# Patient Record
Sex: MALE | Race: WHITE | Employment: OTHER | URBAN - METROPOLITAN AREA
[De-identification: names, ages, dates, MRNs, and addresses within clinical notes are randomized per-mention and may not be internally consistent; named-entity substitution may affect disease eponyms.]

---

## 2018-02-02 PROBLEM — R20.0 NUMBNESS AND TINGLING OF LEFT SIDE OF FACE: Status: ACTIVE | Noted: 2018-02-02

## 2018-02-02 PROBLEM — R20.0 NUMBNESS AND TINGLING IN LEFT ARM: Status: ACTIVE | Noted: 2018-02-02

## 2018-02-02 PROBLEM — R20.2 NUMBNESS AND TINGLING OF LEFT SIDE OF FACE: Status: ACTIVE | Noted: 2018-02-02

## 2018-02-02 PROBLEM — R20.2 NUMBNESS AND TINGLING IN LEFT ARM: Status: ACTIVE | Noted: 2018-02-02

## 2018-04-18 ENCOUNTER — OFFICE VISIT (OUTPATIENT)
Dept: CARDIOLOGY CLINIC | Age: 83
End: 2018-04-18
Payer: MEDICARE

## 2018-04-18 VITALS
WEIGHT: 185.8 LBS | SYSTOLIC BLOOD PRESSURE: 142 MMHG | HEART RATE: 57 BPM | DIASTOLIC BLOOD PRESSURE: 70 MMHG | HEIGHT: 67 IN | RESPIRATION RATE: 16 BRPM | BODY MASS INDEX: 29.16 KG/M2

## 2018-04-18 DIAGNOSIS — I25.10 CORONARY ARTERY DISEASE INVOLVING NATIVE CORONARY ARTERY OF NATIVE HEART WITHOUT ANGINA PECTORIS: Primary | ICD-10-CM

## 2018-04-18 DIAGNOSIS — Z95.1 S/P CABG X 6: ICD-10-CM

## 2018-04-18 PROCEDURE — 1123F ACP DISCUSS/DSCN MKR DOCD: CPT | Performed by: INTERNAL MEDICINE

## 2018-04-18 PROCEDURE — 1036F TOBACCO NON-USER: CPT | Performed by: INTERNAL MEDICINE

## 2018-04-18 PROCEDURE — G8419 CALC BMI OUT NRM PARAM NOF/U: HCPCS | Performed by: INTERNAL MEDICINE

## 2018-04-18 PROCEDURE — G8598 ASA/ANTIPLAT THER USED: HCPCS | Performed by: INTERNAL MEDICINE

## 2018-04-18 PROCEDURE — 93000 ELECTROCARDIOGRAM COMPLETE: CPT | Performed by: INTERNAL MEDICINE

## 2018-04-18 PROCEDURE — G8427 DOCREV CUR MEDS BY ELIG CLIN: HCPCS | Performed by: INTERNAL MEDICINE

## 2018-04-18 PROCEDURE — 99214 OFFICE O/P EST MOD 30 MIN: CPT | Performed by: INTERNAL MEDICINE

## 2018-04-18 PROCEDURE — 4040F PNEUMOC VAC/ADMIN/RCVD: CPT | Performed by: INTERNAL MEDICINE

## 2018-04-18 RX ORDER — METOPROLOL SUCCINATE 25 MG/1
25 TABLET, EXTENDED RELEASE ORAL DAILY
Qty: 90 TABLET | Refills: 3 | Status: ON HOLD | OUTPATIENT
Start: 2018-04-18 | End: 2019-04-16 | Stop reason: HOSPADM

## 2018-07-10 ENCOUNTER — OFFICE VISIT (OUTPATIENT)
Dept: ORTHOPEDIC SURGERY | Age: 83
End: 2018-07-10
Payer: MEDICARE

## 2018-07-10 VITALS
HEART RATE: 53 BPM | SYSTOLIC BLOOD PRESSURE: 133 MMHG | WEIGHT: 175 LBS | TEMPERATURE: 97.2 F | BODY MASS INDEX: 27.47 KG/M2 | DIASTOLIC BLOOD PRESSURE: 69 MMHG | HEIGHT: 67 IN

## 2018-07-10 DIAGNOSIS — M54.16 LUMBAR RADICULAR PAIN: ICD-10-CM

## 2018-07-10 DIAGNOSIS — M25.551 PAIN OF RIGHT HIP JOINT: Primary | ICD-10-CM

## 2018-07-10 PROCEDURE — 1036F TOBACCO NON-USER: CPT | Performed by: ORTHOPAEDIC SURGERY

## 2018-07-10 PROCEDURE — 1123F ACP DISCUSS/DSCN MKR DOCD: CPT | Performed by: ORTHOPAEDIC SURGERY

## 2018-07-10 PROCEDURE — G8598 ASA/ANTIPLAT THER USED: HCPCS | Performed by: ORTHOPAEDIC SURGERY

## 2018-07-10 PROCEDURE — 4040F PNEUMOC VAC/ADMIN/RCVD: CPT | Performed by: ORTHOPAEDIC SURGERY

## 2018-07-10 PROCEDURE — 99203 OFFICE O/P NEW LOW 30 MIN: CPT | Performed by: ORTHOPAEDIC SURGERY

## 2018-07-10 PROCEDURE — G8419 CALC BMI OUT NRM PARAM NOF/U: HCPCS | Performed by: ORTHOPAEDIC SURGERY

## 2018-07-10 PROCEDURE — G8427 DOCREV CUR MEDS BY ELIG CLIN: HCPCS | Performed by: ORTHOPAEDIC SURGERY

## 2018-07-10 NOTE — PROGRESS NOTES
Chief Complaint:   Chief Complaint   Patient presents with    Hip Pain     Left hip and leg pain for several months. History of left BRIONNA 11/09/09. Wearing shoe orthotics. Had 3 treatments from a chiropractor with no improvement. SOSA Hernandez is a 80 y.o. male, who presents With a few month history of relapsing diffuse left hip pain that radiates down the leg actually he is more symptomatic with numbness tingling low the knee to the foot. He had similar symptoms in the past that resolved with chiropractic and variable use of shoe lifts, these symptoms currently do not seem to be responding to these measures. History notable for left total hip 2009 right total hip 2011 both with uneventful recovery. No fever chills sweats or other constitutional symptoms. Allergies; medications; past medical, surgical, family, and social history; and problem list have been reviewed today and updated as indicated in this encounter - see below following Ortho specifics. Musculoskeletal: Leg lengths are grossly equal, hip rotation flexion are painless bilaterally. Straight leg raising is positive for some hamstring tension but no radicular symptoms. Some weakness of ankle dorsiflexion against resistance noted on the left side, Achilles and patellar tendon reflexes absent. Radiologic Studies: X-rays of pelvis and right hip show bilateral total hip arthroplasties, the right is clearly intact without evidence of loosening migration or wear, the left side there may be some early proximal periprosthetic lucency although the distal stem appears intact but there is some evidence of stress remodeling noted at the medial distal end. ASSESSMENT/PLAN:    Lexi Rodriguez was seen today for hip pain.     Diagnoses and all orders for this visit:    Pain of right hip joint  -     Cancel: XR HIP 2-3 VW W PELVIS RIGHT  -     XR HIP 2-3 VW W PELVIS LEFT    Lumbar radicular pain  -     Ambulatory referral to Physical Therapy     I think the majority of the patient's symptoms are lumbar radicular in nature although we did talk about the possibility as well of some subclinical loosening of the left hip femoral stem. Treatment options were reviewed, the patient was referred for physical therapy and we will also consider a PMR consultation. Follow-up in 1 month possible bone scan left hip. Return in about 1 month (around 8/10/2018). Odilon Nayak MD    7/10/2018  4:46 PM      Patient Active Problem List   Diagnosis    CAD (coronary artery disease)    S/P CABG x 6    Hyperlipemia    Carotid artery stenosis, asymptomatic    Numbness and tingling in left arm    Numbness and tingling of left side of face       Past Medical History:   Diagnosis Date    BPH (benign prostatic hyperplasia)     CAD (coronary artery disease)     Carotid artery calcification     DJD (degenerative joint disease)     Hyperlipemia     Macular degeneration     Numbness and tingling in left arm 2/2/2018    Numbness and tingling of left side of face 2/2/2018    S/P CABG x 6 2000    LIMA-LAD, LYNNETTE-LCx,SVG-OM,SVG-LPL,SVG-PDA-RPL       Past Surgical History:   Procedure Laterality Date    CARDIAC SURGERY      6 vessel in 2000   [facility] DENTAL SURGERY      root canal    DIAGNOSTIC CARDIAC CATH LAB PROCEDURE  9/5/00    OTHER SURGICAL HISTORY  02/16/2011    Injection right hip  T. Contreras Palomino MD    TOTAL HIP ARTHROPLASTY Right 03/28/2011    Right BRIONNA  James Vergara MD    TOTAL HIP ARTHROPLASTY Left 11/09/2009    Left BRIONAN  James Vergara MD    TURP         Current Outpatient Prescriptions   Medication Sig Dispense Refill    metoprolol succinate (TOPROL XL) 25 MG extended release tablet Take 1 tablet by mouth daily 90 tablet 3    Calcium-Magnesium-Vitamin D (CALCIUM 1200+D3 PO) Take 1 tablet by mouth daily.  atorvastatin (LIPITOR) 40 MG tablet Take 40 mg by mouth daily.  Multiple Vitamins-Minerals (CENTRUM-LUTEIN PO) Take 1 tablet by mouth daily.       Omega-3 Fatty Acids (FISH OIL) 1000 MG CAPS Take 1,000 mg by mouth daily.  aspirin 81 MG EC tablet Take 81 mg by mouth daily.  Coenzyme Q10 (COQ10) 100 MG CAPS Take 100 mg by mouth daily.  Bevacizumab (AVASTIN IV) Infuse intravenously See Admin Instructions Gets injection in eyes about every 11 weeks       No current facility-administered medications for this visit. Allergies   Allergen Reactions    Nitroglycerin     Biaxin [Clarithromycin] Rash    Iodine Swelling and Rash     Internal Iodine only    Quinolones Rash       Social History     Social History    Marital status:      Spouse name: N/A    Number of children: N/A    Years of education: N/A     Social History Main Topics    Smoking status: Former Smoker     Packs/day: 1.00     Years: 10.00     Types: Cigarettes    Smokeless tobacco: Never Used      Comment: quit 40-50 years ago    Alcohol use Yes      Comment: socially    Drug use: No    Sexual activity: Not Asked     Other Topics Concern    None     Social History Narrative    None       Family History   Problem Relation Age of Onset    Other Mother         accident age 28 years , Gertrude Phylicia. was 5 months old    Other Father         accident age 43 decesed 8 years when Father passed    Heart Disease Brother     Alzheimer's Disease Sister     Heart Disease Brother     Heart Disease Brother          Review of Systems  As follows except as previously noted in HPI:  Constitutional: Negative for chills, diaphoresis, fatigue, fever and unexpected weight change. Respiratory: Negative for cough, shortness of breath and wheezing. Cardiovascular: Negative for chest pain and palpitations. Neurological: Negative for dizziness, syncope, weakness and numbness. Musculoskeletal: see HPI       Objective:   Physical Exam   Constitutional: Oriented to person, place, and time.  and appears well-developed and well-nourished. :   Head: Normocephalic and atraumatic. Eyes: EOM are normal.   Neck: Neck supple. Cardiovascular: Normal rate and regular rhythm. Pulmonary/Chest: Effort normal. No stridor. No respiratory distress, no wheezes. Abdominal: Soft, no distension. There is no tenderness. Neurological: Alert and oriented to person, place, and time. Skin: Skin is warm and dry. Psychiatric: Normal mood and affect.  Behavior is normal. Thought content normal.

## 2018-07-12 ENCOUNTER — EVALUATION (OUTPATIENT)
Dept: PHYSICAL THERAPY | Age: 83
End: 2018-07-12
Payer: MEDICARE

## 2018-07-12 DIAGNOSIS — M54.16 LUMBAR RADICULOPATHY: Primary | ICD-10-CM

## 2018-07-12 PROCEDURE — 97110 THERAPEUTIC EXERCISES: CPT | Performed by: PHYSICAL THERAPIST

## 2018-07-12 PROCEDURE — G8978 MOBILITY CURRENT STATUS: HCPCS | Performed by: PHYSICAL THERAPIST

## 2018-07-12 PROCEDURE — G8979 MOBILITY GOAL STATUS: HCPCS | Performed by: PHYSICAL THERAPIST

## 2018-07-12 PROCEDURE — 97535 SELF CARE MNGMENT TRAINING: CPT | Performed by: PHYSICAL THERAPIST

## 2018-07-12 PROCEDURE — 97162 PT EVAL MOD COMPLEX 30 MIN: CPT | Performed by: PHYSICAL THERAPIST

## 2018-07-12 NOTE — PROGRESS NOTES
INITIAL EVALUATION: SPINE              Date:  2018   Patient: Freeman Knowles  : 1931  MRN: [id number]  Physician: Nuria Smith MD  [address] [place] [address]       Medical Diagnosis: M54.16 (ICD-10-CM) - Lumbar radicular pain    Reason for referral/Mechanism of Injury: 2 month hx of L hip and lateral thigh and lower leg. Has had a history of past episodes of similar symptoms. Onset date: 2 months     SUBJECTIVE:       Past Medical History:   Diagnosis Date    BPH (benign prostatic hyperplasia)     CAD (coronary artery disease)     Carotid artery calcification     DJD (degenerative joint disease)     Hyperlipemia     Macular degeneration     Numbness and tingling in left arm 2018    Numbness and tingling of left side of face 2018    S/P CABG x 6     LIMA-LAD, LYNNETTE-LCx,SVG-OM,SVG-LPL,SVG-PDA-RPL     Past Surgical History:   Procedure Laterality Date    CARDIAC SURGERY      6 vessel in    [facility] DENTAL SURGERY      root canal    DIAGNOSTIC CARDIAC CATH LAB PROCEDURE  00    OTHER SURGICAL HISTORY  2011    Injection right hip  T. Scotty Castro MD    TOTAL HIP ARTHROPLASTY Right 2011    Right BRIONNA  Nakul Bhatt MD    TOTAL HIP ARTHROPLASTY Left 2009    Left BRIONNA  Nakul Bhatt MD    TURP         Pain:  Current 0/10 Best  0/10  Worst 4/10    Symptom Type/Quality/Location:  Intermittent             Provoking Activities/Positions:  [] Sitting,          [] Standing,     [x] Walking,- >1/4 mile                                                                   [x] lying              [] bending,      [] When still,                                                                      [] On the move,  []Turning head                                                                     []a.m.         [] p.m.       [x] as day progresses  -based on activity for longer periods                                                               [] Cough          []

## 2018-09-13 ENCOUNTER — HOSPITAL ENCOUNTER (OUTPATIENT)
Age: 83
Discharge: HOME | End: 2018-09-13
Payer: MEDICARE

## 2018-09-13 LAB
ALBUMIN SERPL-MCNC: 3.9 G/DL (ref 3.5–5.2)
ALP BLD-CCNC: 54 U/L (ref 40–129)
ALT SERPL-CCNC: 16 U/L (ref 0–40)
ANION GAP SERPL CALCULATED.3IONS-SCNC: 9 MMOL/L (ref 7–16)
APTT: 25.3 SEC (ref 25.7–34.7)
AST SERPL-CCNC: 20 U/L (ref 0–39)
BASOPHILS ABSOLUTE: 0.1 E9/L (ref 0–0.2)
BASOPHILS RELATIVE PERCENT: 1.7 % (ref 0–2)
BILIRUB SERPL-MCNC: 1.3 MG/DL (ref 0–1.2)
BILIRUBIN DIRECT: 0.3 MG/DL (ref 0–0.3)
BILIRUBIN, INDIRECT: 1 MG/DL (ref 0–1)
BUN BLDV-MCNC: 22 MG/DL (ref 8–23)
CALCIUM SERPL-MCNC: 9.1 MG/DL (ref 8.6–10.2)
CHLORIDE BLD-SCNC: 105 MMOL/L (ref 98–107)
CHOLESTEROL, TOTAL: 158 MG/DL (ref 0–199)
CO2: 26 MMOL/L (ref 22–29)
CREAT SERPL-MCNC: 1.3 MG/DL (ref 0.7–1.2)
EOSINOPHILS ABSOLUTE: 0.49 E9/L (ref 0.05–0.5)
EOSINOPHILS RELATIVE PERCENT: 8.3 % (ref 0–6)
GFR AFRICAN AMERICAN: >60
GFR NON-AFRICAN AMERICAN: 52 ML/MIN/1.73
GLUCOSE BLD-MCNC: 111 MG/DL (ref 74–109)
HCT VFR BLD CALC: 39.9 % (ref 37–54)
HDLC SERPL-MCNC: 57 MG/DL
HEMOGLOBIN: 13 G/DL (ref 12.5–16.5)
IMMATURE GRANULOCYTES #: 0.01 E9/L
IMMATURE GRANULOCYTES %: 0.2 % (ref 0–5)
INR BLD: 1
IRON: 92 MCG/DL (ref 59–158)
LDL CHOLESTEROL CALCULATED: 79 MG/DL (ref 0–99)
LYMPHOCYTES ABSOLUTE: 1.71 E9/L (ref 1.5–4)
LYMPHOCYTES RELATIVE PERCENT: 28.8 % (ref 20–42)
MCH RBC QN AUTO: 28.8 PG (ref 26–35)
MCHC RBC AUTO-ENTMCNC: 32.6 % (ref 32–34.5)
MCV RBC AUTO: 88.5 FL (ref 80–99.9)
MONOCYTES ABSOLUTE: 0.69 E9/L (ref 0.1–0.95)
MONOCYTES RELATIVE PERCENT: 11.6 % (ref 2–12)
NEUTROPHILS ABSOLUTE: 2.93 E9/L (ref 1.8–7.3)
NEUTROPHILS RELATIVE PERCENT: 49.4 % (ref 43–80)
PDW BLD-RTO: 13.4 FL (ref 11.5–15)
PLATELET # BLD: 196 E9/L (ref 130–450)
PMV BLD AUTO: 8.6 FL (ref 7–12)
POTASSIUM SERPL-SCNC: 4.5 MMOL/L (ref 3.5–5)
PROSTATE SPECIFIC ANTIGEN: 1.51 NG/ML (ref 0–4)
PROTHROMBIN TIME: 11.7 SEC (ref 9.3–12.4)
RBC # BLD: 4.51 E12/L (ref 3.8–5.8)
SEDIMENTATION RATE, ERYTHROCYTE: 10 MM/HR (ref 0–15)
SODIUM BLD-SCNC: 140 MMOL/L (ref 132–146)
T4 FREE: 1.27 NG/DL (ref 0.93–1.7)
TOTAL PROTEIN: 6.6 G/DL (ref 6.4–8.3)
TRIGL SERPL-MCNC: 111 MG/DL (ref 0–149)
TSH SERPL DL<=0.05 MIU/L-ACNC: 1.77 UIU/ML (ref 0.27–4.2)
URIC ACID, SERUM: 7.1 MG/DL (ref 3.4–7)
VITAMIN D 25-HYDROXY: 37 NG/ML (ref 30–100)
VLDLC SERPL CALC-MCNC: 22 MG/DL
WBC # BLD: 5.9 E9/L (ref 4.5–11.5)

## 2018-09-13 PROCEDURE — 85025 COMPLETE CBC W/AUTO DIFF WBC: CPT

## 2018-09-13 PROCEDURE — 85651 RBC SED RATE NONAUTOMATED: CPT

## 2018-09-13 PROCEDURE — G0103 PSA SCREENING: HCPCS

## 2018-09-13 PROCEDURE — 84443 ASSAY THYROID STIM HORMONE: CPT

## 2018-09-13 PROCEDURE — 80053 COMPREHEN METABOLIC PANEL: CPT

## 2018-09-13 PROCEDURE — 36415 COLL VENOUS BLD VENIPUNCTURE: CPT

## 2018-09-13 PROCEDURE — 80061 LIPID PANEL: CPT

## 2018-09-13 PROCEDURE — 84550 ASSAY OF BLOOD/URIC ACID: CPT

## 2018-09-13 PROCEDURE — 85610 PROTHROMBIN TIME: CPT

## 2018-09-13 PROCEDURE — 85730 THROMBOPLASTIN TIME PARTIAL: CPT

## 2018-09-13 PROCEDURE — 82306 VITAMIN D 25 HYDROXY: CPT

## 2018-09-13 PROCEDURE — 83540 ASSAY OF IRON: CPT

## 2018-09-13 PROCEDURE — 82248 BILIRUBIN DIRECT: CPT

## 2018-09-13 PROCEDURE — 84439 ASSAY OF FREE THYROXINE: CPT

## 2018-09-28 ENCOUNTER — HOSPITAL ENCOUNTER (OUTPATIENT)
Dept: PET IMAGING | Age: 83
Discharge: HOME | End: 2018-09-30
Payer: MEDICARE

## 2018-09-28 DIAGNOSIS — R91.1 PULMONARY NODULE: ICD-10-CM

## 2018-09-28 PROCEDURE — 78815 PET IMAGE W/CT SKULL-THIGH: CPT

## 2018-09-28 PROCEDURE — 82962 GLUCOSE BLOOD TEST: CPT

## 2018-09-28 PROCEDURE — 3430000000 HC RX DIAGNOSTIC RADIOPHARMACEUTICAL: Performed by: RADIOLOGY

## 2018-09-28 PROCEDURE — A9552 F18 FDG: HCPCS | Performed by: RADIOLOGY

## 2018-09-28 RX ORDER — FLUDEOXYGLUCOSE F 18 200 MCI/ML
15 INJECTION, SOLUTION INTRAVENOUS
Status: COMPLETED | OUTPATIENT
Start: 2018-09-28 | End: 2018-09-28

## 2018-09-28 RX ADMIN — FLUDEOXYGLUCOSE F 18 15 MILLICURIE: 200 INJECTION, SOLUTION INTRAVENOUS at 10:26

## 2018-10-03 LAB — METER GLUCOSE: 109 MG/DL (ref 70–110)

## 2018-11-19 PROBLEM — R91.1 PULMONARY NODULE: Status: ACTIVE | Noted: 2018-11-19

## 2018-12-17 ENCOUNTER — OFFICE VISIT (OUTPATIENT)
Dept: VASCULAR SURGERY | Age: 83
End: 2018-12-17
Payer: MEDICARE

## 2018-12-17 ENCOUNTER — HOSPITAL ENCOUNTER (OUTPATIENT)
Dept: CARDIOLOGY | Age: 83
Discharge: HOME | End: 2018-12-17
Payer: MEDICARE

## 2018-12-17 VITALS
SYSTOLIC BLOOD PRESSURE: 142 MMHG | HEART RATE: 47 BPM | BODY MASS INDEX: 28.93 KG/M2 | HEIGHT: 66 IN | WEIGHT: 180 LBS | DIASTOLIC BLOOD PRESSURE: 68 MMHG

## 2018-12-17 DIAGNOSIS — I65.23 ASYMPTOMATIC BILATERAL CAROTID ARTERY STENOSIS: ICD-10-CM

## 2018-12-17 DIAGNOSIS — I65.23 ASYMPTOMATIC BILATERAL CAROTID ARTERY STENOSIS: Primary | ICD-10-CM

## 2018-12-17 PROCEDURE — G8427 DOCREV CUR MEDS BY ELIG CLIN: HCPCS | Performed by: SURGERY

## 2018-12-17 PROCEDURE — 1101F PT FALLS ASSESS-DOCD LE1/YR: CPT | Performed by: SURGERY

## 2018-12-17 PROCEDURE — 4040F PNEUMOC VAC/ADMIN/RCVD: CPT | Performed by: SURGERY

## 2018-12-17 PROCEDURE — G8598 ASA/ANTIPLAT THER USED: HCPCS | Performed by: SURGERY

## 2018-12-17 PROCEDURE — 1123F ACP DISCUSS/DSCN MKR DOCD: CPT | Performed by: SURGERY

## 2018-12-17 PROCEDURE — 1036F TOBACCO NON-USER: CPT | Performed by: SURGERY

## 2018-12-17 PROCEDURE — 99213 OFFICE O/P EST LOW 20 MIN: CPT | Performed by: SURGERY

## 2018-12-17 PROCEDURE — G8419 CALC BMI OUT NRM PARAM NOF/U: HCPCS | Performed by: SURGERY

## 2018-12-17 PROCEDURE — 93880 EXTRACRANIAL BILAT STUDY: CPT

## 2018-12-17 PROCEDURE — G8482 FLU IMMUNIZE ORDER/ADMIN: HCPCS | Performed by: SURGERY

## 2018-12-17 RX ORDER — TAMSULOSIN HYDROCHLORIDE 0.4 MG/1
0.4 CAPSULE ORAL EVERY OTHER DAY
COMMUNITY
End: 2019-03-21 | Stop reason: ALTCHOICE

## 2018-12-17 RX ORDER — FINASTERIDE 5 MG/1
5 TABLET, FILM COATED ORAL EVERY OTHER DAY
COMMUNITY
End: 2019-03-21 | Stop reason: ALTCHOICE

## 2018-12-17 NOTE — PATIENT INSTRUCTIONS
exercise program. Regular exercise lowers your chance of stroke. · Limit alcohol to 2 drinks a day for men and 1 drink a day for women. Too much alcohol can cause health problems. · Work with your doctor to control high blood pressure, high cholesterol, diabetes, and other conditions that increase your chance of a stroke. A healthy diet, exercise, weight loss (if needed), and medicines can help. · Avoid colds and flu. Get the flu vaccine every year. When should you call for help? Call 911 anytime you think you may need emergency care. For example, call if:    · You passed out (lost consciousness).     · You have symptoms of a stroke. These may include:  ? Sudden numbness, tingling, weakness, or loss of movement in your face, arm, or leg, especially on only one side of your body. ? Sudden vision changes. ? Sudden trouble speaking. ? Sudden confusion or trouble understanding simple statements. ? Sudden problems with walking or balance. ? A sudden, severe headache that is different from past headaches.    Call your doctor now or seek immediate medical care if:    · You are dizzy or lightheaded, or you feel like you may faint.    Watch closely for changes in your health, and be sure to contact your doctor if you have any problems. Where can you learn more? Go to [email or web address] org and sign in to your PositiveID account. Enter S652 in the Wicron box to learn more about \"Carotid Stenosis: Care Instructions. \"     If you do not have an account, please click on the \"Sign Up Now\" link. Current as of: December 6, 2017  Content Version: 11.8  © 4972-0941 Healthwise, [employer]orporated. Care instructions adapted under license by [facility] ([facility]). If you have questions about a medical condition or this instruction, always ask your healthcare professional. [place] [address] any warranty or liability for your use of this information.

## 2019-03-11 ENCOUNTER — HOSPITAL ENCOUNTER (OUTPATIENT)
Dept: CT IMAGING | Age: 84
Discharge: HOME | End: 2019-03-13
Payer: MEDICARE

## 2019-03-11 DIAGNOSIS — R91.1 LUNG NODULE: ICD-10-CM

## 2019-03-11 PROCEDURE — 71250 CT THORAX DX C-: CPT

## 2019-03-26 ENCOUNTER — INITIAL CONSULT (OUTPATIENT)
Dept: CARDIOTHORACIC SURGERY | Age: 84
End: 2019-03-26
Payer: MEDICARE

## 2019-03-26 ENCOUNTER — PREP FOR PROCEDURE (OUTPATIENT)
Dept: CARDIOTHORACIC SURGERY | Age: 84
End: 2019-03-26

## 2019-03-26 ENCOUNTER — TELEPHONE (OUTPATIENT)
Dept: CARDIOLOGY | Age: 84
End: 2019-03-26

## 2019-03-26 VITALS
BODY MASS INDEX: 26.68 KG/M2 | WEIGHT: 170 LBS | SYSTOLIC BLOOD PRESSURE: 154 MMHG | HEART RATE: 50 BPM | HEIGHT: 67 IN | DIASTOLIC BLOOD PRESSURE: 74 MMHG

## 2019-03-26 DIAGNOSIS — R91.1 PULMONARY NODULE: Primary | ICD-10-CM

## 2019-03-26 DIAGNOSIS — Z01.818 PRE-OP TESTING: ICD-10-CM

## 2019-03-26 PROCEDURE — 1101F PT FALLS ASSESS-DOCD LE1/YR: CPT | Performed by: THORACIC SURGERY (CARDIOTHORACIC VASCULAR SURGERY)

## 2019-03-26 PROCEDURE — G8482 FLU IMMUNIZE ORDER/ADMIN: HCPCS | Performed by: THORACIC SURGERY (CARDIOTHORACIC VASCULAR SURGERY)

## 2019-03-26 PROCEDURE — G8598 ASA/ANTIPLAT THER USED: HCPCS | Performed by: THORACIC SURGERY (CARDIOTHORACIC VASCULAR SURGERY)

## 2019-03-26 PROCEDURE — 1123F ACP DISCUSS/DSCN MKR DOCD: CPT | Performed by: THORACIC SURGERY (CARDIOTHORACIC VASCULAR SURGERY)

## 2019-03-26 PROCEDURE — G8419 CALC BMI OUT NRM PARAM NOF/U: HCPCS | Performed by: THORACIC SURGERY (CARDIOTHORACIC VASCULAR SURGERY)

## 2019-03-26 PROCEDURE — 99204 OFFICE O/P NEW MOD 45 MIN: CPT | Performed by: THORACIC SURGERY (CARDIOTHORACIC VASCULAR SURGERY)

## 2019-03-26 PROCEDURE — G8427 DOCREV CUR MEDS BY ELIG CLIN: HCPCS | Performed by: THORACIC SURGERY (CARDIOTHORACIC VASCULAR SURGERY)

## 2019-03-26 PROCEDURE — 1036F TOBACCO NON-USER: CPT | Performed by: THORACIC SURGERY (CARDIOTHORACIC VASCULAR SURGERY)

## 2019-03-26 PROCEDURE — 4040F PNEUMOC VAC/ADMIN/RCVD: CPT | Performed by: THORACIC SURGERY (CARDIOTHORACIC VASCULAR SURGERY)

## 2019-03-26 RX ORDER — SODIUM CHLORIDE 0.9 % (FLUSH) 0.9 %
10 SYRINGE (ML) INJECTION PRN
Status: CANCELLED | OUTPATIENT
Start: 2019-03-26

## 2019-03-26 RX ORDER — CHLORHEXIDINE GLUCONATE 0.12 MG/ML
15 RINSE ORAL ONCE
Status: CANCELLED | OUTPATIENT
Start: 2019-03-26 | End: 2019-03-26

## 2019-03-26 RX ORDER — CHLORHEXIDINE GLUCONATE 4 G/100ML
SOLUTION TOPICAL ONCE
Status: CANCELLED | OUTPATIENT
Start: 2019-03-26 | End: 2019-03-26

## 2019-03-26 RX ORDER — SODIUM CHLORIDE 9 MG/ML
INJECTION, SOLUTION INTRAVENOUS CONTINUOUS
Status: CANCELLED | OUTPATIENT
Start: 2019-03-26

## 2019-03-26 RX ORDER — SODIUM CHLORIDE 0.9 % (FLUSH) 0.9 %
10 SYRINGE (ML) INJECTION EVERY 12 HOURS SCHEDULED
Status: CANCELLED | OUTPATIENT
Start: 2019-03-26

## 2019-03-26 ASSESSMENT — ENCOUNTER SYMPTOMS
SHORTNESS OF BREATH: 0
ABDOMINAL PAIN: 0
CHEST TIGHTNESS: 0
COUGH: 0

## 2019-03-26 NOTE — H&P
Patient ID: Kaye Goodwin is a 80 y.o. male. Chief Complaint   Patient presents with    Consultation       referral Dr Michael Gallagher lung nodule      HPI  Mr. Tali Camp is an 66-year-old male recently presenting to office upon referral from Dr. Michael Gallagher for a lung nodule. He underwent a workup following episodes of hemoptysis this past October. A CT of the chest revealed a 12 mm nodule in the superior segment of the LLL. PET scan revealed a SUV 4.7 with no other uptake. A repeat CT scan done on 3/11/19 shows increase in nodule size to 16 mm. He continues to c/o hemoptysis. He denies any significant SOB, cough, MACDONALD, recent weight loss, night sweats, or fever/chills. He reports to the office to discuss possible surgical intervention. Past Medical History:   Diagnosis Date    BPH (benign prostatic hyperplasia)      CAD (coronary artery disease)      Carotid artery calcification      DJD (degenerative joint disease)      Hyperlipemia      Macular degeneration      Numbness and tingling in left arm 2018    Numbness and tingling of left side of face 2018    S/P CABG x 6      LIMA-LAD, LYNNETTE-LCx,SVG-OM,SVG-LPL,SVG-PDA-RPL                 Past Surgical History:   Procedure Laterality Date    CARDIAC SURGERY         6 vessel in     DENTAL SURGERY         root canal    DIAGNOSTIC CARDIAC CATH LAB PROCEDURE   00    OTHER SURGICAL HISTORY   2011     Injection right hip  T.  Kristen Curiel MD    TOTAL HIP ARTHROPLASTY Right 2011     Right BRIONNA Joyce MD    TOTAL HIP ARTHROPLASTY Left 2009     Left BRIONNA Joyce MD    TUR                   Family History   Problem Relation Age of Onset    Other Mother           accident age 28 years , Michaela Teran. was 5 months old    Other Father           accident age 43 decesed 8 years when Father passed   Cher Dally Heart Disease Brother      Alzheimer's Disease Sister      Heart Disease Brother      Heart Disease Brother                 Allergies   Allergen Reactions    Nitroglycerin      Biaxin [Clarithromycin] Rash    Iodine Swelling and Rash       Internal Iodine only    Quinolones Rash            Current Outpatient Medications:     metoprolol succinate (TOPROL XL) 25 MG extended release tablet, Take 1 tablet by mouth daily, Disp: 90 tablet, Rfl: 3    Calcium-Magnesium-Vitamin D (CALCIUM 1200+D3 PO), Take 1 tablet by mouth daily. , Disp: , Rfl:     atorvastatin (LIPITOR) 40 MG tablet, Take 40 mg by mouth daily. , Disp: , Rfl:     Multiple Vitamins-Minerals (CENTRUM-LUTEIN PO), Take 1 tablet by mouth daily. , Disp: , Rfl:     Omega-3 Fatty Acids (FISH OIL) 1000 MG CAPS, Take 1,000 mg by mouth daily. , Disp: , Rfl:     aspirin 81 MG EC tablet, Take 81 mg by mouth daily. , Disp: , Rfl:     Coenzyme Q10 (COQ10) 100 MG CAPS, Take 100 mg by mouth daily. , Disp: , Rfl:     Bevacizumab (AVASTIN IV), Infuse intravenously See Admin Instructions Gets injection in eyes about every 11 weeks, Disp: , Rfl:      Social History            Tobacco Use    Smoking status: Former Smoker       Packs/day: 1.00       Years: 10.00       Pack years: 10.00       Types: Cigarettes    Smokeless tobacco: Never Used    Tobacco comment: quit 40-50 years ago   Substance Use Topics    Alcohol use: Yes       Comment: socially              Vitals:     03/26/19 1001 03/26/19 1006   BP: (!) 163/79 (!) 154/74   Site: Right Upper Arm     Position: Sitting     Pulse: 50     Weight: 170 lb (77.1 kg)     Height: 5' 7\" (1.702 m)            Review of Systems   Constitutional: Negative for activity change, fatigue and unexpected weight change. Respiratory: Negative for cough, chest tightness and shortness of breath.         + hemoptysis   Cardiovascular: Negative for chest pain. Gastrointestinal: Negative for abdominal pain.    Neurological: Negative for dizziness, syncope and light-headedness.         Objective:   Physical Exam   Constitutional: He is oriented to person, place, and time. He appears well-developed. No distress. Cardiovascular: Normal rate. Pulmonary/Chest: Effort normal. No respiratory distress. Abdominal: Soft. Bowel sounds are normal.   Musculoskeletal: Normal range of motion. Neurological: He is alert and oriented to person, place, and time. Skin: Skin is warm and dry. Psychiatric: He has a normal mood and affect.         Assessment:      Superior segment LLL lung nodule                Plan:   Per. Dr. Sharla Winter:   Certainly cancer until proven otherwise. Robotic LLL wedge possible lobe. He has had previous CABG so there will likely be adhesions. In an 80year old I do not feel dissecting out his LIMA graft to get to AP window and periaortic lymph nodes is appropriate. I will get subcarinal lymph nodes, and others. PAT 4/9, OR 4/12 at 7 am.                   H&P Update    Patient's History and Physical from March 26, 2019 was reviewed. Patient examined. There has been no change.     Baldemar Seen

## 2019-04-04 NOTE — PROGRESS NOTES
[facility] PRE-ADMISSION TESTING GENERAL INSTRUCTIONS- formerly [facility]-phone number:[phone]    GENERAL INSTRUCTIONS  ? Antibacterial Soap shower Night before and/or AM of Surgery  ? James wipe instruction sheet and wipes given. X ? Nothing by mouth after midnight, including gum, candy, mints, or water. X ? You may brush your teeth, gargle, but do NOT swallow water. X ? Hibiclens shower  the night before and the morning of surgery. Do not use             Hibiclens on your face or head. X ? No smoking, chewing tobacco, illegal drugs, or alcohol within 24 hours of your surgery. X ? Jewelry, valuables or body piercing's should not be brought to the hospital. All body and/or tongue piercing's must be removed prior to arriving to hospital.  ALL hair pins must be removed. X ? Do not wear makeup, lotions, powders, deodorant. Nail polish as directed by the nurse. X ? Arrange transportation with a responsible adult  to and from the hospital. If you do not have a responsible adult  to transport you, you will need to make arrangements with a medical transportation company (i.e. [employer]. A Uber/taxi/bus is not appropriate unless you are accompanied by a responsible adult ). Arrange for someone to be with you for the remainder of the day and for 24 hours after your procedure due to having had anesthesia. Who will be your  for transportation?___FEDERICO BENNETT______________   Who will be staying with you for 24 hrs after your procedure?______FEDERICO BENNETT____________  ? Bring insurance card and photo ID. X ? Transfusion Bracelet: Please bring with you to hospital, day of surgery  ? Bring urine specimen day of surgery. Any small container is acceptable. ? Use inhalers the morning of surgery and bring with you to hospital.  ? Bring copy of living will or healthcare power of  papers to be placed in your electronic record.   ? CPAP/BI-PAP: Please bring your machine if you are to spend the night in the hospital.     PARKING INSTRUCTIONS:    X ? Arrival Time:__0500___________  ·  X ? Parking lot '\"I\"  is located on [facility] (the corner of [facility] and [facility]). To enter, press the button and the gate will lift. A free token will be provided to exit the lot. One car per patient is allowed to park in this lot. All other cars are to park on [address] [place] [address] either in the parking garage or the handicap lot. ? Free  parking is available on [address] [place] [address]. · ? To reach the [facility] lobby from [address] [place] [address], upon entering the hospital, take elevator B to the 3rd floor. EDUCATION INSTRUCTIONS:      ? Knee or hip replacement booklet & exercise pamphlets given. ? Bj 77 placed in chart. X ? Pre-admission Testing educational folder given   X ? Incentive Spirometry,coughing & deep breathing exercises reviewed. X ? Medication information sheet(s)    X ? Fluoroscopy-Xray used in surgery reviewed with patient. Educational pamphlet placed in chart. X ? Pain: Post-op pain is normal and to be expected. You will be asked to rate your pain from 0-10(a zero is not acceptable-education is needed). Your post-op pain goal is:3   X ? Ask your nurse for your pain medication. ? Joint camp offered. ? Joint replacement booklets given. ? Other:___________________________    MEDICATION INSTRUCTIONS:    X ? Bring a complete list of your medications, please write the last time you took the medicine, give this list to the nurse. X ? Take the following medications the morning of surgery with 1-2 ounces of water: SEE LIST   X ? Stop herbal supplements and vitamins 5 days before your surgery. ? DO NOT take any diabetic medicine the morning of surgery. Follow instructions for insulin the day before surgery.   ? If you are diabetic and your blood sugar is low or you feel symptomatic, you may drink 1-2 ounces of apple

## 2019-04-09 ENCOUNTER — HOSPITAL ENCOUNTER (OUTPATIENT)
Dept: CARDIOLOGY | Age: 84
Discharge: HOME | End: 2019-04-09
Payer: MEDICARE

## 2019-04-09 DIAGNOSIS — R06.09 DOE (DYSPNEA ON EXERTION): ICD-10-CM

## 2019-04-09 LAB
LV EF: 60 %
LVEF MODALITY: NORMAL

## 2019-04-11 ENCOUNTER — ANESTHESIA EVENT (OUTPATIENT)
Dept: OPERATING ROOM | Age: 84
DRG: 163 | End: 2019-04-11
Payer: MEDICARE

## 2019-04-11 ENCOUNTER — HOSPITAL ENCOUNTER (OUTPATIENT)
Dept: PREADMISSION TESTING | Age: 84
Discharge: HOME | DRG: 163 | End: 2019-04-11
Payer: MEDICARE

## 2019-04-11 VITALS
HEIGHT: 67 IN | RESPIRATION RATE: 12 BRPM | OXYGEN SATURATION: 96 % | SYSTOLIC BLOOD PRESSURE: 136 MMHG | TEMPERATURE: 98 F | DIASTOLIC BLOOD PRESSURE: 60 MMHG | BODY MASS INDEX: 26.68 KG/M2 | HEART RATE: 54 BPM | WEIGHT: 170 LBS

## 2019-04-11 DIAGNOSIS — Z01.818 PRE-OP TESTING: ICD-10-CM

## 2019-04-11 DIAGNOSIS — R91.1 PULMONARY NODULE: ICD-10-CM

## 2019-04-11 LAB
ABO/RH: NORMAL
ALBUMIN SERPL-MCNC: 4 G/DL (ref 3.5–5.2)
ALP BLD-CCNC: 69 U/L (ref 40–129)
ALT SERPL-CCNC: 22 U/L (ref 0–40)
ANION GAP SERPL CALCULATED.3IONS-SCNC: 10 MMOL/L (ref 7–16)
ANTIBODY SCREEN: NORMAL
AST SERPL-CCNC: 24 U/L (ref 0–39)
BILIRUB SERPL-MCNC: 1.1 MG/DL (ref 0–1.2)
BUN BLDV-MCNC: 19 MG/DL (ref 8–23)
CALCIUM SERPL-MCNC: 9.2 MG/DL (ref 8.6–10.2)
CHLORIDE BLD-SCNC: 100 MMOL/L (ref 98–107)
CO2: 26 MMOL/L (ref 22–29)
CREAT SERPL-MCNC: 1.2 MG/DL (ref 0.7–1.2)
GFR AFRICAN AMERICAN: >60
GFR NON-AFRICAN AMERICAN: 57 ML/MIN/1.73
GLUCOSE BLD-MCNC: 153 MG/DL (ref 74–99)
HCT VFR BLD CALC: 38.2 % (ref 37–54)
HEMOGLOBIN: 12.6 G/DL (ref 12.5–16.5)
MCH RBC QN AUTO: 29.3 PG (ref 26–35)
MCHC RBC AUTO-ENTMCNC: 33 % (ref 32–34.5)
MCV RBC AUTO: 88.8 FL (ref 80–99.9)
PDW BLD-RTO: 13.4 FL (ref 11.5–15)
PLATELET # BLD: 212 E9/L (ref 130–450)
PMV BLD AUTO: 8.8 FL (ref 7–12)
POTASSIUM REFLEX MAGNESIUM: 4.9 MMOL/L (ref 3.5–5)
RBC # BLD: 4.3 E12/L (ref 3.8–5.8)
SODIUM BLD-SCNC: 136 MMOL/L (ref 132–146)
TOTAL PROTEIN: 7 G/DL (ref 6.4–8.3)
WBC # BLD: 7.3 E9/L (ref 4.5–11.5)

## 2019-04-11 PROCEDURE — 86900 BLOOD TYPING SEROLOGIC ABO: CPT

## 2019-04-11 PROCEDURE — 36415 COLL VENOUS BLD VENIPUNCTURE: CPT

## 2019-04-11 PROCEDURE — 85027 COMPLETE CBC AUTOMATED: CPT

## 2019-04-11 PROCEDURE — 80053 COMPREHEN METABOLIC PANEL: CPT

## 2019-04-11 PROCEDURE — 86901 BLOOD TYPING SEROLOGIC RH(D): CPT

## 2019-04-11 PROCEDURE — 86850 RBC ANTIBODY SCREEN: CPT

## 2019-04-11 PROCEDURE — 86923 COMPATIBILITY TEST ELECTRIC: CPT

## 2019-04-11 PROCEDURE — 87081 CULTURE SCREEN ONLY: CPT

## 2019-04-11 NOTE — ANESTHESIA PRE PROCEDURE
Department of Anesthesiology  Preprocedure Note       Name:  Emma Borges   Age:  80 y.o.  :  1931                                          MRN:  [id number]         Date:  2019      Surgeon: Misti Vergara):  Enzo Zavaleta MD    Procedure: BRONCHOSCOPY LEFT THORACOSCOPY ROBOTIC VIDEO ASSISTED , WEDGE RESECTION, POSS. LEFT LOWER LOBECTOMY (Left )    Medications prior to admission:   Prior to Admission medications    Medication Sig Start Date End Date Taking? Authorizing Provider   PROAIR  (07 Base) MCG/ACT inhaler  3/27/19  Yes Historical Provider, MD   metoprolol succinate (TOPROL XL) 25 MG extended release tablet Take 1 tablet by mouth daily 18  Yes Jb Rivera DO   atorvastatin (LIPITOR) 40 MG tablet Take 40 mg by mouth daily. Yes Historical Provider, MD   Calcium-Magnesium-Vitamin D (CALCIUM 1200+D3 PO) Take 1 tablet by mouth daily. Historical Provider, MD   Multiple Vitamins-Minerals (CENTRUM-LUTEIN PO) Take 1 tablet by mouth daily. Historical Provider, MD   Omega-3 Fatty Acids (FISH OIL) 1000 MG CAPS Take 1,000 mg by mouth daily. Historical Provider, MD   aspirin 81 MG EC tablet Take 81 mg by mouth daily. Historical Provider, MD   Coenzyme Q10 (COQ10) 100 MG CAPS Take 100 mg by mouth daily.       Historical Provider, MD   Bevacizumab (AVASTIN IV) Infuse intravenously See Admin Instructions Gets injection in eyes about every 11 weeks    Historical Provider, MD       Current medications:    Current Facility-Administered Medications   Medication Dose Route Frequency Provider Last Rate Last Dose    HYDROmorphone (DILAUDID) injection 0.25 mg  0.25 mg Intravenous Q5 Min PRN Nadine Mixon Finamore, DO        HYDROmorphone (DILAUDID) injection 0.5 mg  0.5 mg Intravenous Q5 Min PRN Nadine Mixon Finamore, DO        morphine (PF) injection 1 mg  1 mg Intravenous Q5 Min PRN Nadine Mixon Finamore, DO        morphine (PF) injection 2 mg  2 mg Intravenous Q5 Min PRN Virgene Klinefelter, DO  oxyCODONE-acetaminophen (PERCOCET) 5-325 MG per tablet 1 tablet  1 tablet Oral PRN Read Lute, DO        Or    oxyCODONE-acetaminophen (PERCOCET) 5-325 MG per tablet 2 tablet  2 tablet Oral PRN Read Lute, DO        [facility] injection 4 mg  4 mg Intravenous Once PRN Read Lute, DO        meperidine (DEMEROL) injection 12.5 mg  12.5 mg Intravenous Q15 Min PRN Ranjith Kaye, DO        0.9 % sodium chloride infusion   Intravenous Continuous Jules Home, APRN -  mL/hr at 04/12/19 0601      ceFAZolin (ANCEF) 2 g in dextrose 3 % 50 mL IVPB (duplex)  2 g Intravenous On Call to [address], APRN - CNP        chlorhexidine (HIBICLENS) 4 % liquid   Topical Once Jules Home, APRN - CNP        sodium chloride flush 0.9 % injection 10 mL  10 mL Intravenous 2 times per day Jules Home, APRN - CNP        sodium chloride flush 0.9 % injection 10 mL  10 mL Intravenous PRN Jules Home, APRN - CNP           Allergies:     Allergies   Allergen Reactions    Nitroglycerin     Biaxin [Clarithromycin] Rash    Iodine Swelling and Rash     Internal Iodine only    Quinolones Rash       Problem List:    Patient Active Problem List   Diagnosis Code    CAD (coronary artery disease) I25.10    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    Hyperlipemia E78.5    Asymptomatic bilateral carotid artery stenosis I65.23    Numbness and tingling in left arm R20.0, R20.2    Numbness and tingling of left side of face R20.0, R20.2    Pulmonary nodule R91.1    Lung nodule R91.1       Past Medical History:        Diagnosis Date    BPH (benign prostatic hyperplasia)     CAD (coronary artery disease)     Carotid artery calcification     DJD (degenerative joint disease)     Hyperlipemia     Lung nodule     Macular degeneration     Numbness and tingling in left arm 2/2/2018    Numbness and tingling of left side of face 2/2/2018    S/P CABG x 6 2000    LIMA-LAD, LYNNETTE-LCx,SVG-OM,SVG-LPL,SVG-PDA-RPL       Past Surgical History:        Procedure Laterality Date    CARDIAC SURGERY      6 vessel in 2000   [facility] DENTAL SURGERY      root canal    DIAGNOSTIC CARDIAC CATH LAB PROCEDURE  9/5/00    OTHER SURGICAL HISTORY  02/16/2011    Injection right hip  ADRIANNE Pickering MD    TOTAL HIP ARTHROPLASTY Right 03/28/2011    Right BRIONNA  Zena Ladd MD    TOTAL HIP ARTHROPLASTY Left 11/09/2009    Left BRIONNA  Zena Ladd MD    TURKAREN         Social History:    Social History     Tobacco Use    Smoking status: Former Smoker     Packs/day: 1.00     Years: 10.00     Pack years: 10.00     Types: Cigarettes    Smokeless tobacco: Never Used    Tobacco comment: quit 40-50 years ago   Substance Use Topics    Alcohol use: Yes     Comment: socially                                Counseling given: Not Answered  Comment: quit 40-50 years ago      Vital Signs (Current):   Vitals:    04/12/19 0530 04/12/19 0630   BP: (!) 172/77 (!) 219/84   Pulse: 71 64   Resp: 18 16   Temp: 98 °F (36.7 °C)    TempSrc: Temporal    SpO2: 95% 99%   Weight: 170 lb (77.1 kg)                                               BP Readings from Last 3 Encounters:   04/12/19 (!) 219/84   04/11/19 136/60   03/26/19 (!) 154/74       NPO Status: Pt advised NPO after 4/12/19 0000 Time of last liquid consumption: 2100                        Time of last solid consumption: 2100                        Date of last liquid consumption: 04/11/19                        Date of last solid food consumption: 04/11/19    BMI:   Wt Readings from Last 3 Encounters:   04/12/19 170 lb (77.1 kg)   04/11/19 170 lb (77.1 kg)   03/26/19 170 lb (77.1 kg)     Body mass index is 26.63 kg/m².     CBC:   Lab Results   Component Value Date    WBC 7.3 04/11/2019    RBC 4.30 04/11/2019    HGB 12.6 04/11/2019    HCT 38.2 04/11/2019    MCV 88.8 04/11/2019    RDW 13.4 04/11/2019     04/11/2019       CMP:   Lab Results   Component Value Date     04/11/2019 K 4.9 04/11/2019     04/11/2019    CO2 26 04/11/2019    BUN 19 04/11/2019    CREATININE 1.2 04/11/2019    GFRAA >60 04/11/2019    LABGLOM 57 04/11/2019    GLUCOSE 153 04/11/2019    GLUCOSE 109 05/08/2012    PROT 7.0 04/11/2019    CALCIUM 9.2 04/11/2019    BILITOT 1.1 04/11/2019    ALKPHOS 69 04/11/2019    AST 24 04/11/2019    ALT 22 04/11/2019       POC Tests: No results for input(s): POCGLU, POCNA, POCK, POCCL, POCBUN, POCHEMO, POCHCT in the last 72 hours. Coags:   Lab Results   Component Value Date    PROTIME 11.7 09/13/2018    INR 1.0 09/13/2018    APTT 25.3 09/13/2018       HCG (If Applicable): No results found for: PREGTESTUR, PREGSERUM, HCG, HCGQUANT     ABGs: No results found for: PHART, PO2ART, XVC5QJC, XAS9IQO, BEART, J0NLFKZD     Type & Screen (If Applicable):  No results found for: LABABO, LABRH    EKG 4/18/18  Sinus  Bradycardia   -Left axis -anterior fascicular block. Low voltage with rightward P-axis and rotation -possible pulmonary disease. Anesthesia Evaluation  Patient summary reviewed and Nursing notes reviewed no history of anesthetic complications:   Airway: Mallampati: III  TM distance: >3 FB   Neck ROM: full  Mouth opening: > = 3 FB Dental:      Comment: Pt denies any loose, chipped or missing teeth    Pulmonary:Negative Pulmonary ROS and normal exam  breath sounds clear to auscultation                             Cardiovascular:    (+) CAD:, CABG/stent (CABG x6 - 2000):, hyperlipidemia      ECG reviewed  Rhythm: regular  Rate: normal           Beta Blocker:  Dose within 24 Hrs         Neuro/Psych:                ROS comment: Numbness and tingling of left side of face GI/Hepatic/Renal: Neg GI/Hepatic/Renal ROS            Endo/Other:    (+) blood dyscrasia (Aspirin last taken 4/9/19): anemia:., malignancy/cancer (Pulmonary nodule (possible stage 1)).                   ROS comment: DJD (degenerative joint disease) Abdominal:           Vascular:           ROS comment: Asymptomatic bilateral carotid artery stenosis. Anesthesia Plan      general and regional     ASA 3     (Pt advised and educated on possibility of 2 large bore IVs and art line, pt agreeable )  Induction: intravenous. BIS and arterial line  MIPS: Postoperative opioids intended and Prophylactic antiemetics administered. Anesthetic plan and risks discussed with patient. Use of blood products discussed with patient whom consented to blood products. Plan discussed with CRNA and attending.               Daniel Wren DO   4/12/2019

## 2019-04-12 ENCOUNTER — HOSPITAL ENCOUNTER (INPATIENT)
Age: 84
LOS: 4 days | Discharge: HOME | DRG: 163 | End: 2019-04-16
Attending: THORACIC SURGERY (CARDIOTHORACIC VASCULAR SURGERY) | Admitting: THORACIC SURGERY (CARDIOTHORACIC VASCULAR SURGERY)
Payer: MEDICARE

## 2019-04-12 ENCOUNTER — APPOINTMENT (OUTPATIENT)
Dept: GENERAL RADIOLOGY | Age: 84
DRG: 163 | End: 2019-04-12
Attending: THORACIC SURGERY (CARDIOTHORACIC VASCULAR SURGERY)
Payer: MEDICARE

## 2019-04-12 ENCOUNTER — ANESTHESIA (OUTPATIENT)
Dept: OPERATING ROOM | Age: 84
DRG: 163 | End: 2019-04-12
Payer: MEDICARE

## 2019-04-12 VITALS — SYSTOLIC BLOOD PRESSURE: 158 MMHG | OXYGEN SATURATION: 100 % | DIASTOLIC BLOOD PRESSURE: 81 MMHG | TEMPERATURE: 90.1 F

## 2019-04-12 DIAGNOSIS — G89.18 ACUTE POST-OPERATIVE PAIN: Primary | ICD-10-CM

## 2019-04-12 PROBLEM — R91.1 LUNG NODULE: Status: ACTIVE | Noted: 2019-04-12

## 2019-04-12 LAB
BLOOD BANK DISPENSE STATUS: NORMAL
BLOOD BANK DISPENSE STATUS: NORMAL
BLOOD BANK PRODUCT CODE: NORMAL
BLOOD BANK PRODUCT CODE: NORMAL
BPU ID: NORMAL
BPU ID: NORMAL
DESCRIPTION BLOOD BANK: NORMAL
DESCRIPTION BLOOD BANK: NORMAL
ORGANISM: ABNORMAL

## 2019-04-12 PROCEDURE — 6360000002 HC RX W HCPCS: Performed by: NURSE ANESTHETIST, CERTIFIED REGISTERED

## 2019-04-12 PROCEDURE — 07T70ZZ RESECTION OF THORAX LYMPHATIC, OPEN APPROACH: ICD-10-PCS | Performed by: THORACIC SURGERY (CARDIOTHORACIC VASCULAR SURGERY)

## 2019-04-12 PROCEDURE — 3700000001 HC ADD 15 MINUTES (ANESTHESIA): Performed by: THORACIC SURGERY (CARDIOTHORACIC VASCULAR SURGERY)

## 2019-04-12 PROCEDURE — 94640 AIRWAY INHALATION TREATMENT: CPT

## 2019-04-12 PROCEDURE — 2700000000 HC OXYGEN THERAPY PER DAY

## 2019-04-12 PROCEDURE — 6360000002 HC RX W HCPCS: Performed by: REGISTERED NURSE

## 2019-04-12 PROCEDURE — 6370000000 HC RX 637 (ALT 250 FOR IP): Performed by: NURSE PRACTITIONER

## 2019-04-12 PROCEDURE — 88305 TISSUE EXAM BY PATHOLOGIST: CPT

## 2019-04-12 PROCEDURE — 2720000010 HC SURG SUPPLY STERILE: Performed by: THORACIC SURGERY (CARDIOTHORACIC VASCULAR SURGERY)

## 2019-04-12 PROCEDURE — 32674 THORACOSCOPY LYMPH NODE EXC: CPT | Performed by: NURSE PRACTITIONER

## 2019-04-12 PROCEDURE — 6360000002 HC RX W HCPCS

## 2019-04-12 PROCEDURE — 8E0W4CZ ROBOTIC ASSISTED PROCEDURE OF TRUNK REGION, PERCUTANEOUS ENDOSCOPIC APPROACH: ICD-10-PCS | Performed by: THORACIC SURGERY (CARDIOTHORACIC VASCULAR SURGERY)

## 2019-04-12 PROCEDURE — 0BTJ0ZZ RESECTION OF LEFT LOWER LUNG LOBE, OPEN APPROACH: ICD-10-PCS | Performed by: THORACIC SURGERY (CARDIOTHORACIC VASCULAR SURGERY)

## 2019-04-12 PROCEDURE — 32663 THORACOSCOPY W/LOBECTOMY: CPT | Performed by: NURSE PRACTITIONER

## 2019-04-12 PROCEDURE — 7100000000 HC PACU RECOVERY - FIRST 15 MIN: Performed by: THORACIC SURGERY (CARDIOTHORACIC VASCULAR SURGERY)

## 2019-04-12 PROCEDURE — 88342 IMHCHEM/IMCYTCHM 1ST ANTB: CPT

## 2019-04-12 PROCEDURE — 88331 PATH CONSLTJ SURG 1 BLK 1SPC: CPT

## 2019-04-12 PROCEDURE — 97161 PT EVAL LOW COMPLEX 20 MIN: CPT

## 2019-04-12 PROCEDURE — 97110 THERAPEUTIC EXERCISES: CPT

## 2019-04-12 PROCEDURE — 88309 TISSUE EXAM BY PATHOLOGIST: CPT

## 2019-04-12 PROCEDURE — 3600000019 HC SURGERY ROBOT ADDTL 15MIN: Performed by: THORACIC SURGERY (CARDIOTHORACIC VASCULAR SURGERY)

## 2019-04-12 PROCEDURE — 2580000003 HC RX 258: Performed by: NURSE PRACTITIONER

## 2019-04-12 PROCEDURE — 2580000003 HC RX 258: Performed by: REGISTERED NURSE

## 2019-04-12 PROCEDURE — 2140000000 HC CCU INTERMEDIATE R&B

## 2019-04-12 PROCEDURE — C1713 ANCHOR/SCREW BN/BN,TIS/BN: HCPCS | Performed by: THORACIC SURGERY (CARDIOTHORACIC VASCULAR SURGERY)

## 2019-04-12 PROCEDURE — 2500000003 HC RX 250 WO HCPCS: Performed by: NURSE ANESTHETIST, CERTIFIED REGISTERED

## 2019-04-12 PROCEDURE — 6360000002 HC RX W HCPCS: Performed by: NURSE PRACTITIONER

## 2019-04-12 PROCEDURE — 7100000001 HC PACU RECOVERY - ADDTL 15 MIN: Performed by: THORACIC SURGERY (CARDIOTHORACIC VASCULAR SURGERY)

## 2019-04-12 PROCEDURE — 88313 SPECIAL STAINS GROUP 2: CPT

## 2019-04-12 PROCEDURE — 2500000003 HC RX 250 WO HCPCS: Performed by: THORACIC SURGERY (CARDIOTHORACIC VASCULAR SURGERY)

## 2019-04-12 PROCEDURE — 6360000002 HC RX W HCPCS: Performed by: ANESTHESIOLOGY

## 2019-04-12 PROCEDURE — 88341 IMHCHEM/IMCYTCHM EA ADD ANTB: CPT

## 2019-04-12 PROCEDURE — 3600000009 HC SURGERY ROBOT BASE: Performed by: THORACIC SURGERY (CARDIOTHORACIC VASCULAR SURGERY)

## 2019-04-12 PROCEDURE — 32674 THORACOSCOPY LYMPH NODE EXC: CPT | Performed by: THORACIC SURGERY (CARDIOTHORACIC VASCULAR SURGERY)

## 2019-04-12 PROCEDURE — S2900 ROBOTIC SURGICAL SYSTEM: HCPCS | Performed by: THORACIC SURGERY (CARDIOTHORACIC VASCULAR SURGERY)

## 2019-04-12 PROCEDURE — 71045 X-RAY EXAM CHEST 1 VIEW: CPT

## 2019-04-12 PROCEDURE — 2709999900 HC NON-CHARGEABLE SUPPLY: Performed by: THORACIC SURGERY (CARDIOTHORACIC VASCULAR SURGERY)

## 2019-04-12 PROCEDURE — 6370000000 HC RX 637 (ALT 250 FOR IP): Performed by: ANESTHESIOLOGY

## 2019-04-12 PROCEDURE — 86923 COMPATIBILITY TEST ELECTRIC: CPT

## 2019-04-12 PROCEDURE — 94760 N-INVAS EAR/PLS OXIMETRY 1: CPT

## 2019-04-12 PROCEDURE — 2500000003 HC RX 250 WO HCPCS: Performed by: REGISTERED NURSE

## 2019-04-12 PROCEDURE — 32663 THORACOSCOPY W/LOBECTOMY: CPT | Performed by: THORACIC SURGERY (CARDIOTHORACIC VASCULAR SURGERY)

## 2019-04-12 PROCEDURE — 3700000000 HC ANESTHESIA ATTENDED CARE: Performed by: THORACIC SURGERY (CARDIOTHORACIC VASCULAR SURGERY)

## 2019-04-12 PROCEDURE — 76942 ECHO GUIDE FOR BIOPSY: CPT | Performed by: ANESTHESIOLOGY

## 2019-04-12 DEVICE — SEALANT TISS GLUE 4ML PLEUR AIR LEAK PROGEL: Type: IMPLANTABLE DEVICE | Site: LUNG | Status: FUNCTIONAL

## 2019-04-12 RX ORDER — SODIUM CHLORIDE 0.9 % (FLUSH) 0.9 %
10 SYRINGE (ML) INJECTION PRN
Status: DISCONTINUED | OUTPATIENT
Start: 2019-04-12 | End: 2019-04-16 | Stop reason: HOSPADM

## 2019-04-12 RX ORDER — SODIUM CHLORIDE 9 MG/ML
INJECTION, SOLUTION INTRAVENOUS CONTINUOUS PRN
Status: DISCONTINUED | OUTPATIENT
Start: 2019-04-12 | End: 2019-04-12 | Stop reason: SDUPTHER

## 2019-04-12 RX ORDER — MIDAZOLAM HYDROCHLORIDE 1 MG/ML
INJECTION INTRAMUSCULAR; INTRAVENOUS PRN
Status: DISCONTINUED | OUTPATIENT
Start: 2019-04-12 | End: 2019-04-12 | Stop reason: SDUPTHER

## 2019-04-12 RX ORDER — BUPIVACAINE HYDROCHLORIDE AND EPINEPHRINE 5; 5 MG/ML; UG/ML
INJECTION, SOLUTION EPIDURAL; INTRACAUDAL; PERINEURAL PRN
Status: DISCONTINUED | OUTPATIENT
Start: 2019-04-12 | End: 2019-04-12 | Stop reason: ALTCHOICE

## 2019-04-12 RX ORDER — OXYCODONE HYDROCHLORIDE AND ACETAMINOPHEN 5; 325 MG/1; MG/1
2 TABLET ORAL PRN
Status: DISCONTINUED | OUTPATIENT
Start: 2019-04-12 | End: 2019-04-12 | Stop reason: HOSPADM

## 2019-04-12 RX ORDER — GABAPENTIN 100 MG/1
200 CAPSULE ORAL ONCE
Status: COMPLETED | OUTPATIENT
Start: 2019-04-12 | End: 2019-04-12

## 2019-04-12 RX ORDER — CELECOXIB 100 MG/1
200 CAPSULE ORAL ONCE
Status: COMPLETED | OUTPATIENT
Start: 2019-04-12 | End: 2019-04-12

## 2019-04-12 RX ORDER — MORPHINE SULFATE 2 MG/ML
2 INJECTION, SOLUTION INTRAMUSCULAR; INTRAVENOUS EVERY 5 MIN PRN
Status: DISCONTINUED | OUTPATIENT
Start: 2019-04-12 | End: 2019-04-12 | Stop reason: HOSPADM

## 2019-04-12 RX ORDER — OXYCODONE HYDROCHLORIDE 5 MG/1
5 TABLET ORAL EVERY 4 HOURS PRN
Status: DISCONTINUED | OUTPATIENT
Start: 2019-04-12 | End: 2019-04-14

## 2019-04-12 RX ORDER — ROPIVACAINE HYDROCHLORIDE 5 MG/ML
INJECTION, SOLUTION EPIDURAL; INFILTRATION; PERINEURAL
Status: COMPLETED | OUTPATIENT
Start: 2019-04-12 | End: 2019-04-12

## 2019-04-12 RX ORDER — FENTANYL CITRATE 50 UG/ML
INJECTION, SOLUTION INTRAMUSCULAR; INTRAVENOUS PRN
Status: DISCONTINUED | OUTPATIENT
Start: 2019-04-12 | End: 2019-04-12 | Stop reason: SDUPTHER

## 2019-04-12 RX ORDER — MEPERIDINE HYDROCHLORIDE 50 MG/ML
12.5 INJECTION INTRAMUSCULAR; INTRAVENOUS; SUBCUTANEOUS
Status: DISCONTINUED | OUTPATIENT
Start: 2019-04-12 | End: 2019-04-12 | Stop reason: HOSPADM

## 2019-04-12 RX ORDER — PROPOFOL 10 MG/ML
INJECTION, EMULSION INTRAVENOUS PRN
Status: DISCONTINUED | OUTPATIENT
Start: 2019-04-12 | End: 2019-04-12 | Stop reason: SDUPTHER

## 2019-04-12 RX ORDER — HYDRALAZINE HYDROCHLORIDE 20 MG/ML
INJECTION INTRAMUSCULAR; INTRAVENOUS PRN
Status: DISCONTINUED | OUTPATIENT
Start: 2019-04-12 | End: 2019-04-12 | Stop reason: SDUPTHER

## 2019-04-12 RX ORDER — SODIUM CHLORIDE 0.9 % (FLUSH) 0.9 %
10 SYRINGE (ML) INJECTION EVERY 12 HOURS SCHEDULED
Status: DISCONTINUED | OUTPATIENT
Start: 2019-04-12 | End: 2019-04-16 | Stop reason: HOSPADM

## 2019-04-12 RX ORDER — ONDANSETRON 2 MG/ML
4 INJECTION INTRAMUSCULAR; INTRAVENOUS
Status: DISCONTINUED | OUTPATIENT
Start: 2019-04-12 | End: 2019-04-12 | Stop reason: HOSPADM

## 2019-04-12 RX ORDER — ATORVASTATIN CALCIUM 40 MG/1
40 TABLET, FILM COATED ORAL DAILY
Status: DISCONTINUED | OUTPATIENT
Start: 2019-04-13 | End: 2019-04-16 | Stop reason: HOSPADM

## 2019-04-12 RX ORDER — ONDANSETRON 2 MG/ML
INJECTION INTRAMUSCULAR; INTRAVENOUS PRN
Status: DISCONTINUED | OUTPATIENT
Start: 2019-04-12 | End: 2019-04-12 | Stop reason: SDUPTHER

## 2019-04-12 RX ORDER — SODIUM CHLORIDE 0.9 % (FLUSH) 0.9 %
10 SYRINGE (ML) INJECTION PRN
Status: DISCONTINUED | OUTPATIENT
Start: 2019-04-12 | End: 2019-04-12

## 2019-04-12 RX ORDER — AMIODARONE HYDROCHLORIDE 200 MG/1
200 TABLET ORAL 2 TIMES DAILY
Status: DISCONTINUED | OUTPATIENT
Start: 2019-04-12 | End: 2019-04-16 | Stop reason: HOSPADM

## 2019-04-12 RX ORDER — ACETAMINOPHEN 500 MG
1000 TABLET ORAL ONCE
Status: COMPLETED | OUTPATIENT
Start: 2019-04-12 | End: 2019-04-12

## 2019-04-12 RX ORDER — METOPROLOL SUCCINATE 25 MG/1
25 TABLET, EXTENDED RELEASE ORAL DAILY
Status: DISCONTINUED | OUTPATIENT
Start: 2019-04-13 | End: 2019-04-15

## 2019-04-12 RX ORDER — GLYCOPYRROLATE 1 MG/5 ML
SYRINGE (ML) INTRAVENOUS PRN
Status: DISCONTINUED | OUTPATIENT
Start: 2019-04-12 | End: 2019-04-12 | Stop reason: SDUPTHER

## 2019-04-12 RX ORDER — EPHEDRINE SULFATE/0.9% NACL/PF 50 MG/5 ML
SYRINGE (ML) INTRAVENOUS PRN
Status: DISCONTINUED | OUTPATIENT
Start: 2019-04-12 | End: 2019-04-12 | Stop reason: SDUPTHER

## 2019-04-12 RX ORDER — SODIUM CHLORIDE 0.9 % (FLUSH) 0.9 %
10 SYRINGE (ML) INJECTION EVERY 12 HOURS SCHEDULED
Status: DISCONTINUED | OUTPATIENT
Start: 2019-04-12 | End: 2019-04-12

## 2019-04-12 RX ORDER — ROPIVACAINE HYDROCHLORIDE 5 MG/ML
60 INJECTION, SOLUTION EPIDURAL; INFILTRATION; PERINEURAL ONCE
Status: COMPLETED | OUTPATIENT
Start: 2019-04-12 | End: 2019-04-12

## 2019-04-12 RX ORDER — ONDANSETRON 2 MG/ML
4 INJECTION INTRAMUSCULAR; INTRAVENOUS EVERY 6 HOURS PRN
Status: DISCONTINUED | OUTPATIENT
Start: 2019-04-12 | End: 2019-04-16 | Stop reason: HOSPADM

## 2019-04-12 RX ORDER — NEOSTIGMINE METHYLSULFATE 1 MG/ML
INJECTION, SOLUTION INTRAVENOUS PRN
Status: DISCONTINUED | OUTPATIENT
Start: 2019-04-12 | End: 2019-04-12 | Stop reason: SDUPTHER

## 2019-04-12 RX ORDER — MORPHINE SULFATE 2 MG/ML
1 INJECTION, SOLUTION INTRAMUSCULAR; INTRAVENOUS EVERY 5 MIN PRN
Status: DISCONTINUED | OUTPATIENT
Start: 2019-04-12 | End: 2019-04-12 | Stop reason: HOSPADM

## 2019-04-12 RX ORDER — OXYCODONE HYDROCHLORIDE 5 MG/1
5 TABLET ORAL ONCE
Status: COMPLETED | OUTPATIENT
Start: 2019-04-12 | End: 2019-04-12

## 2019-04-12 RX ORDER — KETOROLAC TROMETHAMINE 30 MG/ML
INJECTION, SOLUTION INTRAMUSCULAR; INTRAVENOUS PRN
Status: DISCONTINUED | OUTPATIENT
Start: 2019-04-12 | End: 2019-04-12 | Stop reason: SDUPTHER

## 2019-04-12 RX ORDER — ASPIRIN 81 MG/1
81 TABLET ORAL DAILY
Status: DISCONTINUED | OUTPATIENT
Start: 2019-04-13 | End: 2019-04-16 | Stop reason: HOSPADM

## 2019-04-12 RX ORDER — DOCUSATE SODIUM 100 MG/1
100 CAPSULE, LIQUID FILLED ORAL 2 TIMES DAILY
Status: DISCONTINUED | OUTPATIENT
Start: 2019-04-12 | End: 2019-04-16 | Stop reason: HOSPADM

## 2019-04-12 RX ORDER — AMIODARONE HYDROCHLORIDE 50 MG/ML
INJECTION, SOLUTION INTRAVENOUS PRN
Status: DISCONTINUED | OUTPATIENT
Start: 2019-04-12 | End: 2019-04-12 | Stop reason: SDUPTHER

## 2019-04-12 RX ORDER — DEXAMETHASONE SODIUM PHOSPHATE 10 MG/ML
INJECTION INTRAMUSCULAR; INTRAVENOUS PRN
Status: DISCONTINUED | OUTPATIENT
Start: 2019-04-12 | End: 2019-04-12 | Stop reason: SDUPTHER

## 2019-04-12 RX ORDER — MAGNESIUM SULFATE 1 G/100ML
1 INJECTION INTRAVENOUS PRN
Status: DISCONTINUED | OUTPATIENT
Start: 2019-04-12 | End: 2019-04-16 | Stop reason: HOSPADM

## 2019-04-12 RX ORDER — MORPHINE SULFATE 2 MG/ML
2 INJECTION, SOLUTION INTRAMUSCULAR; INTRAVENOUS EVERY 4 HOURS PRN
Status: DISCONTINUED | OUTPATIENT
Start: 2019-04-12 | End: 2019-04-14

## 2019-04-12 RX ORDER — CEFAZOLIN SODIUM 2 G/50ML
2 SOLUTION INTRAVENOUS EVERY 8 HOURS
Status: COMPLETED | OUTPATIENT
Start: 2019-04-12 | End: 2019-04-13

## 2019-04-12 RX ORDER — ROCURONIUM BROMIDE 10 MG/ML
INJECTION, SOLUTION INTRAVENOUS PRN
Status: DISCONTINUED | OUTPATIENT
Start: 2019-04-12 | End: 2019-04-12 | Stop reason: SDUPTHER

## 2019-04-12 RX ORDER — OXYCODONE HYDROCHLORIDE AND ACETAMINOPHEN 5; 325 MG/1; MG/1
1 TABLET ORAL PRN
Status: DISCONTINUED | OUTPATIENT
Start: 2019-04-12 | End: 2019-04-12 | Stop reason: HOSPADM

## 2019-04-12 RX ORDER — IPRATROPIUM BROMIDE AND ALBUTEROL SULFATE 2.5; .5 MG/3ML; MG/3ML
1 SOLUTION RESPIRATORY (INHALATION)
Status: DISCONTINUED | OUTPATIENT
Start: 2019-04-12 | End: 2019-04-16 | Stop reason: HOSPADM

## 2019-04-12 RX ORDER — MIDAZOLAM HYDROCHLORIDE 1 MG/ML
1 INJECTION INTRAMUSCULAR; INTRAVENOUS PRN
Status: COMPLETED | OUTPATIENT
Start: 2019-04-12 | End: 2019-04-12

## 2019-04-12 RX ORDER — CEFAZOLIN SODIUM 1 G/3ML
INJECTION, POWDER, FOR SOLUTION INTRAMUSCULAR; INTRAVENOUS PRN
Status: DISCONTINUED | OUTPATIENT
Start: 2019-04-12 | End: 2019-04-12 | Stop reason: SDUPTHER

## 2019-04-12 RX ORDER — CHLORHEXIDINE GLUCONATE 4 G/100ML
SOLUTION TOPICAL ONCE
Status: DISCONTINUED | OUTPATIENT
Start: 2019-04-12 | End: 2019-04-12

## 2019-04-12 RX ORDER — ACETAMINOPHEN 325 MG/1
650 TABLET ORAL EVERY 6 HOURS
Status: DISPENSED | OUTPATIENT
Start: 2019-04-12 | End: 2019-04-14

## 2019-04-12 RX ORDER — CHLORHEXIDINE GLUCONATE 0.12 MG/ML
15 RINSE ORAL ONCE
Status: COMPLETED | OUTPATIENT
Start: 2019-04-12 | End: 2019-04-12

## 2019-04-12 RX ORDER — SODIUM CHLORIDE 9 MG/ML
INJECTION, SOLUTION INTRAVENOUS CONTINUOUS
Status: DISCONTINUED | OUTPATIENT
Start: 2019-04-12 | End: 2019-04-12

## 2019-04-12 RX ORDER — CEFAZOLIN SODIUM 2 G/50ML
2 SOLUTION INTRAVENOUS
Status: DISCONTINUED | OUTPATIENT
Start: 2019-04-12 | End: 2019-04-12

## 2019-04-12 RX ADMIN — ROPIVACAINE HYDROCHLORIDE 15 ML: 5 INJECTION, SOLUTION EPIDURAL; INFILTRATION; PERINEURAL at 09:05

## 2019-04-12 RX ADMIN — CELECOXIB 200 MG: 100 CAPSULE ORAL at 06:29

## 2019-04-12 RX ADMIN — FENTANYL CITRATE 25 MCG: 50 INJECTION, SOLUTION INTRAMUSCULAR; INTRAVENOUS at 08:22

## 2019-04-12 RX ADMIN — CEFAZOLIN 2000 MG: 1 INJECTION, POWDER, FOR SOLUTION INTRAMUSCULAR; INTRAVENOUS at 07:06

## 2019-04-12 RX ADMIN — ROPIVACAINE HYDROCHLORIDE 30 ML: 5 INJECTION, SOLUTION EPIDURAL; INFILTRATION; PERINEURAL at 06:40

## 2019-04-12 RX ADMIN — AMIODARONE HYDROCHLORIDE 150 MG: 50 INJECTION, SOLUTION INTRAVENOUS at 08:15

## 2019-04-12 RX ADMIN — KETOROLAC TROMETHAMINE 30 MG: 30 INJECTION, SOLUTION INTRAMUSCULAR; INTRAVENOUS at 08:40

## 2019-04-12 RX ADMIN — DEXAMETHASONE SODIUM PHOSPHATE 10 MG: 10 INJECTION INTRAMUSCULAR; INTRAVENOUS at 07:00

## 2019-04-12 RX ADMIN — METOPROLOL TARTRATE 25 MG: 25 TABLET ORAL at 05:59

## 2019-04-12 RX ADMIN — IPRATROPIUM BROMIDE AND ALBUTEROL SULFATE 1 AMPULE: .5; 3 SOLUTION RESPIRATORY (INHALATION) at 22:03

## 2019-04-12 RX ADMIN — CHLORHEXIDINE GLUCONATE 0.12% ORAL RINSE 15 ML: 1.2 LIQUID ORAL at 05:58

## 2019-04-12 RX ADMIN — Medication 5 MG: at 07:34

## 2019-04-12 RX ADMIN — MIDAZOLAM HYDROCHLORIDE 2 MG: 1 INJECTION, SOLUTION INTRAMUSCULAR; INTRAVENOUS at 06:52

## 2019-04-12 RX ADMIN — Medication 10 ML: at 13:24

## 2019-04-12 RX ADMIN — DOCUSATE SODIUM 100 MG: 100 CAPSULE, LIQUID FILLED ORAL at 20:11

## 2019-04-12 RX ADMIN — SODIUM CHLORIDE: 9 INJECTION, SOLUTION INTRAVENOUS at 07:05

## 2019-04-12 RX ADMIN — OXYCODONE HYDROCHLORIDE 5 MG: 5 TABLET ORAL at 06:29

## 2019-04-12 RX ADMIN — MORPHINE SULFATE 2 MG: 2 INJECTION, SOLUTION INTRAMUSCULAR; INTRAVENOUS at 13:23

## 2019-04-12 RX ADMIN — ONDANSETRON HYDROCHLORIDE 4 MG: 2 INJECTION, SOLUTION INTRAMUSCULAR; INTRAVENOUS at 08:41

## 2019-04-12 RX ADMIN — MIDAZOLAM 1 MG: 1 INJECTION INTRAMUSCULAR; INTRAVENOUS at 06:35

## 2019-04-12 RX ADMIN — GABAPENTIN 200 MG: 100 CAPSULE ORAL at 06:29

## 2019-04-12 RX ADMIN — FENTANYL CITRATE 100 MCG: 50 INJECTION, SOLUTION INTRAMUSCULAR; INTRAVENOUS at 07:00

## 2019-04-12 RX ADMIN — AMIODARONE HYDROCHLORIDE 200 MG: 200 TABLET ORAL at 14:11

## 2019-04-12 RX ADMIN — PHENYLEPHRINE HYDROCHLORIDE 100 MCG: 10 INJECTION INTRAVENOUS at 07:26

## 2019-04-12 RX ADMIN — HYDRALAZINE HYDROCHLORIDE 10 MG: 20 INJECTION INTRAMUSCULAR; INTRAVENOUS at 09:01

## 2019-04-12 RX ADMIN — ACETAMINOPHEN 650 MG: 325 TABLET, FILM COATED ORAL at 19:24

## 2019-04-12 RX ADMIN — Medication 5 MG: at 07:29

## 2019-04-12 RX ADMIN — DOCUSATE SODIUM 100 MG: 100 CAPSULE, LIQUID FILLED ORAL at 14:11

## 2019-04-12 RX ADMIN — ACETAMINOPHEN 650 MG: 325 TABLET, FILM COATED ORAL at 14:11

## 2019-04-12 RX ADMIN — ROPIVACAINE HYDROCHLORIDE 30 ML: 5 INJECTION, SOLUTION EPIDURAL; INFILTRATION; PERINEURAL at 07:39

## 2019-04-12 RX ADMIN — SODIUM CHLORIDE: 9 INJECTION, SOLUTION INTRAVENOUS at 06:01

## 2019-04-12 RX ADMIN — IPRATROPIUM BROMIDE AND ALBUTEROL SULFATE 1 AMPULE: .5; 3 SOLUTION RESPIRATORY (INHALATION) at 13:31

## 2019-04-12 RX ADMIN — ROCURONIUM BROMIDE 10 MG: 10 INJECTION, SOLUTION INTRAVENOUS at 07:57

## 2019-04-12 RX ADMIN — MIDAZOLAM 1 MG: 1 INJECTION INTRAMUSCULAR; INTRAVENOUS at 06:37

## 2019-04-12 RX ADMIN — Medication 10 ML: at 20:10

## 2019-04-12 RX ADMIN — IPRATROPIUM BROMIDE AND ALBUTEROL SULFATE 1 AMPULE: .5; 3 SOLUTION RESPIRATORY (INHALATION) at 16:34

## 2019-04-12 RX ADMIN — MUPIROCIN: 20 OINTMENT TOPICAL at 15:32

## 2019-04-12 RX ADMIN — Medication 3 MG: at 08:45

## 2019-04-12 RX ADMIN — LIDOCAINE HYDROCHLORIDE 100 MG: 20 INJECTION, SOLUTION INTRAVENOUS at 07:00

## 2019-04-12 RX ADMIN — AMIODARONE HYDROCHLORIDE 200 MG: 200 TABLET ORAL at 20:11

## 2019-04-12 RX ADMIN — MUPIROCIN: 20 OINTMENT TOPICAL at 20:11

## 2019-04-12 RX ADMIN — IPRATROPIUM BROMIDE AND ALBUTEROL SULFATE 1 AMPULE: .5; 3 SOLUTION RESPIRATORY (INHALATION) at 09:31

## 2019-04-12 RX ADMIN — CEFAZOLIN SODIUM 2 G: 2 SOLUTION INTRAVENOUS at 23:59

## 2019-04-12 RX ADMIN — PHENYLEPHRINE HYDROCHLORIDE 200 MCG: 10 INJECTION INTRAVENOUS at 07:14

## 2019-04-12 RX ADMIN — ROPIVACAINE HYDROCHLORIDE 15 ML: 5 INJECTION, SOLUTION EPIDURAL; INFILTRATION; PERINEURAL at 09:29

## 2019-04-12 RX ADMIN — Medication 10 ML: at 14:12

## 2019-04-12 RX ADMIN — Medication 0.6 MG: at 08:45

## 2019-04-12 RX ADMIN — PROPOFOL 100 MG: 10 INJECTION, EMULSION INTRAVENOUS at 07:00

## 2019-04-12 RX ADMIN — CEFAZOLIN SODIUM 2 G: 2 SOLUTION INTRAVENOUS at 16:36

## 2019-04-12 RX ADMIN — ROCURONIUM BROMIDE 50 MG: 10 INJECTION, SOLUTION INTRAVENOUS at 07:00

## 2019-04-12 RX ADMIN — ACETAMINOPHEN 1000 MG: 500 TABLET ORAL at 06:30

## 2019-04-12 ASSESSMENT — PULMONARY FUNCTION TESTS
PIF_VALUE: 25
PIF_VALUE: 33
PIF_VALUE: 16
PIF_VALUE: 29
PIF_VALUE: 28
PIF_VALUE: 19
PIF_VALUE: 27
PIF_VALUE: 30
PIF_VALUE: 1
PIF_VALUE: 3
PIF_VALUE: 30
PIF_VALUE: 29
PIF_VALUE: 2
PIF_VALUE: 28
PIF_VALUE: 18
PIF_VALUE: 30
PIF_VALUE: 31
PIF_VALUE: 30
PIF_VALUE: 18
PIF_VALUE: 28
PIF_VALUE: 30
PIF_VALUE: 29
PIF_VALUE: 0
PIF_VALUE: 29
PIF_VALUE: 33
PIF_VALUE: 28
PIF_VALUE: 28
PIF_VALUE: 27
PIF_VALUE: 32
PIF_VALUE: 29
PIF_VALUE: 28
PIF_VALUE: 27
PIF_VALUE: 4
PIF_VALUE: 30
PIF_VALUE: 28
PIF_VALUE: 28
PIF_VALUE: 27
PIF_VALUE: 29
PIF_VALUE: 35
PIF_VALUE: 10
PIF_VALUE: 30
PIF_VALUE: 23
PIF_VALUE: 31
PIF_VALUE: 25
PIF_VALUE: 29
PIF_VALUE: 1
PIF_VALUE: 27
PIF_VALUE: 26
PIF_VALUE: 24
PIF_VALUE: 30
PIF_VALUE: 28
PIF_VALUE: 15
PIF_VALUE: 29
PIF_VALUE: 31
PIF_VALUE: 29
PIF_VALUE: 25
PIF_VALUE: 27
PIF_VALUE: 0
PIF_VALUE: 29
PIF_VALUE: 29
PIF_VALUE: 30
PIF_VALUE: 28
PIF_VALUE: 29
PIF_VALUE: 28
PIF_VALUE: 2
PIF_VALUE: 29
PIF_VALUE: 15
PIF_VALUE: 28
PIF_VALUE: 29
PIF_VALUE: 24
PIF_VALUE: 19
PIF_VALUE: 30
PIF_VALUE: 29
PIF_VALUE: 28
PIF_VALUE: 29
PIF_VALUE: 2
PIF_VALUE: 30
PIF_VALUE: 24
PIF_VALUE: 28
PIF_VALUE: 24
PIF_VALUE: 2
PIF_VALUE: 25
PIF_VALUE: 30
PIF_VALUE: 29
PIF_VALUE: 29
PIF_VALUE: 3
PIF_VALUE: 29
PIF_VALUE: 28
PIF_VALUE: 1
PIF_VALUE: 28
PIF_VALUE: 30
PIF_VALUE: 27
PIF_VALUE: 0
PIF_VALUE: 28
PIF_VALUE: 30
PIF_VALUE: 4
PIF_VALUE: 29
PIF_VALUE: 19
PIF_VALUE: 28
PIF_VALUE: 29
PIF_VALUE: 23
PIF_VALUE: 24
PIF_VALUE: 29
PIF_VALUE: 2
PIF_VALUE: 30
PIF_VALUE: 19
PIF_VALUE: 27
PIF_VALUE: 25
PIF_VALUE: 32
PIF_VALUE: 29
PIF_VALUE: 30
PIF_VALUE: 2
PIF_VALUE: 29
PIF_VALUE: 24
PIF_VALUE: 30
PIF_VALUE: 24
PIF_VALUE: 30
PIF_VALUE: 30
PIF_VALUE: 28
PIF_VALUE: 29
PIF_VALUE: 19

## 2019-04-12 ASSESSMENT — PAIN DESCRIPTION - ORIENTATION: ORIENTATION: LEFT

## 2019-04-12 ASSESSMENT — PAIN SCALES - GENERAL
PAINLEVEL_OUTOF10: 4
PAINLEVEL_OUTOF10: 0
PAINLEVEL_OUTOF10: 5
PAINLEVEL_OUTOF10: 0
PAINLEVEL_OUTOF10: 6
PAINLEVEL_OUTOF10: 0

## 2019-04-12 ASSESSMENT — PAIN - FUNCTIONAL ASSESSMENT: PAIN_FUNCTIONAL_ASSESSMENT: 0-10

## 2019-04-12 ASSESSMENT — PAIN DESCRIPTION - LOCATION: LOCATION: CHEST

## 2019-04-12 ASSESSMENT — PAIN DESCRIPTION - DESCRIPTORS: DESCRIPTORS: DISCOMFORT

## 2019-04-12 ASSESSMENT — PAIN DESCRIPTION - PAIN TYPE: TYPE: ACUTE PAIN;SURGICAL PAIN

## 2019-04-12 NOTE — PROGRESS NOTES
Pt admitted to same day surgery. Pt alert/oriented x3. Respirations non-labored. Skin warm/dry. No distress noted. Side rails x2. Call light in reach. Will continue to monitor.   Олег Steve RN 08-Apr-2018 18:30

## 2019-04-12 NOTE — ANESTHESIA POSTPROCEDURE EVALUATION
Department of Anesthesiology  Postprocedure Note    Patient: Broyd Crawley  MRN: [id number]  YOB: 1931  Date of evaluation: 4/12/2019  Time:  12:20 PM     Procedure Summary     Date:  04/12/19 Room / Location:  [facility] OR 05 / [facility]    Anesthesia Start:  1662 Anesthesia Stop:  0909    Procedure:  BRONCHOSCOPY LEFT THORACOSCOPY ROBOTIC VIDEO ASSISTED , WEDGE RESECTION, POSS. LEFT LOWER LOBECTOMY (Left ) Diagnosis:  (LUNG NODULE)    Surgeon:  Damaso Trotter MD Responsible Provider:  Kelley Montelongo DO    Anesthesia Type:  general ASA Status:  3          Anesthesia Type: general    Danyelle Phase I: Danyelle Score: 9    Danyelle Phase II:      Last vitals: Reviewed and per EMR flowsheets.        Anesthesia Post Evaluation    Patient location during evaluation: PACU  Patient participation: complete - patient participated  Level of consciousness: awake and alert  Airway patency: patent  Nausea & Vomiting: no nausea and no vomiting  Complications: no  Cardiovascular status: blood pressure returned to baseline  Respiratory status: acceptable  Hydration status: euvolemic

## 2019-04-12 NOTE — PROGRESS NOTES
INTRAOPERATIVE CONSULTATION (with FROZEN SECTION)    LLL wedge- non-small cell carcinoma. Await permanent sections.

## 2019-04-12 NOTE — ANESTHESIA PROCEDURE NOTES
Peripheral Block    Patient location during procedure: pre-op  Staffing  Anesthesiologist: Jerel Leventhal, DO  Performed: anesthesiologist   Preanesthetic Checklist  Completed: patient identified, site marked, surgical consent, pre-op evaluation, timeout performed, IV checked, risks and benefits discussed, monitors and equipment checked, anesthesia consent given, oxygen available and patient being monitored  Peripheral Block  Patient position: prone  Prep: ChloraPrep  Patient monitoring: cardiac monitor, continuous pulse ox, IV access and frequent blood pressure checks  Block type: Erector spinae  Laterality: left  Injection technique: catheter  Procedures: ultrasound guided  Local infiltration: lidocaine  Infiltration strength: 2 %  Dose: 5 mL  Provider prep: mask  Local infiltration: lidocaine  Needle  Needle type: Tuohy   Needle gauge: 22 G  Needle length: 8 cm  Needle localization: ultrasound guidance  Catheter type: open end  Catheter size: 18 G  Assessment  Injection assessment: negative aspiration for heme, no paresthesia on injection and local visualized surrounding nerve on ultrasound  Slow fractionated injection: yes  Hemodynamics: stable  Additional Notes  Timeout performed.   meds as above - no complications  Reason for block: post-op pain management and at surgeon's request

## 2019-04-12 NOTE — PROGRESS NOTES
Pt missed his dose of Toprol 25mg this AM. Per Dr. Angelica Anderson pt given PO dose this AM.  Brian Chacon RN

## 2019-04-12 NOTE — PROGRESS NOTES
Physical Therapy    Facility/Department: [address]  Initial Assessment    NAME: Tamia Acosta  : 1931  MRN: [id number]    Date of Service: 2019  Evaluating Therapist: Vamsi Villatoro PT, DPT    Room #: 2810F  DIAGNOSIS: lung nodule   PRECAUTIONS: falls, CTx1  Pertinent Medical History: CTx1, BPH, CAD, DJD, HLD, macular degeneration, s/p CABGx6 in ,  B hip BRIONNA  S/p  L VATS, bronch,  And LLL lobectomy 19    Social:    Pt lives alone. Pt lives in a 1 story home with 1 steps and no hand rail(s) to enter, 13 steps and 2 HR to basement where pt showers. Prior to admission pt used no device and required no assistance with self care or mobility. Pt is independnet and drives      Initial Evaluation  Date: 19 Treatment  Date: NA  Short Term/ Long Term   Goals   Was pt agreeable to Eval/treatment? Yes      Did pt report pain? Pt reported mild pain at incision sit     Bed Mobility  Rolling: SBA  Supine to sit: SBA  Sit to supine: nt  Scooting: SBA  Mod I   Transfers Sit to stand: SBA  Stand to sit: SBA  Stand pivot: SBA with ww   Mod I   Ambulation    200 feet with ww with SBA  >400 feet with AAD and mod I    Stair negotiation: ascended and descended NT  13 steps with 2 rail with mod I   AMPAC Raw Score 24/24       Alertness/Orientation: x4  LE AROM: Grossly WFL  LE Strength: Grossly 4+/5  Static Balance: SBA  Dynamic Balance: SBA   Endurance: good  Sensation: denied numbness/tinglign   Edema/Skin Integrity: no visible skin integrity issues noted     Chair/Bed Alarm: NA      ASSESSMENT/TREATMENT  Pt displays functional ability as noted in the objective portion of this evaluation. Pt performed the following therapeutic activities/exercise:   Seated ankle pumps, LAQ, hip flexion, hip abduction/adduction, shoulder press, elbow flexion/extension all x30 reps B    STS x 3  Comments: Pt was supine  upon PT arrival and agreeable to PT evaluation/treatment.  Pt required cues for safe hand placement with transfers, postural control, and safe use of ww. Pt on 4 L of O2 and SaO2 meaured 90-96%. Pt tolerated activity well. Anticipate pt will be safe to discharge home . At end of session pt sitting in bedside chair with call light within reach and all needs met. Patient education  Pt educated re: safety recommendations, PT treatment expectations and POC, PT d/c recommendations. Patient response to education:   Pt verbalized understanding Pt demonstrated skill Pt requires further education in this area   Yes  partial Ys      Rehab potential is good for reaching above PT goals. Pts/ family goals   To go hoem       PLAN  PT care will be provided in accordance with the objectives noted above. Whenever appropriate, clear delegation orders will be provided for nursing staff. Exercises and functional mobility practice will be used as well as appropriate assistive devices or modalities to obtain goals. Patient and family education will also be administered as needed. Frequency of treatments will be 2-5x/week x 1-3 days. Patient and or family understand(s) diagnosis, prognosis, and plan of care.       Time in: 1420  Time out: [address] [place] BRADY Sc[address]r   License number:  [id number]

## 2019-04-12 NOTE — PROGRESS NOTES
1018-Dr. Chris Garay updated,via perfect serve, post-op chest x ray results, V/S, lung sounds, and chest tube status.  Perfect serve read at 1015

## 2019-04-12 NOTE — ANESTHESIA PROCEDURE NOTES
Arterial Line:    An arterial line was placed using surface landmarks, in the OR for the following indication(s): continuous blood pressure monitoring and blood sampling needed. A 20 gauge (size), 1 and 3/8 inch (length), Angiocath (type) catheter was placed, Seldinger technique not used, into the right radial artery, secured by tape. Anesthesia type: Local    Events:  patient tolerated procedure well with no complications.   Anesthesiologist: Tia Iglesias DO  Resident/CRNA: ABHISHEK Gonzales - CRNA  Performed: Resident/CRNA   Preanesthetic Checklist  Completed: patient identified, site marked, surgical consent, pre-op evaluation, timeout performed, IV checked, risks and benefits discussed, monitors and equipment checked, anesthesia consent given, oxygen available and patient being monitored

## 2019-04-12 NOTE — PROGRESS NOTES
1043-Dr Kaye updated status and states he will be over to evaluate patient. 1100-Patient suddenly talking and no further C/O feeling like he can't take deep breathes. Rhonchi less noted. 12- Dr Betty Heredia here and states he will be back to remove epiduaral catheter.

## 2019-04-12 NOTE — PLAN OF CARE
Patient rates pain at a 6 given morphine and states it has helped.    Continues to wear oxygen 4 liters

## 2019-04-12 NOTE — PROGRESS NOTES
anethesia here to see patient and evaluate epidural.   Bolus epidural given by anesthesia and epidural line pulled dry dressing applied,  Dr. Lara Wilkerson made aware that the line was bolused and pulled. Patient tolerated the procedure well.  Jairo Branch

## 2019-04-12 NOTE — BRIEF OP NOTE
Brief Postoperative Note    Edwardo Kenny  YOB: 1931  [id number]    Pre-operative Diagnosis: LLL lung mass    Post-operative Diagnosis: Same    Procedure: Bronch/Left robotic VATS/Robotic CURLY/Robotic LLL wedge/Robotic left lower lobectomy/Mediastinal lymph node dissection/Intercostal nerve block/22 modifier    Anesthesia: General    Surgeons/Assistants: Yovany/Bentley/Aurea    Estimated Blood Loss: 50    Complications: None    Specimens:   ID Type Source Tests Collected by Time Destination   A : LEFT LOWER LOBE WEDGE  Tissue Lung SURGICAL PATHOLOGY Redgitheodore Keith, MD 4/12/2019 0727    B : INFERIOR PULMONARY LIGAMENT LYMPH NODE  Tissue Lymph Node SURGICAL PATHOLOGY Redabimael Keith, MD 4/12/2019 0734    C : INFERIOR HILAR LYMPH NODE  Tissue Lymph Node SURGICAL PATHOLOGY Redabimael Keith, MD 4/12/2019 0737    D : SUBCARINAL LYMPH NODE  Tissue Lymph Node SURGICAL PATHOLOGY Baldemar Keith, MD 4/12/2019 0748    E : SUBCARINAL LYMPH NODE #2 Tissue Lymph Node SURGICAL PATHOLOGY Redabimael Keith MD 4/12/2019 0749    F : POSTERIOR HILAR LYMPH NODE  Tissue Lymph Node SURGICAL PATHOLOGY Redabimael Keith, MD 4/12/2019 0751    G : BRONCHIAL LYMPH NODE  Tissue Lymph Node SURGICAL PATHOLOGY Redabimael Keith, MD 4/12/2019 0753    H : DEB AORTIC LYMPH NODE Tissue Lymph Node SURGICAL PATHOLOGY Redabimael Keith, MD 4/12/2019 0727    I : AP WINDOW LYMPH NODE PACKET Tissue Lymph Node SURGICAL PATHOLOGY Redabimael Keith MD 4/12/2019 5175    J : LEFT LOWER LOBE OF LUNG, CHECK BRONCHIAL MARGIN Tissue Lung SURGICAL PATHOLOGY Redabimael Keith MD 4/12/2019 7631        Implants:  Implant Name Type Inv. Item Serial No.  Lot No. LRB No. Used   SEALANT PROGEL PLEURAL AIR LEAK Cement SEALANT PROGEL PLEURAL AIR LEAK  [employer] [id number] Left 1         Drains:   Chest Tube 1 Left Pleural 28 Citizen [place] (Active)   Dressing Status Clean;Dry; Intact 4/12/2019  8:10 AM   Dressing Type Dry dressing; Other (Comment) 4/12/2019  8:10 AM   Site Assessment Clean;Dry; Intact 4/12/2019  8:10 AM       Chest Tube 2 Left Pleural 28 [place] (Active)   Dressing Status Clean; Intact;Dry 4/12/2019  8:10 AM   Dressing Type Dry dressing; Other (Comment) 4/12/2019  8:10 AM   Site Assessment Clean;Dry; Intact 4/12/2019  8:10 AM       Urethral Catheter Non-latex 16 fr (Active)       [REMOVED] Urethral Catheter Non-latex 16 fr (Removed)       Findings: NSCLC    Yesica Gomez MD  Date: 4/12/2019  Time: 8:42 AM

## 2019-04-12 NOTE — PLAN OF CARE
Problem: Pain - Acute:  Goal: Pain level will decrease  Description  Pain level will decrease  4/12/2019 1725 by Anton Kenney RN  Outcome: Met This Shift     Problem: Falls - Risk of:  Goal: Will remain free from falls  Description  Will remain free from falls  4/12/2019 1725 by Anton Kenney RN  Outcome: Met This Shift

## 2019-04-12 NOTE — OP NOTE
[address]                  [address] [id number],  [facility]                                OPERATIVE REPORT    PATIENT NAME: Fadia Gagnon                   :        1931  MED REC NO:   [id number]                            ROOM:  ACCOUNT NO:   [de-identified]                           ADMIT DATE: 2019  PROVIDER:     Nandini Duran MD    DATE OF PROCEDURE:  2019    PREOPERATIVE DIAGNOSIS:  Left lower lobe lung mass. POSTOPERATIVE DIAGNOSES:  Left lower lobe lung mass, non-small-cell lung  cancer. INDICATIONS:  Left lower lobe lung mass, non-small-cell lung cancer. SURGEON:  Nandini Duran MD.    ASSISTANT:  Hernan Suarez NP (no other resident was adequately trained  to assist.  Hernan Suarez was present and assisting throughout the entire  operation). SECOND ASSISTANT:  DELILAH Dolan.    COMPLICATIONS:  None, tolerated well. ESTIMATED BLOOD LOSS:  Less than 50 mL. ANESTHESIA:  General endotracheal.    ANESTHESIA ATTENDING:  Wiliam Reich DO.    SPECIMEN OBTAINED:  Include left lower lobe wedge, left lower lobectomy,  and multiple lymph node stations. Please refer to the path report. OPERATIONS PERFORMED:  1. Bronchoscopy. 2,  Left robotic VATS. 3.  Robotic lysis of adhesions. 4.  Robotic left lower lobe wedge resection. 5.  Robotic left lower lobectomy. 6.  Mediastinal lymph node dissection. 7. Intercostal nerve block. 8.  22 modifier. HISTORY:  This is an 49-year-old man with a history of coronary artery  bypass grafting x6, who was found to have a left lower lobe lung mass. This was in the superior segment. It was enlarging and PET-positive and  looked quite ominous. He was referred for resection.   The patient was  described the procedure in full including the risks and complications,  including but not limited to bleeding, infection, need for reoperation,  hemothorax, pneumothorax, stroke, myocardial infarction, and death; and  the patient agreed to proceed. FINDINGS:  On robotic VATS, there was no pleural effusion or pleural  studding, making this an R0 resection. There were significant adhesions  especially anteriorly and superiorly, some of these were taken down so  that I could perform the operation. Wedge resection of left lower lobe  revealed non-small-cell lung cancer, therefore a left lower lobectomy  was performed. Multiple lymph node stations were removed. The patient  tolerated the procedure well. The patient was extubated on the table  and transferred to the recovery room in a stable condition. All sponge,  instruments, and needle counts were correct at the end of the case. DESCRIPTION OF OPERATION:  After adequate informed consent was obtained  and adequate preoperative antibiotics were given, the patient was  brought to the operating room in stable condition. He was laid in the  supine position and the patient was induced under general endotracheal  anesthesia by the Anesthesia staff. This was via double-lumen tube. I  performed bronchoscopy, which revealed the tube to be in good position. The patient was turned to the right lateral decubitus position with the  left side up. All pressure points were padded. The left chest was  prepped and draped in the usual sterile fashion. Four robotic ports and  the 12 mm access port were placed in the usual fashion. The robot was  docked and visualization inside the chest revealed the above findings. The adhesions were taken down using bipolar energy. There were some  adhesions within the fissure, these were also taken down. I easily  identified the superior segmental left lower lobe mass and this was  wedged using an Endo KIMBERLY stapler. Specimens were put in the bag and  removed. Further dissection revealed non-small cell lung cancer.   I  then started my dissection inferiorly and the inferior pulmonary  ligament was released. I carried this dissection anteriorly and  posteriorly to encircle the inferior pulmonary vein. My dissection posteriorly removed subcarinal lymph nodes, as well as  heading up superiorly, dissected out the posterior surface of the  pulmonary artery and headed into the paraaortic and AP window areas  where lymph nodes were removed. I then started the dissection in the  fissure and the superior segmental branch, as well as the basilar  segmental branches were identified and taken using Endo KIMBERLY stapler. I  then took the inferior pulmonary vein using an Endo KIMBERLY stapler. The  anterior fissure was then taken using green load 45 stapler and all the  remainder was the bronchus in the posterior fissure, which were taken  using Endo KIMBERLY stapler green loads. Specimens were put in a bag and  removed. Bronchial margin was negative. The chest was inspected for  hemostasis. Hemostasis was noted. Progel was sprayed over the raw  surfaces. Two 28-[place] chest tubes were placed, one at the apex and  one at the base. These were secured appropriately. Intercostal nerve  block was done around the incisions. The ports were removed under  direct vision and the incisions were closed in multiple layers of  absorbable stitch. Dry sterile dressings were applied. The patient  tolerated the procedure well. The patient was extubated on the table  and transferred to the recovery room in stable condition. All sponge,  instruments, and needle counts were correct at the end of the case.         Laymond Rubinstein, MD    D: 04/12/2019 9:03:13       T: 04/12/2019 13:34:02     [id number]  Job#: [id number]     Doc#: [id number]    CC:  DO Gallo Raygoza MD Claybon Donna, MD Denson Cowper

## 2019-04-12 NOTE — CONSULTS
CHIEF COMPLAINT:  Left lower lobe lung mass    HPI: Mr. Alden Garcia is a very pleasant 31-year-old  gentleman well known to our practice. He is followed by my partner Dr. Jose Plasencia. In summary he had a history of hemoptysis back in October. His workup including a CT scan and PET scan showed a 12 mm nodule in the superior segment of the left lower lobe PET positive with an SUV of 4.7 with no other uptake. Repeat CAT scan in March showed increase up to 1.6 x 0.9 cm. Patient initially had been resistant to resection but in March follow-up he was agreeable. Therefore he was referred to Dr. Valeria Avendaño and today he underwent aBronch/Left robotic VATS/Robotic CURLY/Robotic LLL wedge/Robotic left lower lobectomy/Mediastinal lymph node dissection/Intercostal nerve block/22 modifier . Is currently doing very well sitting up in bed having a late lunch, his pain is well controlled, he is saturating 95% on 4 L of oxygen. Total output from his chest tubes has. been 190 mL      Past Medical History:    Past Medical History:   Diagnosis Date    BPH (benign prostatic hyperplasia)     CAD (coronary artery disease)     Carotid artery calcification     DJD (degenerative joint disease)     Hyperlipemia     Lung nodule     Macular degeneration     Numbness and tingling in left arm 2/2/2018    Numbness and tingling of left side of face 2/2/2018    S/P CABG x 6 2000    LIMA-LAD, LYNNETTE-LCx,SVG-OM,SVG-LPL,SVG-PDA-RPL       Past Surgical History:    Past Surgical History:   Procedure Laterality Date    CARDIAC SURGERY      6 vessel in 2000   [facility] DENTAL SURGERY      root canal    DIAGNOSTIC CARDIAC CATH LAB PROCEDURE  9/5/00    OTHER SURGICAL HISTORY  02/16/2011    Injection right hip  ADRIANNE Pickering MD    THORACOSCOPY Left 4/12/2019    BRONCHOSCOPY LEFT THORACOSCOPY ROBOTIC VIDEO ASSISTED , WEDGE RESECTION, POSS.  LEFT LOWER LOBECTOMY performed by Stephanie Weir MD at [address] [place] [address] Right 03/28/2011    Right BRIONNA  Kelton Leslie MD    TOTAL HIP ARTHROPLASTY Left 11/09/2009    Left BRIONNA  Kelton Leslie MD    TURP         Medications Prior to Admission:    Medications Prior to Admission: PROAIR  (90 Base) MCG/ACT inhaler,   metoprolol succinate (TOPROL XL) 25 MG extended release tablet, Take 1 tablet by mouth daily  atorvastatin (LIPITOR) 40 MG tablet, Take 40 mg by mouth daily. Calcium-Magnesium-Vitamin D (CALCIUM 1200+D3 PO), Take 1 tablet by mouth daily. Multiple Vitamins-Minerals (CENTRUM-LUTEIN PO), Take 1 tablet by mouth daily. Omega-3 Fatty Acids (FISH OIL) 1000 MG CAPS, Take 1,000 mg by mouth daily. aspirin 81 MG EC tablet, Take 81 mg by mouth daily. Coenzyme Q10 (COQ10) 100 MG CAPS, Take 100 mg by mouth daily. Bevacizumab (AVASTIN IV), Infuse intravenously See Admin Instructions Gets injection in eyes about every 11 weeks    Allergies:    Nitroglycerin; Biaxin [clarithromycin]; Iodine; and Quinolones    Social History:    reports that he has quit smoking. His smoking use included cigarettes. He has a 10.00 pack-year smoking history. He has never used smokeless tobacco. He reports that he drinks alcohol. He reports that he does not use drugs. Family History:   family history includes Alzheimer's Disease in his sister; Heart Disease in his brother, brother, and brother; Other in his father and mother. REVIEW OF SYSTEMS:  Constitutional: Denies fever, weight loss, night sweats, and fatigue  Skin: Denies pigmentation, dark lesions, and rashes   HEENT: Denies hearing loss, tinnitus, ear drainage, epistaxis, sore throat, and hoarseness. Cardiovascular: Denies palpitations, chest pain, and chest pressure. Respiratory: Denies cough, dyspnea at rest, hemoptysis, apnea, and choking.   Gastrointestinal: Denies nausea, vomiting, poor appetite, diarrhea, heartburn or reflux  Genitourinary: Denies dysuria, frequency, urgency or hematuria  Musculoskeletal: Denies myalgias, muscle weakness, and bone pain  Neurological: Denies dizziness, vertigo, headache, and focal weakness  Psychological: Denies anxiety and depression  Endocrine: Denies heat intolerance and cold intolerance      PHYSICAL EXAM:    Vitals:  BP (!) 165/68   Pulse 62   Temp 97.1 °F (36.2 °C) (Temporal)   Resp 16   Wt 170 lb (77.1 kg)   SpO2 97%   BMI 26.63 kg/m²     General:  Awake, alert, oriented X 3. No apparent distress. HEENT:  Normocephalic, atraumatic. Pupils equal, round, reactive to light. No scleral icterus. No conjunctival injection. Normal lips, teeth, and gums. No nasal discharge. Neck:  Supple; no bruits  Heart:  RRR, no murmurs, gallops, rubs  Lungs:  Diminished breathing sounds on the left side with small amount of serosanguineous drainage from chest tube  Abdomen:  BS+, soft, nontender nondistended, no masses, no organomegaly Extremities:  No clubbing, cyanosis, or edema  Skin:  Warm and dry, no open lesions or rash  Neuro:  Cranial nerves 2-12 intact, no focal deficits    LABS:  No results found for this or any previous visit (from the past 24 hour(s)). ASSESSMENT:      1. LLL 1.6 x 0.9 cm  PET  Positive (SUV 4.7) nodule s/p Bronch/Left robotic VATS/Robotic CURLY/Robotic LLL wedge/Robotic left lower lobectomy/Mediastinal lymph node dissection/Intercostal nerve block/22 modifier POD#1        PLAN:    1. Pain control   2. Chest tube management  per CTS  3. Incentiuve  Spirometry,  JEAN CLAUDE as needed  4. Follow pathoplogy results, preliminary results in OR reported as non-small cell carcinoma . 5.  GI and DVT prophylaxis    Thank you for letting me participate in care of Mr. Razia Gómez. Akhil Ruizadalberto  1:37 PM  4/12/2019        NOTE: This report, in part or full, may have been transcribed using voice recognition software. Every effort was made to ensure accuracy; however, inadvertent computerized transcription errors may be present.  Please excuse any transcriptional grammatical or spelling errors that may have escaped my editorial review.

## 2019-04-13 LAB
ANION GAP SERPL CALCULATED.3IONS-SCNC: 10 MMOL/L (ref 7–16)
BUN BLDV-MCNC: 32 MG/DL (ref 8–23)
CALCIUM SERPL-MCNC: 8.3 MG/DL (ref 8.6–10.2)
CHLORIDE BLD-SCNC: 100 MMOL/L (ref 98–107)
CO2: 23 MMOL/L (ref 22–29)
CREAT SERPL-MCNC: 1.3 MG/DL (ref 0.7–1.2)
GFR AFRICAN AMERICAN: >60
GFR NON-AFRICAN AMERICAN: 52 ML/MIN/1.73
GLUCOSE BLD-MCNC: 172 MG/DL (ref 74–99)
HCT VFR BLD CALC: 32.4 % (ref 37–54)
HEMOGLOBIN: 10.9 G/DL (ref 12.5–16.5)
MCH RBC QN AUTO: 29.5 PG (ref 26–35)
MCHC RBC AUTO-ENTMCNC: 33.6 % (ref 32–34.5)
MCV RBC AUTO: 87.8 FL (ref 80–99.9)
PDW BLD-RTO: 13.5 FL (ref 11.5–15)
PLATELET # BLD: 200 E9/L (ref 130–450)
PMV BLD AUTO: 8.8 FL (ref 7–12)
POTASSIUM SERPL-SCNC: 4.1 MMOL/L (ref 3.5–5)
RBC # BLD: 3.69 E12/L (ref 3.8–5.8)
SODIUM BLD-SCNC: 133 MMOL/L (ref 132–146)
WBC # BLD: 12.7 E9/L (ref 4.5–11.5)

## 2019-04-13 PROCEDURE — 2580000003 HC RX 258: Performed by: NURSE PRACTITIONER

## 2019-04-13 PROCEDURE — 2700000000 HC OXYGEN THERAPY PER DAY

## 2019-04-13 PROCEDURE — 94640 AIRWAY INHALATION TREATMENT: CPT

## 2019-04-13 PROCEDURE — 85027 COMPLETE CBC AUTOMATED: CPT

## 2019-04-13 PROCEDURE — 94150 VITAL CAPACITY TEST: CPT

## 2019-04-13 PROCEDURE — 6370000000 HC RX 637 (ALT 250 FOR IP): Performed by: NURSE PRACTITIONER

## 2019-04-13 PROCEDURE — 6360000002 HC RX W HCPCS: Performed by: NURSE PRACTITIONER

## 2019-04-13 PROCEDURE — 36415 COLL VENOUS BLD VENIPUNCTURE: CPT

## 2019-04-13 PROCEDURE — 80048 BASIC METABOLIC PNL TOTAL CA: CPT

## 2019-04-13 PROCEDURE — 2140000000 HC CCU INTERMEDIATE R&B

## 2019-04-13 RX ADMIN — IPRATROPIUM BROMIDE AND ALBUTEROL SULFATE 1 AMPULE: .5; 3 SOLUTION RESPIRATORY (INHALATION) at 16:14

## 2019-04-13 RX ADMIN — Medication 10 ML: at 21:21

## 2019-04-13 RX ADMIN — ASPIRIN 81 MG: 81 TABLET ORAL at 09:38

## 2019-04-13 RX ADMIN — ENOXAPARIN SODIUM 40 MG: 40 INJECTION SUBCUTANEOUS at 09:37

## 2019-04-13 RX ADMIN — DOCUSATE SODIUM 100 MG: 100 CAPSULE, LIQUID FILLED ORAL at 09:36

## 2019-04-13 RX ADMIN — Medication 10 ML: at 09:37

## 2019-04-13 RX ADMIN — MUPIROCIN: 20 OINTMENT TOPICAL at 21:22

## 2019-04-13 RX ADMIN — AMIODARONE HYDROCHLORIDE 200 MG: 200 TABLET ORAL at 09:37

## 2019-04-13 RX ADMIN — AMIODARONE HYDROCHLORIDE 200 MG: 200 TABLET ORAL at 21:21

## 2019-04-13 RX ADMIN — METOPROLOL SUCCINATE 25 MG: 25 TABLET, EXTENDED RELEASE ORAL at 09:37

## 2019-04-13 RX ADMIN — MUPIROCIN: 20 OINTMENT TOPICAL at 09:37

## 2019-04-13 RX ADMIN — DOCUSATE SODIUM 100 MG: 100 CAPSULE, LIQUID FILLED ORAL at 21:21

## 2019-04-13 RX ADMIN — ACETAMINOPHEN 650 MG: 325 TABLET, FILM COATED ORAL at 17:19

## 2019-04-13 RX ADMIN — IPRATROPIUM BROMIDE AND ALBUTEROL SULFATE 1 AMPULE: .5; 3 SOLUTION RESPIRATORY (INHALATION) at 19:25

## 2019-04-13 RX ADMIN — OXYCODONE HYDROCHLORIDE 5 MG: 5 TABLET ORAL at 07:25

## 2019-04-13 RX ADMIN — ATORVASTATIN CALCIUM 40 MG: 40 TABLET, FILM COATED ORAL at 09:37

## 2019-04-13 RX ADMIN — IPRATROPIUM BROMIDE AND ALBUTEROL SULFATE 1 AMPULE: .5; 3 SOLUTION RESPIRATORY (INHALATION) at 12:37

## 2019-04-13 RX ADMIN — ACETAMINOPHEN 650 MG: 325 TABLET, FILM COATED ORAL at 05:18

## 2019-04-13 ASSESSMENT — PAIN DESCRIPTION - ONSET: ONSET: ON-GOING

## 2019-04-13 ASSESSMENT — PAIN SCALES - GENERAL
PAINLEVEL_OUTOF10: 0
PAINLEVEL_OUTOF10: 3
PAINLEVEL_OUTOF10: 7
PAINLEVEL_OUTOF10: 0
PAINLEVEL_OUTOF10: 8

## 2019-04-13 ASSESSMENT — PAIN DESCRIPTION - FREQUENCY: FREQUENCY: CONTINUOUS

## 2019-04-13 ASSESSMENT — PAIN DESCRIPTION - LOCATION: LOCATION: PENIS

## 2019-04-13 ASSESSMENT — PAIN DESCRIPTION - DESCRIPTORS: DESCRIPTORS: BURNING;DISCOMFORT

## 2019-04-13 ASSESSMENT — PAIN DESCRIPTION - PAIN TYPE: TYPE: ACUTE PAIN

## 2019-04-13 NOTE — PLAN OF CARE
Problem: Pain - Acute:  Goal: Pain level will decrease  Description  Pain level will decrease  4/13/2019 0543 by Honorio Sharp RN  Outcome: Met This Shift  4/12/2019 1725 by Robert Lazo RN  Outcome: Met This Shift  Goal: Recognizes and communicates pain  Description  Recognizes and communicates pain  Outcome: Met This Shift     Problem: Falls - Risk of:  Goal: Will remain free from falls  Description  Will remain free from falls  4/12/2019 1725 by Robert Lazo RN  Outcome: Met This Shift

## 2019-04-13 NOTE — PLAN OF CARE
Problem: Discharge Planning:  Goal: Ability to perform activities of daily living will improve  Description  Ability to perform activities of daily living will improve  Outcome: Ongoing     Problem: Pain - Acute:  Goal: Pain level will decrease  Description  Pain level will decrease  4/13/2019 1105 by Lieutenant Chencho RN  Outcome: Ongoing

## 2019-04-13 NOTE — PROGRESS NOTES
POD#1 Awake, alert. No complaints. Denies CP, palpitations, SOB at rest, dizziness/lightheadedness. Vitals:    04/13/19 0509 04/13/19 0515 04/13/19 0557 04/13/19 0750   BP: (!) 193/76 (!) 160/60  (!) 160/72   Pulse: 72   71   Resp: 20   16   Temp: 98.3 °F (36.8 °C)   98.7 °F (37.1 °C)   TempSrc: Temporal   Temporal   SpO2: 97%   96%   Weight:   181 lb 6.4 oz (82.3 kg)    Height:         O2: 2L/NC      Intake/Output Summary (Last 24 hours) at 4/13/2019 0945  Last data filed at 4/13/2019 0841  Gross per 24 hour   Intake 340 ml   Output 1220 ml   Net -880 ml       UO: 125mL/8hr   CT output:   Pleural: 180mL/8hr (370mL/24hrs)      Recent Labs     04/11/19  1045 04/13/19  0546   WBC 7.3 12.7*   HGB 12.6 10.9*   HCT 38.2 32.4*    200      Recent Labs     04/11/19  1045 04/13/19  0546   BUN 19 32*   CREATININE 1.2 1.3*       Telemetry: NSR      PE  Cardiac: RRR  Lungs: decreased bases  Chest incisions C/D/I, approximated, no erythema. Chest tubes x 2 present and secure. Abd: Soft, nondistended nontender, +BS  Ext: SWARTZ      A/P: Stable s/p robotic LVATS, LLL wedge, left lower lobectomy, MS lymph node dissection, POD#1  Acute blood loss anemia secondary to surgery--stable  Chest tubes with airleak approx 230 per thopaz chest drainage system. Basilar chest tube removed without difficulty. Patient tolerated well.  Will continue remaining chest tube until airleak resolves  Continue oral amiodarone for afib prophylaxis  Please wean oxygen to keep SpO2 greater than or equal to 92%--continue duonebs/SMI--pulmonary following  Remove chapman catheter  Lovenox for dvt prophy   Increase activity as tolerated   Encourage incentive spirometry  This patient's case and care plan was discussed with the attending surgeon

## 2019-04-14 LAB
ANION GAP SERPL CALCULATED.3IONS-SCNC: 9 MMOL/L (ref 7–16)
BACTERIA: ABNORMAL /HPF
BILIRUBIN URINE: NEGATIVE
BLOOD, URINE: ABNORMAL
BUN BLDV-MCNC: 30 MG/DL (ref 8–23)
CALCIUM SERPL-MCNC: 8.5 MG/DL (ref 8.6–10.2)
CASTS: ABNORMAL /LPF
CHLORIDE BLD-SCNC: 101 MMOL/L (ref 98–107)
CLARITY: CLEAR
CO2: 24 MMOL/L (ref 22–29)
COLOR: YELLOW
CREAT SERPL-MCNC: 1.4 MG/DL (ref 0.7–1.2)
GFR AFRICAN AMERICAN: 58
GFR NON-AFRICAN AMERICAN: 48 ML/MIN/1.73
GLUCOSE BLD-MCNC: 134 MG/DL (ref 74–99)
GLUCOSE URINE: NEGATIVE MG/DL
HCT VFR BLD CALC: 35.2 % (ref 37–54)
HEMOGLOBIN: 11.7 G/DL (ref 12.5–16.5)
KETONES, URINE: NEGATIVE MG/DL
LEUKOCYTE ESTERASE, URINE: NEGATIVE
MCH RBC QN AUTO: 29.6 PG (ref 26–35)
MCHC RBC AUTO-ENTMCNC: 33.2 % (ref 32–34.5)
MCV RBC AUTO: 89.1 FL (ref 80–99.9)
METER GLUCOSE: 135 MG/DL (ref 74–99)
MUCUS: PRESENT
NITRITE, URINE: NEGATIVE
PDW BLD-RTO: 13.7 FL (ref 11.5–15)
PH UA: 6 (ref 5–9)
PLATELET # BLD: 216 E9/L (ref 130–450)
PMV BLD AUTO: 8.8 FL (ref 7–12)
POTASSIUM SERPL-SCNC: 5.1 MMOL/L (ref 3.5–5)
PROTEIN UA: ABNORMAL MG/DL
RBC # BLD: 3.95 E12/L (ref 3.8–5.8)
RBC UA: ABNORMAL /HPF (ref 0–2)
SODIUM BLD-SCNC: 134 MMOL/L (ref 132–146)
SPECIFIC GRAVITY UA: 1.02 (ref 1–1.03)
UROBILINOGEN, URINE: 0.2 E.U./DL
WBC # BLD: 12.5 E9/L (ref 4.5–11.5)
WBC UA: ABNORMAL /HPF (ref 0–5)

## 2019-04-14 PROCEDURE — 94640 AIRWAY INHALATION TREATMENT: CPT

## 2019-04-14 PROCEDURE — 2580000003 HC RX 258: Performed by: NURSE PRACTITIONER

## 2019-04-14 PROCEDURE — 85027 COMPLETE CBC AUTOMATED: CPT

## 2019-04-14 PROCEDURE — 6370000000 HC RX 637 (ALT 250 FOR IP): Performed by: NURSE PRACTITIONER

## 2019-04-14 PROCEDURE — 81001 URINALYSIS AUTO W/SCOPE: CPT

## 2019-04-14 PROCEDURE — 6360000002 HC RX W HCPCS: Performed by: NURSE PRACTITIONER

## 2019-04-14 PROCEDURE — 82962 GLUCOSE BLOOD TEST: CPT

## 2019-04-14 PROCEDURE — P9046 ALBUMIN (HUMAN), 25%, 20 ML: HCPCS | Performed by: NURSE PRACTITIONER

## 2019-04-14 PROCEDURE — 80048 BASIC METABOLIC PNL TOTAL CA: CPT

## 2019-04-14 PROCEDURE — 2700000000 HC OXYGEN THERAPY PER DAY

## 2019-04-14 PROCEDURE — 2140000000 HC CCU INTERMEDIATE R&B

## 2019-04-14 PROCEDURE — 87088 URINE BACTERIA CULTURE: CPT

## 2019-04-14 PROCEDURE — 36415 COLL VENOUS BLD VENIPUNCTURE: CPT

## 2019-04-14 RX ORDER — ACETAMINOPHEN 325 MG/1
650 TABLET ORAL EVERY 4 HOURS PRN
Status: DISCONTINUED | OUTPATIENT
Start: 2019-04-14 | End: 2019-04-16 | Stop reason: HOSPADM

## 2019-04-14 RX ORDER — AMLODIPINE BESYLATE 5 MG/1
5 TABLET ORAL DAILY
Status: DISCONTINUED | OUTPATIENT
Start: 2019-04-14 | End: 2019-04-15

## 2019-04-14 RX ORDER — OXYCODONE HYDROCHLORIDE 5 MG/1
5 TABLET ORAL EVERY 8 HOURS PRN
Status: DISCONTINUED | OUTPATIENT
Start: 2019-04-14 | End: 2019-04-16 | Stop reason: HOSPADM

## 2019-04-14 RX ORDER — SENNA PLUS 8.6 MG/1
1 TABLET ORAL NIGHTLY
Status: DISCONTINUED | OUTPATIENT
Start: 2019-04-14 | End: 2019-04-16 | Stop reason: HOSPADM

## 2019-04-14 RX ORDER — HYDRALAZINE HYDROCHLORIDE 20 MG/ML
10 INJECTION INTRAMUSCULAR; INTRAVENOUS EVERY 6 HOURS PRN
Status: DISCONTINUED | OUTPATIENT
Start: 2019-04-14 | End: 2019-04-16 | Stop reason: HOSPADM

## 2019-04-14 RX ORDER — ALBUMIN (HUMAN) 12.5 G/50ML
50 SOLUTION INTRAVENOUS ONCE
Status: COMPLETED | OUTPATIENT
Start: 2019-04-14 | End: 2019-04-14

## 2019-04-14 RX ADMIN — ALBUMIN (HUMAN) 50 G: 0.25 INJECTION, SOLUTION INTRAVENOUS at 09:48

## 2019-04-14 RX ADMIN — OXYCODONE HYDROCHLORIDE 5 MG: 5 TABLET ORAL at 09:45

## 2019-04-14 RX ADMIN — AMIODARONE HYDROCHLORIDE 200 MG: 200 TABLET ORAL at 09:46

## 2019-04-14 RX ADMIN — METOPROLOL SUCCINATE 25 MG: 25 TABLET, EXTENDED RELEASE ORAL at 09:46

## 2019-04-14 RX ADMIN — ASPIRIN 81 MG: 81 TABLET ORAL at 09:46

## 2019-04-14 RX ADMIN — IPRATROPIUM BROMIDE AND ALBUTEROL SULFATE 1 AMPULE: .5; 3 SOLUTION RESPIRATORY (INHALATION) at 12:47

## 2019-04-14 RX ADMIN — ATORVASTATIN CALCIUM 40 MG: 40 TABLET, FILM COATED ORAL at 09:46

## 2019-04-14 RX ADMIN — IPRATROPIUM BROMIDE AND ALBUTEROL SULFATE 1 AMPULE: .5; 3 SOLUTION RESPIRATORY (INHALATION) at 16:41

## 2019-04-14 RX ADMIN — MUPIROCIN: 20 OINTMENT TOPICAL at 20:44

## 2019-04-14 RX ADMIN — IPRATROPIUM BROMIDE AND ALBUTEROL SULFATE 1 AMPULE: .5; 3 SOLUTION RESPIRATORY (INHALATION) at 09:28

## 2019-04-14 RX ADMIN — MUPIROCIN: 20 OINTMENT TOPICAL at 09:58

## 2019-04-14 RX ADMIN — Medication 10 ML: at 01:46

## 2019-04-14 RX ADMIN — Medication 10 ML: at 09:47

## 2019-04-14 RX ADMIN — OXYCODONE HYDROCHLORIDE 5 MG: 5 TABLET ORAL at 13:27

## 2019-04-14 RX ADMIN — MORPHINE SULFATE 2 MG: 2 INJECTION, SOLUTION INTRAMUSCULAR; INTRAVENOUS at 01:46

## 2019-04-14 RX ADMIN — HYDRALAZINE HYDROCHLORIDE 10 MG: 20 INJECTION INTRAMUSCULAR; INTRAVENOUS at 18:08

## 2019-04-14 RX ADMIN — OXYCODONE HYDROCHLORIDE 5 MG: 5 TABLET ORAL at 17:37

## 2019-04-14 RX ADMIN — AMIODARONE HYDROCHLORIDE 200 MG: 200 TABLET ORAL at 20:43

## 2019-04-14 RX ADMIN — AMLODIPINE BESYLATE 5 MG: 5 TABLET ORAL at 13:25

## 2019-04-14 RX ADMIN — OXYCODONE HYDROCHLORIDE 5 MG: 5 TABLET ORAL at 04:35

## 2019-04-14 RX ADMIN — STANDARDIZED SENNA CONCENTRATE 8.6 MG: 8.6 TABLET ORAL at 20:43

## 2019-04-14 RX ADMIN — Medication 10 ML: at 20:44

## 2019-04-14 RX ADMIN — DOCUSATE SODIUM 100 MG: 100 CAPSULE, LIQUID FILLED ORAL at 20:43

## 2019-04-14 RX ADMIN — DOCUSATE SODIUM 100 MG: 100 CAPSULE, LIQUID FILLED ORAL at 09:46

## 2019-04-14 RX ADMIN — OXYCODONE HYDROCHLORIDE 5 MG: 5 TABLET ORAL at 00:26

## 2019-04-14 RX ADMIN — ENOXAPARIN SODIUM 40 MG: 40 INJECTION SUBCUTANEOUS at 09:46

## 2019-04-14 ASSESSMENT — PAIN DESCRIPTION - PAIN TYPE
TYPE: ACUTE PAIN
TYPE: ACUTE PAIN

## 2019-04-14 ASSESSMENT — PAIN SCALES - GENERAL
PAINLEVEL_OUTOF10: 4
PAINLEVEL_OUTOF10: 5
PAINLEVEL_OUTOF10: 0
PAINLEVEL_OUTOF10: 4
PAINLEVEL_OUTOF10: 0
PAINLEVEL_OUTOF10: 5
PAINLEVEL_OUTOF10: 0
PAINLEVEL_OUTOF10: 0
PAINLEVEL_OUTOF10: 8

## 2019-04-14 ASSESSMENT — PAIN DESCRIPTION - LOCATION
LOCATION: PENIS
LOCATION: RIB CAGE

## 2019-04-14 ASSESSMENT — PAIN DESCRIPTION - ONSET: ONSET: ON-GOING

## 2019-04-14 ASSESSMENT — PAIN DESCRIPTION - ORIENTATION: ORIENTATION: LEFT

## 2019-04-14 ASSESSMENT — PAIN DESCRIPTION - DESCRIPTORS
DESCRIPTORS: BURNING;CONSTANT;DISCOMFORT
DESCRIPTORS: ACHING;DISCOMFORT;DULL

## 2019-04-14 ASSESSMENT — PAIN DESCRIPTION - FREQUENCY: FREQUENCY: CONTINUOUS

## 2019-04-14 NOTE — PLAN OF CARE
Problem: Discharge Planning:  Goal: Ability to perform activities of daily living will improve  Description  Ability to perform activities of daily living will improve  4/13/2019 2240 by Renetta Pickard RN  Outcome: Met This Shift  4/13/2019 1105 by Adrian Candelario RN  Outcome: Ongoing  Goal: Absence of nausea/vomiting  Description  Absence of nausea/vomiting  4/13/2019 2240 by Renetta Pickard RN  Outcome: Met This Shift     Problem: Pain - Acute:  Goal: Pain level will decrease  Description  Pain level will decrease  4/13/2019 2353 by Jose Castle RN  Outcome: Met This Shift  4/13/2019 2240 by Renetta Pickard RN  Outcome: Met This Shift  4/13/2019 1105 by Adrian Candelario RN  Outcome: Ongoing     Problem: Falls - Risk of:  Goal: Will remain free from falls  Description  Will remain free from falls  4/13/2019 2353 by Jose Castle RN  Outcome: Met This Shift  4/13/2019 2240 by Renetta Pickard RN  Outcome: Met This Shift  Goal: Absence of physical injury  Description  Absence of physical injury  4/13/2019 2240 by Renetta Pickard RN  Outcome: Met This Shift     Problem: Musculor/Skeletal Functional Status  Goal: Highest potential functional level  4/13/2019 2353 by Jose Castle RN  Outcome: Met This Shift  4/13/2019 2240 by Renetta Pickard RN  Outcome: Met This Shift

## 2019-04-14 NOTE — PROGRESS NOTES
POD#2 Awake, alert. No complaints other than unable to sleep last night. Denies CP, palpitations, SOB at rest, dizziness/lightheadedness. Vitals:    04/14/19 0025 04/14/19 0355 04/14/19 0430 04/14/19 0820   BP:  (!) 200/78 (!) 178/70 (!) 180/80   Pulse:  80  79   Resp:  20  16   Temp:  98.8 °F (37.1 °C)  98.7 °F (37.1 °C)   TempSrc:    Temporal   SpO2: 91%  93% 94%   Weight:       Height:         O2: 4L/NC      Intake/Output Summary (Last 24 hours) at 4/14/2019 0857  Last data filed at 4/14/2019 3013  Gross per 24 hour   Intake 1020 ml   Output 940 ml   Net 80 ml       UO: 200mL/8hr   CT output:   Pleural: 125mL/8hr (300mL/24hrs)      Recent Labs     04/11/19  1045 04/13/19  0546 04/14/19  0522   WBC 7.3 12.7* 12.5*   HGB 12.6 10.9* 11.7*   HCT 38.2 32.4* 35.2*    200 216      Recent Labs     04/11/19  1045 04/13/19  0546 04/14/19  0522   BUN 19 32* 30*   CREATININE 1.2 1.3* 1.4*     Telemetry: NSR        PE  Cardiac: RRR  Lungs: decreased bases  Chest incisions C/D/I, approximated, no erythema. Chest tube x 1 present and secure. Abd: Soft, nondistended nontender, +BS  Ext: SWARTZ        A/P: Stable s/p robotic LVATS, LLL wedge, left lower lobectomy, MS lymph node dissection, POD#2  Acute blood loss anemia secondary to surgery--stable  Chest tubes with airleak approx  per thopaz chest drainage system.  Will continue remaining chest tube until airleak resolves  Continue oral amiodarone for afib prophylaxis  Please wean oxygen to keep SpO2 greater than or equal to 92%--continue duonebs/SMI--pulmonary following  Lovenox for dvt prophy  Increase activity as tolerated  Encourage incentive spirometry  This patient's case and care plan was discussed with the attending surgeon

## 2019-04-14 NOTE — PLAN OF CARE
Problem: Discharge Planning:  Goal: Ability to perform activities of daily living will improve  Description  Ability to perform activities of daily living will improve  4/14/2019 1040 by Pascual Melara RN  Outcome: Met This Shift     Problem: Pain - Acute:  Goal: Pain level will decrease  Description  Pain level will decrease  4/14/2019 1040 by Pascual Melara RN  Outcome: Met This Shift     Problem: Falls - Risk of:  Goal: Will remain free from falls  Description  Will remain free from falls  4/14/2019 1040 by Pascual Melara RN  Outcome: Met This Shift

## 2019-04-14 NOTE — PROGRESS NOTES
Pt becoming forgetful at times. Easily redirected. Pt incontinent of urine x 2. 's. Head to toe assessment otherwise unremarkable. Queta  notified. Orders received. Daughter, Nataly Seek, updated.

## 2019-04-15 ENCOUNTER — APPOINTMENT (OUTPATIENT)
Dept: GENERAL RADIOLOGY | Age: 84
DRG: 163 | End: 2019-04-15
Attending: THORACIC SURGERY (CARDIOTHORACIC VASCULAR SURGERY)
Payer: MEDICARE

## 2019-04-15 LAB
ANION GAP SERPL CALCULATED.3IONS-SCNC: 13 MMOL/L (ref 7–16)
BUN BLDV-MCNC: 30 MG/DL (ref 8–23)
CALCIUM SERPL-MCNC: 9 MG/DL (ref 8.6–10.2)
CHLORIDE BLD-SCNC: 99 MMOL/L (ref 98–107)
CO2: 21 MMOL/L (ref 22–29)
CREAT SERPL-MCNC: 1.2 MG/DL (ref 0.7–1.2)
GFR AFRICAN AMERICAN: >60
GFR NON-AFRICAN AMERICAN: 57 ML/MIN/1.73
GLUCOSE BLD-MCNC: 171 MG/DL (ref 74–99)
HCT VFR BLD CALC: 37.9 % (ref 37–54)
HEMOGLOBIN: 12.7 G/DL (ref 12.5–16.5)
MCH RBC QN AUTO: 30 PG (ref 26–35)
MCHC RBC AUTO-ENTMCNC: 33.5 % (ref 32–34.5)
MCV RBC AUTO: 89.4 FL (ref 80–99.9)
PDW BLD-RTO: 13.8 FL (ref 11.5–15)
PLATELET # BLD: 242 E9/L (ref 130–450)
PMV BLD AUTO: 8.9 FL (ref 7–12)
POTASSIUM SERPL-SCNC: 4.8 MMOL/L (ref 3.5–5)
RBC # BLD: 4.24 E12/L (ref 3.8–5.8)
SODIUM BLD-SCNC: 133 MMOL/L (ref 132–146)
WBC # BLD: 14 E9/L (ref 4.5–11.5)

## 2019-04-15 PROCEDURE — 6370000000 HC RX 637 (ALT 250 FOR IP): Performed by: NURSE PRACTITIONER

## 2019-04-15 PROCEDURE — 71045 X-RAY EXAM CHEST 1 VIEW: CPT

## 2019-04-15 PROCEDURE — 6360000002 HC RX W HCPCS: Performed by: NURSE PRACTITIONER

## 2019-04-15 PROCEDURE — 80048 BASIC METABOLIC PNL TOTAL CA: CPT

## 2019-04-15 PROCEDURE — 2140000000 HC CCU INTERMEDIATE R&B

## 2019-04-15 PROCEDURE — 94640 AIRWAY INHALATION TREATMENT: CPT

## 2019-04-15 PROCEDURE — 2700000000 HC OXYGEN THERAPY PER DAY

## 2019-04-15 PROCEDURE — 2580000003 HC RX 258: Performed by: NURSE PRACTITIONER

## 2019-04-15 PROCEDURE — 36415 COLL VENOUS BLD VENIPUNCTURE: CPT

## 2019-04-15 PROCEDURE — 85027 COMPLETE CBC AUTOMATED: CPT

## 2019-04-15 RX ORDER — BISACODYL 10 MG
10 SUPPOSITORY, RECTAL RECTAL ONCE
Status: COMPLETED | OUTPATIENT
Start: 2019-04-15 | End: 2019-04-16

## 2019-04-15 RX ORDER — AMLODIPINE BESYLATE 5 MG/1
5 TABLET ORAL ONCE
Status: COMPLETED | OUTPATIENT
Start: 2019-04-15 | End: 2019-04-15

## 2019-04-15 RX ORDER — METOPROLOL SUCCINATE 25 MG/1
12.5 TABLET, EXTENDED RELEASE ORAL ONCE
Status: COMPLETED | OUTPATIENT
Start: 2019-04-15 | End: 2019-04-15

## 2019-04-15 RX ORDER — POLYETHYLENE GLYCOL 3350 17 G/17G
17 POWDER, FOR SOLUTION ORAL DAILY
Status: DISCONTINUED | OUTPATIENT
Start: 2019-04-15 | End: 2019-04-16 | Stop reason: HOSPADM

## 2019-04-15 RX ORDER — METOPROLOL SUCCINATE 25 MG/1
37.5 TABLET, EXTENDED RELEASE ORAL DAILY
Status: DISCONTINUED | OUTPATIENT
Start: 2019-04-16 | End: 2019-04-16 | Stop reason: HOSPADM

## 2019-04-15 RX ORDER — AMLODIPINE BESYLATE 10 MG/1
10 TABLET ORAL DAILY
Status: DISCONTINUED | OUTPATIENT
Start: 2019-04-16 | End: 2019-04-16 | Stop reason: HOSPADM

## 2019-04-15 RX ADMIN — POLYETHYLENE GLYCOL (3350) 17 G: 17 POWDER, FOR SOLUTION ORAL at 16:46

## 2019-04-15 RX ADMIN — AMLODIPINE BESYLATE 5 MG: 5 TABLET ORAL at 08:07

## 2019-04-15 RX ADMIN — Medication 10 ML: at 08:07

## 2019-04-15 RX ADMIN — AMIODARONE HYDROCHLORIDE 200 MG: 200 TABLET ORAL at 08:07

## 2019-04-15 RX ADMIN — ASPIRIN 81 MG: 81 TABLET ORAL at 08:07

## 2019-04-15 RX ADMIN — OXYCODONE HYDROCHLORIDE 5 MG: 5 TABLET ORAL at 00:30

## 2019-04-15 RX ADMIN — IPRATROPIUM BROMIDE AND ALBUTEROL SULFATE 1 AMPULE: .5; 3 SOLUTION RESPIRATORY (INHALATION) at 16:26

## 2019-04-15 RX ADMIN — ATORVASTATIN CALCIUM 40 MG: 40 TABLET, FILM COATED ORAL at 08:08

## 2019-04-15 RX ADMIN — Medication 10 ML: at 20:38

## 2019-04-15 RX ADMIN — Medication 10 ML: at 00:30

## 2019-04-15 RX ADMIN — MUPIROCIN: 20 OINTMENT TOPICAL at 08:06

## 2019-04-15 RX ADMIN — IPRATROPIUM BROMIDE AND ALBUTEROL SULFATE 1 AMPULE: .5; 3 SOLUTION RESPIRATORY (INHALATION) at 12:29

## 2019-04-15 RX ADMIN — STANDARDIZED SENNA CONCENTRATE 8.6 MG: 8.6 TABLET ORAL at 20:37

## 2019-04-15 RX ADMIN — AMLODIPINE BESYLATE 5 MG: 5 TABLET ORAL at 12:33

## 2019-04-15 RX ADMIN — MUPIROCIN: 20 OINTMENT TOPICAL at 20:38

## 2019-04-15 RX ADMIN — METOPROLOL SUCCINATE 12.5 MG: 25 TABLET, EXTENDED RELEASE ORAL at 12:32

## 2019-04-15 RX ADMIN — DOCUSATE SODIUM 100 MG: 100 CAPSULE, LIQUID FILLED ORAL at 20:37

## 2019-04-15 RX ADMIN — HYDRALAZINE HYDROCHLORIDE 10 MG: 20 INJECTION INTRAMUSCULAR; INTRAVENOUS at 00:30

## 2019-04-15 RX ADMIN — IPRATROPIUM BROMIDE AND ALBUTEROL SULFATE 1 AMPULE: .5; 3 SOLUTION RESPIRATORY (INHALATION) at 20:02

## 2019-04-15 RX ADMIN — DOCUSATE SODIUM 100 MG: 100 CAPSULE, LIQUID FILLED ORAL at 08:07

## 2019-04-15 RX ADMIN — AMIODARONE HYDROCHLORIDE 200 MG: 200 TABLET ORAL at 20:37

## 2019-04-15 RX ADMIN — ENOXAPARIN SODIUM 40 MG: 40 INJECTION SUBCUTANEOUS at 08:07

## 2019-04-15 RX ADMIN — IPRATROPIUM BROMIDE AND ALBUTEROL SULFATE 1 AMPULE: .5; 3 SOLUTION RESPIRATORY (INHALATION) at 09:19

## 2019-04-15 RX ADMIN — METOPROLOL SUCCINATE 25 MG: 25 TABLET, EXTENDED RELEASE ORAL at 08:08

## 2019-04-15 ASSESSMENT — PAIN DESCRIPTION - ONSET: ONSET: ON-GOING

## 2019-04-15 ASSESSMENT — PAIN DESCRIPTION - PAIN TYPE: TYPE: ACUTE PAIN

## 2019-04-15 ASSESSMENT — PAIN SCALES - GENERAL
PAINLEVEL_OUTOF10: 0
PAINLEVEL_OUTOF10: 4
PAINLEVEL_OUTOF10: 0

## 2019-04-15 ASSESSMENT — PAIN DESCRIPTION - DESCRIPTORS: DESCRIPTORS: SORE;ACHING;DISCOMFORT

## 2019-04-15 ASSESSMENT — PAIN DESCRIPTION - LOCATION: LOCATION: ABDOMEN

## 2019-04-15 ASSESSMENT — PAIN DESCRIPTION - ORIENTATION: ORIENTATION: UPPER

## 2019-04-15 ASSESSMENT — PAIN DESCRIPTION - FREQUENCY: FREQUENCY: INTERMITTENT

## 2019-04-15 NOTE — PLAN OF CARE
Problem: Discharge Planning:  Goal: Ability to perform activities of daily living will improve  Description  Ability to perform activities of daily living will improve  4/14/2019 1040 by Reggie Mc RN  Outcome: Met This Shift  Goal: Absence of nausea/vomiting  Description  Absence of nausea/vomiting  Outcome: Met This Shift     Problem: Pain - Acute:  Goal: Recognizes and communicates pain  Description  Recognizes and communicates pain  Outcome: Met This Shift     Problem: Falls - Risk of:  Goal: Will remain free from falls  Description  Will remain free from falls  4/14/2019 1040 by Reggie Mc RN  Outcome: Met This Shift

## 2019-04-15 NOTE — CARE COORDINATION
Transition of care: Met with patient in room. POD#3  robotic LVATS, LLL wedge, left lower lobectomy. Pt lives alone in a 1 story home. . Independent with ADLs and drives. DME- [employer], cane which pt does not use. [facility] is a  and [facility]. [facility] and see Dr. Inez Rosado. Currently on 6l/nc. PCP is Dr. Desmond Oshea and pharmacy is [facility] pharmacy is [place].  Sw/cm will follow

## 2019-04-15 NOTE — PROGRESS NOTES
Patient declined ordered suppository at this time for constipation. Requested prune juice and states \"That should take care of it in a couple hours. \" Educated patient on importance of getting his bowels moving and that he may need the suppository due to surgery/pain medication/ etc.  States he will think about it this afternoon if he still hasn't gone by then.

## 2019-04-15 NOTE — PLAN OF CARE
Problem: Discharge Planning:  Goal: Ability to perform activities of daily living will improve  Description  Ability to perform activities of daily living will improve  4/14/2019 1040 by Eri Bailon RN  Outcome: Met This Shift  Goal: Absence of nausea/vomiting  Description  Absence of nausea/vomiting  4/14/2019 2234 by Mychal Mai RN  Outcome: Met This Shift     Problem: Pain - Acute:  Goal: Pain level will decrease  Description  Pain level will decrease  4/14/2019 2306 by Nilsa Goodrich RN  Outcome: Met This Shift  4/14/2019 2234 by Mychal Mai RN  Outcome: Ongoing  4/14/2019 1040 by Eri Bailon RN  Outcome: Met This Shift  Goal: Recognizes and communicates pain  Description  Recognizes and communicates pain  4/14/2019 2234 by Mychal Mai RN  Outcome: Met This Shift     Problem: Falls - Risk of:  Goal: Will remain free from falls  Description  Will remain free from falls  4/14/2019 2306 by Nilsa Goodrich RN  Outcome: Met This Shift  4/14/2019 1040 by Eri Bailon RN  Outcome: Met This Shift     Problem: Pain:  Goal: Pain level will decrease  Description  Pain level will decrease  4/14/2019 2306 by Nilsa Goodrich RN  Outcome: Met This Shift  4/14/2019 2234 by Mychal Mai RN  Outcome: Ongoing  4/14/2019 1040 by Eri Bailon RN  Outcome: Met This Shift

## 2019-04-15 NOTE — PROGRESS NOTES
Jessy Hoang M.D.,[facility]  Hua Oh D.O., F.A.C.OMAURA., Kane Stanford M.D. Cait Barlow M.D., Isamar Blackburn M.D. Daily Pulmonary Progress Note    Patient:  Diaz Tirado 80 y.o. male MRN: [id number]     Date of Service: 4/15/2019      Synopsis     We are following patient for hypoxia post op  \"CC\" shortness of breath    Code status:   FULL    Subjective      Patient was seen and examined. Ambulating in room in no distress. Oxygen down to 3.5 liters NC. Feels better since chest tube removed. No dyspnea. Less discomfort. Occasional cough, no sputum or hemoptysis. Review of Systems:  Constitutional: Denies fever, weight loss, night sweats, and fatigue  Skin: Denies pigmentation, dark lesions, and rashes   HEENT: Denies hearing loss, tinnitus, ear drainage, epistaxis, sore throat, and hoarseness. Cardiovascular: Denies palpitations, chest pain, and chest pressure. Respiratory: Denies dyspnea at rest, hemoptysis, apnea, and choking.   Gastrointestinal: Denies nausea, vomiting, poor appetite, diarrhea, heartburn or reflux  Genitourinary: Denies dysuria, frequency, urgency or hematuria  Musculoskeletal: Denies myalgias, muscle weakness, and bone pain  Neurological: Denies dizziness, vertigo, headache, and focal weakness  Psychological: Denies anxiety and depression  Endocrine: Denies heat intolerance and cold intolerance  Hematopoietic/Lymphatic: Denies bleeding problems and blood transfusions    24-hour events:  None     Objective   Vitals: BP (!) 147/80   Pulse 84   Temp 99.2 °F (37.3 °C) (Temporal)   Resp 16   Ht 5' 6.93\" (1.7 m)   Wt 181 lb 6.4 oz (82.3 kg)   SpO2 92%   BMI 28.47 kg/m²     I/O:    Intake/Output Summary (Last 24 hours) at 4/15/2019 1702  Last data filed at 4/15/2019 1427  Gross per 24 hour   Intake 730 ml   Output 745 ml   Net -15 ml       CURRENT MEDS :  Scheduled Meds:   [START ON 4/16/2019] amLODIPine  10 mg Oral Daily    bisacodyl  10 mg Rectal Once    [START ON 4/16/2019] metoprolol succinate  37.5 mg Oral Daily    polyethylene glycol  17 g Oral Daily    senna  1 tablet Oral Nightly    aspirin  81 mg Oral Daily    atorvastatin  40 mg Oral Daily    sodium chloride flush  10 mL Intravenous 2 times per day    docusate sodium  100 mg Oral BID    enoxaparin  40 mg Subcutaneous Daily    ipratropium-albuterol  1 ampule Inhalation Q4H WA    mupirocin   Nasal BID    amiodarone  200 mg Oral BID       Physical Exam:  General Appearance: appears comfortable in no acute distress. HEENT: Normocephalic atraumatic without obvious abnormality   Neck: Lips, mucosa, and tongue normal.  Supple, symmetrical, trachea midline, no adenopathy;thyroid:  no enlargement/tenderness/nodules or JVD. Lung: Breath sounds CTA. Respirations   unlabored. Symmetrical expansion. Heart: RRR, normal S1, S2. No MRG  Abdomen: Soft, NT, ND. BS present x 4 quadrants. No bruit or organomegaly. Extremities: Pedal pulses 2+ symmetric b/l. Extremities normal, no cyanosis, clubbing, or edema. Musculokeletal: No joint swelling, no muscle tenderness. ROM normal in all joints of extremities. Neurologic: Mental status: Alert and Oriented X3 . Pertinent/ New Labs and Imaging Studies     Imaging Personally Reviewed:  4/15/19  CXR       FINDINGS:     There is been removal left-sided chest tube. There is no pneumothorax. There is some infiltrative changes in left lower hemithorax and   possible small left effusion/pleural thickening. There is some   subcutaneous air.  Right lung is clear.       There is been sternotomy and prior bypass surgery.           Impression   Removal of left-sided chest tube without pneumothorax               Labs:  Lab Results   Component Value Date    WBC 14.0 04/15/2019    HGB 12.7 04/15/2019    HCT 37.9 04/15/2019    MCV 89.4 04/15/2019    MCH 30.0 04/15/2019    MCHC 33.5 04/15/2019    RDW 13.8 04/15/2019     04/15/2019    MPV 8.9 04/15/2019 Lab Results   Component Value Date     04/15/2019    K 4.8 04/15/2019    K 4.9 04/11/2019    CL 99 04/15/2019    CO2 21 04/15/2019    BUN 30 04/15/2019    CREATININE 1.2 04/15/2019    LABALBU 4.0 04/11/2019    LABALBU 4.2 05/08/2012    CALCIUM 9.0 04/15/2019    GFRAA >60 04/15/2019    LABGLOM 57 04/15/2019     Lab Results   Component Value Date    PROTIME 11.7 09/13/2018    INR 1.0 09/13/2018     No results for input(s): PROBNP in the last 72 hours. No results for input(s): PROCAL in the last 72 hours. This SmartLink has not been configured with any valid records. Micro:  No results for input(s): CULTRESP in the last 72 hours. No results for input(s): LABGRAM in the last 72 hours. No results for input(s): LEGUR in the last 72 hours. No results for input(s): STREPNEUMAGU in the last 72 hours. No results for input(s): LP1UAG in the last 72 hours. Assessment:      1. Acute respiratory failure with hypoxia  2. LLL 1.6 x 0.9 cm PET positive (SUV 4.7) nodule  3. S/p Left Robotic VATS LLL wedge, LLL lobectomy, mediastinal lymph node dissection      Plan:   1. Oxygen therapy, wean to keep >94%  2. Ambulating O2 testing prior to dc home  3. Analgesia  PRN  4. Chest tube removed today per surgery team  5. Follow CXR  6. Incentive spirometer  7. Follow path results, pending  8. GI, DVT prophylaxis  9. Increase activity as tolerated   10. Follows in office with Dr. Lenin Pruitt    This plan of care was reviewed in collaboration with Dr. Blanca Horton  Electronically signed by ABHISHEK Jonas CNP on 4/15/2019 at 5:02 PM    I personally saw, examined, and cared for the patient. Labs, medications, radiographs reviewed. I agree with history exam and plans detailed in NP note.     Electronically signed by Payal Nesbitt MD on 4/15/2019 at 10:35 PM

## 2019-04-15 NOTE — PROGRESS NOTES
POD#3 Awake, alert. No complaints other than ct discomfort. Reports he is feeling better today. Denies CP, palpitations, SOB at rest, dizziness/lightheadedness. Vitals:    04/15/19 0025 04/15/19 0330 04/15/19 0355 04/15/19 0727   BP:  (!) 180/86 (!) 160/72 (!) 164/74   Pulse:  86  79   Resp:  18  16   Temp:  98.1 °F (36.7 °C)  98.5 °F (36.9 °C)   TempSrc:  Oral  Oral   SpO2: 91% 93%  93%   Weight:       Height:         O2: 6L/NC      Intake/Output Summary (Last 24 hours) at 4/15/2019 0920  Last data filed at 4/15/2019 9906  Gross per 24 hour   Intake 970 ml   Output 545 ml   Net 425 ml       UO: 100+mL/8hr   CT output:   Pleural: 120mL/8hr (220mL/24hrs)      Recent Labs     04/13/19  0546 04/14/19  0522 04/15/19  0525   WBC 12.7* 12.5* 14.0*   HGB 10.9* 11.7* 12.7   HCT 32.4* 35.2* 37.9    216 242      Recent Labs     04/13/19  0546 04/14/19  0522 04/15/19  0525   BUN 32* 30* 30*   CREATININE 1.3* 1.4* 1.2       Telemetry: NSR        PE  Cardiac: RRR  Lungs: decreased bases  Chest incisions C/D/I, approximated, no erythema. Chest tube x 1 present and secure. Abd: Soft, nondistended nontender, +BS  Ext: SWARTZ        A/P: Stable s/p robotic LVATS, LLL wedge, left lower lobectomy, MS lymph node dissection, POD#3  Acute blood loss anemia secondary to surgery--stable  Chest tube without airleak this morning--reading \"0\" per [facility] chest drainage system. Will likely remove remaining chest tube today  Continue oral amiodarone for afib prophylaxis  Wean oxygen to keep SpO2 greater than or equal to 92%--continue duonebs/SMI--pulmonary following--increasing O2 requirements this AM to 6L/NC--will check portable cxr  HTN--up titrate BB and norvasc, continue prn hydralazine--monitor  Constipation--no BM since prior to surgery--Continue BID colace, senna QHS, and give suppository today. Encouraged continued increase in oral intake and activity.    Lovenox for dvt prophy   Increase activity as tolerated   Encourage incentive spirometry  This patient's case and care plan was discussed with the attending surgeon

## 2019-04-15 NOTE — CARE COORDINATION
EMERY Discharge planning:    SW attempted to meet with patient. Patient is off the unit. Will check back.  Kenny Meyer

## 2019-04-15 NOTE — PLAN OF CARE
Problem: Pain - Acute:  Goal: Pain level will decrease  Description  Pain level will decrease  4/15/2019 0839 by Nava Carballo RN  Outcome: Met This Shift     Problem: Falls - Risk of:  Goal: Will remain free from falls  Description  Will remain free from falls  4/15/2019 0839 by Nava Carballo RN  Outcome: Met This Shift     Problem: Musculor/Skeletal Functional Status  Goal: Highest potential functional level  Outcome: Met This Shift

## 2019-04-16 VITALS
TEMPERATURE: 99.3 F | RESPIRATION RATE: 18 BRPM | DIASTOLIC BLOOD PRESSURE: 69 MMHG | SYSTOLIC BLOOD PRESSURE: 148 MMHG | BODY MASS INDEX: 28.47 KG/M2 | HEART RATE: 80 BPM | HEIGHT: 67 IN | OXYGEN SATURATION: 96 % | WEIGHT: 181.4 LBS

## 2019-04-16 LAB
ANION GAP SERPL CALCULATED.3IONS-SCNC: 11 MMOL/L (ref 7–16)
BUN BLDV-MCNC: 35 MG/DL (ref 8–23)
CALCIUM SERPL-MCNC: 8.8 MG/DL (ref 8.6–10.2)
CHLORIDE BLD-SCNC: 100 MMOL/L (ref 98–107)
CO2: 21 MMOL/L (ref 22–29)
CREAT SERPL-MCNC: 1.3 MG/DL (ref 0.7–1.2)
GFR AFRICAN AMERICAN: >60
GFR NON-AFRICAN AMERICAN: 52 ML/MIN/1.73
GLUCOSE BLD-MCNC: 119 MG/DL (ref 74–99)
HCT VFR BLD CALC: 34.3 % (ref 37–54)
HEMOGLOBIN: 11.4 G/DL (ref 12.5–16.5)
MCH RBC QN AUTO: 29.5 PG (ref 26–35)
MCHC RBC AUTO-ENTMCNC: 33.2 % (ref 32–34.5)
MCV RBC AUTO: 88.6 FL (ref 80–99.9)
PDW BLD-RTO: 13.6 FL (ref 11.5–15)
PLATELET # BLD: 217 E9/L (ref 130–450)
PMV BLD AUTO: 8.9 FL (ref 7–12)
POTASSIUM SERPL-SCNC: 4.5 MMOL/L (ref 3.5–5)
RBC # BLD: 3.87 E12/L (ref 3.8–5.8)
SODIUM BLD-SCNC: 132 MMOL/L (ref 132–146)
URINE CULTURE, ROUTINE: NORMAL
WBC # BLD: 12 E9/L (ref 4.5–11.5)

## 2019-04-16 PROCEDURE — 80048 BASIC METABOLIC PNL TOTAL CA: CPT

## 2019-04-16 PROCEDURE — 6370000000 HC RX 637 (ALT 250 FOR IP): Performed by: NURSE PRACTITIONER

## 2019-04-16 PROCEDURE — 85027 COMPLETE CBC AUTOMATED: CPT

## 2019-04-16 PROCEDURE — 2580000003 HC RX 258: Performed by: NURSE PRACTITIONER

## 2019-04-16 PROCEDURE — 36415 COLL VENOUS BLD VENIPUNCTURE: CPT

## 2019-04-16 PROCEDURE — 94640 AIRWAY INHALATION TREATMENT: CPT

## 2019-04-16 PROCEDURE — 6360000002 HC RX W HCPCS: Performed by: NURSE PRACTITIONER

## 2019-04-16 RX ORDER — OXYCODONE HYDROCHLORIDE AND ACETAMINOPHEN 5; 325 MG/1; MG/1
1 TABLET ORAL EVERY 6 HOURS PRN
Qty: 28 TABLET | Refills: 0 | Status: SHIPPED | OUTPATIENT
Start: 2019-04-16 | End: 2019-04-23

## 2019-04-16 RX ORDER — METOPROLOL SUCCINATE 25 MG/1
37.5 TABLET, EXTENDED RELEASE ORAL DAILY
Qty: 30 TABLET | Refills: 3 | Status: ON HOLD | OUTPATIENT
Start: 2019-04-16 | End: 2019-05-23

## 2019-04-16 RX ORDER — AMLODIPINE BESYLATE 10 MG/1
10 TABLET ORAL DAILY
Qty: 30 TABLET | Refills: 3 | Status: SHIPPED | OUTPATIENT
Start: 2019-04-16 | End: 2019-05-21

## 2019-04-16 RX ORDER — PSEUDOEPHEDRINE HCL 30 MG
100 TABLET ORAL 2 TIMES DAILY PRN
Qty: 20 CAPSULE | Refills: 0 | Status: SHIPPED | OUTPATIENT
Start: 2019-04-16 | End: 2019-08-07

## 2019-04-16 RX ORDER — AMIODARONE HYDROCHLORIDE 200 MG/1
200 TABLET ORAL SEE ADMIN INSTRUCTIONS
Qty: 14 TABLET | Refills: 0 | Status: SHIPPED | OUTPATIENT
Start: 2019-04-16 | End: 2019-05-21

## 2019-04-16 RX ADMIN — ASPIRIN 81 MG: 81 TABLET ORAL at 09:28

## 2019-04-16 RX ADMIN — POLYETHYLENE GLYCOL (3350) 17 G: 17 POWDER, FOR SOLUTION ORAL at 09:27

## 2019-04-16 RX ADMIN — HYDRALAZINE HYDROCHLORIDE 10 MG: 20 INJECTION INTRAMUSCULAR; INTRAVENOUS at 04:15

## 2019-04-16 RX ADMIN — Medication 10 ML: at 09:28

## 2019-04-16 RX ADMIN — AMLODIPINE BESYLATE 10 MG: 10 TABLET ORAL at 09:43

## 2019-04-16 RX ADMIN — BISACODYL 10 MG: 10 SUPPOSITORY RECTAL at 07:08

## 2019-04-16 RX ADMIN — ENOXAPARIN SODIUM 40 MG: 40 INJECTION SUBCUTANEOUS at 09:28

## 2019-04-16 RX ADMIN — IPRATROPIUM BROMIDE AND ALBUTEROL SULFATE 1 AMPULE: .5; 3 SOLUTION RESPIRATORY (INHALATION) at 10:03

## 2019-04-16 RX ADMIN — MUPIROCIN: 20 OINTMENT TOPICAL at 09:28

## 2019-04-16 RX ADMIN — ATORVASTATIN CALCIUM 40 MG: 40 TABLET, FILM COATED ORAL at 09:28

## 2019-04-16 RX ADMIN — AMIODARONE HYDROCHLORIDE 200 MG: 200 TABLET ORAL at 09:28

## 2019-04-16 RX ADMIN — Medication 10 ML: at 04:16

## 2019-04-16 RX ADMIN — ACETAMINOPHEN 650 MG: 325 TABLET, FILM COATED ORAL at 00:35

## 2019-04-16 RX ADMIN — METOPROLOL SUCCINATE 37.5 MG: 25 TABLET, EXTENDED RELEASE ORAL at 09:43

## 2019-04-16 RX ADMIN — ACETAMINOPHEN 650 MG: 325 TABLET, FILM COATED ORAL at 09:45

## 2019-04-16 RX ADMIN — DOCUSATE SODIUM 100 MG: 100 CAPSULE, LIQUID FILLED ORAL at 09:28

## 2019-04-16 ASSESSMENT — PAIN SCALES - GENERAL
PAINLEVEL_OUTOF10: 0
PAINLEVEL_OUTOF10: 3
PAINLEVEL_OUTOF10: 1
PAINLEVEL_OUTOF10: 0
PAINLEVEL_OUTOF10: 0

## 2019-04-16 ASSESSMENT — PAIN DESCRIPTION - ONSET: ONSET: GRADUAL

## 2019-04-16 ASSESSMENT — PAIN DESCRIPTION - PAIN TYPE: TYPE: ACUTE PAIN

## 2019-04-16 ASSESSMENT — PAIN - FUNCTIONAL ASSESSMENT: PAIN_FUNCTIONAL_ASSESSMENT: ACTIVITIES ARE NOT PREVENTED

## 2019-04-16 ASSESSMENT — PAIN DESCRIPTION - DESCRIPTORS: DESCRIPTORS: ACHING;HEADACHE;DISCOMFORT

## 2019-04-16 ASSESSMENT — PAIN DESCRIPTION - LOCATION: LOCATION: HEAD

## 2019-04-16 ASSESSMENT — PAIN DESCRIPTION - FREQUENCY: FREQUENCY: INTERMITTENT

## 2019-04-16 NOTE — PROGRESS NOTES
Patient continues to complain of not yet having a bowel movement and agreeable to ordered suppository. Suppository administered at this time.

## 2019-04-16 NOTE — PROGRESS NOTES
Pulse Ox on Room air at rest 92%.    Pulse Ox ambulation 400 ft 89% room air  Pulse Ox ambulation recovery 94% room air

## 2019-04-16 NOTE — PLAN OF CARE
Problem: Pain:  Goal: Pain level will decrease  Description  Pain level will decrease  4/16/2019 0521 by Michael Loredo RN  Outcome: Met This Shift

## 2019-04-16 NOTE — PROGRESS NOTES
Lester Oliver M.D.,[facility]  Darnell Swann D.O., F.A.C.O.I., Nathalie Noguera M.D. Latoya Suazo M.D., Lady Fatemeh M.D. Daily Pulmonary Progress Note    Patient:  Yefri Lisa 80 y.o. male MRN: [id number]     Date of Service: 4/16/2019      Synopsis     We are following patient for hypoxia post op  \"CC\" shortness of breath    Code status:   FULL    Subjective      Patient was seen and examined. Ambulating in room in no distress. Oxygen weaned off. No cough or mucus production. Ambulatory testing  Pulse Ox on Room air at rest 92%. Pulse Ox ambulation 400 ft 89% room air  Pulse Ox ambulation recovery 94% room air      Review of Systems:  Constitutional: Denies fever, weight loss, night sweats, and fatigue  Skin: Denies pigmentation, dark lesions, and rashes   HEENT: Denies hearing loss, tinnitus, ear drainage, epistaxis, sore throat, and hoarseness. Cardiovascular: Denies palpitations, chest pain, and chest pressure. Respiratory: Denies dyspnea at rest, hemoptysis, apnea, and choking.   Gastrointestinal: Denies nausea, vomiting, poor appetite, diarrhea, heartburn or reflux  Genitourinary: Denies dysuria, frequency, urgency or hematuria  Musculoskeletal: Denies myalgias, muscle weakness, and bone pain  Neurological: Denies dizziness, vertigo, headache, and focal weakness  Psychological: Denies anxiety and depression  Endocrine: Denies heat intolerance and cold intolerance  Hematopoietic/Lymphatic: Denies bleeding problems and blood transfusions    24-hour events:  None     Objective   Vitals: BP (!) 148/69   Pulse 80   Temp 99.3 °F (37.4 °C) (Temporal)   Resp 18   Ht 5' 6.93\" (1.7 m)   Wt 181 lb 6.4 oz (82.3 kg)   SpO2 96%   BMI 28.47 kg/m²     I/O:    Intake/Output Summary (Last 24 hours) at 4/16/2019 1249  Last data filed at 4/16/2019 0520  Gross per 24 hour   Intake 430 ml   Output 575 ml   Net -145 ml       CURRENT MEDS :  Scheduled Meds:   amLODIPine  10 mg Oral Daily    metoprolol succinate  37.5 mg Oral Daily    polyethylene glycol  17 g Oral Daily    senna  1 tablet Oral Nightly    aspirin  81 mg Oral Daily    atorvastatin  40 mg Oral Daily    sodium chloride flush  10 mL Intravenous 2 times per day    docusate sodium  100 mg Oral BID    enoxaparin  40 mg Subcutaneous Daily    ipratropium-albuterol  1 ampule Inhalation Q4H WA    mupirocin   Nasal BID    amiodarone  200 mg Oral BID       Physical Exam:  General Appearance: appears comfortable in no acute distress. HEENT: Normocephalic atraumatic without obvious abnormality   Neck: Lips, mucosa, and tongue normal.  Supple, symmetrical, trachea midline, no adenopathy;thyroid:  no enlargement/tenderness/nodules or JVD. Lung: Breath sounds CTA. Respirations   unlabored. Symmetrical expansion. Heart: RRR, normal S1, S2. No MRG  Abdomen: Soft, NT, ND. BS present x 4 quadrants. No bruit or organomegaly. Extremities: Pedal pulses 2+ symmetric b/l. Extremities normal, no cyanosis, clubbing, or edema. Musculokeletal: No joint swelling, no muscle tenderness. ROM normal in all joints of extremities. Neurologic: Mental status: Alert and Oriented X3 . Pertinent/ New Labs and Imaging Studies     Imaging Personally Reviewed:  4/15/19  CXR       FINDINGS:     There is been removal left-sided chest tube. There is no pneumothorax. There is some infiltrative changes in left lower hemithorax and   possible small left effusion/pleural thickening. There is some   subcutaneous air.  Right lung is clear.       There is been sternotomy and prior bypass surgery.           Impression   Removal of left-sided chest tube without pneumothorax               Labs:  Lab Results   Component Value Date    WBC 12.0 04/16/2019    HGB 11.4 04/16/2019    HCT 34.3 04/16/2019    MCV 88.6 04/16/2019    MCH 29.5 04/16/2019    MCHC 33.2 04/16/2019    RDW 13.6 04/16/2019     04/16/2019    MPV 8.9 04/16/2019     Lab Results Component Value Date     04/16/2019    K 4.5 04/16/2019    K 4.9 04/11/2019     04/16/2019    CO2 21 04/16/2019    BUN 35 04/16/2019    CREATININE 1.3 04/16/2019    LABALBU 4.0 04/11/2019    LABALBU 4.2 05/08/2012    CALCIUM 8.8 04/16/2019    GFRAA >60 04/16/2019    LABGLOM 52 04/16/2019     Lab Results   Component Value Date    PROTIME 11.7 09/13/2018    INR 1.0 09/13/2018     No results for input(s): PROBNP in the last 72 hours. No results for input(s): PROCAL in the last 72 hours. This SmartLink has not been configured with any valid records. Micro:  No results for input(s): CULTRESP in the last 72 hours. No results for input(s): LABGRAM in the last 72 hours. No results for input(s): LEGUR in the last 72 hours. No results for input(s): STREPNEUMAGU in the last 72 hours. No results for input(s): LP1UAG in the last 72 hours. Full PFT testing 3/21/19  DATA (in office testing)  INTERPRETATION  Pulmonary function test on [employer] we obtained in the office today. Patient demonstrates good effort and cooperation. Flow volume loop demonstrates normal expiratory curve. FVC is 3.29 liters (100% of predicted). FEV1 is 2.49 liters (102% of predicted). FEV1/FVC ratio is 0.76. Lung volumes show the vital capacity to be 3.78 liters (114% of predicted). Total lung capacity is 7.00 liters (107% of predicted). Residual volume is 3.22 liters (121% of predicted). RV/TLC ratio is 0.46. Diffusing capacity is reduced to 64% of predicted but partailly corrects to 66% for alveolar volume.  Findings consistent with no obstruction no restriction and moderate reduction in difusing capacity which partially corrects for alveolar lung volume. Assessment:      1. Acute respiratory failure with hypoxia  2. LLL 1.6 x 0.9 cm PET positive (SUV 4.7) nodule  3. S/p Left Robotic VATS LLL wedge, LLL lobectomy, mediastinal lymph node dissection  4. Mild Basilar atelectasis      Plan:   1.  Ambulatory oxygen

## 2019-04-16 NOTE — PLAN OF CARE
Problem: Discharge Planning:  Goal: Ability to perform activities of daily living will improve  Description  Ability to perform activities of daily living will improve  4/15/2019 2237 by Sajan Hernandez RN  Outcome: Met This Shift  4/15/2019 0839 by Trinidad Anaya RN  Outcome: Ongoing  Goal: Absence of nausea/vomiting  Description  Absence of nausea/vomiting  Outcome: Met This Shift     Problem: Pain - Acute:  Goal: Pain level will decrease  Description  Pain level will decrease  4/15/2019 2237 by Sajan Hernandez RN  Outcome: Met This Shift  4/15/2019 0839 by Trinidad Anaya RN  Outcome: Met This Shift     Problem: Falls - Risk of:  Goal: Will remain free from falls  Description  Will remain free from falls  4/15/2019 2237 by Sajan Hernandez RN  Outcome: Met This Shift  4/15/2019 0839 by Trinidad Anaya RN  Outcome: Met This Shift  Goal: Absence of physical injury  Description  Absence of physical injury  Outcome: Met This Shift     Problem: Pain:  Goal: Pain level will decrease  Description  Pain level will decrease  4/15/2019 2237 by Sajan Hernandez RN  Outcome: Met This Shift  4/15/2019 0839 by Trinidad Anaya RN  Outcome: Met This Shift

## 2019-04-22 PROBLEM — C34.32 MALIGNANT NEOPLASM OF LOWER LOBE OF LEFT LUNG (HCC): Status: ACTIVE | Noted: 2019-04-22

## 2019-04-26 ENCOUNTER — HOSPITAL ENCOUNTER (OUTPATIENT)
Age: 84
Discharge: HOME | End: 2019-04-26
Payer: MEDICARE

## 2019-04-26 LAB
ALBUMIN SERPL-MCNC: 3.5 G/DL (ref 3.5–5.2)
ALP BLD-CCNC: 123 U/L (ref 40–129)
ALT SERPL-CCNC: 25 U/L (ref 0–40)
ANION GAP SERPL CALCULATED.3IONS-SCNC: 13 MMOL/L (ref 7–16)
AST SERPL-CCNC: 23 U/L (ref 0–39)
BASOPHILS ABSOLUTE: 0.05 E9/L (ref 0–0.2)
BASOPHILS RELATIVE PERCENT: 0.5 % (ref 0–2)
BILIRUB SERPL-MCNC: 0.6 MG/DL (ref 0–1.2)
BILIRUBIN DIRECT: 0.2 MG/DL (ref 0–0.3)
BILIRUBIN, INDIRECT: 0.4 MG/DL (ref 0–1)
BUN BLDV-MCNC: 21 MG/DL (ref 8–23)
CALCIUM SERPL-MCNC: 8.7 MG/DL (ref 8.6–10.2)
CHLORIDE BLD-SCNC: 101 MMOL/L (ref 98–107)
CHOLESTEROL, TOTAL: 136 MG/DL (ref 0–199)
CO2: 22 MMOL/L (ref 22–29)
CREAT SERPL-MCNC: 1.5 MG/DL (ref 0.7–1.2)
EOSINOPHILS ABSOLUTE: 0.79 E9/L (ref 0.05–0.5)
EOSINOPHILS RELATIVE PERCENT: 7.2 % (ref 0–6)
GFR AFRICAN AMERICAN: 53
GFR NON-AFRICAN AMERICAN: 44 ML/MIN/1.73
GLUCOSE BLD-MCNC: 107 MG/DL (ref 74–99)
HCT VFR BLD CALC: 33.9 % (ref 37–54)
HDLC SERPL-MCNC: 50 MG/DL
HEMOGLOBIN: 11 G/DL (ref 12.5–16.5)
IMMATURE GRANULOCYTES #: 0.05 E9/L
IMMATURE GRANULOCYTES %: 0.5 % (ref 0–5)
IRON: 40 MCG/DL (ref 59–158)
LDL CHOLESTEROL CALCULATED: 63 MG/DL (ref 0–99)
LYMPHOCYTES ABSOLUTE: 1.39 E9/L (ref 1.5–4)
LYMPHOCYTES RELATIVE PERCENT: 12.7 % (ref 20–42)
MCH RBC QN AUTO: 28.9 PG (ref 26–35)
MCHC RBC AUTO-ENTMCNC: 32.4 % (ref 32–34.5)
MCV RBC AUTO: 89 FL (ref 80–99.9)
MONOCYTES ABSOLUTE: 1.09 E9/L (ref 0.1–0.95)
MONOCYTES RELATIVE PERCENT: 10 % (ref 2–12)
NEUTROPHILS ABSOLUTE: 7.55 E9/L (ref 1.8–7.3)
NEUTROPHILS RELATIVE PERCENT: 69.1 % (ref 43–80)
PDW BLD-RTO: 13.4 FL (ref 11.5–15)
PLATELET # BLD: 296 E9/L (ref 130–450)
PMV BLD AUTO: 7.6 FL (ref 7–12)
POTASSIUM SERPL-SCNC: 4.3 MMOL/L (ref 3.5–5)
PROSTATE SPECIFIC ANTIGEN: 3.31 NG/ML (ref 0–4)
RBC # BLD: 3.81 E12/L (ref 3.8–5.8)
SEDIMENTATION RATE, ERYTHROCYTE: 40 MM/HR (ref 0–15)
SODIUM BLD-SCNC: 136 MMOL/L (ref 132–146)
T4 TOTAL: 8.3 MCG/DL (ref 4.5–11.7)
TOTAL PROTEIN: 6.5 G/DL (ref 6.4–8.3)
TRIGL SERPL-MCNC: 114 MG/DL (ref 0–149)
TSH SERPL DL<=0.05 MIU/L-ACNC: 5.78 UIU/ML (ref 0.27–4.2)
URIC ACID, SERUM: 5.6 MG/DL (ref 3.4–7)
VITAMIN D 25-HYDROXY: 29 NG/ML (ref 30–100)
VLDLC SERPL CALC-MCNC: 23 MG/DL
WBC # BLD: 10.9 E9/L (ref 4.5–11.5)

## 2019-04-26 PROCEDURE — 36415 COLL VENOUS BLD VENIPUNCTURE: CPT

## 2019-04-26 PROCEDURE — 84550 ASSAY OF BLOOD/URIC ACID: CPT

## 2019-04-26 PROCEDURE — 84443 ASSAY THYROID STIM HORMONE: CPT

## 2019-04-26 PROCEDURE — 85651 RBC SED RATE NONAUTOMATED: CPT

## 2019-04-26 PROCEDURE — 82306 VITAMIN D 25 HYDROXY: CPT

## 2019-04-26 PROCEDURE — G0103 PSA SCREENING: HCPCS

## 2019-04-26 PROCEDURE — 84436 ASSAY OF TOTAL THYROXINE: CPT

## 2019-04-26 PROCEDURE — 83540 ASSAY OF IRON: CPT

## 2019-04-26 PROCEDURE — 85025 COMPLETE CBC W/AUTO DIFF WBC: CPT

## 2019-04-26 PROCEDURE — 80061 LIPID PANEL: CPT

## 2019-04-26 PROCEDURE — 80053 COMPREHEN METABOLIC PANEL: CPT

## 2019-04-26 PROCEDURE — 82248 BILIRUBIN DIRECT: CPT

## 2019-04-30 ENCOUNTER — OFFICE VISIT (OUTPATIENT)
Dept: CARDIOTHORACIC SURGERY | Age: 84
End: 2019-04-30

## 2019-04-30 VITALS
DIASTOLIC BLOOD PRESSURE: 70 MMHG | HEIGHT: 67 IN | SYSTOLIC BLOOD PRESSURE: 142 MMHG | WEIGHT: 190 LBS | BODY MASS INDEX: 29.82 KG/M2 | HEART RATE: 52 BPM

## 2019-04-30 DIAGNOSIS — C34.32 MALIGNANT NEOPLASM OF LOWER LOBE OF LEFT LUNG (HCC): Primary | ICD-10-CM

## 2019-04-30 PROCEDURE — 99024 POSTOP FOLLOW-UP VISIT: CPT | Performed by: THORACIC SURGERY (CARDIOTHORACIC VASCULAR SURGERY)

## 2019-04-30 ASSESSMENT — ENCOUNTER SYMPTOMS
SHORTNESS OF BREATH: 0
COUGH: 0

## 2019-04-30 NOTE — PROGRESS NOTES
Subjective:      Chief Complaint   Patient presents with    Post-Op Check     Robotic LVATS /wedge/lobe 4/12/19    Leg Swelling     feet and abdomen       Patient ID: Abdon Medellin is a 80 y.o. male who presents to office for routine follow up s/p Robotic LVATS, LLLwedge resection, LLLobectomy on 4/12/19. Patient states he is doing well and denies any CP, SOB or incisional problems. He does note he has experienced some increase in his LE swelling. Path report  Diagnosis:     A. 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 Lymph node, biopsy, inferior pulmonary ligament: Moderate       anthracosis, negative for metastatic carcinoma     C. Lymph node, biopsy, inferior hilar: Severe anthracosis negative for       metastatic carcinoma     D. Lymph node, biopsy, subcarinal #1: Moderate anthracosis negative       for metastatic carcinoma     E. Lymph node, biopsy, subcarinal #2: Adipose tissue, negative for       lymphoid tissue, negative for metastatic carcinoma     F. Lymph node, biopsy, posterior hilar: Lymph nodes x2 with moderate       anthracosis negative for metastatic carcinoma     G. Lymph node, biopsy, bronchial: Moderate anthracosis negative for       metastatic carcinoma     H. Lymph node, biopsy, periaortic: Moderate anthracosis negative for       metastatic carcinoma     I. Lymph node, biopsy, AP window: Moderate anthracosis negative for       metastatic carcinoma     J. Lung, left lower lobectomy: No significant pathologic changes,       negative for residual carcinoma; Lymph node x1 with moderate       anthracosis, negative for metastatic carcinoma;       Bronchial and vascular margins of excision negative for tumor  HPI    Review of Systems   Constitutional: Negative for chills and fever. Respiratory: Negative for cough and shortness of breath.     Cardiovascular: Positive for leg swelling. Negative for chest pain and palpitations. Neurological: Negative for syncope. Objective:   Physical Exam   Constitutional: He is oriented to person, place, and time. He appears well-developed and well-nourished. Cardiovascular: Normal rate, regular rhythm and normal heart sounds. Pulmonary/Chest: Effort normal and breath sounds normal.   LVATS and chest tube sites healing well without evidence of infection    Abdominal: Soft. Bowel sounds are normal.   Neurological: He is alert and oriented to person, place, and time. Assessment:      S/p robotic LVATS, LLL wedge resection, LLLobectomy       Plan:      No activity restrictions   Patient may drive  Path report given to patient and discussed in full  Continue follow up with PCP, pulmonary as scheduled. CT scans every 6 months x 2 years to monitor to new lung nodules   Encouraged to call office with any questions, concerns.   Otherwise no further follow up necessary from CTS standpoint.    '          Dali Ovalle MD

## 2019-05-16 ENCOUNTER — TELEPHONE (OUTPATIENT)
Dept: CARDIOLOGY CLINIC | Age: 84
End: 2019-05-16

## 2019-05-16 NOTE — TELEPHONE ENCOUNTER
[facility] called from Dr. Acuña Mad office. Patient is in need of urologic surgery (unable to urinate) and needs cardiac clearance. Please advise.

## 2019-05-17 ENCOUNTER — TELEPHONE (OUTPATIENT)
Dept: CARDIOTHORACIC SURGERY | Age: 84
End: 2019-05-17

## 2019-05-17 NOTE — TELEPHONE ENCOUNTER
Sharad Horan from Dr Elisa Diggs  [phone] Wants to know if it would be to soon for pt to have a urologic surgery, since he had recent lung sx. They will  obtain cardiac clearance from Dr AJIT CASTELLANO, who wanted to check with us on timing?

## 2019-05-17 NOTE — TELEPHONE ENCOUNTER
Contacted patient to schedule appointment with Dr. Noble White. Patient refused appointment stating Dr. Berhane Diop did an EKG on 5/16/19 and told him he didn't need to do anything further. I contacted Dr. Elda Joyce office and spoke with Diallo Argueta. I explained the above per patient. She asked me to schedule the patient and she will contact him with the appointment and the reasoning behind it.

## 2019-05-21 ENCOUNTER — OFFICE VISIT (OUTPATIENT)
Dept: CARDIOLOGY CLINIC | Age: 84
End: 2019-05-21
Payer: MEDICARE

## 2019-05-21 VITALS
RESPIRATION RATE: 16 BRPM | HEART RATE: 51 BPM | DIASTOLIC BLOOD PRESSURE: 60 MMHG | SYSTOLIC BLOOD PRESSURE: 118 MMHG | HEIGHT: 67 IN | BODY MASS INDEX: 27.45 KG/M2 | WEIGHT: 174.9 LBS

## 2019-05-21 DIAGNOSIS — R06.09 DOE (DYSPNEA ON EXERTION): Primary | ICD-10-CM

## 2019-05-21 PROCEDURE — 99214 OFFICE O/P EST MOD 30 MIN: CPT | Performed by: INTERNAL MEDICINE

## 2019-05-21 PROCEDURE — G8598 ASA/ANTIPLAT THER USED: HCPCS | Performed by: INTERNAL MEDICINE

## 2019-05-21 PROCEDURE — 1123F ACP DISCUSS/DSCN MKR DOCD: CPT | Performed by: INTERNAL MEDICINE

## 2019-05-21 PROCEDURE — G8427 DOCREV CUR MEDS BY ELIG CLIN: HCPCS | Performed by: INTERNAL MEDICINE

## 2019-05-21 PROCEDURE — G8419 CALC BMI OUT NRM PARAM NOF/U: HCPCS | Performed by: INTERNAL MEDICINE

## 2019-05-21 PROCEDURE — 93000 ELECTROCARDIOGRAM COMPLETE: CPT | Performed by: INTERNAL MEDICINE

## 2019-05-21 PROCEDURE — 1036F TOBACCO NON-USER: CPT | Performed by: INTERNAL MEDICINE

## 2019-05-21 PROCEDURE — 4040F PNEUMOC VAC/ADMIN/RCVD: CPT | Performed by: INTERNAL MEDICINE

## 2019-05-21 NOTE — TELEPHONE ENCOUNTER
6 weeks from his surgery will be 5/24/19--from a surgical standpoint he can go forward with further surgeries after this week.

## 2019-05-21 NOTE — PROGRESS NOTES
CHIEF COMPLAINT: CAD h/o CABG    HISTORY OF PRESENT ILLNESS: Patient is a 80 y.o. male seen at the request of Roma Rivera MD.      Patient presents in follow up. Patient has history of CAD s/p CABG 2000 x 6 by Dr. Vern Barros.     Mildly abnormal stress but overall low risk 1/17/18. Baseline MACDONALD. No CP. For prostate procedure. Past Medical History:   Diagnosis Date    BPH (benign prostatic hyperplasia)     CAD (coronary artery disease)     Carotid artery calcification     DJD (degenerative joint disease)     Hyperlipemia     Lung nodule     Macular degeneration     Numbness and tingling in left arm 2/2/2018    Numbness and tingling of left side of face 2/2/2018    S/P CABG x 6 2000    LIMA-LAD, LYNNETTE-LCx,SVG-OM,SVG-LPL,SVG-PDA-RPL       Patient Active Problem List   Diagnosis    CAD (coronary artery disease)    S/P CABG x 6    Hyperlipemia    Asymptomatic bilateral carotid artery stenosis    Numbness and tingling in left arm    Numbness and tingling of left side of face    Pulmonary nodule    Lung nodule    Malignant neoplasm of lower lobe of left lung (HCC)       Allergies   Allergen Reactions    Nitroglycerin     Biaxin [Clarithromycin] Rash    Iodine Swelling and Rash     Internal Iodine only    Quinolones Rash       Current Outpatient Medications   Medication Sig Dispense Refill    metoprolol succinate (TOPROL XL) 25 MG extended release tablet Take 1.5 tablets by mouth daily (Patient taking differently: Take 25 mg by mouth daily ) 30 tablet 3    docusate (COLACE, DULCOLAX) 100 MG CAPS Take 100 mg by mouth 2 times daily as needed (constipation) 20 capsule 0    Calcium-Magnesium-Vitamin D (CALCIUM 1200+D3 PO) Take 1 tablet by mouth daily.  atorvastatin (LIPITOR) 40 MG tablet Take 40 mg by mouth daily.  Multiple Vitamins-Minerals (CENTRUM-LUTEIN PO) Take 1 tablet by mouth daily.  Omega-3 Fatty Acids (FISH OIL) 1000 MG CAPS Take 1,000 mg by mouth daily.       aspirin 81 MG EC tablet Take 81 mg by mouth daily.  Coenzyme Q10 (COQ10) 100 MG CAPS Take 100 mg by mouth daily.  Bevacizumab (AVASTIN IV) Infuse intravenously See Admin Instructions Gets injection in eyes about every 11 weeks      amiodarone (CORDARONE) 200 MG tablet Take 1 tablet by mouth See Admin Instructions Take one tablet once daily x 14 days 14 tablet 0    amLODIPine (NORVASC) 10 MG tablet Take 1 tablet by mouth daily 30 tablet 3    PROAIR  (90 Base) MCG/ACT inhaler        No current facility-administered medications for this visit. Social History     Socioeconomic History    Marital status:       Spouse name: Not on file    Number of children: Not on file    Years of education: Not on file    Highest education level: Not on file   Occupational History    Occupation: retired-    Social Needs    Financial resource strain: Not on file    Food insecurity:     Worry: Not on file     Inability: Not on file   Sodbuster needs:     Medical: Not on file     Non-medical: Not on file   Tobacco Use    Smoking status: Former Smoker     Packs/day: 1.00     Years: 10.00     Pack years: 10.00     Types: Cigarettes    Smokeless tobacco: Never Used    Tobacco comment: quit 40-50 years ago   Substance and Sexual Activity    Alcohol use: Yes     Comment: socially    Drug use: No    Sexual activity: Not on file   Lifestyle    Physical activity:     Days per week: Not on file     Minutes per session: Not on file    Stress: Not on file   Relationships    Social connections:     Talks on phone: Not on file     Gets together: Not on file     Attends Mandaeism service: Not on file     Active member of club or organization: Not on file     Attends meetings of clubs or organizations: Not on file     Relationship status: Not on file    Intimate partner violence:     Fear of current or ex partner: Not on file     Emotionally abused: Not on file     Physically abused: Not on file     Forced sexual activity: Not on file   Other Topics Concern    Not on file   Social History Narrative    Not on file       Family History   Problem Relation Age of Onset    Other Mother         accident age 28 years , Dave Johnston. was 5 months old    Other Father         accident age 43 decesed 8 years when Father passed    Heart Disease Brother     Alzheimer's Disease Sister     Heart Disease Brother     Heart Disease Brother      Review of Systems:  Heart: as above   Lungs: as above   Eyes: denies changes in vision or discharge. Ears: denies changes in hearing or pain. Nose: denies epistaxis or masses   Throat: denies sore throat or trouble swallowing. Neuro: denies numbness, tingling, tremors. Skin: denies rashes or itching. : denies hematuria, dysuria   GI: denies vomiting, diarrhea   Psych: denies mood changed, anxiety, depression. all others systems negative. Physical Exam   /60   Pulse 51   Resp 16   Ht 5' 7\" (1.702 m)   Wt 174 lb 14.4 oz (79.3 kg)   BMI 27.39 kg/m²   Constitutional: Oriented to person, place, and time. Well-developed and well-nourished. No distress. Head: Normocephalic and atraumatic. Eyes: EOM are normal. Pupils are equal, round, and reactive to light. Neck: Normal range of motion. Neck supple. No hepatojugular reflux and no JVD present. Carotid bruit is not present. No tracheal deviation present. No thyromegaly present. Cardiovascular: Normal rate, regular rhythm, normal heart sounds and intact distal pulses. Exam reveals no gallop and no friction rub. No murmur heard. Pulmonary/Chest: Effort normal and breath sounds normal. No respiratory distress. No wheezes. No rales. No tenderness. Abdominal: Soft. Bowel sounds are normal. No distension and no mass. No tenderness. No rebound and no guarding. Musculoskeletal: Normal range of motion. No edema and no tenderness. Lymphadenopathy:   No cervical adenopathy.  No groin adenopathy. Neurological: Alert and oriented to person, place, and time. Skin: Skin is warm and dry. No rash noted. Not diaphoretic. No erythema. Psychiatric: Normal mood and affect. Behavior is normal.     EKG:  nonspecific ST and T waves changes, sinus bradycardia. Stress Impression 1/17/18:    1. ECG during the infusion did not change. 2. The myocardial perfusion imaging was abnormal.    The abnormality was a moderate sized partially reversible defect in the inferolateral  wall  3. Overall left ventricular systolic function was normal without regional wall motion abnormalities. 4. Intermediate risk general pharmacologic stress test.  5. Compared to prior study results of 11/16/2010, there is a new perfusion defect involving the inferolateral wall with partial reversibility. ASSESSMENT AND PLAN:  Patient Active Problem List   Diagnosis    CAD (coronary artery disease)    S/P CABG x 6    Hyperlipemia    Asymptomatic bilateral carotid artery stenosis    Numbness and tingling in left arm    Numbness and tingling of left side of face    Pulmonary nodule    Lung nodule    Malignant neoplasm of lower lobe of left lung ([address]     1. CAD hx of CABG:    Continue BB/ASA/statin/imdur. Stable symptoms. 2. Pre-op Clearance: Overall low risk stress 1/18. Patient is an acceptable risk to proceed with surgery. Anita Perez D.O.   Cardiologist  Cardiology, [address] [facility]

## 2019-05-22 ENCOUNTER — HOSPITAL ENCOUNTER (OUTPATIENT)
Age: 84
Discharge: HOME | End: 2019-05-22
Payer: MEDICARE

## 2019-05-22 ENCOUNTER — HOSPITAL ENCOUNTER (INPATIENT)
Age: 84
LOS: 2 days | Discharge: HOME | DRG: 872 | End: 2019-05-24
Attending: EMERGENCY MEDICINE | Admitting: INTERNAL MEDICINE
Payer: MEDICARE

## 2019-05-22 ENCOUNTER — APPOINTMENT (OUTPATIENT)
Dept: CT IMAGING | Age: 84
DRG: 872 | End: 2019-05-22
Payer: MEDICARE

## 2019-05-22 ENCOUNTER — APPOINTMENT (OUTPATIENT)
Dept: GENERAL RADIOLOGY | Age: 84
DRG: 872 | End: 2019-05-22
Payer: MEDICARE

## 2019-05-22 DIAGNOSIS — N39.0 URINARY TRACT INFECTION WITHOUT HEMATURIA, SITE UNSPECIFIED: ICD-10-CM

## 2019-05-22 DIAGNOSIS — R91.8 LUNG NODULES: ICD-10-CM

## 2019-05-22 DIAGNOSIS — R65.10 SIRS (SYSTEMIC INFLAMMATORY RESPONSE SYNDROME) (HCC): ICD-10-CM

## 2019-05-22 DIAGNOSIS — J90 PLEURAL EFFUSION: Primary | ICD-10-CM

## 2019-05-22 PROBLEM — A41.9 SEPSIS SECONDARY TO UTI (HCC): Status: ACTIVE | Noted: 2019-05-22

## 2019-05-22 LAB
ALBUMIN SERPL-MCNC: 3.8 G/DL (ref 3.5–5.2)
ALP BLD-CCNC: 80 U/L (ref 40–129)
ALT SERPL-CCNC: 9 U/L (ref 0–40)
ANION GAP SERPL CALCULATED.3IONS-SCNC: 15 MMOL/L (ref 7–16)
APTT: 28.2 SEC (ref 25.7–34.7)
AST SERPL-CCNC: 18 U/L (ref 0–39)
BACTERIA: ABNORMAL /HPF
BASOPHILS ABSOLUTE: 0.08 E9/L (ref 0–0.2)
BASOPHILS RELATIVE PERCENT: 0.3 % (ref 0–2)
BILIRUB SERPL-MCNC: 1.7 MG/DL (ref 0–1.2)
BILIRUBIN URINE: ABNORMAL
BLOOD, URINE: ABNORMAL
BUN BLDV-MCNC: 27 MG/DL (ref 8–23)
CALCIUM SERPL-MCNC: 8.8 MG/DL (ref 8.6–10.2)
CHLORIDE BLD-SCNC: 99 MMOL/L (ref 98–107)
CLARITY: ABNORMAL
CO2: 22 MMOL/L (ref 22–29)
COLOR: ABNORMAL
CREAT SERPL-MCNC: 1.3 MG/DL (ref 0.7–1.2)
EOSINOPHILS ABSOLUTE: 0 E9/L (ref 0.05–0.5)
EOSINOPHILS RELATIVE PERCENT: 0 % (ref 0–6)
EPITHELIAL CELLS, UA: ABNORMAL /HPF
GFR AFRICAN AMERICAN: >60
GFR NON-AFRICAN AMERICAN: 51 ML/MIN/1.73
GLUCOSE BLD-MCNC: 143 MG/DL (ref 74–99)
GLUCOSE URINE: NEGATIVE MG/DL
HCT VFR BLD CALC: 33.9 % (ref 37–54)
HEMOGLOBIN: 11.2 G/DL (ref 12.5–16.5)
IMMATURE GRANULOCYTES #: 0.23 E9/L
IMMATURE GRANULOCYTES %: 0.9 % (ref 0–5)
INR BLD: 1.3
KETONES, URINE: NEGATIVE MG/DL
LACTIC ACID: 1.9 MMOL/L (ref 0.5–2.2)
LACTIC ACID: 2.3 MMOL/L (ref 0.5–2.2)
LEUKOCYTE ESTERASE, URINE: ABNORMAL
LYMPHOCYTES ABSOLUTE: 1.23 E9/L (ref 1.5–4)
LYMPHOCYTES RELATIVE PERCENT: 4.7 % (ref 20–42)
MCH RBC QN AUTO: 28.9 PG (ref 26–35)
MCHC RBC AUTO-ENTMCNC: 33 % (ref 32–34.5)
MCV RBC AUTO: 87.4 FL (ref 80–99.9)
MONOCYTES ABSOLUTE: 2.92 E9/L (ref 0.1–0.95)
MONOCYTES RELATIVE PERCENT: 11.1 % (ref 2–12)
NEUTROPHILS ABSOLUTE: 21.96 E9/L (ref 1.8–7.3)
NEUTROPHILS RELATIVE PERCENT: 83 % (ref 43–80)
NITRITE, URINE: NEGATIVE
PDW BLD-RTO: 13.8 FL (ref 11.5–15)
PH UA: 7 (ref 5–9)
PLATELET # BLD: 231 E9/L (ref 130–450)
PMV BLD AUTO: 9 FL (ref 7–12)
POTASSIUM SERPL-SCNC: 4.1 MMOL/L (ref 3.5–5)
PRO-BNP: 2729 PG/ML (ref 0–450)
PROTEIN UA: 100 MG/DL
PROTHROMBIN TIME: 15 SEC (ref 9.3–12.4)
RBC # BLD: 3.88 E12/L (ref 3.8–5.8)
RBC UA: ABNORMAL /HPF (ref 0–2)
SODIUM BLD-SCNC: 136 MMOL/L (ref 132–146)
SPECIFIC GRAVITY UA: 1.01 (ref 1–1.03)
TOTAL PROTEIN: 6.9 G/DL (ref 6.4–8.3)
TROPONIN: <0.01 NG/ML (ref 0–0.03)
UROBILINOGEN, URINE: 1 E.U./DL
WBC # BLD: 26.4 E9/L (ref 4.5–11.5)
WBC UA: ABNORMAL /HPF (ref 0–5)

## 2019-05-22 PROCEDURE — 99285 EMERGENCY DEPT VISIT HI MDM: CPT

## 2019-05-22 PROCEDURE — 71250 CT THORAX DX C-: CPT

## 2019-05-22 PROCEDURE — 2580000003 HC RX 258: Performed by: EMERGENCY MEDICINE

## 2019-05-22 PROCEDURE — 6370000000 HC RX 637 (ALT 250 FOR IP): Performed by: INTERNAL MEDICINE

## 2019-05-22 PROCEDURE — 6360000002 HC RX W HCPCS: Performed by: INTERNAL MEDICINE

## 2019-05-22 PROCEDURE — 2580000003 HC RX 258: Performed by: INTERNAL MEDICINE

## 2019-05-22 PROCEDURE — 85025 COMPLETE CBC W/AUTO DIFF WBC: CPT

## 2019-05-22 PROCEDURE — 85610 PROTHROMBIN TIME: CPT

## 2019-05-22 PROCEDURE — 71045 X-RAY EXAM CHEST 1 VIEW: CPT

## 2019-05-22 PROCEDURE — 6360000002 HC RX W HCPCS

## 2019-05-22 PROCEDURE — 81001 URINALYSIS AUTO W/SCOPE: CPT

## 2019-05-22 PROCEDURE — 85730 THROMBOPLASTIN TIME PARTIAL: CPT

## 2019-05-22 PROCEDURE — 87040 BLOOD CULTURE FOR BACTERIA: CPT

## 2019-05-22 PROCEDURE — 83880 ASSAY OF NATRIURETIC PEPTIDE: CPT

## 2019-05-22 PROCEDURE — 93005 ELECTROCARDIOGRAM TRACING: CPT | Performed by: EMERGENCY MEDICINE

## 2019-05-22 PROCEDURE — A0426 ALS 1: HCPCS

## 2019-05-22 PROCEDURE — 96365 THER/PROPH/DIAG IV INF INIT: CPT

## 2019-05-22 PROCEDURE — 36415 COLL VENOUS BLD VENIPUNCTURE: CPT

## 2019-05-22 PROCEDURE — A0425 GROUND MILEAGE: HCPCS

## 2019-05-22 PROCEDURE — 2140000000 HC CCU INTERMEDIATE R&B

## 2019-05-22 PROCEDURE — 83605 ASSAY OF LACTIC ACID: CPT

## 2019-05-22 PROCEDURE — 94760 N-INVAS EAR/PLS OXIMETRY 1: CPT

## 2019-05-22 PROCEDURE — 80053 COMPREHEN METABOLIC PANEL: CPT

## 2019-05-22 PROCEDURE — 6360000002 HC RX W HCPCS: Performed by: EMERGENCY MEDICINE

## 2019-05-22 PROCEDURE — 84484 ASSAY OF TROPONIN QUANT: CPT

## 2019-05-22 RX ORDER — 0.9 % SODIUM CHLORIDE 0.9 %
1000 INTRAVENOUS SOLUTION INTRAVENOUS ONCE
Status: COMPLETED | OUTPATIENT
Start: 2019-05-22 | End: 2019-05-22

## 2019-05-22 RX ORDER — PIPERACILLIN SODIUM, TAZOBACTAM SODIUM 3; .375 G/15ML; G/15ML
INJECTION, POWDER, LYOPHILIZED, FOR SOLUTION INTRAVENOUS
Status: DISPENSED
Start: 2019-05-22 | End: 2019-05-23

## 2019-05-22 RX ORDER — VANCOMYCIN HYDROCHLORIDE 500 MG/10ML
INJECTION, POWDER, LYOPHILIZED, FOR SOLUTION INTRAVENOUS
Status: DISPENSED
Start: 2019-05-22 | End: 2019-05-23

## 2019-05-22 RX ORDER — ASPIRIN 81 MG/1
81 TABLET ORAL DAILY
Status: DISCONTINUED | OUTPATIENT
Start: 2019-05-23 | End: 2019-05-24 | Stop reason: HOSPADM

## 2019-05-22 RX ORDER — ACETAMINOPHEN 325 MG/1
650 TABLET ORAL EVERY 4 HOURS PRN
Status: DISCONTINUED | OUTPATIENT
Start: 2019-05-22 | End: 2019-05-24 | Stop reason: HOSPADM

## 2019-05-22 RX ORDER — SODIUM CHLORIDE 0.9 % (FLUSH) 0.9 %
10 SYRINGE (ML) INJECTION 2 TIMES DAILY
Status: DISCONTINUED | OUTPATIENT
Start: 2019-05-23 | End: 2019-05-24 | Stop reason: HOSPADM

## 2019-05-22 RX ORDER — SODIUM CHLORIDE 9 MG/ML
INJECTION, SOLUTION INTRAVENOUS CONTINUOUS
Status: DISCONTINUED | OUTPATIENT
Start: 2019-05-23 | End: 2019-05-23

## 2019-05-22 RX ORDER — ATORVASTATIN CALCIUM 40 MG/1
40 TABLET, FILM COATED ORAL NIGHTLY
Status: DISCONTINUED | OUTPATIENT
Start: 2019-05-23 | End: 2019-05-24 | Stop reason: HOSPADM

## 2019-05-22 RX ORDER — VANCOMYCIN HYDROCHLORIDE 1 G/20ML
INJECTION, POWDER, LYOPHILIZED, FOR SOLUTION INTRAVENOUS
Status: DISPENSED
Start: 2019-05-22 | End: 2019-05-23

## 2019-05-22 RX ORDER — METOPROLOL SUCCINATE 25 MG/1
37.5 TABLET, EXTENDED RELEASE ORAL DAILY
Status: DISCONTINUED | OUTPATIENT
Start: 2019-05-23 | End: 2019-05-23

## 2019-05-22 RX ORDER — SODIUM CHLORIDE 0.9 % (FLUSH) 0.9 %
10 SYRINGE (ML) INJECTION PRN
Status: DISCONTINUED | OUTPATIENT
Start: 2019-05-22 | End: 2019-05-24 | Stop reason: HOSPADM

## 2019-05-22 RX ADMIN — Medication 10 ML: at 23:53

## 2019-05-22 RX ADMIN — SODIUM CHLORIDE 1000 ML: 9 INJECTION, SOLUTION INTRAVENOUS at 18:15

## 2019-05-22 RX ADMIN — SODIUM CHLORIDE: 9 INJECTION, SOLUTION INTRAVENOUS at 23:53

## 2019-05-22 RX ADMIN — VANCOMYCIN HYDROCHLORIDE 1250 MG: 1 INJECTION, POWDER, LYOPHILIZED, FOR SOLUTION INTRAVENOUS at 20:18

## 2019-05-22 RX ADMIN — CEFTRIAXONE SODIUM 1 G: 1 INJECTION, POWDER, FOR SOLUTION INTRAMUSCULAR; INTRAVENOUS at 23:53

## 2019-05-22 RX ADMIN — ACETAMINOPHEN 650 MG: 325 TABLET, FILM COATED ORAL at 23:53

## 2019-05-22 ASSESSMENT — PAIN SCALES - GENERAL
PAINLEVEL_OUTOF10: 6
PAINLEVEL_OUTOF10: 0
PAINLEVEL_OUTOF10: 1

## 2019-05-22 ASSESSMENT — PAIN DESCRIPTION - LOCATION: LOCATION: CHEST

## 2019-05-22 ASSESSMENT — PAIN DESCRIPTION - DESCRIPTORS: DESCRIPTORS: DISCOMFORT;CONSTANT

## 2019-05-22 ASSESSMENT — PAIN DESCRIPTION - PAIN TYPE: TYPE: ACUTE PAIN

## 2019-05-22 ASSESSMENT — PAIN - FUNCTIONAL ASSESSMENT: PAIN_FUNCTIONAL_ASSESSMENT: 0-10

## 2019-05-22 NOTE — ED PROVIDER NOTES
HPI:  5/22/19, Time: 5:10 PM         Leslie Mtz is a 80 y.o. male presenting to the ED for fever and cough productive of yellow phlegm, beginning yesterday. Also associated with anorexia and nausea. The complaint has been persistent, moderate in severity, and worsened by nothing. He c/o pain in his chest on the left side. Recent lung resection 5 weeks ago for lung mass. Reports is to have prostate surgery soon as well. Patient denies sore throat, ear pain, sinus congestion, rash,  shortness of breath, edema, headache, visual disturbances, focal paresthesias, focal weakness, abdominal pain, vomiting, diarrhea, constipation, dysuria, hematuria, trauma, neck or back pain or other complaints. ROS:   Pertinent positives and negatives are stated within HPI, all other systems reviewed and are negative.      --------------------------------------------- PAST HISTORY ---------------------------------------------  Past Medical History:  has a past medical history of BPH (benign prostatic hyperplasia), CAD (coronary artery disease), Carotid artery calcification, DJD (degenerative joint disease), Hyperlipemia, Lung nodule, Macular degeneration, Numbness and tingling in left arm, Numbness and tingling of left side of face, and S/P CABG x 6. Past Surgical History:  has a past surgical history that includes Diagnostic Cardiac Cath Lab Procedure (9/5/00); Cardiac surgery; TURP; Dental surgery; Total hip arthroplasty (Right, 03/28/2011); Total hip arthroplasty (Left, 11/09/2009); other surgical history (02/16/2011); and Thoracoscopy (Left, 4/12/2019). Social History:  reports that he has quit smoking. His smoking use included cigarettes. He has a 10.00 pack-year smoking history. He has never used smokeless tobacco. He reports that he drinks alcohol. He reports that he does not use drugs. Family History: family history includes Alzheimer's Disease in his sister;  Heart Disease in his brother, brother, and brother; Other in his father and mother. The patients home medications have been reviewed. Allergies: Nitroglycerin; Biaxin [clarithromycin]; Iodine; and Quinolones        ---------------------------------------------------PHYSICAL EXAM--------------------------------------    Constitutional:  Well developed, well nourished, no acute distress, non-toxic appearance   Eyes:  PERRL, conjunctiva normal, EOMI  HENT:  Atraumatic, external ears normal, nose normal, oropharynx moist. Neck- normal range of motion, no tenderness, supple   Respiratory:  No respiratory distress, diffusely normal breath sounds, no rales, no wheezing   Cardiovascular:  Normal rate, normal rhythm, no murmurs, no gallops, no rubs. Radial and DP pulses 2+ bilaterally. GI:  Soft, nondistended, normal bowel sounds, nontender, no organomegaly, no mass, no rebound, no guarding   :  No costovertebral angle tenderness. Dunham in place. Musculoskeletal:  No edema, no tenderness, no deformities. Integument:  Well hydrated, no rash. Adequate perfusion. Chest wall surgical incisions well-healed. Lymphatic:  No cervical lymphadenopathy noted   Neurologic:  Alert & oriented x 3, CN 2-12 normal, normal gait, no focal deficits noted. Psychiatric:  Speech and behavior appropriate       -------------------------------------------------- RESULTS -------------------------------------------------  I have personally reviewed all laboratory and imaging results for this patient. Results are listed below.      LABS:  Results for orders placed or performed during the hospital encounter of 05/22/19   CBC auto differential   Result Value Ref Range    WBC 26.4 (H) 4.5 - 11.5 E9/L    RBC 3.88 3.80 - 5.80 E12/L    Hemoglobin 11.2 (L) 12.5 - 16.5 g/dL    Hematocrit 33.9 (L) 37.0 - 54.0 %    MCV 87.4 80.0 - 99.9 fL    MCH 28.9 26.0 - 35.0 pg    MCHC 33.0 32.0 - 34.5 %    RDW 13.8 11.5 - 15.0 fL    Platelets 804 630 - 239 E9/L    MPV 9.0 7.0 - 12.0 fL MODERATE (A) Negative    WBC, UA PACKED 0 - 5 /HPF    RBC, UA 5-10 (A) 0 - 2 /HPF    Epi Cells FEW /HPF    Bacteria, UA MANY (A) /HPF   Lactic Acid, Plasma   Result Value Ref Range    Lactic Acid 1.9 0.5 - 2.2 mmol/L       RADIOLOGY:  Interpreted by Radiologist.  CT Chest WO Contrast   Final Result   The most significant findings are 2 groundglass nodular infiltrates in   the left lung as described. Both of which are new when compared to the   previous study. Given the previous CT scan of the chest and PET CT,   concerning for neoplasm. Small moderate left pleural effusion   Hiatal hernia   Moderate scarring and fibrosis throughout the lung fields                     XR CHEST PORTABLE   Final Result      No airspace opacities or pleural effusion. EKG Interpretation  Interpreted by emergency department physician,    Time: 9713  Rhythm: normal sinus   Rate: 63  Axis: normal  Conduction: left anterior fasciclar block  ST Segments: no acute change  T Waves: no acute change  Clinical Impression: no acute changes  Comparison to prior EKG: None      ------------------------- NURSING NOTES AND VITALS REVIEWED ---------------------------   The nursing notes within the ED encounter and vital signs as below have been reviewed by myself. /64   Pulse 61   Temp 99.5 °F (37.5 °C) (Oral)   Resp 16   Ht 5' 7\" (1.702 m)   Wt 170 lb (77.1 kg)   SpO2 96%   BMI 26.63 kg/m²   Oxygen Saturation Interpretation: Normal    The patients available past medical records and past encounters were reviewed.         ------------------------------ ED COURSE/MEDICAL DECISION MAKING----------------------  Medications   piperacillin-tazobactam (ZOSYN) 3.375 g in dextrose 5 % 100 mL IVPB (mini-bag) (3.375 g Intravenous Not Given 5/22/19 1824)   vancomycin (VANCOCIN) 1,250 mg in dextrose 5 % 250 mL IVPB (1,250 mg Intravenous New Bag 5/22/19 2018)   piperacillin-tazobactam (ZOSYN) 3.375 (3-0.375) g injection (0 g Stop Time 5/22/19 2019)   vancomycin (VANCOCIN) 500 MG injection (has no administration in time range)   vancomycin (VANCOCIN) 1 g injection (has no administration in time range)   0.9 % sodium chloride bolus (0 mLs Intravenous Stopped 5/22/19 1926)           Procedures:  none      Medical Decision Making:      SIRS CRITERIA:    Temp >38 C (100.4 F) or <36 C (96.8 F)   NO. Heart Rate >90:   NO.     Resp. Rate >20 or PaCO2 < 32 mmHg:   NO.     WBC <4K or >12K  or >10% Bands:   YES  +1. Total:   1     ** Two or more above criteria met? No**           This patient's ED course included: re-evaluation prior to disposition, IV medications, cardiac monitoring, continuous pulse oximetry and a personal history and physicial eaxmination    This patient has remained hemodynamically stable, improved and been closely monitored during their ED course. Re-Evaluations:             Time: 6:14 PM  Re-evaluation. Patients symptoms are improving  Repeat physical examination is not changed        Consultations:             Spoke with Dr. Mary Milton (Medicine). Discussed case. They will admit this patient to intermediate. Critical Care: none        Counseling: The emergency provider has spoken with the patient and discussed todays results, in addition to providing specific details for the plan of care and counseling regarding the diagnosis and prognosis. Questions are answered at this time and they are agreeable with the plan.       --------------------------------- IMPRESSION AND DISPOSITION ---------------------------------    IMPRESSION  1. Pleural effusion    2. Lung nodules    3. SIRS (systemic inflammatory response syndrome) (HCC)    4.  Urinary tract infection without hematuria, site unspecified        DISPOSITION  Disposition: Admit to intermediate floor  Patient condition is fair                 Colin Hayward, DO  05/22/19 2102       Colin Hayward, DO  05/22/19 2103

## 2019-05-22 NOTE — ED NOTES
Liter of 0.9 NACL infusing to the LAC site at 1000 cc/hour. Zosyn infusing IVPB. Pt will use call light to let nurse know when he can provide a urine sample. Repeat lactic ordered - collected post infusion of normal saline and send to lab.       [facility]  05/22/19 1004

## 2019-05-23 LAB
ANION GAP SERPL CALCULATED.3IONS-SCNC: 11 MMOL/L (ref 7–16)
ANISOCYTOSIS: ABNORMAL
BASOPHILS ABSOLUTE: 0 E9/L (ref 0–0.2)
BASOPHILS RELATIVE PERCENT: 0.4 % (ref 0–2)
BUN BLDV-MCNC: 25 MG/DL (ref 8–23)
BURR CELLS: ABNORMAL
CALCIUM SERPL-MCNC: 8.1 MG/DL (ref 8.6–10.2)
CHLORIDE BLD-SCNC: 102 MMOL/L (ref 98–107)
CO2: 22 MMOL/L (ref 22–29)
CREAT SERPL-MCNC: 1.2 MG/DL (ref 0.7–1.2)
EKG ATRIAL RATE: 63 BPM
EKG P AXIS: 52 DEGREES
EKG P-R INTERVAL: 174 MS
EKG Q-T INTERVAL: 434 MS
EKG QRS DURATION: 102 MS
EKG QTC CALCULATION (BAZETT): 444 MS
EKG R AXIS: -46 DEGREES
EKG T AXIS: 40 DEGREES
EKG VENTRICULAR RATE: 63 BPM
EOSINOPHILS ABSOLUTE: 0 E9/L (ref 0.05–0.5)
EOSINOPHILS RELATIVE PERCENT: 0.1 % (ref 0–6)
GFR AFRICAN AMERICAN: >60
GFR NON-AFRICAN AMERICAN: 56 ML/MIN/1.73
GLUCOSE BLD-MCNC: 113 MG/DL (ref 74–99)
HCT VFR BLD CALC: 29.8 % (ref 37–54)
HEMOGLOBIN: 9.8 G/DL (ref 12.5–16.5)
LYMPHOCYTES ABSOLUTE: 1.63 E9/L (ref 1.5–4)
LYMPHOCYTES RELATIVE PERCENT: 7 % (ref 20–42)
MCH RBC QN AUTO: 28.7 PG (ref 26–35)
MCHC RBC AUTO-ENTMCNC: 32.9 % (ref 32–34.5)
MCV RBC AUTO: 87.4 FL (ref 80–99.9)
MONOCYTES ABSOLUTE: 0.93 E9/L (ref 0.1–0.95)
MONOCYTES RELATIVE PERCENT: 4.3 % (ref 2–12)
NEUTROPHILS ABSOLUTE: 20.74 E9/L (ref 1.8–7.3)
NEUTROPHILS RELATIVE PERCENT: 88.7 % (ref 43–80)
OVALOCYTES: ABNORMAL
PDW BLD-RTO: 13.8 FL (ref 11.5–15)
PLATELET # BLD: 202 E9/L (ref 130–450)
PMV BLD AUTO: 9.1 FL (ref 7–12)
POIKILOCYTES: ABNORMAL
POLYCHROMASIA: ABNORMAL
POTASSIUM SERPL-SCNC: 3.4 MMOL/L (ref 3.5–5)
PROCALCITONIN: 0.16 NG/ML (ref 0–0.08)
RBC # BLD: 3.41 E12/L (ref 3.8–5.8)
SODIUM BLD-SCNC: 135 MMOL/L (ref 132–146)
WBC # BLD: 23.3 E9/L (ref 4.5–11.5)

## 2019-05-23 PROCEDURE — 2140000000 HC CCU INTERMEDIATE R&B

## 2019-05-23 PROCEDURE — 87077 CULTURE AEROBIC IDENTIFY: CPT

## 2019-05-23 PROCEDURE — 87450 HC DIRECT STREP B ANTIGEN: CPT

## 2019-05-23 PROCEDURE — 93010 ELECTROCARDIOGRAM REPORT: CPT | Performed by: INTERNAL MEDICINE

## 2019-05-23 PROCEDURE — 87186 SC STD MICRODIL/AGAR DIL: CPT

## 2019-05-23 PROCEDURE — 6370000000 HC RX 637 (ALT 250 FOR IP): Performed by: INTERNAL MEDICINE

## 2019-05-23 PROCEDURE — 85025 COMPLETE CBC W/AUTO DIFF WBC: CPT

## 2019-05-23 PROCEDURE — 36415 COLL VENOUS BLD VENIPUNCTURE: CPT

## 2019-05-23 PROCEDURE — 80048 BASIC METABOLIC PNL TOTAL CA: CPT

## 2019-05-23 PROCEDURE — 84145 PROCALCITONIN (PCT): CPT

## 2019-05-23 PROCEDURE — 99222 1ST HOSP IP/OBS MODERATE 55: CPT | Performed by: INTERNAL MEDICINE

## 2019-05-23 PROCEDURE — 6360000002 HC RX W HCPCS: Performed by: INTERNAL MEDICINE

## 2019-05-23 PROCEDURE — 87088 URINE BACTERIA CULTURE: CPT

## 2019-05-23 PROCEDURE — 2580000003 HC RX 258: Performed by: INTERNAL MEDICINE

## 2019-05-23 RX ORDER — ALBUTEROL SULFATE 2.5 MG/3ML
2.5 SOLUTION RESPIRATORY (INHALATION) EVERY 6 HOURS PRN
Status: DISCONTINUED | OUTPATIENT
Start: 2019-05-23 | End: 2019-05-24 | Stop reason: HOSPADM

## 2019-05-23 RX ORDER — METOPROLOL SUCCINATE 25 MG/1
25 TABLET, EXTENDED RELEASE ORAL DAILY
Status: DISCONTINUED | OUTPATIENT
Start: 2019-05-24 | End: 2019-05-24 | Stop reason: HOSPADM

## 2019-05-23 RX ORDER — SODIUM CHLORIDE AND POTASSIUM CHLORIDE .9; .15 G/100ML; G/100ML
SOLUTION INTRAVENOUS CONTINUOUS
Status: DISCONTINUED | OUTPATIENT
Start: 2019-05-23 | End: 2019-05-23

## 2019-05-23 RX ORDER — ALBUTEROL SULFATE 2.5 MG/3ML
2.5 SOLUTION RESPIRATORY (INHALATION) EVERY 6 HOURS
Status: DISCONTINUED | OUTPATIENT
Start: 2019-05-23 | End: 2019-05-23

## 2019-05-23 RX ORDER — ATROPA BELLADONNA AND OPIUM 16.2; 6 MG/1; MG/1
60 SUPPOSITORY RECTAL EVERY 8 HOURS PRN
Status: DISCONTINUED | OUTPATIENT
Start: 2019-05-23 | End: 2019-05-24 | Stop reason: HOSPADM

## 2019-05-23 RX ORDER — METOPROLOL SUCCINATE 25 MG/1
25 TABLET, EXTENDED RELEASE ORAL DAILY
COMMUNITY

## 2019-05-23 RX ADMIN — ATORVASTATIN CALCIUM 40 MG: 40 TABLET, FILM COATED ORAL at 21:38

## 2019-05-23 RX ADMIN — SODIUM CHLORIDE AND POTASSIUM CHLORIDE: .9; .15 SOLUTION INTRAVENOUS at 14:58

## 2019-05-23 RX ADMIN — METOPROLOL SUCCINATE 37.5 MG: 25 TABLET, EXTENDED RELEASE ORAL at 11:08

## 2019-05-23 RX ADMIN — SODIUM CHLORIDE: 9 INJECTION, SOLUTION INTRAVENOUS at 11:09

## 2019-05-23 RX ADMIN — Medication 10 ML: at 21:38

## 2019-05-23 ASSESSMENT — PAIN SCALES - GENERAL
PAINLEVEL_OUTOF10: 0

## 2019-05-23 NOTE — PROGRESS NOTES
[facility]

## 2019-05-23 NOTE — H&P
[facility]  [facility] and Physical      CHIEF COMPLAINT:  Chills, generalized feeling of sickness    Reason for Admission:  Suspected urinary tract infection, possible pneumonia    History Obtained From:  Patient    PCP :  Anita Blancas MD    [address] / [facility] [address]      HISTORY OF PRESENT ILLNESS:      The patient is a 80 y.o. male presented to the emergency room because of generalized feeling of malaise, fevers, chills. Patient was recently here for thoracotomy. One week after discharge patient started having issues with lower abdominal bloating. He was evaluated by his surgeon Dr. Marie Stephenson. He was then sent to his urologist's office with a Dunham catheter was placed in 2000 mL of urine was drained. Since then he has had indwelling Dunham catheter. 2 days prior to admission he then started having chills and having it warm sensation after mowing his lawn. He just did not feel good. He went to bed and woke up the next morning not feeling well. He then went back to bed and woke up feeling not good which is when he presented to the emergency room. He also had some nausea. Does not have any cough. In the emergency room he was noted to have urinary tract infection. He was also noted to have an abnormal CT scan of the chest. At that point of time decision was made to admit the patient for further evaluation treatment. He feels significantly improved since admission. Sinus by the bedside.     Past Medical History:        Diagnosis Date    BPH (benign prostatic hyperplasia)     CAD (coronary artery disease)     Carotid artery calcification     DJD (degenerative joint disease)     Hyperlipemia     Lung nodule     Macular degeneration     Numbness and tingling in left arm 2/2/2018    Numbness and tingling of left side of face 2/2/2018    S/P CABG x 6 2000    LIMA-LAD, LYNNETTE-LCx,SVG-OM,SVG-LPL,SVG-PDA-RPL     Past Surgical History:        Procedure Laterality Date    CARDIAC SURGERY      6 vessel in 2000    DENTAL SURGERY      root canal    DIAGNOSTIC CARDIAC CATH LAB PROCEDURE  9/5/00    OTHER SURGICAL HISTORY  02/16/2011    Injection right hip  T. Mary Ellen Avendaño MD    THORACOSCOPY Left 4/12/2019    BRONCHOSCOPY LEFT THORACOSCOPY ROBOTIC VIDEO ASSISTED , WEDGE RESECTION, POSS. LEFT LOWER LOBECTOMY performed by Enzo Zavaleta MD at [facility] Right 03/28/2011    Right BRIONNA  Lakia Dominguez MD    TOTAL HIP ARTHROPLASTY Left 11/09/2009    Left BRIONNA  Lakia Dominguez MD    TURP           Medications Prior to Admission:    Medications Prior to Admission: metoprolol succinate (TOPROL XL) 25 MG extended release tablet, Take 1.5 tablets by mouth daily (Patient taking differently: Take 25 mg by mouth daily )  docusate (COLACE, DULCOLAX) 100 MG CAPS, Take 100 mg by mouth 2 times daily as needed (constipation)  Calcium-Magnesium-Vitamin D (CALCIUM 1200+D3 PO), Take 1 tablet by mouth daily. atorvastatin (LIPITOR) 40 MG tablet, Take 40 mg by mouth daily. Multiple Vitamins-Minerals (CENTRUM-LUTEIN PO), Take 1 tablet by mouth daily. Omega-3 Fatty Acids (FISH OIL) 1000 MG CAPS, Take 1,000 mg by mouth daily. aspirin 81 MG EC tablet, Take 81 mg by mouth daily. Coenzyme Q10 (COQ10) 100 MG CAPS, Take 100 mg by mouth daily. Bevacizumab (AVASTIN IV), Infuse intravenously See Admin Instructions Gets injection in eyes about every 11 weeks    Allergies:  Nitroglycerin; Biaxin [clarithromycin]; Iodine; and Quinolones    Social History:   Social History     Socioeconomic History    Marital status:       Spouse name: Not on file    Number of children: Not on file    Years of education: Not on file    Highest education level: Not on file   Occupational History    Occupation: retired-    Social Needs    Financial resource strain: Not on file    Food insecurity:     Worry: Not on file     Inability: Not on file   [place] needs:     Medical: Not on file     Non-medical: Not on file   Tobacco Use    Smoking status: Former Smoker     Packs/day: 1.00     Years: 10.00     Pack years: 10.00     Types: Cigarettes    Smokeless tobacco: Never Used    Tobacco comment: quit 40-50 years ago   Substance and Sexual Activity    Alcohol use: Yes     Comment: socially    Drug use: No    Sexual activity: Not on file   Lifestyle    Physical activity:     Days per week: Not on file     Minutes per session: Not on file    Stress: Not on file   Relationships    Social connections:     Talks on phone: Not on file     Gets together: Not on file     Attends Cheondoism service: Not on file     Active member of club or organization: Not on file     Attends meetings of clubs or organizations: Not on file     Relationship status: Not on file    Intimate partner violence:     Fear of current or ex partner: Not on file     Emotionally abused: Not on file     Physically abused: Not on file     Forced sexual activity: Not on file   Other Topics Concern    Not on file   Social History Narrative    Not on file         Family History:       Problem Relation Age of Onset    Other Mother         accident age 28 years , Rickie Moore. was 5 months old    Other Father         accident age 43 decesed 8 years when Father passed    Heart Disease Brother     Alzheimer's Disease Sister     Heart Disease Brother     Heart Disease Brother        REVIEW OF SYSTEMS:    General ROS: Positive for fevers, chills, generalized fatigue  Hematological and Lymphatic ROS: negative  Endocrine ROS: negative  Respiratory ROS: no cough,  wheezing  or shortness of breath,   Cardiovascular ROS: no chest pain or dyspnea on exertion  Gastrointestinal ROS: no abdominal pain, change in bowel habits, or black or bloody stools  Genito-Urinary ROS: Positive for recent urinary retention   Neurological ROS: no TIA or stroke symptoms  negative    Vitals:  BP (!) 143/63   Pulse 62   Temp 98.8 °F (37.1 °C) (Temporal)   Resp 24   Ht 5' 7\" (1.702 m)   Wt 171 lb 11.2 oz (77.9 kg)   SpO2 93%   BMI 26.89 kg/m²     PHYSICAL EXAM:  General:  Awake, alert, oriented X 3. Well developed, well nourished, well groomed. No apparent distress. HEENT:  Normocephalic, atraumatic. Pupils equal, round, reactive to light. No scleral icterus. No conjunctival injection. Neck:  Supple, no carotid bruits  Heart:  RRR,   Lungs:  CTA bilaterally, bilat symmetrical expansion, no wheeze, rales, or rhonchi  Abdomen:   Bowel sounds present, soft, nontender, no masses, no organomegaly, no peritoneal signs  Extremities:  No clubbing, cyanosis, or edema  Skin:  Warm and dry, no open lesions or rash  Neuro:  Cranial nerves 2-12 intact, no focal deficits  Indwelling Dunham catheter noted    DATA:     Recent Results (from the past 24 hour(s))   EKG 12 Lead    Collection Time: 05/22/19  4:59 PM   Result Value Ref Range    Ventricular Rate 63 BPM    Atrial Rate 63 BPM    P-R Interval 174 ms    QRS Duration 102 ms    Q-T Interval 434 ms    QTc Calculation (Bazett) 444 ms    P Axis 52 degrees    R Axis -46 degrees    T Axis 40 degrees   CBC auto differential    Collection Time: 05/22/19  5:22 PM   Result Value Ref Range    WBC 26.4 (H) 4.5 - 11.5 E9/L    RBC 3.88 3.80 - 5.80 E12/L    Hemoglobin 11.2 (L) 12.5 - 16.5 g/dL    Hematocrit 33.9 (L) 37.0 - 54.0 %    MCV 87.4 80.0 - 99.9 fL    MCH 28.9 26.0 - 35.0 pg    MCHC 33.0 32.0 - 34.5 %    RDW 13.8 11.5 - 15.0 fL    Platelets 968 517 - 667 E9/L    MPV 9.0 7.0 - 12.0 fL    Neutrophils % 83.0 (H) 43.0 - 80.0 %    Immature Granulocytes % 0.9 0.0 - 5.0 %    Lymphocytes % 4.7 (L) 20.0 - 42.0 %    Monocytes % 11.1 2.0 - 12.0 %    Eosinophils % 0.0 0.0 - 6.0 %    Basophils % 0.3 0.0 - 2.0 %    Neutrophils # 21.96 (H) 1.80 - 7.30 E9/L    Immature Granulocytes # 0.23 E9/L    Lymphocytes # 1.23 (L) 1.50 - 4.00 E9/L    Monocytes # 2.92 (H) 0.10 - 0.95 E9/L    Eosinophils # 0.00 (L) 0.05 - 0.50 E9/L    Basophils # 0.08 0.00 - 0.20 E9/L Comprehensive Metabolic Panel    Collection Time: 05/22/19  5:22 PM   Result Value Ref Range    Sodium 136 132 - 146 mmol/L    Potassium 4.1 3.5 - 5.0 mmol/L    Chloride 99 98 - 107 mmol/L    CO2 22 22 - 29 mmol/L    Anion Gap 15 7 - 16 mmol/L    Glucose 143 (H) 74 - 99 mg/dL    BUN 27 (H) 8 - 23 mg/dL    CREATININE 1.3 (H) 0.7 - 1.2 mg/dL    GFR Non-African American 52 >=60 mL/min/1.73    GFR African American >60     Calcium 8.8 8.6 - 10.2 mg/dL    Total Protein 6.9 6.4 - 8.3 g/dL    Alb 3.8 3.5 - 5.2 g/dL    Total Bilirubin 1.7 (H) 0.0 - 1.2 mg/dL    Alkaline Phosphatase 80 40 - 129 U/L    ALT 9 0 - 40 U/L    AST 18 0 - 39 U/L   Troponin    Collection Time: 05/22/19  5:22 PM   Result Value Ref Range    Troponin <0.01 0.00 - 0.03 ng/mL   Brain Natriuretic Peptide    Collection Time: 05/22/19  5:22 PM   Result Value Ref Range    Pro-BNP 2,729 (H) 0 - 450 pg/mL   Lactic Acid, Plasma    Collection Time: 05/22/19  5:22 PM   Result Value Ref Range    Lactic Acid 2.3 (H) 0.5 - 2.2 mmol/L   APTT    Collection Time: 05/22/19  5:22 PM   Result Value Ref Range    aPTT 28.2 25.7 - 34.7 sec   Protime-INR    Collection Time: 05/22/19  5:22 PM   Result Value Ref Range    Protime 15.0 (H) 9.3 - 12.4 sec    INR 1.3    Urinalysis with Microscopic    Collection Time: 05/22/19  6:52 PM   Result Value Ref Range    Color, UA DARK YELLOW (A) Straw/Yellow    Clarity, UA CLOUDY (A) Clear    Glucose, Ur Negative Negative mg/dL    Bilirubin Urine SMALL (A) Negative    Ketones, Urine Negative Negative mg/dL    Specific Gravity, UA 1.015 1.005 - 1.030    Blood, Urine SMALL (A) Negative    pH, UA 7.0 5.0 - 9.0    Protein,  (A) Negative mg/dL    Urobilinogen, Urine 1.0 <2.0 E.U./dL    Nitrite, Urine Negative Negative    Leukocyte Esterase, Urine MODERATE (A) Negative    WBC, UA PACKED 0 - 5 /HPF    RBC, UA 5-10 (A) 0 - 2 /HPF    Epi Cells FEW /HPF    Bacteria, UA MANY (A) /HPF   Lactic Acid, Plasma    Collection Time: 05/22/19  7:45 PM Result Value Ref Range    Lactic Acid 1.9 0.5 - 2.2 mmol/L   CBC WITH AUTO DIFFERENTIAL    Collection Time: 05/23/19  5:58 AM   Result Value Ref Range    WBC 23.3 (H) 4.5 - 11.5 E9/L    RBC 3.41 (L) 3.80 - 5.80 E12/L    Hemoglobin 9.8 (L) 12.5 - 16.5 g/dL    Hematocrit 29.8 (L) 37.0 - 54.0 %    MCV 87.4 80.0 - 99.9 fL    MCH 28.7 26.0 - 35.0 pg    MCHC 32.9 32.0 - 34.5 %    RDW 13.8 11.5 - 15.0 fL    Platelets 309 722 - 046 E9/L    MPV 9.1 7.0 - 12.0 fL    Neutrophils % 88.7 (H) 43.0 - 80.0 %    Lymphocytes % 7.0 (L) 20.0 - 42.0 %    Monocytes % 4.3 2.0 - 12.0 %    Eosinophils % 0.1 0.0 - 6.0 %    Basophils % 0.4 0.0 - 2.0 %    Neutrophils # 20.74 (H) 1.80 - 7.30 E9/L    Lymphocytes # 1.63 1.50 - 4.00 E9/L    Monocytes # 0.93 0.10 - 0.95 E9/L    Eosinophils # 0.00 (L) 0.05 - 0.50 E9/L    Basophils # 0.00 0.00 - 0.20 E9/L    Anisocytosis 1+     Polychromasia 1+     Poikilocytes 1+     Edel Cells 1+     Ovalocytes 1+    Basic metabolic panel    Collection Time: 05/23/19  5:58 AM   Result Value Ref Range    Sodium 135 132 - 146 mmol/L    Potassium 3.4 (L) 3.5 - 5.0 mmol/L    Chloride 102 98 - 107 mmol/L    CO2 22 22 - 29 mmol/L    Anion Gap 11 7 - 16 mmol/L    Glucose 113 (H) 74 - 99 mg/dL    BUN 25 (H) 8 - 23 mg/dL    CREATININE 1.2 0.7 - 1.2 mg/dL    GFR Non-African American 57 >=60 mL/min/1.73    GFR African American >60     Calcium 8.1 (L) 8.6 - 10.2 mg/dL       CT Chest WO Contrast   Final Result   The most significant findings are 2 groundglass nodular infiltrates in   the left lung as described. Both of which are new when compared to the   previous study. Given the previous CT scan of the chest and PET CT,   concerning for neoplasm. Small moderate left pleural effusion   Hiatal hernia   Moderate scarring and fibrosis throughout the lung fields                     XR CHEST PORTABLE   Final Result      No airspace opacities or pleural effusion.                     ASSESSMENT :      Active Problems:    UTI (urinary tract infection)    Sepsis secondary to UTI [facility])  Resolved Problems:    * No resolved hospital problems. *  Possible pneumonia  Non-small cell cancer  Recent thoracotomy  Postop urinary retention  History of BPH  Coronary disease status post bypass surgery without angina  Hyperlipidemia  Macular degeneration        Plan :    Replace potassium  Antibiotics  Urology to see  Dunham apparently has been in for 3 weeks  ?pneumonia -doubt given lack of respiratory symptoms  Clinically he is feeling much improved but still with elevated wbc count  Ct chest noted  Discussed with patient and son      Electronically signed by Salome Vega MD on 5/23/2019 at 1:09 PM    NOTE: This report was transcribed using voice recognition software.  Every effort was made to ensure accuracy; however, inadvertent transcription errors may be present

## 2019-05-23 NOTE — CONSULTS
Inpatient consult to Cardiology  Consult performed by: Gwen Bagley MD  Consult ordered by: Antolin Reyes MD      The patient is an 15-year-old white male known to [facility] cardiology with primary cardiovascular care provided by Lamount Ped. He has a known history of coronary atherosclerosis with surgical revascularization in 2000 with subsequent medical management. He additionally has a known history of asymptomatic carotid disease, hypertension and hyperlipidemia with recent resection of a carcinoma of his left lower lobe. He is developed symptoms of urinary retention with placement of an indwelling urinary catheter with plans of urologic intervention and was evaluated by Paras Alexandre within the past 48 hours and felt to be an acceptable candidate for his surgical procedure from a cardiovascular standpoint. He subsequently developed alternating fever and chills with evidence suggestive of a urinary tract infection prompting his hospitalization and in the interim has developed no cardiovascular symptomatology. On examination, he is presently in no discomfort nor distress and hemodynamically stable with vital signs as documented with a low-grade fever noted. Jugular venous pressure appears normal with no identified carotid bruits. Lung fields are clear to auscultation. Cardiac examination cell for a regular rate and rhythm with no gallop rhythm or cardiac murmur. A benign abdominal examination is present with no evidence of peripheral edema noted. An interim electrocardiogram demonstrates evidence of sinus rhythm with left anterior fascicular block and is unchanged from that previously documented with a chest x-ray demonstrating no evidence of acute abnormalities. Laboratory assessment demonstrates no change of his chronic kidney disease with the presence of an anemia and leukocytosis.     On a clinical basis, the patient presents with no active cardiovascular symptoms and would be an acceptable candidate for his urologic surgical procedure with a low risk of acute ischemic events. Continued optimal medical therapy will be necessary inclusive of his beta blocker as well as that of cautious periprocedural fluid administration to reduce risk of iatrogenic volume overload and/or perioperative congestive heart failure. I long-term basis, continued aggressive risk factor modification of blood pressure and serum lipids will be necessary to reduce risk of future atherosclerotic development.

## 2019-05-23 NOTE — PLAN OF CARE
Problem: Urinary Elimination:  Goal: Signs and symptoms of infection will decrease  Description  Signs and symptoms of infection will decrease  Outcome: Met This Shift

## 2019-05-23 NOTE — PLAN OF CARE
Problem: Falls - Risk of:  Goal: Will remain free from falls  Description  Will remain free from falls  Outcome: Met This Shift  Goal: Absence of physical injury  Description  Absence of physical injury  Outcome: Met This Shift     Problem: Breathing Pattern - Ineffective:  Goal: Ability to achieve and maintain a regular respiratory rate will improve  Description  Ability to achieve and maintain a regular respiratory rate will improve  Outcome: Met This Shift     Problem:  Activity:  Goal: Ability to tolerate increased activity will improve  Description  Ability to tolerate increased activity will improve  Outcome: Met This Shift

## 2019-05-23 NOTE — CONSULTS
Raza Hensley M.D.,[facility]  Rubia Barajas D.O., F.A.SHANNON.OMAURA., Rose Marie Andre M.D. Amy Adan M.D., Barbara Lawrence M.D. Patient:  Archana Arenas 80 y.o. male MRN: [id number]     Date of Service: 5/23/2019      PULMONARY CONSULTATION    Reason for Consultation: Possible pneumonia  Referring Physician: Cristine Ríos      Communication with the referring physician will be sent via the electronic medical record. Chief Complaint: fatigue     CODE STATUS:prior    SUBJECTIVE:  HPI:  Archana Arenas is a 80 y.o.  is a male well known to our practice, he is followed by Dr. Priyanka Willingham. Significant medical  history for hemoptysis with workup including a CT scan and PET showing a 12 mm nodule in the superior segment left lower lobe PET positive with an SBU of 4.7. Refered to Dr. Nay Duarte who did VATS left lower lobe wedge robotic left arm lobectomy mediastinal lymph node biopsy, on 4/12. He did present to the emergency room one day ago with generalized feeling of malaise, fevers, chills. Onset 2 days, he had been mowing his lawn which she has done previously after his VATS and became extremely fatigued with chills and fever. Fever was at least 100 he does have macular degeneration and is not able to see thermometer very well. She was not accompanied by any coughing sneezing or rhinorrhea. He has had problems urinating and was sent to a urologist he had a Dunham placed. CT scan of chest revealed possible  Left lower lung pneumonia small left pleural effusion. Patient seen in room, he is on room air appears in no acute distress and has no labored breathing today. He is on room air saturation 98%. He reports cough mild occasional clear sputum. He denies chills, fevers, night sweats. He does complain that he had a poor appetite for the last several days. Patient denies any wheezing, shortness of breath or any labored breathing with activity.   He reports to me that he is feeling much better today he has an appetite and his fatigue has lessened. Past Medical History:   Diagnosis Date    BPH (benign prostatic hyperplasia)     CAD (coronary artery disease)     Carotid artery calcification     DJD (degenerative joint disease)     Hyperlipemia     Lung nodule     Macular degeneration     Numbness and tingling in left arm 2018    Numbness and tingling of left side of face 2018    S/P CABG x 6     LIMA-LAD, LYNNETTE-LCx,SVG-OM,SVG-LPL,SVG-PDA-RPL       Past Surgical History:   Procedure Laterality Date    CARDIAC SURGERY      6 vessel in    [facility] DENTAL SURGERY      root canal    DIAGNOSTIC CARDIAC CATH LAB PROCEDURE  00    OTHER SURGICAL HISTORY  2011    Injection right hip  T. Blanca Thornton MD    THORACOSCOPY Left 2019    BRONCHOSCOPY LEFT THORACOSCOPY ROBOTIC VIDEO ASSISTED , WEDGE RESECTION, POSS. LEFT LOWER LOBECTOMY performed by Vianey Jones MD at [address] [place] [address] Right 2011    Right BRIONNA  Dacia Wilson MD    TOTAL HIP ARTHROPLASTY Left 2009    Left MD Dafne García         Family History   Problem Relation Age of Onset    Other Mother         accident age 28 years , Weston Bustillo. was 5 months old    Other Father         accident age 43 decesed 8 years when Father passed    Heart Disease Brother     Alzheimer's Disease Sister     Heart Disease Brother     Heart Disease Brother        Social History:   Social History     Socioeconomic History    Marital status:       Spouse name: Not on file    Number of children: Not on file    Years of education: Not on file    Highest education level: Not on file   Occupational History    Occupation: retired-    Social Needs    Financial resource strain: Not on file    Food insecurity:     Worry: Not on file     Inability: Not on file   Fe[employer] needs:     Medical: Not on file     Non-medical: Not on file   Tobacco Use    Smoking status: Former Smoker     Packs/day: 1.00     Years: 10.00     Pack years: 10.00     Types: Cigarettes    Smokeless tobacco: Never Used    Tobacco comment: quit 40-50 years ago   Substance and Sexual Activity    Alcohol use: Yes     Comment: socially    Drug use: No    Sexual activity: Not on file   Lifestyle    Physical activity:     Days per week: Not on file     Minutes per session: Not on file    Stress: Not on file   Relationships    Social connections:     Talks on phone: Not on file     Gets together: Not on file     Attends Judaism service: Not on file     Active member of club or organization: Not on file     Attends meetings of clubs or organizations: Not on file     Relationship status: Not on file    Intimate partner violence:     Fear of current or ex partner: Not on file     Emotionally abused: Not on file     Physically abused: Not on file     Forced sexual activity: Not on file   Other Topics Concern    Not on file   Social History Narrative    Not on file     Social History:    reports that he has quit smoking. His smoking use included cigarettes. He has a 10.00 pack-year smoking history. He has never used smokeless tobacco. He reports that he drinks alcohol. He reports that he does not use drugs.     Family History:   family history includes Alzheimer's Disease in his sister; Heart Disease in his brother, brother, and brother; Other in his father and mother. ETOH:   reports that he drinks alcohol.     Immunization History   Administered Date(s) Administered    Influenza, MDCK Quadv, with preserv IM (Flucelvax 4 yrs and older) 11/20/2018        Home Meds: Medications Prior to Admission: metoprolol succinate (TOPROL XL) 25 MG extended release tablet, Take 1.5 tablets by mouth daily (Patient taking differently: Take 25 mg by mouth daily )  docusate (COLACE, DULCOLAX) 100 MG CAPS, Take 100 mg by mouth 2 times daily as needed (constipation)  Calcium-Magnesium-Vitamin D (CALCIUM 1200+D3 most suspicious for scar. There is an irregular groundglass infiltrate in the left lung   measuring 3 x 2 cm. There is a second region of nodularity in the   lingula again groundglass appearance measuring 5 x 5 mm. There is a left pleural effusion identified   The heart is abnormal. There is coronary artery calcification. The aorta demonstrates evidence for atherosclerotic disease   The mediastinum demonstrates  multiple lymph nodes that do not meet   the CT criteria for adenopathy. There are multiple lesions seen in the kidney statistically likely   cysts There are findings compatible with a hiatal hernia           Impression   The most significant findings are 2 groundglass nodular infiltrates in   the left lung as described. Both of which are new when compared to the   previous study. Given the previous CT scan of the chest and PET CT,   concerning for neoplasm. Small moderate left pleural effusion   Hiatal hernia   Moderate scarring and fibrosis throughout the lung fields       Labs:  Lab Results   Component Value Date    WBC 23.3 05/23/2019    HGB 9.8 05/23/2019    HCT 29.8 05/23/2019    MCV 87.4 05/23/2019    MCH 28.7 05/23/2019    MCHC 32.9 05/23/2019    RDW 13.8 05/23/2019     05/23/2019    MPV 9.1 05/23/2019     Lab Results   Component Value Date     05/23/2019    K 3.4 05/23/2019    K 4.9 04/11/2019     05/23/2019    CO2 22 05/23/2019    BUN 25 05/23/2019    CREATININE 1.2 05/23/2019    LABALBU 3.8 05/22/2019    LABALBU 4.2 05/08/2012    CALCIUM 8.1 05/23/2019    GFRAA >60 05/23/2019    LABGLOM 57 05/23/2019     Lab Results   Component Value Date    PROTIME 15.0 05/22/2019    INR 1.3 05/22/2019     Recent Labs     05/22/19  1722   PROBNP 2,729*     Recent Labs     05/22/19  1722   TROPONINI <0.01     No results for input(s): PROCAL in the last 72 hours. This SmartLink has not been configured with any valid records. Assessment:  1.  Left lobe mild atelectasis  2. +UTI  3. non small cell lung  cancer  4. Robotic LVATS /wedge/lobe 4/12/19  5. History BPH, Chapman placement 3 weeks ago    Plan:  1. Doubt pneumonia - add incentive and hyperinflation techniques for atelectasis   2. Continue Rocephin   3. Add incentive spirometry  4. Urology consult - +UTI      Thank you for allowing me to participate in the care of Nia Salas. Please feel free to call with questions. This plan of care was reviewed in collaboration with Dr. Christopher Olson    Electronically signed by ABHISHEK Dickson on 5/23/2019 at 1:49 PM    I personally saw, examined, and cared for the patient. Labs, medications, radiographs reviewed. I agree with history exam and plans detailed in NP note with the following additions:    Mr. Azar Lorenzo is known to my partner Dr. Raine Patiño for NSCLC s/p LLL lobectomy with negative lymph nodes. He presented to the hospital with more malaise and fatigue. CXR with scattered areas of atelectasis. He has no SOB, cough, sputum,  Hemoptysis. Doubt pneumonia. U/A suggestive of UTI and he has an indwelling chapman catheter. Feeling better after IVF and antibiotics. Will provide incentive spirometry.      Electronically signed by Nusrat Ferrer MD on 5/23/2019 at 2:40 PM

## 2019-05-23 NOTE — CONSULTS
[address]                  [address] [facility],  [facility]                                  CONSULTATION    PATIENT NAME: Zackary Montgomery                   :        1931  MED REC NO:   [id number]                            ROOM:       1811  ACCOUNT NO:   [de-identified]                           ADMIT DATE: 2019  PROVIDER:     Alba Pastrana MD    CONSULT DATE:  2019    He is in 65. CHIEF COMPLAINT:  Urinary retention. HISTORY OF PRESENT ILLNESS:  This 59-year-old male who has been seen by  Dr. Noé Ellsworth for many years, recently developed urinary retention, and has a  Dunham catheter in place. He was being scheduled by Dr. Noé Ellsworth for  cystoscopy and TUR of his prostate. However, Dr. Noé Ellsworth was awaiting  cardiac clearance for this procedure. The patient apparently had an episode of feeling sick, came to the  emergency room, and was admitted. His serum creatinine is 1.3. He is  currently getting Rocephin, Zosyn, and vancomycin. His white count  remains elevated at 26. The patient would very much like to have his  procedure done at this admission if possible. PAST MEDICAL HISTORY:  His general health has been excellent. Allergies  to NITROGLYCERIN, BIAXIN, IODINE, and QUINOLONES. His medical problems  include a malignant neoplasm of the left lower lung, coronary artery  disease, bilateral carotid stenosis. PAST SURGICAL HISTORY:  The patient underwent a thoracoscopy in 2019. He also has had a hip arthroplasty bilaterally, cardiac catheterization,  cardiac surgery, dental surgery. PHYSICAL EXAMINATION:  GENERAL:  The patient is an extremely alert and oriented male who  appears younger than his stated age. He appears to be comfortable at  this time. ABDOMEN:  Soft. There are no palpable organs or masses present. GENITOURINARY:  Penis, Dunham catheter is in place.   Testes are normal.   The urine is grossly clear.    IMPRESSION AND PLAN:  Bladder outlet obstruction secondary to prostatic  enlargement. The patient will require TUR of his prostate to become  catheter free. This can be done at this admission if he is deemed to be  cardiac stable. The patient also receives injections for macular  degeneration and is scheduled to have an injection on 05/29, which he  has a strong desire to be out of the hospital for that procedure. We  will try to coordinate all these activities and hopefully can accomplish  the TUR.         Deyvi Hernandez MD    D: 05/23/2019 15:08:35       T: 05/23/2019 15:16:49     [id number]  Job#: [id number]     Doc#: [id number]    CC:

## 2019-05-23 NOTE — CARE COORDINATION
5/23/2019  Met with patient to obtain information and assist with potential discharge needs. He lives alone, one floor home. Patient has been independent with Adl's and Iadl's, driving and ready to golf. Patient reported he has 3 children and 2 of them are available to assist his at home if needed. No needs identified at this time.

## 2019-05-24 VITALS
BODY MASS INDEX: 27.25 KG/M2 | WEIGHT: 173.6 LBS | OXYGEN SATURATION: 93 % | DIASTOLIC BLOOD PRESSURE: 81 MMHG | RESPIRATION RATE: 18 BRPM | SYSTOLIC BLOOD PRESSURE: 171 MMHG | HEIGHT: 67 IN | HEART RATE: 68 BPM | TEMPERATURE: 97.5 F

## 2019-05-24 LAB
ANION GAP SERPL CALCULATED.3IONS-SCNC: 11 MMOL/L (ref 7–16)
BASOPHILS ABSOLUTE: 0.08 E9/L (ref 0–0.2)
BASOPHILS RELATIVE PERCENT: 0.6 % (ref 0–2)
BUN BLDV-MCNC: 20 MG/DL (ref 8–23)
CALCIUM SERPL-MCNC: 8.5 MG/DL (ref 8.6–10.2)
CHLORIDE BLD-SCNC: 105 MMOL/L (ref 98–107)
CO2: 21 MMOL/L (ref 22–29)
CREAT SERPL-MCNC: 1.1 MG/DL (ref 0.7–1.2)
EOSINOPHILS ABSOLUTE: 0.44 E9/L (ref 0.05–0.5)
EOSINOPHILS RELATIVE PERCENT: 3.1 % (ref 0–6)
GFR AFRICAN AMERICAN: >60
GFR NON-AFRICAN AMERICAN: >60 ML/MIN/1.73
GLUCOSE BLD-MCNC: 107 MG/DL (ref 74–99)
HCT VFR BLD CALC: 29 % (ref 37–54)
HEMOGLOBIN: 9.5 G/DL (ref 12.5–16.5)
IMMATURE GRANULOCYTES #: 0.08 E9/L
IMMATURE GRANULOCYTES %: 0.6 % (ref 0–5)
L. PNEUMOPHILA SEROGP 1 UR AG: NORMAL
LYMPHOCYTES ABSOLUTE: 1.53 E9/L (ref 1.5–4)
LYMPHOCYTES RELATIVE PERCENT: 10.9 % (ref 20–42)
MCH RBC QN AUTO: 28 PG (ref 26–35)
MCHC RBC AUTO-ENTMCNC: 32.8 % (ref 32–34.5)
MCV RBC AUTO: 85.5 FL (ref 80–99.9)
MONOCYTES ABSOLUTE: 1.38 E9/L (ref 0.1–0.95)
MONOCYTES RELATIVE PERCENT: 9.8 % (ref 2–12)
NEUTROPHILS ABSOLUTE: 10.52 E9/L (ref 1.8–7.3)
NEUTROPHILS RELATIVE PERCENT: 75 % (ref 43–80)
PDW BLD-RTO: 13.8 FL (ref 11.5–15)
PLATELET # BLD: 204 E9/L (ref 130–450)
PMV BLD AUTO: 9.2 FL (ref 7–12)
POTASSIUM SERPL-SCNC: 3.6 MMOL/L (ref 3.5–5)
RBC # BLD: 3.39 E12/L (ref 3.8–5.8)
SODIUM BLD-SCNC: 137 MMOL/L (ref 132–146)
STREP PNEUMONIAE ANTIGEN, URINE: NORMAL
WBC # BLD: 14 E9/L (ref 4.5–11.5)

## 2019-05-24 PROCEDURE — 6360000002 HC RX W HCPCS: Performed by: INTERNAL MEDICINE

## 2019-05-24 PROCEDURE — 2580000003 HC RX 258: Performed by: INTERNAL MEDICINE

## 2019-05-24 PROCEDURE — 36415 COLL VENOUS BLD VENIPUNCTURE: CPT

## 2019-05-24 PROCEDURE — 6370000000 HC RX 637 (ALT 250 FOR IP): Performed by: INTERNAL MEDICINE

## 2019-05-24 PROCEDURE — 85025 COMPLETE CBC W/AUTO DIFF WBC: CPT

## 2019-05-24 PROCEDURE — 80048 BASIC METABOLIC PNL TOTAL CA: CPT

## 2019-05-24 RX ORDER — CEPHALEXIN 500 MG/1
500 CAPSULE ORAL 3 TIMES DAILY
Qty: 42 CAPSULE | Refills: 0 | Status: SHIPPED | OUTPATIENT
Start: 2019-05-24 | End: 2019-06-07

## 2019-05-24 RX ADMIN — CEFTRIAXONE SODIUM 1 G: 1 INJECTION, POWDER, FOR SOLUTION INTRAMUSCULAR; INTRAVENOUS at 00:12

## 2019-05-24 RX ADMIN — ACETAMINOPHEN 650 MG: 325 TABLET, FILM COATED ORAL at 00:11

## 2019-05-24 RX ADMIN — Medication 10 ML: at 09:22

## 2019-05-24 RX ADMIN — METOPROLOL SUCCINATE 25 MG: 25 TABLET, EXTENDED RELEASE ORAL at 09:21

## 2019-05-24 RX ADMIN — Medication 10 ML: at 00:14

## 2019-05-24 ASSESSMENT — PAIN - FUNCTIONAL ASSESSMENT: PAIN_FUNCTIONAL_ASSESSMENT: ACTIVITIES ARE NOT PREVENTED

## 2019-05-24 ASSESSMENT — PAIN DESCRIPTION - FREQUENCY
FREQUENCY: INTERMITTENT
FREQUENCY: INTERMITTENT

## 2019-05-24 ASSESSMENT — PAIN DESCRIPTION - PAIN TYPE
TYPE: ACUTE PAIN
TYPE: ACUTE PAIN

## 2019-05-24 ASSESSMENT — PAIN DESCRIPTION - DESCRIPTORS
DESCRIPTORS: DISCOMFORT
DESCRIPTORS: DISCOMFORT

## 2019-05-24 ASSESSMENT — PAIN DESCRIPTION - LOCATION
LOCATION: GENERALIZED
LOCATION: GENERALIZED

## 2019-05-24 ASSESSMENT — PAIN DESCRIPTION - PROGRESSION
CLINICAL_PROGRESSION: NOT CHANGED
CLINICAL_PROGRESSION: RAPIDLY IMPROVING

## 2019-05-24 ASSESSMENT — PAIN SCALES - GENERAL
PAINLEVEL_OUTOF10: 0
PAINLEVEL_OUTOF10: 0
PAINLEVEL_OUTOF10: 1

## 2019-05-24 ASSESSMENT — PAIN DESCRIPTION - ONSET: ONSET: UNABLE TO TELL

## 2019-05-24 NOTE — PROGRESS NOTES
[facility]   Department of Pharmacy   Pharmacist Transition of Care Services         Patient Demographics  Name:  Brody Crawley   Medical Record Number:  [id number]  Gender:  male   Age:  80 y.o. YOB: 1931    Primary Care Physician: Haydee Ocononr MD  Primary Care Physician phone number:  [phone]  Readmission Risk (% from EPIC Patient List): 14%     Patient plans to participate in [facility] Meds to [facility] (Y/N): N    Pharmacist Review and Summary of Medications     Date of last reviewed/update: 5/24/19     Category Comments   New Medication Started   1. Cephalexin 500 mg PO TID x 14 days          Change in Outpatient Medication  (Dosage Form, Route,   Dose, or Frequency) 1. Discontinued Outpatient Medication   (or on Hold During Admission) 1. Other              Pharmacist Patient Education:    Date  Person Educated Content of Education    5/24/19 Patient Cephalexin -AH       Documentation of Pharmacist Interventions and Follow-up Plan:     The following Pharmacist Transition of Care Services were completed:   [x]  Reviewed and summarized medication changes  []  Entire home medication list was reviewed for accuracy (sources: **)  []  Home medication list was updated or corrected (sources: **)    [x]  Patient education was provided on new medications  []  Patient education was provided on medication changes  [x]  Reviewed the After Visit Summary (AVS) with patient    Additional Interventions:  []  Inpatient prescriber was contacted and the following pharmacy recommendations        were accepted: **     [] Other interventions: **        Pharmacist: Deion Hare PharmD, [facility]  Date:  5/24/2019 1:33 PM  Time spent counseling on medications: 15 minutes

## 2019-05-24 NOTE — PROGRESS NOTES
Message sent to Dr. Carmelita Garcia via perfect serve regarding if patient can discharge from a pulmonology stand point. Dr. Romero Mins back and states yes patient is ok for discharge.      Ashvin Bernal

## 2019-05-24 NOTE — PROGRESS NOTES
[facility] PROGRESS NOTE                                                                       2019        CHIEF UROLOGIC COMPLAINT: UA retention, BPH pending TURP    HISTORY OF PRESENT ILLNESS:  Patient without new complaints. Tolerating catheter and is set for discharge. REVIEW OF SYSTEMS:   CONSTITUTIONAL: negative  HEENT: negative  HEMATOLOGIC: negative  ENDOCRINE: negative  RESPIRATORY: negative  CV: negative  GI: negative  NEURO: negative  ORTHOPEDICS: negative  PSYCHIATRIC: negative  : as above    PAST FAMILY HISTORY:    Family History   Problem Relation Age of Onset    Other Mother         accident age 28 years , Aramis Tillman. was 5 months old    Other Father         accident age 43 decesed 8 years when Father passed    Heart Disease Brother     Alzheimer's Disease Sister     Heart Disease Brother     Heart Disease Brother      PAST SOCIAL HISTORY:    Social History     Socioeconomic History    Marital status:       Spouse name: None    Number of children: None    Years of education: None    Highest education level: None   Occupational History    Occupation: retired-    Social Needs    Financial resource strain: None    Food insecurity:     Worry: None     Inability: None    Transportation needs:     Medical: None     Non-medical: None   Tobacco Use    Smoking status: Former Smoker     Packs/day: 1.00     Years: 10.00     Pack years: 10.00     Types: Cigarettes    Smokeless tobacco: Never Used    Tobacco comment: quit 40-50 years ago   Substance and Sexual Activity    Alcohol use: Yes     Comment: socially    Drug use: No    Sexual activity: None   Lifestyle    Physical activity:     Days per week: None     Minutes per session: None    Stress: None   Relationships    Social connections:     Talks on phone: None     Gets together: None     Attends Protestant service: None     Active member of club or organization: None     Attends meetings of clubs or organizations: None     Relationship status: None    Intimate partner violence:     Fear of current or ex partner: None     Emotionally abused: None     Physically abused: None     Forced sexual activity: None   Other Topics Concern    None   Social History Narrative    None       Scheduled Meds:   metoprolol succinate  25 mg Oral Daily    piperacillin-tazobactam  3.375 g Intravenous Once    aspirin  81 mg Oral Daily    atorvastatin  40 mg Oral Nightly    cefTRIAXone (ROCEPHIN) IV  1 g Intravenous Q24H    sodium chloride flush  10 mL Intravenous BID     Continuous Infusions:  PRN Meds:.opium-belladonna, albuterol, acetaminophen, sodium chloride flush    BP (!) 171/81   Pulse 68   Temp 97.5 °F (36.4 °C) (Temporal)   Resp 18   Ht 5' 7\" (1.702 m)   Wt 173 lb 9.6 oz (78.7 kg)   SpO2 93%   BMI 27.19 kg/m²     Lab Results   Component Value Date    WBC 14.0 (H) 05/24/2019    HGB 9.5 (L) 05/24/2019    HCT 29.0 (L) 05/24/2019    MCV 85.5 05/24/2019     05/24/2019       Lab Results   Component Value Date    CREATININE 1.1 05/24/2019       Lab Results   Component Value Date    PSA 3.31 04/26/2019    PSA 1.51 09/13/2018    PSA 1.92 11/28/2017           PHYSICAL EXAMINATION:  Skin dry, without rashes  Respirations non-labored, intact  Abdomen soft, non-tender, non-distended  Alert and oriented x3  Dunham draining clear yellow urine      ASSESSMENT AND PLAN:  Hx of BPH with UA retention with plan for TURP  -tentatively scheduled for TURP with Dr. Marnie Mendez on Tuesday vs Dr. Jay Cortes Thursday if able to have cardiac clearance  -continue catheter for now  -Cr has improved since admission    We will follow       Electronically signed by Maximo Herrmann MD on 5/24/2019 at 1:46 PM

## 2019-05-24 NOTE — PROGRESS NOTES
Notified Dr. Katheryn Oshea via perfect serve that all consults have signed off. Dr. Bigg Dennison states patient can discharge with chapman and follow up in the office.    Michi Aguilar

## 2019-05-24 NOTE — PROGRESS NOTES
PT IN ROOM 6413 A TO BE DISCHARGED  SCHEDULED FOR SURGERY Tuesday 5-28 ARRIVE 0800 PREOP INSTRUCTIONS TUBED TO FLOOR INSTRUCTED TO GIVE TO PT

## 2019-05-24 NOTE — PROGRESS NOTES
Subjective:    Chief complaint:    Wants to go home    Objective:    BP (!) 171/81   Pulse 68   Temp 97.5 °F (36.4 °C) (Temporal)   Resp 18   Ht 5' 7\" (1.702 m)   Wt 173 lb 9.6 oz (78.7 kg)   SpO2 93%   BMI 27.19 kg/m²   General : Awake ,alert,no distress. Heart:  RRR, no murmurs, gallops, or rubs.   Lungs:  CTA bilaterally, no wheeze, rales or rhonchi  Abd: bowel sounds present, nontender, nondistended, no masses  Extrem:  No clubbing, cyanosis, or edema  Dunham in place    CBC:   Lab Results   Component Value Date    WBC 14.0 05/24/2019    RBC 3.39 05/24/2019    HGB 9.5 05/24/2019    HCT 29.0 05/24/2019    MCV 85.5 05/24/2019    MCH 28.0 05/24/2019    MCHC 32.8 05/24/2019    RDW 13.8 05/24/2019     05/24/2019    MPV 9.2 05/24/2019     BMP:    Lab Results   Component Value Date     05/24/2019    K 3.6 05/24/2019    K 4.9 04/11/2019     05/24/2019    CO2 21 05/24/2019    BUN 20 05/24/2019    LABALBU 3.8 05/22/2019    LABALBU 4.2 05/08/2012    CREATININE 1.1 05/24/2019    CALCIUM 8.5 05/24/2019    GFRAA >60 05/24/2019    LABGLOM >60 05/24/2019    GLUCOSE 107 05/24/2019    GLUCOSE 109 05/08/2012     PT/INR:    Lab Results   Component Value Date    PROTIME 15.0 05/22/2019    INR 1.3 05/22/2019     Troponin:    Lab Results   Component Value Date    TROPONINI <0.01 05/22/2019       Recent Labs     05/23/19  1130   LABURIN >100,000 CFU/ml  Identification and sensitivity to follow    >100,000 CFU/ml  Identification and sensitivity to follow       Recent Labs     05/22/19  1722   BC 24 Hours- no growth     Recent Labs     05/22/19  1722   BLOODCULT2 24 Hours- no growth         Current Facility-Administered Medications:     metoprolol succinate (TOPROL XL) extended release tablet 25 mg, 25 mg, Oral, Daily, Hanna Avila MD, 25 mg at 05/24/19 0921    opium-belladonna (B&O SUPPRETTES) 16.2-60 MG suppository 60 mg, 60 mg, Rectal, Q8H PRN, María Mojica MD    albuterol (PROVENTIL) nebulizer solution 2.5 mg, 2.5 mg, Nebulization, Q6H PRN, ABHISHEK Carver    piperacillin-tazobactam (ZOSYN) 3.375 g in dextrose 5 % 100 mL IVPB (mini-bag), 3.375 g, Intravenous, Once, Tawny Sierra DO    aspirin EC tablet 81 mg, 81 mg, Oral, Daily, Normie Brittle, MD    atorvastatin (LIPITOR) tablet 40 mg, 40 mg, Oral, Nightly, Normie Brittle, MD, 40 mg at 05/23/19 2138    cefTRIAXone (ROCEPHIN) 1 g in sterile water 10 mL IV syringe, 1 g, Intravenous, Q24H, Normie Brittle, MD, 1 g at 05/24/19 0012    acetaminophen (TYLENOL) tablet 650 mg, 650 mg, Oral, Q4H PRN, Normie Brittle, MD, 650 mg at 05/24/19 0011    sodium chloride flush 0.9 % injection 10 mL, 10 mL, Intravenous, PRN, Normie Brittle, MD, 10 mL at 05/24/19 0014    sodium chloride flush 0.9 % injection 10 mL, 10 mL, Intravenous, BID, Normie Brittle, MD, 10 mL at 05/24/19 7445    DIET CARDIAC;    CT Chest WO Contrast   Final Result   The most significant findings are 2 groundglass nodular infiltrates in   the left lung as described. Both of which are new when compared to the   previous study. Given the previous CT scan of the chest and PET CT,   concerning for neoplasm. Small moderate left pleural effusion   Hiatal hernia   Moderate scarring and fibrosis throughout the lung fields                     XR CHEST PORTABLE   Final Result      No airspace opacities or pleural effusion. Assessment:    Active Problems:    UTI (urinary tract infection)    Sepsis secondary to UTI [facility])  Resolved Problems:    * No resolved hospital problems.  *  urinary retention  BPH    Plan:    Urine culture- strep and oxidase positive gram negative fern- likely pseudomonas  Patient wants to go home  I told him I prefer waiting for final sensitivities especially with indwelling chapman and with elevated wbc count  He would like to go home later today  Will place him on heri Dutta  1:26 PM  5/24/2019    NOTE: This report was transcribed using voice recognition software.  Every effort was made to ensure accuracy; however, inadvertent transcription errors may be present Warm

## 2019-05-24 NOTE — PROGRESS NOTES
Rochelle Ramirez M.D.,[facility]  Frankie Hanks D.O., FKIARRAOMAURA., Zuleima Bailon M.D. Tom Prescott M.D., Naresh Tierney M.D. Daily Pulmonary Progress Note    Patient:  Brody Crawley 80 y.o. male MRN: [id number]     Date of Service: 5/24/2019      Synopsis     We are following patient for left lobe mild atelectasis    \"CC\" cough     Code status:prior      Subjective      Patient was seen and examined. Patient seen and examined he is up walking around room, he has no cough he has no shortness of breath. He reports that he is discharging today. He appears to have no respiratory issues at this time will encourage him to use his incentive spirometer at home. Will follow-up in the office after his surgery. Review of Systems:  Constitutional: Denies fever, weight loss, night sweats, and fatigue  Skin: Denies pigmentation, dark lesions, and rashes   HEENT: Denies hearing loss, tinnitus, ear drainage, epistaxis, sore throat, and hoarseness. Cardiovascular: Denies palpitations, chest pain, and chest pressure. Respiratory: Denies cough, dyspnea at rest, hemoptysis, apnea, and choking.   Gastrointestinal: Denies nausea, vomiting, poor appetite, diarrhea, heartburn or reflux  Genitourinary: Denies dysuria, frequency, urgency or hematuria  Musculoskeletal: Denies myalgias, muscle weakness, and bone pain  24-hour events:      Objective   Vitals: BP (!) 171/81   Pulse 68   Temp 97.5 °F (36.4 °C) (Temporal)   Resp 18   Ht 5' 7\" (1.702 m)   Wt 173 lb 9.6 oz (78.7 kg)   SpO2 93%   BMI 27.19 kg/m²     I/O:    Intake/Output Summary (Last 24 hours) at 5/24/2019 1401  Last data filed at 5/24/2019 0815  Gross per 24 hour   Intake 1451.31 ml   Output 500 ml   Net 951.31 ml                        CURRENT MEDS :  Scheduled Meds:   metoprolol succinate  25 mg Oral Daily    piperacillin-tazobactam  3.375 g Intravenous Once    aspirin  81 mg Oral Daily    atorvastatin  40 mg Oral Nightly    cefTRIAXone (ROCEPHIN) IV  1 g Intravenous Q24H    sodium chloride flush  10 mL Intravenous BID       Physical Exam:  General Appearance: appears comfortable in no acute distress. HEENT: Normocephalic atraumatic without obvious abnormality   Neck: Lips, mucosa, and tongue normal.  Supple, symmetrical, trachea midline, no adenopathy;thyroid:  no enlargement/tenderness/nodules or JVD. Lung: Breath sounds CTA. Respirations   unlabored. Symmetrical expansion. Heart: RRR, normal S1, S2. No MRG  Abdomen: Soft, NT, ND. BS present x 4 quadrants. No bruit or organomegaly. Extremities: Pedal pulses 2+ symmetric b/l. Extremities normal, no cyanosis, clubbing, or edema. Musculokeletal: No joint swelling, no muscle tenderness. ROM normal in all joints of extremities. Neurologic: Mental status: Alert and Oriented X3 . Pertinent/ New Labs and Imaging Studies     Labs:  Lab Results   Component Value Date    WBC 14.0 05/24/2019    HGB 9.5 05/24/2019    HCT 29.0 05/24/2019    MCV 85.5 05/24/2019    MCH 28.0 05/24/2019    MCHC 32.8 05/24/2019    RDW 13.8 05/24/2019     05/24/2019    MPV 9.2 05/24/2019     Lab Results   Component Value Date     05/24/2019    K 3.6 05/24/2019    K 4.9 04/11/2019     05/24/2019    CO2 21 05/24/2019    BUN 20 05/24/2019    CREATININE 1.1 05/24/2019    LABALBU 3.8 05/22/2019    LABALBU 4.2 05/08/2012    CALCIUM 8.5 05/24/2019    GFRAA >60 05/24/2019    LABGLOM >60 05/24/2019     Lab Results   Component Value Date    PROTIME 15.0 05/22/2019    INR 1.3 05/22/2019     Recent Labs     05/22/19  1722   PROBNP 2,729*     Recent Labs     05/23/19  0558   PROCAL 0.16*     This SmartLink has not been configured with any valid records. Micro:  No results for input(s): CULTRESP in the last 72 hours. No results for input(s): LABGRAM in the last 72 hours. No results for input(s): LEGUR in the last 72 hours.   Recent Labs     05/23/19  1515   STREPNEUMAGU Presumptive NEGATIVE for Pneumococcal pneumonia, suggesting  no current or recent pneumococcal infection. Infection due  to S. pneumoniae cannot be ruled out since the antigen present  in the sample may be below the detection limit of the test.       Recent Labs     05/23/19  1515   LP1UAG Presumptive NEGATIVE suggesting no recent or current infections  with Legionella pneumophilia serogroup 1. Infection to  Legionella cannot be ruled out since other serogroups and  species may cause infection, antigen may not be present in  early infection, or level of antigen may be below the  detection limit. Assessment:    1. Left lobe mild atelectasis  2. +UTI  3. non small cell lung  cancer  4. Robotic LVATS /wedge/lobe 4/12/19  5. History BPH, Dunham placement 3 weeks ago          Plan:     6. Doubt pneumonia - add incentive and hyperinflation techniques for atelectasis   7. Continue Rocephin -placed on Cipro per PCP  8. Add incentive spirometry  9. Urology following - +UTI, he is scheduled for TUR on 65.  10. Okay for discharge from pulmonary standpoint he will follow up with Sai Mcdonald  in the office after surgery. This plan of care was reviewed in collaboration with Dr. Rachel Polanco  Electronically signed by ABHISHEK Grajeda on 5/24/2019 at 2:01 PM    I personally saw, examined, and cared for the patient. Labs, medications, radiographs reviewed. I agree with history exam and plans detailed in NP note.     Electronically signed by Mary Borges MD on 5/24/2019 at 2:41 PM

## 2019-05-25 LAB
ORGANISM: ABNORMAL
ORGANISM: ABNORMAL
URINE CULTURE, ROUTINE: ABNORMAL

## 2019-05-26 NOTE — DISCHARGE SUMMARY
Physician Discharge Summary     Patient ID:  Ying Dahl  [id number]  55 y.o.  7/18/1931    Admit date: 5/22/2019    Discharge date and time: 5/24/2019  6:00 PM     Admission Diagnoses: Active Problems:    UTI (urinary tract infection)    Sepsis secondary to UTI [facility])  Resolved Problems:    * No resolved hospital problems. *      Discharge Diagnoses: Active Problems:    UTI (urinary tract infection)    Sepsis secondary to UTI [facility])  Resolved Problems:    * No resolved hospital problems. *      Condition at discharge : stable    Consults: IP CONSULT TO INTERNAL MEDICINE  IP CONSULT TO PULMONOLOGY  IP CONSULT TO UROLOGY  IP CONSULT TO CARDIOLOGY    Procedures: none    Hospital Course: patient is a 80-year-old gentleman presents to the emergency room because of generalized feeling of malaise, fevers, chills. Recently in the hospital for a thoracotomy. He does have non-small cell cancer. One week after discharge he had lower abdominal bloating. He went to his urologist office and had Dunham placed with 2000 cc of urine drained. He did have the catheter in for 3 weeks. He also had chills. He was noted to have urinary tract infection. Neurology also saw patient. They recommended eventual TUR. He does not have any aspirin or to symptoms. Seen by pulmonary as well. He did have elevated white count this improved with treatment of infection. He did have gram-negative rods which are oxidase positive more than likely pseudomonas. Cultures were not available at discharge. I did recommend patient to stay until cultures resulted. Since he was feeling better. He requested discharge. CT Chest WO Contrast   Final Result   The most significant findings are 2 groundglass nodular infiltrates in   the left lung as described. Both of which are new when compared to the   previous study. Given the previous CT scan of the chest and PET CT,   concerning for neoplasm.    Small moderate left pleural effusion   Hiatal hernia Moderate scarring and fibrosis throughout the lung fields                     XR CHEST PORTABLE   Final Result      No airspace opacities or pleural effusion.                 Results for orders placed or performed during the hospital encounter of 05/22/19 (from the past 336 hour(s))   EKG 12 Lead    Collection Time: 05/22/19  4:59 PM   Result Value Ref Range    Ventricular Rate 63 BPM    Atrial Rate 63 BPM    P-R Interval 174 ms    QRS Duration 102 ms    Q-T Interval 434 ms    QTc Calculation (Bazett) 444 ms    P Axis 52 degrees    R Axis -46 degrees    T Axis 40 degrees   Culture Blood #1    Collection Time: 05/22/19  5:22 PM   Result Value Ref Range    Blood Culture, Routine 24 Hours- no growth    Culture Blood #2    Collection Time: 05/22/19  5:22 PM   Result Value Ref Range    Culture, Blood 2 24 Hours- no growth    CBC auto differential    Collection Time: 05/22/19  5:22 PM   Result Value Ref Range    WBC 26.4 (H) 4.5 - 11.5 E9/L    RBC 3.88 3.80 - 5.80 E12/L    Hemoglobin 11.2 (L) 12.5 - 16.5 g/dL    Hematocrit 33.9 (L) 37.0 - 54.0 %    MCV 87.4 80.0 - 99.9 fL    MCH 28.9 26.0 - 35.0 pg    MCHC 33.0 32.0 - 34.5 %    RDW 13.8 11.5 - 15.0 fL    Platelets 502 239 - 535 E9/L    MPV 9.0 7.0 - 12.0 fL    Neutrophils % 83.0 (H) 43.0 - 80.0 %    Immature Granulocytes % 0.9 0.0 - 5.0 %    Lymphocytes % 4.7 (L) 20.0 - 42.0 %    Monocytes % 11.1 2.0 - 12.0 %    Eosinophils % 0.0 0.0 - 6.0 %    Basophils % 0.3 0.0 - 2.0 %    Neutrophils # 21.96 (H) 1.80 - 7.30 E9/L    Immature Granulocytes # 0.23 E9/L    Lymphocytes # 1.23 (L) 1.50 - 4.00 E9/L    Monocytes # 2.92 (H) 0.10 - 0.95 E9/L    Eosinophils # 0.00 (L) 0.05 - 0.50 E9/L    Basophils # 0.08 0.00 - 0.20 E9/L   Comprehensive Metabolic Panel    Collection Time: 05/22/19  5:22 PM   Result Value Ref Range    Sodium 136 132 - 146 mmol/L    Potassium 4.1 3.5 - 5.0 mmol/L    Chloride 99 98 - 107 mmol/L    CO2 22 22 - 29 mmol/L    Anion Gap 15 7 - 16 mmol/L    Glucose 143 (H) 74 - 99 mg/dL    BUN 27 (H) 8 - 23 mg/dL    CREATININE 1.3 (H) 0.7 - 1.2 mg/dL    GFR Non-African American 52 >=60 mL/min/1.73    GFR African American >60     Calcium 8.8 8.6 - 10.2 mg/dL    Total Protein 6.9 6.4 - 8.3 g/dL    Alb 3.8 3.5 - 5.2 g/dL    Total Bilirubin 1.7 (H) 0.0 - 1.2 mg/dL    Alkaline Phosphatase 80 40 - 129 U/L    ALT 9 0 - 40 U/L    AST 18 0 - 39 U/L   Troponin    Collection Time: 05/22/19  5:22 PM   Result Value Ref Range    Troponin <0.01 0.00 - 0.03 ng/mL   Brain Natriuretic Peptide    Collection Time: 05/22/19  5:22 PM   Result Value Ref Range    Pro-BNP 2,729 (H) 0 - 450 pg/mL   Lactic Acid, Plasma    Collection Time: 05/22/19  5:22 PM   Result Value Ref Range    Lactic Acid 2.3 (H) 0.5 - 2.2 mmol/L   APTT    Collection Time: 05/22/19  5:22 PM   Result Value Ref Range    aPTT 28.2 25.7 - 34.7 sec   Protime-INR    Collection Time: 05/22/19  5:22 PM   Result Value Ref Range    Protime 15.0 (H) 9.3 - 12.4 sec    INR 1.3    Urinalysis with Microscopic    Collection Time: 05/22/19  6:52 PM   Result Value Ref Range    Color, UA DARK YELLOW (A) Straw/Yellow    Clarity, UA CLOUDY (A) Clear    Glucose, Ur Negative Negative mg/dL    Bilirubin Urine SMALL (A) Negative    Ketones, Urine Negative Negative mg/dL    Specific Gravity, UA 1.015 1.005 - 1.030    Blood, Urine SMALL (A) Negative    pH, UA 7.0 5.0 - 9.0    Protein,  (A) Negative mg/dL    Urobilinogen, Urine 1.0 <2.0 E.U./dL    Nitrite, Urine Negative Negative    Leukocyte Esterase, Urine MODERATE (A) Negative    WBC, UA PACKED 0 - 5 /HPF    RBC, UA 5-10 (A) 0 - 2 /HPF    Epi Cells FEW /HPF    Bacteria, UA MANY (A) /HPF   Lactic Acid, Plasma    Collection Time: 05/22/19  7:45 PM   Result Value Ref Range    Lactic Acid 1.9 0.5 - 2.2 mmol/L   CBC WITH AUTO DIFFERENTIAL    Collection Time: 05/23/19  5:58 AM   Result Value Ref Range    WBC 23.3 (H) 4.5 - 11.5 E9/L    RBC 3.41 (L) 3.80 - 5.80 E12/L    Hemoglobin 9.8 (L) 12.5 - 16.5 g/dL Hematocrit 29.8 (L) 37.0 - 54.0 %    MCV 87.4 80.0 - 99.9 fL    MCH 28.7 26.0 - 35.0 pg    MCHC 32.9 32.0 - 34.5 %    RDW 13.8 11.5 - 15.0 fL    Platelets 029 813 - 413 E9/L    MPV 9.1 7.0 - 12.0 fL    Neutrophils % 88.7 (H) 43.0 - 80.0 %    Lymphocytes % 7.0 (L) 20.0 - 42.0 %    Monocytes % 4.3 2.0 - 12.0 %    Eosinophils % 0.1 0.0 - 6.0 %    Basophils % 0.4 0.0 - 2.0 %    Neutrophils # 20.74 (H) 1.80 - 7.30 E9/L    Lymphocytes # 1.63 1.50 - 4.00 E9/L    Monocytes # 0.93 0.10 - 0.95 E9/L    Eosinophils # 0.00 (L) 0.05 - 0.50 E9/L    Basophils # 0.00 0.00 - 0.20 E9/L    Anisocytosis 1+     Polychromasia 1+     Poikilocytes 1+     Edel Cells 1+     Ovalocytes 1+    Basic metabolic panel    Collection Time: 05/23/19  5:58 AM   Result Value Ref Range    Sodium 135 132 - 146 mmol/L    Potassium 3.4 (L) 3.5 - 5.0 mmol/L    Chloride 102 98 - 107 mmol/L    CO2 22 22 - 29 mmol/L    Anion Gap 11 7 - 16 mmol/L    Glucose 113 (H) 74 - 99 mg/dL    BUN 25 (H) 8 - 23 mg/dL    CREATININE 1.2 0.7 - 1.2 mg/dL    GFR Non-African American 57 >=60 mL/min/1.73    GFR African American >60     Calcium 8.1 (L) 8.6 - 10.2 mg/dL   Procalcitonin    Collection Time: 05/23/19  5:58 AM   Result Value Ref Range    Procalcitonin 0.16 (H) 0.00 - 0.08 ng/mL   URINE CULTURE    Collection Time: 05/23/19 11:30 AM   Result Value Ref Range    Urine Culture, Routine      Organism Pseudomonas aeruginosa (A)     Urine Culture, Routine >100,000 CFU/ml     Organism Enterococcus faecalis (A)     Urine Culture, Routine >100,000 CFU/ml        Susceptibility    Pseudomonas aeruginosa - BACTERIAL SUSCEPTIBILITY PANEL BY SIMI     cefepime =^2 Sensitive mcg/mL     gentamicin <=^1 Sensitive mcg/mL     imipenem =^2 Sensitive mcg/mL     levofloxacin =^1 Sensitive mcg/mL     piperacillin-tazobactam =^8 Sensitive mcg/mL     tobramycin <=^1 Sensitive mcg/mL    Enterococcus faecalis - BACTERIAL SUSCEPTIBILITY PANEL BY SIMI     ampicillin <=^2 Sensitive mcg/mL     doxycycline >=^16 Resistant mcg/mL     gentamicin-syn SYN-R Resistant mcg/mL     nitrofurantoin <=^16 Sensitive mcg/mL     vancomycin =^1 Sensitive mcg/mL   Strep Pneumoniae Antigen    Collection Time: 05/23/19  3:15 PM   Result Value Ref Range    STREP PNEUMONIAE ANTIGEN, URINE       Presumptive NEGATIVE for Pneumococcal pneumonia, suggesting  no current or recent pneumococcal infection. Infection due  to S. pneumoniae cannot be ruled out since the antigen present  in the sample may be below the detection limit of the test.     LEGIONELLA ANTIGEN, URINE    Collection Time: 05/23/19  3:15 PM   Result Value Ref Range    L. pneumophila Serogp 1 Ur Ag       Presumptive NEGATIVE suggesting no recent or current infections  with Legionella pneumophilia serogroup 1. Infection to  Legionella cannot be ruled out since other serogroups and  species may cause infection, antigen may not be present in  early infection, or level of antigen may be below the  detection limit.      CBC WITH AUTO DIFFERENTIAL    Collection Time: 05/24/19  6:45 AM   Result Value Ref Range    WBC 14.0 (H) 4.5 - 11.5 E9/L    RBC 3.39 (L) 3.80 - 5.80 E12/L    Hemoglobin 9.5 (L) 12.5 - 16.5 g/dL    Hematocrit 29.0 (L) 37.0 - 54.0 %    MCV 85.5 80.0 - 99.9 fL    MCH 28.0 26.0 - 35.0 pg    MCHC 32.8 32.0 - 34.5 %    RDW 13.8 11.5 - 15.0 fL    Platelets 757 641 - 662 E9/L    MPV 9.2 7.0 - 12.0 fL    Neutrophils % 75.0 43.0 - 80.0 %    Immature Granulocytes % 0.6 0.0 - 5.0 %    Lymphocytes % 10.9 (L) 20.0 - 42.0 %    Monocytes % 9.8 2.0 - 12.0 %    Eosinophils % 3.1 0.0 - 6.0 %    Basophils % 0.6 0.0 - 2.0 %    Neutrophils # 10.52 (H) 1.80 - 7.30 E9/L    Immature Granulocytes # 0.08 E9/L    Lymphocytes # 1.53 1.50 - 4.00 E9/L    Monocytes # 1.38 (H) 0.10 - 0.95 E9/L    Eosinophils # 0.44 0.05 - 0.50 E9/L    Basophils # 0.08 0.00 - 0.20 N0/H   Basic metabolic panel    Collection Time: 05/24/19  6:45 AM   Result Value Ref Range    Sodium 137 132 - 146 mmol/L    Potassium 3.6 3.5 - 5.0 mmol/L    Chloride 105 98 - 107 mmol/L    CO2 21 (L) 22 - 29 mmol/L    Anion Gap 11 7 - 16 mmol/L    Glucose 107 (H) 74 - 99 mg/dL    BUN 20 8 - 23 mg/dL    CREATININE 1.1 0.7 - 1.2 mg/dL    GFR Non-African American >60 >=60 mL/min/1.73    GFR African American >60     Calcium 8.5 (L) 8.6 - 10.2 mg/dL         Discharge Exam:  See progress note from today    Disposition: home    Patient Instructions:   Discharge Medication List as of 5/24/2019  1:53 PM      START taking these medications    Details   cephALEXin (KEFLEX) 500 MG capsule Take 1 capsule by mouth 3 times daily for 14 days, Disp-42 capsule, R-0Normal         CONTINUE these medications which have NOT CHANGED    Details   metoprolol succinate (TOPROL XL) 25 MG extended release tablet Take 25 mg by mouth dailyHistorical Med      docusate (COLACE, DULCOLAX) 100 MG CAPS Take 100 mg by mouth 2 times daily as needed (constipation), Disp-20 capsule, R-0Print      Calcium-Magnesium-Vitamin D (CALCIUM 1200+D3 PO) Take 1 tablet by mouth daily. atorvastatin (LIPITOR) 40 MG tablet Take 40 mg by mouth daily. Multiple Vitamins-Minerals (CENTRUM-LUTEIN PO) Take 1 tablet by mouth daily. Omega-3 Fatty Acids (FISH OIL) 1000 MG CAPS Take 1,000 mg by mouth daily. aspirin 81 MG EC tablet Take 81 mg by mouth daily. Coenzyme Q10 (COQ10) 100 MG CAPS Take 100 mg by mouth daily.         Bevacizumab (AVASTIN IV) Infuse intravenously See Admin Instructions Gets injection in eyes about every 11 weeksHistorical Med             Activity: as tolerated    Diet: cardiac    Follow-up with John Santana MD  [address] [place] [address]  [phone]    Schedule an appointment as soon as possible for a visit      Griselda Matas, MD  [facility] [phone]      for surgery Tuesday        Note that over 30 minutes was spent in preparing discharge papers, discussing discharge with patient, medication review,

## 2019-05-27 LAB
BLOOD CULTURE, ROUTINE: NORMAL
CULTURE, BLOOD 2: NORMAL

## 2019-05-28 NOTE — PROGRESS NOTES
[place] [facility] PRE-ADMISSION TESTING GENERAL INSTRUCTIONS- [facility]-phone number:[phone]    GENERAL INSTRUCTIONS  [x] Antibacterial Soap shower Night before and/or AM of Surgery  [] James wipe instruction sheet and wipes given. [x] Nothing by mouth after midnight, including gum, candy, mints, or water.  [] You may brush your teeth, gargle, but do NOT swallow water. []Hibiclens shower  the night before and the morning of surgery. Do not use             Hibiclens on your face or head. [x]No smoking, chewing tobacco, illegal drugs, or alcohol within 24 hours of your surgery. [x] Jewelry, valuables or body piercing's should not be brought to the hospital. All body and/or tongue piercing's must be removed prior to arriving to hospital.  ALL hair pins must be removed. [] Do not wear makeup, lotions, powders, deodorant. Nail polish as directed by the nurse. [x] Arrange transportation with a responsible adult  to and from the hospital. If you do not have a responsible adult  to transport you, you will need to make arrangements with a medical transportation company (i.e. [employer]. A Uber/taxi/bus is not appropriate unless you are accompanied by a responsible adult ). Arrange for someone to be with you for the remainder of the day and for 24 hours after your procedure due to having had anesthesia. Who will be your  for transportation?______daughter____________   Who will be staying with you for 24 hrs after your procedure?_________________daughter_  [] Bring insurance card and photo ID.  [] Transfusion Bracelet: Please bring with you to hospital, day of surgery  [] Bring urine specimen day of surgery. Any small container is acceptable. [] Use inhalers the morning of surgery and bring with you to hospital.  [] Bring copy of living will or healthcare power of  papers to be placed in your electronic record.   [] CPAP/BI-PAP: Please bring your machine area if you have concerns about your blood sugar [phone]. [] Use your inhalers the morning of surgery. Bring your emergency inhaler with you day of surgery. [x] Follow physician instructions regarding any blood thinners you may be taking. asa on hold  WHAT TO EXPECT:  [x] The day of surgery you will be greeted and checked in by the [employer].  In addition, you will be registered in the [place] by a Patient Access Representative. Please bring your photo ID and insurance card. A nurse will greet you in accordance to the time you are needed in the pre-op area to prepare you for surgery. Please do not be discouraged if you are not greeted in the order you arrive as there are many variables that are involved in patient preparation. Your patience is greatly appreciated as you wait for your nurse. Please bring in items such as: books, magazines, newspapers, electronics, or any other items  to occupy your time in the waiting area. []  Delays may occur with surgery and staff will make a sincere effort to keep you informed of delays. If any delays occur with your procedure, we apologize ahead of time for your inconvenience as we recognize the value of your time.

## 2019-05-29 ENCOUNTER — PREP FOR PROCEDURE (OUTPATIENT)
Dept: UROLOGY | Age: 84
End: 2019-05-29

## 2019-05-29 DIAGNOSIS — R33.9 URINARY RETENTION: Primary | ICD-10-CM

## 2019-05-29 RX ORDER — SODIUM CHLORIDE 0.9 % (FLUSH) 0.9 %
10 SYRINGE (ML) INJECTION EVERY 12 HOURS SCHEDULED
Status: CANCELLED | OUTPATIENT
Start: 2019-05-29

## 2019-05-29 RX ORDER — SODIUM CHLORIDE 0.9 % (FLUSH) 0.9 %
10 SYRINGE (ML) INJECTION PRN
Status: CANCELLED | OUTPATIENT
Start: 2019-05-29

## 2019-05-30 ENCOUNTER — ANESTHESIA (OUTPATIENT)
Dept: OPERATING ROOM | Age: 84
End: 2019-05-30
Payer: MEDICARE

## 2019-05-30 ENCOUNTER — ANESTHESIA EVENT (OUTPATIENT)
Dept: OPERATING ROOM | Age: 84
End: 2019-05-30
Payer: MEDICARE

## 2019-05-30 ENCOUNTER — APPOINTMENT (OUTPATIENT)
Dept: GENERAL RADIOLOGY | Age: 84
End: 2019-05-30
Attending: UROLOGY
Payer: MEDICARE

## 2019-05-30 ENCOUNTER — HOSPITAL ENCOUNTER (OUTPATIENT)
Age: 84
Setting detail: OBSERVATION
Discharge: HOME | End: 2019-05-31
Attending: UROLOGY | Admitting: UROLOGY
Payer: MEDICARE

## 2019-05-30 VITALS — OXYGEN SATURATION: 97 % | SYSTOLIC BLOOD PRESSURE: 145 MMHG | DIASTOLIC BLOOD PRESSURE: 60 MMHG

## 2019-05-30 PROBLEM — R31.0 GROSS HEMATURIA: Status: ACTIVE | Noted: 2019-05-30

## 2019-05-30 LAB
ANION GAP SERPL CALCULATED.3IONS-SCNC: 10 MMOL/L (ref 7–16)
BUN BLDV-MCNC: 19 MG/DL (ref 8–23)
CALCIUM SERPL-MCNC: 8.6 MG/DL (ref 8.6–10.2)
CHLORIDE BLD-SCNC: 107 MMOL/L (ref 98–107)
CO2: 25 MMOL/L (ref 22–29)
CREAT SERPL-MCNC: 1.1 MG/DL (ref 0.7–1.2)
GFR AFRICAN AMERICAN: >60
GFR NON-AFRICAN AMERICAN: >60 ML/MIN/1.73
GLUCOSE BLD-MCNC: 110 MG/DL (ref 74–99)
HCT VFR BLD CALC: 34.6 % (ref 37–54)
HEMOGLOBIN: 11.1 G/DL (ref 12.5–16.5)
MCH RBC QN AUTO: 28.1 PG (ref 26–35)
MCHC RBC AUTO-ENTMCNC: 32.1 % (ref 32–34.5)
MCV RBC AUTO: 87.6 FL (ref 80–99.9)
PDW BLD-RTO: 13.8 FL (ref 11.5–15)
PLATELET # BLD: 334 E9/L (ref 130–450)
PMV BLD AUTO: 8.4 FL (ref 7–12)
POTASSIUM SERPL-SCNC: 4.1 MMOL/L (ref 3.5–5)
RBC # BLD: 3.95 E12/L (ref 3.8–5.8)
SODIUM BLD-SCNC: 142 MMOL/L (ref 132–146)
WBC # BLD: 8.1 E9/L (ref 4.5–11.5)

## 2019-05-30 PROCEDURE — C1758 CATHETER, URETERAL: HCPCS | Performed by: UROLOGY

## 2019-05-30 PROCEDURE — 7100000001 HC PACU RECOVERY - ADDTL 15 MIN: Performed by: UROLOGY

## 2019-05-30 PROCEDURE — 2500000003 HC RX 250 WO HCPCS: Performed by: UROLOGY

## 2019-05-30 PROCEDURE — 2720000010 HC SURG SUPPLY STERILE: Performed by: UROLOGY

## 2019-05-30 PROCEDURE — 88305 TISSUE EXAM BY PATHOLOGIST: CPT

## 2019-05-30 PROCEDURE — 85027 COMPLETE CBC AUTOMATED: CPT

## 2019-05-30 PROCEDURE — G0378 HOSPITAL OBSERVATION PER HR: HCPCS

## 2019-05-30 PROCEDURE — 3600000003 HC SURGERY LEVEL 3 BASE: Performed by: UROLOGY

## 2019-05-30 PROCEDURE — 2709999900 HC NON-CHARGEABLE SUPPLY: Performed by: UROLOGY

## 2019-05-30 PROCEDURE — 2700000000 HC OXYGEN THERAPY PER DAY

## 2019-05-30 PROCEDURE — 3209999900 FLUORO FOR SURGICAL PROCEDURES

## 2019-05-30 PROCEDURE — 2580000003 HC RX 258: Performed by: UROLOGY

## 2019-05-30 PROCEDURE — 6370000000 HC RX 637 (ALT 250 FOR IP): Performed by: UROLOGY

## 2019-05-30 PROCEDURE — 3700000001 HC ADD 15 MINUTES (ANESTHESIA): Performed by: UROLOGY

## 2019-05-30 PROCEDURE — 7100000000 HC PACU RECOVERY - FIRST 15 MIN: Performed by: UROLOGY

## 2019-05-30 PROCEDURE — 3600000013 HC SURGERY LEVEL 3 ADDTL 15MIN: Performed by: UROLOGY

## 2019-05-30 PROCEDURE — 36415 COLL VENOUS BLD VENIPUNCTURE: CPT

## 2019-05-30 PROCEDURE — 6360000002 HC RX W HCPCS: Performed by: NURSE ANESTHETIST, CERTIFIED REGISTERED

## 2019-05-30 PROCEDURE — 6360000002 HC RX W HCPCS: Performed by: ANESTHESIOLOGY

## 2019-05-30 PROCEDURE — 2580000003 HC RX 258: Performed by: NURSE ANESTHETIST, CERTIFIED REGISTERED

## 2019-05-30 PROCEDURE — 6360000004 HC RX CONTRAST MEDICATION: Performed by: UROLOGY

## 2019-05-30 PROCEDURE — 3700000000 HC ANESTHESIA ATTENDED CARE: Performed by: UROLOGY

## 2019-05-30 PROCEDURE — 80048 BASIC METABOLIC PNL TOTAL CA: CPT

## 2019-05-30 RX ORDER — LIDOCAINE HYDROCHLORIDE 20 MG/ML
INJECTION, SOLUTION INTRAVENOUS PRN
Status: DISCONTINUED | OUTPATIENT
Start: 2019-05-30 | End: 2019-05-30 | Stop reason: SDUPTHER

## 2019-05-30 RX ORDER — HYDROCODONE BITARTRATE AND ACETAMINOPHEN 5; 325 MG/1; MG/1
1 TABLET ORAL EVERY 4 HOURS PRN
Status: DISCONTINUED | OUTPATIENT
Start: 2019-05-30 | End: 2019-05-31 | Stop reason: HOSPADM

## 2019-05-30 RX ORDER — ONDANSETRON 2 MG/ML
INJECTION INTRAMUSCULAR; INTRAVENOUS PRN
Status: DISCONTINUED | OUTPATIENT
Start: 2019-05-30 | End: 2019-05-30 | Stop reason: SDUPTHER

## 2019-05-30 RX ORDER — PROPOFOL 10 MG/ML
INJECTION, EMULSION INTRAVENOUS CONTINUOUS PRN
Status: DISCONTINUED | OUTPATIENT
Start: 2019-05-30 | End: 2019-05-30

## 2019-05-30 RX ORDER — ATORVASTATIN CALCIUM 40 MG/1
40 TABLET, FILM COATED ORAL DAILY
Status: DISCONTINUED | OUTPATIENT
Start: 2019-05-30 | End: 2019-05-31 | Stop reason: HOSPADM

## 2019-05-30 RX ORDER — DEXAMETHASONE SODIUM PHOSPHATE 10 MG/ML
INJECTION INTRAMUSCULAR; INTRAVENOUS PRN
Status: DISCONTINUED | OUTPATIENT
Start: 2019-05-30 | End: 2019-05-30 | Stop reason: SDUPTHER

## 2019-05-30 RX ORDER — PROPOFOL 10 MG/ML
INJECTION, EMULSION INTRAVENOUS PRN
Status: DISCONTINUED | OUTPATIENT
Start: 2019-05-30 | End: 2019-05-30 | Stop reason: SDUPTHER

## 2019-05-30 RX ORDER — SODIUM CHLORIDE 9 MG/ML
INJECTION, SOLUTION INTRAVENOUS CONTINUOUS PRN
Status: DISCONTINUED | OUTPATIENT
Start: 2019-05-30 | End: 2019-05-30 | Stop reason: SDUPTHER

## 2019-05-30 RX ORDER — HYDROCODONE BITARTRATE AND ACETAMINOPHEN 5; 325 MG/1; MG/1
2 TABLET ORAL PRN
Status: DISCONTINUED | OUTPATIENT
Start: 2019-05-30 | End: 2019-05-30 | Stop reason: HOSPADM

## 2019-05-30 RX ORDER — SODIUM CHLORIDE 9 MG/ML
INJECTION, SOLUTION INTRAVENOUS CONTINUOUS
Status: DISCONTINUED | OUTPATIENT
Start: 2019-05-30 | End: 2019-05-31 | Stop reason: HOSPADM

## 2019-05-30 RX ORDER — SODIUM CHLORIDE 450 MG/100ML
INJECTION, SOLUTION INTRAVENOUS CONTINUOUS
Status: DISCONTINUED | OUTPATIENT
Start: 2019-05-30 | End: 2019-05-31 | Stop reason: HOSPADM

## 2019-05-30 RX ORDER — BUPIVACAINE HYDROCHLORIDE 5 MG/ML
INJECTION, SOLUTION EPIDURAL; INTRACAUDAL PRN
Status: DISCONTINUED | OUTPATIENT
Start: 2019-05-30 | End: 2019-05-30 | Stop reason: ALTCHOICE

## 2019-05-30 RX ORDER — FENTANYL CITRATE 50 UG/ML
INJECTION, SOLUTION INTRAMUSCULAR; INTRAVENOUS PRN
Status: DISCONTINUED | OUTPATIENT
Start: 2019-05-30 | End: 2019-05-30 | Stop reason: SDUPTHER

## 2019-05-30 RX ORDER — MORPHINE SULFATE 2 MG/ML
1 INJECTION, SOLUTION INTRAMUSCULAR; INTRAVENOUS EVERY 5 MIN PRN
Status: DISCONTINUED | OUTPATIENT
Start: 2019-05-30 | End: 2019-05-30 | Stop reason: HOSPADM

## 2019-05-30 RX ORDER — MEPERIDINE HYDROCHLORIDE 50 MG/ML
12.5 INJECTION INTRAMUSCULAR; INTRAVENOUS; SUBCUTANEOUS EVERY 5 MIN PRN
Status: DISCONTINUED | OUTPATIENT
Start: 2019-05-30 | End: 2019-05-30 | Stop reason: HOSPADM

## 2019-05-30 RX ORDER — SODIUM CHLORIDE 0.9 % (FLUSH) 0.9 %
10 SYRINGE (ML) INJECTION EVERY 12 HOURS SCHEDULED
Status: DISCONTINUED | OUTPATIENT
Start: 2019-05-30 | End: 2019-05-31 | Stop reason: HOSPADM

## 2019-05-30 RX ORDER — SODIUM CHLORIDE 0.9 % (FLUSH) 0.9 %
10 SYRINGE (ML) INJECTION PRN
Status: DISCONTINUED | OUTPATIENT
Start: 2019-05-30 | End: 2019-05-30 | Stop reason: HOSPADM

## 2019-05-30 RX ORDER — METOPROLOL SUCCINATE 25 MG/1
25 TABLET, EXTENDED RELEASE ORAL DAILY
Status: DISCONTINUED | OUTPATIENT
Start: 2019-05-31 | End: 2019-05-31 | Stop reason: HOSPADM

## 2019-05-30 RX ORDER — ATROPA BELLADONNA AND OPIUM 16.2; 3 MG/1; MG/1
30 SUPPOSITORY RECTAL EVERY 8 HOURS PRN
Status: DISCONTINUED | OUTPATIENT
Start: 2019-05-30 | End: 2019-05-31 | Stop reason: HOSPADM

## 2019-05-30 RX ORDER — HYDROCODONE BITARTRATE AND ACETAMINOPHEN 5; 325 MG/1; MG/1
1 TABLET ORAL PRN
Status: DISCONTINUED | OUTPATIENT
Start: 2019-05-30 | End: 2019-05-30 | Stop reason: HOSPADM

## 2019-05-30 RX ORDER — SODIUM CHLORIDE 0.9 % (FLUSH) 0.9 %
10 SYRINGE (ML) INJECTION PRN
Status: DISCONTINUED | OUTPATIENT
Start: 2019-05-30 | End: 2019-05-31 | Stop reason: HOSPADM

## 2019-05-30 RX ORDER — ONDANSETRON 2 MG/ML
4 INJECTION INTRAMUSCULAR; INTRAVENOUS EVERY 6 HOURS PRN
Status: DISCONTINUED | OUTPATIENT
Start: 2019-05-30 | End: 2019-05-31 | Stop reason: HOSPADM

## 2019-05-30 RX ORDER — HYDROCODONE BITARTRATE AND ACETAMINOPHEN 5; 325 MG/1; MG/1
2 TABLET ORAL EVERY 4 HOURS PRN
Status: DISCONTINUED | OUTPATIENT
Start: 2019-05-30 | End: 2019-05-31 | Stop reason: HOSPADM

## 2019-05-30 RX ORDER — CEFAZOLIN SODIUM 1 G/3ML
INJECTION, POWDER, FOR SOLUTION INTRAMUSCULAR; INTRAVENOUS PRN
Status: DISCONTINUED | OUTPATIENT
Start: 2019-05-30 | End: 2019-05-30 | Stop reason: SDUPTHER

## 2019-05-30 RX ORDER — PROMETHAZINE HYDROCHLORIDE 25 MG/ML
6.25 INJECTION, SOLUTION INTRAMUSCULAR; INTRAVENOUS EVERY 10 MIN PRN
Status: DISCONTINUED | OUTPATIENT
Start: 2019-05-30 | End: 2019-05-30 | Stop reason: HOSPADM

## 2019-05-30 RX ORDER — SODIUM CHLORIDE 0.9 % (FLUSH) 0.9 %
10 SYRINGE (ML) INJECTION EVERY 12 HOURS SCHEDULED
Status: DISCONTINUED | OUTPATIENT
Start: 2019-05-30 | End: 2019-05-30 | Stop reason: HOSPADM

## 2019-05-30 RX ORDER — ATROPA BELLADONNA AND OPIUM 16.2; 6 MG/1; MG/1
SUPPOSITORY RECTAL PRN
Status: DISCONTINUED | OUTPATIENT
Start: 2019-05-30 | End: 2019-05-30 | Stop reason: ALTCHOICE

## 2019-05-30 RX ORDER — MORPHINE SULFATE 2 MG/ML
2 INJECTION, SOLUTION INTRAMUSCULAR; INTRAVENOUS EVERY 5 MIN PRN
Status: DISCONTINUED | OUTPATIENT
Start: 2019-05-30 | End: 2019-05-30 | Stop reason: HOSPADM

## 2019-05-30 RX ORDER — PROPOFOL 10 MG/ML
INJECTION, EMULSION INTRAVENOUS CONTINUOUS PRN
Status: DISCONTINUED | OUTPATIENT
Start: 2019-05-30 | End: 2019-05-30 | Stop reason: SDUPTHER

## 2019-05-30 RX ADMIN — FENTANYL CITRATE 50 MCG: 50 INJECTION, SOLUTION INTRAMUSCULAR; INTRAVENOUS at 15:30

## 2019-05-30 RX ADMIN — FENTANYL CITRATE 50 MCG: 50 INJECTION, SOLUTION INTRAMUSCULAR; INTRAVENOUS at 15:09

## 2019-05-30 RX ADMIN — ATORVASTATIN CALCIUM 40 MG: 40 TABLET, FILM COATED ORAL at 20:27

## 2019-05-30 RX ADMIN — DEXAMETHASONE SODIUM PHOSPHATE 10 MG: 10 INJECTION INTRAMUSCULAR; INTRAVENOUS at 16:18

## 2019-05-30 RX ADMIN — SODIUM CHLORIDE: 4.5 INJECTION, SOLUTION INTRAVENOUS at 20:51

## 2019-05-30 RX ADMIN — MEPERIDINE HYDROCHLORIDE 12.5 MG: 50 INJECTION, SOLUTION INTRAMUSCULAR; INTRAVENOUS; SUBCUTANEOUS at 17:23

## 2019-05-30 RX ADMIN — ONDANSETRON HYDROCHLORIDE 4 MG: 2 INJECTION, SOLUTION INTRAMUSCULAR; INTRAVENOUS at 16:18

## 2019-05-30 RX ADMIN — FENTANYL CITRATE 50 MCG: 50 INJECTION, SOLUTION INTRAMUSCULAR; INTRAVENOUS at 15:45

## 2019-05-30 RX ADMIN — SODIUM CHLORIDE: 9 INJECTION, SOLUTION INTRAVENOUS at 12:41

## 2019-05-30 RX ADMIN — CEFAZOLIN 2000 MG: 1 INJECTION, POWDER, FOR SOLUTION INTRAMUSCULAR; INTRAVENOUS at 14:57

## 2019-05-30 RX ADMIN — MEPERIDINE HYDROCHLORIDE 12.5 MG: 50 INJECTION, SOLUTION INTRAMUSCULAR; INTRAVENOUS; SUBCUTANEOUS at 17:00

## 2019-05-30 RX ADMIN — FENTANYL CITRATE 50 MCG: 50 INJECTION, SOLUTION INTRAMUSCULAR; INTRAVENOUS at 15:02

## 2019-05-30 RX ADMIN — PROPOFOL 70 MG: 10 INJECTION, EMULSION INTRAVENOUS at 15:08

## 2019-05-30 RX ADMIN — PROPOFOL 100 MCG/KG/MIN: 10 INJECTION, EMULSION INTRAVENOUS at 14:57

## 2019-05-30 RX ADMIN — HYDROCODONE BITARTRATE AND ACETAMINOPHEN 1 TABLET: 5; 325 TABLET ORAL at 23:02

## 2019-05-30 RX ADMIN — FENTANYL CITRATE 50 MCG: 50 INJECTION, SOLUTION INTRAMUSCULAR; INTRAVENOUS at 16:10

## 2019-05-30 RX ADMIN — SODIUM CHLORIDE: 9 INJECTION, SOLUTION INTRAVENOUS at 14:52

## 2019-05-30 RX ADMIN — LIDOCAINE HYDROCHLORIDE 50 MG: 20 INJECTION, SOLUTION INTRAVENOUS at 15:00

## 2019-05-30 RX ADMIN — IOPAMIDOL 60 ML: 755 INJECTION, SOLUTION INTRAVENOUS at 15:09

## 2019-05-30 ASSESSMENT — PULMONARY FUNCTION TESTS
PIF_VALUE: 11
PIF_VALUE: 2
PIF_VALUE: 1
PIF_VALUE: 2
PIF_VALUE: 1
PIF_VALUE: 2
PIF_VALUE: 8
PIF_VALUE: 2
PIF_VALUE: 18
PIF_VALUE: 2
PIF_VALUE: 8
PIF_VALUE: 2
PIF_VALUE: 9
PIF_VALUE: 3
PIF_VALUE: 13
PIF_VALUE: 0
PIF_VALUE: 2
PIF_VALUE: 0
PIF_VALUE: 0
PIF_VALUE: 2
PIF_VALUE: 1
PIF_VALUE: 0
PIF_VALUE: 1
PIF_VALUE: 1
PIF_VALUE: 2
PIF_VALUE: 5
PIF_VALUE: 2
PIF_VALUE: 3
PIF_VALUE: 0
PIF_VALUE: 2
PIF_VALUE: 6
PIF_VALUE: 1
PIF_VALUE: 2
PIF_VALUE: 4
PIF_VALUE: 0
PIF_VALUE: 0
PIF_VALUE: 2
PIF_VALUE: 0
PIF_VALUE: 3
PIF_VALUE: 16
PIF_VALUE: 3
PIF_VALUE: 2
PIF_VALUE: 3
PIF_VALUE: 2
PIF_VALUE: 0
PIF_VALUE: 2
PIF_VALUE: 2
PIF_VALUE: 0
PIF_VALUE: 2
PIF_VALUE: 4
PIF_VALUE: 3
PIF_VALUE: 2
PIF_VALUE: 2
PIF_VALUE: 0
PIF_VALUE: 2
PIF_VALUE: 0
PIF_VALUE: 2
PIF_VALUE: 1
PIF_VALUE: 2
PIF_VALUE: 3
PIF_VALUE: 1
PIF_VALUE: 2
PIF_VALUE: 1
PIF_VALUE: 2
PIF_VALUE: 8
PIF_VALUE: 2

## 2019-05-30 ASSESSMENT — PAIN SCALES - GENERAL
PAINLEVEL_OUTOF10: 0
PAINLEVEL_OUTOF10: 3
PAINLEVEL_OUTOF10: 0

## 2019-05-30 ASSESSMENT — PAIN - FUNCTIONAL ASSESSMENT: PAIN_FUNCTIONAL_ASSESSMENT: 0-10

## 2019-05-30 NOTE — ANESTHESIA PRE PROCEDURE
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    Hyperlipemia E78.5    Asymptomatic bilateral carotid artery stenosis I65.23    Numbness and tingling in left arm R20.0, R20.2    Numbness and tingling of left side of face R20.0, R20.2    Pulmonary nodule R91.1    Lung nodule R91.1    Malignant neoplasm of lower lobe of left lung (HCC) C34.32    UTI (urinary tract infection) N39.0    Sepsis secondary to UTI ([facility] [address] A41.9, N39.0       Past Medical History:        Diagnosis Date    BPH (benign prostatic hyperplasia)     CAD (coronary artery disease)     Carotid artery calcification     DJD (degenerative joint disease)     Hyperlipemia     Lung nodule     Macular degeneration     Numbness and tingling in left arm 2/2/2018    Numbness and tingling of left side of face 2/2/2018    S/P CABG x 6 2000    LIMA-LAD, LYNNETTE-LCx,SVG-OM,SVG-LPL,SVG-PDA-RPL       Past Surgical History:        Procedure Laterality Date    CARDIAC SURGERY      6 vessel in 2000   [facility] DENTAL SURGERY      root canal    DIAGNOSTIC CARDIAC CATH LAB PROCEDURE  9/5/00    OTHER SURGICAL HISTORY  02/16/2011    Injection right hip  T. Best Bhatt MD    THORACOSCOPY Left 4/12/2019    BRONCHOSCOPY LEFT THORACOSCOPY ROBOTIC VIDEO ASSISTED , WEDGE RESECTION, POSS.  LEFT LOWER LOBECTOMY performed by Ava Mccartney MD at [address] [place] [address] Right 03/28/2011    Right Cici Flowers MD  TOTAL HIP ARTHROPLASTY Left 11/09/2009    Left BRIONNA  MD Sean Sow         Social History:    Social History     Tobacco Use    Smoking status: Former Smoker     Packs/day: 1.00     Years: 10.00     Pack years: 10.00     Types: Cigarettes    Smokeless tobacco: Never Used    Tobacco comment: quit 40-50 years ago   Substance Use Topics    Alcohol use: Yes     Comment: socially                                Counseling given: Not Answered  Comment: quit 40-50 years ago      Vital Signs (Current): There were no vitals filed for this visit. BP Readings from Last 3 Encounters:   05/30/19 (!) 200/88   05/24/19 (!) 171/81   05/21/19 118/60       NPO Status: Pt advised NPO after 4/12/19 0000                                                                                 BMI:   Wt Readings from Last 3 Encounters:   05/30/19 173 lb (78.5 kg)   05/24/19 173 lb 9.6 oz (78.7 kg)   05/21/19 174 lb 14.4 oz (79.3 kg)     There is no height or weight on file to calculate BMI.    CBC:   Lab Results   Component Value Date    WBC 14.0 05/24/2019    RBC 3.39 05/24/2019    HGB 9.5 05/24/2019    HCT 29.0 05/24/2019    MCV 85.5 05/24/2019    RDW 13.8 05/24/2019     05/24/2019       CMP:   Lab Results   Component Value Date     05/24/2019    K 3.6 05/24/2019    K 4.9 04/11/2019     05/24/2019    CO2 21 05/24/2019    BUN 20 05/24/2019    CREATININE 1.1 05/24/2019    GFRAA >60 05/24/2019    LABGLOM >60 05/24/2019    GLUCOSE 107 05/24/2019    GLUCOSE 109 05/08/2012    PROT 6.9 05/22/2019    CALCIUM 8.5 05/24/2019    BILITOT 1.7 05/22/2019    ALKPHOS 80 05/22/2019    AST 18 05/22/2019    ALT 9 05/22/2019       POC Tests: No results for input(s): POCGLU, POCNA, POCK, POCCL, POCBUN, POCHEMO, POCHCT in the last 72 hours. Coags:   Lab Results   Component Value Date    PROTIME 15.0 05/22/2019    INR 1.3 05/22/2019    APTT 28.2 05/22/2019       HCG (If Applicable):  No results found for: PREGTESTUR, PREGSERUM, HCG, HCGQUANT     ABGs: No results found for: PHART, PO2ART, KAC5MTN, JOT4IGT, BEART, L1DDUGOH     Type & Screen (If Applicable):  No results found for: LABABO, LABRH    EKG 4/18/18  Sinus  Bradycardia   -Left axis -anterior fascicular block. Low voltage with rightward P-axis and rotation -possible pulmonary disease. Anesthesia Evaluation  Patient summary reviewed and Nursing notes reviewed no history of anesthetic complications:   Airway: Mallampati: III  TM distance: >3 FB   Neck ROM: full  Mouth opening: > = 3 FB Dental:      Comment: Pt denies any loose, chipped or missing teeth    Pulmonary:Negative Pulmonary ROS and normal exam  breath sounds clear to auscultation                             Cardiovascular:    (+) CAD:, CABG/stent (CABG x6 - 2000):, hyperlipidemia      ECG reviewed  Rhythm: regular  Rate: normal           Beta Blocker:  Dose within 24 Hrs         Neuro/Psych:                ROS comment: Numbness and tingling of left side of face GI/Hepatic/Renal: Neg GI/Hepatic/Renal ROS            Endo/Other:    (+) blood dyscrasia (Aspirin last taken 4/9/19): anemia:., malignancy/cancer (Pulmonary nodule (possible stage 1)). ROS comment: DJD (degenerative joint disease) Abdominal:           Vascular:           ROS comment: Asymptomatic bilateral carotid artery stenosis. Anesthesia Plan      general and regional     ASA 3     (Pt advised and educated on possibility of 2 large bore IVs and art line, pt agreeable )  Induction: intravenous. BIS and arterial line  MIPS: Postoperative opioids intended and Prophylactic antiemetics administered. Anesthetic plan and risks discussed with patient. Use of blood products discussed with patient whom consented to blood products. Plan discussed with CRNA and attending.                   Penelope Rome MD   5/30/2019

## 2019-05-30 NOTE — ANESTHESIA POSTPROCEDURE EVALUATION
Department of Anesthesiology  Postprocedure Note    Patient: Elvie Olvera  MRN: [id number]  YOB: 1931  Date of evaluation: 5/30/2019  Time:  7:17 PM     Procedure Summary     Date:  05/30/19 Room / Location:  [facility]  / [facility]    Anesthesia Start:   Anesthesia Stop:      Procedure:  CYSTOSCOPY, RETROGRADE PYELOGRAM,  TRANSURETHRAL RESECTION PROSTATE (N/A ) Diagnosis:  (.)    Surgeon:  Maylin Mckinnon MD Responsible Provider:      Anesthesia Type:  general, regional ASA Status:  3          Anesthesia Type: general, regional    Danyelle Phase I: Danyelle Score: 9    Danyelle Phase II:      Last vitals: Reviewed and per EMR flowsheets.        Anesthesia Post Evaluation    Patient location during evaluation: PACU  Patient participation: complete - patient participated  Level of consciousness: awake  Pain score: 3  Airway patency: patent  Nausea & Vomiting: no nausea and no vomiting  Complications: no  Cardiovascular status: blood pressure returned to baseline  Respiratory status: acceptable  Hydration status: euvolemic

## 2019-05-30 NOTE — BRIEF OP NOTE
Brief Postoperative Note  ______________________________________________________________    Patient: Nia Salas  YOB: 1931  MRN: [id number]  Date of Procedure: 5/30/2019    Pre-Op Diagnosis: . Urinary tract infection with urinary retention assess upper and lower tract for causes retention but most likely bladder outlet obstruction due to BPH despite previous laser prostatectomy    Post-Op Diagnosis: Normal upper tracts he may have an element of neurogenic bladder his prostate was not totally obstructed by the residual prostatic tissue/narrow fossa navicularis       Procedure(s):  CYSTOSCOPY, RETROGRADE PYELOGRAM,  URERTHRAL DILITATION, TRANSURETHRAL RESECTION PROSTATE    Anesthesia: Regional, General    Surgeon(s):  Adrienne Hidalgo MD    Assistant: none  Estimated Blood Loss (mL):300cc replaced none    Complications: none  Specimens:   ID Type Source Tests Collected by Time Destination   A : PROSTATE Tissue Tissue SURGICAL PATHOLOGY Shorty Hale MD 5/30/2019 1614        Implants:  none      Drains:   Urethral Catheter  22 fr (Active)       [REMOVED] Urethral Catheter Non-latex 16 fr (Removed)   Catheter Indications Urology/Urologist seeing this patient or inserted indwelling catheter 5/30/2019 12:31 PM   Securement Device Date Changed 05/23/19 5/23/2019  3:06 PM   Site Assessment No urethral drainage 5/30/2019 12:31 PM   Urine Color Milana 5/30/2019 12:31 PM   Urine Appearance Sediment 5/24/2019 10:45 AM   Output (mL) 350 mL 5/24/2019  2:17 PM       Findings: as above    Adrienne Hidalgo MD  Date: 5/30/2019  Time: 4:31 PM

## 2019-05-31 VITALS
WEIGHT: 173 LBS | OXYGEN SATURATION: 99 % | HEIGHT: 67 IN | DIASTOLIC BLOOD PRESSURE: 60 MMHG | RESPIRATION RATE: 16 BRPM | BODY MASS INDEX: 27.15 KG/M2 | HEART RATE: 68 BPM | SYSTOLIC BLOOD PRESSURE: 152 MMHG | TEMPERATURE: 97.7 F

## 2019-05-31 LAB
BASOPHILS ABSOLUTE: 0 E9/L (ref 0–0.2)
BASOPHILS RELATIVE PERCENT: 0.3 % (ref 0–2)
BURR CELLS: ABNORMAL
EOSINOPHILS ABSOLUTE: 0 E9/L (ref 0.05–0.5)
EOSINOPHILS RELATIVE PERCENT: 0 % (ref 0–6)
HCT VFR BLD CALC: 30.5 % (ref 37–54)
HEMOGLOBIN: 10.3 G/DL (ref 12.5–16.5)
LYMPHOCYTES ABSOLUTE: 0.69 E9/L (ref 1.5–4)
LYMPHOCYTES RELATIVE PERCENT: 11.3 % (ref 20–42)
MCH RBC QN AUTO: 28.5 PG (ref 26–35)
MCHC RBC AUTO-ENTMCNC: 33.8 % (ref 32–34.5)
MCV RBC AUTO: 84.5 FL (ref 80–99.9)
MONOCYTES ABSOLUTE: 0.13 E9/L (ref 0.1–0.95)
MONOCYTES RELATIVE PERCENT: 1.7 % (ref 2–12)
NEUTROPHILS ABSOLUTE: 5.48 E9/L (ref 1.8–7.3)
NEUTROPHILS RELATIVE PERCENT: 87 % (ref 43–80)
OVALOCYTES: ABNORMAL
PDW BLD-RTO: 13.5 FL (ref 11.5–15)
PLATELET # BLD: 310 E9/L (ref 130–450)
PMV BLD AUTO: 8.6 FL (ref 7–12)
POIKILOCYTES: ABNORMAL
RBC # BLD: 3.61 E12/L (ref 3.8–5.8)
SCHISTOCYTES: ABNORMAL
WBC # BLD: 6.3 E9/L (ref 4.5–11.5)

## 2019-05-31 PROCEDURE — 2580000003 HC RX 258: Performed by: UROLOGY

## 2019-05-31 PROCEDURE — G0378 HOSPITAL OBSERVATION PER HR: HCPCS

## 2019-05-31 PROCEDURE — 85025 COMPLETE CBC W/AUTO DIFF WBC: CPT

## 2019-05-31 PROCEDURE — 6370000000 HC RX 637 (ALT 250 FOR IP): Performed by: UROLOGY

## 2019-05-31 PROCEDURE — 6360000002 HC RX W HCPCS: Performed by: UROLOGY

## 2019-05-31 PROCEDURE — 2700000000 HC OXYGEN THERAPY PER DAY

## 2019-05-31 PROCEDURE — 36415 COLL VENOUS BLD VENIPUNCTURE: CPT

## 2019-05-31 RX ADMIN — METOPROLOL SUCCINATE 25 MG: 25 TABLET, FILM COATED, EXTENDED RELEASE ORAL at 11:16

## 2019-05-31 RX ADMIN — ATORVASTATIN CALCIUM 40 MG: 40 TABLET, FILM COATED ORAL at 11:16

## 2019-05-31 RX ADMIN — DEXTROSE MONOHYDRATE 2 G: 50 INJECTION, SOLUTION INTRAVENOUS at 00:47

## 2019-05-31 RX ADMIN — DEXTROSE MONOHYDRATE 2 G: 50 INJECTION, SOLUTION INTRAVENOUS at 11:14

## 2019-05-31 ASSESSMENT — PAIN SCALES - GENERAL: PAINLEVEL_OUTOF10: 0

## 2019-05-31 NOTE — OP NOTE
[address]                  [address] [id number],  [facility]                                OPERATIVE REPORT    PATIENT NAME: Joey Shafer                   :        1931  MED REC NO:   [id number]                            ROOM:       8416  ACCOUNT NO:   [de-identified]                           ADMIT DATE: 2019  PROVIDER:     Raman Bose MD    DATE OF PROCEDURE:  2019    PREOPERATIVE DIAGNOSES:  Urinary retention, urinary tract infection,  evaluate for upper and lower tract for causes of infection, suspect  bladder outlet obstruction and urinary retention, also I suspect that  may be an element of neurogenic bladder as this patient had previous  laser prostatectomy with incomplete resection of the prostatic fossa. POSTOPERATIVE DIAGNOSES:  Normal upper tracts, possible early neurogenic  bladder, bladder outlet obstruction is suspected, BPH, also narrow fossa  navicularis. OPERATION PERFORMED:  . Cystopanendoscopy, retrograde pyelogram, urethral  dilatation, transurethral resection of 20 gm of tissue. He had the monitored sedation and B and O suppository at the end of the  case. ANESTHESIA:  IV sedation plus Marcaine 2% 10 mL injected, 5 mL on each  side of the prostate transperineally. BLOOD LOSS:  300 mL, replaced none. DISPOSITION:  PACU, and he would be admitted overnight looking for  hematuria. SURGEON:  Raman Bose MD    OPERATIVE PROCEDURE:  The timeout was read by me, the Anesthesia, and  the operating staff. Reviewed history, physical, allergy, and  medication. 2 gm of Ancef given upon induction. The patient was then  placed in lithotomy position. No undue tension on the hips, knees, or  buttocks. The meatus was dilated to 32-Indonesian and the urethra was  dilated to 30-Indonesian  The 21 panendoscope and obturator were inserted. There were no urethral abnormalities. The verumontanum is intact.    There is residual obstructing anterior tissue with patient's previous  laser prostatectomy in the mid portion and bladder neck area were  actually kind of patulous making wonder, if he does not have an element  of neurogenic bladder, should not have had quite the retention even  though he had incomplete resection of the prostatic fossa. The bladder  was trabeculated with cellule and diverticular formation. There were no  stone, clots, or foreign bodies in the lumen. There was erythema in the  posterior aspect of the bladder, obvious _____. The patient's trigone  was well developed. Both ureteral orifices appeared to be singular and  nonrefluxing. No stones in the estimated area of the kidneys or  ureters. Retrograde pyelogram demonstrates no intrinsic or extrinsic  abnormalities of the ureters, the UPJ, the infundibulum, or the calices. These were normal upper tracts. The patient then had with the  PlasmaLoop system and a saline irrigant transurethral resection of 20 gm  of tissue. At the end of the procedure, there seemed to be some  cloudiness of the scope and I elected eventually switch to the standard  TURP system, used glycine to the coagulation of the prostatic fossa. Blood loss perhaps 300 mL with none replaced. At the end of the  procedure, I filled the bladder and coude'd the bladder and there was a  good caliber stream produced and the irrigant was clear. The patient  then had a 22-Israeli three-way catheter with 60 mL of water in 30 mL  balloon. No traction was necessary. The irrigant was clear. Rectal  had good anal tone, good involution of the prostate and with TURP, and B  and O suppository was used. Approximately 20 gm of tissue was resected  and removed and sent for analysis. The patient tolerated the procedure  well and was sent to the Recovery in satisfactory condition.         Mila Singh MD    D: 05/30/2019 17:41:31       T: 05/31/2019 4:12:18     [email or web address]  Job#: [id number]     Doc#: [id number]    CC:  Marielle Zamora MD

## 2019-05-31 NOTE — PROGRESS NOTES
[employer] service: None     Active member of club or organization: None     Attends meetings of clubs or organizations: None     Relationship status: None    Intimate partner violence:     Fear of current or ex partner: None     Emotionally abused: None     Physically abused: None     Forced sexual activity: None   Other Topics Concern    None   Social History Narrative    None       Scheduled Meds:   atorvastatin  40 mg Oral Daily    metoprolol succinate  25 mg Oral Daily    sodium chloride flush  10 mL Intravenous 2 times per day    ceFAZolin (ANCEF) IVPB  2 g Intravenous Q8H     Continuous Infusions:   sodium chloride Stopped (05/30/19 2052)    sodium chloride 75 mL/hr at 05/30/19 2051     PRN Meds:.sodium chloride flush, HYDROcodone 5 mg - acetaminophen **OR** HYDROcodone 5 mg - acetaminophen, ondansetron, HYDROmorphone, opium-belladonna    BP (!) 146/88   Pulse 55   Temp 98.1 °F (36.7 °C) (Temporal)   Resp 16   Ht 5' 7\" (1.702 m)   Wt 173 lb (78.5 kg)   SpO2 96%   BMI 27.10 kg/m²     Lab Results   Component Value Date    WBC 6.3 05/31/2019    HGB 10.3 (L) 05/31/2019    HCT 30.5 (L) 05/31/2019    MCV 84.5 05/31/2019     05/31/2019       Lab Results   Component Value Date    CREATININE 1.1 05/30/2019       Lab Results   Component Value Date    PSA 3.31 04/26/2019    PSA 1.51 09/13/2018    PSA 1.92 11/28/2017       PHYSICAL EXAMINATION:  Skin dry, without rashes  Respirations non-labored, intact  Abdomen soft, non-tender, non-distended  Alert and oriented x3  Dunham draining clear urine      ASSESSMENT AND PLAN:  POD#1 from TURP for BPH   -stop CBI  -OOB  -if urine stays clear d/c home with catheter  -voiding trial Monday       Electronically signed by Anuja Petersen MD on 5/31/2019 at 8:06 AM

## 2019-05-31 NOTE — CARE COORDINATION
Discharge order noted. Met with patient in room to explain role and discuss transition of care. Patient is POD#1 CYSTOSCOPY, RETROGRADE PYELOGRAM,  URERTHRAL DILITATION, TRANSURETHRAL RESECTION PROSTATE. He will be discharging home with chapman catheter. He lives alone but says he has no needs for discharge. He states he can manage his chapman because he has been doing it at home PTA. His daughter will be transporting him home today.   Arturo Vaughn RN CM

## 2019-05-31 NOTE — PROGRESS NOTES
All discharge instructions reviewed with patient at bedside. Patient verbalizes understanding of all medications and importance of follow up appointments. Patient hooked to leg bag and provided with overnight bag for at home. Patient understands chapman care at home as he had chapman prior to admission.

## 2019-05-31 NOTE — DISCHARGE SUMMARY
Shakira January DISCHARGE SUMMARY    Diaz Tirado  For discharge on 5/31/2019    HOSPITAL COURSE: See daily Urology progress notes for full details regarding hospital course. Hospital day 1- Urine was clear and CBI was stopped. No clots developed and he was determined stable to discharge to home. Day of discharge - afebrile with stable vital signs. Pain was controlled. Patient was tolerating regular diet. Patient was ambulating well with minimal assistance. Patient was passing flatus. DIAGNOSES DURING ADMISSION: See daily Urology progress notes for clinical diagnoses managed during admission  Patient Active Problem List   Diagnosis    CAD (coronary artery disease)    S/P CABG x 6    Hyperlipemia    Asymptomatic bilateral carotid artery stenosis    Numbness and tingling in left arm    Numbness and tingling of left side of face    Pulmonary nodule    Lung nodule    Malignant neoplasm of lower lobe of left lung (HCC)    UTI (urinary tract infection)    Sepsis secondary to UTI ([facility] [address]    Gross hematuria       CONDITION AT DISCHARGE: stable    DISCHARGE INSTRUCTIONS: see discharge instructions under discharge instruction section of EHR    -Walk daily.  -Okay to go up stairs.  -Okay to shower.  -Drink plenty of fluids.  -No lifting greater than 10 pounds for duration discussed with you by your surgeon    DISCHARGE MEDICATIONS: see medication reconciliation  Current Discharge Medication List      CONTINUE these medications which have NOT CHANGED    Details   cephALEXin (KEFLEX) 500 MG capsule Take 1 capsule by mouth 3 times daily for 14 days  Qty: 42 capsule, Refills: 0      metoprolol succinate (TOPROL XL) 25 MG extended release tablet Take 25 mg by mouth daily      atorvastatin (LIPITOR) 40 MG tablet Take 40 mg by mouth daily.       Bevacizumab (AVASTIN IV) Infuse intravenously See Admin Instructions Gets injection in eyes about every 11 weeks      docusate (COLACE, DULCOLAX) 100 MG CAPS Take 100 mg by mouth 2 times daily as needed (constipation)  Qty: 20 capsule, Refills: 0      Calcium-Magnesium-Vitamin D (CALCIUM 1200+D3 PO) Take 1 tablet by mouth daily. Multiple Vitamins-Minerals (CENTRUM-LUTEIN PO) Take 1 tablet by mouth daily. Omega-3 Fatty Acids (FISH OIL) 1000 MG CAPS Take 1,000 mg by mouth daily. Coenzyme Q10 (COQ10) 100 MG CAPS Take 100 mg by mouth daily. STOP taking these medications       aspirin 81 MG EC tablet Comments:   Reason for Stopping:               FOLLOW-UP CARE: Follow-up with [facility] in 1-2 weeks. Call if any fever, chills, nausea or vomiting or any concerns.       DISPOSITION:  Home       Radha Suh   9:17 AM  5/31/2019

## 2019-06-04 ENCOUNTER — TELEPHONE (OUTPATIENT)
Dept: CARDIOLOGY CLINIC | Age: 84
End: 2019-06-04

## 2019-06-07 ENCOUNTER — HOSPITAL ENCOUNTER (EMERGENCY)
Age: 84
Discharge: HOME | End: 2019-06-08
Attending: EMERGENCY MEDICINE
Payer: MEDICARE

## 2019-06-07 DIAGNOSIS — N39.0 URINARY TRACT INFECTION WITHOUT HEMATURIA, SITE UNSPECIFIED: Primary | ICD-10-CM

## 2019-06-07 LAB
ALBUMIN SERPL-MCNC: 3.8 G/DL (ref 3.5–5.2)
ALP BLD-CCNC: 82 U/L (ref 40–129)
ALT SERPL-CCNC: 13 U/L (ref 0–40)
ANION GAP SERPL CALCULATED.3IONS-SCNC: 11 MMOL/L (ref 7–16)
AST SERPL-CCNC: 18 U/L (ref 0–39)
BACTERIA: ABNORMAL /HPF
BILIRUB SERPL-MCNC: 0.9 MG/DL (ref 0–1.2)
BILIRUBIN URINE: ABNORMAL
BLOOD, URINE: ABNORMAL
BUN BLDV-MCNC: 15 MG/DL (ref 8–23)
CALCIUM SERPL-MCNC: 9 MG/DL (ref 8.6–10.2)
CHLORIDE BLD-SCNC: 100 MMOL/L (ref 98–107)
CLARITY: CLEAR
CO2: 25 MMOL/L (ref 22–29)
COLOR: YELLOW
CREAT SERPL-MCNC: 1.2 MG/DL (ref 0.7–1.2)
GFR AFRICAN AMERICAN: >60
GFR NON-AFRICAN AMERICAN: 57 ML/MIN/1.73
GLUCOSE BLD-MCNC: 124 MG/DL (ref 74–99)
GLUCOSE URINE: NEGATIVE MG/DL
HCT VFR BLD CALC: 32.6 % (ref 37–54)
HEMOGLOBIN: 10.6 G/DL (ref 12.5–16.5)
KETONES, URINE: NEGATIVE MG/DL
LEUKOCYTE ESTERASE, URINE: ABNORMAL
MCH RBC QN AUTO: 28.2 PG (ref 26–35)
MCHC RBC AUTO-ENTMCNC: 32.5 % (ref 32–34.5)
MCV RBC AUTO: 86.7 FL (ref 80–99.9)
NITRITE, URINE: POSITIVE
PDW BLD-RTO: 13.8 FL (ref 11.5–15)
PH UA: 7 (ref 5–9)
PLATELET # BLD: 346 E9/L (ref 130–450)
PMV BLD AUTO: 8.4 FL (ref 7–12)
POTASSIUM SERPL-SCNC: 4.9 MMOL/L (ref 3.5–5)
PROTEIN UA: 100 MG/DL
RBC # BLD: 3.76 E12/L (ref 3.8–5.8)
RBC UA: ABNORMAL /HPF (ref 0–2)
SODIUM BLD-SCNC: 136 MMOL/L (ref 132–146)
SPECIFIC GRAVITY UA: <=1.005 (ref 1–1.03)
TOTAL PROTEIN: 7.1 G/DL (ref 6.4–8.3)
UROBILINOGEN, URINE: 1 E.U./DL
WBC # BLD: 9.9 E9/L (ref 4.5–11.5)
WBC UA: >20 /HPF (ref 0–5)

## 2019-06-07 PROCEDURE — 36415 COLL VENOUS BLD VENIPUNCTURE: CPT

## 2019-06-07 PROCEDURE — 85027 COMPLETE CBC AUTOMATED: CPT

## 2019-06-07 PROCEDURE — 80053 COMPREHEN METABOLIC PANEL: CPT

## 2019-06-07 PROCEDURE — 99284 EMERGENCY DEPT VISIT MOD MDM: CPT

## 2019-06-07 PROCEDURE — 93005 ELECTROCARDIOGRAM TRACING: CPT | Performed by: EMERGENCY MEDICINE

## 2019-06-07 PROCEDURE — 81001 URINALYSIS AUTO W/SCOPE: CPT

## 2019-06-07 ASSESSMENT — ENCOUNTER SYMPTOMS
COUGH: 0
SORE THROAT: 0
RHINORRHEA: 0
VOMITING: 1
DIARRHEA: 0
ABDOMINAL PAIN: 0
BLOOD IN STOOL: 0
CONSTIPATION: 0
BACK PAIN: 0
NAUSEA: 1
SHORTNESS OF BREATH: 0
COLOR CHANGE: 0

## 2019-06-07 NOTE — ED NOTES
FIRST PROVIDER CONTACT ASSESSMENT NOTE      Department of Emergency Medicine   6/7/19  6:07 PM    Chief Complaint: Fatigue (body wide aches ) and Emesis (x's 6 today (dark in color))      History of Present Illness:    Chapo Chamberlain is a 80 y.o. male who presents to the ED by private car for dysuria, emesis  Focused Screening Exam:  Constitutional:  Alert, appears stated age and is in no distress. *ALLERGIES*     Nitroglycerin; Biaxin [clarithromycin];  Iodine; and Quinolones     ED Triage Vitals   BP Temp Temp src Pulse Resp SpO2 Height Weight   06/07/19 1721 06/07/19 1721 -- 06/07/19 1701 06/07/19 1721 06/07/19 1701 06/07/19 1721 06/07/19 1721   (!) 162/72 98.9 °F (37.2 °C)  64 16 97 % 5' 7\" (1.702 m) 173 lb (78.5 kg)        Initial Plan of Care:  Initiate Treatment-Testing, Proceed toTreatment Area When Bed Available for ED Attending/MLP to Continue Care    -----------------END OF FIRST PROVIDER CONTACT ASSESSMENT NOTE--------------  Electronically signed by ABHISHEK James CNP   DD: 6/7/19       ABHISHEK Sanderson CNP  06/07/19 1808

## 2019-06-08 VITALS
OXYGEN SATURATION: 97 % | DIASTOLIC BLOOD PRESSURE: 82 MMHG | WEIGHT: 173 LBS | RESPIRATION RATE: 18 BRPM | HEART RATE: 73 BPM | SYSTOLIC BLOOD PRESSURE: 142 MMHG | BODY MASS INDEX: 27.15 KG/M2 | TEMPERATURE: 98.4 F | HEIGHT: 67 IN

## 2019-06-08 LAB
EKG ATRIAL RATE: 54 BPM
EKG P AXIS: 75 DEGREES
EKG P-R INTERVAL: 170 MS
EKG Q-T INTERVAL: 470 MS
EKG QRS DURATION: 102 MS
EKG QTC CALCULATION (BAZETT): 445 MS
EKG R AXIS: -68 DEGREES
EKG T AXIS: 58 DEGREES
EKG VENTRICULAR RATE: 54 BPM

## 2019-06-08 PROCEDURE — 93010 ELECTROCARDIOGRAM REPORT: CPT | Performed by: INTERNAL MEDICINE

## 2019-06-08 RX ORDER — LEVOFLOXACIN 750 MG/1
750 TABLET ORAL DAILY
Qty: 5 TABLET | Refills: 0 | Status: SHIPPED | OUTPATIENT
Start: 2019-06-08 | End: 2019-06-13

## 2019-06-08 NOTE — ED PROVIDER NOTES
Patient is an 55-year-old male presenting with fatigue and vomiting. He says that 8 days ago he had surgery on his prostate due to it being too enlarged. He says for 5 days ago he felt the urine voiding test.  He went back into the urologist office today and has Dunham catheter removed and he went home and drank about 200 ounces of fluid. Shortly after that he had some nausea and vomited 5 or 6 times. He went back to the urologist and the Dunham catheter was replaced. He denies any vomiting since then. He says he is beginning to feel much better. He says that earlier when he was vomiting he felt very weak but now he is walking around and feels like he is getting his strength back. He denies any abdominal pain or back pain. He is not had any fevers or chills. He mentions that he has been on Keflex for about 10 or 11 days. By review of his last urine culture on 5/23/19, it was growing pseudomonas and enterococcus. Review of Systems   Constitutional: Negative for chills and fever. HENT: Negative for congestion, rhinorrhea and sore throat. Respiratory: Negative for cough and shortness of breath. Cardiovascular: Negative for chest pain and palpitations. Gastrointestinal: Positive for nausea and vomiting (Vomited about 10 hours ago but nothing since). Negative for abdominal pain, blood in stool, constipation and diarrhea. Genitourinary: Positive for difficulty urinating (Has failed urine voiding test after his surgery). Negative for dysuria and hematuria. Musculoskeletal: Negative for back pain and neck pain. Skin: Negative for color change, rash and wound. Neurological: Negative for dizziness, syncope, weakness, light-headedness and headaches. Psychiatric/Behavioral: Negative for confusion. Physical Exam   Constitutional: He is oriented to person, place, and time. He appears well-developed and well-nourished. No distress. HENT:   Head: Normocephalic and atraumatic.    Right Ear: External ear normal.   Left Ear: External ear normal.   Nose: Nose normal.   Mouth/Throat: Oropharynx is clear and moist. No oropharyngeal exudate. Eyes: Pupils are equal, round, and reactive to light. Conjunctivae and EOM are normal. Right eye exhibits no discharge. Left eye exhibits no discharge. No scleral icterus. Neck: Neck supple. Cardiovascular: Normal rate, regular rhythm, normal heart sounds and intact distal pulses. Exam reveals no gallop and no friction rub. No murmur heard. Pulmonary/Chest: Effort normal and breath sounds normal. No stridor. No respiratory distress. He has no wheezes. He has no rales. He exhibits no tenderness. Abdominal: Soft. Bowel sounds are normal. He exhibits no distension and no mass. There is no tenderness. There is no rebound and no guarding. Genitourinary:   Genitourinary Comments: Dunham catheter in place with blood tinged urine; no clots or sediment seen in Dunham catheter or bag   Musculoskeletal: He exhibits no edema. Neurological: He is alert and oriented to person, place, and time. He exhibits normal muscle tone. Skin: Skin is warm and dry. No rash noted. He is not diaphoretic. No erythema. No pallor. Psychiatric: He has a normal mood and affect. His behavior is normal. Judgment and thought content normal.       Procedures    MDM  Number of Diagnoses or Management Options  Urinary tract infection without hematuria, site unspecified:   Diagnosis management comments: Labs and urinalysis ordered and reviewed. Patient felt much better. I believe he vomited after drinking lots of fluid for his voiding test.  He looked very well here and was ambulatory without problems. His hemogram is stable. EKG was stable. His urine culture from 5/23/19 showed urine growing pseudomonas and enterococcus. He is on Keflex. Do not believe that keflex will suffice for his urinalysis. It appears he has failed antibiotic. EKG was performed and QTc was in normal range. He was discharged home with Levaquin. He had Floxacins listed as an allergy and a rash. He did not believe that to be true, but I discussed with him that the benefits of using this antibiotic outweigh the risk. He is encouraged to take Benadryl as needed if he developed a rash or if he developed any shortness of breath he should come here immediately. He should follow up as outpatient return here if needed. --------------------------------------------- PAST HISTORY ---------------------------------------------  Past Medical History:  has a past medical history of BPH (benign prostatic hyperplasia), CAD (coronary artery disease), Carotid artery calcification, DJD (degenerative joint disease), Hyperlipemia, Lung nodule, Macular degeneration, Numbness and tingling in left arm, Numbness and tingling of left side of face, and S/P CABG x 6. Past Surgical History:  has a past surgical history that includes Diagnostic Cardiac Cath Lab Procedure (9/5/00); Cardiac surgery; TURP; Dental surgery; Total hip arthroplasty (Right, 03/28/2011); Total hip arthroplasty (Left, 11/09/2009); other surgical history (02/16/2011); Thoracoscopy (Left, 4/12/2019); and TURP (N/A, 5/30/2019). Social History:  reports that he has quit smoking. His smoking use included cigarettes. He has a 10.00 pack-year smoking history. He has never used smokeless tobacco. He reports that he drinks alcohol. He reports that he does not use drugs. Family History: family history includes Alzheimer's Disease in his sister; Heart Disease in his brother, brother, and brother; Other in his father and mother. The patients home medications have been reviewed. Allergies: Nitroglycerin; Biaxin [clarithromycin];  Iodine; and Quinolones    -------------------------------------------------- RESULTS -------------------------------------------------  Labs:  Results for orders placed or performed during the hospital encounter of 06/07/19   CBC Result Value Ref Range    WBC 9.9 4.5 - 11.5 E9/L    RBC 3.76 (L) 3.80 - 5.80 E12/L    Hemoglobin 10.6 (L) 12.5 - 16.5 g/dL    Hematocrit 32.6 (L) 37.0 - 54.0 %    MCV 86.7 80.0 - 99.9 fL    MCH 28.2 26.0 - 35.0 pg    MCHC 32.5 32.0 - 34.5 %    RDW 13.8 11.5 - 15.0 fL    Platelets 834 545 - 159 E9/L    MPV 8.4 7.0 - 12.0 fL   Comprehensive Metabolic Panel   Result Value Ref Range    Sodium 136 132 - 146 mmol/L    Potassium 4.9 3.5 - 5.0 mmol/L    Chloride 100 98 - 107 mmol/L    CO2 25 22 - 29 mmol/L    Anion Gap 11 7 - 16 mmol/L    Glucose 124 (H) 74 - 99 mg/dL    BUN 15 8 - 23 mg/dL    CREATININE 1.2 0.7 - 1.2 mg/dL    GFR Non-African American 57 >=60 mL/min/1.73    GFR African American >60     Calcium 9.0 8.6 - 10.2 mg/dL    Total Protein 7.1 6.4 - 8.3 g/dL    Alb 3.8 3.5 - 5.2 g/dL    Total Bilirubin 0.9 0.0 - 1.2 mg/dL    Alkaline Phosphatase 82 40 - 129 U/L    ALT 13 0 - 40 U/L    AST 18 0 - 39 U/L   Urinalysis with Microscopic   Result Value Ref Range    Color, UA Yellow Straw/Yellow    Clarity, UA Clear Clear    Glucose, Ur Negative Negative mg/dL    Bilirubin Urine SMALL (A) Negative    Ketones, Urine Negative Negative mg/dL    Specific Gravity, UA <=1.005 1.005 - 1.030    Blood, Urine LARGE (A) Negative    pH, UA 7.0 5.0 - 9.0    Protein,  (A) Negative mg/dL    Urobilinogen, Urine 1.0 <2.0 E.U./dL    Nitrite, Urine POSITIVE (A) Negative    Leukocyte Esterase, Urine LARGE (A) Negative    WBC, UA >20 0 - 5 /HPF    RBC, UA PACKED 0 - 2 /HPF    Bacteria, UA RARE (A) /HPF       Radiology:  No orders to display       EKG: This EKG is signed and interpreted by ED Physician. Time:  2338   Rate: 54  Rhythm: sinus. Interpretation: Left axis; incomplete right bundle-branch block; left anterior fascicular block;  ms.   Comparison: stable as compared to patient's most recent EKG, 5/22/2019.      ------------------------- NURSING NOTES AND VITALS REVIEWED ---------------------------  Date / Time Roomed: 6/7/2019 10:02 PM  ED Bed Assignment:  08/08    The nursing notes within the ED encounter and vital signs as below have been reviewed. BP (!) 162/72   Pulse 61   Temp 98.9 °F (37.2 °C)   Resp 16   Ht 5' 7\" (1.702 m)   Wt 173 lb (78.5 kg)   SpO2 96%   BMI 27.10 kg/m²   Oxygen Saturation Interpretation: Normal      ------------------------------------------ PROGRESS NOTES ------------------------------------------  ED COURSE MEDICATIONS:              Medications - No data to display    I have spoken with the patient and discussed todays results, in addition to providing specific details for the plan of care and counseling regarding the diagnosis and prognosis. Their questions are answered at this time and they are agreeable with the plan. I discussed at length with them reasons for immediate return here for re evaluation. They will followup with primary care by calling their office tomorrow. --------------------------------- ADDITIONAL PROVIDER NOTES ---------------------------------  At this time the patient is without objective evidence of an acute process requiring hospitalization or inpatient management. They have remained hemodynamically stable throughout their entire ED visit and are stable for discharge with outpatient follow-up. The plan has been discussed in detail and they are aware of the specific conditions for emergent return, as well as the importance of follow-up. New Prescriptions    LEVOFLOXACIN (LEVAQUIN) 750 MG TABLET    Take 1 tablet by mouth daily for 5 days       Diagnosis:  1. Urinary tract infection without hematuria, site unspecified        Disposition:  Patient's disposition: Discharge to home  Patient's condition is stable.             Jakob Covarrubias DO  Resident  06/08/19 [id number]

## 2019-06-13 PROBLEM — R93.89 ABNORMAL CT SCAN, CHEST: Status: ACTIVE | Noted: 2019-06-13

## 2019-06-21 PROBLEM — N39.0 UTI (URINARY TRACT INFECTION): Status: RESOLVED | Noted: 2019-05-22 | Resolved: 2019-06-21

## 2019-07-26 ENCOUNTER — APPOINTMENT (OUTPATIENT)
Dept: CT IMAGING | Age: 84
End: 2019-07-26
Payer: MEDICARE

## 2019-07-26 ENCOUNTER — HOSPITAL ENCOUNTER (EMERGENCY)
Age: 84
Discharge: LEFT BEFORE DISCHARGE | End: 2019-07-26
Attending: EMERGENCY MEDICINE
Payer: MEDICARE

## 2019-07-26 VITALS
OXYGEN SATURATION: 96 % | HEART RATE: 58 BPM | SYSTOLIC BLOOD PRESSURE: 126 MMHG | WEIGHT: 170 LBS | BODY MASS INDEX: 26.68 KG/M2 | RESPIRATION RATE: 16 BRPM | DIASTOLIC BLOOD PRESSURE: 72 MMHG | HEIGHT: 67 IN | TEMPERATURE: 97.8 F

## 2019-07-26 DIAGNOSIS — N39.0 URINARY TRACT INFECTION WITHOUT HEMATURIA, SITE UNSPECIFIED: ICD-10-CM

## 2019-07-26 DIAGNOSIS — J90 PLEURAL EFFUSION: ICD-10-CM

## 2019-07-26 DIAGNOSIS — J93.83 OTHER PNEUMOTHORAX: Primary | ICD-10-CM

## 2019-07-26 LAB
ALBUMIN SERPL-MCNC: 4 G/DL (ref 3.5–5.2)
ALP BLD-CCNC: 81 U/L (ref 40–129)
ALT SERPL-CCNC: 15 U/L (ref 0–40)
ANION GAP SERPL CALCULATED.3IONS-SCNC: 8 MMOL/L (ref 7–16)
AST SERPL-CCNC: 19 U/L (ref 0–39)
BACTERIA: ABNORMAL /HPF
BASOPHILS ABSOLUTE: 0.11 E9/L (ref 0–0.2)
BASOPHILS RELATIVE PERCENT: 1.2 % (ref 0–2)
BILIRUB SERPL-MCNC: 0.6 MG/DL (ref 0–1.2)
BILIRUBIN URINE: NEGATIVE
BLOOD, URINE: NEGATIVE
BUN BLDV-MCNC: 17 MG/DL (ref 8–23)
CALCIUM SERPL-MCNC: 9 MG/DL (ref 8.6–10.2)
CHLORIDE BLD-SCNC: 103 MMOL/L (ref 98–107)
CLARITY: CLEAR
CO2: 27 MMOL/L (ref 22–29)
COLOR: YELLOW
CREAT SERPL-MCNC: 1.3 MG/DL (ref 0.7–1.2)
EOSINOPHILS ABSOLUTE: 0.67 E9/L (ref 0.05–0.5)
EOSINOPHILS RELATIVE PERCENT: 7.5 % (ref 0–6)
GFR AFRICAN AMERICAN: >60
GFR NON-AFRICAN AMERICAN: 52 ML/MIN/1.73
GLUCOSE BLD-MCNC: 117 MG/DL (ref 74–99)
GLUCOSE URINE: NEGATIVE MG/DL
HCT VFR BLD CALC: 35 % (ref 37–54)
HEMOGLOBIN: 11.2 G/DL (ref 12.5–16.5)
IMMATURE GRANULOCYTES #: 0.01 E9/L
IMMATURE GRANULOCYTES %: 0.1 % (ref 0–5)
KETONES, URINE: NEGATIVE MG/DL
LACTIC ACID: 0.8 MMOL/L (ref 0.5–2.2)
LEUKOCYTE ESTERASE, URINE: ABNORMAL
LIPASE: 53 U/L (ref 13–60)
LYMPHOCYTES ABSOLUTE: 1.42 E9/L (ref 1.5–4)
LYMPHOCYTES RELATIVE PERCENT: 16 % (ref 20–42)
MCH RBC QN AUTO: 27.8 PG (ref 26–35)
MCHC RBC AUTO-ENTMCNC: 32 % (ref 32–34.5)
MCV RBC AUTO: 86.8 FL (ref 80–99.9)
MONOCYTES ABSOLUTE: 1.05 E9/L (ref 0.1–0.95)
MONOCYTES RELATIVE PERCENT: 11.8 % (ref 2–12)
NEUTROPHILS ABSOLUTE: 5.64 E9/L (ref 1.8–7.3)
NEUTROPHILS RELATIVE PERCENT: 63.4 % (ref 43–80)
NITRITE, URINE: NEGATIVE
PDW BLD-RTO: 14.6 FL (ref 11.5–15)
PH UA: 8 (ref 5–9)
PLATELET # BLD: 226 E9/L (ref 130–450)
PMV BLD AUTO: 8.7 FL (ref 7–12)
POTASSIUM REFLEX MAGNESIUM: 4.6 MMOL/L (ref 3.5–5)
PROTEIN UA: NEGATIVE MG/DL
RBC # BLD: 4.03 E12/L (ref 3.8–5.8)
RBC UA: ABNORMAL /HPF (ref 0–2)
SODIUM BLD-SCNC: 138 MMOL/L (ref 132–146)
SPECIFIC GRAVITY UA: 1.01 (ref 1–1.03)
TOTAL PROTEIN: 6.8 G/DL (ref 6.4–8.3)
UROBILINOGEN, URINE: 0.2 E.U./DL
WBC # BLD: 8.9 E9/L (ref 4.5–11.5)
WBC UA: >20 /HPF (ref 0–5)

## 2019-07-26 PROCEDURE — 36415 COLL VENOUS BLD VENIPUNCTURE: CPT

## 2019-07-26 PROCEDURE — 81001 URINALYSIS AUTO W/SCOPE: CPT

## 2019-07-26 PROCEDURE — 85025 COMPLETE CBC W/AUTO DIFF WBC: CPT

## 2019-07-26 PROCEDURE — 80053 COMPREHEN METABOLIC PANEL: CPT

## 2019-07-26 PROCEDURE — 83690 ASSAY OF LIPASE: CPT

## 2019-07-26 PROCEDURE — 83605 ASSAY OF LACTIC ACID: CPT

## 2019-07-26 PROCEDURE — 99284 EMERGENCY DEPT VISIT MOD MDM: CPT

## 2019-07-26 PROCEDURE — 71250 CT THORAX DX C-: CPT

## 2019-07-26 NOTE — ED NOTES
Patient with all testing completed and ask if IV is necessary. Will reassess if needed and start line when warranted.       Brooke Tran RN  07/26/19 [id number]

## 2019-07-26 NOTE — ED PROVIDER NOTES
ED Attending  CC: Sveta           Department of Emergency Medicine   ED  Provider Note  Admit Date/RoomTime: 7/26/2019  3:47 PM  ED Room: Dafne Hutson  MRN: [id number]  Chief Complaint: Abnormal Test Results (sent in by dr Hugo Washington for eval of abdnormal mri report denies pain upon arrival )       History of Present Illness   Source of history provided by:  patient. History/Exam Limitations: none. Kojo Atwood is a 80 y.o. male who has a past medical history of:   Past Medical History:   Diagnosis Date    BPH (benign prostatic hyperplasia)     CAD (coronary artery disease)     Carotid artery calcification     DJD (degenerative joint disease)     Hyperlipemia     Lung nodule     Macular degeneration     Numbness and tingling in left arm 2/2/2018    Numbness and tingling of left side of face 2/2/2018    S/P CABG x 6 2000    LIMA-LAD, LYNNETTE-LCx,SVG-OM,SVG-LPL,SVG-PDA-RPL    presents to the ED for complaint of abnormal radiology result. He states that he had a CT done at [facility] clinic. He states that Dr Manny Richardson office called him to come to the ED. reports history of lung CA that was completely resected with partial pneumonectomy on the left side 2 months ago. Surgery was completed by Dr. Macy Louise. He reports that he has had ongoing left upper quadrant abdominal discomfort since his surgery. He states that Dr. Macy Louise told him it would take at least 6 months before that pain would dissipate. He reports that the CT was done because constipation issues. Denies chest pain, shortness of breath, cough, fever, chills, nausea, vomiting, dysuria, headache, back pain, numbness, tingling, lightheadedness, dizziness, syncope, or any other complaints. ROS    Pertinent positives and negatives are stated within HPI, all other systems reviewed and are negative.     Past Surgical History:   Procedure Laterality Date    CARDIAC SURGERY      6 vessel in 2000    DENTAL SURGERY      root canal    DIAGNOSTIC CARDIAC >=60 mL/min/1.73    GFR African American >60     Calcium 9.0 8.6 - 10.2 mg/dL    Total Protein 6.8 6.4 - 8.3 g/dL    Alb 4.0 3.5 - 5.2 g/dL    Total Bilirubin 0.6 0.0 - 1.2 mg/dL    Alkaline Phosphatase 81 40 - 129 U/L    ALT 15 0 - 40 U/L    AST 19 0 - 39 U/L   Lipase   Result Value Ref Range    Lipase 53 13 - 60 U/L   Lactic Acid, Plasma   Result Value Ref Range    Lactic Acid 0.8 0.5 - 2.2 mmol/L   Urinalysis, reflex to microscopic   Result Value Ref Range    Color, UA Yellow Straw/Yellow    Clarity, UA Clear Clear    Glucose, Ur Negative Negative mg/dL    Bilirubin Urine Negative Negative    Ketones, Urine Negative Negative mg/dL    Specific Gravity, UA 1.015 1.005 - 1.030    Blood, Urine Negative Negative    pH, UA 8.0 5.0 - 9.0    Protein, UA Negative Negative mg/dL    Urobilinogen, Urine 0.2 <2.0 E.U./dL    Nitrite, Urine Negative Negative    Leukocyte Esterase, Urine MODERATE (A) Negative   Microscopic Urinalysis   Result Value Ref Range    WBC, UA >20 0 - 5 /HPF    RBC, UA NONE 0 - 2 /HPF    Bacteria, UA RARE (A) /HPF     Imaging: All Radiology results interpreted by Radiologist unless otherwise noted. CT CHEST WO CONTRAST   Final Result   1. After left lower lobe lobectomy. 2. Presence of a 15% pneumothorax located anteriorly and inferiorly in   the left chest causing some peripheral atelectasis in the lingula   segment. 3. Presence of a chronic left-sided pleural fluid accumulation in the   posterior medial inferior aspect of the left chest cavity with 2 small   loculated foci of pneumothorax. 4. More ill-defined irregular reticular opacities in the peripheral   aspect of the left upper lobe anteriorly and laterally. 5. Development of a small spiculated suspicious nodule in the anterior   segment of the right upper lobe which requires close monitoring   follow-up study/monitoring increased size since the previous   examination. Presently it measures 7 x 5 mm are. The      6.  Stable frustrated stating that he was fine and signed out AMA. He stated to nursing that he will follow up with Dr Jessica Mcgowan in his office. Counseling: The emergency provider has spoken with the patient and discussed todays results, in addition to providing specific details for the plan, but patient signed out [place] before care was complete. Assessment      1. Other pneumothorax    2. Pleural effusion    3. Urinary tract infection without hematuria, site unspecified      Plan   Other Disposition: Left AMA  Patient condition is stable    New Medications     Discharge Medication List as of 7/26/2019  8:02 PM        Electronically signed by ABHISHEK Faulkner CNP   DD: 7/26/19  **This report was transcribed using voice recognition software. Every effort was made to ensure accuracy; however, inadvertent computerized transcription errors may be present.   END OF ED PROVIDER NOTE     ABHISHEK Faulkner CNP  07/27/19 0915

## 2019-07-26 NOTE — ED NOTES
Pt continues to express concern that he \"feels fine and is going to leave. \"  Dr. Amy Galeano speaking to patient now, pt continues to express concern that he is \"fine\" and is going to leave.      Helen Acevedo RN  07/26/19 [id number]

## 2019-08-05 ENCOUNTER — TELEPHONE (OUTPATIENT)
Dept: CARDIOTHORACIC SURGERY | Age: 84
End: 2019-08-05

## 2019-08-19 RX ORDER — SODIUM CHLORIDE 0.9 % (FLUSH) 0.9 %
10 SYRINGE (ML) INJECTION PRN
Status: CANCELLED | OUTPATIENT
Start: 2019-08-20

## 2019-08-20 ENCOUNTER — HOSPITAL ENCOUNTER (OUTPATIENT)
Dept: INTERVENTIONAL RADIOLOGY/VASCULAR | Age: 84
Discharge: HOME | End: 2019-08-22
Payer: MEDICARE

## 2019-08-20 ENCOUNTER — HOSPITAL ENCOUNTER (OUTPATIENT)
Age: 84
Discharge: HOME | End: 2019-08-20
Payer: MEDICARE

## 2019-08-20 ENCOUNTER — HOSPITAL ENCOUNTER (OUTPATIENT)
Dept: GENERAL RADIOLOGY | Age: 84
Discharge: HOME | End: 2019-08-22
Payer: MEDICARE

## 2019-08-20 VITALS
SYSTOLIC BLOOD PRESSURE: 190 MMHG | HEART RATE: 50 BPM | OXYGEN SATURATION: 99 % | TEMPERATURE: 97.8 F | DIASTOLIC BLOOD PRESSURE: 80 MMHG | RESPIRATION RATE: 16 BRPM

## 2019-08-20 DIAGNOSIS — Z01.812 PRE-PROCEDURE LAB EXAM: ICD-10-CM

## 2019-08-20 DIAGNOSIS — C34.32 MALIGNANT NEOPLASM OF LOWER LOBE OF LEFT LUNG (HCC): ICD-10-CM

## 2019-08-20 LAB
AMYLASE FLUID: 23 U/L
APTT: 26 SEC (ref 24.5–35.1)
FLUID TYPE: NORMAL
GLUCOSE, FLUID: 107 MG/DL
INR BLD: 1
LD, FLUID: 145 U/L
PH, BODY FLUID: 7.92
PLATELET # BLD: 212 E9/L (ref 130–450)
PROTEIN FLUID: 3.6 G/DL
PROTHROMBIN TIME: 11.8 SEC (ref 9.3–12.4)

## 2019-08-20 PROCEDURE — 85049 AUTOMATED PLATELET COUNT: CPT

## 2019-08-20 PROCEDURE — 87075 CULTR BACTERIA EXCEPT BLOOD: CPT

## 2019-08-20 PROCEDURE — 71045 X-RAY EXAM CHEST 1 VIEW: CPT

## 2019-08-20 PROCEDURE — 87205 SMEAR GRAM STAIN: CPT

## 2019-08-20 PROCEDURE — 32555 ASPIRATE PLEURA W/ IMAGING: CPT

## 2019-08-20 PROCEDURE — 83615 LACTATE (LD) (LDH) ENZYME: CPT

## 2019-08-20 PROCEDURE — 85610 PROTHROMBIN TIME: CPT

## 2019-08-20 PROCEDURE — 82150 ASSAY OF AMYLASE: CPT

## 2019-08-20 PROCEDURE — 84157 ASSAY OF PROTEIN OTHER: CPT

## 2019-08-20 PROCEDURE — 83986 ASSAY PH BODY FLUID NOS: CPT

## 2019-08-20 PROCEDURE — C1729 CATH, DRAINAGE: HCPCS

## 2019-08-20 PROCEDURE — 85730 THROMBOPLASTIN TIME PARTIAL: CPT

## 2019-08-20 PROCEDURE — 89051 BODY FLUID CELL COUNT: CPT

## 2019-08-20 PROCEDURE — 36415 COLL VENOUS BLD VENIPUNCTURE: CPT

## 2019-08-20 PROCEDURE — 82947 ASSAY GLUCOSE BLOOD QUANT: CPT

## 2019-08-20 PROCEDURE — 87070 CULTURE OTHR SPECIMN AEROBIC: CPT

## 2019-08-20 RX ORDER — SODIUM CHLORIDE 0.9 % (FLUSH) 0.9 %
10 SYRINGE (ML) INJECTION PRN
Status: DISCONTINUED | OUTPATIENT
Start: 2019-08-20 | End: 2019-08-23 | Stop reason: HOSPADM

## 2019-08-20 NOTE — PROGRESS NOTES
Lt chest scanned, prepped and centesis catheter inserted Lt lower lung with ultrasound guidance by Heide Davies @ 12:00. Patient tolerated well. .2 Liters drained of rand colored pleural fluid. Centesis catheter removed @ 12:05p  . Puncture site cleansed and dry dressing applied. No bleeding, swelling or complications noted.

## 2019-08-20 NOTE — H&P
Never Used   Substance and Sexual Activity    Alcohol use: Yes     Comment: socially    Drug use: No    Sexual activity: Not Currently   Lifestyle    Physical activity:     Days per week: Not on file     Minutes per session: Not on file    Stress: Not on file   Relationships    Social connections:     Talks on phone: Not on file     Gets together: Not on file     Attends Cheondoism service: Not on file     Active member of club or organization: Not on file     Attends meetings of clubs or organizations: Not on file     Relationship status: Not on file    Intimate partner violence:     Fear of current or ex partner: Not on file     Emotionally abused: Not on file     Physically abused: Not on file     Forced sexual activity: Not on file   Other Topics Concern    Not on file   Social History Narrative    Not on file       ROS: Non-contributory other than as noted above    PHYSICAL EXAM:      Constitutional:  Awake and alert. cooperative.     Heart:  Normal regular rhythm    Lungs:  demonstrate no contraindications to proceed    Abdomen:  normal    DATA:  CBC:   Lab Results   Component Value Date    WBC 8.9 07/26/2019    RBC 4.03 07/26/2019    HGB 11.2 07/26/2019    HCT 35.0 07/26/2019    MCV 86.8 07/26/2019    MCH 27.8 07/26/2019    MCHC 32.0 07/26/2019    RDW 14.6 07/26/2019     08/20/2019    MPV 8.7 07/26/2019     CBC with Differential:    Lab Results   Component Value Date    WBC 8.9 07/26/2019    RBC 4.03 07/26/2019    HGB 11.2 07/26/2019    HCT 35.0 07/26/2019     08/20/2019    MCV 86.8 07/26/2019    MCH 27.8 07/26/2019    MCHC 32.0 07/26/2019    RDW 14.6 07/26/2019    SEGSPCT 60 06/27/2013    LYMPHOPCT 16.0 07/26/2019    MONOPCT 11.8 07/26/2019    BASOPCT 1.2 07/26/2019    MONOSABS 1.05 07/26/2019    LYMPHSABS 1.42 07/26/2019    EOSABS 0.67 07/26/2019    BASOSABS 0.11 07/26/2019     Platelets:    Lab Results   Component Value Date     08/20/2019     BUN/Creatinine:    Lab Results Component Value Date    BUN 17 07/26/2019    CREATININE 1.3 07/26/2019       ASSESSMENT AND PLAN:  1.   left Pleural Fluid  2. Procedure options, risks and benefits reviewed with patient. Patient expresses understanding.   3.   Thoracentesis under ultrasound guidance for Pleural effusion and removal of fluid for diagnostic purposes    Electronically signed by DELILAH Graff   DD: 8/20/19  2:44 PM

## 2019-08-21 LAB
APPEARANCE FLUID: NORMAL
CELL COUNT FLUID TYPE: NORMAL
COLOR FLUID: YELLOW
GRAM STAIN ORDERABLE: NORMAL
MONOCYTE, FLUID: 99 %
NEUTROPHIL, FLUID: 1 %
NUCLEATED CELLS FLUID: 1403 /UL
RBC FLUID: 5000 /UL

## 2019-08-22 LAB
BODY FLUID CULTURE, STERILE: NORMAL
GRAM STAIN RESULT: NORMAL

## 2019-08-25 LAB — ANAEROBIC CULTURE: NORMAL

## 2019-10-21 ENCOUNTER — TELEPHONE (OUTPATIENT)
Dept: VASCULAR SURGERY | Age: 84
End: 2019-10-21

## 2019-10-23 ENCOUNTER — APPOINTMENT (OUTPATIENT)
Dept: GENERAL RADIOLOGY | Age: 84
DRG: 189 | End: 2019-10-23
Payer: MEDICARE

## 2019-10-23 ENCOUNTER — HOSPITAL ENCOUNTER (INPATIENT)
Age: 84
LOS: 2 days | Discharge: HOME | DRG: 189 | End: 2019-10-25
Attending: EMERGENCY MEDICINE | Admitting: INTERNAL MEDICINE
Payer: MEDICARE

## 2019-10-23 DIAGNOSIS — J96.01 ACUTE RESPIRATORY FAILURE WITH HYPOXIA (HCC): Primary | ICD-10-CM

## 2019-10-23 DIAGNOSIS — N30.01 ACUTE CYSTITIS WITH HEMATURIA: ICD-10-CM

## 2019-10-23 DIAGNOSIS — D72.829 LEUKOCYTOSIS, UNSPECIFIED TYPE: ICD-10-CM

## 2019-10-23 DIAGNOSIS — J18.9 PNEUMONIA DUE TO ORGANISM: ICD-10-CM

## 2019-10-23 DIAGNOSIS — I21.4 NSTEMI (NON-ST ELEVATED MYOCARDIAL INFARCTION) (HCC): ICD-10-CM

## 2019-10-23 LAB
ALBUMIN SERPL-MCNC: 4.3 G/DL (ref 3.5–5.2)
ALP BLD-CCNC: 131 U/L (ref 40–129)
ALT SERPL-CCNC: 63 U/L (ref 0–40)
ANION GAP SERPL CALCULATED.3IONS-SCNC: 14 MMOL/L (ref 7–16)
AST SERPL-CCNC: 35 U/L (ref 0–39)
BACTERIA: ABNORMAL /HPF
BASOPHILS ABSOLUTE: 0.02 E9/L (ref 0–0.2)
BASOPHILS RELATIVE PERCENT: 0.1 % (ref 0–2)
BILIRUB SERPL-MCNC: 1 MG/DL (ref 0–1.2)
BILIRUBIN URINE: NEGATIVE
BLOOD, URINE: ABNORMAL
BUN BLDV-MCNC: 30 MG/DL (ref 8–23)
CALCIUM SERPL-MCNC: 8.9 MG/DL (ref 8.6–10.2)
CHLORIDE BLD-SCNC: 94 MMOL/L (ref 98–107)
CLARITY: CLEAR
CO2: 24 MMOL/L (ref 22–29)
COLOR: YELLOW
CREAT SERPL-MCNC: 1.4 MG/DL (ref 0.7–1.2)
EOSINOPHILS ABSOLUTE: 0 E9/L (ref 0.05–0.5)
EOSINOPHILS RELATIVE PERCENT: 0 % (ref 0–6)
GFR AFRICAN AMERICAN: 58
GFR NON-AFRICAN AMERICAN: 48 ML/MIN/1.73
GLUCOSE BLD-MCNC: 156 MG/DL (ref 74–99)
GLUCOSE URINE: NEGATIVE MG/DL
HCT VFR BLD CALC: 39.5 % (ref 37–54)
HEMOGLOBIN: 13 G/DL (ref 12.5–16.5)
IMMATURE GRANULOCYTES #: 0.06 E9/L
IMMATURE GRANULOCYTES %: 0.4 % (ref 0–5)
INFLUENZA A BY PCR: NOT DETECTED
INFLUENZA B BY PCR: NOT DETECTED
KETONES, URINE: 40 MG/DL
LACTIC ACID: 1.7 MMOL/L (ref 0.5–2.2)
LEUKOCYTE ESTERASE, URINE: ABNORMAL
LYMPHOCYTES ABSOLUTE: 0.6 E9/L (ref 1.5–4)
LYMPHOCYTES RELATIVE PERCENT: 4.1 % (ref 20–42)
MCH RBC QN AUTO: 28.1 PG (ref 26–35)
MCHC RBC AUTO-ENTMCNC: 32.9 % (ref 32–34.5)
MCV RBC AUTO: 85.5 FL (ref 80–99.9)
MONOCYTES ABSOLUTE: 1.17 E9/L (ref 0.1–0.95)
MONOCYTES RELATIVE PERCENT: 8.1 % (ref 2–12)
NEUTROPHILS ABSOLUTE: 12.68 E9/L (ref 1.8–7.3)
NEUTROPHILS RELATIVE PERCENT: 87.3 % (ref 43–80)
NITRITE, URINE: POSITIVE
PDW BLD-RTO: 14.9 FL (ref 11.5–15)
PH UA: 6.5 (ref 5–9)
PLATELET # BLD: 158 E9/L (ref 130–450)
PMV BLD AUTO: 8.4 FL (ref 7–12)
POTASSIUM REFLEX MAGNESIUM: 4.5 MMOL/L (ref 3.5–5)
PROTEIN UA: 30 MG/DL
RBC # BLD: 4.62 E12/L (ref 3.8–5.8)
RBC UA: ABNORMAL /HPF (ref 0–2)
SODIUM BLD-SCNC: 132 MMOL/L (ref 132–146)
SPECIFIC GRAVITY UA: 1.02 (ref 1–1.03)
TOTAL PROTEIN: 7.7 G/DL (ref 6.4–8.3)
TROPONIN: 0.14 NG/ML (ref 0–0.03)
UROBILINOGEN, URINE: 0.2 E.U./DL
WBC # BLD: 14.5 E9/L (ref 4.5–11.5)
WBC UA: >20 /HPF (ref 0–5)

## 2019-10-23 PROCEDURE — 87186 SC STD MICRODIL/AGAR DIL: CPT

## 2019-10-23 PROCEDURE — 83605 ASSAY OF LACTIC ACID: CPT

## 2019-10-23 PROCEDURE — 2500000003 HC RX 250 WO HCPCS: Performed by: STUDENT IN AN ORGANIZED HEALTH CARE EDUCATION/TRAINING PROGRAM

## 2019-10-23 PROCEDURE — 87581 M.PNEUMON DNA AMP PROBE: CPT

## 2019-10-23 PROCEDURE — 93005 ELECTROCARDIOGRAM TRACING: CPT | Performed by: STUDENT IN AN ORGANIZED HEALTH CARE EDUCATION/TRAINING PROGRAM

## 2019-10-23 PROCEDURE — 6370000000 HC RX 637 (ALT 250 FOR IP): Performed by: EMERGENCY MEDICINE

## 2019-10-23 PROCEDURE — 6360000002 HC RX W HCPCS: Performed by: STUDENT IN AN ORGANIZED HEALTH CARE EDUCATION/TRAINING PROGRAM

## 2019-10-23 PROCEDURE — 6370000000 HC RX 637 (ALT 250 FOR IP): Performed by: STUDENT IN AN ORGANIZED HEALTH CARE EDUCATION/TRAINING PROGRAM

## 2019-10-23 PROCEDURE — 96365 THER/PROPH/DIAG IV INF INIT: CPT

## 2019-10-23 PROCEDURE — 71046 X-RAY EXAM CHEST 2 VIEWS: CPT

## 2019-10-23 PROCEDURE — 2060000000 HC ICU INTERMEDIATE R&B

## 2019-10-23 PROCEDURE — 2580000003 HC RX 258: Performed by: STUDENT IN AN ORGANIZED HEALTH CARE EDUCATION/TRAINING PROGRAM

## 2019-10-23 PROCEDURE — 87798 DETECT AGENT NOS DNA AMP: CPT

## 2019-10-23 PROCEDURE — 96375 TX/PRO/DX INJ NEW DRUG ADDON: CPT

## 2019-10-23 PROCEDURE — 36415 COLL VENOUS BLD VENIPUNCTURE: CPT

## 2019-10-23 PROCEDURE — 99285 EMERGENCY DEPT VISIT HI MDM: CPT

## 2019-10-23 PROCEDURE — 87633 RESP VIRUS 12-25 TARGETS: CPT

## 2019-10-23 PROCEDURE — 85025 COMPLETE CBC W/AUTO DIFF WBC: CPT

## 2019-10-23 PROCEDURE — 87040 BLOOD CULTURE FOR BACTERIA: CPT

## 2019-10-23 PROCEDURE — 87486 CHLMYD PNEUM DNA AMP PROBE: CPT

## 2019-10-23 PROCEDURE — 96367 TX/PROPH/DG ADDL SEQ IV INF: CPT

## 2019-10-23 PROCEDURE — 51701 INSERT BLADDER CATHETER: CPT

## 2019-10-23 PROCEDURE — 87088 URINE BACTERIA CULTURE: CPT

## 2019-10-23 PROCEDURE — 80053 COMPREHEN METABOLIC PANEL: CPT

## 2019-10-23 PROCEDURE — 87502 INFLUENZA DNA AMP PROBE: CPT

## 2019-10-23 PROCEDURE — 81001 URINALYSIS AUTO W/SCOPE: CPT

## 2019-10-23 PROCEDURE — 87077 CULTURE AEROBIC IDENTIFY: CPT

## 2019-10-23 PROCEDURE — 84484 ASSAY OF TROPONIN QUANT: CPT

## 2019-10-23 RX ORDER — ONDANSETRON 2 MG/ML
4 INJECTION INTRAMUSCULAR; INTRAVENOUS ONCE
Status: COMPLETED | OUTPATIENT
Start: 2019-10-23 | End: 2019-10-23

## 2019-10-23 RX ORDER — ASPIRIN 81 MG/1
324 TABLET, CHEWABLE ORAL ONCE
Status: COMPLETED | OUTPATIENT
Start: 2019-10-23 | End: 2019-10-23

## 2019-10-23 RX ORDER — ACETAMINOPHEN 500 MG
1000 TABLET ORAL ONCE
Status: COMPLETED | OUTPATIENT
Start: 2019-10-23 | End: 2019-10-23

## 2019-10-23 RX ORDER — 0.9 % SODIUM CHLORIDE 0.9 %
1000 INTRAVENOUS SOLUTION INTRAVENOUS ONCE
Status: COMPLETED | OUTPATIENT
Start: 2019-10-23 | End: 2019-10-23

## 2019-10-23 RX ADMIN — ONDANSETRON 4 MG: 2 INJECTION INTRAMUSCULAR; INTRAVENOUS at 18:26

## 2019-10-23 RX ADMIN — SODIUM CHLORIDE 1000 ML: 9 INJECTION, SOLUTION INTRAVENOUS at 18:26

## 2019-10-23 RX ADMIN — DOXYCYCLINE 100 MG: 100 INJECTION, POWDER, LYOPHILIZED, FOR SOLUTION INTRAVENOUS at 23:03

## 2019-10-23 RX ADMIN — ACETAMINOPHEN 1000 MG: 500 TABLET ORAL at 20:14

## 2019-10-23 RX ADMIN — CEFTRIAXONE 2 G: 2 INJECTION, POWDER, FOR SOLUTION INTRAMUSCULAR; INTRAVENOUS at 22:06

## 2019-10-23 RX ADMIN — ASPIRIN 81 MG 324 MG: 81 TABLET ORAL at 20:14

## 2019-10-23 ASSESSMENT — ENCOUNTER SYMPTOMS
VOMITING: 0
DIARRHEA: 0
BACK PAIN: 0
WHEEZING: 0
VOICE CHANGE: 0
TROUBLE SWALLOWING: 0
SHORTNESS OF BREATH: 0
PHOTOPHOBIA: 0
NAUSEA: 0
COUGH: 1
ABDOMINAL PAIN: 0

## 2019-10-23 ASSESSMENT — PAIN DESCRIPTION - FREQUENCY: FREQUENCY: CONTINUOUS

## 2019-10-23 ASSESSMENT — PAIN DESCRIPTION - PAIN TYPE: TYPE: ACUTE PAIN

## 2019-10-23 ASSESSMENT — PAIN DESCRIPTION - DESCRIPTORS: DESCRIPTORS: DULL

## 2019-10-23 ASSESSMENT — PAIN SCALES - GENERAL
PAINLEVEL_OUTOF10: 3
PAINLEVEL_OUTOF10: 0

## 2019-10-23 ASSESSMENT — PAIN DESCRIPTION - ORIENTATION: ORIENTATION: LEFT;UPPER

## 2019-10-23 ASSESSMENT — PAIN DESCRIPTION - LOCATION: LOCATION: ABDOMEN

## 2019-10-24 LAB
ANION GAP SERPL CALCULATED.3IONS-SCNC: 10 MMOL/L (ref 7–16)
BASOPHILS ABSOLUTE: 0 E9/L (ref 0–0.2)
BASOPHILS RELATIVE PERCENT: 0 % (ref 0–2)
BUN BLDV-MCNC: 29 MG/DL (ref 8–23)
BURR CELLS: ABNORMAL
CALCIUM SERPL-MCNC: 8.2 MG/DL (ref 8.6–10.2)
CHLORIDE BLD-SCNC: 101 MMOL/L (ref 98–107)
CO2: 24 MMOL/L (ref 22–29)
CREAT SERPL-MCNC: 1.5 MG/DL (ref 0.7–1.2)
EKG ATRIAL RATE: 65 BPM
EKG P AXIS: 56 DEGREES
EKG P-R INTERVAL: 172 MS
EKG Q-T INTERVAL: 432 MS
EKG QRS DURATION: 106 MS
EKG QTC CALCULATION (BAZETT): 449 MS
EKG R AXIS: -44 DEGREES
EKG T AXIS: 42 DEGREES
EKG VENTRICULAR RATE: 65 BPM
EOSINOPHILS ABSOLUTE: 0 E9/L (ref 0.05–0.5)
EOSINOPHILS RELATIVE PERCENT: 0 % (ref 0–6)
FILM ARRAY ADENOVIRUS: ABNORMAL
FILM ARRAY BORDETELLA PERTUSSIS: ABNORMAL
FILM ARRAY CHLAMYDOPHILIA PNEUMONIAE: ABNORMAL
FILM ARRAY CORONAVIRUS 229E: ABNORMAL
FILM ARRAY CORONAVIRUS HKU1: ABNORMAL
FILM ARRAY CORONAVIRUS NL63: ABNORMAL
FILM ARRAY CORONAVIRUS OC43: ABNORMAL
FILM ARRAY INFLUENZA A VIRUS 09H1: ABNORMAL
FILM ARRAY INFLUENZA A VIRUS H1: ABNORMAL
FILM ARRAY INFLUENZA A VIRUS H3: ABNORMAL
FILM ARRAY INFLUENZA A VIRUS: ABNORMAL
FILM ARRAY INFLUENZA B: ABNORMAL
FILM ARRAY METAPNEUMOVIRUS: ABNORMAL
FILM ARRAY MYCOPLASMA PNEUMONIAE: ABNORMAL
FILM ARRAY PARAINFLUENZA VIRUS 2: ABNORMAL
FILM ARRAY PARAINFLUENZA VIRUS 3: ABNORMAL
FILM ARRAY PARAINFLUENZA VIRUS 4: ABNORMAL
FILM ARRAY RESPIRATORY SYNCITIAL VIRUS: ABNORMAL
FILM ARRAY RHINOVIRUS/ENTEROVIRUS: ABNORMAL
GFR AFRICAN AMERICAN: 53
GFR NON-AFRICAN AMERICAN: 44 ML/MIN/1.73
GLUCOSE BLD-MCNC: 117 MG/DL (ref 74–99)
HCT VFR BLD CALC: 34.4 % (ref 37–54)
HEMOGLOBIN: 11 G/DL (ref 12.5–16.5)
LYMPHOCYTES ABSOLUTE: 2.33 E9/L (ref 1.5–4)
LYMPHOCYTES RELATIVE PERCENT: 15 % (ref 20–42)
MCH RBC QN AUTO: 27.7 PG (ref 26–35)
MCHC RBC AUTO-ENTMCNC: 32 % (ref 32–34.5)
MCV RBC AUTO: 86.6 FL (ref 80–99.9)
MONOCYTES ABSOLUTE: 0.46 E9/L (ref 0.1–0.95)
MONOCYTES RELATIVE PERCENT: 3 % (ref 2–12)
NEUTROPHILS ABSOLUTE: 12.71 E9/L (ref 1.8–7.3)
NEUTROPHILS RELATIVE PERCENT: 82 % (ref 43–80)
ORGANISM: ABNORMAL
PDW BLD-RTO: 14.8 FL (ref 11.5–15)
PLATELET # BLD: 138 E9/L (ref 130–450)
PMV BLD AUTO: 9.1 FL (ref 7–12)
POIKILOCYTES: ABNORMAL
POTASSIUM REFLEX MAGNESIUM: 4.2 MMOL/L (ref 3.5–5)
RBC # BLD: 3.97 E12/L (ref 3.8–5.8)
SCHISTOCYTES: ABNORMAL
SODIUM BLD-SCNC: 135 MMOL/L (ref 132–146)
TROPONIN: 0.05 NG/ML (ref 0–0.03)
TROPONIN: 0.07 NG/ML (ref 0–0.03)
TROPONIN: 0.11 NG/ML (ref 0–0.03)
WBC # BLD: 15.5 E9/L (ref 4.5–11.5)

## 2019-10-24 PROCEDURE — 93308 TTE F-UP OR LMTD: CPT

## 2019-10-24 PROCEDURE — 6370000000 HC RX 637 (ALT 250 FOR IP): Performed by: INTERNAL MEDICINE

## 2019-10-24 PROCEDURE — 6360000002 HC RX W HCPCS: Performed by: INTERNAL MEDICINE

## 2019-10-24 PROCEDURE — 80048 BASIC METABOLIC PNL TOTAL CA: CPT

## 2019-10-24 PROCEDURE — 2060000000 HC ICU INTERMEDIATE R&B

## 2019-10-24 PROCEDURE — 2580000003 HC RX 258: Performed by: INTERNAL MEDICINE

## 2019-10-24 PROCEDURE — APPSS45 APP SPLIT SHARED TIME 31-45 MINUTES: Performed by: PHYSICIAN ASSISTANT

## 2019-10-24 PROCEDURE — 97161 PT EVAL LOW COMPLEX 20 MIN: CPT

## 2019-10-24 PROCEDURE — 85025 COMPLETE CBC W/AUTO DIFF WBC: CPT

## 2019-10-24 PROCEDURE — 93010 ELECTROCARDIOGRAM REPORT: CPT | Performed by: INTERNAL MEDICINE

## 2019-10-24 PROCEDURE — 99222 1ST HOSP IP/OBS MODERATE 55: CPT | Performed by: INTERNAL MEDICINE

## 2019-10-24 PROCEDURE — 97530 THERAPEUTIC ACTIVITIES: CPT

## 2019-10-24 PROCEDURE — 97165 OT EVAL LOW COMPLEX 30 MIN: CPT

## 2019-10-24 PROCEDURE — 84484 ASSAY OF TROPONIN QUANT: CPT

## 2019-10-24 PROCEDURE — 2700000000 HC OXYGEN THERAPY PER DAY

## 2019-10-24 PROCEDURE — 36415 COLL VENOUS BLD VENIPUNCTURE: CPT

## 2019-10-24 RX ORDER — ACETAMINOPHEN 325 MG/1
650 TABLET ORAL EVERY 4 HOURS PRN
Status: DISCONTINUED | OUTPATIENT
Start: 2019-10-24 | End: 2019-10-25 | Stop reason: HOSPADM

## 2019-10-24 RX ORDER — VITAMIN B COMPLEX
100 TABLET ORAL DAILY
Status: DISCONTINUED | OUTPATIENT
Start: 2019-10-24 | End: 2019-10-24 | Stop reason: CLARIF

## 2019-10-24 RX ORDER — ASPIRIN 81 MG/1
81 TABLET ORAL DAILY
Status: DISCONTINUED | OUTPATIENT
Start: 2019-10-24 | End: 2019-10-25 | Stop reason: HOSPADM

## 2019-10-24 RX ORDER — SODIUM CHLORIDE 0.9 % (FLUSH) 0.9 %
10 SYRINGE (ML) INJECTION EVERY 12 HOURS SCHEDULED
Status: DISCONTINUED | OUTPATIENT
Start: 2019-10-24 | End: 2019-10-25 | Stop reason: HOSPADM

## 2019-10-24 RX ORDER — OSELTAMIVIR PHOSPHATE 30 MG/1
45 CAPSULE ORAL 2 TIMES DAILY
Status: DISCONTINUED | OUTPATIENT
Start: 2019-10-24 | End: 2019-10-24 | Stop reason: DRUGHIGH

## 2019-10-24 RX ORDER — METOPROLOL SUCCINATE 25 MG/1
25 TABLET, EXTENDED RELEASE ORAL DAILY
Status: DISCONTINUED | OUTPATIENT
Start: 2019-10-24 | End: 2019-10-25 | Stop reason: HOSPADM

## 2019-10-24 RX ORDER — ATORVASTATIN CALCIUM 40 MG/1
40 TABLET, FILM COATED ORAL DAILY
Status: DISCONTINUED | OUTPATIENT
Start: 2019-10-24 | End: 2019-10-25 | Stop reason: HOSPADM

## 2019-10-24 RX ORDER — OSELTAMIVIR PHOSPHATE 30 MG/1
30 CAPSULE ORAL 2 TIMES DAILY
Status: DISCONTINUED | OUTPATIENT
Start: 2019-10-24 | End: 2019-10-25 | Stop reason: HOSPADM

## 2019-10-24 RX ORDER — ONDANSETRON 2 MG/ML
4 INJECTION INTRAMUSCULAR; INTRAVENOUS EVERY 6 HOURS PRN
Status: DISCONTINUED | OUTPATIENT
Start: 2019-10-24 | End: 2019-10-25 | Stop reason: HOSPADM

## 2019-10-24 RX ORDER — CHLORAL HYDRATE 500 MG
1000 CAPSULE ORAL DAILY
Status: DISCONTINUED | OUTPATIENT
Start: 2019-10-24 | End: 2019-10-24 | Stop reason: CLARIF

## 2019-10-24 RX ORDER — SODIUM CHLORIDE 0.9 % (FLUSH) 0.9 %
10 SYRINGE (ML) INJECTION PRN
Status: DISCONTINUED | OUTPATIENT
Start: 2019-10-24 | End: 2019-10-25 | Stop reason: HOSPADM

## 2019-10-24 RX ADMIN — Medication 10 ML: at 09:33

## 2019-10-24 RX ADMIN — Medication 10 ML: at 21:18

## 2019-10-24 RX ADMIN — CEFTRIAXONE 1 G: 1 INJECTION, POWDER, FOR SOLUTION INTRAMUSCULAR; INTRAVENOUS at 21:18

## 2019-10-24 RX ADMIN — ASPIRIN 81 MG: 81 TABLET, COATED ORAL at 15:13

## 2019-10-24 RX ADMIN — METOPROLOL SUCCINATE 25 MG: 25 TABLET, EXTENDED RELEASE ORAL at 09:33

## 2019-10-24 RX ADMIN — ATORVASTATIN CALCIUM 40 MG: 40 TABLET, FILM COATED ORAL at 09:33

## 2019-10-24 RX ADMIN — OSELTAMIVIR PHOSPHATE 30 MG: 30 CAPSULE ORAL at 21:18

## 2019-10-24 RX ADMIN — ENOXAPARIN SODIUM 40 MG: 40 INJECTION SUBCUTANEOUS at 09:34

## 2019-10-24 ASSESSMENT — PAIN SCALES - GENERAL
PAINLEVEL_OUTOF10: 0
PAINLEVEL_OUTOF10: 0

## 2019-10-25 VITALS
HEIGHT: 67 IN | SYSTOLIC BLOOD PRESSURE: 141 MMHG | HEART RATE: 60 BPM | TEMPERATURE: 97.6 F | DIASTOLIC BLOOD PRESSURE: 64 MMHG | WEIGHT: 171.9 LBS | RESPIRATION RATE: 18 BRPM | BODY MASS INDEX: 26.98 KG/M2 | OXYGEN SATURATION: 95 %

## 2019-10-25 LAB
ALBUMIN SERPL-MCNC: 3.4 G/DL (ref 3.5–5.2)
ALP BLD-CCNC: 90 U/L (ref 40–129)
ALT SERPL-CCNC: 36 U/L (ref 0–40)
ANION GAP SERPL CALCULATED.3IONS-SCNC: 12 MMOL/L (ref 7–16)
AST SERPL-CCNC: 26 U/L (ref 0–39)
BASOPHILS ABSOLUTE: 0.03 E9/L (ref 0–0.2)
BASOPHILS RELATIVE PERCENT: 0.3 % (ref 0–2)
BILIRUB SERPL-MCNC: 0.8 MG/DL (ref 0–1.2)
BUN BLDV-MCNC: 28 MG/DL (ref 8–23)
CALCIUM SERPL-MCNC: 8.5 MG/DL (ref 8.6–10.2)
CHLORIDE BLD-SCNC: 99 MMOL/L (ref 98–107)
CO2: 22 MMOL/L (ref 22–29)
CREAT SERPL-MCNC: 1.2 MG/DL (ref 0.7–1.2)
EOSINOPHILS ABSOLUTE: 0.15 E9/L (ref 0.05–0.5)
EOSINOPHILS RELATIVE PERCENT: 1.5 % (ref 0–6)
GFR AFRICAN AMERICAN: >60
GFR NON-AFRICAN AMERICAN: 57 ML/MIN/1.73
GLUCOSE BLD-MCNC: 112 MG/DL (ref 74–99)
HCT VFR BLD CALC: 31.6 % (ref 37–54)
HEMOGLOBIN: 10.7 G/DL (ref 12.5–16.5)
IMMATURE GRANULOCYTES #: 0.04 E9/L
IMMATURE GRANULOCYTES %: 0.4 % (ref 0–5)
LYMPHOCYTES ABSOLUTE: 1.54 E9/L (ref 1.5–4)
LYMPHOCYTES RELATIVE PERCENT: 15 % (ref 20–42)
MCH RBC QN AUTO: 28.6 PG (ref 26–35)
MCHC RBC AUTO-ENTMCNC: 33.9 % (ref 32–34.5)
MCV RBC AUTO: 84.5 FL (ref 80–99.9)
MONOCYTES ABSOLUTE: 0.67 E9/L (ref 0.1–0.95)
MONOCYTES RELATIVE PERCENT: 6.5 % (ref 2–12)
NEUTROPHILS ABSOLUTE: 7.83 E9/L (ref 1.8–7.3)
NEUTROPHILS RELATIVE PERCENT: 76.3 % (ref 43–80)
ORGANISM: ABNORMAL
PDW BLD-RTO: 14.8 FL (ref 11.5–15)
PLATELET # BLD: 134 E9/L (ref 130–450)
PMV BLD AUTO: 8.8 FL (ref 7–12)
POTASSIUM SERPL-SCNC: 3.7 MMOL/L (ref 3.5–5)
RBC # BLD: 3.74 E12/L (ref 3.8–5.8)
SODIUM BLD-SCNC: 133 MMOL/L (ref 132–146)
TOTAL PROTEIN: 6.3 G/DL (ref 6.4–8.3)
URINE CULTURE, ROUTINE: ABNORMAL
WBC # BLD: 10.3 E9/L (ref 4.5–11.5)

## 2019-10-25 PROCEDURE — 6370000000 HC RX 637 (ALT 250 FOR IP): Performed by: INTERNAL MEDICINE

## 2019-10-25 PROCEDURE — 6360000002 HC RX W HCPCS: Performed by: INTERNAL MEDICINE

## 2019-10-25 PROCEDURE — 85025 COMPLETE CBC W/AUTO DIFF WBC: CPT

## 2019-10-25 PROCEDURE — 80053 COMPREHEN METABOLIC PANEL: CPT

## 2019-10-25 PROCEDURE — 2700000000 HC OXYGEN THERAPY PER DAY

## 2019-10-25 PROCEDURE — 2580000003 HC RX 258: Performed by: INTERNAL MEDICINE

## 2019-10-25 PROCEDURE — 99233 SBSQ HOSP IP/OBS HIGH 50: CPT | Performed by: INTERNAL MEDICINE

## 2019-10-25 RX ORDER — CEFDINIR 300 MG/1
300 CAPSULE ORAL 2 TIMES DAILY
Qty: 14 CAPSULE | Refills: 0 | Status: SHIPPED | OUTPATIENT
Start: 2019-10-25 | End: 2019-11-01

## 2019-10-25 RX ORDER — ASPIRIN 81 MG/1
81 TABLET ORAL DAILY
Qty: 30 TABLET | Refills: 3 | Status: SHIPPED | OUTPATIENT
Start: 2019-10-26

## 2019-10-25 RX ORDER — OSELTAMIVIR PHOSPHATE 30 MG/1
30 CAPSULE ORAL 2 TIMES DAILY
Qty: 10 CAPSULE | Refills: 0 | Status: SHIPPED | OUTPATIENT
Start: 2019-10-25 | End: 2019-10-30

## 2019-10-25 RX ADMIN — OSELTAMIVIR PHOSPHATE 30 MG: 30 CAPSULE ORAL at 08:22

## 2019-10-25 RX ADMIN — ACETAMINOPHEN 650 MG: 325 TABLET ORAL at 00:15

## 2019-10-25 RX ADMIN — ENOXAPARIN SODIUM 40 MG: 40 INJECTION SUBCUTANEOUS at 08:22

## 2019-10-25 RX ADMIN — METOPROLOL SUCCINATE 25 MG: 25 TABLET, EXTENDED RELEASE ORAL at 08:23

## 2019-10-25 RX ADMIN — ASPIRIN 81 MG: 81 TABLET, COATED ORAL at 08:22

## 2019-10-25 RX ADMIN — Medication 10 ML: at 08:23

## 2019-10-25 ASSESSMENT — PAIN SCALES - GENERAL
PAINLEVEL_OUTOF10: 0
PAINLEVEL_OUTOF10: 0
PAINLEVEL_OUTOF10: 2

## 2019-10-29 ENCOUNTER — TELEPHONE (OUTPATIENT)
Dept: CARDIOLOGY CLINIC | Age: 84
End: 2019-10-29

## 2019-10-29 LAB
BLOOD CULTURE, ROUTINE: NORMAL
CULTURE, BLOOD 2: NORMAL

## 2019-11-20 ENCOUNTER — HOSPITAL ENCOUNTER (OUTPATIENT)
Dept: CT IMAGING | Age: 84
Discharge: HOME | End: 2019-11-22
Payer: MEDICARE

## 2019-11-20 DIAGNOSIS — R91.1 PULMONARY NODULE: ICD-10-CM

## 2019-11-20 PROCEDURE — 71250 CT THORAX DX C-: CPT

## 2019-11-26 PROBLEM — B34.8 PARAINFLUENZA TYPE 1 INFECTION: Status: ACTIVE | Noted: 2019-11-26

## 2019-11-26 PROBLEM — J15.0 PNEUMONIA OF BOTH LOWER LOBES DUE TO KLEBSIELLA PNEUMONIAE (HCC): Status: ACTIVE | Noted: 2019-11-26

## 2019-12-09 ENCOUNTER — HOSPITAL ENCOUNTER (OUTPATIENT)
Dept: CARDIOLOGY | Age: 84
Discharge: HOME | End: 2019-12-09
Payer: MEDICARE

## 2019-12-09 ENCOUNTER — OFFICE VISIT (OUTPATIENT)
Dept: VASCULAR SURGERY | Age: 84
End: 2019-12-09
Payer: MEDICARE

## 2019-12-09 DIAGNOSIS — I65.23 ASYMPTOMATIC BILATERAL CAROTID ARTERY STENOSIS: ICD-10-CM

## 2019-12-09 DIAGNOSIS — I65.23 ASYMPTOMATIC BILATERAL CAROTID ARTERY STENOSIS: Primary | ICD-10-CM

## 2019-12-09 PROCEDURE — G8482 FLU IMMUNIZE ORDER/ADMIN: HCPCS | Performed by: SURGERY

## 2019-12-09 PROCEDURE — 1036F TOBACCO NON-USER: CPT | Performed by: SURGERY

## 2019-12-09 PROCEDURE — 99213 OFFICE O/P EST LOW 20 MIN: CPT | Performed by: SURGERY

## 2019-12-09 PROCEDURE — G8417 CALC BMI ABV UP PARAM F/U: HCPCS | Performed by: SURGERY

## 2019-12-09 PROCEDURE — 1123F ACP DISCUSS/DSCN MKR DOCD: CPT | Performed by: SURGERY

## 2019-12-09 PROCEDURE — G8598 ASA/ANTIPLAT THER USED: HCPCS | Performed by: SURGERY

## 2019-12-09 PROCEDURE — G8427 DOCREV CUR MEDS BY ELIG CLIN: HCPCS | Performed by: SURGERY

## 2019-12-09 PROCEDURE — 93880 EXTRACRANIAL BILAT STUDY: CPT

## 2019-12-09 PROCEDURE — 4040F PNEUMOC VAC/ADMIN/RCVD: CPT | Performed by: SURGERY

## 2020-02-29 ENCOUNTER — HOSPITAL ENCOUNTER (OUTPATIENT)
Dept: CT IMAGING | Age: 85
Discharge: HOME | End: 2020-03-02
Payer: MEDICARE

## 2020-02-29 PROCEDURE — 71250 CT THORAX DX C-: CPT

## 2020-03-19 ENCOUNTER — HOSPITAL ENCOUNTER (OUTPATIENT)
Age: 85
Discharge: HOME | End: 2020-03-21
Payer: MEDICARE

## 2020-03-19 PROCEDURE — 87088 URINE BACTERIA CULTURE: CPT

## 2020-03-20 LAB — URINE CULTURE, ROUTINE: NORMAL

## 2020-06-25 ENCOUNTER — TELEPHONE (OUTPATIENT)
Dept: CARDIOLOGY CLINIC | Age: 85
End: 2020-06-25

## 2020-07-02 ENCOUNTER — OFFICE VISIT (OUTPATIENT)
Dept: CARDIOLOGY CLINIC | Age: 85
End: 2020-07-02
Payer: MEDICARE

## 2020-07-02 VITALS
SYSTOLIC BLOOD PRESSURE: 120 MMHG | BODY MASS INDEX: 28.44 KG/M2 | HEART RATE: 52 BPM | WEIGHT: 181.2 LBS | HEIGHT: 67 IN | DIASTOLIC BLOOD PRESSURE: 60 MMHG

## 2020-07-02 PROCEDURE — 1123F ACP DISCUSS/DSCN MKR DOCD: CPT | Performed by: INTERNAL MEDICINE

## 2020-07-02 PROCEDURE — 93000 ELECTROCARDIOGRAM COMPLETE: CPT | Performed by: INTERNAL MEDICINE

## 2020-07-02 PROCEDURE — 4040F PNEUMOC VAC/ADMIN/RCVD: CPT | Performed by: INTERNAL MEDICINE

## 2020-07-02 PROCEDURE — G8427 DOCREV CUR MEDS BY ELIG CLIN: HCPCS | Performed by: INTERNAL MEDICINE

## 2020-07-02 PROCEDURE — 99214 OFFICE O/P EST MOD 30 MIN: CPT | Performed by: INTERNAL MEDICINE

## 2020-07-02 PROCEDURE — 1036F TOBACCO NON-USER: CPT | Performed by: INTERNAL MEDICINE

## 2020-07-02 PROCEDURE — G8417 CALC BMI ABV UP PARAM F/U: HCPCS | Performed by: INTERNAL MEDICINE

## 2020-07-02 NOTE — PROGRESS NOTES
Multiple Vitamins-Minerals (CENTRUM-LUTEIN PO) Take 1 tablet by mouth daily.  Omega-3 Fatty Acids (FISH OIL) 1000 MG CAPS Take 1,000 mg by mouth daily.  Bevacizumab (AVASTIN IV) Infuse intravenously See Admin Instructions Gets injection in eyes about every 11 weeks       No current facility-administered medications for this visit. Social History     Socioeconomic History    Marital status:       Spouse name: Not on file    Number of children: Not on file    Years of education: Not on file    Highest education level: Not on file   Occupational History    Occupation: retired-    Social Needs    Financial resource strain: Not on file    Food insecurity     Worry: Not on file     Inability: Not on file   [employer] needs     Medical: Not on file     Non-medical: Not on file   Tobacco Use    Smoking status: Former Smoker     Packs/day: 1.00     Years: 10.00     Pack years: 10.00     Types: Cigarettes     Start date: 1978     Last attempt to quit: 1988     Years since quittin.8    Smokeless tobacco: Never Used   Substance and Sexual Activity    Alcohol use: Yes     Comment: socially    Drug use: No    Sexual activity: Not Currently   Lifestyle    Physical activity     Days per week: Not on file     Minutes per session: Not on file    Stress: Not on file   Relationships    Social connections     Talks on phone: Not on file     Gets together: Not on file     Attends Pentecostalism service: Not on file     Active member of club or organization: Not on file     Attends meetings of clubs or organizations: Not on file     Relationship status: Not on file    Intimate partner violence     Fear of current or ex partner: Not on file     Emotionally abused: Not on file     Physically abused: Not on file     Forced sexual activity: Not on file   Other Topics Concern    Not on file   Social History Narrative    Not on file       Family History   Problem Relation Age of Onset    Other Mother         accident age 28 years , Jeramie Coburn. was 5 months old    Other Father         accident age 43 decesed 8 years when Father passed    Heart Disease Brother     Alzheimer's Disease Sister     Heart Disease Brother     Heart Disease Brother      Review of Systems:  Heart: as above   Lungs: as above   Eyes: denies changes in vision or discharge. Ears: denies changes in hearing or pain. Nose: denies epistaxis or masses   Throat: denies sore throat or trouble swallowing. Neuro: denies numbness, tingling, tremors. Skin: denies rashes or itching. : denies hematuria, dysuria   GI: denies vomiting, diarrhea   Psych: denies mood changed, anxiety, depression. all others systems negative. Physical Exam   /60   Pulse 52   Ht 5' 7\" (1.702 m)   Wt 181 lb 3.2 oz (82.2 kg)   BMI 28.38 kg/m²   Constitutional: Oriented to person, place, and time. Well-developed and well-nourished. No distress. Head: Normocephalic and atraumatic. Eyes: EOM are normal. Pupils are equal, round, and reactive to light. Neck: Normal range of motion. Neck supple. No hepatojugular reflux and no JVD present. Carotid bruit is not present. No tracheal deviation present. No thyromegaly present. Cardiovascular: Normal rate, regular rhythm, normal heart sounds and intact distal pulses. Exam reveals no gallop and no friction rub. No murmur heard. Pulmonary/Chest: Effort normal and breath sounds normal. No respiratory distress. No wheezes. No rales. No tenderness. Abdominal: Soft. Bowel sounds are normal. No distension and no mass. No tenderness. No rebound and no guarding. Musculoskeletal: Normal range of motion. No edema and no tenderness. Lymphadenopathy:   No cervical adenopathy. No groin adenopathy. Neurological: Alert and oriented to person, place, and time. Skin: Skin is warm and dry. No rash noted. Not diaphoretic. No erythema. Psychiatric: Normal mood and affect. Behavior is normal.     EKG:  nonspecific ST and T waves changes, sinus bradycardia. Stress Impression 1/17/18:    1. ECG during the infusion did not change. 2. The myocardial perfusion imaging was abnormal.    The abnormality was a moderate sized partially reversible defect in the inferolateral  wall  3. Overall left ventricular systolic function was normal without regional wall motion abnormalities. 4. Intermediate risk general pharmacologic stress test.  5. Compared to prior study results of 11/16/2010, there is a new perfusion defect involving the inferolateral wall with partial reversibility. ASSESSMENT AND PLAN:  Patient Active Problem List   Diagnosis    CAD (coronary artery disease)    S/P CABG x 6    Hyperlipemia    Asymptomatic bilateral carotid artery stenosis    Numbness and tingling in left arm    Numbness and tingling of left side of face    Pulmonary nodule    Lung nodule    Malignant neoplasm of lower lobe of left lung (HCC)    Sepsis secondary to UTI ([address]    Gross hematuria    Abnormal CT scan, chest    Acute respiratory failure with hypoxia (HCC)    Pneumonia due to organism    Hx of coronary artery disease    Parainfluenza type 1 infection    Pneumonia of both lower lobes due to Klebsiella pneumoniae ([address]     1. CAD hx of CABG:    Continue BB/ASA/statin/imdur. Stable symptoms. 2. Lipids: Statin. Evaristo Peter D.O.   Cardiologist  Cardiology, [facility]

## 2020-09-18 ENCOUNTER — HOSPITAL ENCOUNTER (OUTPATIENT)
Dept: CT IMAGING | Age: 85
Discharge: HOME | End: 2020-09-20
Payer: MEDICARE

## 2020-09-18 PROCEDURE — 71250 CT THORAX DX C-: CPT

## 2020-10-02 ENCOUNTER — TELEPHONE (OUTPATIENT)
Dept: CARDIOLOGY CLINIC | Age: 85
End: 2020-10-02

## 2020-10-02 ENCOUNTER — APPOINTMENT (OUTPATIENT)
Dept: GENERAL RADIOLOGY | Age: 85
End: 2020-10-02
Payer: MEDICARE

## 2020-10-02 ENCOUNTER — HOSPITAL ENCOUNTER (OUTPATIENT)
Age: 85
Setting detail: OBSERVATION
Discharge: HOME | End: 2020-10-03
Attending: EMERGENCY MEDICINE | Admitting: INTERNAL MEDICINE
Payer: MEDICARE

## 2020-10-02 ENCOUNTER — APPOINTMENT (OUTPATIENT)
Dept: CT IMAGING | Age: 85
End: 2020-10-02
Payer: MEDICARE

## 2020-10-02 PROBLEM — R07.9 CHEST PAIN: Status: ACTIVE | Noted: 2020-10-02

## 2020-10-02 LAB
ANION GAP SERPL CALCULATED.3IONS-SCNC: 7 MMOL/L (ref 7–16)
BASOPHILS ABSOLUTE: 0.08 E9/L (ref 0–0.2)
BASOPHILS RELATIVE PERCENT: 1 % (ref 0–2)
BUN BLDV-MCNC: 23 MG/DL (ref 8–23)
CALCIUM SERPL-MCNC: 8.9 MG/DL (ref 8.6–10.2)
CHLORIDE BLD-SCNC: 100 MMOL/L (ref 98–107)
CO2: 25 MMOL/L (ref 22–29)
CREAT SERPL-MCNC: 1.3 MG/DL (ref 0.7–1.2)
EKG ATRIAL RATE: 50 BPM
EKG P AXIS: 3 DEGREES
EKG P-R INTERVAL: 196 MS
EKG Q-T INTERVAL: 472 MS
EKG QRS DURATION: 108 MS
EKG QTC CALCULATION (BAZETT): 430 MS
EKG R AXIS: -57 DEGREES
EKG T AXIS: 44 DEGREES
EKG VENTRICULAR RATE: 50 BPM
EOSINOPHILS ABSOLUTE: 0.48 E9/L (ref 0.05–0.5)
EOSINOPHILS RELATIVE PERCENT: 5.8 % (ref 0–6)
GFR AFRICAN AMERICAN: >60
GFR NON-AFRICAN AMERICAN: 52 ML/MIN/1.73
GLUCOSE BLD-MCNC: 109 MG/DL (ref 74–99)
HCT VFR BLD CALC: 37.3 % (ref 37–54)
HEMOGLOBIN: 12.4 G/DL (ref 12.5–16.5)
IMMATURE GRANULOCYTES #: 0.01 E9/L
IMMATURE GRANULOCYTES %: 0.1 % (ref 0–5)
LACTIC ACID: 1.1 MMOL/L (ref 0.5–2.2)
LYMPHOCYTES ABSOLUTE: 1.71 E9/L (ref 1.5–4)
LYMPHOCYTES RELATIVE PERCENT: 20.6 % (ref 20–42)
MCH RBC QN AUTO: 29.2 PG (ref 26–35)
MCHC RBC AUTO-ENTMCNC: 33.2 % (ref 32–34.5)
MCV RBC AUTO: 88 FL (ref 80–99.9)
MONOCYTES ABSOLUTE: 0.9 E9/L (ref 0.1–0.95)
MONOCYTES RELATIVE PERCENT: 10.8 % (ref 2–12)
NEUTROPHILS ABSOLUTE: 5.14 E9/L (ref 1.8–7.3)
NEUTROPHILS RELATIVE PERCENT: 61.7 % (ref 43–80)
PDW BLD-RTO: 14.2 FL (ref 11.5–15)
PLATELET # BLD: 213 E9/L (ref 130–450)
PMV BLD AUTO: 8.6 FL (ref 7–12)
POTASSIUM SERPL-SCNC: 4 MMOL/L (ref 3.5–5)
PRO-BNP: 654 PG/ML (ref 0–450)
RBC # BLD: 4.24 E12/L (ref 3.8–5.8)
SODIUM BLD-SCNC: 132 MMOL/L (ref 132–146)
TROPONIN: <0.01 NG/ML (ref 0–0.03)
WBC # BLD: 8.3 E9/L (ref 4.5–11.5)

## 2020-10-02 PROCEDURE — 80048 BASIC METABOLIC PNL TOTAL CA: CPT

## 2020-10-02 PROCEDURE — 96360 HYDRATION IV INFUSION INIT: CPT

## 2020-10-02 PROCEDURE — 99284 EMERGENCY DEPT VISIT MOD MDM: CPT

## 2020-10-02 PROCEDURE — 83605 ASSAY OF LACTIC ACID: CPT

## 2020-10-02 PROCEDURE — G0378 HOSPITAL OBSERVATION PER HR: HCPCS

## 2020-10-02 PROCEDURE — 85025 COMPLETE CBC W/AUTO DIFF WBC: CPT

## 2020-10-02 PROCEDURE — 83880 ASSAY OF NATRIURETIC PEPTIDE: CPT

## 2020-10-02 PROCEDURE — 93005 ELECTROCARDIOGRAM TRACING: CPT | Performed by: EMERGENCY MEDICINE

## 2020-10-02 PROCEDURE — 36415 COLL VENOUS BLD VENIPUNCTURE: CPT

## 2020-10-02 PROCEDURE — 93010 ELECTROCARDIOGRAM REPORT: CPT | Performed by: INTERNAL MEDICINE

## 2020-10-02 PROCEDURE — 84484 ASSAY OF TROPONIN QUANT: CPT

## 2020-10-02 PROCEDURE — 70450 CT HEAD/BRAIN W/O DYE: CPT

## 2020-10-02 PROCEDURE — 6370000000 HC RX 637 (ALT 250 FOR IP): Performed by: EMERGENCY MEDICINE

## 2020-10-02 PROCEDURE — 96372 THER/PROPH/DIAG INJ SC/IM: CPT

## 2020-10-02 PROCEDURE — 6360000002 HC RX W HCPCS: Performed by: INTERNAL MEDICINE

## 2020-10-02 PROCEDURE — 2580000003 HC RX 258: Performed by: EMERGENCY MEDICINE

## 2020-10-02 PROCEDURE — 71045 X-RAY EXAM CHEST 1 VIEW: CPT

## 2020-10-02 RX ORDER — ACETAMINOPHEN 650 MG/1
650 SUPPOSITORY RECTAL EVERY 6 HOURS PRN
Status: DISCONTINUED | OUTPATIENT
Start: 2020-10-02 | End: 2020-10-03 | Stop reason: HOSPADM

## 2020-10-02 RX ORDER — METOPROLOL SUCCINATE 25 MG/1
25 TABLET, EXTENDED RELEASE ORAL DAILY
Status: DISCONTINUED | OUTPATIENT
Start: 2020-10-02 | End: 2020-10-02

## 2020-10-02 RX ORDER — ATORVASTATIN CALCIUM 40 MG/1
40 TABLET, FILM COATED ORAL DAILY
Status: DISCONTINUED | OUTPATIENT
Start: 2020-10-02 | End: 2020-10-03 | Stop reason: HOSPADM

## 2020-10-02 RX ORDER — ASPIRIN 81 MG/1
81 TABLET ORAL DAILY
Status: DISCONTINUED | OUTPATIENT
Start: 2020-10-02 | End: 2020-10-03 | Stop reason: HOSPADM

## 2020-10-02 RX ORDER — SODIUM CHLORIDE 0.9 % (FLUSH) 0.9 %
10 SYRINGE (ML) INJECTION PRN
Status: DISCONTINUED | OUTPATIENT
Start: 2020-10-02 | End: 2020-10-03 | Stop reason: HOSPADM

## 2020-10-02 RX ORDER — 0.9 % SODIUM CHLORIDE 0.9 %
1000 INTRAVENOUS SOLUTION INTRAVENOUS ONCE
Status: COMPLETED | OUTPATIENT
Start: 2020-10-02 | End: 2020-10-02

## 2020-10-02 RX ORDER — ACETAMINOPHEN 325 MG/1
650 TABLET ORAL EVERY 6 HOURS PRN
Status: DISCONTINUED | OUTPATIENT
Start: 2020-10-02 | End: 2020-10-03 | Stop reason: HOSPADM

## 2020-10-02 RX ORDER — PROMETHAZINE HYDROCHLORIDE 25 MG/1
12.5 TABLET ORAL EVERY 6 HOURS PRN
Status: DISCONTINUED | OUTPATIENT
Start: 2020-10-02 | End: 2020-10-03 | Stop reason: HOSPADM

## 2020-10-02 RX ORDER — ONDANSETRON 2 MG/ML
4 INJECTION INTRAMUSCULAR; INTRAVENOUS EVERY 6 HOURS PRN
Status: DISCONTINUED | OUTPATIENT
Start: 2020-10-02 | End: 2020-10-03 | Stop reason: HOSPADM

## 2020-10-02 RX ORDER — ASPIRIN 81 MG/1
324 TABLET, CHEWABLE ORAL ONCE
Status: DISCONTINUED | OUTPATIENT
Start: 2020-10-02 | End: 2020-10-03 | Stop reason: HOSPADM

## 2020-10-02 RX ADMIN — SODIUM CHLORIDE 1000 ML: 9 INJECTION, SOLUTION INTRAVENOUS at 12:11

## 2020-10-02 RX ADMIN — ENOXAPARIN SODIUM 40 MG: 40 INJECTION SUBCUTANEOUS at 20:31

## 2020-10-02 ASSESSMENT — ENCOUNTER SYMPTOMS
VISUAL CHANGE: 0
ABDOMINAL PAIN: 0
BACK PAIN: 0
WHEEZING: 0
VOMITING: 0
SORE THROAT: 0
BLOOD IN STOOL: 0
DIARRHEA: 0
COUGH: 0
SINUS PRESSURE: 0
EYE PAIN: 0
SHORTNESS OF BREATH: 0
EYE REDNESS: 0
EYE DISCHARGE: 0
NAUSEA: 0

## 2020-10-02 ASSESSMENT — PAIN SCALES - GENERAL
PAINLEVEL_OUTOF10: 0
PAINLEVEL_OUTOF10: 0

## 2020-10-02 NOTE — TELEPHONE ENCOUNTER
Patient called stating he woke up at 4am with a sharp pain in his left hand that was  moving into his left arm. States it went away after a couple of hours. Does not have any other cardiac symptoms. Patient was advised to go to the ER or call his PCP. Patient stated he will go to the ER at [facility] to get evaluated.

## 2020-10-02 NOTE — ED NOTES
[facility] faxed, spoke to Diamond Mayorga, confirmation received, pt to be transported momentarily.       Sierra Pereira RN  10/02/20 2934

## 2020-10-02 NOTE — ED PROVIDER NOTES
42-year-old male presents emergency department with dizziness and left hand pain. He states he woke up this morning with this. Patient is on beta-blockers. He states no chest pain or shortness of breath. He states that the pain is just throbbing in his left hand and there is numbness tingling feeling to it. He states no weakness in the hand. He states no nausea vomiting diarrhea or other complaints. States no vision changes. States no facial droop slurring of speech or other focal neurologic deficits. The history is provided by the patient. Dizziness   Quality:  Lightheadedness  Severity:  Mild  Onset quality:  Gradual  Duration:  1 day  Timing:  Intermittent  Progression:  Waxing and waning  Chronicity:  New  Relieved by:  Nothing  Worsened by:  Nothing  Ineffective treatments:  None tried  Associated symptoms: no blood in stool, no chest pain, no diarrhea, no headaches, no nausea, no palpitations, no shortness of breath, no vision changes, no vomiting and no weakness    Risk factors: no anemia         Review of Systems   Constitutional: Negative for chills and fever. HENT: Negative for ear pain, sinus pressure and sore throat. Eyes: Negative for pain, discharge and redness. Respiratory: Negative for cough, shortness of breath and wheezing. Cardiovascular: Negative for chest pain and palpitations. Gastrointestinal: Negative for abdominal pain, blood in stool, diarrhea, nausea and vomiting. Genitourinary: Negative for dysuria and frequency. Musculoskeletal: Negative for arthralgias and back pain. Skin: Negative for rash and wound. Neurological: Positive for dizziness and numbness. Negative for weakness and headaches. Hematological: Negative for adenopathy. All other systems reviewed and are negative. Physical Exam  Constitutional:       Appearance: Normal appearance. HENT:      Head: Normocephalic and atraumatic.       Right Ear: Tympanic membrane normal.      Left Ear: Tympanic membrane normal.      Mouth/Throat:      Mouth: Mucous membranes are moist.   Eyes:      Extraocular Movements: Extraocular movements intact. Pupils: Pupils are equal, round, and reactive to light. Cardiovascular:      Rate and Rhythm: Normal rate and regular rhythm. Pulses: Normal pulses. Heart sounds: Normal heart sounds. Pulmonary:      Effort: Pulmonary effort is normal.      Breath sounds: Normal breath sounds. Abdominal:      General: Abdomen is flat. Bowel sounds are normal.   Musculoskeletal: Normal range of motion. Skin:     General: Skin is warm. Capillary Refill: Capillary refill takes less than 2 seconds. Neurological:      General: No focal deficit present. Mental Status: He is alert and oriented to person, place, and time. Sensory: Sensory deficit present. Procedures     MDM  Number of Diagnoses or Management Options  Bradycardia:   Dehydration:   Essential hypertension:   Numbness and tingling in left arm:   Diagnosis management comments: Patient seen and examined. Labs and imaging were ordered. This thought of stroke was considered but patient does not seem to have any strokelike symptoms. Paresthesia in the arm resolved with IV hydration. Given patient's risk factors and bradycardia and patient's coronary artery disease history I did recommend the patient stay for admission Case was discussed with Dr. Wade Lowery who will admit the patient.             --------------------------------------------- PAST HISTORY ---------------------------------------------  Past Medical History:  has a past medical history of BPH (benign prostatic hyperplasia), CAD (coronary artery disease), Carotid artery calcification, DJD (degenerative joint disease), Hyperlipemia, Lung nodule, Macular degeneration, Numbness and tingling in left arm, Numbness and tingling of left side of face, S/P CABG x 6, and Shingles.     Past Surgical History:  has a past surgical history that includes Diagnostic Cardiac Cath Lab Procedure (9/5/00); Cardiac surgery; TURP; Dental surgery; Total hip arthroplasty (Right, 03/28/2011); Total hip arthroplasty (Left, 11/09/2009); other surgical history (02/16/2011); Thoracoscopy (Left, 4/12/2019); TURP (N/A, 5/30/2019); Prostate surgery (Bilateral, 05/27/2019); and Lung removal, partial (Left). Social History:  reports that he quit smoking about 32 years ago. His smoking use included cigarettes. He started smoking about 42 years ago. He has a 10.00 pack-year smoking history. He has never used smokeless tobacco. He reports current alcohol use. He reports that he does not use drugs. Family History: family history includes Alzheimer's Disease in his sister; Heart Disease in his brother, brother, and brother; Other in his father and mother. The patients home medications have been reviewed. Allergies: Nitroglycerin; Biaxin [clarithromycin];  Iodine; and Quinolones    -------------------------------------------------- RESULTS -------------------------------------------------    LABS:  Results for orders placed or performed during the hospital encounter of 10/02/20   CBC Auto Differential   Result Value Ref Range    WBC 8.3 4.5 - 11.5 E9/L    RBC 4.24 3.80 - 5.80 E12/L    Hemoglobin 12.4 (L) 12.5 - 16.5 g/dL    Hematocrit 37.3 37.0 - 54.0 %    MCV 88.0 80.0 - 99.9 fL    MCH 29.2 26.0 - 35.0 pg    MCHC 33.2 32.0 - 34.5 %    RDW 14.2 11.5 - 15.0 fL    Platelets 892 498 - 183 E9/L    MPV 8.6 7.0 - 12.0 fL    Neutrophils % 61.7 43.0 - 80.0 %    Immature Granulocytes % 0.1 0.0 - 5.0 %    Lymphocytes % 20.6 20.0 - 42.0 %    Monocytes % 10.8 2.0 - 12.0 %    Eosinophils % 5.8 0.0 - 6.0 %    Basophils % 1.0 0.0 - 2.0 %    Neutrophils Absolute 5.14 1.80 - 7.30 E9/L    Immature Granulocytes # 0.01 E9/L    Lymphocytes Absolute 1.71 1.50 - 4.00 E9/L    Monocytes Absolute 0.90 0.10 - 0.95 E9/L    Eosinophils Absolute 0.48 0.05 - 0.50 E9/L    Basophils Absolute 0.08 0.00 - 0.20 W6/D   Basic Metabolic Panel   Result Value Ref Range    Sodium 132 132 - 146 mmol/L    Potassium 4.0 3.5 - 5.0 mmol/L    Chloride 100 98 - 107 mmol/L    CO2 25 22 - 29 mmol/L    Anion Gap 7 7 - 16 mmol/L    Glucose 109 (H) 74 - 99 mg/dL    BUN 23 8 - 23 mg/dL    CREATININE 1.3 (H) 0.7 - 1.2 mg/dL    GFR Non-African American 52 >=60 mL/min/1.73    GFR African American >60     Calcium 8.9 8.6 - 10.2 mg/dL   Troponin   Result Value Ref Range    Troponin <0.01 0.00 - 0.03 ng/mL   Brain Natriuretic Peptide   Result Value Ref Range    Pro- (H) 0 - 450 pg/mL   Lactic Acid, Plasma   Result Value Ref Range    Lactic Acid 1.1 0.5 - 2.2 mmol/L   Troponin   Result Value Ref Range    Troponin <0.01 0.00 - 0.03 ng/mL   EKG 12 Lead   Result Value Ref Range    Ventricular Rate 50 BPM    Atrial Rate 50 BPM    P-R Interval 196 ms    QRS Duration 108 ms    Q-T Interval 472 ms    QTc Calculation (Bazett) 430 ms    P Axis 3 degrees    R Axis -57 degrees    T Axis 44 degrees       RADIOLOGY:  Ct Head Wo Contrast    Result Date: 10/2/2020  EXAMINATION: CT OF THE HEAD WITHOUT CONTRAST  10/2/2020 10:07 am TECHNIQUE: CT of the head was performed without the administration of intravenous contrast. Dose modulation, iterative reconstruction, and/or weight based adjustment of the mA/kV was utilized to reduce the radiation dose to as low as reasonably achievable. COMPARISON: November 14, 2014 HISTORY: ORDERING SYSTEM PROVIDED HISTORY: dizziness, concern for TIA TECHNOLOGIST PROVIDED HISTORY: Has a \"code stroke\" or \"stroke alert\" been called? ->No Reason for exam:->dizziness, concern for TIA FINDINGS: No acute intracranial hemorrhage or edema. Areas of hypoattenuation are present in periventricular white matter which appears similar since previous examination suggestive of areas of chronic microvascular ischemic change.  Subcentimeter foci of hypoattenuation are also seen involving head of the caudate nuclei suggestive of foci of chronic lacunar stroke. No abnormal extra-axial fluid collections. There is generalized brain parenchymal volume loss. Paranasal sinuses and mastoid air cells are clear. 1.  No acute intracranial hemorrhage or edema. 2.  Areas of chronic microvascular ischemic change are present within periventricular white matter as well as areas of chronic lacunar stroke involving the bilateral caudate nuclei. If clinical concern persists for acute stroke, MRI brain with diffusion-weighted imaging may be helpful for further evaluation. Ct Chest Wo Contrast    Result Date: 2020  Patient MRN:  [id number] : 1931 Age: 80 years Gender: Male Order Date:  2020 9:51 AM EXAM: CT CHEST WO CONTRAST number of images 326 Technique: Low-dose CT  acquisition technique included one of following options; 1 . Automated exposure control, 2. Adjustment of MA and or KV according to patient's size or 3. Use of iterative reconstruction. INDICATION: R91.1 Pulmonary nodule lung nodule COMPARISON: Previous CT scan 2020 FINDINGS: There is borderline cardiomegaly. 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 There is severe coronary artery calcification. There is COPD with the persistent slightly improved atelectasis and pleural effusion in the left lower lobe. Previously noted 4 mm nodule in the right middle lobe is unchanged. The liver is of normal architecture except for a 1.9 cm low-attenuation lesion the left hepatic lobe without change. 3 cm left renal cyst is present. 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 Mild compression deformity of T2 and T5 are noted. Cardiomegaly with the coronary artery calcification with persistent slightly improved atelectasis and pleural effusion in the left lung base likely mild CHF. Persistent right middle lobe nodule. Continued surveillance is recommended.      Xr Chest Portable    Result Date: 10/2/2020  EXAMINATION: ONE XRAY VIEW OF THE CHEST 10/2/2020 10:22 am COMPARISON: CT of the chest, without contrast 02/29/2020 HISTORY: ORDERING SYSTEM PROVIDED HISTORY: chest pain TECHNOLOGIST PROVIDED HISTORY: Reason for exam:->chest pain What reading provider will be dictating this exam?->CRC FINDINGS: Multiple cardiac monitoring leads overlie the patient's chest.  Multiple sternotomy wires and cardiomediastinal surgical clips compatible with prior coronary artery bypass grafting are not significantly changed. Borderline cardiomegaly, equivalent central pulmonary vascular fullness, a small left basilar pleural effusion, and minimal adjacent subsegmental atelectasis (less likely infiltrate), are all not significantly changed. No other clinically-significant changes are noted. 1.  Borderline cardiomegaly, equivalent central pulmonary vascular fullness, a small left basilar pleural effusion, and minimal adjacent subsegmental atelectasis (less likely infiltrate), are all not significantly changed. EKG: This EKG is signed and interpreted by me. Rate: 50  Rhythm: Incomplete RBBB, LAFB, Sinus  Interpretation: sinus bradycardia  Comparison: stable as compared to patient's most recent EKG      ------------------------- NURSING NOTES AND VITALS REVIEWED ---------------------------  Date / Time Roomed:  10/2/2020  9:50 AM  ED Bed Assignment:  [id number]    The nursing notes within the ED encounter and vital signs as below have been reviewed.      Patient Vitals for the past 24 hrs:   BP Temp Temp src Pulse Resp SpO2 Height Weight   10/02/20 1619 (!) 193/85 98.3 °F (36.8 °C) Oral (!) 49 17 96 % -- 184 lb (83.5 kg)   10/02/20 1500 (!) 147/67 -- -- -- -- -- -- --   10/02/20 1430 128/61 -- -- 51 -- -- -- --   10/02/20 1400 (!) 157/74 -- -- 52 -- -- -- --   10/02/20 1330 (!) 179/77 -- -- 54 -- -- -- --   10/02/20 1300 (!) 210/83 -- -- 52 -- -- -- --   10/02/20 1100 (!) 186/76 -- -- (!) 47

## 2020-10-03 VITALS
BODY MASS INDEX: 27.89 KG/M2 | OXYGEN SATURATION: 96 % | WEIGHT: 184 LBS | TEMPERATURE: 97.6 F | RESPIRATION RATE: 18 BRPM | DIASTOLIC BLOOD PRESSURE: 69 MMHG | HEART RATE: 66 BPM | HEIGHT: 68 IN | SYSTOLIC BLOOD PRESSURE: 149 MMHG

## 2020-10-03 LAB
ALBUMIN SERPL-MCNC: 3.5 G/DL (ref 3.5–5.2)
ALP BLD-CCNC: 69 U/L (ref 40–129)
ALT SERPL-CCNC: 16 U/L (ref 0–40)
ANION GAP SERPL CALCULATED.3IONS-SCNC: 8 MMOL/L (ref 7–16)
AST SERPL-CCNC: 21 U/L (ref 0–39)
BILIRUB SERPL-MCNC: 1.3 MG/DL (ref 0–1.2)
BUN BLDV-MCNC: 24 MG/DL (ref 8–23)
CALCIUM SERPL-MCNC: 8.6 MG/DL (ref 8.6–10.2)
CHLORIDE BLD-SCNC: 102 MMOL/L (ref 98–107)
CHOLESTEROL, TOTAL: 144 MG/DL (ref 0–199)
CO2: 23 MMOL/L (ref 22–29)
CREAT SERPL-MCNC: 1.3 MG/DL (ref 0.7–1.2)
GFR AFRICAN AMERICAN: >60
GFR NON-AFRICAN AMERICAN: 52 ML/MIN/1.73
GLUCOSE BLD-MCNC: 88 MG/DL (ref 74–99)
HCT VFR BLD CALC: 34 % (ref 37–54)
HDLC SERPL-MCNC: 54 MG/DL
HEMOGLOBIN: 11.3 G/DL (ref 12.5–16.5)
LDL CHOLESTEROL CALCULATED: 67 MG/DL (ref 0–99)
MCH RBC QN AUTO: 29.4 PG (ref 26–35)
MCHC RBC AUTO-ENTMCNC: 33.2 % (ref 32–34.5)
MCV RBC AUTO: 88.3 FL (ref 80–99.9)
PDW BLD-RTO: 14.1 FL (ref 11.5–15)
PLATELET # BLD: 187 E9/L (ref 130–450)
PMV BLD AUTO: 8.9 FL (ref 7–12)
POTASSIUM REFLEX MAGNESIUM: 4 MMOL/L (ref 3.5–5)
RBC # BLD: 3.85 E12/L (ref 3.8–5.8)
SODIUM BLD-SCNC: 133 MMOL/L (ref 132–146)
TOTAL PROTEIN: 6.1 G/DL (ref 6.4–8.3)
TRIGL SERPL-MCNC: 114 MG/DL (ref 0–149)
VLDLC SERPL CALC-MCNC: 23 MG/DL
WBC # BLD: 7.5 E9/L (ref 4.5–11.5)

## 2020-10-03 PROCEDURE — 6360000002 HC RX W HCPCS: Performed by: INTERNAL MEDICINE

## 2020-10-03 PROCEDURE — 96372 THER/PROPH/DIAG INJ SC/IM: CPT

## 2020-10-03 PROCEDURE — 6370000000 HC RX 637 (ALT 250 FOR IP): Performed by: INTERNAL MEDICINE

## 2020-10-03 PROCEDURE — 85027 COMPLETE CBC AUTOMATED: CPT

## 2020-10-03 PROCEDURE — 80053 COMPREHEN METABOLIC PANEL: CPT

## 2020-10-03 PROCEDURE — G0378 HOSPITAL OBSERVATION PER HR: HCPCS

## 2020-10-03 PROCEDURE — 80061 LIPID PANEL: CPT

## 2020-10-03 PROCEDURE — APPSS45 APP SPLIT SHARED TIME 31-45 MINUTES: Performed by: PHYSICIAN ASSISTANT

## 2020-10-03 PROCEDURE — 36415 COLL VENOUS BLD VENIPUNCTURE: CPT

## 2020-10-03 PROCEDURE — 99215 OFFICE O/P EST HI 40 MIN: CPT | Performed by: INTERNAL MEDICINE

## 2020-10-03 RX ORDER — METOPROLOL SUCCINATE 25 MG/1
25 TABLET, EXTENDED RELEASE ORAL DAILY
Status: DISCONTINUED | OUTPATIENT
Start: 2020-10-03 | End: 2020-10-03 | Stop reason: HOSPADM

## 2020-10-03 RX ADMIN — ATORVASTATIN CALCIUM 40 MG: 40 TABLET, FILM COATED ORAL at 08:26

## 2020-10-03 RX ADMIN — ENOXAPARIN SODIUM 40 MG: 40 INJECTION SUBCUTANEOUS at 08:25

## 2020-10-03 RX ADMIN — METOPROLOL SUCCINATE 25 MG: 25 TABLET, EXTENDED RELEASE ORAL at 11:15

## 2020-10-03 RX ADMIN — ASPIRIN 81 MG: 81 TABLET, COATED ORAL at 08:26

## 2020-10-03 ASSESSMENT — PAIN SCALES - GENERAL: PAINLEVEL_OUTOF10: 0

## 2020-10-03 NOTE — CONSULTS
Inpatient Cardiology Consultation      Reason for Consult:  Bradycardia, left arm pan    Consulting Physician: Dr. Marcio Medina    Requesting Physician:  Dr. Lina Ferrer    Date of Consultation: 10/3/2020    HISTORY OF PRESENT ILLNESS:    This is a very pleasant 80year old gentleman who is known to the practice through Dr. Hossein Watt with a known medical history of CAD (s/p CABG 2000 with intermediate risk stress in 1/18 which was medically managed), Iodine allergy, CARLO invasive adenocarcinoma (s/p robotic VATS, LLL wedge resection, LLL lobectomy in 4/19), RUL spiculated nodule (7 x 5 mm on CT chest 4/19, followed by pulmonary). CKD stage 2, mild AI, hyperlipidemia, DJD, BPH, PVD (carotid U/S 12/18 noting 50-69% YEIMY and 99-66% LICA). Echo 4/19 noted EF 60%, no WMA, trace MR/TR, mild AI. He reports compliance on his medications. He is able to do ADLs and ambulate without any exertional chest discomfort or significant MACDONALD. Yesterday at 5 am, he woke up with severe left hand discomfort noted as a sharp sensation which was persistent for about an hour. At that point, he then noted a dull left arm \"aching and throbbing\" which was persistent for a few hours. He denied any chest pain, palpitations, or SOB. He called Dr. Saurav Watt office and was advised for ER evaluation. Patient noted mild dizziness upon arrival to ED. Upon arrival to ER, /79, EKG noted SB rate 50 with nonspecific st/t wave changes, CXR noted cardiomegaly with small left basilar pleural effusion and atelectasis, CT head noting chronic lacunar infarct in bilateral caudate nucleus, Na 132, K 4.0 BUN 23, creatinine 1.3, pBNP 654, troponin <0.01, bilirubin 1.3, WBC 8.3, H/H 12.4/37.3, . Patient was administered 1 L IVF. Overnight, telemetry notes NSR/SB (Rates 50-60s). He states, \"I feel 100%, I'm ready to go home. \"      Please note: past medical records were reviewed per electronic medical record (EMR) - see detailed reports under Past Medical/ Surgical History. Past Medical History:    Past Medical History:   Diagnosis Date    BPH (benign prostatic hyperplasia)     CAD (coronary artery disease)     Carotid artery calcification     DJD (degenerative joint disease)     Hyperlipemia     Lung nodule     Macular degeneration     Numbness and tingling in left arm 2/2/2018    Numbness and tingling of left side of face 2/2/2018    S/P CABG x 6 2000    LIMA-LAD, LYNNETTE-LCx,SVG-OM,SVG-LPL,SVG-PDA-RPL    Shingles      · CAD (s/p CABG x 6 in 2000 (Dr. Belia Gavin)  with intermediate risk stress in 1/18 which was medically managed)  · Iodine allergy  · CARLO invasive adenocarcinoma (s/p robotic VATS, LLL wedge resection, LLL lobectomy in 4/19)  · RUL spiculated nodule (7 x 5 mm on CT chest 4/19, followed by pulmonary)  · CKD stage 2  · mild AI  · Hyperlipidemia  · DJD  · BPH  · PVD (carotid U/S 12/18 noting 50-69% YEIMY and 26-68% LICA)    Echo 22/93:  Summary   Limited study. Ejection fraction is visually estimated at 65%. No regional wall motion abnormalities seen. Normal right ventricle structure and function. Mild tricuspid regurgitation. RVSP is 35 mmHg. No evidence for hemodynamically significant pericardial effusion. Echo 4/19:  Summary   Ejection fraction is visually estimated at 60%. No regional wall motion abnormalities seen. Normal right ventricle structure and function. Mild aortic regurgitation is noted. Physiologic and/or trace mitral regurgitation is present. Physiologic and/or trace tricuspid regurgitation. RVSP is 39 mmHg. Past Surgical History:    Past Surgical History:   Procedure Laterality Date    CARDIAC SURGERY      6 vessel in 2000    DENTAL SURGERY      root canal    DIAGNOSTIC CARDIAC CATH LAB PROCEDURE  9/5/00    LUNG REMOVAL, PARTIAL Left     march    OTHER SURGICAL HISTORY  02/16/2011    Injection right hip  ADRIANNE Ely MD    PROSTATE SURGERY Bilateral 05/27/2019    THORACOSCOPY Left 4/12/2019 BRONCHOSCOPY LEFT THORACOSCOPY ROBOTIC VIDEO ASSISTED , WEDGE RESECTION, POSS. LEFT LOWER LOBECTOMY performed by Saúl Lynn MD at [address] Right 03/28/2011    Right BRIONNA  Timothy Roque MD    TOTAL HIP ARTHROPLASTY Left 11/09/2009    Left BRIONNA  Timothy Roque MD    TURP      TURP N/A 5/30/2019    CYSTOSCOPY, RETROGRADE PYELOGRAM,  URERTHRAL DILITATION, TRANSURETHRAL RESECTION PROSTATE performed by Jackie Beckwith MD at [facility] OR       Medications Prior to admit:  Prior to Admission medications    Medication Sig Start Date End Date Taking? Authorizing Provider   aspirin 81 MG EC tablet Take 1 tablet by mouth daily 10/26/19   Raheel Story MD   metoprolol succinate (TOPROL XL) 25 MG extended release tablet Take 25 mg by mouth daily    Historical Provider, MD   Calcium-Magnesium-Vitamin D (CALCIUM 1200+D3 PO) Take 1 tablet by mouth daily. Historical Provider, MD   atorvastatin (LIPITOR) 40 MG tablet Take 40 mg by mouth daily. Historical Provider, MD   Multiple Vitamins-Minerals (CENTRUM-LUTEIN PO) Take 1 tablet by mouth daily. Historical Provider, MD   Omega-3 Fatty Acids (FISH OIL) 1000 MG CAPS Take 1,000 mg by mouth daily.     Historical Provider, MD   Bevacizumab (AVASTIN IV) Infuse intravenously See Admin Instructions Gets injection in eyes about every 11 weeks    Historical Provider, MD       Current Medications:    Current Facility-Administered Medications: metoprolol succinate (TOPROL XL) extended release tablet 25 mg, 25 mg, Oral, Daily  sodium chloride flush 0.9 % injection 10 mL, 10 mL, Intravenous, PRN  aspirin chewable tablet 324 mg, 324 mg, Oral, Once  aspirin EC tablet 81 mg, 81 mg, Oral, Daily  atorvastatin (LIPITOR) tablet 40 mg, 40 mg, Oral, Daily  acetaminophen (TYLENOL) tablet 650 mg, 650 mg, Oral, Q6H PRN **OR** acetaminophen (TYLENOL) suppository 650 mg, 650 mg, Rectal, Q6H PRN  magnesium hydroxide (MILK OF MAGNESIA) 400 MG/5ML suspension 30 mL, 30 mL, Oral, Daily PRN  promethazine (PHENERGAN) tablet 12.5 mg, 12.5 mg, Oral, Q6H PRN **OR** ondansetron (ZOFRAN) injection 4 mg, 4 mg, Intravenous, Q6H PRN  enoxaparin (LOVENOX) injection 40 mg, 40 mg, Subcutaneous, Daily    Allergies:  Nitroglycerin; Biaxin [clarithromycin]; Iodine; and Quinolones    Social History:    Social History     Socioeconomic History    Marital status:       Spouse name: Not on file    Number of children: Not on file    Years of education: Not on file    Highest education level: Not on file   Occupational History    Occupation: retired-    Social Needs    Financial resource strain: Not on file    Food insecurity     Worry: Not on file     Inability: Not on file   [employer] needs     Medical: Not on file     Non-medical: Not on file   Tobacco Use    Smoking status: Former Smoker     Packs/day: 1.00     Years: 10.00     Pack years: 10.00     Types: Cigarettes     Start date: 1978     Last attempt to quit: 1988     Years since quittin.0    Smokeless tobacco: Never Used   Substance and Sexual Activity    Alcohol use: Yes     Comment: socially    Drug use: No    Sexual activity: Not Currently   Lifestyle    Physical activity     Days per week: Not on file     Minutes per session: Not on file    Stress: Not on file   Relationships    Social connections     Talks on phone: Not on file     Gets together: Not on file     Attends Anabaptism service: Not on file     Active member of club or organization: Not on file     Attends meetings of clubs or organizations: Not on file     Relationship status: Not on file    Intimate partner violence     Fear of current or ex partner: Not on file     Emotionally abused: Not on file     Physically abused: Not on file     Forced sexual activity: Not on file   Other Topics Concern    Not on file   Social History Narrative    Not on file       Family History:   Family History   Problem Relation Age of Onset    Other Mother         accident age 28 years , Antonella Arnold. was 5 months old    Other Father         accident age 43 decesed 8 years when Father passed    Heart Disease Brother     Alzheimer's Disease Sister     Heart Disease Brother     Heart Disease Brother        REVIEW OF SYSTEMS:     · Constitutional: Denies fatigue, fevers, chills or night sweats  · Eyes: Denies visual changes or drainage  · ENT: Denies headaches or hearing loss. No mouth sores or sore throat. No epistaxis   · Cardiovascular: Denies chest pain, pressure or palpitations. No lower extremity swelling. · Respiratory: Denies MACDONALD, cough, orthopnea or PND. No hemoptysis   · Gastrointestinal: Denies hematemesis or anorexia. No hematochezia or melena    · Genitourinary: Denies urgency, dysuria or hematuria. · Musculoskeletal: Denies gait disturbance, weakness or joint complaints  · Integumentary: Denies rash, hives or pruritis   · Neurological: Denies dizziness, headaches or seizures. No numbness or tingling  · Psychiatric: Denies anxiety or depression. · Endocrine: Denies temperature intolerance. No recent weight change. .  · Hematologic/Lymphatic: Denies abnormal bruising or bleeding. No swollen lymph nodes    PHYSICAL EXAM:   BP (!) 149/69   Pulse 66   Temp 97.6 °F (36.4 °C) (Oral)   Resp 18   Ht 5' 7.5\" (1.715 m)   Wt 184 lb (83.5 kg)   SpO2 96%   BMI 28.39 kg/m²   CONST:  Well developed, well nourished who appears of stated age. Awake, alert and cooperative. No apparent distress. HEENT:   Head- Normocephalic, atraumatic   Eyes- Conjunctivae pink, anicteric  Throat- Oral mucosa pink and moist  Neck-  No stridor, trachea midline, no jugular venous distention. No carotid bruit. CHEST: Chest symmetrical and non-tender to palpation.  No accessory muscle use or intercostal retractions  RESPIRATORY: Lung sounds - clear throughout fields   CARDIOVASCULAR:     Heart Inspection- shows no noted pulsations  Heart Palpation- no heaves or thrills; PMI is non-displaced   Heart Ausculation- Regular rate and rhythm, 1/6 CARLOS MANUEL noted  PV: No lower extremity edema. No varicosities. Pedal pulses palpable, no clubbing or cyanosis   ABDOMEN: Soft, non-tender to light palpation. Bowel sounds present. No palpable masses no organomegaly; no abdominal bruit  MS:  No atrophy or abnormal movements. : Deferred  SKIN: Warm and dry no statis dermatitis or ulcers   NEURO / PSYCH: Oriented to person, place and time. Speech clear and appropriate. Follows all commands. Pleasant affect     DATA:    ECG / Tele strips: SB rate 50, iRBBB/LAFB, QTc 430, nonspecific st/t wave changes  RS: NSR/SB (rates 50-60s)    Diagnostic:    CXR:  FINDINGS:   Multiple cardiac monitoring leads overlie the patient's chest.  Multiple   sternotomy wires and cardiomediastinal surgical clips compatible with prior   coronary artery bypass grafting are not significantly changed. Borderline cardiomegaly, equivalent central pulmonary vascular fullness, a   small left basilar pleural effusion, and minimal adjacent subsegmental   atelectasis (less likely infiltrate), are all not significantly changed. No other clinically-significant changes are noted.        Impression:         1.  Borderline cardiomegaly, equivalent central pulmonary vascular fullness,   a small left basilar pleural effusion, and minimal adjacent subsegmental   atelectasis (less likely infiltrate), are all not significantly changed.             CT head:     Impression:          1.  No acute intracranial hemorrhage or edema. 2. Norberto Domenica of chronic microvascular ischemic change are present within   periventricular white matter as well as areas of chronic lacunar stroke   involving the bilateral caudate nuclei.  If clinical concern persists for   acute stroke, MRI brain with diffusion-weighted imaging may be helpful for   further evaluation.           No intake or output data in the 24 hours ending 10/03/20 1554    Labs:   CBC: Recent Labs     10/02/20  1007 10/03/20  0430   WBC 8.3 7.5   HGB 12.4* 11.3*   HCT 37.3 34.0*    187     BMP:   Recent Labs     10/02/20  1007 10/03/20  0430    133   K 4.0 4.0   CO2 25 23   BUN 23 24*   CREATININE 1.3* 1.3*   LABGLOM 52 52   CALCIUM 8.9 8.6     Mag: No results for input(s): MG in the last 72 hours. Phos: No results for input(s): PHOS in the last 72 hours. TSH: No results for input(s): TSH in the last 72 hours. HgA1c: No results found for: LABA1C  No results found for: EAG  proBNP:   Recent Labs     10/02/20  1007   PROBNP 654*     PT/INR: No results for input(s): PROTIME, INR in the last 72 hours. APTT:No results for input(s): APTT in the last 72 hours. CARDIAC ENZYMES:  Recent Labs     10/02/20  1007 10/02/20  1506 10/02/20  2017   TROPONINI <0.01 <0.01 <0.01     FASTING LIPID PANEL:  Lab Results   Component Value Date    CHOL 144 10/03/2020    HDL 54 10/03/2020    LDLCALC 67 10/03/2020    TRIG 114 10/03/2020     LIVER PROFILE:  Recent Labs     10/03/20  0430   AST 21   ALT 16   LABALBU 3.5     A & P are to follow [facility] [place] [id number] [facility]       The above documentation has been prepared under my direction and personally reviewed by me in its entirety. I confirm that the note above accurately reflects all work, treatment, procedures, and medical decision making performed by me.     The patient's history was independently obtained. The patient was independently examined. Electrocardiogram, prior and present cardiovascular assessment, and laboratory studies were reviewed.     The patient is an 80-year-old white male known to [facility] with primary cardiovascular care provided by Kathy Sanford. He has a known history of coronary atherosclerosis with surgical revascularization in 2000 with the details presently not available for review as well as that of hyperlipidemia, carcinoma of the left upper lobe and chronic kidney disease.   He has most recently undergone objective assessment with a gated vasodilator myocardial perfusion imaging study in January, 2018 demonstrating a moderate size mild intensity reversible mid inferolateral perfusion abnormality without associated regional wall motion abnormalities with normal left ventricular systolic function and an echocardiogram of October, 2019 demonstrating evidence of a normal-sized left ventricular chamber with normal left ventricular systolic function and borderline elevation of pulmonary artery pressures. He is normally active and without cardiovascular symptoms and remained in his usual state of health until the morning of his hospitalization when he was awakened by an intense pain in the palm of his left hand without additional symptomatology with the subsequent development of paresthesias of his left upper extremity. He denies any chest discomfort or other ischemic equivalents nor additional manifestations of decompensated left ventricular systolic dysfunction or volume overload. Following the persistence of the symptoms for approximately 3 to 4 hours, he contacted both his primary care provider and cardiologist with emergency room evaluation recommended. On the way from his home to the emergency room, he developed mild symptoms of lightheadedness without near syncope or loss of consciousness in the absence of arrhythmia related symptoms or symptoms of a focal neurologic origin. The emergency room, a resting electrocardiogram reviewed at the time of evaluation demonstrated evidence of sinus bradycardia with an incomplete right bundle branch block conduction pattern nonspecific ST changes and a chest x-ray again reviewed demonstrated evidence of borderline cardiomegaly with volume loss of his left lung and a possible small left pleural effusion. Vital signs throughout his hospitalization have demonstrated evidence of systolic hypertension.   Cardiac biomarkers and a proBNP level were unremarkable. Subsequent to hospitalization, he is noted no development of additional symptoms.     At the time of evaluation, his medications and allergies were reviewed as well as that of his past medical history and review of systems is documented.     On examination, he appears in no discomfort nor distress and is hemodynamically stable with vital signs as documented and evidence of sinus bradycardia via telemetry monitoring including that of nocturnal heart rates in the mid 40s without additional significant bradycardia arrhythmias. Jugular venous pressure appears normal with no identified carotid bruits. Lung fields are clear to auscultation. Cardiac examination is notable for a regular rate and rhythm with no gallop rhythm or cardiac murmur. A benign abdominal examination is present no peripheral edema noted.     Diagnostic Assessment and Plan: On a clinical basis, the patient presents with symptoms atypical for that of a cardiovascular origin and in the absence of objective evidence suggestion of significant progression of his existing coronary atherosclerosis. Presently, no additional cardiovascular assessment is indicated with need of careful monitoring of his blood pressure and potential further modification of his antihypertensive medical regimen. In the absence of significant bradycardia arrhythmias, no alteration of his beta-blocker dose is indicated in spite of his nocturnal bradycardia. Continue aggressive risk factor modification blood pressure and serum lipids will remain essential to reducing risk of future atherosclerotic development. We will further evaluate him during hospitalization should additional cardiovascular difficulties or concerns arise and defer additional management to primary care. Continued cardiovascular follow-up with his primary cardiologist, Sarah Romero would be advisable as presently scheduled.     Thank you for allowing me to participate in your patient's care. Please feel free to contact me if you have any questions or concerns.     Jaret Parrish [facility], [address] [facility]

## 2020-10-03 NOTE — CONSULTS
The above documentation has been prepared under my direction and personally reviewed by me in its entirety. I confirm that the note above accurately reflects all work, treatment, procedures, and medical decision making performed by me. The patient's history was independently obtained. The patient was independently examined. Electrocardiogram, prior and present cardiovascular assessment, and laboratory studies were reviewed. The patient is an 70-year-old white male known to [facility] with primary cardiovascular care provided by Magdalene Torres. He has a known history of coronary atherosclerosis with surgical revascularization in 2000 with the details presently not available for review as well as that of hyperlipidemia, carcinoma of the left upper lobe and chronic kidney disease. He has most recently undergone objective assessment with a gated vasodilator myocardial perfusion imaging study in January, 2018 demonstrating a moderate size mild intensity reversible mid inferolateral perfusion abnormality without associated regional wall motion abnormalities with normal left ventricular systolic function and an echocardiogram of October, 2019 demonstrating evidence of a normal-sized left ventricular chamber with normal left ventricular systolic function and borderline elevation of pulmonary artery pressures. He is normally active and without cardiovascular symptoms and remained in his usual state of health until the morning of his hospitalization when he was awakened by an intense pain in the palm of his left hand without additional symptomatology with the subsequent development of paresthesias of his left upper extremity. He denies any chest discomfort or other ischemic equivalents nor additional manifestations of decompensated left ventricular systolic dysfunction or volume overload.   Following the persistence of the symptoms for approximately 3 to 4 hours, he contacted both his primary care provider and cardiologist with emergency room evaluation recommended. On the way from his home to the emergency room, he developed mild symptoms of lightheadedness without near syncope or loss of consciousness in the absence of arrhythmia related symptoms or symptoms of a focal neurologic origin. The emergency room, a resting electrocardiogram reviewed at the time of evaluation demonstrated evidence of sinus bradycardia with an incomplete right bundle branch block conduction pattern nonspecific ST changes and a chest x-ray again reviewed demonstrated evidence of borderline cardiomegaly with volume loss of his left lung and a possible small left pleural effusion. Vital signs throughout his hospitalization have demonstrated evidence of systolic hypertension. Cardiac biomarkers and a proBNP level were unremarkable. Subsequent to hospitalization, he is noted no development of additional symptoms. At the time of evaluation, his medications and allergies were reviewed as well as that of his past medical history and review of systems is documented. On examination, he appears in no discomfort nor distress and is hemodynamically stable with vital signs as documented and evidence of sinus bradycardia via telemetry monitoring including that of nocturnal heart rates in the mid 40s without additional significant bradycardia arrhythmias. Jugular venous pressure appears normal with no identified carotid bruits. Lung fields are clear to auscultation. Cardiac examination is notable for a regular rate and rhythm with no gallop rhythm or cardiac murmur. A benign abdominal examination is present no peripheral edema noted. Diagnostic Assessment and Plan: On a clinical basis, the patient presents with symptoms atypical for that of a cardiovascular origin and in the absence of objective evidence suggestion of significant progression of his existing coronary atherosclerosis.   Presently, no additional cardiovascular assessment is indicated with need of careful monitoring of his blood pressure and potential further modification of his antihypertensive medical regimen. In the absence of significant bradycardia arrhythmias, no alteration of his beta-blocker dose is indicated in spite of his nocturnal bradycardia. Continue aggressive risk factor modification blood pressure and serum lipids will remain essential to reducing risk of future atherosclerotic development. We will further evaluate him during hospitalization should additional cardiovascular difficulties or concerns arise and defer additional management to primary care. Continued cardiovascular follow-up with his primary cardiologist, Sarah Romero would be advisable as presently scheduled. Thank you for allowing me to participate in your patient's care. Please feel free to contact me if you have any questions or concerns. Katty George.  Alonso Celeste, [address] [facility] Cardiology

## 2020-10-03 NOTE — H&P
Injection right hip  T. Uriel More MD    PROSTATE SURGERY Bilateral 05/27/2019    THORACOSCOPY Left 4/12/2019    BRONCHOSCOPY LEFT THORACOSCOPY ROBOTIC VIDEO ASSISTED , WEDGE RESECTION, POSS. LEFT LOWER LOBECTOMY performed by Jasmyn Mendoza MD at [address] Right 03/28/2011    Right BRIONNA  Renay Ngo MD    TOTAL HIP ARTHROPLASTY Left 11/09/2009    Left BRIONNA  Renay Ngo MD    TURP      TURP N/A 5/30/2019    CYSTOSCOPY, RETROGRADE PYELOGRAM,  URERTHRAL DILITATION, TRANSURETHRAL RESECTION PROSTATE performed by Caio Wolff MD at [address]       Medications Prior to Admission:    Medications Prior to Admission: aspirin 81 MG EC tablet, Take 1 tablet by mouth daily  metoprolol succinate (TOPROL XL) 25 MG extended release tablet, Take 25 mg by mouth daily  Calcium-Magnesium-Vitamin D (CALCIUM 1200+D3 PO), Take 1 tablet by mouth daily. atorvastatin (LIPITOR) 40 MG tablet, Take 40 mg by mouth daily. Multiple Vitamins-Minerals (CENTRUM-LUTEIN PO), Take 1 tablet by mouth daily. Omega-3 Fatty Acids (FISH OIL) 1000 MG CAPS, Take 1,000 mg by mouth daily. Bevacizumab (AVASTIN IV), Infuse intravenously See Admin Instructions Gets injection in eyes about every 11 weeks    Allergies:    Nitroglycerin; Biaxin [clarithromycin]; Iodine; and Quinolones    Social History:    reports that he quit smoking about 32 years ago. His smoking use included cigarettes. He started smoking about 42 years ago. He has a 10.00 pack-year smoking history. He has never used smokeless tobacco. He reports current alcohol use. He reports that he does not use drugs. Family History:   family history includes Alzheimer's Disease in his sister; Heart Disease in his brother, brother, and brother; Other in his father and mother.     REVIEW OF SYSTEMS:  Gen: Negative for nausea, vomiting, diarrhea, fever, chills, night sweats  HEENT: Negative for double vision, blurred vision, sore throat   Heart:  history of known coronary disease with CABG in 2000  Lungs: Negative for wheezes, asthma or SOB  GI: Negative for nausea, vomiting  : Negative for dysuria, hematuria  Endo: Negative for diabetes, thyroid disorders  Heme: Negative for DVT or bleeding disorders  Psych: Negative for Depression or anxiety  Ortho: Age-appropriate aches and pain    PHYSICAL EXAM:    Vitals:  BP (!) 149/69   Pulse 66   Temp 97.6 °F (36.4 °C) (Oral)   Resp 18   Ht 5' 7.5\" (1.715 m)   Wt 184 lb (83.5 kg)   SpO2 96%   BMI 28.39 kg/m²     General:  Awake, alert, oriented X 3. Well developed, well nourished, well groomed. No apparent distress. HEENT:  Normocephalic, atraumatic. Pupils equal, round, reactive to light. No scleral icterus. No conjunctival injection. Normal lips, teeth, and gums. No nasal discharge. Neck:  Supple  Heart:  RRR, no murmurs, gallops, or rubs  Lungs: Decreased bibasilar   abdomen:   Bowel sounds present, soft, nontender, no masses, no organomegaly, no peritoneal signs  Extremities:  No clubbing, cyanosis, or edema  Skin:  Warm and dry, no open lesions or rash  Neuro:  Cranial nerves 2-12 intact, no focal deficits  Breast: deferred  Rectal: deferred  Genitalia:  deferred    LABS:  CBC:   Lab Results   Component Value Date    WBC 7.5 10/03/2020    RBC 3.85 10/03/2020    HGB 11.3 10/03/2020    HCT 34.0 10/03/2020    MCV 88.3 10/03/2020    MCH 29.4 10/03/2020    MCHC 33.2 10/03/2020    RDW 14.1 10/03/2020     10/03/2020    MPV 8.9 10/03/2020     CMP:    Lab Results   Component Value Date     10/03/2020    K 4.0 10/03/2020     10/03/2020    CO2 23 10/03/2020    BUN 24 10/03/2020    CREATININE 1.3 10/03/2020    GFRAA >60 10/03/2020    LABGLOM 52 10/03/2020    GLUCOSE 88 10/03/2020    GLUCOSE 109 05/08/2012    PROT 6.1 10/03/2020    LABALBU 3.5 10/03/2020    LABALBU 4.2 05/08/2012    CALCIUM 8.6 10/03/2020    BILITOT 1.3 10/03/2020    ALKPHOS 69 10/03/2020    AST 21 10/03/2020    ALT 16 10/03/2020 BUN/Creatinine:    Lab Results   Component Value Date    BUN 24 10/03/2020    CREATININE 1.3 10/03/2020     Hepatic Function Panel:    Lab Results   Component Value Date    ALKPHOS 69 10/03/2020    ALT 16 10/03/2020    AST 21 10/03/2020    PROT 6.1 10/03/2020    BILITOT 1.3 10/03/2020    BILIDIR 0.2 04/26/2019    IBILI 0.4 04/26/2019    LABALBU 3.5 10/03/2020    LABALBU 4.2 05/08/2012     Albumin:    Lab Results   Component Value Date    LABALBU 3.5 10/03/2020    LABALBU 4.2 05/08/2012     Calcium:    Lab Results   Component Value Date    CALCIUM 8.6 10/03/2020     Ionized Calcium:  No results found for: IONCA  Magnesium:    Lab Results   Component Value Date    MG 2.1 11/14/2014     Results for Michelle Carter (MRN [id number]) as of 10/3/2020 13:20   Ref. Range 10/2/2020 15:06 10/2/2020 20:17   Troponin Latest Ref Range: 0.00 - 0.03 ng/mL <0.01 <0.01     Ct Head Wo Contrast    Result Date: 10/2/2020  EXAMINATION: CT OF THE HEAD WITHOUT CONTRAST  10/2/2020 10:07 am TECHNIQUE: CT of the head was performed without the administration of intravenous contrast. Dose modulation, iterative reconstruction, and/or weight based adjustment of the mA/kV was utilized to reduce the radiation dose to as low as reasonably achievable. COMPARISON: November 14, 2014 HISTORY: ORDERING SYSTEM PROVIDED HISTORY: dizziness, concern for TIA TECHNOLOGIST PROVIDED HISTORY: Has a \"code stroke\" or \"stroke alert\" been called? ->No Reason for exam:->dizziness, concern for TIA FINDINGS: No acute intracranial hemorrhage or edema. Areas of hypoattenuation are present in periventricular white matter which appears similar since previous examination suggestive of areas of chronic microvascular ischemic change. Subcentimeter foci of hypoattenuation are also seen involving head of the caudate nuclei suggestive of foci of chronic lacunar stroke. No abnormal extra-axial fluid collections. There is generalized brain parenchymal volume loss.   Paranasal contrast 02/29/2020 HISTORY: ORDERING SYSTEM PROVIDED HISTORY: chest pain TECHNOLOGIST PROVIDED HISTORY: Reason for exam:->chest pain What reading provider will be dictating this exam?->CRC FINDINGS: Multiple cardiac monitoring leads overlie the patient's chest.  Multiple sternotomy wires and cardiomediastinal surgical clips compatible with prior coronary artery bypass grafting are not significantly changed. Borderline cardiomegaly, equivalent central pulmonary vascular fullness, a small left basilar pleural effusion, and minimal adjacent subsegmental atelectasis (less likely infiltrate), are all not significantly changed. No other clinically-significant changes are noted. 1.  Borderline cardiomegaly, equivalent central pulmonary vascular fullness, a small left basilar pleural effusion, and minimal adjacent subsegmental atelectasis (less likely infiltrate), are all not significantly changed. ASSESSMENT:      Active Problems:    Chest pain  Resolved Problems:    * No resolved hospital problems. *  Known history of coronary artery disease  Hyperlipidemia  Previous history of lung cancer left-sided    PLAN:    Patient has been asymptomatic since the time he was admitted and his troponins are negative. His EKG is nonspecific and he did have a stress test in 2018 which demonstrated a moderate sized mild intensity reversible mid inferolateral perfusion abnormality without associated regional wall motion abnormalities of left ventricle. Subsequent echogram in 2019 also showed normal LVEF with borderline elevation of pulmonary artery pressures.   He currently does not have any symptoms and no stress testing is planned by cardiology    Patient can be discharged for outpatient follow-up      Please note that over 50 minutes was spent in evaluating the patient, review of records and results, discussion with staff/family, etc.      Electronically signed by Esther Hernández MD on 10/3/2020 at 1:20 PM

## 2020-10-03 NOTE — PROGRESS NOTES
Internal Medicine Discharge Summary    Patient ID: Amie Sainz      Patient's PCP: Jarrod Skinner MD    Admit Date: 10/2/2020     Discharge Date:  10/3/2020    Admitting Physician: Darleen Jones DO     Discharge Physician: Genaro Lauren MD     Discharge Diagnoses: Active Hospital Problems    Diagnosis Date Noted    Chest pain [R07.9] 10/02/2020       The patient was seen and examined on day of discharge and this discharge summary is in conjunction with any daily progress note from day of discharge. Hospital Course: This is an 58-year-old male with a past history significant for coronary disease status post open heart surgery in 2000. He also has a history of pulmonary nodules he had a history of malignant neoplasm of the left lower lobe of the lung and previous history of UTI and BPH. Patient had a stress test in 2018. He also has had left lower lobe surgery and more importantly he was told most recently that he is free of recurrence of lung carcinoma. On Friday early morning he had experienced some symptoms of extreme left hand pain and he was concerned so he waited till the cardiology office open and requested to be able to speak to someone to triage his symptoms. He was told to go to the emergency room. He subsequently called his PCP who reiterated that given his symptoms it was hard to make out if or not it was angina. Patient also felt some numbness across his left wrist and forearm. He did not break out in a sweat he was not short of breath. He was brought to the ER and troponins were cycled overnight. At this point he has been assessed by cardiology and no further testing is requested and he could be discharged      Exam:     BP (!) 149/69   Pulse 66   Temp 97.6 °F (36.4 °C) (Oral)   Resp 18   Ht 5' 7.5\" (1.715 m)   Wt 184 lb (83.5 kg)   SpO2 96%   BMI 28.39 kg/m²     General appearance: No apparent distress, appears stated age and cooperative.   HEENT: Pupils equal, No abnormal extra-axial fluid collections. There is generalized brain parenchymal volume loss. Paranasal sinuses and mastoid air cells are clear. 1.  No acute intracranial hemorrhage or edema. 2.  Areas of chronic microvascular ischemic change are present within periventricular white matter as well as areas of chronic lacunar stroke involving the bilateral caudate nuclei. If clinical concern persists for acute stroke, MRI brain with diffusion-weighted imaging may be helpful for further evaluation. Ct Chest Wo Contrast    Result Date: 2020  Patient MRN:  [id number] : 1931 Age: 80 years Gender: Male Order Date:  2020 9:51 AM EXAM: CT CHEST WO CONTRAST number of images 326 Technique: Low-dose CT  acquisition technique included one of following options; 1 . Automated exposure control, 2. Adjustment of MA and or KV according to patient's size or 3. Use of iterative reconstruction. INDICATION: R91.1 Pulmonary nodule lung nodule COMPARISON: Previous CT scan 2020 FINDINGS: There is borderline cardiomegaly. 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 There is severe coronary artery calcification. There is COPD with the persistent slightly improved atelectasis and pleural effusion in the left lower lobe. Previously noted 4 mm nodule in the right middle lobe is unchanged. The liver is of normal architecture except for a 1.9 cm low-attenuation lesion the left hepatic lobe without change. 3 cm left renal cyst is present. 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 Mild compression deformity of T2 and T5 are noted. Cardiomegaly with the coronary artery calcification with persistent slightly improved atelectasis and pleural effusion in the left lung base likely mild CHF. Persistent right middle lobe nodule. Continued surveillance is recommended.      Xr Chest Portable    Result Date: 10/2/2020  EXAMINATION: ONE XRAY VIEW OF THE CHEST 10/2/2020 10:22 am COMPARISON: CT of the chest, without contrast 02/29/2020 HISTORY: ORDERING SYSTEM PROVIDED HISTORY: chest pain TECHNOLOGIST PROVIDED HISTORY: Reason for exam:->chest pain What reading provider will be dictating this exam?->CRC FINDINGS: Multiple cardiac monitoring leads overlie the patient's chest.  Multiple sternotomy wires and cardiomediastinal surgical clips compatible with prior coronary artery bypass grafting are not significantly changed. Borderline cardiomegaly, equivalent central pulmonary vascular fullness, a small left basilar pleural effusion, and minimal adjacent subsegmental atelectasis (less likely infiltrate), are all not significantly changed. No other clinically-significant changes are noted. 1.  Borderline cardiomegaly, equivalent central pulmonary vascular fullness, a small left basilar pleural effusion, and minimal adjacent subsegmental atelectasis (less likely infiltrate), are all not significantly changed. Disposition: Stable for home    Discharge Instructions/Follow-up: Given    Code Status:  Full Code    Activity: activity as tolerated    Diet: cardiac diet    Labs:  For convenience and continuity at follow-up the following most recent labs are provided:      CBC:    Lab Results   Component Value Date    WBC 7.5 10/03/2020    HGB 11.3 10/03/2020    HCT 34.0 10/03/2020     10/03/2020       Renal:    Lab Results   Component Value Date     10/03/2020    K 4.0 10/03/2020     10/03/2020    CO2 23 10/03/2020    BUN 24 10/03/2020    CREATININE 1.3 10/03/2020    CALCIUM 8.6 10/03/2020       Discharge Medications:     Current Discharge Medication List           Details   aspirin 81 MG EC tablet Take 1 tablet by mouth daily  Qty: 30 tablet, Refills: 3      metoprolol succinate (TOPROL XL) 25 MG extended release tablet Take 25 mg by mouth daily      Calcium-Magnesium-Vitamin D (CALCIUM 1200+D3 PO) Take 1 tablet by mouth daily. atorvastatin (LIPITOR) 40 MG tablet Take 40 mg by mouth daily. Multiple Vitamins-Minerals (CENTRUM-LUTEIN PO) Take 1 tablet by mouth daily. Omega-3 Fatty Acids (FISH OIL) 1000 MG CAPS Take 1,000 mg by mouth daily. Bevacizumab (AVASTIN IV) Infuse intravenously See Admin Instructions Gets injection in eyes about every 11 weeks             Time Spent on discharge is more than 45 minutes in the examination, evaluation, counseling and review of medications and discharge plan.       Signed:    Sammy Crews MD   10/3/2020

## 2020-10-05 ENCOUNTER — TELEPHONE (OUTPATIENT)
Dept: CARDIOLOGY CLINIC | Age: 85
End: 2020-10-05

## 2020-10-05 NOTE — TELEPHONE ENCOUNTER
I called pt.  To schedule a hospital f/u but he declined saying he feels great and will be taking a trip to [place] and won't need a f/u at this time

## 2020-12-08 ENCOUNTER — HOSPITAL ENCOUNTER (OUTPATIENT)
Age: 85
Discharge: HOME | End: 2020-12-08
Payer: MEDICARE

## 2020-12-08 LAB
ALBUMIN SERPL-MCNC: 4 G/DL (ref 3.5–5.2)
ALP BLD-CCNC: 72 U/L (ref 40–129)
ALT SERPL-CCNC: 18 U/L (ref 0–40)
ANION GAP SERPL CALCULATED.3IONS-SCNC: 8 MMOL/L (ref 7–16)
AST SERPL-CCNC: 20 U/L (ref 0–39)
BASOPHILS ABSOLUTE: 0.08 E9/L (ref 0–0.2)
BASOPHILS RELATIVE PERCENT: 1.3 % (ref 0–2)
BILIRUB SERPL-MCNC: 0.9 MG/DL (ref 0–1.2)
BILIRUBIN DIRECT: 0.2 MG/DL (ref 0–0.3)
BILIRUBIN, INDIRECT: 0.7 MG/DL (ref 0–1)
BUN BLDV-MCNC: 25 MG/DL (ref 8–23)
CALCIUM SERPL-MCNC: 9.2 MG/DL (ref 8.6–10.2)
CHLORIDE BLD-SCNC: 104 MMOL/L (ref 98–107)
CHOLESTEROL, TOTAL: 159 MG/DL (ref 0–199)
CO2: 27 MMOL/L (ref 22–29)
CREAT SERPL-MCNC: 1.3 MG/DL (ref 0.7–1.2)
EOSINOPHILS ABSOLUTE: 0.22 E9/L (ref 0.05–0.5)
EOSINOPHILS RELATIVE PERCENT: 3.5 % (ref 0–6)
GFR AFRICAN AMERICAN: >60
GFR NON-AFRICAN AMERICAN: 52 ML/MIN/1.73
GLUCOSE BLD-MCNC: 117 MG/DL (ref 74–99)
HCT VFR BLD CALC: 37.4 % (ref 37–54)
HDLC SERPL-MCNC: 69 MG/DL
HEMOGLOBIN: 12.5 G/DL (ref 12.5–16.5)
IMMATURE GRANULOCYTES #: 0.03 E9/L
IMMATURE GRANULOCYTES %: 0.5 % (ref 0–5)
LDL CHOLESTEROL CALCULATED: 76 MG/DL (ref 0–99)
LYMPHOCYTES ABSOLUTE: 1.41 E9/L (ref 1.5–4)
LYMPHOCYTES RELATIVE PERCENT: 22.1 % (ref 20–42)
MCH RBC QN AUTO: 29.9 PG (ref 26–35)
MCHC RBC AUTO-ENTMCNC: 33.4 % (ref 32–34.5)
MCV RBC AUTO: 89.5 FL (ref 80–99.9)
MONOCYTES ABSOLUTE: 0.75 E9/L (ref 0.1–0.95)
MONOCYTES RELATIVE PERCENT: 11.8 % (ref 2–12)
NEUTROPHILS ABSOLUTE: 3.88 E9/L (ref 1.8–7.3)
NEUTROPHILS RELATIVE PERCENT: 60.8 % (ref 43–80)
PDW BLD-RTO: 13.7 FL (ref 11.5–15)
PLATELET # BLD: 206 E9/L (ref 130–450)
PMV BLD AUTO: 8.4 FL (ref 7–12)
POTASSIUM SERPL-SCNC: 4.6 MMOL/L (ref 3.5–5)
RBC # BLD: 4.18 E12/L (ref 3.8–5.8)
SEDIMENTATION RATE, ERYTHROCYTE: 9 MM/HR (ref 0–15)
SODIUM BLD-SCNC: 139 MMOL/L (ref 132–146)
TOTAL PROTEIN: 6.9 G/DL (ref 6.4–8.3)
TRIGL SERPL-MCNC: 68 MG/DL (ref 0–149)
TSH SERPL DL<=0.05 MIU/L-ACNC: 2.21 UIU/ML (ref 0.27–4.2)
URIC ACID, SERUM: 6.3 MG/DL (ref 3.4–7)
VITAMIN D 25-HYDROXY: 45 NG/ML (ref 30–100)
VLDLC SERPL CALC-MCNC: 14 MG/DL
WBC # BLD: 6.4 E9/L (ref 4.5–11.5)

## 2020-12-08 PROCEDURE — G0103 PSA SCREENING: HCPCS

## 2020-12-08 PROCEDURE — 84153 ASSAY OF PSA TOTAL: CPT

## 2020-12-08 PROCEDURE — 82248 BILIRUBIN DIRECT: CPT

## 2020-12-08 PROCEDURE — 84550 ASSAY OF BLOOD/URIC ACID: CPT

## 2020-12-08 PROCEDURE — 83540 ASSAY OF IRON: CPT

## 2020-12-08 PROCEDURE — 85025 COMPLETE CBC W/AUTO DIFF WBC: CPT

## 2020-12-08 PROCEDURE — 84439 ASSAY OF FREE THYROXINE: CPT

## 2020-12-08 PROCEDURE — 85651 RBC SED RATE NONAUTOMATED: CPT

## 2020-12-08 PROCEDURE — 80061 LIPID PANEL: CPT

## 2020-12-08 PROCEDURE — 36415 COLL VENOUS BLD VENIPUNCTURE: CPT

## 2020-12-08 PROCEDURE — 82306 VITAMIN D 25 HYDROXY: CPT

## 2020-12-08 PROCEDURE — 84443 ASSAY THYROID STIM HORMONE: CPT

## 2020-12-08 PROCEDURE — 80053 COMPREHEN METABOLIC PANEL: CPT

## 2020-12-09 LAB — IRON: 65 MCG/DL (ref 59–158)

## 2020-12-10 LAB — T4 FREE: 1.25 NG/DL (ref 0.93–1.7)

## 2020-12-11 ENCOUNTER — HOSPITAL ENCOUNTER (OUTPATIENT)
Age: 85
Discharge: HOME | End: 2020-12-11
Payer: MEDICARE

## 2020-12-11 LAB — PROLACTIN: 7.07 NG/ML

## 2020-12-11 PROCEDURE — 36415 COLL VENOUS BLD VENIPUNCTURE: CPT

## 2020-12-11 PROCEDURE — 84146 ASSAY OF PROLACTIN: CPT

## 2020-12-14 ENCOUNTER — HOSPITAL ENCOUNTER (OUTPATIENT)
Dept: CARDIOLOGY | Age: 85
Discharge: HOME | End: 2020-12-14
Payer: MEDICARE

## 2020-12-14 ENCOUNTER — OFFICE VISIT (OUTPATIENT)
Dept: VASCULAR SURGERY | Age: 85
End: 2020-12-14
Payer: MEDICARE

## 2020-12-14 VITALS — WEIGHT: 178 LBS | HEIGHT: 67 IN | BODY MASS INDEX: 27.94 KG/M2 | RESPIRATION RATE: 16 BRPM

## 2020-12-14 PROCEDURE — G8482 FLU IMMUNIZE ORDER/ADMIN: HCPCS | Performed by: NURSE PRACTITIONER

## 2020-12-14 PROCEDURE — 99213 OFFICE O/P EST LOW 20 MIN: CPT | Performed by: NURSE PRACTITIONER

## 2020-12-14 PROCEDURE — 4040F PNEUMOC VAC/ADMIN/RCVD: CPT | Performed by: NURSE PRACTITIONER

## 2020-12-14 PROCEDURE — G8427 DOCREV CUR MEDS BY ELIG CLIN: HCPCS | Performed by: NURSE PRACTITIONER

## 2020-12-14 PROCEDURE — 93880 EXTRACRANIAL BILAT STUDY: CPT

## 2020-12-14 PROCEDURE — 1123F ACP DISCUSS/DSCN MKR DOCD: CPT | Performed by: NURSE PRACTITIONER

## 2020-12-14 PROCEDURE — 1036F TOBACCO NON-USER: CPT | Performed by: NURSE PRACTITIONER

## 2020-12-14 PROCEDURE — G8417 CALC BMI ABV UP PARAM F/U: HCPCS | Performed by: NURSE PRACTITIONER

## 2020-12-14 NOTE — PROGRESS NOTES
Vascular Surgery Outpatient Followup    PCP : Rama Suarez MD    HISTORY OF PRESENT ILLNESS:    The patient is a 80 y.o. male who is here in regards to hx of ass carotid stenosis. He has a hx of asymptomatic bilateral carotid stenosis. Patient denies hx of stroke, TIA, focal weakness, slurred speech or amaurosis fugax. Patient denies any other changes in overall health. He has no pain associated with his carotid disease. He has not had any recent episodes of exposure to toxic fumes which had precipitated his initial evaluation of his carotid disease in 11/2014 when he had been using gas grill and fertilizer. He denies any changes to his overall health. He was hospitalized for a UTI since he was last seen. He has had issues with recurrent UTIs over the past few years. He still enjoys drinking his red wine. Past Medical History:        Diagnosis Date    BPH (benign prostatic hyperplasia)     CAD (coronary artery disease)     Carotid artery calcification     DJD (degenerative joint disease)     Hyperlipemia     Lung nodule     Macular degeneration     Numbness and tingling in left arm 2/2/2018    Numbness and tingling of left side of face 2/2/2018    S/P CABG x 6 2000    LIMA-LAD, LYNNETTE-LCx,SVG-OM,SVG-LPL,SVG-PDA-RPL    Shingles      Past Surgical History:        Procedure Laterality Date    CARDIAC SURGERY      6 vessel in 2000    DENTAL SURGERY      root canal    DIAGNOSTIC CARDIAC CATH LAB PROCEDURE  9/5/00    LUNG REMOVAL, PARTIAL Left     march    OTHER SURGICAL HISTORY  02/16/2011    Injection right hip  HIMANSHU. Arlene Uriarte MD    PROSTATE SURGERY Bilateral 05/27/2019    THORACOSCOPY Left 4/12/2019    BRONCHOSCOPY LEFT THORACOSCOPY ROBOTIC VIDEO ASSISTED , WEDGE RESECTION, POSS.  LEFT LOWER LOBECTOMY performed by Maeve Ball MD at [address] [place] [address] Right 03/28/2011    Right BRIONNA  Shakir Moura MD    TOTAL HIP ARTHROPLASTY Left 11/09/2009  Sexual activity: Not Currently   Lifestyle    Physical activity     Days per week: Not on file     Minutes per session: Not on file    Stress: Not on file   Relationships    Social connections     Talks on phone: Not on file     Gets together: Not on file     Attends Shinto service: Not on file     Active member of club or organization: Not on file     Attends meetings of clubs or organizations: Not on file     Relationship status: Not on file    Intimate partner violence     Fear of current or ex partner: Not on file     Emotionally abused: Not on file     Physically abused: Not on file     Forced sexual activity: Not on file   Other Topics Concern    Not on file   Social History Narrative    Not on file     Family History   Problem Relation Age of Onset    Other Mother         accident age 28 years , Tavia Garima. was 5 months old    Other Father         accident age 43 decesed 8 years when Father passed    Heart Disease Brother     Alzheimer's Disease Sister     Heart Disease Brother     Heart Disease Brother      Labs  Lab Results   Component Value Date    WBC 6.4 2020    HGB 12.5 2020    HCT 37.4 2020     2020    PROTIME 11.8 2019    INR 1.0 2019    APTT 26.0 2019    K 4.6 2020    BUN 25 (H) 2020    CREATININE 1.3 (H) 2020     PHYSICAL EXAM:    CONSTITUTIONAL:  awake, alert, cooperative  CN II - XII no deficits noted except for hearing deficits bilaterally  Glasses used   EYES:  lids and lashes normal  ENT: external ears and nose without lesions  NECK:  supple, symmetrical, trachea midline  Carotid Bruits are absent  LUNGS:  No increased work of breathing                 Clear to auscultation  CARDIOVASCULAR:  regular rate and rhythm   ABDOMEN:  soft, non-distended, non-tender   Aorta is not palpable  SKIN:  normal skin color, texture, turgor  EXTREMITIES:   R UE 5/5 Strength  L UE 5/5 Strength  R LE Edema absent L LE Edema absent    RADIOLOGY:  US Carotid  R ICA 50-59% stenosis  L ICA 60-69% stenosis      A/p Asymptomatic Bilateral Carotid Stenosis  · US reviewed  · R ICA 50-59% stenosis which is stable as compared to previous  · L ICA 60-69% stenosis which is stable as compared to previous  · Continue medical management with aspirin and statin  · Emphasized importance of continued Tobacco cessation  · No indication for surgical intervention at this time  · Discussed with patient pathophysiology of carotid stenosis and all ?s answered  · Discussed with patient symptoms of stroke, TIA and they understood to go to ER immediately if any symptoms developed  · F/U as outpatient with carotid duplex in 1 year    Pt seen and plan reviewed with Dr. Baron Whaley.      Jh Nicolas , CNP

## 2020-12-16 ENCOUNTER — OFFICE VISIT (OUTPATIENT)
Dept: SURGERY | Age: 85
End: 2020-12-16
Payer: MEDICARE

## 2020-12-16 VITALS
DIASTOLIC BLOOD PRESSURE: 72 MMHG | TEMPERATURE: 97.1 F | BODY MASS INDEX: 28.91 KG/M2 | WEIGHT: 184.2 LBS | HEART RATE: 50 BPM | HEIGHT: 67 IN | OXYGEN SATURATION: 98 % | RESPIRATION RATE: 16 BRPM | SYSTOLIC BLOOD PRESSURE: 130 MMHG

## 2020-12-16 PROCEDURE — G8427 DOCREV CUR MEDS BY ELIG CLIN: HCPCS | Performed by: SURGERY

## 2020-12-16 PROCEDURE — 4040F PNEUMOC VAC/ADMIN/RCVD: CPT | Performed by: SURGERY

## 2020-12-16 PROCEDURE — 1036F TOBACCO NON-USER: CPT | Performed by: SURGERY

## 2020-12-16 PROCEDURE — G8417 CALC BMI ABV UP PARAM F/U: HCPCS | Performed by: SURGERY

## 2020-12-16 PROCEDURE — G8482 FLU IMMUNIZE ORDER/ADMIN: HCPCS | Performed by: SURGERY

## 2020-12-16 PROCEDURE — 99204 OFFICE O/P NEW MOD 45 MIN: CPT | Performed by: SURGERY

## 2020-12-16 PROCEDURE — 1123F ACP DISCUSS/DSCN MKR DOCD: CPT | Performed by: SURGERY

## 2020-12-16 NOTE — PROGRESS NOTES
 TOTAL HIP ARTHROPLASTY Left 2009    Left BRIONNA  Lynn Forbes MD    TURP      TURP N/A 2019    CYSTOSCOPY, RETROGRADE PYELOGRAM,  URERTHRAL DILITATION, TRANSURETHRAL RESECTION PROSTATE performed by Jessica Villarreal MD at  [address] History     Tobacco Use    Smoking status: Former Smoker     Packs/day: 1.00     Years: 10.00     Pack years: 10.00     Types: Cigarettes     Start date: 1978     Quit date: 1988     Years since quittin.2    Smokeless tobacco: Never Used   Substance Use Topics    Alcohol use: Yes     Comment: socially       Current Outpatient Medications   Medication Sig Dispense Refill    Cholecalciferol (VITAMIN D3) 50 MCG ( UT) CAPS Take by mouth      aspirin 81 MG EC tablet Take 1 tablet by mouth daily 30 tablet 3    metoprolol succinate (TOPROL XL) 25 MG extended release tablet Take 25 mg by mouth daily      Calcium-Magnesium-Vitamin D (CALCIUM 1200+D3 PO) Take 1 tablet by mouth daily.  atorvastatin (LIPITOR) 40 MG tablet Take 40 mg by mouth daily.  Multiple Vitamins-Minerals (CENTRUM-LUTEIN PO) Take 1 tablet by mouth daily.  Omega-3 Fatty Acids (FISH OIL) 1000 MG CAPS Take 1,000 mg by mouth daily.  Bevacizumab (AVASTIN IV) Infuse intravenously See Admin Instructions Gets injection in eyes about every 11 weeks       No current facility-administered medications for this visit.             Review of Systems  Constitutional: negative  Eyes: negative  Ears, nose, mouth, throat, and face: negative  Respiratory: negative  Cardiovascular: negative  Gastrointestinal: negative  Genitourinary:negative  Integument/breast: negative  Hematologic/lymphatic: negative  Musculoskeletal:negative  Neurological: negative  Allergic/Immunologic: negative    Physical exam: /72 (Site: Right Upper Arm, Position: Sitting, Cuff Size: Medium Adult)   Pulse 50   Temp 97.1 °F (36.2 °C) (Temporal)   Resp 16   Ht 5' 7\" (1.702 m)   Wt 184 lb 3.2 oz (83.6 kg)   SpO2 98%   BMI 28.85 kg/m²   General appearance: no acute distress  Head:NCAT, EOMI, PERRLA, conjunctiva pink  Neck: no masses, supple  Lungs: CTABL  Heart: RRR  Abdomen: soft, nondistended, nontender, no guarding, no peritoneal signs, normoactive bowel sounds  Extremities:no edema  Breasts: bilateral gynecomastia, no palpable mass left breast  Neuro exam: normal  Skin: no lesions, no rashes    Assessment/Plan:  .Bilateral mammogram  High fiber diet  Miralax daily    Return in about 4 weeks (around 1/13/2021).     Bridgette Fuentes MD      Send copy of H&P to PCP, Derek Farah MD

## 2020-12-22 ENCOUNTER — HOSPITAL ENCOUNTER (OUTPATIENT)
Dept: GENERAL RADIOLOGY | Age: 85
Discharge: HOME | End: 2020-12-24
Payer: MEDICARE

## 2020-12-22 PROCEDURE — 77065 DX MAMMO INCL CAD UNI: CPT

## 2020-12-28 ENCOUNTER — HOSPITAL ENCOUNTER (EMERGENCY)
Age: 85
Discharge: HOME | End: 2020-12-28
Attending: EMERGENCY MEDICINE
Payer: MEDICARE

## 2020-12-28 ENCOUNTER — APPOINTMENT (OUTPATIENT)
Dept: ULTRASOUND IMAGING | Age: 85
End: 2020-12-28
Payer: MEDICARE

## 2020-12-28 ENCOUNTER — TELEPHONE (OUTPATIENT)
Dept: ADMINISTRATIVE | Age: 85
End: 2020-12-28

## 2020-12-28 VITALS
HEART RATE: 64 BPM | BODY MASS INDEX: 27.47 KG/M2 | OXYGEN SATURATION: 96 % | DIASTOLIC BLOOD PRESSURE: 68 MMHG | SYSTOLIC BLOOD PRESSURE: 136 MMHG | RESPIRATION RATE: 16 BRPM | TEMPERATURE: 97.3 F | WEIGHT: 175 LBS | HEIGHT: 67 IN

## 2020-12-28 LAB
ANION GAP SERPL CALCULATED.3IONS-SCNC: 9 MMOL/L (ref 7–16)
BACTERIA: ABNORMAL /HPF
BASOPHILS ABSOLUTE: 0.14 E9/L (ref 0–0.2)
BASOPHILS RELATIVE PERCENT: 1.7 % (ref 0–2)
BILIRUBIN URINE: NEGATIVE
BLOOD, URINE: ABNORMAL
BUN BLDV-MCNC: 27 MG/DL (ref 8–23)
CALCIUM SERPL-MCNC: 8.9 MG/DL (ref 8.6–10.2)
CHLORIDE BLD-SCNC: 103 MMOL/L (ref 98–107)
CLARITY: ABNORMAL
CO2: 24 MMOL/L (ref 22–29)
COLOR: YELLOW
CREAT SERPL-MCNC: 1.3 MG/DL (ref 0.7–1.2)
EOSINOPHILS ABSOLUTE: 0.54 E9/L (ref 0.05–0.5)
EOSINOPHILS RELATIVE PERCENT: 6.5 % (ref 0–6)
GFR AFRICAN AMERICAN: >60
GFR NON-AFRICAN AMERICAN: 52 ML/MIN/1.73
GLUCOSE BLD-MCNC: 174 MG/DL (ref 74–99)
GLUCOSE URINE: NEGATIVE MG/DL
HCT VFR BLD CALC: 34.5 % (ref 37–54)
HEMOGLOBIN: 11.3 G/DL (ref 12.5–16.5)
HYALINE CASTS: ABNORMAL /LPF (ref 0–2)
IMMATURE GRANULOCYTES #: 0.03 E9/L
IMMATURE GRANULOCYTES %: 0.4 % (ref 0–5)
KETONES, URINE: NEGATIVE MG/DL
LEUKOCYTE ESTERASE, URINE: ABNORMAL
LYMPHOCYTES ABSOLUTE: 1.24 E9/L (ref 1.5–4)
LYMPHOCYTES RELATIVE PERCENT: 14.9 % (ref 20–42)
MCH RBC QN AUTO: 29 PG (ref 26–35)
MCHC RBC AUTO-ENTMCNC: 32.8 % (ref 32–34.5)
MCV RBC AUTO: 88.5 FL (ref 80–99.9)
MONOCYTES ABSOLUTE: 0.75 E9/L (ref 0.1–0.95)
MONOCYTES RELATIVE PERCENT: 9 % (ref 2–12)
MUCUS: PRESENT /LPF
NEUTROPHILS ABSOLUTE: 5.63 E9/L (ref 1.8–7.3)
NEUTROPHILS RELATIVE PERCENT: 67.5 % (ref 43–80)
NITRITE, URINE: NEGATIVE
PDW BLD-RTO: 13.2 FL (ref 11.5–15)
PH UA: 7 (ref 5–9)
PLATELET # BLD: 228 E9/L (ref 130–450)
PMV BLD AUTO: 8.7 FL (ref 7–12)
POTASSIUM SERPL-SCNC: 4.2 MMOL/L (ref 3.5–5)
PROSTATE SPECIFIC ANTIGEN: 1.34 NG/ML (ref 0–4)
PROSTATE SPECIFIC ANTIGEN: ABNORMAL NG/ML (ref 0–4)
PROTEIN UA: NEGATIVE MG/DL
RBC # BLD: 3.9 E12/L (ref 3.8–5.8)
RBC UA: ABNORMAL /HPF (ref 0–2)
SODIUM BLD-SCNC: 136 MMOL/L (ref 132–146)
SPECIFIC GRAVITY UA: 1.02 (ref 1–1.03)
UROBILINOGEN, URINE: 0.2 E.U./DL
WBC # BLD: 8.3 E9/L (ref 4.5–11.5)
WBC UA: >20 /HPF (ref 0–5)

## 2020-12-28 PROCEDURE — 80048 BASIC METABOLIC PNL TOTAL CA: CPT

## 2020-12-28 PROCEDURE — 87077 CULTURE AEROBIC IDENTIFY: CPT

## 2020-12-28 PROCEDURE — 76870 US EXAM SCROTUM: CPT

## 2020-12-28 PROCEDURE — 87186 SC STD MICRODIL/AGAR DIL: CPT

## 2020-12-28 PROCEDURE — 93975 VASCULAR STUDY: CPT

## 2020-12-28 PROCEDURE — 99284 EMERGENCY DEPT VISIT MOD MDM: CPT

## 2020-12-28 PROCEDURE — 6370000000 HC RX 637 (ALT 250 FOR IP): Performed by: EMERGENCY MEDICINE

## 2020-12-28 PROCEDURE — 87088 URINE BACTERIA CULTURE: CPT

## 2020-12-28 PROCEDURE — 81001 URINALYSIS AUTO W/SCOPE: CPT

## 2020-12-28 PROCEDURE — 85025 COMPLETE CBC W/AUTO DIFF WBC: CPT

## 2020-12-28 RX ORDER — SULFAMETHOXAZOLE AND TRIMETHOPRIM 800; 160 MG/1; MG/1
1 TABLET ORAL 2 TIMES DAILY
Qty: 20 TABLET | Refills: 0 | Status: SHIPPED | OUTPATIENT
Start: 2020-12-28 | End: 2021-01-05

## 2020-12-28 RX ORDER — LEVOFLOXACIN 500 MG/1
500 TABLET, FILM COATED ORAL ONCE
Status: DISCONTINUED | OUTPATIENT
Start: 2020-12-28 | End: 2020-12-28

## 2020-12-28 RX ORDER — SULFAMETHOXAZOLE AND TRIMETHOPRIM 800; 160 MG/1; MG/1
1 TABLET ORAL ONCE
Status: COMPLETED | OUTPATIENT
Start: 2020-12-28 | End: 2020-12-28

## 2020-12-28 RX ADMIN — SULFAMETHOXAZOLE AND TRIMETHOPRIM 1 TABLET: 800; 160 TABLET ORAL at 10:47

## 2020-12-28 ASSESSMENT — PAIN DESCRIPTION - LOCATION: LOCATION: SCROTUM

## 2020-12-28 ASSESSMENT — PAIN DESCRIPTION - PAIN TYPE
TYPE: ACUTE PAIN
TYPE: ACUTE PAIN

## 2020-12-28 ASSESSMENT — ENCOUNTER SYMPTOMS
SHORTNESS OF BREATH: 0
EYE DISCHARGE: 0
VOMITING: 0
WHEEZING: 0
EYE REDNESS: 0
ABDOMINAL PAIN: 0
BACK PAIN: 0
EYE PAIN: 0
SINUS PRESSURE: 0
DIARRHEA: 0
COUGH: 0
SORE THROAT: 0
NAUSEA: 0

## 2020-12-28 ASSESSMENT — PAIN DESCRIPTION - FREQUENCY: FREQUENCY: CONTINUOUS

## 2020-12-28 ASSESSMENT — PAIN SCALES - GENERAL
PAINLEVEL_OUTOF10: 7
PAINLEVEL_OUTOF10: 5

## 2020-12-28 ASSESSMENT — PAIN DESCRIPTION - DESCRIPTORS: DESCRIPTORS: ACHING

## 2020-12-28 ASSESSMENT — PAIN DESCRIPTION - ORIENTATION: ORIENTATION: RIGHT

## 2020-12-28 NOTE — ED PROVIDER NOTES
All other systems reviewed and are negative. Physical Exam  Vitals signs and nursing note reviewed. Constitutional:       Appearance: He is well-developed. HENT:      Head: Normocephalic and atraumatic. Eyes:      Pupils: Pupils are equal, round, and reactive to light. Neck:      Musculoskeletal: Normal range of motion and neck supple. Cardiovascular:      Rate and Rhythm: Normal rate and regular rhythm. Heart sounds: Normal heart sounds. No murmur. Pulmonary:      Effort: Pulmonary effort is normal. No respiratory distress. Breath sounds: Normal breath sounds. No wheezing or rales. Abdominal:      General: Bowel sounds are normal.      Palpations: Abdomen is soft. Tenderness: There is no abdominal tenderness. There is no guarding or rebound. Hernia: There is no hernia in the left inguinal area or right inguinal area. Genitourinary:     Testes:         Right: Tenderness and swelling present. Left: Tenderness or swelling not present. Epididymis:      Right: Tenderness present. Left: No tenderness. Comments: The right scrotum is notably enlarged. The testicle feels firm with an odd cephalad access line. Skin:     General: Skin is warm and dry. Neurological:      Mental Status: He is alert and oriented to person, place, and time. Cranial Nerves: No cranial nerve deficit. Coordination: Coordination normal.          Procedures     [facility]            --------------------------------------------- PAST HISTORY ---------------------------------------------  Past Medical History:  has a past medical history of BPH (benign prostatic hyperplasia), CAD (coronary artery disease), Carotid artery calcification, DJD (degenerative joint disease), Hyperlipemia, Lung nodule, Macular degeneration, Numbness and tingling in left arm, Numbness and tingling of left side of face, S/P CABG x 6, and Shingles. Past Surgical History:  has a past surgical history that includes Diagnostic Cardiac Cath Lab Procedure (9/5/00); Cardiac surgery; TURP; Dental surgery; Total hip arthroplasty (Right, 03/28/2011); Total hip arthroplasty (Left, 11/09/2009); other surgical history (02/16/2011); Thoracoscopy (Left, 4/12/2019); TURP (N/A, 5/30/2019); Prostate surgery (Bilateral, 05/27/2019); and Lung removal, partial (Left). Social History:  reports that he quit smoking about 32 years ago. His smoking use included cigarettes. He started smoking about 42 years ago. He has a 10.00 pack-year smoking history. He has never used smokeless tobacco. He reports current alcohol use. He reports that he does not use drugs. Family History: family history includes Alzheimer's Disease in his sister; Heart Disease in his brother, brother, and brother; Other in his father and mother. The patients home medications have been reviewed.     Allergies: Nitroglycerin, Biaxin [clarithromycin], Iodine, and Quinolones    -------------------------------------------------- RESULTS -------------------------------------------------  Labs:  Results for orders placed or performed during the hospital encounter of 12/28/20   Urinalysis   Result Value Ref Range    Color, UA Yellow Straw/Yellow    Clarity, UA CLOUDY (A) Clear    Glucose, Ur Negative Negative mg/dL    Bilirubin Urine Negative Negative    Ketones, Urine Negative Negative mg/dL    Specific Gravity, UA 1.020 1.005 - 1.030    Blood, Urine TRACE-INTACT Negative    pH, UA 7.0 5.0 - 9.0    Protein, UA Negative Negative mg/dL    Urobilinogen, Urine 0.2 <2.0 E.U./dL    Nitrite, Urine Negative Negative    Leukocyte Esterase, Urine MODERATE (A) Negative   CBC Auto Differential   Result Value Ref Range    WBC 8.3 4.5 - 11.5 E9/L    RBC 3.90 3.80 - 5.80 E12/L    Hemoglobin 11.3 (L) 12.5 - 16.5 g/dL    Hematocrit 34.5 (L) 37.0 - 54.0 %    MCV 88.5 80.0 - 99.9 fL    MCH 29.0 26.0 - 35.0 pg MCHC 32.8 32.0 - 34.5 %    RDW 13.2 11.5 - 15.0 fL    Platelets 377 442 - 947 E9/L    MPV 8.7 7.0 - 12.0 fL    Neutrophils % 67.5 43.0 - 80.0 %    Immature Granulocytes % 0.4 0.0 - 5.0 %    Lymphocytes % 14.9 (L) 20.0 - 42.0 %    Monocytes % 9.0 2.0 - 12.0 %    Eosinophils % 6.5 (H) 0.0 - 6.0 %    Basophils % 1.7 0.0 - 2.0 %    Neutrophils Absolute 5.63 1.80 - 7.30 E9/L    Immature Granulocytes # 0.03 E9/L    Lymphocytes Absolute 1.24 (L) 1.50 - 4.00 E9/L    Monocytes Absolute 0.75 0.10 - 0.95 E9/L    Eosinophils Absolute 0.54 (H) 0.05 - 0.50 E9/L    Basophils Absolute 0.14 0.00 - 0.20 J5/D   Basic Metabolic Panel   Result Value Ref Range    Sodium 136 132 - 146 mmol/L    Potassium 4.2 3.5 - 5.0 mmol/L    Chloride 103 98 - 107 mmol/L    CO2 24 22 - 29 mmol/L    Anion Gap 9 7 - 16 mmol/L    Glucose 174 (H) 74 - 99 mg/dL    BUN 27 (H) 8 - 23 mg/dL    CREATININE 1.3 (H) 0.7 - 1.2 mg/dL    GFR Non-African American 52 >=60 mL/min/1.73    GFR African American >60     Calcium 8.9 8.6 - 10.2 mg/dL   Microscopic Urinalysis   Result Value Ref Range    Hyaline Casts, UA 0-2 0 - 2 /LPF    Mucus, UA Present (A) None Seen /LPF    WBC, UA >20 (A) 0 - 5 /HPF    RBC, UA NONE 0 - 2 /HPF    Bacteria, UA RARE (A) None Seen /HPF       Radiology:  US SCROTUM AND TESTICLES   Final Result   Sonographic findings suggestive of acute right epididymo-orchitis. Large right simple hydrocele. US DUP ABD PEL RETRO SCROT COMPLETE   Final Result   Sonographic findings suggestive of acute right epididymo-orchitis. Large right simple hydrocele.          ------------------------- NURSING NOTES AND VITALS REVIEWED ---------------------------  Date / Time Roomed:  12/28/2020  7:12 AM  ED Bed Assignment:  19/19    The nursing notes within the ED encounter and vital signs as below have been reviewed.    /68   Pulse 64   Temp 97.3 °F (36.3 °C) (Oral)   Resp 16   Ht 5' 7\" (1.702 m)   Wt 175 lb (79.4 kg)   SpO2 96%   BMI 27.41 kg/m² Oxygen Saturation Interpretation: Normal      ------------------------------------------ PROGRESS NOTES ------------------------------------------  10:44 AM EST  I have spoken with the patient and discussed todays results, in addition to providing specific details for the plan of care and counseling regarding the diagnosis and prognosis. Their questions are answered at this time and they are agreeable with the plan. I discussed at length with them reasons for immediate return here for re evaluation. They will followup with their Urologist and primary care physician by calling their office tomorrow. Patient reports allergy to quinolones. As such, Yumiko Faisal will be substituted with Bactrim DS.    --------------------------------- ADDITIONAL PROVIDER NOTES ---------------------------------  At this time the patient is without objective evidence of an acute process requiring hospitalization or inpatient management. They have remained hemodynamically stable throughout their entire ED visit and are stable for discharge with outpatient follow-up. The plan has been discussed in detail and they are aware of the specific conditions for emergent return, as well as the importance of follow-up. New Prescriptions    SULFAMETHOXAZOLE-TRIMETHOPRIM (BACTRIM DS;SEPTRA DS) 800-160 MG PER TABLET    Take 1 tablet by mouth 2 times daily for 10 days       Diagnosis:  1. Epididymoorchitis    2. Hydrocele, right        Disposition:  Patient's disposition: Discharge to home  Patient's condition is stable.               Khadra Murillo DO  12/28/20 1045

## 2021-01-01 LAB
ORGANISM: ABNORMAL
URINE CULTURE, ROUTINE: ABNORMAL

## 2021-01-05 ENCOUNTER — APPOINTMENT (OUTPATIENT)
Dept: GENERAL RADIOLOGY | Age: 86
DRG: 683 | End: 2021-01-05
Payer: MEDICARE

## 2021-01-05 ENCOUNTER — HOSPITAL ENCOUNTER (INPATIENT)
Age: 86
LOS: 1 days | Discharge: HOME | DRG: 683 | End: 2021-01-06
Attending: EMERGENCY MEDICINE | Admitting: INTERNAL MEDICINE
Payer: MEDICARE

## 2021-01-05 DIAGNOSIS — R53.1 GENERALIZED WEAKNESS: ICD-10-CM

## 2021-01-05 DIAGNOSIS — R21 RASH AND OTHER NONSPECIFIC SKIN ERUPTION: ICD-10-CM

## 2021-01-05 DIAGNOSIS — N17.9 ACUTE KIDNEY INJURY (HCC): Primary | ICD-10-CM

## 2021-01-05 DIAGNOSIS — R11.2 NON-INTRACTABLE VOMITING WITH NAUSEA, UNSPECIFIED VOMITING TYPE: ICD-10-CM

## 2021-01-05 PROBLEM — B34.8 PARAINFLUENZA TYPE 1 INFECTION: Status: RESOLVED | Noted: 2019-11-26 | Resolved: 2021-01-05

## 2021-01-05 PROBLEM — R20.0 NUMBNESS AND TINGLING OF LEFT SIDE OF FACE: Status: RESOLVED | Noted: 2018-02-02 | Resolved: 2021-01-05

## 2021-01-05 PROBLEM — R07.9 CHEST PAIN: Status: RESOLVED | Noted: 2020-10-02 | Resolved: 2021-01-05

## 2021-01-05 PROBLEM — R20.2 NUMBNESS AND TINGLING IN LEFT ARM: Status: RESOLVED | Noted: 2018-02-02 | Resolved: 2021-01-05

## 2021-01-05 PROBLEM — J96.01 ACUTE RESPIRATORY FAILURE WITH HYPOXIA (HCC): Status: RESOLVED | Noted: 2019-10-23 | Resolved: 2021-01-05

## 2021-01-05 PROBLEM — A41.9 SEPSIS SECONDARY TO UTI (HCC): Status: RESOLVED | Noted: 2019-05-22 | Resolved: 2021-01-05

## 2021-01-05 PROBLEM — J15.0 PNEUMONIA OF BOTH LOWER LOBES DUE TO KLEBSIELLA PNEUMONIAE (HCC): Status: RESOLVED | Noted: 2019-11-26 | Resolved: 2021-01-05

## 2021-01-05 PROBLEM — R20.0 NUMBNESS AND TINGLING IN LEFT ARM: Status: RESOLVED | Noted: 2018-02-02 | Resolved: 2021-01-05

## 2021-01-05 PROBLEM — R20.2 NUMBNESS AND TINGLING OF LEFT SIDE OF FACE: Status: RESOLVED | Noted: 2018-02-02 | Resolved: 2021-01-05

## 2021-01-05 PROBLEM — R31.0 GROSS HEMATURIA: Status: RESOLVED | Noted: 2019-05-30 | Resolved: 2021-01-05

## 2021-01-05 PROBLEM — R91.1 PULMONARY NODULE: Status: RESOLVED | Noted: 2018-11-19 | Resolved: 2021-01-05

## 2021-01-05 PROBLEM — N39.0 UTI (URINARY TRACT INFECTION): Status: ACTIVE | Noted: 2021-01-05

## 2021-01-05 PROBLEM — R91.1 LUNG NODULE: Status: RESOLVED | Noted: 2019-04-12 | Resolved: 2021-01-05

## 2021-01-05 PROBLEM — N39.0 SEPSIS SECONDARY TO UTI (HCC): Status: RESOLVED | Noted: 2019-05-22 | Resolved: 2021-01-05

## 2021-01-05 PROBLEM — R93.89 ABNORMAL CT SCAN, CHEST: Status: RESOLVED | Noted: 2019-06-13 | Resolved: 2021-01-05

## 2021-01-05 LAB
ALBUMIN SERPL-MCNC: 3.7 G/DL (ref 3.5–5.2)
ALP BLD-CCNC: 102 U/L (ref 40–129)
ALT SERPL-CCNC: 19 U/L (ref 0–40)
ANION GAP SERPL CALCULATED.3IONS-SCNC: 10 MMOL/L (ref 7–16)
AST SERPL-CCNC: 31 U/L (ref 0–39)
BACTERIA: ABNORMAL /HPF
BASOPHILS ABSOLUTE: 0.01 E9/L (ref 0–0.2)
BASOPHILS RELATIVE PERCENT: 0.1 % (ref 0–2)
BILIRUB SERPL-MCNC: 0.9 MG/DL (ref 0–1.2)
BILIRUBIN URINE: NEGATIVE
BLOOD, URINE: NEGATIVE
BUN BLDV-MCNC: 28 MG/DL (ref 8–23)
CALCIUM SERPL-MCNC: 8.8 MG/DL (ref 8.6–10.2)
CHLORIDE BLD-SCNC: 97 MMOL/L (ref 98–107)
CLARITY: CLEAR
CO2: 23 MMOL/L (ref 22–29)
COLOR: YELLOW
CREAT SERPL-MCNC: 1.9 MG/DL (ref 0.7–1.2)
EKG ATRIAL RATE: 57 BPM
EKG P AXIS: 64 DEGREES
EKG P-R INTERVAL: 170 MS
EKG Q-T INTERVAL: 452 MS
EKG QRS DURATION: 100 MS
EKG QTC CALCULATION (BAZETT): 439 MS
EKG R AXIS: -58 DEGREES
EKG T AXIS: 62 DEGREES
EKG VENTRICULAR RATE: 57 BPM
EOSINOPHILS ABSOLUTE: 0.36 E9/L (ref 0.05–0.5)
EOSINOPHILS RELATIVE PERCENT: 5.3 % (ref 0–6)
GFR AFRICAN AMERICAN: 41
GFR NON-AFRICAN AMERICAN: 34 ML/MIN/1.73
GLUCOSE BLD-MCNC: 113 MG/DL (ref 74–99)
GLUCOSE URINE: NEGATIVE MG/DL
HCT VFR BLD CALC: 34.7 % (ref 37–54)
HEMOGLOBIN: 11.8 G/DL (ref 12.5–16.5)
IMMATURE GRANULOCYTES #: 0.02 E9/L
IMMATURE GRANULOCYTES %: 0.3 % (ref 0–5)
KETONES, URINE: NEGATIVE MG/DL
LACTIC ACID: 1.5 MMOL/L (ref 0.5–2.2)
LEUKOCYTE ESTERASE, URINE: ABNORMAL
LIPASE: 34 U/L (ref 13–60)
LYMPHOCYTES ABSOLUTE: 0.68 E9/L (ref 1.5–4)
LYMPHOCYTES RELATIVE PERCENT: 10.1 % (ref 20–42)
MCH RBC QN AUTO: 29.4 PG (ref 26–35)
MCHC RBC AUTO-ENTMCNC: 34 % (ref 32–34.5)
MCV RBC AUTO: 86.5 FL (ref 80–99.9)
MONOCYTES ABSOLUTE: 0.89 E9/L (ref 0.1–0.95)
MONOCYTES RELATIVE PERCENT: 13.2 % (ref 2–12)
NEUTROPHILS ABSOLUTE: 4.79 E9/L (ref 1.8–7.3)
NEUTROPHILS RELATIVE PERCENT: 71 % (ref 43–80)
NITRITE, URINE: NEGATIVE
PDW BLD-RTO: 13.3 FL (ref 11.5–15)
PH UA: 6.5 (ref 5–9)
PLATELET # BLD: 176 E9/L (ref 130–450)
PMV BLD AUTO: 8.6 FL (ref 7–12)
POTASSIUM REFLEX MAGNESIUM: 4.6 MMOL/L (ref 3.5–5)
PROTEIN UA: NEGATIVE MG/DL
RBC # BLD: 4.01 E12/L (ref 3.8–5.8)
RBC UA: ABNORMAL /HPF (ref 0–2)
SARS-COV-2, NAAT: NOT DETECTED
SODIUM BLD-SCNC: 130 MMOL/L (ref 132–146)
SPECIFIC GRAVITY UA: 1.01 (ref 1–1.03)
TOTAL PROTEIN: 7.1 G/DL (ref 6.4–8.3)
TROPONIN: <0.01 NG/ML (ref 0–0.03)
UROBILINOGEN, URINE: 0.2 E.U./DL
WBC # BLD: 6.8 E9/L (ref 4.5–11.5)
WBC UA: ABNORMAL /HPF (ref 0–5)

## 2021-01-05 PROCEDURE — 6370000000 HC RX 637 (ALT 250 FOR IP): Performed by: INTERNAL MEDICINE

## 2021-01-05 PROCEDURE — G0378 HOSPITAL OBSERVATION PER HR: HCPCS

## 2021-01-05 PROCEDURE — 87040 BLOOD CULTURE FOR BACTERIA: CPT

## 2021-01-05 PROCEDURE — 99284 EMERGENCY DEPT VISIT MOD MDM: CPT

## 2021-01-05 PROCEDURE — 1200000000 HC SEMI PRIVATE

## 2021-01-05 PROCEDURE — 2580000003 HC RX 258: Performed by: STUDENT IN AN ORGANIZED HEALTH CARE EDUCATION/TRAINING PROGRAM

## 2021-01-05 PROCEDURE — 2580000003 HC RX 258: Performed by: INTERNAL MEDICINE

## 2021-01-05 PROCEDURE — 85025 COMPLETE CBC W/AUTO DIFF WBC: CPT

## 2021-01-05 PROCEDURE — 96372 THER/PROPH/DIAG INJ SC/IM: CPT

## 2021-01-05 PROCEDURE — 71046 X-RAY EXAM CHEST 2 VIEWS: CPT

## 2021-01-05 PROCEDURE — 6360000002 HC RX W HCPCS: Performed by: INTERNAL MEDICINE

## 2021-01-05 PROCEDURE — 93010 ELECTROCARDIOGRAM REPORT: CPT | Performed by: INTERNAL MEDICINE

## 2021-01-05 PROCEDURE — 96361 HYDRATE IV INFUSION ADD-ON: CPT

## 2021-01-05 PROCEDURE — 96365 THER/PROPH/DIAG IV INF INIT: CPT

## 2021-01-05 PROCEDURE — 96375 TX/PRO/DX INJ NEW DRUG ADDON: CPT

## 2021-01-05 PROCEDURE — 96366 THER/PROPH/DIAG IV INF ADDON: CPT

## 2021-01-05 PROCEDURE — 81001 URINALYSIS AUTO W/SCOPE: CPT

## 2021-01-05 PROCEDURE — 83690 ASSAY OF LIPASE: CPT

## 2021-01-05 PROCEDURE — 83605 ASSAY OF LACTIC ACID: CPT

## 2021-01-05 PROCEDURE — U0002 COVID-19 LAB TEST NON-CDC: HCPCS

## 2021-01-05 PROCEDURE — 80053 COMPREHEN METABOLIC PANEL: CPT

## 2021-01-05 PROCEDURE — 96376 TX/PRO/DX INJ SAME DRUG ADON: CPT

## 2021-01-05 PROCEDURE — 93005 ELECTROCARDIOGRAM TRACING: CPT | Performed by: EMERGENCY MEDICINE

## 2021-01-05 PROCEDURE — 84484 ASSAY OF TROPONIN QUANT: CPT

## 2021-01-05 RX ORDER — 0.9 % SODIUM CHLORIDE 0.9 %
1000 INTRAVENOUS SOLUTION INTRAVENOUS ONCE
Status: COMPLETED | OUTPATIENT
Start: 2021-01-05 | End: 2021-01-05

## 2021-01-05 RX ORDER — DIPHENHYDRAMINE HYDROCHLORIDE 50 MG/ML
25 INJECTION INTRAMUSCULAR; INTRAVENOUS EVERY 6 HOURS
Status: DISCONTINUED | OUTPATIENT
Start: 2021-01-05 | End: 2021-01-06 | Stop reason: HOSPADM

## 2021-01-05 RX ORDER — ACETAMINOPHEN 650 MG/1
650 SUPPOSITORY RECTAL EVERY 6 HOURS PRN
Status: DISCONTINUED | OUTPATIENT
Start: 2021-01-05 | End: 2021-01-06 | Stop reason: HOSPADM

## 2021-01-05 RX ORDER — PROMETHAZINE HYDROCHLORIDE 25 MG/1
12.5 TABLET ORAL EVERY 6 HOURS PRN
Status: DISCONTINUED | OUTPATIENT
Start: 2021-01-05 | End: 2021-01-06 | Stop reason: HOSPADM

## 2021-01-05 RX ORDER — POTASSIUM CHLORIDE 20 MEQ/1
40 TABLET, EXTENDED RELEASE ORAL PRN
Status: DISCONTINUED | OUTPATIENT
Start: 2021-01-05 | End: 2021-01-05

## 2021-01-05 RX ORDER — SODIUM CHLORIDE 9 MG/ML
INJECTION, SOLUTION INTRAVENOUS CONTINUOUS
Status: DISCONTINUED | OUTPATIENT
Start: 2021-01-05 | End: 2021-01-06 | Stop reason: HOSPADM

## 2021-01-05 RX ORDER — ONDANSETRON 2 MG/ML
4 INJECTION INTRAMUSCULAR; INTRAVENOUS EVERY 6 HOURS PRN
Status: DISCONTINUED | OUTPATIENT
Start: 2021-01-05 | End: 2021-01-06 | Stop reason: HOSPADM

## 2021-01-05 RX ORDER — SODIUM CHLORIDE 0.9 % (FLUSH) 0.9 %
10 SYRINGE (ML) INJECTION EVERY 12 HOURS SCHEDULED
Status: DISCONTINUED | OUTPATIENT
Start: 2021-01-05 | End: 2021-01-06 | Stop reason: HOSPADM

## 2021-01-05 RX ORDER — ACETAMINOPHEN 325 MG/1
650 TABLET ORAL EVERY 6 HOURS PRN
Status: DISCONTINUED | OUTPATIENT
Start: 2021-01-05 | End: 2021-01-06 | Stop reason: HOSPADM

## 2021-01-05 RX ORDER — ASPIRIN 81 MG/1
81 TABLET ORAL DAILY
Status: DISCONTINUED | OUTPATIENT
Start: 2021-01-05 | End: 2021-01-06 | Stop reason: HOSPADM

## 2021-01-05 RX ORDER — METOPROLOL SUCCINATE 25 MG/1
25 TABLET, EXTENDED RELEASE ORAL DAILY
Status: DISCONTINUED | OUTPATIENT
Start: 2021-01-05 | End: 2021-01-06 | Stop reason: HOSPADM

## 2021-01-05 RX ORDER — ATORVASTATIN CALCIUM 40 MG/1
40 TABLET, FILM COATED ORAL NIGHTLY
Status: DISCONTINUED | OUTPATIENT
Start: 2021-01-05 | End: 2021-01-06 | Stop reason: HOSPADM

## 2021-01-05 RX ORDER — DIPHENHYDRAMINE HYDROCHLORIDE 50 MG/ML
25 INJECTION INTRAMUSCULAR; INTRAVENOUS ONCE
Status: DISCONTINUED | OUTPATIENT
Start: 2021-01-05 | End: 2021-01-05 | Stop reason: ALTCHOICE

## 2021-01-05 RX ORDER — POTASSIUM CHLORIDE 7.45 MG/ML
10 INJECTION INTRAVENOUS PRN
Status: DISCONTINUED | OUTPATIENT
Start: 2021-01-05 | End: 2021-01-05

## 2021-01-05 RX ORDER — SENNA PLUS 8.6 MG/1
1 TABLET ORAL DAILY PRN
Status: DISCONTINUED | OUTPATIENT
Start: 2021-01-05 | End: 2021-01-06 | Stop reason: HOSPADM

## 2021-01-05 RX ORDER — SODIUM CHLORIDE 0.9 % (FLUSH) 0.9 %
10 SYRINGE (ML) INJECTION PRN
Status: DISCONTINUED | OUTPATIENT
Start: 2021-01-05 | End: 2021-01-06 | Stop reason: HOSPADM

## 2021-01-05 RX ADMIN — DIPHENHYDRAMINE HYDROCHLORIDE 25 MG: 50 INJECTION, SOLUTION INTRAMUSCULAR; INTRAVENOUS at 20:39

## 2021-01-05 RX ADMIN — SODIUM CHLORIDE: 9 INJECTION, SOLUTION INTRAVENOUS at 14:30

## 2021-01-05 RX ADMIN — Medication 10 ML: at 23:03

## 2021-01-05 RX ADMIN — METOPROLOL SUCCINATE 25 MG: 25 TABLET, FILM COATED, EXTENDED RELEASE ORAL at 12:35

## 2021-01-05 RX ADMIN — ASPIRIN 81 MG: 81 TABLET, COATED ORAL at 12:16

## 2021-01-05 RX ADMIN — SODIUM CHLORIDE: 9 INJECTION, SOLUTION INTRAVENOUS at 20:37

## 2021-01-05 RX ADMIN — ENOXAPARIN SODIUM 30 MG: 30 INJECTION SUBCUTANEOUS at 15:32

## 2021-01-05 RX ADMIN — SODIUM CHLORIDE 1000 ML: 9 INJECTION, SOLUTION INTRAVENOUS at 12:17

## 2021-01-05 RX ADMIN — DIPHENHYDRAMINE HYDROCHLORIDE 25 MG: 50 INJECTION, SOLUTION INTRAMUSCULAR; INTRAVENOUS at 13:26

## 2021-01-05 RX ADMIN — ATORVASTATIN CALCIUM 40 MG: 40 TABLET, FILM COATED ORAL at 20:39

## 2021-01-05 RX ADMIN — WATER 1 G: 1 INJECTION INTRAMUSCULAR; INTRAVENOUS; SUBCUTANEOUS at 12:16

## 2021-01-05 NOTE — PROGRESS NOTES
Dr Nell Renee,   Potassium protocol was discontinued for this patient due to contraindication in patient with CrCl less than 30 ml/min. It can be re-ordered if CrCl improves. Supplementation can be ordered as needed. Thank Ko Carter Pharm. D 1/5/2021 11:59 AM

## 2021-01-05 NOTE — ED PROVIDER NOTES
Chief complaint: Fatigue and emesis  HPI:     Kevyn Feng is a 80 y.o. male presenting to the ED for increasing fatigue and emesis, beginning 1 week ago. The complaint has been persistent, moderate in severity, and worsened by nothing. This is been constant since onset. Nothing makes it better. No treatment prior to arrival.  Patient does note that he was recently did take some Bactrim for an enlarged testicle. States that testicle is no longer swollen and he has completed his course, however that he has been having increasing weakness over the last few days. States that he felt like he was going to fall but without has caught himself and has not actually fallen or lost consciousness. In addition of lightheadedness he endorses nausea and a few episodes of emesis. Denies any headache, dizziness, chest pain, cough, shortness of breath, abdominal pain, diarrhea, constipation, urinary symptoms. Yes  Review of Systems:   Please see HPI above. All bolded are positive. All un-bolded are negative.     Constitutional Symptoms: fever, chills, fatigue, generalized weakness, diaphoresis, increase in thirst, loss of appetite  Eyes: vision change   Ears, Nose, Mouth, Throat: hearing loss, nasal congestion, sores in the mouth  Cardiovascular: chest pain, chest heaviness, palpitations  Respiratory: shortness of breath, wheezing, coughing  Gastrointestinal: abdominal pain, nausea, vomiting, diarrhea, constipation, melena, hematochezia, hematemesis  Genitourinary: dysuria, hematuria, increased frequency  Musculoskeletal: lower extremity edema, myalgias, arthralgias, back pain  Integumentary: rashes, itching   Neurological: headache, lightheadedness, dizziness, confusion, syncope, numbness, tingling, focal weakness  Psychiatric: depression, suicidal ideation, anxiety  Endocrine: unintentional weight change  Hematologic/Lymphatic: lymphadenopathy, easy bruising, easy bleeding   Allergic/Immunologic: recurrent infections --------------------------------------------- PAST HISTORY ---------------------------------------------  Past Medical History:  has a past medical history of BPH (benign prostatic hyperplasia), CAD (coronary artery disease), Carotid artery calcification, DJD (degenerative joint disease), Hyperlipemia, Lung nodule, Macular degeneration, Numbness and tingling in left arm, Numbness and tingling of left side of face, S/P CABG x 6, and Shingles. Past Surgical History:  has a past surgical history that includes Diagnostic Cardiac Cath Lab Procedure (9/5/00); Cardiac surgery; TURP; Dental surgery; Total hip arthroplasty (Right, 03/28/2011); Total hip arthroplasty (Left, 11/09/2009); other surgical history (02/16/2011); Thoracoscopy (Left, 4/12/2019); TURP (N/A, 5/30/2019); Prostate surgery (Bilateral, 05/27/2019); and Lung removal, partial (Left). Social History:  reports that he quit smoking about 32 years ago. His smoking use included cigarettes. He started smoking about 42 years ago. He has a 10.00 pack-year smoking history. He has never used smokeless tobacco. He reports current alcohol use. He reports that he does not use drugs. Family History: family history includes Alzheimer's Disease in his sister; Heart Disease in his brother, brother, and brother; Other in his father and mother. The patients home medications have been reviewed.     Allergies: Nitroglycerin, Biaxin [clarithromycin], Iodine, and Quinolones    -------------------------------------------------- RESULTS -------------------------------------------------  All laboratory and radiology results have been personally reviewed by myself   LABS:  Results for orders placed or performed during the hospital encounter of 01/05/21   CBC Auto Differential   Result Value Ref Range    WBC 6.8 4.5 - 11.5 E9/L    RBC 4.01 3.80 - 5.80 E12/L    Hemoglobin 11.8 (L) 12.5 - 16.5 g/dL    Hematocrit 34.7 (L) 37.0 - 54.0 %    MCV 86.5 80.0 - 99.9 fL MCH 29.4 26.0 - 35.0 pg    MCHC 34.0 32.0 - 34.5 %    RDW 13.3 11.5 - 15.0 fL    Platelets 873 808 - 412 E9/L    MPV 8.6 7.0 - 12.0 fL    Neutrophils % 71.0 43.0 - 80.0 %    Immature Granulocytes % 0.3 0.0 - 5.0 %    Lymphocytes % 10.1 (L) 20.0 - 42.0 %    Monocytes % 13.2 (H) 2.0 - 12.0 %    Eosinophils % 5.3 0.0 - 6.0 %    Basophils % 0.1 0.0 - 2.0 %    Neutrophils Absolute 4.79 1.80 - 7.30 E9/L    Immature Granulocytes # 0.02 E9/L    Lymphocytes Absolute 0.68 (L) 1.50 - 4.00 E9/L    Monocytes Absolute 0.89 0.10 - 0.95 E9/L    Eosinophils Absolute 0.36 0.05 - 0.50 E9/L    Basophils Absolute 0.01 0.00 - 0.20 E9/L   Comprehensive Metabolic Panel w/ Reflex to MG   Result Value Ref Range    Sodium 130 (L) 132 - 146 mmol/L    Potassium reflex Magnesium 4.6 3.5 - 5.0 mmol/L    Chloride 97 (L) 98 - 107 mmol/L    CO2 23 22 - 29 mmol/L    Anion Gap 10 7 - 16 mmol/L    Glucose 113 (H) 74 - 99 mg/dL    BUN 28 (H) 8 - 23 mg/dL    CREATININE 1.9 (H) 0.7 - 1.2 mg/dL    GFR Non-African American 34 >=60 mL/min/1.73    GFR African American 41     Calcium 8.8 8.6 - 10.2 mg/dL    Total Protein 7.1 6.4 - 8.3 g/dL    Alb 3.7 3.5 - 5.2 g/dL    Total Bilirubin 0.9 0.0 - 1.2 mg/dL    Alkaline Phosphatase 102 40 - 129 U/L    ALT 19 0 - 40 U/L    AST 31 0 - 39 U/L   Troponin   Result Value Ref Range    Troponin <0.01 0.00 - 0.03 ng/mL   Lactic Acid, Plasma   Result Value Ref Range    Lactic Acid 1.5 0.5 - 2.2 mmol/L   Lipase   Result Value Ref Range    Lipase 34 13 - 60 U/L   COVID-19   Result Value Ref Range    SARS-CoV-2, NAAT Not Detected Not Detected   EKG 12 Lead   Result Value Ref Range    Ventricular Rate 57 BPM    Atrial Rate 57 BPM    P-R Interval 170 ms    QRS Duration 100 ms    Q-T Interval 452 ms    QTc Calculation (Bazett) 439 ms    P Axis 64 degrees    R Axis -58 degrees    T Axis 62 degrees       RADIOLOGY:  Interpreted by Radiologist.  XR CHEST (2 VW)   Final Result   Substantial resolution of small left pleural effusion. Hiatal hernia. No   new abnormal findings. ------------------------- NURSING NOTES AND VITALS REVIEWED ---------------------------   The nursing notes within the ED encounter and vital signs as below have been reviewed. BP (!) 152/60   Pulse 64   Temp 98 °F (36.7 °C) (Oral)   Resp 16   Ht 5' 7\" (1.702 m)   Wt 180 lb (81.6 kg)   SpO2 97%   BMI 28.19 kg/m²   Oxygen Saturation Interpretation: Normal      ---------------------------------------------------PHYSICAL EXAM--------------------------------------      Constitutional/General: Alert and oriented x3, well appearing, non toxic in NAD  Head: Normocephalic and atraumatic  Eyes: PERRL, EOMI  Mouth: Oropharynx clear, handling secretions, no trismus  Neck: Supple, full ROM, no meningeal signs noted  Pulmonary: Lungs clear to auscultation bilaterally, no wheezes, rales, or rhonchi. Not in respiratory distress  Cardiovascular:  Regular rate and rhythm, no murmurs, gallops, or rubs. 2+ distal pulses  Abdomen: Soft, non tender, non distended,   Extremities: Moves all extremities x 4.  Warm and well perfused  Skin: warm and dry, diffuse macular rash noted to chest, abdomen, back, and arms bilaterally  Neurologic: GCS 15, no focal deficits noted, 5 out of 5 strength in all 4 extremities  Psych: Normal Affect      ------------------------------ ED COURSE/MEDICAL DECISION MAKING----------------------  Medications   0.9 % sodium chloride infusion ( Intravenous New Bag 1/5/21 7579)   sodium chloride flush 0.9 % injection 10 mL (has no administration in time range)   sodium chloride flush 0.9 % injection 10 mL (has no administration in time range)   enoxaparin (LOVENOX) injection 30 mg (has no administration in time range)   promethazine (PHENERGAN) tablet 12.5 mg (has no administration in time range)     Or   ondansetron (ZOFRAN) injection 4 mg (has no administration in time range)   senna (SENOKOT) tablet 8.6 mg (has no administration in time range) acetaminophen (TYLENOL) tablet 650 mg (has no administration in time range)     Or   acetaminophen (TYLENOL) suppository 650 mg (has no administration in time range)   cefTRIAXone (ROCEPHIN) 1 g in sterile water 10 mL IV syringe (0 g Intravenous Stopped 1/5/21 1416)   aspirin EC tablet 81 mg (81 mg Oral Given 1/5/21 1216)   atorvastatin (LIPITOR) tablet 40 mg (has no administration in time range)   metoprolol succinate (TOPROL XL) extended release tablet 25 mg (25 mg Oral Given 1/5/21 1235)   diphenhydrAMINE (BENADRYL) injection 25 mg (25 mg Intravenous Given 1/5/21 1326)   0.9 % sodium chloride bolus (0 mLs Intravenous Stopped 1/5/21 1415)         ED COURSE:       Medical Decision Making:    Patient presented to the ER today with chief complaint of weakness and emesis. Patient also noted to have a diffuse rash in the ED. Labs do demonstrate that patient has acute kidney injury for which she was given fluid resuscitation. Concern is that patient may have had a rash to his Bactrim and he was given Benadryl in the ED. Labs otherwise largely noncontributory at this time. Did speak to Dr. Adry Lujan who is agreeable to admission. All questions been answered. Counseling: The emergency provider has spoken with the patient and discussed todays results, in addition to providing specific details for the plan of care and counseling regarding the diagnosis and prognosis. Questions are answered at this time and they are agreeable with the plan.      --------------------------------- IMPRESSION AND DISPOSITION ---------------------------------    IMPRESSION  1. Acute kidney injury ([address] Stable   2. Rash and other nonspecific skin eruption    3. Non-intractable vomiting with nausea, unspecified vomiting type    4.  Generalized weakness        New Prescriptions    No medications on file       DISPOSITION  Disposition: Admit to telemetry  Patient condition is stable      NOTE: This report was transcribed using voice Generalized weakness were also pertinent to this visit.   Discharge Medication List as of 1/6/2021  3:36 PM      START taking these medications    Details   cefdinir (OMNICEF) 300 MG capsule Take 1 capsule by mouth daily for 7 days, Disp-7 capsule, R-0Normal      diphenhydrAMINE (BENADRYL ALLERGY) 25 MG capsule Take 1 capsule by mouth every 6 hours as needed for Itching, Disp-20 capsule, R-0Normal           Shahab [facility], [address], DO  01/06/21 7697

## 2021-01-05 NOTE — H&P
Internal Medicine History & Physical     Name: Yamilex Swartz  : 1931  Chief Complaint: Fatigue (increasing weakness x1 week) and Emesis (unable to keep anything down)  Primary Care Physician: Salazar Hall MD  Admission date: 2021  Date of service: 2021     History of Present Illness  Sung Briceno is a 80y.o. year old male. Patient presented to ED with complaint of weakness, chills and overall not feeling well and fatigue. Patient states nothing seems to make this better, nothing made it worse. Patient was recently seen here in the emergency department last week for enlarged testicle and was started on Bactrim for that. Patient's testiclel has decreased back to normal size. Patient states he has been feeling this way for the past couple days and was seen by his primary care doctor yesterday as well. Patient was noted here in the hospital to have a acute kidney injury along with being Covid negative. He was also noted on physical exam to have a diffuse rash to his bilateral arms, chest, abdomen, back. Patient otherwise has no other complaints at this time denies any chest pain shortness of breath abdominal pain changes to bowel or bladder habits, fevers, chills. The patient presents with acute kidney injury, fatigue that has been going on for 2. These symptoms are moderate in severity. Symptoms are made better by nothing. Symptoms are made worse by nothing. Associated symptoms include generalized weakness, chills, feeling unwell. .    There are no family or friends at bedside. The history is provided by the patient. he is felt to be a good historian. ED course:   Initial blood work and imaging studies performed. Admission recommended by ED physician. Case discussed with ED provider.  Meds in ED consisted of the following:  Medications   0.9 % sodium chloride bolus (has no administration in time range)       Past Medical History:   Diagnosis Date    BPH (benign prostatic hyperplasia)     CAD (coronary artery disease)     Carotid artery calcification     DJD (degenerative joint disease)     Hyperlipemia     Lung nodule     Macular degeneration     Numbness and tingling in left arm 2018    Numbness and tingling of left side of face 2018    S/P CABG x 6     LIMA-LAD, LYNNETTE-LCx,SVG-OM,SVG-LPL,SVG-PDA-RPL    Shingles        Past Surgical History:   Procedure Laterality Date    CARDIAC SURGERY      6 vessel in     DENTAL SURGERY      root canal    DIAGNOSTIC CARDIAC CATH LAB PROCEDURE  00    LUNG REMOVAL, PARTIAL Left     march    OTHER SURGICAL HISTORY  2011    Injection right hip  T. Carroll Khanna MD    PROSTATE SURGERY Bilateral 2019    THORACOSCOPY Left 2019    BRONCHOSCOPY LEFT THORACOSCOPY ROBOTIC VIDEO ASSISTED , WEDGE RESECTION, POSS. LEFT LOWER LOBECTOMY performed by Kelly Arechiga MD at [place] [address] Right 2011    Right BRIONNA  Marty Saldana MD    TOTAL HIP ARTHROPLASTY Left 2009    Left BRIONNA  Marty Saldana MD    TURP      TURP N/A 2019    CYSTOSCOPY, RETROGRADE PYELOGRAM,  URERTHRAL DILITATION, TRANSURETHRAL RESECTION PROSTATE performed by Jeff Kelley MD at [address] [place] [address] History   Problem Relation Age of Onset    Other Mother         accident age 28 years , Berl Buggy. was 5 months old    Other Father         accident age 43 decesed 8 years when Father passed    Heart Disease Brother     Alzheimer's Disease Sister     Heart Disease Brother     Heart Disease Brother        Social History  Patient lives at home by himself. Employment: Retired  Illicit drug use- denies  TOBACCO:   reports that he quit smoking about 32 years ago. His smoking use included cigarettes. He started smoking about 42 years ago. He has a 10.00 pack-year smoking history. He has never used smokeless tobacco.  ETOH:   reports current alcohol use.     Home Medications  Prior to Admission medications    Medication Sig Start Neurological: headache, lightheadedness, dizziness, confusion, syncope, numbness, tingling, focal weakness  Psychiatric: depression, suicidal ideation, anxiety  Endocrine: unintentional weight change  Hematologic/Lymphatic: lymphadenopathy, easy bruising, easy bleeding   Allergic/Immunologic: recurrent infections      Objective  VITALS:  BP (!) 122/57   Pulse 59   Temp 98 °F (36.7 °C) (Oral)   Resp 16   Ht 5' 7\" (1.702 m)   Wt 180 lb (81.6 kg)   SpO2 97%   BMI 28.19 kg/m²     Physical Exam:  General: awake, alert, oriented to person, place, time, and purpose, appears stated age, cooperative, no acute distress, pleasant, appropriate mood  Eyes: conjunctivae/corneas clear, sclera non icteric, EOMI  Ears: no obvious scars, no lesions, no masses, hearing intact  Mouth: mucous membranes moist, no obvious oral sores  Head: normocephalic, atraumatic  Neck: no JVD, no adenopathy, no thyromegaly, neck is supple, trachea is midline  Back: ROM normal, no CVA tenderness.   Chest: no pain on palpation  Lungs: clear to auscultation bilaterally, without rhonchi, crackle, wheezing, or rale, no retractions or use of accessory muscles  Heart: regular rate and regular rhythm, no murmur, normal S1, S2  Abdomen: soft, non-tender; bowel sounds normal; no masses, no organomegaly  : Deferred   Extremities: no lower extremity edema, extremities atraumatic, no cyanosis, no clubbing, 2+ pedal pulses palpated  Skin: normal color, normal texture, normal turgor, patient has diffuse rash to chest, abdomen, back, bilateral arms, no lesions  Neurologic:5/5 muscle strength throughout, normal muscle tone throughout, face symmetric, hearing intact, tongue midline, speech appropriate without slurring, sensation to fine touch intact in upper and lower extremities    Labs-   Lab Results   Component Value Date    WBC 6.8 01/05/2021    HGB 11.8 (L) 01/05/2021    HCT 34.7 (L) 01/05/2021     01/05/2021     (L) 01/05/2021    K 4.6 01/05/2021    CL 97 (L) 01/05/2021    CREATININE 1.9 (H) 01/05/2021    BUN 28 (H) 01/05/2021    CO2 23 01/05/2021    GLUCOSE 113 (H) 01/05/2021    ALT 19 01/05/2021    AST 31 01/05/2021    INR 1.0 08/20/2019     Lab Results   Component Value Date    TROPONINI <0.01 01/05/2021       Recent Radiological Studies:  XR CHEST (2 VW)   Final Result   Substantial resolution of small left pleural effusion. Hiatal hernia. No   new abnormal findings. Assessment  Active Hospital Problems    Diagnosis    CORBIN (acute kidney injury) ([facility] [N17.9]     Priority: High    UTI (urinary tract infection) [N39.0]     Priority: Medium    Essential hypertension [I10]    Bradycardia [R00.1]    Hx of coronary artery disease [Z86.79]    Malignant neoplasm of lower lobe of left lung (HCC) [C34.32]    Asymptomatic bilateral carotid artery stenosis [I65.23]    Hyperlipemia [E78.5]       Patient Active Problem List    Diagnosis Date Noted    CORBIN (acute kidney injury) ([facility] 01/05/2021     Priority: High    UTI (urinary tract infection) 01/05/2021     Priority: Medium    Essential hypertension     Bradycardia     Hx of coronary artery disease     Malignant ne[facility] of lower lobe of left lung ([facility] 04/22/2019    Asymptomatic bilateral carotid artery stenosis 05/23/2016    Hyperlipemia        Plan  · Rash likely related to Bactrim allergy: DC Bactrim and add to allergy list.  IV Benadryl. · CORBIN related to dehydration and Bactrim: Follow BMP. IVF. UTI: UA noted. Urine cx noted. IV Rocephin. · Continue home medications  · PT/OT  · Follow labs  · DVT prophylaxis. · Please see orders for further management and care. ·  for discharge planning  · Discharge plan: Possibly home as soon as tomorrow barring setbacks    The patient was seen and evaluated by myself and Max Yen MD PGY-1  1/5/2021 1:07 PM      NOTE:  This report was transcribed using voice recognition software.   Every effort was made to ensure accuracy; however, inadvertent computerized transcription errors may be present. Addendum: I have personally participated in a face-to-face history and physical exam on the date of service with the patient. I have discussed the case with the resident. I also participated in medical decision making with the resident on the date of service and I agree with all of the pertinent clinical information unless indicated in my editing of the note. I have reviewed and edited the note above based on my findings during my history, exam, and decision making on the same day of service. Electronically signed by Mallorie Pearson DO on 1/5/2021 at 1:10 PM    I can be reached through [email or web address].

## 2021-01-05 NOTE — ED NOTES
Bed:  [place] [address]  Expected date:   Expected time:   Means of arrival:   Comments:  Alessia Escobar RN  01/05/21 0553

## 2021-01-06 VITALS
HEIGHT: 67 IN | OXYGEN SATURATION: 95 % | HEART RATE: 55 BPM | SYSTOLIC BLOOD PRESSURE: 149 MMHG | DIASTOLIC BLOOD PRESSURE: 65 MMHG | RESPIRATION RATE: 16 BRPM | TEMPERATURE: 98.2 F | WEIGHT: 182 LBS | BODY MASS INDEX: 28.56 KG/M2

## 2021-01-06 LAB
ALBUMIN SERPL-MCNC: 3.2 G/DL (ref 3.5–5.2)
ALP BLD-CCNC: 83 U/L (ref 40–129)
ALT SERPL-CCNC: 19 U/L (ref 0–40)
ANION GAP SERPL CALCULATED.3IONS-SCNC: 6 MMOL/L (ref 7–16)
AST SERPL-CCNC: 29 U/L (ref 0–39)
BASOPHILS ABSOLUTE: 0.02 E9/L (ref 0–0.2)
BASOPHILS RELATIVE PERCENT: 0.3 % (ref 0–2)
BILIRUB SERPL-MCNC: 0.4 MG/DL (ref 0–1.2)
BUN BLDV-MCNC: 25 MG/DL (ref 8–23)
CALCIUM SERPL-MCNC: 8.1 MG/DL (ref 8.6–10.2)
CHLORIDE BLD-SCNC: 105 MMOL/L (ref 98–107)
CO2: 25 MMOL/L (ref 22–29)
CREAT SERPL-MCNC: 1.6 MG/DL (ref 0.7–1.2)
EOSINOPHILS ABSOLUTE: 1.19 E9/L (ref 0.05–0.5)
EOSINOPHILS RELATIVE PERCENT: 20.6 % (ref 0–6)
GFR AFRICAN AMERICAN: 49
GFR NON-AFRICAN AMERICAN: 41 ML/MIN/1.73
GLUCOSE BLD-MCNC: 107 MG/DL (ref 74–99)
HCT VFR BLD CALC: 31.5 % (ref 37–54)
HEMOGLOBIN: 10.3 G/DL (ref 12.5–16.5)
IMMATURE GRANULOCYTES #: 0.01 E9/L
IMMATURE GRANULOCYTES %: 0.2 % (ref 0–5)
LYMPHOCYTES ABSOLUTE: 1.6 E9/L (ref 1.5–4)
LYMPHOCYTES RELATIVE PERCENT: 27.7 % (ref 20–42)
MCH RBC QN AUTO: 29.1 PG (ref 26–35)
MCHC RBC AUTO-ENTMCNC: 32.7 % (ref 32–34.5)
MCV RBC AUTO: 89 FL (ref 80–99.9)
MONOCYTES ABSOLUTE: 0.79 E9/L (ref 0.1–0.95)
MONOCYTES RELATIVE PERCENT: 13.7 % (ref 2–12)
NEUTROPHILS ABSOLUTE: 2.16 E9/L (ref 1.8–7.3)
NEUTROPHILS RELATIVE PERCENT: 37.5 % (ref 43–80)
PDW BLD-RTO: 13.3 FL (ref 11.5–15)
PLATELET # BLD: 161 E9/L (ref 130–450)
PMV BLD AUTO: 8.8 FL (ref 7–12)
POTASSIUM REFLEX MAGNESIUM: 4.5 MMOL/L (ref 3.5–5)
RBC # BLD: 3.54 E12/L (ref 3.8–5.8)
SODIUM BLD-SCNC: 136 MMOL/L (ref 132–146)
TOTAL PROTEIN: 6.1 G/DL (ref 6.4–8.3)
WBC # BLD: 5.8 E9/L (ref 4.5–11.5)

## 2021-01-06 PROCEDURE — 80053 COMPREHEN METABOLIC PANEL: CPT

## 2021-01-06 PROCEDURE — 36415 COLL VENOUS BLD VENIPUNCTURE: CPT

## 2021-01-06 PROCEDURE — 97161 PT EVAL LOW COMPLEX 20 MIN: CPT

## 2021-01-06 PROCEDURE — 97165 OT EVAL LOW COMPLEX 30 MIN: CPT

## 2021-01-06 PROCEDURE — 6370000000 HC RX 637 (ALT 250 FOR IP): Performed by: INTERNAL MEDICINE

## 2021-01-06 PROCEDURE — 85025 COMPLETE CBC W/AUTO DIFF WBC: CPT

## 2021-01-06 PROCEDURE — 2580000003 HC RX 258: Performed by: INTERNAL MEDICINE

## 2021-01-06 PROCEDURE — 6360000002 HC RX W HCPCS: Performed by: INTERNAL MEDICINE

## 2021-01-06 PROCEDURE — 96376 TX/PRO/DX INJ SAME DRUG ADON: CPT

## 2021-01-06 PROCEDURE — 51798 US URINE CAPACITY MEASURE: CPT

## 2021-01-06 PROCEDURE — 96361 HYDRATE IV INFUSION ADD-ON: CPT

## 2021-01-06 PROCEDURE — G0378 HOSPITAL OBSERVATION PER HR: HCPCS

## 2021-01-06 RX ORDER — CEFDINIR 300 MG/1
300 CAPSULE ORAL DAILY
Qty: 7 CAPSULE | Refills: 0 | Status: SHIPPED | OUTPATIENT
Start: 2021-01-06 | End: 2021-01-13

## 2021-01-06 RX ORDER — DIPHENHYDRAMINE HCL 25 MG
25 CAPSULE ORAL EVERY 6 HOURS PRN
Qty: 20 CAPSULE | Refills: 0 | Status: SHIPPED | OUTPATIENT
Start: 2021-01-06 | End: 2021-01-11

## 2021-01-06 RX ADMIN — METOPROLOL SUCCINATE 25 MG: 25 TABLET, FILM COATED, EXTENDED RELEASE ORAL at 09:29

## 2021-01-06 RX ADMIN — ASPIRIN 81 MG: 81 TABLET, COATED ORAL at 09:30

## 2021-01-06 RX ADMIN — WATER 1 G: 1 INJECTION INTRAMUSCULAR; INTRAVENOUS; SUBCUTANEOUS at 13:07

## 2021-01-06 RX ADMIN — DIPHENHYDRAMINE HYDROCHLORIDE 25 MG: 50 INJECTION, SOLUTION INTRAMUSCULAR; INTRAVENOUS at 09:30

## 2021-01-06 NOTE — PROGRESS NOTES
Occupational Therapy  OCCUPATIONAL THERAPY INITIAL EVALUATION      Date:2021  Patient Name: Michele Barclay  MRN: [id number]  : 1931  Room: [address] [place] [address]A    Referring Provider: Mega Ho DO    Evaluating OT: Devan Talamantes OTR/L [id number]    AM-PAC Daily Activity Raw Score:     Recommended Adaptive Equipment: none      Diagnosis: CORBIN. Pt presents to ED from home with fatigue/emesis. Pertinent Medical History: CAD, DJD   Precautions:  none     Home Living: Pt lives alone in a single story home with basement laundry and shower   Bathroom setup: walk in shower, standard commode     Prior Level of Function: Independent with ADLs, Independent with IADLs; completed functional mobility with no AD  Driving: Yes    Pain Level: no reported pain    Cognition: A&O: 4/4    Problem solving:  WFL   Judgement/safety:  WFL     Functional Assessment:   Initial Eval Status  Date: 21   Feeding Independent   Grooming Independent   UB Dressing Independent   LB Dressing Independent  Management of B socks seated EOB   Bathing Mod I   Toileting Independent  Good safety and technique with use of commode for toileting   Bed Mobility  Supine <> sit: Independent   Functional Transfers Independent   Functional Mobility Independent no AD  Hallway distance   Balance Sitting: Good    Standing: Fair plus   Activity Tolerance WFL        Strength ROM Additional Info:    RUE  4/5 WFL good  and wfl FMC/dexterity noted during ADL tasks       LUE 4/5 WFL good  and wfl FMC/dexterity noted during ADL tasks         Hearing: WFL   Vision: WFL   Sensation:  No c/o numbness or tingling   Edema: none                             Pt educated on purpose of OT services with verbalized understanding. Pt reports no need for further OT interventions at this time. OT evaluation only no indicated need for further skilled OT services. Please reconsult if any new need arises.     Eval Complexity: Low    Upon arrival, patient supine in bed. At end of session, patient seated in armchair with call light and phone within reach, all lines and tubes intact. Low Evaluation     Time In: 823  Time Out: 833    Evaluation time includes thorough review of current medical information, gathering information on past medical history/social history and prior level of function, completion of standardized testing/informal observation of tasks and assessment of data.     Hero Mcallister OTR/L  [id number]

## 2021-01-06 NOTE — PLAN OF CARE
Problem: Pain:  Goal: Pain level will decrease  Description: Pain level will decrease  Outcome: Met This Shift     Problem: Pain:  Goal: Control of acute pain  Description: Control of acute pain  Outcome: Met This Shift     Problem: Skin Integrity:  Goal: Skin integrity will stabilize  Description: Skin integrity will stabilize  Outcome: Met This Shift

## 2021-01-06 NOTE — CARE COORDINATION
Social Work:    Reviewed chart notes. Mr. Deyvi Tompkins admitted to [facility] due to increased weakness, fatigue, & emesis. He has a history of BPH, UTI, azotemia, and presently was consulted by urology due to testicle pain. Social service met with Mr. Deyvi Tompkins and explained social service role within the case management department. Mr. Adela Davis PCP is Dr. Dann Rodriguez and he uses [facility] pharmacy in [place]. He resides in his one-story home alone and has no home durable medical equipment. Mr. Deyvi Tompkins is a  and is active at the [facility] in [place]. His daughter Ronan Mora and her  assist any needs he may have. Mr. Reji Junior has never used [facility] or snf. He expects to discharge home and states he plans to follow-up out-patient with Dr. Faheem Sarabia and does not feel he will need home care.     Electronically signed by MARTHA Leonard on 1/6/2021 at 1:01 PM

## 2021-01-06 NOTE — CONSULTS
1/6/2021 10:33 AM  Service: Urology  Group: [facility] (Geoffrey/Isabella/Angelic)    Robert Plata  [id number]     Chief Complaint:    Right testicle pain     History of Present Illness: The patient is a 80 y.o. male patient who was admitted to the hospital yesterday with complaints of fatigue and emesis for about 1 week. He had recently been seen in the emergency department on 12/28/2020 with complaints of right-sided scrotal pain and swelling. He was diagnosed with right epididymoorchitis and sent home on Bactrim. He states that since beginning the Bactrim his swelling has significantly improved and his pain has subsided. Since beginning the Bactrim he has been noted to have a diffuse rash. He is known to Dr. Nilay Hale. He has a history of urinary retention, recurring UTI, BPH s/p TURP May 2019. He states since his TURP procedure that he feels he he does empty his bladder completely. He does have occasional weak stream and still does experience nocturia 3-4 times per night. He does keep catheters at home and will sometimes maybe once every 2 weeks perform a self cath to ensure that he has complete bladder emptying. His urine culture from 12/28/20 was +Klebsiella with a 25,000 CFU/ml. He is currently on Rocephin.        Past Medical History:   Diagnosis Date    BPH (benign prostatic hyperplasia)     CAD (coronary artery disease)     Carotid artery calcification     DJD (degenerative joint disease)     Hyperlipemia     Lung nodule     Macular degeneration     Numbness and tingling in left arm 2/2/2018    Numbness and tingling of left side of face 2/2/2018    S/P CABG x 6 2000    LIMA-LAD, LYNNETTE-LCx,SVG-OM,SVG-LPL,SVG-PDA-RPL    Shingles          Past Surgical History:   Procedure Laterality Date    CARDIAC SURGERY      6 vessel in 2000    DENTAL SURGERY      root canal    DIAGNOSTIC CARDIAC CATH LAB PROCEDURE  9/5/00    LUNG REMOVAL, PARTIAL Left     march    OTHER SURGICAL HISTORY 02/16/2011    Injection right hip  T. Danni Albarado MD    PROSTATE SURGERY Bilateral 05/27/2019    THORACOSCOPY Left 4/12/2019    BRONCHOSCOPY LEFT THORACOSCOPY ROBOTIC VIDEO ASSISTED , WEDGE RESECTION, POSS. LEFT LOWER LOBECTOMY performed by Mis Weeks MD at [facility] Right 03/28/2011    Right BRIONNA Zuniga MD    TOTAL HIP ARTHROPLASTY Left 11/09/2009    Left BRIONNA  Luis Zuniga MD    TURP      TURP N/A 5/30/2019    CYSTOSCOPY, RETROGRADE PYELOGRAM,  URERTHRAL DILITATION, TRANSURETHRAL RESECTION PROSTATE performed by Peggy Gordon MD at [address]       Medications Prior to Admission:    Medications Prior to Admission: Cholecalciferol (VITAMIN D3) 50 MCG (2000 UT) CAPS, Take by mouth  aspirin 81 MG EC tablet, Take 1 tablet by mouth daily  metoprolol succinate (TOPROL XL) 25 MG extended release tablet, Take 25 mg by mouth daily  Calcium-Magnesium-Vitamin D (CALCIUM 1200+D3 PO), Take 1 tablet by mouth daily. atorvastatin (LIPITOR) 40 MG tablet, Take 40 mg by mouth daily. Multiple Vitamins-Minerals (CENTRUM-LUTEIN PO), Take 1 tablet by mouth daily. Omega-3 Fatty Acids (FISH OIL) 1000 MG CAPS, Take 1,000 mg by mouth daily. Bevacizumab (AVASTIN IV), Infuse intravenously See Admin Instructions Gets injection in eyes about every 11 weeks    Allergies:    Nitroglycerin, Biaxin [clarithromycin], Iodine, and Quinolones    Social History:    reports that he quit smoking about 32 years ago. His smoking use included cigarettes. He started smoking about 42 years ago. He has a 10.00 pack-year smoking history. He has never used smokeless tobacco. He reports current alcohol use. He reports that he does not use drugs. Family History:   Non-contributory to this Urological problem  family history includes Alzheimer's Disease in his sister; Heart Disease in his brother, brother, and brother; Other in his father and mother.     Review of Systems:  Constitutional: No fever or chills   Respiratory: negative for cough and hemoptysis  Cardiovascular: negative for chest pain and dyspnea  Gastrointestinal: negative for abdominal pain, diarrhea, nausea and vomiting   Derm: negative for rash and skin lesion(s)  Neurological: negative for seizures and tremors  Musculoskeletal: Negative    Psychiatric: Negative   : As above in the HPI, otherwise negative  All other reviews are negative    Physical Exam:     Vitals:  BP (!) 149/65   Pulse 55   Temp 98.2 °F (36.8 °C) (Oral)   Resp 16   Ht 5' 7\" (1.702 m)   Wt 182 lb (82.6 kg)   SpO2 95%   BMI 28.51 kg/m²     General:  Awake, alert, oriented X 3. No apparent distress. HEENT:  Normocephalic, atraumatic. Lungs:  Respirations symmetric and non-labored. Abdomen:  soft, nontender, no masses  Extremities:  No clubbing, cyanosis, or edema  Skin:  Warm and dry, no open lesions or rashes  Neuro: There are no motor or sensory deficits in the 4 quadrant extremities   Rectal: deferred  Genitourinary:  Right epididymoorchitis, minimal tenderness to palpation, moderate swelling, no abscess or crepitus. Labs:     Recent Labs     01/05/21  0956   WBC 6.8   RBC 4.01   HGB 11.8*   HCT 34.7*   MCV 86.5   MCH 29.4   MCHC 34.0   RDW 13.3      MPV 8.6         Recent Labs     01/05/21  0956   CREATININE 1.9*       Imaging:   Narrative   EXAMINATION:   ULTRASOUND OF THE SCROTUM/TESTICLES WITH COLOR DOPPLER FLOW EVALUATION;   DOPPLER EVALUATION OF THE PELVIS   12/28/2020   COMPARISON:   None.    HISTORY:   ORDERING SYSTEM PROVIDED HISTORY: right scrotal edema / pain   TECHNOLOGIST PROVIDED HISTORY:   Reason for exam:->right scrotal edema / pain   What reading provider will be dictating this exam?->CRC   FINDINGS:   The right testicle is homogeneous and normal in size, measuring 4.3 x 2.8 x   2.5 cm.  There is color as well as Doppler arterial and venous flow   identified to the right testicle. Patty Hives is heterogeneous enlargement of the   right epididymis with asymmetric increased flow to the right epididymis and   testicle compared to the left, suggesting acute epididymo-orchitis. Bretta Columbia is   a right superior.  To stick ill are cysts with single thin septation,   measuring 1.2 x 1.0 x 1.0 cm. Bretta Columbia is a large right simple hydrocele. There is a small coarse calcification noted within the inferior scrotal sac. The left testicle is normal in size with heterogeneous echotexture, measuring   3.9 x 2.7 x 2.1 cm.  There is color as well as Doppler arterial and venous   flow identified to the left testicle. Bretta Columbia is a small left testicular   simple cyst, measuring 0.4 x 0.3 x 0.3 cm.  There is no testicular mass   lesion observed.  The left epididymis is normal in size without appreciable   hyperemia.  There is a left epididymal head simple cyst measuring 1.0 x 0.8 x   0.8 cm.  There is no left hydrocele.  There are small coarse calcifications   noted within the left scrotal sac.       Impression   Sonographic findings suggestive of acute right epididymo-orchitis. Large right simple hydrocele. Assessment/plan:  Right epididymoorchitis  BPH s/p TURP 2019  UTI   Azotemia     Urine Cx reviewed  Blood Cx pending  Creatinine elevated   Bladder PVR ordered to be sure that he does not have incomplete bladder emptying that could contribute to his UTI and azotemia  If elevated PVRs will need Tamsulosin   His swelling he states has significantly improved.  His pain has resolved  Cont the antibiotics   Ice and elevate scrotum   Ok for discharge today after bladder scans are evaluated     Electronically signed by ABHISHEK Heart CNP on 1/6/2021 at 10:33 AM     Agree with above  Seen and examined  Agree with the plan and treatment    [facility], DO

## 2021-01-06 NOTE — PROGRESS NOTES
P Quality Flow/Interdisciplinary Rounds Progress Note        Quality Flow Rounds held on January 6, 2021    Disciplines Attending:  Bedside Nurse, ,  and Nursing Unit Leadership    Edgardo Hay was admitted on 1/5/2021  8:54 AM    Anticipated Discharge Date:  Expected Discharge Date: 01/06/21    Disposition:    Cornell Score:  Cornell Scale Score: 21    Readmission Risk              Risk of Unplanned Readmission:        17           Discussed patient goal for the day, patient clinical progression, and barriers to discharge.   The following Goal(s) of the Day/Commitment(s) have been identified:  Discharge [address] [place]  January 6, 2021

## 2021-01-06 NOTE — DISCHARGE SUMMARY
Internal Medicine Discharge Summary    NAME: Saulo Sultana :  1931  MRN:  [id number] Suzanna Serrano MD    ADMITTED: 2021   DISCHARGED: 2021  4:09 PM    ADMITTING PHYSICIAN: Antonino Sierra DO    PCP: Stef Lira MD    CONSULTANT(S):   IP CONSULT TO INTERNAL MEDICINE  IP CONSULT TO SOCIAL WORK  IP CONSULT TO UROLOGY     ADMITTING DIAGNOSIS:   CORBIN (acute kidney injury) ([facility] [address] [N17.9]     Please see H&P for further details    DISCHARGE DIAGNOSES:   Active Hospital Problems    Diagnosis    CORBIN (acute kidney injury) ([facility] [address] [N17.9]     Priority: High    UTI (urinary tract infection) [N39.0]     Priority: Medium    Essential hypertension [I10]    Bradycardia [R00.1]    Hx of coronary artery disease [Z86.79]    Malignant neoplasm of lower lobe of left lung ([facility] [address] [C34.32]    Asymptomatic bilateral carotid artery stenosis [I65.23]    Hyperlipemia [E78.5]       BRIEF HISTORY OF PRESENT ILLNESS: Saulo Sultana is a 80 y.o. male patient of Stef Lira MD who  has a past medical history of BPH (benign prostatic hyperplasia), CAD (coronary artery disease), Carotid artery calcification, DJD (degenerative joint disease), Hyperlipemia, Lung nodule, Macular degeneration, Numbness and tingling in left arm, Numbness and tingling of left side of face, S/P CABG x 6, and Shingles. who originally had concerns including Fatigue (increasing weakness x1 week) and Emesis (unable to keep anything down). at presentation on 2021, and was found to have CORBIN (acute kidney injury) ([facility] [address] [N17.9] after workup. Please see H&P for further details. HOSPITAL COURSE:   The patient presented to the hospital with the chief complaint of Fatigue (increasing weakness x1 week) and Emesis (unable to keep anything down). The patient was admitted to the hospital.     · Rash likely related to Bactrim allergy: DC Bactrim and add to allergy list.  IV Benadryl. · CORBIN related to dehydration and Bactrim--resolved:  Follow BMP. IVF. · UTI: UA noted. Urine cx noted. IV Rocephin.   · DC'ed home    BRIEF PHYSICAL EXAMINATION AND LABORATORIES ON DAY OF DISCHARGE:  VITALS:  BP (!) 149/65   Pulse 55   Temp 98.2 °F (36.8 °C) (Oral)   Resp 16   Ht 5' 7\" (1.702 m)   Wt 182 lb (82.6 kg)   SpO2 95%   BMI 28.51 kg/m²     Please see note from the same day. LABS[de-identified]  Recent Labs     01/05/21  0956   *   K 4.6   CL 97*   CO2 23   BUN 28*   CREATININE 1.9*   GLUCOSE 113*   CALCIUM 8.8     Recent Labs     01/05/21  0956   ALKPHOS 102   ALT 19   AST 31   PROT 7.1   BILITOT 0.9   LABALBU 3.7     Recent Labs     01/05/21  0956   WBC 6.8   RBC 4.01   HGB 11.8*   HCT 34.7*   MCV 86.5   MCH 29.4   MCHC 34.0   RDW 13.3      MPV 8.6     No results found for: LABA1C  Lab Results   Component Value Date    INR 1.0 08/20/2019    INR 1.3 05/22/2019    INR 1.0 09/13/2018    PROTIME 11.8 08/20/2019    PROTIME 15.0 (H) 05/22/2019    PROTIME 11.7 09/13/2018      Lab Results   Component Value Date    TSH 2.210 12/08/2020    TSH 5.780 (H) 04/26/2019    TSH 1.770 09/13/2018     Lab Results   Component Value Date    TRIG 68 12/08/2020    TRIG 114 10/03/2020    TRIG 114 04/26/2019    HDL 69 12/08/2020    HDL 54 10/03/2020    HDL 50 04/26/2019    LDLCALC 76 12/08/2020    LDLCALC 67 10/03/2020    LDLCALC 63 04/26/2019     No results for input(s): MG in the last 72 hours. Recent Labs     01/05/21  0956   TROPONINI <0.01      Recent Labs     01/05/21  0956   LACTA 1.5       IMAGING:  Xr Chest (2 Vw)    Result Date: 1/5/2021  EXAMINATION: TWO XRAY VIEWS OF THE CHEST 1/5/2021 9:06 am COMPARISON: October 2, 2020 HISTORY: ORDERING SYSTEM PROVIDED HISTORY: fatigue TECHNOLOGIST PROVIDED HISTORY: Reason for exam:->fatigue FINDINGS: Stable postoperative changes. A small left pleural effusion seen on the prior study has substantially resolved. Stable hiatal hernia. There are no new abnormal findings. Substantial resolution of small left pleural effusion. Hiatal hernia. No new abnormal findings. Us Scrotum And Testicles    Result Date: 12/28/2020  EXAMINATION: ULTRASOUND OF THE SCROTUM/TESTICLES WITH COLOR DOPPLER FLOW EVALUATION; DOPPLER EVALUATION OF THE PELVIS 12/28/2020 COMPARISON: None. HISTORY: ORDERING SYSTEM PROVIDED HISTORY: right scrotal edema / pain TECHNOLOGIST PROVIDED HISTORY: Reason for exam:->right scrotal edema / pain What reading provider will be dictating this exam?->CRC FINDINGS: The right testicle is homogeneous and normal in size, measuring 4.3 x 2.8 x 2.5 cm. There is color as well as Doppler arterial and venous flow identified to the right testicle. There is heterogeneous enlargement of the right epididymis with asymmetric increased flow to the right epididymis and testicle compared to the left, suggesting acute epididymo-orchitis. There is a right superior. To stick ill are cysts with single thin septation, measuring 1.2 x 1.0 x 1.0 cm. There is a large right simple hydrocele. There is a small coarse calcification noted within the inferior scrotal sac. The left testicle is normal in size with heterogeneous echotexture, measuring 3.9 x 2.7 x 2.1 cm. There is color as well as Doppler arterial and venous flow identified to the left testicle. There is a small left testicular simple cyst, measuring 0.4 x 0.3 x 0.3 cm. There is no testicular mass lesion observed. The left epididymis is normal in size without appreciable hyperemia. There is a left epididymal head simple cyst measuring 1.0 x 0.8 x 0.8 cm. There is no left hydrocele. There are small coarse calcifications noted within the left scrotal sac. Sonographic findings suggestive of acute right epididymo-orchitis. Large right simple hydrocele. Us Dup Abd Pel Retro Scrot Complete    Result Date: 12/28/2020  EXAMINATION: ULTRASOUND OF THE SCROTUM/TESTICLES WITH COLOR DOPPLER FLOW EVALUATION; DOPPLER EVALUATION OF THE PELVIS 12/28/2020 COMPARISON: None.  HISTORY: ORDERING SYSTEM PROVIDED HISTORY: right scrotal edema / pain TECHNOLOGIST PROVIDED HISTORY: Reason for exam:->right scrotal edema / pain What reading provider will be dictating this exam?->CRC FINDINGS: The right testicle is homogeneous and normal in size, measuring 4.3 x 2.8 x 2.5 cm. There is color as well as Doppler arterial and venous flow identified to the right testicle. There is heterogeneous enlargement of the right epididymis with asymmetric increased flow to the right epididymis and testicle compared to the left, suggesting acute epididymo-orchitis. There is a right superior. To stick ill are cysts with single thin septation, measuring 1.2 x 1.0 x 1.0 cm. There is a large right simple hydrocele. There is a small coarse calcification noted within the inferior scrotal sac. The left testicle is normal in size with heterogeneous echotexture, measuring 3.9 x 2.7 x 2.1 cm. There is color as well as Doppler arterial and venous flow identified to the left testicle. There is a small left testicular simple cyst, measuring 0.4 x 0.3 x 0.3 cm. There is no testicular mass lesion observed. The left epididymis is normal in size without appreciable hyperemia. There is a left epididymal head simple cyst measuring 1.0 x 0.8 x 0.8 cm. There is no left hydrocele. There are small coarse calcifications noted within the left scrotal sac. Sonographic findings suggestive of acute right epididymo-orchitis. Large right simple hydrocele. Us Carotid Artery Bilateral    [facility] - [facility]   Sears Spice 7/18/1931 Date of study: 12/14/20   Indication for study:  Carotid artery stenosis Study : Bilateral Carotid Artery Duplex Examination   Duplex examination of the RIGHT carotid artery system identifies atherosclerotic plaque. The peak systolic velocity in internal carotid artery was 144 centimeters / second. The maximum end diastolic velocity was 25 centimeters / second. The ICA/CCA ratio is 1.5.  The right vertebral artery has antegrade flow.   Duplex examination of the LEFT carotid artery system identifies atherosclerotic plaque. The peak systolic velocity in internal carotid artery was 325 centimeters / second. The maximum end diastolic velocity was 59 centimeters / second. The ICA/CCA ratio is 3.5. The left vertebral artery has antegrade flow.   Additional comments: none Impression: Right internal carotid artery: 50-59 % stenosis. Left internal carotid artery: 60-69 % stenosis. Addison Barajas M.D. Read and dictated by Addison Barajas M.D. on 12/14/20     Nimisha Michel Digital Diagnostic Unilateral Left    Result Date: 12/22/2020  INDICATION: Left Diagnostic and Swelling in the left breast  HISTORY: The patient has a history of lung cancer. The patient has no family history of breast cancer. MAMMOGRAM VIEWS: The following mammographic views where obtained: left craniocaudal; left craniocaudal with tomosynthesis; left mediolateral oblique; and left mediolateral oblique with tomosynthesis  TOMOSYNTHESIS: Tomosynthesis (3 Dimensional Breast Imaging) was used on this examination to aid in evaluation. COMPARISON: This is a baseline study. CAD: This exam was reviewed using the [employer] Computer Aided Detection (CAD)  TISSUE DENSITY: There are scattered fibroglandular densities (Type 2 density). MAMMOGRAM FINDINGS: Finding 1: There is an area of subareolar densities that are consistent with gynecomastia seen in the retro-areolar region of the left breast.  No suspicious masses, areas of suspicious architectural distortion, suspicious calcifications, or additional suspicious findings are identified. IMPRESSION: Area of subareolar densities that are consistent with gynecomastia in the left breast is benign.   Clinical follow-up is recommended.  ======================================= BI-RADS Category 2:  Benign =======================================  NOTE: Mammography is not 100% accurate in the detection of breast cancer. Accuracy decreases as mammographic density of the breast increases. A negative mammogram should not deter further evaluation (including biopsy) of suspicious physical findings. Recommendations are based on NCCN ([address]) and ACR [employer] of Radiology) guidelines. Thank you for sending your patient to this ACR and FDA approved facility. If there are any physician concerns regarding this report, a Radiologist can be reached by calling the following number [phone]. Follow up Protocol for the [facility]: BI-RADS 1 or 2:  Center will send a reminder when next mammogram is due. BI-RADS 3:  Center will send a reminder for recommended short term follow up. BI-RADS 0, 4 or 5:  Center will contact patient to schedule all additional views and procedures. MICROBIOLOGY:  BLOOD CX #1  Recent Labs     01/05/21  0956   BC 24 Hours no growth     BLOOD CX #2  Recent Labs     01/05/21  0956   BLOODCULT2 24 Hours no growth     TIP CULTURE  No results for input(s): CXCATHTIP in the last 72 hours. CULTURE, RESPIRATORY   No results for input(s): CULTRESP in the last 72 hours. RESPIRATORY SMEAR  No results for input(s): RESPSMEAR in the last 72 hours. DISPOSITION:  The patient's condition is fair. The patient is being discharged to home    DISCHARGE MEDICATIONS:    Armin Alston   Home Medication Instructions GPQ:[id number]    Printed on:01/07/21 1831   Medication Information                      aspirin 81 MG EC tablet  Take 1 tablet by mouth daily             atorvastatin (LIPITOR) 40 MG tablet  Take 40 mg by mouth daily. Calcium-Magnesium-Vitamin D (CALCIUM 1200+D3 PO)  Take 1 tablet by mouth daily.              cefdinir (OMNICEF) 300 MG capsule  Take 1 capsule by mouth daily for 7 days             Cholecalciferol (VITAMIN D3) 50 MCG (2000 UT) CAPS  Take by mouth             diphenhydrAMINE (BENADRYL ALLERGY) 25 MG capsule  Take 1 capsule by mouth every 6 hours as needed for Itching             metoprolol succinate (TOPROL XL) 25 MG extended release tablet  Take 25 mg by mouth daily             Multiple Vitamins-Minerals (CENTRUM-LUTEIN PO)  Take 1 tablet by mouth daily. Omega-3 Fatty Acids (FISH OIL) 1000 MG CAPS  Take 1,000 mg by mouth daily. Discharge Medication List as of 1/6/2021  3:36 PM        Discharge Medication List as of 1/6/2021  3:36 PM      STOP taking these medications       sulfamethoxazole-trimethoprim (BACTRIM DS;SEPTRA DS) 800-160 MG per tablet Comments:   Reason for Stopping:         Bevacizumab (AVASTIN IV) Comments:   Reason for Stopping:             Discharge Medication List as of 1/6/2021  3:36 PM      START taking these medications    Details   cefdinir (OMNICEF) 300 MG capsule Take 1 capsule by mouth daily for 7 days, Disp-7 capsule, R-0Normal      diphenhydrAMINE (BENADRYL ALLERGY) 25 MG capsule Take 1 capsule by mouth every 6 hours as needed for Itching, Disp-20 capsule, R-0Normal             INTERNAL MEDICINE FOLLOW UP/INSTRUCTIONS:  · Follow-up with primary care physician as directed in discharge paperwork. · Please review results of imaging studies with PCP. · Follow-up with consultants as directed by them. · If recurrence or worsening of symptoms go to the ED or call primary care physician. · Diet: DIET GENERAL;    Preparing for this patient's discharge, including paperwork, orders, instructions, and meeting with patient did required 35 minutes.     Electronically signed by Karmen Eastman DO on 1/7/2021 at 6:31 PM

## 2021-01-06 NOTE — PROGRESS NOTES
Physical Therapy    Facility/Department: [facility] SURGERY  Initial Assessment    NAME: Aarti Hogue  : 1931  MRN: [id number]    Date of Service: 2021          Patient Diagnosis(es): The primary encounter diagnosis was Acute kidney injury [facility]). Diagnoses of Rash and other nonspecific skin eruption, Non-intractable vomiting with nausea, unspecified vomiting type, and Generalized weakness were also pertinent to this visit. has a past medical history of BPH (benign prostatic hyperplasia), CAD (coronary artery disease), Carotid artery calcification, DJD (degenerative joint disease), Hyperlipemia, Lung nodule, Macular degeneration, Numbness and tingling in left arm, Numbness and tingling of left side of face, S/P CABG x 6, and Shingles. has a past surgical history that includes Diagnostic Cardiac Cath Lab Procedure (00); Cardiac surgery; TURP; Dental surgery; Total hip arthroplasty (Right, 2011); Total hip arthroplasty (Left, 2009); other surgical history (2011); Thoracoscopy (Left, 2019); TURP (N/A, 2019); Prostate surgery (Bilateral, 2019); and Lung removal, partial (Left). Evaluating Therapist: Tara Cornelius PT     Referring Provider:  Dr. Nae Joseph #:  [id number]  DIAGNOSIS: CORBIN     PRECAUTIONS: falls     Social:  Pt lives alone  in a  1  floor plan  1 step to enter. Uses basement   Prior to admission pt walked with  No AD , independent      Initial Evaluation  Date:  2021    Was pt agreeable to Eval/treatment? Yes    Does pt have pain?  denies    Bed Mobility  Rolling:  Independent   Supine to sit: Independent   Sit to supine:  NT   Scooting:  Independent    Transfers Sit to stand: independent  Stand to sit:   Independent   Stand pivot:  Independent    Ambulation    300  feet with no AD /IV pole push  with  Independent        Stair negotiation: ascended and descended NT,  pt reports he does not have problems with steps    LE ROM  WFL   LE strength

## 2021-01-06 NOTE — PROGRESS NOTES
 cefTRIAXone (ROCEPHIN) 1 g in sterile water 10 mL IV syringe  1 g Intravenous Q24H Stanton Colon DO   Stopped at 01/05/21 1416    aspirin EC tablet 81 mg  81 mg Oral Daily Stanton Colon DO   81 mg at 01/05/21 1216    atorvastatin (LIPITOR) tablet 40 mg  40 mg Oral Nightly Stanton Colon DO   40 mg at 01/05/21 2039    metoprolol succinate (TOPROL XL) extended release tablet 25 mg  25 mg Oral Daily Stanton Colon DO   25 mg at 01/05/21 1235    diphenhydrAMINE (BENADRYL) injection 25 mg  25 mg Intravenous Q6H Stanton Colon DO   25 mg at 01/05/21 2039       PRN Medications  sodium chloride flush, promethazine **OR** ondansetron, senna, acetaminophen **OR** acetaminophen    Objective  Most Recent Recorded Vitals  BP (!) 175/72   Pulse 59   Temp 98.2 °F (36.8 °C) (Oral)   Resp 18   Ht 5' 7\" (1.702 m)   Wt 182 lb (82.6 kg)   SpO2 95%   BMI 28.51 kg/m²   No intake/output data recorded. No intake/output data recorded. Physical Exam:  General: AAO to person/place/time/purpose, NAD, no labored breathing  Eyes: conjunctivae/corneas clear, sclera non icteric  Skin: color/texture/turgor normal, rash is much improved however still slightly there to the chest, abdomen, back. Lesions noted   Lungs: CTAB, no retractions/use of accessory muscles, no vocal fremitus, no rhonchi, no crackle, no rales  Heart: regular rate, regular rhythm, no murmur  Abdomen: soft, NT; bowel sounds normal; no masses,  no organomegaly  Extremities: atraumatic, no cyanosis, no edema  Neurologic: cranial nerves 2-12 grossly intact, no slurred speech.     Most Recent Labs  Lab Results   Component Value Date    WBC 6.8 01/05/2021    HGB 11.8 (L) 01/05/2021    HCT 34.7 (L) 01/05/2021     01/05/2021     (L) 01/05/2021    K 4.6 01/05/2021    CL 97 (L) 01/05/2021    CREATININE 1.9 (H) 01/05/2021    BUN 28 (H) 01/05/2021    CO2 23 01/05/2021    GLUCOSE 113 (H) 01/05/2021    ALT 19 01/05/2021    AST 31 01/05/2021    INR 1.0 08/20/2019    TSH 2.210 12/08/2020       XR CHEST (2 VW)   Final Result   Substantial resolution of small left pleural effusion. Hiatal hernia. No   new abnormal findings. Assessment   Active Hospital Problems    Diagnosis    CORBIN (acute kidney injury) ([facility] [address] [N17.9]     Priority: High    UTI (urinary tract infection) [N39.0]     Priority: Medium    Essential hypertension [I10]    Bradycardia [R00.1]    Hx of coronary artery disease [Z86.79]    Malignant neoplasm of lower lobe of left lung (HCC) [C34.32]    Asymptomatic bilateral carotid artery stenosis [I65.23]    Hyperlipemia [E78.5]         Plan  · Rash likely related to Bactrim allergy: DC Bactrim and add to allergy list.  IV Benadryl. · CORBIN related to dehydration and Bactrim: Follow BMP. IVF. · UTI: UA noted. Urine cx noted. IV Rocephin. · Follow labs  · DVT prophylaxis. · Please see orders for further management and care. ·  for discharge planning  · Discharge plan: home soon     The patient was seen and evaluated by myself and Stephany Villaseñor MD PGY-1  1/6/2021 9:08 AM     Addendum: I have personally participated in a face-to-face history and physical exam on the date of service with the patient. I have discussed the case with the resident. I also participated in medical decision making with the resident on the date of service and I agree with all of the pertinent clinical information unless indicated in my editing of the note. I have reviewed and edited the note above based on my findings during my history, exam, and decision making on the same day of service. My additional thoughts:    Home today barring setbacks    Awaiting labs     Electronically signed by Esteban Fried DO on 1/6/2021 at 12:54 PM    I can be reached through [employer].

## 2021-01-10 LAB
BLOOD CULTURE, ROUTINE: NORMAL
CULTURE, BLOOD 2: NORMAL

## 2021-02-04 PROBLEM — N39.0 UTI (URINARY TRACT INFECTION): Status: RESOLVED | Noted: 2021-01-05 | Resolved: 2021-02-04

## 2021-09-04 ENCOUNTER — HOSPITAL ENCOUNTER (OUTPATIENT)
Age: 86
Setting detail: OBSERVATION
Discharge: HOME | End: 2021-09-07
Attending: EMERGENCY MEDICINE | Admitting: STUDENT IN AN ORGANIZED HEALTH CARE EDUCATION/TRAINING PROGRAM
Payer: MEDICARE

## 2021-09-04 ENCOUNTER — APPOINTMENT (OUTPATIENT)
Dept: GENERAL RADIOLOGY | Age: 86
End: 2021-09-04
Payer: MEDICARE

## 2021-09-04 DIAGNOSIS — I21.4 ACUTE NON-ST ELEVATION MYOCARDIAL INFARCTION (NSTEMI) (HCC): Primary | ICD-10-CM

## 2021-09-04 DIAGNOSIS — N39.0 URINARY TRACT INFECTION WITHOUT HEMATURIA, SITE UNSPECIFIED: ICD-10-CM

## 2021-09-04 LAB
ALBUMIN SERPL-MCNC: 3.8 G/DL (ref 3.5–5.2)
ALP BLD-CCNC: 81 U/L (ref 40–129)
ALT SERPL-CCNC: 14 U/L (ref 0–40)
ANION GAP SERPL CALCULATED.3IONS-SCNC: 9 MMOL/L (ref 7–16)
AST SERPL-CCNC: 23 U/L (ref 0–39)
BACTERIA: ABNORMAL /HPF
BASOPHILS ABSOLUTE: 0.04 E9/L (ref 0–0.2)
BASOPHILS RELATIVE PERCENT: 0.2 % (ref 0–2)
BILIRUB SERPL-MCNC: 2.4 MG/DL (ref 0–1.2)
BILIRUBIN URINE: ABNORMAL
BLOOD, URINE: ABNORMAL
BUN BLDV-MCNC: 25 MG/DL (ref 6–23)
BURR CELLS: ABNORMAL
CALCIUM SERPL-MCNC: 8.9 MG/DL (ref 8.6–10.2)
CHLORIDE BLD-SCNC: 102 MMOL/L (ref 98–107)
CLARITY: ABNORMAL
CO2: 22 MMOL/L (ref 22–29)
COLOR: YELLOW
CREAT SERPL-MCNC: 1.4 MG/DL (ref 0.7–1.2)
EOSINOPHILS ABSOLUTE: 0 E9/L (ref 0.05–0.5)
EOSINOPHILS RELATIVE PERCENT: 0 % (ref 0–6)
GFR AFRICAN AMERICAN: 58
GFR NON-AFRICAN AMERICAN: 48 ML/MIN/1.73
GLUCOSE BLD-MCNC: 121 MG/DL (ref 74–99)
GLUCOSE URINE: NEGATIVE MG/DL
HCT VFR BLD CALC: 35.3 % (ref 37–54)
HEMOGLOBIN: 11.8 G/DL (ref 12.5–16.5)
IMMATURE GRANULOCYTES #: 0.19 E9/L
IMMATURE GRANULOCYTES %: 1 % (ref 0–5)
KETONES, URINE: ABNORMAL MG/DL
LACTIC ACID: 1.8 MMOL/L (ref 0.5–2.2)
LEUKOCYTE ESTERASE, URINE: ABNORMAL
LYMPHOCYTES ABSOLUTE: 0.83 E9/L (ref 1.5–4)
LYMPHOCYTES RELATIVE PERCENT: 4.5 % (ref 20–42)
MCH RBC QN AUTO: 29.5 PG (ref 26–35)
MCHC RBC AUTO-ENTMCNC: 33.4 % (ref 32–34.5)
MCV RBC AUTO: 88.3 FL (ref 80–99.9)
MONOCYTES ABSOLUTE: 1.79 E9/L (ref 0.1–0.95)
MONOCYTES RELATIVE PERCENT: 9.6 % (ref 2–12)
NEUTROPHILS ABSOLUTE: 15.73 E9/L (ref 1.8–7.3)
NEUTROPHILS RELATIVE PERCENT: 84.7 % (ref 43–80)
NITRITE, URINE: NEGATIVE
OVALOCYTES: ABNORMAL
PDW BLD-RTO: 14.3 FL (ref 11.5–15)
PH UA: 7 (ref 5–9)
PLATELET # BLD: 190 E9/L (ref 130–450)
PMV BLD AUTO: 9.5 FL (ref 7–12)
POIKILOCYTES: ABNORMAL
POTASSIUM REFLEX MAGNESIUM: 4 MMOL/L (ref 3.5–5)
PROTEIN UA: 100 MG/DL
RBC # BLD: 4 E12/L (ref 3.8–5.8)
RBC UA: ABNORMAL /HPF (ref 0–2)
SCHISTOCYTES: ABNORMAL
SODIUM BLD-SCNC: 133 MMOL/L (ref 132–146)
SPECIFIC GRAVITY UA: 1.02 (ref 1–1.03)
TOTAL PROTEIN: 6.9 G/DL (ref 6.4–8.3)
TROPONIN, HIGH SENSITIVITY: 87 NG/L (ref 0–11)
TROPONIN, HIGH SENSITIVITY: 94 NG/L (ref 0–11)
UROBILINOGEN, URINE: 4 E.U./DL
WBC # BLD: 18.6 E9/L (ref 4.5–11.5)
WBC UA: ABNORMAL /HPF (ref 0–5)

## 2021-09-04 PROCEDURE — 6360000002 HC RX W HCPCS: Performed by: INTERNAL MEDICINE

## 2021-09-04 PROCEDURE — 2580000003 HC RX 258: Performed by: INTERNAL MEDICINE

## 2021-09-04 PROCEDURE — 2580000003 HC RX 258: Performed by: EMERGENCY MEDICINE

## 2021-09-04 PROCEDURE — 1200000000 HC SEMI PRIVATE

## 2021-09-04 PROCEDURE — 83605 ASSAY OF LACTIC ACID: CPT

## 2021-09-04 PROCEDURE — 81001 URINALYSIS AUTO W/SCOPE: CPT

## 2021-09-04 PROCEDURE — 99284 EMERGENCY DEPT VISIT MOD MDM: CPT

## 2021-09-04 PROCEDURE — 93005 ELECTROCARDIOGRAM TRACING: CPT | Performed by: NURSE PRACTITIONER

## 2021-09-04 PROCEDURE — 80053 COMPREHEN METABOLIC PANEL: CPT

## 2021-09-04 PROCEDURE — 96374 THER/PROPH/DIAG INJ IV PUSH: CPT

## 2021-09-04 PROCEDURE — 6370000000 HC RX 637 (ALT 250 FOR IP): Performed by: INTERNAL MEDICINE

## 2021-09-04 PROCEDURE — 71046 X-RAY EXAM CHEST 2 VIEWS: CPT

## 2021-09-04 PROCEDURE — 96372 THER/PROPH/DIAG INJ SC/IM: CPT

## 2021-09-04 PROCEDURE — 87088 URINE BACTERIA CULTURE: CPT

## 2021-09-04 PROCEDURE — 87186 SC STD MICRODIL/AGAR DIL: CPT

## 2021-09-04 PROCEDURE — 96366 THER/PROPH/DIAG IV INF ADDON: CPT

## 2021-09-04 PROCEDURE — 87077 CULTURE AEROBIC IDENTIFY: CPT

## 2021-09-04 PROCEDURE — 6360000002 HC RX W HCPCS: Performed by: EMERGENCY MEDICINE

## 2021-09-04 PROCEDURE — 85025 COMPLETE CBC W/AUTO DIFF WBC: CPT

## 2021-09-04 PROCEDURE — 96365 THER/PROPH/DIAG IV INF INIT: CPT

## 2021-09-04 PROCEDURE — 2580000003 HC RX 258

## 2021-09-04 PROCEDURE — 84484 ASSAY OF TROPONIN QUANT: CPT

## 2021-09-04 RX ORDER — VITAMIN B COMPLEX
2000 TABLET ORAL DAILY
Status: DISCONTINUED | OUTPATIENT
Start: 2021-09-04 | End: 2021-09-07 | Stop reason: HOSPADM

## 2021-09-04 RX ORDER — SODIUM CHLORIDE 0.9 % (FLUSH) 0.9 %
10 SYRINGE (ML) INJECTION EVERY 12 HOURS SCHEDULED
Status: DISCONTINUED | OUTPATIENT
Start: 2021-09-04 | End: 2021-09-07 | Stop reason: HOSPADM

## 2021-09-04 RX ORDER — SODIUM CHLORIDE 9 MG/ML
INJECTION, SOLUTION INTRAVENOUS CONTINUOUS
Status: ACTIVE | OUTPATIENT
Start: 2021-09-04 | End: 2021-09-06

## 2021-09-04 RX ORDER — SODIUM CHLORIDE 0.9 % (FLUSH) 0.9 %
10 SYRINGE (ML) INJECTION PRN
Status: DISCONTINUED | OUTPATIENT
Start: 2021-09-04 | End: 2021-09-07 | Stop reason: HOSPADM

## 2021-09-04 RX ORDER — ONDANSETRON 4 MG/1
4 TABLET, ORALLY DISINTEGRATING ORAL EVERY 8 HOURS PRN
Status: DISCONTINUED | OUTPATIENT
Start: 2021-09-04 | End: 2021-09-07 | Stop reason: HOSPADM

## 2021-09-04 RX ORDER — ATORVASTATIN CALCIUM 40 MG/1
40 TABLET, FILM COATED ORAL DAILY
Status: DISCONTINUED | OUTPATIENT
Start: 2021-09-04 | End: 2021-09-07 | Stop reason: HOSPADM

## 2021-09-04 RX ORDER — METOPROLOL SUCCINATE 25 MG/1
25 TABLET, EXTENDED RELEASE ORAL DAILY
Status: DISCONTINUED | OUTPATIENT
Start: 2021-09-04 | End: 2021-09-07 | Stop reason: HOSPADM

## 2021-09-04 RX ORDER — CHLORAL HYDRATE 500 MG
1000 CAPSULE ORAL DAILY
Status: DISCONTINUED | OUTPATIENT
Start: 2021-09-04 | End: 2021-09-04 | Stop reason: CLARIF

## 2021-09-04 RX ORDER — ACETAMINOPHEN 650 MG/1
650 SUPPOSITORY RECTAL EVERY 6 HOURS PRN
Status: DISCONTINUED | OUTPATIENT
Start: 2021-09-04 | End: 2021-09-07 | Stop reason: HOSPADM

## 2021-09-04 RX ORDER — SENNA PLUS 8.6 MG/1
1 TABLET ORAL DAILY PRN
Status: DISCONTINUED | OUTPATIENT
Start: 2021-09-04 | End: 2021-09-07 | Stop reason: HOSPADM

## 2021-09-04 RX ORDER — ASPIRIN 81 MG/1
81 TABLET ORAL DAILY
Status: DISCONTINUED | OUTPATIENT
Start: 2021-09-04 | End: 2021-09-07 | Stop reason: HOSPADM

## 2021-09-04 RX ORDER — ONDANSETRON 2 MG/ML
4 INJECTION INTRAMUSCULAR; INTRAVENOUS EVERY 6 HOURS PRN
Status: DISCONTINUED | OUTPATIENT
Start: 2021-09-04 | End: 2021-09-07 | Stop reason: HOSPADM

## 2021-09-04 RX ORDER — POTASSIUM CHLORIDE 20 MEQ/1
40 TABLET, EXTENDED RELEASE ORAL PRN
Status: DISCONTINUED | OUTPATIENT
Start: 2021-09-04 | End: 2021-09-07 | Stop reason: HOSPADM

## 2021-09-04 RX ORDER — ACETAMINOPHEN 325 MG/1
650 TABLET ORAL EVERY 6 HOURS PRN
Status: DISCONTINUED | OUTPATIENT
Start: 2021-09-04 | End: 2021-09-07 | Stop reason: HOSPADM

## 2021-09-04 RX ORDER — SODIUM CHLORIDE 9 MG/ML
INJECTION, SOLUTION INTRAVENOUS ONCE
Status: COMPLETED | OUTPATIENT
Start: 2021-09-04 | End: 2021-09-04

## 2021-09-04 RX ORDER — SODIUM CHLORIDE 9 MG/ML
25 INJECTION, SOLUTION INTRAVENOUS PRN
Status: DISCONTINUED | OUTPATIENT
Start: 2021-09-04 | End: 2021-09-07 | Stop reason: HOSPADM

## 2021-09-04 RX ORDER — POTASSIUM CHLORIDE 7.45 MG/ML
10 INJECTION INTRAVENOUS PRN
Status: DISCONTINUED | OUTPATIENT
Start: 2021-09-04 | End: 2021-09-07 | Stop reason: HOSPADM

## 2021-09-04 RX ADMIN — METOPROLOL SUCCINATE 25 MG: 25 TABLET, EXTENDED RELEASE ORAL at 23:01

## 2021-09-04 RX ADMIN — ENOXAPARIN SODIUM 40 MG: 40 INJECTION SUBCUTANEOUS at 23:01

## 2021-09-04 RX ADMIN — ATORVASTATIN CALCIUM 40 MG: 40 TABLET, FILM COATED ORAL at 23:01

## 2021-09-04 RX ADMIN — ASPIRIN 81 MG: 81 TABLET, COATED ORAL at 23:01

## 2021-09-04 RX ADMIN — Medication 2000 UNITS: at 23:01

## 2021-09-04 RX ADMIN — SODIUM CHLORIDE: 9 INJECTION, SOLUTION INTRAVENOUS at 14:32

## 2021-09-04 RX ADMIN — WATER 2000 MG: 1 INJECTION INTRAMUSCULAR; INTRAVENOUS; SUBCUTANEOUS at 14:32

## 2021-09-04 RX ADMIN — SODIUM CHLORIDE: 9 INJECTION, SOLUTION INTRAVENOUS at 23:03

## 2021-09-04 ASSESSMENT — ENCOUNTER SYMPTOMS
COUGH: 1
CONSTIPATION: 1
ABDOMINAL PAIN: 0
CHOKING: 0
VOMITING: 1
NAUSEA: 0
SORE THROAT: 0
SHORTNESS OF BREATH: 0
TROUBLE SWALLOWING: 0

## 2021-09-04 ASSESSMENT — PAIN DESCRIPTION - LOCATION
LOCATION: GENERALIZED
LOCATION: GENERALIZED

## 2021-09-04 NOTE — ED NOTES
Dr. Odilon Olvera notified of patient being able to void. Catheter not inserted at this time.      Anita Preston RN  09/04/21 [id number]

## 2021-09-04 NOTE — ED PROVIDER NOTES
Chief Complaint   Patient presents with    Generalized Body Aches     since thursday.  Fatigue    Fever    Dizziness       HPI   Jessica Costa is a 80 y.o. male patient of Nick Mijares MD who  has a past medical history of BPH (benign prostatic hyperplasia), CAD (coronary artery disease), Carotid artery calcification, DJD (degenerative joint disease), Hyperlipemia, Lung nodule, Macular degeneration, Numbness and tingling in left arm, Numbness and tingling of left side of face, S/P CABG x 6, and Shingles. Patient presents today for increasing weakness over the last 3 days and fatigue. Patient states on Thursday night he was unable to keep his food down and yesterday he ate only half of his peanut butter sandwich. He has a decrease of appetite and this morning he threw up the water he drank. Patient states he has had a history of this in the past.  Patient also states he has been having a hard time urinating, he does have a history of BPH but he states sometimes he has pain with urination and it has been trickling. Allergies   Allergen Reactions    Nitroglycerin     Biaxin [Clarithromycin] Rash    Iodine Swelling and Rash     Internal Iodine only    Quinolones Rash       Review of Systems   Constitutional: Negative for appetite change and fatigue. HENT: Negative for ear discharge, sore throat and trouble swallowing. Respiratory: Positive for cough. Negative for choking and shortness of breath. Cardiovascular: Negative for chest pain, palpitations and leg swelling. Gastrointestinal: Positive for constipation and vomiting. Negative for abdominal pain and nausea. Genitourinary: Positive for decreased urine volume, difficulty urinating, dysuria and frequency. Negative for discharge, flank pain, hematuria, penile pain, penile swelling, scrotal swelling and testicular pain. Neurological: Positive for weakness and light-headedness.  Negative for tremors, speech difficulty, numbness and headaches. Psychiatric/Behavioral: Negative. Physical Exam  HENT:      Head: Normocephalic and atraumatic. Nose: Nose normal.      Mouth/Throat:      Mouth: Mucous membranes are moist.   Eyes:      Extraocular Movements: Extraocular movements intact. Conjunctiva/sclera: Conjunctivae normal.      Pupils: Pupils are equal, round, and reactive to light. Cardiovascular:      Rate and Rhythm: Regular rhythm. Heart sounds: Normal heart sounds. Pulmonary:      Effort: Pulmonary effort is normal.      Breath sounds: Normal breath sounds. No stridor. No wheezing, rhonchi or rales. Abdominal:      General: Bowel sounds are normal. There is no distension. Palpations: Abdomen is soft. Tenderness: There is no abdominal tenderness. There is no right CVA tenderness, left CVA tenderness or guarding. Musculoskeletal:      Cervical back: Normal range of motion. Right lower leg: No edema. Left lower leg: No edema. Skin:     Coloration: Skin is not jaundiced. Neurological:      General: No focal deficit present. Mental Status: He is alert.    Psychiatric:         Mood and Affect: Mood normal.         ED Course:      Fluids   Possible admit       LABS  - Show a CORBIN   -troponin elevated - repeat  -UA and culture  -CBC reviewed   -CMP reviewed       EKG interpretation   Normal sinus rhythm  Left anterior fascicular block  Abnormal ECG  When compared with ECG of 05-JAN-2021 10:14,  No significant change was found    Chest Xray impression  COPD.  There is no evidence of pneumonia or failure.          --------------------------------------------- PAST HISTORY ---------------------------------------------  Past Medical History:  has a past medical history of BPH (benign prostatic hyperplasia), CAD (coronary artery disease), Carotid artery calcification, DJD (degenerative joint disease), Hyperlipemia, Lung nodule, Macular degeneration, Numbness and tingling in left arm, Numbness and tingling of left side of face, S/P CABG x 6, and Shingles. Past Surgical History:  has a past surgical history that includes Diagnostic Cardiac Cath Lab Procedure (9/5/00); Cardiac surgery; TURP; Dental surgery; Total hip arthroplasty (Right, 03/28/2011); Total hip arthroplasty (Left, 11/09/2009); other surgical history (02/16/2011); Thoracoscopy (Left, 4/12/2019); TURP (N/A, 5/30/2019); Prostate surgery (Bilateral, 05/27/2019); and Lung removal, partial (Left). Social History:  reports that he quit smoking about 33 years ago. His smoking use included cigarettes. He started smoking about 43 years ago. He has a 10.00 pack-year smoking history. He has never used smokeless tobacco. He reports current alcohol use. He reports that he does not use drugs. Family History: family history includes Alzheimer's Disease in his sister; Heart Disease in his brother, brother, and brother; Other in his father and mother. The patients home medications have been reviewed.     Allergies: Nitroglycerin, Biaxin [clarithromycin], Iodine, and Quinolones    -------------------------------------------------- RESULTS -------------------------------------------------    LABS:  Results for orders placed or performed during the hospital encounter of 09/04/21   CBC Auto Differential   Result Value Ref Range    WBC 18.6 (H) 4.5 - 11.5 E9/L    RBC 4.00 3.80 - 5.80 E12/L    Hemoglobin 11.8 (L) 12.5 - 16.5 g/dL    Hematocrit 35.3 (L) 37.0 - 54.0 %    MCV 88.3 80.0 - 99.9 fL    MCH 29.5 26.0 - 35.0 pg    MCHC 33.4 32.0 - 34.5 %    RDW 14.3 11.5 - 15.0 fL    Platelets 523 331 - 947 E9/L    MPV 9.5 7.0 - 12.0 fL    Neutrophils % 84.7 (H) 43.0 - 80.0 %    Immature Granulocytes % 1.0 0.0 - 5.0 %    Lymphocytes % 4.5 (L) 20.0 - 42.0 %    Monocytes % 9.6 2.0 - 12.0 %    Eosinophils % 0.0 0.0 - 6.0 %    Basophils % 0.2 0.0 - 2.0 %    Neutrophils Absolute 15.73 (H) 1.80 - 7.30 E9/L    Immature Granulocytes # 0.19 E9/L Lymphocytes Absolute 0.83 (L) 1.50 - 4.00 E9/L    Monocytes Absolute 1.79 (H) 0.10 - 0.95 E9/L    Eosinophils Absolute 0.00 (L) 0.05 - 0.50 E9/L    Basophils Absolute 0.04 0.00 - 0.20 E9/L    Poikilocytes 2+     Schistocytes 1+     Herndon Cells 2+     Ovalocytes 1+    Troponin   Result Value Ref Range    Troponin, High Sensitivity 87 (H) 0 - 11 ng/L   Lactic Acid, Plasma   Result Value Ref Range    Lactic Acid 1.8 0.5 - 2.2 mmol/L   Comprehensive Metabolic Panel w/ Reflex to MG   Result Value Ref Range    Sodium 133 132 - 146 mmol/L    Potassium reflex Magnesium 4.0 3.5 - 5.0 mmol/L    Chloride 102 98 - 107 mmol/L    CO2 22 22 - 29 mmol/L    Anion Gap 9 7 - 16 mmol/L    Glucose 121 (H) 74 - 99 mg/dL    BUN 25 (H) 6 - 23 mg/dL    CREATININE 1.4 (H) 0.7 - 1.2 mg/dL    GFR Non-African American 48 >=60 mL/min/1.73    GFR African American 58     Calcium 8.9 8.6 - 10.2 mg/dL    Total Protein 6.9 6.4 - 8.3 g/dL    Albumin 3.8 3.5 - 5.2 g/dL    Total Bilirubin 2.4 (H) 0.0 - 1.2 mg/dL    Alkaline Phosphatase 81 40 - 129 U/L    ALT 14 0 - 40 U/L    AST 23 0 - 39 U/L   Urinalysis, reflex to microscopic   Result Value Ref Range    Color, UA Yellow Straw/Yellow    Clarity, UA CLOUDY (A) Clear    Glucose, Ur Negative Negative mg/dL    Bilirubin Urine SMALL (A) Negative    Ketones, Urine TRACE (A) Negative mg/dL    Specific Gravity, UA 1.020 1.005 - 1.030    Blood, Urine TRACE-INTACT Negative    pH, UA 7.0 5.0 - 9.0    Protein,  (A) Negative mg/dL    Urobilinogen, Urine 4.0 (A) <2.0 E.U./dL    Nitrite, Urine Negative Negative    Leukocyte Esterase, Urine MODERATE (A) Negative   Troponin   Result Value Ref Range    Troponin, High Sensitivity 94 (H) 0 - 11 ng/L   Microscopic Urinalysis   Result Value Ref Range    WBC, UA PACKED (A) 0 - 5 /HPF    RBC, UA 5-10 (A) 0 - 2 /HPF    Bacteria, UA MANY (A) None Seen /HPF   EKG 12 Lead   Result Value Ref Range    Ventricular Rate 63 BPM    Atrial Rate 63 BPM    P-R Interval 164 ms    QRS Duration 106 ms    Q-T Interval 446 ms    QTc Calculation (Bazett) 456 ms    P Axis 31 degrees    R Axis -62 degrees    T Axis 55 degrees       RADIOLOGY:  XR CHEST (2 VW)   Final Result   COPD. There is no evidence of pneumonia or failure. EKG:  This EKG is signed and interpreted by me. Rate: 63  Rhythm: Normal sinus rhythm  Interpretation: Left anterior fascicular block  Abnormal ECG  Comparison:   When compared with ECG of 05-JAN-2021 10:14,  No significant change was found        ------------------------- NURSING NOTES AND VITALS REVIEWED ---------------------------  Date / Time Roomed:  9/4/2021 11:46 AM  ED Bed Assignment:  NATY/NATY    The nursing notes within the ED encounter and vital signs as below have been reviewed. Patient Vitals for the past 24 hrs:   BP Temp Temp src Pulse Resp SpO2   09/04/21 1848 (!) 125/57 98.3 °F (36.8 °C) Oral 72 16 99 %   09/04/21 1557 (!) 136/44 -- -- 67 16 98 %   09/04/21 1140 (!) 148/58 98.4 °F (36.9 °C) -- 64 16 98 %       Oxygen Saturation Interpretation: Normal    ------------------------------------------ PROGRESS NOTES ------------------------------------------  Re-evaluation(s):  Time: 16:00, 18:00  Patients symptoms are improving. The fluid seems to have helped the patient he is feeling well has weak. Repeat physical examination is not changed. Catheter orders in place for patient due to accidentally urinating on himself. We will treat the UTI plan to admit to floor. --------------------------------- ADDITIONAL PROVIDER NOTES ---------------------------------  Consultations:  .Spoke with [facility],  Discussed case. They will admit the patient. This patient's ED course included: a personal history and physicial examination, re-evaluation prior to disposition, multiple bedside re-evaluations, IV medications and cardiac monitoring    This patient has remained hemodynamically stable during their ED course. Diagnosis:  1.  Acute non-ST elevation myocardial infarction (NSTEMI) ([facility] [place] [address] Stable   2. Urinary tract infection without hematuria, site unspecified Stable       Disposition:  Patient's disposition: Admit to med/surg floor  Patient's condition is stable.          Mary Araiza MD  Resident  09/04/21        Mary Araiza MD  Resident  09/04/21 2007

## 2021-09-04 NOTE — ED NOTES
FIRST PROVIDER CONTACT ASSESSMENT NOTE      Department of Emergency Medicine   9/4/21  11:39 AM EDT    Chief Complaint: Generalized Body Aches (since thursday. ), Fatigue, Fever, and Dizziness      History of Present Illness:   Juliane Bautista is a 80 y.o. male who presents to the ED for body aches lightheadedness dizziness shortness of breath and fatigue since Thursday. Received both of his Pfizer vaccines previously. Denies any chest pain. Medical History:  has a past medical history of BPH (benign prostatic hyperplasia), CAD (coronary artery disease), Carotid artery calcification, DJD (degenerative joint disease), Hyperlipemia, Lung nodule, Macular degeneration, Numbness and tingling in left arm, Numbness and tingling of left side of face, S/P CABG x 6, and Shingles. Surgical History:  has a past surgical history that includes Diagnostic Cardiac Cath Lab Procedure (9/5/00); Cardiac surgery; TURP; Dental surgery; Total hip arthroplasty (Right, 03/28/2011); Total hip arthroplasty (Left, 11/09/2009); other surgical history (02/16/2011); Thoracoscopy (Left, 4/12/2019); TURP (N/A, 5/30/2019); Prostate surgery (Bilateral, 05/27/2019); and Lung removal, partial (Left). Social History:  reports that he quit smoking about 33 years ago. His smoking use included cigarettes. He started smoking about 43 years ago. He has a 10.00 pack-year smoking history. He has never used smokeless tobacco. He reports current alcohol use. He reports that he does not use drugs. Family History: family history includes Alzheimer's Disease in his sister; Heart Disease in his brother, brother, and brother; Other in his father and mother.     *ALLERGIES*     Nitroglycerin, Biaxin [clarithromycin], Iodine, and Quinolones     Physical Exam:      VS:  BP (!) 148/58   Pulse 64   Temp 98.4 °F (36.9 °C)   Resp 16   SpO2 98%      Initial Plan of Care:  Initiate Treatment-Testing, Proceed toTreatment Area When Bed Available for ED Attending/MLP to Continue Care    -----------------END OF FIRST PROVIDER CONTACT ASSESSMENT NOTE--------------  Electronically signed by ABHISHEK Larsen CNP   DD: 9/4/21             ABHISHEK Larsen CNP  09/04/21 1142

## 2021-09-05 PROBLEM — N17.9 AKI (ACUTE KIDNEY INJURY) (HCC): Status: RESOLVED | Noted: 2021-01-05 | Resolved: 2021-09-05

## 2021-09-05 LAB
ALBUMIN SERPL-MCNC: 3.5 G/DL (ref 3.5–5.2)
ALP BLD-CCNC: 165 U/L (ref 40–129)
ALT SERPL-CCNC: 13 U/L (ref 0–40)
ANION GAP SERPL CALCULATED.3IONS-SCNC: 8 MMOL/L (ref 7–16)
AST SERPL-CCNC: 31 U/L (ref 0–39)
BASOPHILS ABSOLUTE: 0.08 E9/L (ref 0–0.2)
BASOPHILS RELATIVE PERCENT: 0.4 % (ref 0–2)
BILIRUB SERPL-MCNC: 2.4 MG/DL (ref 0–1.2)
BUN BLDV-MCNC: 27 MG/DL (ref 6–23)
BURR CELLS: ABNORMAL
CALCIUM SERPL-MCNC: 8.6 MG/DL (ref 8.6–10.2)
CHLORIDE BLD-SCNC: 105 MMOL/L (ref 98–107)
CO2: 21 MMOL/L (ref 22–29)
CREAT SERPL-MCNC: 1.3 MG/DL (ref 0.7–1.2)
EKG ATRIAL RATE: 63 BPM
EKG P AXIS: 31 DEGREES
EKG P-R INTERVAL: 164 MS
EKG Q-T INTERVAL: 446 MS
EKG QRS DURATION: 106 MS
EKG QTC CALCULATION (BAZETT): 456 MS
EKG R AXIS: -62 DEGREES
EKG T AXIS: 55 DEGREES
EKG VENTRICULAR RATE: 63 BPM
EOSINOPHILS ABSOLUTE: 0.01 E9/L (ref 0.05–0.5)
EOSINOPHILS RELATIVE PERCENT: 0.1 % (ref 0–6)
GFR AFRICAN AMERICAN: >60
GFR NON-AFRICAN AMERICAN: 52 ML/MIN/1.73
GLUCOSE BLD-MCNC: 110 MG/DL (ref 74–99)
HCT VFR BLD CALC: 33.1 % (ref 37–54)
HEMOGLOBIN: 11 G/DL (ref 12.5–16.5)
IMMATURE GRANULOCYTES #: 0.11 E9/L
IMMATURE GRANULOCYTES %: 0.6 % (ref 0–5)
LYMPHOCYTES ABSOLUTE: 1.38 E9/L (ref 1.5–4)
LYMPHOCYTES RELATIVE PERCENT: 7.4 % (ref 20–42)
MCH RBC QN AUTO: 29.5 PG (ref 26–35)
MCHC RBC AUTO-ENTMCNC: 33.2 % (ref 32–34.5)
MCV RBC AUTO: 88.7 FL (ref 80–99.9)
MONOCYTES ABSOLUTE: 1.68 E9/L (ref 0.1–0.95)
MONOCYTES RELATIVE PERCENT: 9.1 % (ref 2–12)
NEUTROPHILS ABSOLUTE: 15.27 E9/L (ref 1.8–7.3)
NEUTROPHILS RELATIVE PERCENT: 82.4 % (ref 43–80)
OVALOCYTES: ABNORMAL
PDW BLD-RTO: 14.3 FL (ref 11.5–15)
PLATELET # BLD: 170 E9/L (ref 130–450)
PMV BLD AUTO: 9.3 FL (ref 7–12)
POIKILOCYTES: ABNORMAL
POTASSIUM REFLEX MAGNESIUM: 3.9 MMOL/L (ref 3.5–5)
PROSTATE SPECIFIC ANTIGEN: 10.15 NG/ML (ref 0–4)
RBC # BLD: 3.73 E12/L (ref 3.8–5.8)
SCHISTOCYTES: ABNORMAL
SODIUM BLD-SCNC: 134 MMOL/L (ref 132–146)
TOTAL PROTEIN: 6.3 G/DL (ref 6.4–8.3)
WBC # BLD: 18.5 E9/L (ref 4.5–11.5)

## 2021-09-05 PROCEDURE — 97161 PT EVAL LOW COMPLEX 20 MIN: CPT

## 2021-09-05 PROCEDURE — 2580000003 HC RX 258: Performed by: INTERNAL MEDICINE

## 2021-09-05 PROCEDURE — 6370000000 HC RX 637 (ALT 250 FOR IP): Performed by: INTERNAL MEDICINE

## 2021-09-05 PROCEDURE — 6360000002 HC RX W HCPCS: Performed by: INTERNAL MEDICINE

## 2021-09-05 PROCEDURE — 80053 COMPREHEN METABOLIC PANEL: CPT

## 2021-09-05 PROCEDURE — 85025 COMPLETE CBC W/AUTO DIFF WBC: CPT

## 2021-09-05 PROCEDURE — 36415 COLL VENOUS BLD VENIPUNCTURE: CPT

## 2021-09-05 PROCEDURE — 96372 THER/PROPH/DIAG INJ SC/IM: CPT

## 2021-09-05 PROCEDURE — G0103 PSA SCREENING: HCPCS

## 2021-09-05 PROCEDURE — 93010 ELECTROCARDIOGRAM REPORT: CPT | Performed by: INTERNAL MEDICINE

## 2021-09-05 PROCEDURE — 1200000000 HC SEMI PRIVATE

## 2021-09-05 RX ADMIN — ASPIRIN 81 MG: 81 TABLET, COATED ORAL at 08:03

## 2021-09-05 RX ADMIN — Medication 10 ML: at 08:05

## 2021-09-05 RX ADMIN — METOPROLOL SUCCINATE 25 MG: 25 TABLET, EXTENDED RELEASE ORAL at 08:03

## 2021-09-05 RX ADMIN — ENOXAPARIN SODIUM 40 MG: 40 INJECTION SUBCUTANEOUS at 08:04

## 2021-09-05 RX ADMIN — Medication 2000 UNITS: at 08:03

## 2021-09-05 RX ADMIN — ATORVASTATIN CALCIUM 40 MG: 40 TABLET, FILM COATED ORAL at 08:03

## 2021-09-05 RX ADMIN — SODIUM CHLORIDE: 9 INJECTION, SOLUTION INTRAVENOUS at 22:40

## 2021-09-05 RX ADMIN — SODIUM CHLORIDE: 9 INJECTION, SOLUTION INTRAVENOUS at 12:49

## 2021-09-05 ASSESSMENT — PAIN DESCRIPTION - LOCATION
LOCATION: GENERALIZED

## 2021-09-05 NOTE — H&P
Internal Medicine History & Physical     Chief Complaint: Generalized Body Aches (since thursday. ), Fatigue, Fever, and Dizziness  Reason for Admission: UTI/weakness  Primary Care Physician: Christen Bell MD  Code status: full    History of Present Illness  Mayank Gambino is a 80y.o. year old male who  has a past medical history of BPH (benign prostatic hyperplasia), CAD (coronary artery disease), Carotid artery calcification, DJD (degenerative joint disease), Hyperlipemia, Lung nodule, Macular degeneration, Numbness and tingling in left arm, Numbness and tingling of left side of face, S/P CABG x 6, and Shingles. .     The patient presented to the ER after a few days of progressive weakness, decreased appetite and difficulty urinating. Patient states that he was doing well until Thursday. Then all symptoms started and were progressive. Now with iv fluids and antibiotics starting to feel well, ate breakfast today.       Therapy in ED-   Medications   aspirin EC tablet 81 mg (81 mg Oral Given 9/5/21 0803)   atorvastatin (LIPITOR) tablet 40 mg (40 mg Oral Given 9/5/21 0803)   Vitamin D (CHOLECALCIFEROL) tablet 2,000 Units (2,000 Units Oral Given 9/5/21 0803)   metoprolol succinate (TOPROL XL) extended release tablet 25 mg (25 mg Oral Given 9/5/21 0803)   sodium chloride flush 0.9 % injection 10 mL (10 mLs IntraVENous Given 9/5/21 0805)   sodium chloride flush 0.9 % injection 10 mL (has no administration in time range)   0.9 % sodium chloride infusion (has no administration in time range)   potassium chloride (KLOR-CON M) extended release tablet 40 mEq (has no administration in time range)     Or   potassium bicarb-citric acid (EFFER-K) effervescent tablet 40 mEq (has no administration in time range)     Or   potassium chloride 10 mEq/100 mL IVPB (Peripheral Line) (has no administration in time range)   enoxaparin (LOVENOX) injection 40 mg (40 mg SubCUTAneous Given 9/5/21 0804)   ondansetron (ZOFRAN-ODT) disintegrating tablet 4 mg (has no administration in time range)     Or   ondansetron (ZOFRAN) injection 4 mg (has no administration in time range)   senna (SENOKOT) tablet 8.6 mg (has no administration in time range)   acetaminophen (TYLENOL) tablet 650 mg (has no administration in time range)     Or   acetaminophen (TYLENOL) suppository 650 mg (has no administration in time range)   0.9 % sodium chloride infusion ( IntraVENous New Bag 9/4/21 2303)   0.9 % sodium chloride infusion ( IntraVENous Stopped 9/4/21 1915)   cefTRIAXone (ROCEPHIN) 2,000 mg in sterile water 20 mL IV syringe (2,000 mg IntraVENous Given 9/4/21 1432)       Past Medical History:   Diagnosis Date    BPH (benign prostatic hyperplasia)     CAD (coronary artery disease)     Carotid artery calcification     DJD (degenerative joint disease)     Hyperlipemia     Lung nodule     Macular degeneration     Numbness and tingling in left arm 2/2/2018    Numbness and tingling of left side of face 2/2/2018    S/P CABG x 6 2000    LIMA-LAD, LYNNETTE-LCx,SVG-OM,SVG-LPL,SVG-PDA-RPL    Shingles        Past Surgical History:   Procedure Laterality Date    CARDIAC SURGERY      6 vessel in 2000    DENTAL SURGERY      root canal    DIAGNOSTIC CARDIAC CATH LAB PROCEDURE  9/5/00    LUNG REMOVAL, PARTIAL Left     march    OTHER SURGICAL HISTORY  02/16/2011    Injection right hip  T. Lucio Mcdonald MD    PROSTATE SURGERY Bilateral 05/27/2019    THORACOSCOPY Left 4/12/2019    BRONCHOSCOPY LEFT THORACOSCOPY ROBOTIC VIDEO ASSISTED , WEDGE RESECTION, POSS.  LEFT LOWER LOBECTOMY performed by Michelle Collins MD at [facility] Right 03/28/2011    Right BRIONNA Watkins MD    TOTAL HIP ARTHROPLASTY Left 11/09/2009    Left BRIONNA Watkins MD    TURP      TURP N/A 5/30/2019    CYSTOSCOPY, RETROGRADE PYELOGRAM,  URERTHRAL DILITATION, TRANSURETHRAL RESECTION PROSTATE performed by Romain Knapp MD at [address] History  Family History   Problem shortness of breath, wheezing, coughing  Abdomen: abdominal pain, nausea, vomiting, diarrhea, constipation, melena, hematochezia, hematemesis  Back: back pain  Extremities: lower extremity edema, myalgias, arthralgias  Urinary: dysuria, hematuria, or increase in frequency, decreased frequency/difficulty  Neurologic: lightheadedness, dizziness, confusion, syncope, numbness, tingling, weakness  Psychiatric: depression, suicidal ideation, or anxiety    Objective  BP (!) 151/65   Pulse 67   Temp 100.1 °F (37.8 °C) (Oral)   Resp 18   SpO2 99%     Physical Exam:  General: awake, alert, oriented to person, place, time, and purpose, appears stated age, cooperative, no acute distress, pleasant   Head: normocephalic, atraumatic  Eyes: conjunctivae/corneas clear, sclera non icteric  Mouth: mucous membranes moist, no obvious oral sores  Neck: no JVD, no adenopathy, no carotid bruit, neck is supple, trachea is midline  Back: ROM normal, no CVA tenderness. Lungs: clear to auscultation bilaterally, without rhonchi, crackle, wheezing, or rale, no retractions or use of accessory muscles  Heart: regular rate and regular rhythm, no murmur, normal S1, S2  Abdomen: soft, non-tender; bowel sounds normal; no masses, no organomegaly  Extremities: no lower extremity edema, extremities atraumatic, no cyanosis, no clubbing, 2+ pedal pulses palpated  Skin: normal color, normal texture, normal turgor, no rashes, no lesions  Neurologic:5/5 muscle strength throughout, normal muscle tone throughout, PERRLA, EOMI, face symmetric, hearing intact, tongue midline, speech appropriate without slurring.     Labs-   Lab Results   Component Value Date    WBC 18.5 (H) 09/05/2021    HGB 11.0 (L) 09/05/2021    HCT 33.1 (L) 09/05/2021     09/05/2021     09/05/2021    K 3.9 09/05/2021     09/05/2021    CREATININE 1.3 (H) 09/05/2021    BUN 27 (H) 09/05/2021    CO2 21 (L) 09/05/2021    GLUCOSE 110 (H) 09/05/2021    ALT 13 09/05/2021    AST 31 09/05/2021    INR 1.0 08/20/2019     Lab Results   Component Value Date    TROPONINI <0.01 01/05/2021     No results for input(s): BNP in the last 72 hours. Recent Radiological Studies:  XR CHEST (2 VW)    Result Date: 9/4/2021  EXAMINATION: TWO XRAY VIEWS OF THE CHEST 9/4/2021 11:56 am COMPARISON: 01/05/2021 HISTORY: ORDERING SYSTEM PROVIDED HISTORY: shortness of breath TECHNOLOGIST PROVIDED HISTORY: Reason for exam:->shortness of breath FINDINGS: There is hyperinflation of the lungs and flattening of diaphragms consistent with COPD. There is no pulmonary infiltrate, mass or nodule. There is no pleural effusion. The cardiac silhouette is within normal limits. .     COPD. There is no evidence of pneumonia or failure. Assessment/Plan  Patient is a 80 y.o. male who presents with Generalized Body Aches (since thursday. ), Fatigue, Fever, and Dizziness      Full problem list includes:  Patient Active Problem List    Diagnosis Date Noted    UTI (urinary tract infection) 09/04/2021    Essential hypertension     Hx of coronary artery disease     Malignant neoplasm of lower lobe of left lung ([facility] [address] 04/22/2019    Asymptomatic bilateral carotid artery stenosis 05/23/2016    Hyperlipemia        · Urinary Tract Infection secondary to history of BPH:  Continue IV fluids, rocephin, urine cultures pending. Monitor I&O, if output low -> bladder scan and check for retention. · Hypertension: stable  · History of Coronary Artery Disease; no issues. · Routine labs in am  · Lovenox for DVT prophylaxis. · Please see orders for further management and care. Nusrat Mcmanus MD    9/5/2021  8:39 AM    NOTE:  This report was transcribed using voice recognition software. Every effort was made to ensure accuracy; however, inadvertent computerized transcription errors may be present.

## 2021-09-06 LAB
ALBUMIN SERPL-MCNC: 3.1 G/DL (ref 3.5–5.2)
ALP BLD-CCNC: 80 U/L (ref 40–129)
ALT SERPL-CCNC: 20 U/L (ref 0–40)
ANION GAP SERPL CALCULATED.3IONS-SCNC: 12 MMOL/L (ref 7–16)
AST SERPL-CCNC: 44 U/L (ref 0–39)
BASOPHILS ABSOLUTE: 0.06 E9/L (ref 0–0.2)
BASOPHILS RELATIVE PERCENT: 0.5 % (ref 0–2)
BILIRUB SERPL-MCNC: 1.2 MG/DL (ref 0–1.2)
BUN BLDV-MCNC: 27 MG/DL (ref 6–23)
CALCIUM SERPL-MCNC: 8.2 MG/DL (ref 8.6–10.2)
CHLORIDE BLD-SCNC: 105 MMOL/L (ref 98–107)
CO2: 19 MMOL/L (ref 22–29)
CREAT SERPL-MCNC: 1.2 MG/DL (ref 0.7–1.2)
EOSINOPHILS ABSOLUTE: 0.14 E9/L (ref 0.05–0.5)
EOSINOPHILS RELATIVE PERCENT: 1.1 % (ref 0–6)
FERRITIN: 205 NG/ML
GFR AFRICAN AMERICAN: >60
GFR NON-AFRICAN AMERICAN: 57 ML/MIN/1.73
GLUCOSE BLD-MCNC: 141 MG/DL (ref 74–99)
HCT VFR BLD CALC: 30.9 % (ref 37–54)
HEMOGLOBIN: 10.4 G/DL (ref 12.5–16.5)
IMMATURE GRANULOCYTES #: 0.06 E9/L
IMMATURE GRANULOCYTES %: 0.5 % (ref 0–5)
IRON SATURATION: 11 % (ref 20–55)
IRON: 22 MCG/DL (ref 59–158)
LYMPHOCYTES ABSOLUTE: 1.52 E9/L (ref 1.5–4)
LYMPHOCYTES RELATIVE PERCENT: 12.2 % (ref 20–42)
MAGNESIUM: 1.9 MG/DL (ref 1.6–2.6)
MCH RBC QN AUTO: 29.6 PG (ref 26–35)
MCHC RBC AUTO-ENTMCNC: 33.7 % (ref 32–34.5)
MCV RBC AUTO: 88 FL (ref 80–99.9)
MONOCYTES ABSOLUTE: 1.42 E9/L (ref 0.1–0.95)
MONOCYTES RELATIVE PERCENT: 11.4 % (ref 2–12)
NEUTROPHILS ABSOLUTE: 9.31 E9/L (ref 1.8–7.3)
NEUTROPHILS RELATIVE PERCENT: 74.3 % (ref 43–80)
PDW BLD-RTO: 14.1 FL (ref 11.5–15)
PLATELET # BLD: 154 E9/L (ref 130–450)
PMV BLD AUTO: 9.3 FL (ref 7–12)
POTASSIUM REFLEX MAGNESIUM: 3.5 MMOL/L (ref 3.5–5)
RBC # BLD: 3.51 E12/L (ref 3.8–5.8)
SODIUM BLD-SCNC: 136 MMOL/L (ref 132–146)
TOTAL IRON BINDING CAPACITY: 206 MCG/DL (ref 250–450)
TOTAL PROTEIN: 6.2 G/DL (ref 6.4–8.3)
WBC # BLD: 12.5 E9/L (ref 4.5–11.5)

## 2021-09-06 PROCEDURE — 85025 COMPLETE CBC W/AUTO DIFF WBC: CPT

## 2021-09-06 PROCEDURE — 96376 TX/PRO/DX INJ SAME DRUG ADON: CPT

## 2021-09-06 PROCEDURE — 2580000003 HC RX 258: Performed by: INTERNAL MEDICINE

## 2021-09-06 PROCEDURE — 83540 ASSAY OF IRON: CPT

## 2021-09-06 PROCEDURE — 83550 IRON BINDING TEST: CPT

## 2021-09-06 PROCEDURE — 36415 COLL VENOUS BLD VENIPUNCTURE: CPT

## 2021-09-06 PROCEDURE — 6360000002 HC RX W HCPCS: Performed by: INTERNAL MEDICINE

## 2021-09-06 PROCEDURE — G0378 HOSPITAL OBSERVATION PER HR: HCPCS

## 2021-09-06 PROCEDURE — 6360000002 HC RX W HCPCS: Performed by: STUDENT IN AN ORGANIZED HEALTH CARE EDUCATION/TRAINING PROGRAM

## 2021-09-06 PROCEDURE — 82728 ASSAY OF FERRITIN: CPT

## 2021-09-06 PROCEDURE — 96375 TX/PRO/DX INJ NEW DRUG ADDON: CPT

## 2021-09-06 PROCEDURE — 96372 THER/PROPH/DIAG INJ SC/IM: CPT

## 2021-09-06 PROCEDURE — 80053 COMPREHEN METABOLIC PANEL: CPT

## 2021-09-06 PROCEDURE — 6370000000 HC RX 637 (ALT 250 FOR IP): Performed by: INTERNAL MEDICINE

## 2021-09-06 PROCEDURE — 83735 ASSAY OF MAGNESIUM: CPT

## 2021-09-06 PROCEDURE — 87040 BLOOD CULTURE FOR BACTERIA: CPT

## 2021-09-06 RX ORDER — HYDRALAZINE HYDROCHLORIDE 20 MG/ML
10 INJECTION INTRAMUSCULAR; INTRAVENOUS EVERY 6 HOURS PRN
Status: DISCONTINUED | OUTPATIENT
Start: 2021-09-06 | End: 2021-09-07 | Stop reason: HOSPADM

## 2021-09-06 RX ADMIN — ATORVASTATIN CALCIUM 40 MG: 40 TABLET, FILM COATED ORAL at 08:24

## 2021-09-06 RX ADMIN — ACETAMINOPHEN 650 MG: 325 TABLET ORAL at 01:48

## 2021-09-06 RX ADMIN — SODIUM CHLORIDE: 9 INJECTION, SOLUTION INTRAVENOUS at 07:35

## 2021-09-06 RX ADMIN — WATER 1000 MG: 1 INJECTION INTRAMUSCULAR; INTRAVENOUS; SUBCUTANEOUS at 01:48

## 2021-09-06 RX ADMIN — ASPIRIN 81 MG: 81 TABLET, COATED ORAL at 08:24

## 2021-09-06 RX ADMIN — Medication 2000 UNITS: at 08:24

## 2021-09-06 RX ADMIN — HYDRALAZINE HYDROCHLORIDE 10 MG: 20 INJECTION INTRAMUSCULAR; INTRAVENOUS at 21:00

## 2021-09-06 RX ADMIN — ENOXAPARIN SODIUM 40 MG: 40 INJECTION SUBCUTANEOUS at 08:24

## 2021-09-06 RX ADMIN — ACETAMINOPHEN 650 MG: 325 TABLET ORAL at 21:00

## 2021-09-06 RX ADMIN — WATER 1000 MG: 1 INJECTION INTRAMUSCULAR; INTRAVENOUS; SUBCUTANEOUS at 22:39

## 2021-09-06 RX ADMIN — METOPROLOL SUCCINATE 25 MG: 25 TABLET, EXTENDED RELEASE ORAL at 08:24

## 2021-09-06 ASSESSMENT — PAIN SCALES - GENERAL
PAINLEVEL_OUTOF10: 0
PAINLEVEL_OUTOF10: 6
PAINLEVEL_OUTOF10: 0
PAINLEVEL_OUTOF10: 0
PAINLEVEL_OUTOF10: 1
PAINLEVEL_OUTOF10: 0

## 2021-09-06 ASSESSMENT — PAIN DESCRIPTION - LOCATION: LOCATION: GENERALIZED

## 2021-09-06 NOTE — PROGRESS NOTES
Internal Medicine Progress Note    Patient's name: Florinda Louise  : 1931  Chief complaints (on day of admission): Generalized Body Aches (since thursday. ), Fatigue, Fever, and Dizziness  Admission date: 2021  Date of service: 2021   Room: [address] MED SURG/TELE  Primary care physician: Christen Bell MD  Reason for visit: Follow-up for UTI    Subjective  Mayank Gambino was seen and examined sitting up in bed, no family present. He just finished changing with nursing. He tells me he is feeling better today and denies any pain. He states he urinated well this morning and that he had a BM. The patient tells me he had a rough night and did not sleep well. The patient states he follows with Dr. Sánchez Bills every 3 months and he is unsure why he keeps getting UTIs. He states he intermittently with straight cath himself at home and did so about 4-5 times the week before admission. Review of Systems  There are no new complaints of chest pain, shortness of breath, abdominal pain, nausea, vomiting, diarrhea, constipation.     Hospital Medications  Current Facility-Administered Medications   Medication Dose Route Frequency Provider Last Rate Last Admin    cefTRIAXone (ROCEPHIN) 1,000 mg in sterile water 10 mL IV syringe  1,000 mg IntraVENous Q24H Adan Cunningham MD   1,000 mg at 21 0148    aspirin EC tablet 81 mg  81 mg Oral Daily Adan Cunningham MD   81 mg at 21 0824    atorvastatin (LIPITOR) tablet 40 mg  40 mg Oral Daily Adan Cunningham MD   40 mg at 21 0824    Vitamin D (CHOLECALCIFEROL) tablet 2,000 Units  2,000 Units Oral Daily Adan Cunningham MD   2,000 Units at 21 0824    metoprolol succinate (TOPROL XL) extended release tablet 25 mg  25 mg Oral Daily Adan Cunningham MD   25 mg at 21 0824    sodium chloride flush 0.9 % injection 10 mL  10 mL IntraVENous 2 times per day Adan Cunningham MD   10 mL at 21 0805    sodium chloride flush 0.9 % injection 10 mL  10 mL IntraVENous PRN Muriel Moses MD        0.9 % sodium chloride infusion  25 mL IntraVENous PRN Muriel Moses MD        potassium chloride (KLOR-CON M) extended release tablet 40 mEq  40 mEq Oral PRN Muriel Moses MD        Or    potassium bicarb-citric acid (EFFER-K) effervescent tablet 40 mEq  40 mEq Oral PRN Muriel Moses MD        Or   Arnaldo Chahal potassium chloride 10 mEq/100 mL IVPB (Peripheral Line)  10 mEq IntraVENous PRN Muriel Moses MD        enoxaparin (LOVENOX) injection 40 mg  40 mg SubCUTAneous Daily Muriel Moses MD   40 mg at 09/06/21 0824    ondansetron (ZOFRAN-ODT) disintegrating tablet 4 mg  4 mg Oral Q8H PRN Muriel Moses MD        Or    ondansetron (ZOFRAN) injection 4 mg  4 mg IntraVENous Q6H PRN Muriel Moses MD        senna (SENOKOT) tablet 8.6 mg  1 tablet Oral Daily PRN Muriel Moses MD        acetaminophen (TYLENOL) tablet 650 mg  650 mg Oral Q6H PRN Muriel Moses MD   650 mg at 09/06/21 0148    Or    acetaminophen (TYLENOL) suppository 650 mg  650 mg Rectal Q6H PRN Muriel Moses MD        0.9 % sodium chloride infusion   IntraVENous Continuous Muriel Moses  mL/hr at 09/06/21 0735 New Bag at 09/06/21 0735       PRN Medications  sodium chloride flush, sodium chloride, potassium chloride **OR** potassium alternative oral replacement **OR** potassium chloride, ondansetron **OR** ondansetron, senna, acetaminophen **OR** acetaminophen    Objective  Most Recent Recorded Vitals  BP (!) 160/74   Pulse 64   Temp 98.8 °F (37.1 °C) (Oral)   Resp 18   SpO2 99%   I/O last 3 completed shifts:  In: -   Out: 400 [Urine:400]  No intake/output data recorded.     Physical Exam:  General: AAO to person/place/time/purpose, NAD, no labored breathing  Eyes: conjunctivae/corneas clear, sclera non icteric  Skin: color/texture/turgor normal, no rashes or lesions  Lungs: CTAB, no retractions/use of accessory muscles, no vocal fremitus, no rhonchi, no crackle, no rales  Heart: regular rate, regular rhythm, no murmur  Abdomen: soft, NT, bowel sounds normal  Extremities: atraumatic, no edema  Neurologic: cranial nerves 2-12 grossly intact, no slurred speech    Most Recent Labs  Lab Results   Component Value Date    WBC 12.5 (H) 09/06/2021    HGB 10.4 (L) 09/06/2021    HCT 30.9 (L) 09/06/2021     09/06/2021     09/06/2021    K 3.5 09/06/2021     09/06/2021    CREATININE 1.2 09/06/2021    BUN 27 (H) 09/06/2021    CO2 19 (L) 09/06/2021    GLUCOSE 141 (H) 09/06/2021    ALT 20 09/06/2021    AST 44 (H) 09/06/2021    INR 1.0 08/20/2019    TSH 2.210 12/08/2020       XR CHEST (2 VW)   Final Result   COPD. There is no evidence of pneumonia or failure. Echocardiogram 10/23/19  Limited study. Ejection fraction is visually estimated at 65%. No regional wall motion abnormalities seen. Normal right ventricle structure and function. Mild tricuspid regurgitation. RVSP is 35 mmHg. No evidence for hemodynamically significant pericardial effusion. Assessment   Active Hospital Problems    Diagnosis     UTI (urinary tract infection) [N39.0]      Priority: High    Essential hypertension [I10]     Hx of coronary artery disease [Z86.79]     Malignant neoplasm of lower lobe of left lung (HCC) [C34.32]     Hyperlipemia [E78.5]          Plan  · Urinary Tract Infection secondary to history of BPH:    · CFU >100K  · IV Rocephin   · Cultures pending sensitivity   · Monitor I&O, if output low -> bladder scan and check for retention  · CORBIN   · Resolving, Kidney function normalized   · Normocytic anemia   · Hx of CABG   · Maintain Hb >8  · Check iron panel - add on   · Hypertension: stable  · History of Coronary Artery Disease; no issues. · PT AM-PAC-- 20/24  · Follow labs   · DVT prophylaxis  · Please see orders for further management and care.   · Discharge plan: PT OT evaluation noted, case mgmt and social work to assist     Electronically signed by ABHISHEK Nagel CNP on 9/6/2021 at 9:11 AM    I can be reached through [employer]. Addendum: I have personally participated in a face-to-face history and physical exam on the date of service with the patient. I have discussed the case with the nurse practitioner. I also participated in medical decision making on the date of service and I agree with all of the pertinent clinical information unless indicated in my editing of the note. I have reviewed and edited the note above based on my findings during my history, exam, and decision making on the same day of service. My additional thoughts:    Agree with above     Electronically signed by Meera Rodriguez MD on 9/6/2021 at 4:24 PM    I can be reached through [address] [place] [address].

## 2021-09-06 NOTE — PLAN OF CARE
Problem: Infection:  Goal: Will remain free from infection  Description: Will remain free from infection  9/6/2021 0119 by Claire Landrum RN  Outcome: Ongoing  9/6/2021 0119 by Claire Landrum RN  Outcome: Ongoing     Problem: Daily Care:  Goal: Daily care needs are met  Description: Daily care needs are met  9/6/2021 0119 by Claire Landrum RN  Outcome: Ongoing  9/6/2021 0119 by Claire Landrum RN  Outcome: Ongoing     Problem: Pain:  Goal: Patient's pain/discomfort is manageable  Description: Patient's pain/discomfort is manageable  9/6/2021 0119 by Claire Landrum RN  Outcome: Ongoing  9/6/2021 0119 by Claire Landrum RN  Outcome: Ongoing

## 2021-09-07 VITALS
RESPIRATION RATE: 18 BRPM | WEIGHT: 180 LBS | DIASTOLIC BLOOD PRESSURE: 72 MMHG | BODY MASS INDEX: 28.25 KG/M2 | OXYGEN SATURATION: 93 % | TEMPERATURE: 98.4 F | SYSTOLIC BLOOD PRESSURE: 163 MMHG | HEIGHT: 67 IN | HEART RATE: 69 BPM

## 2021-09-07 LAB
ALBUMIN SERPL-MCNC: 3.1 G/DL (ref 3.5–5.2)
ALP BLD-CCNC: 71 U/L (ref 40–129)
ALT SERPL-CCNC: 22 U/L (ref 0–40)
ANION GAP SERPL CALCULATED.3IONS-SCNC: 10 MMOL/L (ref 7–16)
AST SERPL-CCNC: 43 U/L (ref 0–39)
BASOPHILS ABSOLUTE: 0.06 E9/L (ref 0–0.2)
BASOPHILS RELATIVE PERCENT: 0.8 % (ref 0–2)
BILIRUB SERPL-MCNC: 0.9 MG/DL (ref 0–1.2)
BUN BLDV-MCNC: 20 MG/DL (ref 6–23)
CALCIUM SERPL-MCNC: 8.2 MG/DL (ref 8.6–10.2)
CHLORIDE BLD-SCNC: 107 MMOL/L (ref 98–107)
CO2: 21 MMOL/L (ref 22–29)
CREAT SERPL-MCNC: 1.1 MG/DL (ref 0.7–1.2)
EOSINOPHILS ABSOLUTE: 0.17 E9/L (ref 0.05–0.5)
EOSINOPHILS RELATIVE PERCENT: 2.3 % (ref 0–6)
GFR AFRICAN AMERICAN: >60
GFR NON-AFRICAN AMERICAN: >60 ML/MIN/1.73
GLUCOSE BLD-MCNC: 117 MG/DL (ref 74–99)
HCT VFR BLD CALC: 28.1 % (ref 37–54)
HEMOGLOBIN: 9.3 G/DL (ref 12.5–16.5)
IMMATURE GRANULOCYTES #: 0.03 E9/L
IMMATURE GRANULOCYTES %: 0.4 % (ref 0–5)
LYMPHOCYTES ABSOLUTE: 1.25 E9/L (ref 1.5–4)
LYMPHOCYTES RELATIVE PERCENT: 16.9 % (ref 20–42)
MAGNESIUM: 1.9 MG/DL (ref 1.6–2.6)
MCH RBC QN AUTO: 29.2 PG (ref 26–35)
MCHC RBC AUTO-ENTMCNC: 33.1 % (ref 32–34.5)
MCV RBC AUTO: 88.1 FL (ref 80–99.9)
MONOCYTES ABSOLUTE: 1.08 E9/L (ref 0.1–0.95)
MONOCYTES RELATIVE PERCENT: 14.6 % (ref 2–12)
NEUTROPHILS ABSOLUTE: 4.82 E9/L (ref 1.8–7.3)
NEUTROPHILS RELATIVE PERCENT: 65 % (ref 43–80)
ORGANISM: ABNORMAL
PDW BLD-RTO: 13.8 FL (ref 11.5–15)
PLATELET # BLD: 167 E9/L (ref 130–450)
PMV BLD AUTO: 9.4 FL (ref 7–12)
POTASSIUM REFLEX MAGNESIUM: 3.4 MMOL/L (ref 3.5–5)
RBC # BLD: 3.19 E12/L (ref 3.8–5.8)
SODIUM BLD-SCNC: 138 MMOL/L (ref 132–146)
TOTAL PROTEIN: 5.8 G/DL (ref 6.4–8.3)
URINE CULTURE, ROUTINE: ABNORMAL
WBC # BLD: 7.4 E9/L (ref 4.5–11.5)

## 2021-09-07 PROCEDURE — 97165 OT EVAL LOW COMPLEX 30 MIN: CPT

## 2021-09-07 PROCEDURE — 2580000003 HC RX 258: Performed by: INTERNAL MEDICINE

## 2021-09-07 PROCEDURE — G0378 HOSPITAL OBSERVATION PER HR: HCPCS

## 2021-09-07 PROCEDURE — 6370000000 HC RX 637 (ALT 250 FOR IP): Performed by: STUDENT IN AN ORGANIZED HEALTH CARE EDUCATION/TRAINING PROGRAM

## 2021-09-07 PROCEDURE — 6360000002 HC RX W HCPCS: Performed by: STUDENT IN AN ORGANIZED HEALTH CARE EDUCATION/TRAINING PROGRAM

## 2021-09-07 PROCEDURE — 85025 COMPLETE CBC W/AUTO DIFF WBC: CPT

## 2021-09-07 PROCEDURE — 83735 ASSAY OF MAGNESIUM: CPT

## 2021-09-07 PROCEDURE — 6360000002 HC RX W HCPCS: Performed by: INTERNAL MEDICINE

## 2021-09-07 PROCEDURE — 6370000000 HC RX 637 (ALT 250 FOR IP): Performed by: INTERNAL MEDICINE

## 2021-09-07 PROCEDURE — 2700000000 HC OXYGEN THERAPY PER DAY

## 2021-09-07 PROCEDURE — 36415 COLL VENOUS BLD VENIPUNCTURE: CPT

## 2021-09-07 PROCEDURE — 80053 COMPREHEN METABOLIC PANEL: CPT

## 2021-09-07 PROCEDURE — 96376 TX/PRO/DX INJ SAME DRUG ADON: CPT

## 2021-09-07 PROCEDURE — 97164 PT RE-EVAL EST PLAN CARE: CPT

## 2021-09-07 PROCEDURE — 96372 THER/PROPH/DIAG INJ SC/IM: CPT

## 2021-09-07 RX ORDER — SULFAMETHOXAZOLE AND TRIMETHOPRIM 800; 160 MG/1; MG/1
1 TABLET ORAL EVERY 12 HOURS SCHEDULED
Status: DISCONTINUED | OUTPATIENT
Start: 2021-09-07 | End: 2021-09-07 | Stop reason: HOSPADM

## 2021-09-07 RX ORDER — SULFAMETHOXAZOLE AND TRIMETHOPRIM 800; 160 MG/1; MG/1
1 TABLET ORAL EVERY 12 HOURS SCHEDULED
Qty: 12 TABLET | Refills: 0 | Status: SHIPPED | OUTPATIENT
Start: 2021-09-07 | End: 2021-09-13

## 2021-09-07 RX ADMIN — ENOXAPARIN SODIUM 40 MG: 40 INJECTION SUBCUTANEOUS at 08:39

## 2021-09-07 RX ADMIN — HYDRALAZINE HYDROCHLORIDE 10 MG: 20 INJECTION INTRAMUSCULAR; INTRAVENOUS at 05:13

## 2021-09-07 RX ADMIN — ASPIRIN 81 MG: 81 TABLET, COATED ORAL at 08:37

## 2021-09-07 RX ADMIN — METOPROLOL SUCCINATE 25 MG: 25 TABLET, EXTENDED RELEASE ORAL at 08:38

## 2021-09-07 RX ADMIN — Medication 2000 UNITS: at 08:37

## 2021-09-07 RX ADMIN — ATORVASTATIN CALCIUM 40 MG: 40 TABLET, FILM COATED ORAL at 08:38

## 2021-09-07 RX ADMIN — Medication 10 ML: at 08:40

## 2021-09-07 RX ADMIN — SULFAMETHOXAZOLE AND TRIMETHOPRIM 1 TABLET: 800; 160 TABLET ORAL at 10:02

## 2021-09-07 ASSESSMENT — PAIN SCALES - GENERAL: PAINLEVEL_OUTOF10: 0

## 2021-09-07 NOTE — PROGRESS NOTES
Physical Therapy Re-Evaluation      Attending Provider:  Apryl Reeves MD    Evaluating PT:  Harland Seat PT    Room #:  8428/5680-V  Diagnosis:  NSTEMI, UTI, body aches  Precautions:  SOB with amb and O2 sat drops  Equipment Needs:  No AD    SUBJECTIVE:    Pt lives with alone in a 1 story home with 2 stairs and 1 rail to enter. Pt ambulated with no AD PTA. OBJECTIVE:   Initial Evaluation  Date: 9/5/21 Re-evaluation  Date: 9/7/21 Treatment Short Term/ Long Term   Goals   Was pt agreeable to Eval/treatment? Yes yes     Does pt have pain? No No c/o pain     Bed Mobility  Rolling: Indep  Supine to sit: Indep  Sit to supine: Indep  Scooting: Indep Rolling: Independent   Supine to sit: Independent   Sit to supine: Independent  Scooting: Independent  Independent    Transfers Sit to stand: Indep  Stand to sit: Indep  Stand pivot: Indep Sit to stand: Independent  Stand to sit: Independent  Stand pivot: Independent  Independent    Ambulation   40 feet with no AD Indep 150 feet with no AD supervision  300 feet with no AD Independent     Stair negotiation: ascended and descended  NA/predict indep NA, pt c/o fatigue and SOB with amb  2 steps with 1 rail Independent     AM-PAC 6 Clicks 98/38 47/88       BLE ROM is WFL. BLE strength is grossly 4/5 to 4+/5. Sensation:  Pt denies numbness and tingling to extremities  Edema:  None noted  Balance: sitting is Independent and standing with no AD is Independent   Endurance: fair+    Vitals:  Pt was found on RA and with amb he became SOB after 75 feet and O2 sat was 92%. He walked back to his room and O2 sat was 90%. He sat down and after 30s O2 sat was 88%. After 2 min seated with deep breathing exs O2 sat up to 93%.       Patient education  Pt educated on deep breathing exs    Patient response to education:   Pt verbalized understanding Pt demonstrated skill Pt requires further education in this area   yes yes yes     ASSESSMENT:    Conditions Requiring Skilled Therapeutic Intervention:    [x]Decreased strength     []Decreased ROM  [x]Decreased functional mobility  []Decreased balance   [x]Decreased endurance   []Decreased posture  []Decreased sensation  []Decreased coordination   []Decreased vision  []Decreased safety awareness   []Increased pain   Comments:  Pt was in bed and agreeable to PT. He walked with no AD and balance was steady, but he became SOB and fatigued with amb. Pt's SOB and fatigue limited his amb distance at this time. His O2 sat dropped with amb, but with deep breathing exs O2 sat returned to 90's. Pt was left in bed with call light left by patient and RN present with give pt medications. Pt's/ family goals   1. To go home. Patient and or family understand(s) diagnosis, prognosis, and plan of care. PHYSICAL THERAPY PLAN OF CARE:    PT POC is established based on physician order and patient diagnosis     Referring provider/PT Order:  PT eval and treat  Diagnosis:  UTI (urinary tract infection) [N39.0]  Urinary tract infection without hematuria, site unspecified [N39.0]  Acute non-ST elevation myocardial infarction (NSTEMI) ([facility] [address] [I21.4]  Specific instructions for next treatment:  Increase amb as able and attempt stairs while monitoring his O2 levels when SOB.      Current Treatment Recommendations:     [x] Strengthening to improve independence with functional mobility   [] ROM to improve ROM and decrease spasm and pain which will help promote independence with functional mobility   [] Balance Training to improve static/dynamic balance and to reduce fall risk  [x] Endurance Training to improve activity tolerance during functional mobility   [x] Transfer Training to improve safety and independence with all functional transfers   [x] Gait Training to improve gait mechanics, endurance and assess need for appropriate assistive device  [x] Stair Training in preparation for safe discharge home and/or into the community   [] Positioning to prevent skin breakdown and contractures  [] Safety and Education Training   [] Patient/Caregiver Education   [] HEP  [] Other     PT long term treatment goals are located in above grid    Frequency of treatments: 2-5x/week x 1-2 weeks. Time in  08:30  Time out  08:45    Evaluation Time includes thorough review of current medical information, gathering information on past medical history/social history and prior level of function, completion of standardized testing/informal observation of tasks, assessment of data and education on plan of care and goals. CPT codes:  [x] Low Complexity PT evaluation 20365  [] Moderate Complexity PT evaluation 69496  [] High Complexity PT evaluation 11951  [] PT Re-evaluation 97227  [] Gait training 06265 ** minutes  [] Manual therapy 49887 ** minutes  [] Therapeutic activities 10124 ** minutes  [] Therapeutic exercises 41190 ** minutes  [] Neuromuscular reeducation 98917 ** minutes     Mihai Talbot, P.T.   License Number: [id number]

## 2021-09-07 NOTE — DISCHARGE SUMMARY
Internal Medicine Discharge Summary    NAME: Rose Costa :  1931  MRN:  [id number] Kait Tang MD    ADMITTED: 2021   DISCHARGED: 2021  2:46 PM    ADMITTING PHYSICIAN: Sveta att. providers found    PCP: Sourav Alexander MD    CONSULTANT(S):   IP CONSULT TO HOSPITALIST  IP CONSULT TO SOCIAL WORK  IP CONSULT TO CASE MANAGEMENT     ADMITTING DIAGNOSIS:   UTI (urinary tract infection) [N39.0]  Urinary tract infection without hematuria, site unspecified [N39.0]  Acute non-ST elevation myocardial infarction (NSTEMI) ([facility] [address] [I21.4]     Please see H&P for further details    DISCHARGE DIAGNOSES:   Active Hospital Problems    Diagnosis     UTI (urinary tract infection) [N39.0]      Priority: High    Essential hypertension [I10]     Hx of coronary artery disease [Z86.79]     Malignant neoplasm of lower lobe of left lung ([address] [C34.32]     Hyperlipemia [E78.5]        BRIEF HISTORY OF PRESENT ILLNESS: Rose Costa is a 80 y.o. male patient of Sourav Alexander MD who  has a past medical history of BPH (benign prostatic hyperplasia), CAD (coronary artery disease), Carotid artery calcification, DJD (degenerative joint disease), Hyperlipemia, Lung nodule, Macular degeneration, Numbness and tingling in left arm, Numbness and tingling of left side of face, S/P CABG x 6, and Shingles. who originally had concerns including Generalized Body Aches (since thursday. ), Fatigue, Fever, and Dizziness. at presentation on 2021, and was found to have UTI (urinary tract infection) [N39.0]  Urinary tract infection without hematuria, site unspecified [N39.0]  Acute non-ST elevation myocardial infarction (NSTEMI) ([facility] [address] [I21.4] after workup. Please see H&P for further details. HOSPITAL COURSE:   The patient presented to the hospital with the chief complaint of Generalized Body Aches (since thursday. ), Fatigue, Fever, and Dizziness  .  The patient was admitted to the hospital.     · Urinary Tract Infection secondary to history of BPH:    · CFU >100K  · IV Rocephin stopped 9/7  · Cultures sensitive to PO Levaquin and Bactrim  · Start PO Bactrim  · Monitor I&O, if output low -> bladder scan and check for retention  · CORBIN   · Resolving, Kidney function normalized   · Normocytic anemia   · Hx of CABG   · Maintain Hb >8  · Iron panel Iton and TIBC low with normal Ferritin  · Hypertension: stable  · History of Coronary Artery Disease; no issues. PT AM-PAC-- 20/24    Discharged home with PO abx and outpatient follow up with PCP within 1 week patient expressed verbal understanding and agrees with plan . Daughter whom is part time care giver updated on phone via nursing staff member presley [facility] PHYSICAL EXAMINATION AND LABORATORIES ON DAY OF DISCHARGE:  VITALS:  BP (!) 163/72   Pulse 69   Temp 98.4 °F (36.9 °C) (Oral)   Resp 18   Ht 5' 7\" (1.702 m)   Wt 180 lb (81.6 kg)   SpO2 93%   BMI 28.19 kg/m²       Please see note from the same day.      LABS[de-identified]  Recent Labs     09/05/21 0424 09/06/21 0208 09/07/21  0515    136 138   K 3.9 3.5 3.4*    105 107   CO2 21* 19* 21*   BUN 27* 27* 20   CREATININE 1.3* 1.2 1.1   GLUCOSE 110* 141* 117*   CALCIUM 8.6 8.2* 8.2*     Recent Labs     09/05/21 0424 09/06/21 0208 09/07/21  0515   ALKPHOS 165* 80 71   ALT 13 20 22   AST 31 44* 43*   PROT 6.3* 6.2* 5.8*   BILITOT 2.4* 1.2 0.9   LABALBU 3.5 3.1* 3.1*     Recent Labs     09/05/21 0424 09/06/21  0208 09/07/21  0515   WBC 18.5* 12.5* 7.4   RBC 3.73* 3.51* 3.19*   HGB 11.0* 10.4* 9.3*   HCT 33.1* 30.9* 28.1*   MCV 88.7 88.0 88.1   MCH 29.5 29.6 29.2   MCHC 33.2 33.7 33.1   RDW 14.3 14.1 13.8    154 167   MPV 9.3 9.3 9.4     No results found for: LABA1C  Lab Results   Component Value Date    INR 1.0 08/20/2019    INR 1.3 05/22/2019    INR 1.0 09/13/2018    PROTIME 11.8 08/20/2019    PROTIME 15.0 (H) 05/22/2019    PROTIME 11.7 09/13/2018      Lab Results   Component Value Date    TSH 2.210 12/08/2020    TSH 5.780 (H) 04/26/2019    TSH 1.770 09/13/2018     Lab Results   Component Value Date    TRIG 68 12/08/2020    TRIG 114 10/03/2020    TRIG 114 04/26/2019    HDL 69 12/08/2020    HDL 54 10/03/2020    HDL 50 04/26/2019    LDLCALC 76 12/08/2020    LDLCALC 67 10/03/2020    LDLCALC 63 04/26/2019     Recent Labs     09/06/21  0208 09/07/21  0515   MG 1.9 1.9       No results for input(s): CKTOTAL, CKMB, TROPONINI in the last 72 hours. No results for input(s): LACTA in the last 72 hours. IMAGING:  XR CHEST (2 VW)    Result Date: 9/4/2021  EXAMINATION: TWO XRAY VIEWS OF THE CHEST 9/4/2021 11:56 am COMPARISON: 01/05/2021 HISTORY: ORDERING SYSTEM PROVIDED HISTORY: shortness of breath TECHNOLOGIST PROVIDED HISTORY: Reason for exam:->shortness of breath FINDINGS: There is hyperinflation of the lungs and flattening of diaphragms consistent with COPD. There is no pulmonary infiltrate, mass or nodule. There is no pleural effusion. The cardiac silhouette is within normal limits. .     COPD. There is no evidence of pneumonia or failure. MICROBIOLOGY:  BLOOD CX #1  No results for input(s): BC in the last 72 hours. BLOOD CX #2  No results for input(s): Emmalene Osceola in the last 72 hours. TIP CULTURE  No results for input(s): CXCATHTIP in the last 72 hours. CULTURE, RESPIRATORY   No results for input(s): CULTRESP in the last 72 hours. RESPIRATORY SMEAR  No results for input(s): RESPSMEAR in the last 72 hours. ECHO:      DISPOSITION:  The patient's condition is fair. The patient is being discharged to home    DISCHARGE MEDICATIONS:    Arnaldo Mandel   Home Medication Instructions UEB:[id number]    Printed on:09/07/21 1626   Medication Information                      aspirin 81 MG EC tablet  Take 1 tablet by mouth daily             atorvastatin (LIPITOR) 40 MG tablet  Take 40 mg by mouth daily. Calcium-Magnesium-Vitamin D (CALCIUM 1200+D3 PO)  Take 1 tablet by mouth daily. Cholecalciferol (VITAMIN D3) 50 MCG (2000 UT) CAPS  Take by mouth             metoprolol succinate (TOPROL XL) 25 MG extended release tablet  Take 25 mg by mouth daily             Multiple Vitamins-Minerals (CENTRUM-LUTEIN PO)  Take 1 tablet by mouth daily. Omega-3 Fatty Acids (FISH OIL) 1000 MG CAPS  Take 1,000 mg by mouth daily. sulfamethoxazole-trimethoprim (BACTRIM DS;SEPTRA DS) 800-160 MG per tablet  Take 1 tablet by mouth every 12 hours for 12 doses                 Discharge Medication List as of 9/7/2021  2:01 PM        Discharge Medication List as of 9/7/2021  2:01 PM        Discharge Medication List as of 9/7/2021  2:01 PM      START taking these medications    Details   sulfamethoxazole-trimethoprim (BACTRIM DS;SEPTRA DS) 800-160 MG per tablet Take 1 tablet by mouth every 12 hours for 12 doses, Disp-12 tablet, R-0Normal             INTERNAL MEDICINE FOLLOW UP/INSTRUCTIONS:  · Follow-up with primary care physician as directed in discharge paperwork. · Please review results of imaging studies with PCP. · Follow-up with consultants as directed by them. · If recurrence or worsening of symptoms go to the ED or call primary care physician. · Diet: ADULT DIET; Regular    Preparing for this patient's discharge, including paperwork, orders, instructions, and meeting with patient did required >35 minutes.     Electronically signed by Stacey Olmstead MD on 9/7/2021 at 4:21 PM

## 2021-09-07 NOTE — PROGRESS NOTES
Therapeutic exercise to improve motor endurance, ROM, and functional strength for ADLs/functional transfers  * Therapeutic activities to facilitate/challenge dynamic balance, stand tolerance for increased safety and independence with ADLs  * Neuro-muscular re-education: facilitation of righting/equilibrium reactions, midline orientation, scapular stability/mobility, normalization of muscle tone, and facilitation of volitional active controled movement    Recommended Adaptive Equipment: TBD     Home Living: Patient lives alone in a one-floor home; full flight of steps down to basement, which is where the laundry and patient's preferred shower are located. Bathroom Setup: walk-in shower (seat available - no handheld shower head), tub shower on main living level; standard height toilets available (on main living level and in the basement    Prior Level of Function (PLOF): Patient reported that he was independent with ADLs, IADLs, and functional mobility (without device) prior to this hospitalization. Patient noted that his daughter (who is retired) lives ~30 minutes away and can provide assistance with IADLs, if needed; patient also stated that a neighbor could assist with IADLs, as needed. Driving: Yes    Pain Level: Patient denied experiencing pain. Cognition: Patient alert and oriented x3. WFL command follow demonstrated. Patient pleasant, cooperative, and motivated to return to [employer] and home environment.   Memory: WFL  Sequencing: WFL  Problem Solving: WFL  Judgement/Safety: WFL grossly    Functional Assessment:  AM-PAC Daily Activity Raw Score: 19/24   Initial Eval Status  Date: 9/7/2021 Treatment Status  Date:  Short Term Goals = Long Term Goals   Feeding Independent     Grooming SBA  Mod I  (seated/standing at sink)   UB Dressing Setup  Mod I / Independent  (including item retrieval)   LB Dressing Min A  Mod I / [place] - with use of AE, as needed/appropriate   Bathing Min A  Mod I / Independent - with use of AE/DME, as needed/appropriate   Toileting SBA  Mod I / Independent   Bed Mobility  Supine-to-Sit: Independent  Patient seated at EOB upon conclusion of this session, per patient preference. Functional Transfers Sit-to-Stand: SBA   from EOB  Independent   Functional Mobility SBA   (without device) within patient's room and bathroom. Mod I / Independent with functional mobility (with device, as needed/appropriate) in order to maximize independence with ADLs/IADLs and other functional tasks. Balance Sitting: Good  (at EOB)  Standing: Fair+  (without device)  Good dynamic standing balance during completion of ADLs/IADLs and other functional tasks. Activity Tolerance Fair  Mild shortness of breath noted with minimal functional mobility. Patient education provided regarding energy conservation and work simplification techniques for implementation into ADL/IADL routines. Patient will demonstrate Good understanding and consistent implementation of energy conservation techniques and work simplification techniques into ADL/IADL routines. Visual/  Perceptual WFL  Patient wears glasses, as needed. N/A   B UE Strength 4-/5  Patient will demonstrate 4+/5 B UE strength in order to maximize independence with ADLs/IADLs and functional transfers. Additional Long-Term Goal: Patient will increase functional independence to PLOF in order to allow patient to live in least restrictive environment. ROM: Additional Information:    R UE  WFL    L UE WFL      Hearing: Fair  Sensation: Patient denied experiencing numbness/tingling in B UEs. Tone: WFL  Edema: No    Comments: RN approved patient's participation in [address] [place] [address] activities. Upon arrival, patient supine in bed. At end of session, patient seated at EOB with call light and phone within reach, lunch tray items within reach on tray table, and all lines and tubes intact.  Patient would benefit from continued skilled OT to increase safety and independence with completion of ADL/IADL tasks for functional independence and quality of life. Patient education provided regardin) importance of having assistance with IADLs as needed to ensure safety upon return home, 2) potential benefits of DME to maximize independence/safety with showering in home environment, 3) energy conservation techniques and work simplification techniques for implementation into ADL/IADL routines. Patient verbalized understanding. Further skilled OT treatment indicated to increase patient's safety and independence with completion of ADL/IADL tasks in order to maximize patient's functional independence and quality of life. Rehab Potential: Good for established goals. Patient / Family Goal: Patient indicated that he anticipates returning home upon discharge. Patient and/or family were instructed on functional diagnosis, prognosis/goals, and OT plan of care. Demonstrated Good understanding. Eval Complexity: Low    Time In: 1136  Time Out: 1156  Total Treatment Time: 0 minutes      Minutes Units   OT Eval Low 82527 20 1   OT Eval Medium 87921     OT Eval High 82136     OT Re-Eval [id number]     Therapeutic Ex 70114     Therapeutic Activities 62537     ADL/Self Care 90519     Orthotic Management 11281     Neuro Re-Ed 92932     Non-Billable Time N/A ---     Evaluation time includes thorough review of current medical information, gathering information on past medical history/social history and prior level of function, completion of standardized testing/informal observation of tasks, assessment of data, and education on plan of care and goals. Toña Garrido, OTR/L  License Number: [id number]

## 2021-09-07 NOTE — PROGRESS NOTES
Internal Medicine Progress Note    Patient's name: Cezar Cano  : 1931  Chief complaints (on day of admission): Generalized Body Aches (since thursday. ), Fatigue, Fever, and Dizziness  Admission date: 2021  Date of service: 2021   Room: [address] MED SURG/TELE  Primary care physician: Juan Jason MD  Reason for visit: Follow-up for UTI    Subjective  Ray Gay was seen and examined     Doing well today, he has no new complaints and was sitting up right in bed enjoying lunch. He had several questions that were answered to his satisfaction, conversation took place in presence of nursing staff member presley. Discussed with patient that his infection has improved, the sensitivity has come back and he is medically stable and cleared for discharge. He lives at home alone, PT score came back excellent and his daughter lives near by and is his care giver. Review of Systems  There are no new complaints of chest pain, shortness of breath, abdominal pain, nausea, vomiting, diarrhea, constipation.     Hospital Medications  Current Facility-Administered Medications   Medication Dose Route Frequency Provider Last Rate Last Admin    hydrALAZINE (APRESOLINE) injection 10 mg  10 mg IntraVENous Q6H PRN Leena Rowe MD   10 mg at 21 0513    cefTRIAXone (ROCEPHIN) 1,000 mg in sterile water 10 mL IV syringe  1,000 mg IntraVENous Q24H Marilu Rodas MD   1,000 mg at 21 2239    aspirin EC tablet 81 mg  81 mg Oral Daily Marilu Rodas MD   81 mg at 21 0837    atorvastatin (LIPITOR) tablet 40 mg  40 mg Oral Daily Marilu Rodas MD   40 mg at 21 8492    Vitamin D (CHOLECALCIFEROL) tablet 2,000 Units  2,000 Units Oral Daily Marilu Rodas MD   2,000 Units at 21 0837    metoprolol succinate (TOPROL XL) extended release tablet 25 mg  25 mg Oral Daily Marilu Rodas MD   25 mg at 21 0838    sodium chloride flush 0.9 % injection 10 mL  10 mL IntraVENous 2 times per day [place] Anne Marie Bird MD   10 mL at 09/07/21 0840    sodium chloride flush 0.9 % injection 10 mL  10 mL IntraVENous PRN Ralph Neff MD        0.9 % sodium chloride infusion  25 mL IntraVENous PRN Ralph Neff MD        potassium chloride (KLOR-CON M) extended release tablet 40 mEq  40 mEq Oral PRN Ralph Neff MD        Or    potassium bicarb-citric acid (EFFER-K) effervescent tablet 40 mEq  40 mEq Oral PRN Ralph Neff MD        Or   Blase Liming potassium chloride 10 mEq/100 mL IVPB (Peripheral Line)  10 mEq IntraVENous PRN Ralph Neff MD        enoxaparin (LOVENOX) injection 40 mg  40 mg SubCUTAneous Daily Ralph Neff MD   40 mg at 09/07/21 0839    ondansetron (ZOFRAN-ODT) disintegrating tablet 4 mg  4 mg Oral Q8H PRN Ralph Neff MD        Or    ondansetron (ZOFRAN) injection 4 mg  4 mg IntraVENous Q6H PRN Ralph Neff MD        senna (SENOKOT) tablet 8.6 mg  1 tablet Oral Daily PRN Ralph Neff MD        acetaminophen (TYLENOL) tablet 650 mg  650 mg Oral Q6H PRN Ralph Neff MD   650 mg at 09/06/21 2100    Or    acetaminophen (TYLENOL) suppository 650 mg  650 mg Rectal Q6H PRN Ralph Neff MD           PRN Medications  hydrALAZINE, sodium chloride flush, sodium chloride, potassium chloride **OR** potassium alternative oral replacement **OR** potassium chloride, ondansetron **OR** ondansetron, senna, acetaminophen **OR** acetaminophen    Objective  Most Recent Recorded Vitals  BP (!) 163/72   Pulse 69   Temp 98.4 °F (36.9 °C) (Oral)   Resp 18   Ht 5' 7\" (1.702 m)   Wt 180 lb (81.6 kg)   SpO2 93%   BMI 28.19 kg/m²   I/O last 3 completed shifts:  In: -   Out: 200 [Urine:200]  No intake/output data recorded.     Physical Exam:  General: AAO to person/place/time/purpose, NAD, no labored breathing  Eyes: conjunctivae/corneas clear, sclera non icteric  Skin: color/texture/turgor normal, no rashes or lesions  Lungs: CTAB, no retractions/use of accessory muscles, no vocal fremitus, no rhonchi, no crackle, no rales  Heart: regular rate, regular rhythm, no murmur  Abdomen: soft, NT, bowel sounds normal  Extremities: atraumatic, no edema  Neurologic: cranial nerves 2-12 grossly intact, no slurred speech    Most Recent Labs  Lab Results   Component Value Date    WBC 7.4 09/07/2021    HGB 9.3 (L) 09/07/2021    HCT 28.1 (L) 09/07/2021     09/07/2021     09/07/2021    K 3.4 (L) 09/07/2021     09/07/2021    CREATININE 1.1 09/07/2021    BUN 20 09/07/2021    CO2 21 (L) 09/07/2021    GLUCOSE 117 (H) 09/07/2021    ALT 22 09/07/2021    AST 43 (H) 09/07/2021    INR 1.0 08/20/2019    TSH 2.210 12/08/2020       XR CHEST (2 VW)   Final Result   COPD. There is no evidence of pneumonia or failure. Echocardiogram 10/23/19  Limited study. Ejection fraction is visually estimated at 65%. No regional wall motion abnormalities seen. Normal right ventricle structure and function. Mild tricuspid regurgitation. RVSP is 35 mmHg. No evidence for hemodynamically significant pericardial effusion. Assessment   Active Hospital Problems    Diagnosis     UTI (urinary tract infection) [N39.0]      Priority: High    Essential hypertension [I10]     Hx of coronary artery disease [Z86.79]     Malignant neoplasm of lower lobe of left lung (HCC) [C34.32]     Hyperlipemia [E78.5]          Plan  · Urinary Tract Infection secondary to history of BPH:    · CFU >100K  · IV Rocephin stopped 9/7  · Cultures sensitive to PO Levaquin and Bactrim  · Start PO Bactrim  · Monitor I&O, if output low -> bladder scan and check for retention  · CORBIN   · Resolving, Kidney function normalized   · Normocytic anemia   · Hx of CABG   · Maintain Hb >8  · Iron panel Iton and TIBC low with normal Ferritin  · Hypertension: stable  · History of Coronary Artery Disease; no issues. · PT AM-PAC-- 20/24  · Follow labs   · DVT prophylaxis  · Please see orders for further management and care.   · Discharge plan: Cleared for DC home with PO abx     Electronically signed by John Acosta MD on 9/7/2021 at 8:41 AM    I can be reached through [facility].

## 2021-09-12 LAB — BLOOD CULTURE, ROUTINE: NORMAL

## 2021-10-04 ENCOUNTER — APPOINTMENT (OUTPATIENT)
Dept: CT IMAGING | Age: 86
End: 2021-10-04
Payer: MEDICARE

## 2021-10-04 ENCOUNTER — HOSPITAL ENCOUNTER (EMERGENCY)
Age: 86
Discharge: HOME | End: 2021-10-04
Attending: EMERGENCY MEDICINE
Payer: MEDICARE

## 2021-10-04 ENCOUNTER — APPOINTMENT (OUTPATIENT)
Dept: GENERAL RADIOLOGY | Age: 86
End: 2021-10-04
Payer: MEDICARE

## 2021-10-04 VITALS
HEART RATE: 75 BPM | HEIGHT: 67 IN | DIASTOLIC BLOOD PRESSURE: 82 MMHG | RESPIRATION RATE: 18 BRPM | WEIGHT: 180 LBS | SYSTOLIC BLOOD PRESSURE: 162 MMHG | OXYGEN SATURATION: 95 % | BODY MASS INDEX: 28.25 KG/M2 | TEMPERATURE: 97.6 F

## 2021-10-04 DIAGNOSIS — J20.9 BRONCHOSPASM WITH BRONCHITIS, ACUTE: Primary | ICD-10-CM

## 2021-10-04 DIAGNOSIS — J06.9 UPPER RESPIRATORY INFECTION, ACUTE: ICD-10-CM

## 2021-10-04 LAB
ALBUMIN SERPL-MCNC: 4 G/DL (ref 3.5–5.2)
ALP BLD-CCNC: 94 U/L (ref 40–129)
ALT SERPL-CCNC: 17 U/L (ref 0–40)
ANION GAP SERPL CALCULATED.3IONS-SCNC: 10 MMOL/L (ref 7–16)
AST SERPL-CCNC: 28 U/L (ref 0–39)
BASOPHILS ABSOLUTE: 0.05 E9/L (ref 0–0.2)
BASOPHILS RELATIVE PERCENT: 0.8 % (ref 0–2)
BILIRUB SERPL-MCNC: 0.8 MG/DL (ref 0–1.2)
BUN BLDV-MCNC: 19 MG/DL (ref 6–23)
CALCIUM SERPL-MCNC: 8.9 MG/DL (ref 8.6–10.2)
CHLORIDE BLD-SCNC: 101 MMOL/L (ref 98–107)
CO2: 24 MMOL/L (ref 22–29)
CREAT SERPL-MCNC: 1.3 MG/DL (ref 0.7–1.2)
EKG ATRIAL RATE: 56 BPM
EKG P AXIS: 68 DEGREES
EKG P-R INTERVAL: 166 MS
EKG Q-T INTERVAL: 444 MS
EKG QRS DURATION: 114 MS
EKG QTC CALCULATION (BAZETT): 428 MS
EKG R AXIS: -63 DEGREES
EKG T AXIS: 31 DEGREES
EKG VENTRICULAR RATE: 56 BPM
EOSINOPHILS ABSOLUTE: 0.32 E9/L (ref 0.05–0.5)
EOSINOPHILS RELATIVE PERCENT: 5 % (ref 0–6)
GFR AFRICAN AMERICAN: >60
GFR NON-AFRICAN AMERICAN: 52 ML/MIN/1.73
GLUCOSE BLD-MCNC: 117 MG/DL (ref 74–99)
HCT VFR BLD CALC: 37 % (ref 37–54)
HEMOGLOBIN: 12.1 G/DL (ref 12.5–16.5)
IMMATURE GRANULOCYTES #: 0.02 E9/L
IMMATURE GRANULOCYTES %: 0.3 % (ref 0–5)
LYMPHOCYTES ABSOLUTE: 1.98 E9/L (ref 1.5–4)
LYMPHOCYTES RELATIVE PERCENT: 30.8 % (ref 20–42)
MCH RBC QN AUTO: 28.5 PG (ref 26–35)
MCHC RBC AUTO-ENTMCNC: 32.7 % (ref 32–34.5)
MCV RBC AUTO: 87.1 FL (ref 80–99.9)
MONOCYTES ABSOLUTE: 0.64 E9/L (ref 0.1–0.95)
MONOCYTES RELATIVE PERCENT: 10 % (ref 2–12)
NEUTROPHILS ABSOLUTE: 3.42 E9/L (ref 1.8–7.3)
NEUTROPHILS RELATIVE PERCENT: 53.1 % (ref 43–80)
PDW BLD-RTO: 14.2 FL (ref 11.5–15)
PLATELET # BLD: 180 E9/L (ref 130–450)
PMV BLD AUTO: 9.1 FL (ref 7–12)
POTASSIUM SERPL-SCNC: 4.3 MMOL/L (ref 3.5–5)
PRO-BNP: 926 PG/ML (ref 0–450)
RBC # BLD: 4.25 E12/L (ref 3.8–5.8)
SARS-COV-2, NAAT: NOT DETECTED
SODIUM BLD-SCNC: 135 MMOL/L (ref 132–146)
TOTAL PROTEIN: 7.4 G/DL (ref 6.4–8.3)
TROPONIN, HIGH SENSITIVITY: 20 NG/L (ref 0–11)
TROPONIN, HIGH SENSITIVITY: 20 NG/L (ref 0–11)
WBC # BLD: 6.4 E9/L (ref 4.5–11.5)

## 2021-10-04 PROCEDURE — 94640 AIRWAY INHALATION TREATMENT: CPT

## 2021-10-04 PROCEDURE — 71275 CT ANGIOGRAPHY CHEST: CPT

## 2021-10-04 PROCEDURE — 6360000004 HC RX CONTRAST MEDICATION: Performed by: RADIOLOGY

## 2021-10-04 PROCEDURE — 85025 COMPLETE CBC W/AUTO DIFF WBC: CPT

## 2021-10-04 PROCEDURE — 87635 SARS-COV-2 COVID-19 AMP PRB: CPT

## 2021-10-04 PROCEDURE — 36415 COLL VENOUS BLD VENIPUNCTURE: CPT

## 2021-10-04 PROCEDURE — 96375 TX/PRO/DX INJ NEW DRUG ADDON: CPT

## 2021-10-04 PROCEDURE — 94664 DEMO&/EVAL PT USE INHALER: CPT

## 2021-10-04 PROCEDURE — 93010 ELECTROCARDIOGRAM REPORT: CPT | Performed by: INTERNAL MEDICINE

## 2021-10-04 PROCEDURE — 2580000003 HC RX 258: Performed by: PHYSICIAN ASSISTANT

## 2021-10-04 PROCEDURE — 80053 COMPREHEN METABOLIC PANEL: CPT

## 2021-10-04 PROCEDURE — 83880 ASSAY OF NATRIURETIC PEPTIDE: CPT

## 2021-10-04 PROCEDURE — 6360000002 HC RX W HCPCS: Performed by: PHYSICIAN ASSISTANT

## 2021-10-04 PROCEDURE — 71046 X-RAY EXAM CHEST 2 VIEWS: CPT

## 2021-10-04 PROCEDURE — 96365 THER/PROPH/DIAG IV INF INIT: CPT

## 2021-10-04 PROCEDURE — 96366 THER/PROPH/DIAG IV INF ADDON: CPT

## 2021-10-04 PROCEDURE — 6370000000 HC RX 637 (ALT 250 FOR IP): Performed by: PHYSICIAN ASSISTANT

## 2021-10-04 PROCEDURE — 99283 EMERGENCY DEPT VISIT LOW MDM: CPT

## 2021-10-04 PROCEDURE — 84484 ASSAY OF TROPONIN QUANT: CPT

## 2021-10-04 PROCEDURE — 93005 ELECTROCARDIOGRAM TRACING: CPT | Performed by: PHYSICIAN ASSISTANT

## 2021-10-04 RX ORDER — IPRATROPIUM BROMIDE AND ALBUTEROL SULFATE 2.5; .5 MG/3ML; MG/3ML
1 SOLUTION RESPIRATORY (INHALATION) ONCE
Status: COMPLETED | OUTPATIENT
Start: 2021-10-04 | End: 2021-10-04

## 2021-10-04 RX ORDER — DOXYCYCLINE HYCLATE 100 MG
100 TABLET ORAL 2 TIMES DAILY
Qty: 20 TABLET | Refills: 0 | Status: SHIPPED | OUTPATIENT
Start: 2021-10-04 | End: 2021-10-14

## 2021-10-04 RX ORDER — ALBUTEROL SULFATE 90 UG/1
2 AEROSOL, METERED RESPIRATORY (INHALATION) 4 TIMES DAILY PRN
Qty: 18 G | Refills: 0 | Status: SHIPPED | OUTPATIENT
Start: 2021-10-04 | End: 2021-11-17

## 2021-10-04 RX ORDER — IPRATROPIUM BROMIDE AND ALBUTEROL SULFATE 2.5; .5 MG/3ML; MG/3ML
1 SOLUTION RESPIRATORY (INHALATION)
Status: ACTIVE | OUTPATIENT
Start: 2021-10-04 | End: 2021-10-04

## 2021-10-04 RX ORDER — CEFDINIR 300 MG/1
300 CAPSULE ORAL 2 TIMES DAILY
Qty: 20 CAPSULE | Refills: 0 | Status: SHIPPED | OUTPATIENT
Start: 2021-10-04 | End: 2021-10-14

## 2021-10-04 RX ORDER — DIPHENHYDRAMINE HYDROCHLORIDE 50 MG/ML
25 INJECTION INTRAMUSCULAR; INTRAVENOUS ONCE
Status: DISCONTINUED | OUTPATIENT
Start: 2021-10-04 | End: 2021-10-05 | Stop reason: HOSPADM

## 2021-10-04 RX ORDER — PREDNISONE 10 MG/1
40 TABLET ORAL DAILY
Qty: 20 TABLET | Refills: 0 | Status: SHIPPED | OUTPATIENT
Start: 2021-10-04 | End: 2021-10-09

## 2021-10-04 RX ORDER — METHYLPREDNISOLONE SODIUM SUCCINATE 40 MG/ML
40 INJECTION, POWDER, LYOPHILIZED, FOR SOLUTION INTRAMUSCULAR; INTRAVENOUS ONCE
Status: COMPLETED | OUTPATIENT
Start: 2021-10-04 | End: 2021-10-04

## 2021-10-04 RX ORDER — IPRATROPIUM BROMIDE AND ALBUTEROL SULFATE 2.5; .5 MG/3ML; MG/3ML
1 SOLUTION RESPIRATORY (INHALATION) EVERY 4 HOURS PRN
Qty: 360 ML | Refills: 1 | Status: SHIPPED | OUTPATIENT
Start: 2021-10-04 | End: 2021-11-17

## 2021-10-04 RX ADMIN — IOPAMIDOL 75 ML: 755 INJECTION, SOLUTION INTRAVENOUS at 21:54

## 2021-10-04 RX ADMIN — IPRATROPIUM BROMIDE AND ALBUTEROL SULFATE 1 AMPULE: .5; 3 SOLUTION RESPIRATORY (INHALATION) at 21:16

## 2021-10-04 RX ADMIN — METHYLPREDNISOLONE SODIUM SUCCINATE 40 MG: 40 INJECTION, POWDER, FOR SOLUTION INTRAMUSCULAR; INTRAVENOUS at 19:35

## 2021-10-04 RX ADMIN — IPRATROPIUM BROMIDE AND ALBUTEROL SULFATE 1 AMPULE: .5; 3 SOLUTION RESPIRATORY (INHALATION) at 22:14

## 2021-10-04 RX ADMIN — CEFTRIAXONE 1000 MG: 1 INJECTION, POWDER, FOR SOLUTION INTRAMUSCULAR; INTRAVENOUS at 21:43

## 2021-10-04 NOTE — ED NOTES
Pt wheeling selg in wheel chair around waiting room. States has been here since 930 this morning and would like to know what is going on. JORDAN.      Merribeth Severe, RN  10/04/21 4603

## 2021-10-04 NOTE — ED NOTES
FIRST PROVIDER CONTACT ASSESSMENT NOTE                                                                                                Department of Emergency Medicine                                                      First Provider Note  10/4/21  10:50 AM EDT  NAME: Tatyana Iniguez  : 1931  MRN: [id number]    Chief Complaint: Cough, Fatigue, and Extremity Weakness      History of Present Illness:   Tatyana Iniguez is a 80 y.o. male who presents to the ED for cough, fatigue, extremity weakness, insomnia for the last few days. Patient states that he has some chest wall tightness. Patient is very concerned for pneumonia    Focused Physical Exam:  VS:    ED Triage Vitals [10/04/21 1047]   BP Temp Temp Source Pulse Resp SpO2 Height Weight   139/68 97.6 °F (36.4 °C) Temporal 75 18 96 % 5' 7\" (1.702 m) 180 lb (81.6 kg)        General: Alert and in no apparent distress. Medical History:  has a past medical history of BPH (benign prostatic hyperplasia), CAD (coronary artery disease), Carotid artery calcification, DJD (degenerative joint disease), Hyperlipemia, Lung nodule, Macular degeneration, Numbness and tingling in left arm, Numbness and tingling of left side of face, S/P CABG x 6, and Shingles. Surgical History:  has a past surgical history that includes Diagnostic Cardiac Cath Lab Procedure (00); Cardiac surgery; TURP; Dental surgery; Total hip arthroplasty (Right, 2011); Total hip arthroplasty (Left, 2009); other surgical history (2011); Thoracoscopy (Left, 2019); TURP (N/A, 2019); Prostate surgery (Bilateral, 2019); and Lung removal, partial (Left). Social History:  reports that he quit smoking about 33 years ago. His smoking use included cigarettes. He started smoking about 43 years ago. He has a 10.00 pack-year smoking history. He has never used smokeless tobacco. He reports current alcohol use. He reports that he does not use drugs.     Family History: family history includes Alzheimer's Disease in his sister; Heart Disease in his brother, brother, and brother; Other in his father and mother.     Allergies: Nitroglycerin, Biaxin [clarithromycin], Iodine, and Quinolones     Initial Plan of Care:  Initiate Treatment-Testing, Proceed toTreatment Area When Bed Available for ED Attending/MLP to Continue Care    -------------------------------------------------END OF FIRST PROVIDER CONTACT ASSESSMENT NOTE--------------------------------------------------------  Electronically signed by Tin Huang PA-C   DD: 10/4/21       Tin Huang PA-C  10/04/21 1050

## 2021-10-05 NOTE — ED PROVIDER NOTES
Seen with ED provider     [facility]  Department of Emergency Medicine   ED  Encounter Note  Admit Date/RoomTime: 10/4/2021  5:46 PM  ED Room: 32/32    NAME: Aby Jennings  : 1931  MRN: [id number]     Chief Complaint:  Cough, Fatigue, and Extremity Weakness    History of Present Illness       Aby Jennings is a 80 y.o. old male who presents to the emergency department by private vehicle, for cough, dyspnea, wheezing and fatigue, which began 5 day(s) prior to arrival.  Since onset the symptoms have been persistent and moderate in severity. The symptoms are associated with productive cough. There has been no abdominal pain, nausea, vomiting, diarrhea, dizziness, dysuria, urinary frequency, fever or headache. He was admitted here 2 weeks ago for UTI and home on antibiotics and he finished those a few days ago. He started a few days after his antibiotics were done having cold symptoms. ROS   Pertinent positives and negatives are stated within HPI, all other systems reviewed and are negative. Past Medical History:  has a past medical history of BPH (benign prostatic hyperplasia), CAD (coronary artery disease), Carotid artery calcification, DJD (degenerative joint disease), Hyperlipemia, Lung nodule, Macular degeneration, Numbness and tingling in left arm, Numbness and tingling of left side of face, S/P CABG x 6, and Shingles. Surgical History:  has a past surgical history that includes Diagnostic Cardiac Cath Lab Procedure (00); Cardiac surgery; TURP; Dental surgery; Total hip arthroplasty (Right, 2011); Total hip arthroplasty (Left, 2009); other surgical history (2011); Thoracoscopy (Left, 2019); TURP (N/A, 2019); Prostate surgery (Bilateral, 2019); and Lung removal, partial (Left). Social History:  reports that he quit smoking about 33 years ago. His smoking use included cigarettes. He started smoking about 43 years ago.  He has a NAAT Not Detected Not Detected   Comprehensive Metabolic Panel   Result Value Ref Range    Sodium 135 132 - 146 mmol/L    Potassium 4.3 3.5 - 5.0 mmol/L    Chloride 101 98 - 107 mmol/L    CO2 24 22 - 29 mmol/L    Anion Gap 10 7 - 16 mmol/L    Glucose 117 (H) 74 - 99 mg/dL    BUN 19 6 - 23 mg/dL    CREATININE 1.3 (H) 0.7 - 1.2 mg/dL    GFR Non-African American 52 >=60 mL/min/1.73    GFR African American >60     Calcium 8.9 8.6 - 10.2 mg/dL    Total Protein 7.4 6.4 - 8.3 g/dL    Albumin 4.0 3.5 - 5.2 g/dL    Total Bilirubin 0.8 0.0 - 1.2 mg/dL    Alkaline Phosphatase 94 40 - 129 U/L    ALT 17 0 - 40 U/L    AST 28 0 - 39 U/L   CBC Auto Differential   Result Value Ref Range    WBC 6.4 4.5 - 11.5 E9/L    RBC 4.25 3.80 - 5.80 E12/L    Hemoglobin 12.1 (L) 12.5 - 16.5 g/dL    Hematocrit 37.0 37.0 - 54.0 %    MCV 87.1 80.0 - 99.9 fL    MCH 28.5 26.0 - 35.0 pg    MCHC 32.7 32.0 - 34.5 %    RDW 14.2 11.5 - 15.0 fL    Platelets 368 137 - 664 E9/L    MPV 9.1 7.0 - 12.0 fL    Neutrophils % 53.1 43.0 - 80.0 %    Immature Granulocytes % 0.3 0.0 - 5.0 %    Lymphocytes % 30.8 20.0 - 42.0 %    Monocytes % 10.0 2.0 - 12.0 %    Eosinophils % 5.0 0.0 - 6.0 %    Basophils % 0.8 0.0 - 2.0 %    Neutrophils Absolute 3.42 1.80 - 7.30 E9/L    Immature Granulocytes # 0.02 E9/L    Lymphocytes Absolute 1.98 1.50 - 4.00 E9/L    Monocytes Absolute 0.64 0.10 - 0.95 E9/L    Eosinophils Absolute 0.32 0.05 - 0.50 E9/L    Basophils Absolute 0.05 0.00 - 0.20 E9/L   Troponin   Result Value Ref Range    Troponin, High Sensitivity 20 (H) 0 - 11 ng/L   Brain Natriuretic Peptide   Result Value Ref Range    Pro- (H) 0 - 450 pg/mL   Troponin   Result Value Ref Range    Troponin, High Sensitivity 20 (H) 0 - 11 ng/L   EKG 12 Lead   Result Value Ref Range    Ventricular Rate 56 BPM    Atrial Rate 56 BPM    P-R Interval 166 ms    QRS Duration 114 ms    Q-T Interval 444 ms    QTc Calculation (Bazett) 428 ms    P Axis 68 degrees    R Axis -63 degrees    T Axis 31 degrees     EKG #1:  Interpreted by emergency department attending physician unless otherwise noted. 10/5/21  Time: 2333    Rhythm: normal sinus   Rate: bradycardia  Axis: normal  Conduction: left anterior fasciclar block  ST Segments: no acute change  T Waves: no acute change    Clinical Impression: non-specific EKG  Comparison to Prior tracings: There are no significant changes when compared to prior tracings. Imaging: All Radiology results interpreted by Radiologist unless otherwise noted. CTA PULMONARY W CONTRAST   Final Result   1. There are limitation in the evaluation of the pulmonary circulation more   distal subsegmental branches of both lower lobes particular on the left due   motion artifacts caused misregistration of the images. 2.  Otherwise there is no indication for acute pulmonary emboli. 3.  No indication for aneurysm formation or dissection of the thoracic aorta. Status post CABG. 4.  Residual changes in the left lower lobe with some loss of volume which   are more likely relate with previous process in the left lower lung base as   seen on the study of September 2020. 5.  Presence of occlusion of a central lumen of a subsegmental branch for the   posterior basal region of the right lower lobe which can be relate with   retained secretions. Can be manifestation of bronchitis or aspiration. Consider follow-up study following short clinical treatment trial for distal   airway disease in a time interval of 10-14 days from the present examination. 6.  Presence of gallstones.          XR CHEST (2 VW)   Final Result   No acute pulmonary infiltrates are identified             ED Course / Medical Decision Making     Medications   ipratropium-albuterol (DUONEB) nebulizer solution 1 ampule (has no administration in time range)   methylPREDNISolone sodium (SOLU-MEDROL) injection 40 mg (40 mg IntraVENous Given 10/4/21 1935)   ipratropium-albuterol (DUONEB) nebulizer solution 1 ampule (1 ampule Inhalation Given 10/4/21 2116)   cefTRIAXone (ROCEPHIN) 1,000 mg in sterile water 10 mL IV syringe (0 mg IntraVENous Stopped 10/4/21 2324)   ipratropium-albuterol (DUONEB) nebulizer solution 1 ampule (1 ampule Inhalation Given 10/4/21 2214)   iopamidol (ISOVUE-370) 76 % injection 75 mL (75 mLs IntraVENous Given 10/4/21 2154)        Re-examination:  10/4/21       Time: *pt has been stable in department. I ambulated pt and he dropped from 95% to 93% with a walk intermediate around the ED. His heart rate stayed in 80's throughout. After his first DuoNeb he really did not sound any better so I ordered a second. CT also was ordered at that time. After a second DuoNeb I felt the wheezing sounded a lot looser. Patient did have some expression of some pale yellow to clear sputum. CT result has showed no PE and a occlusion of the central lumen of a subsegmental branch of bronchial on the right posterior lung. Patient was going to be given an incentive spirometer but he insists he has one at home and he uses it often because he has a history of lung cancer and part of his left lower lung was removed. He had no follow-up complications as it seems they had gotten all the cancer out with the lobectomy. I discussed the treatment plan with him and I also talked to his daughter Filiberto Blas and discussed the treatment plan with her. I advised both of them if they feel they are not improving to return to emergency. Otherwise follow-up with primary care for close follow-up and recheck frequently. I advised both the patient and his daughter that he did not need to use both the inhaler and the nebulizer just use whichever one is easier for the patient to use and is working better. Consults:   None    Procedures:   none    Medical Decision Making: Patient presents to emergency with upper respiratory infection, productive cough, wheezing, no fevers.   Patient was in hospital couple weeks ago with urinary tract infection and finished his antibiotics had been off them for a few days when the respiratory symptoms started. He reports he coughed very hard for 3 to 4 days and then the cough seemed to abruptly stop after which she developed the noticeable wheezing. He reports he does have a intermittent cough at this time and it is productive of mostly clear sputum. X-ray showed no infiltrates. Covid test was negative. Patient ambulated in department and went down from a 95 to 93%, with normal heart rate. CTA was performed and showed no PE. Small subsegmental bronchial showing a central occlusion with secretions most likely. Patient is not hypoxic and had 2 DuoNeb's in department with some improvement of air movement and loosening of the wheezing. Patient be discharged home. He does live at home alone but his daughter is involved in his care and I discussed with the home-going plan with both the patient and his daughter who was picking him up from emergency. I advised them to return to emergency at any time if they feel he is worsening. Otherwise close follow-up with primary care to recheck on patient's progress. Assessment     1. Bronchospasm with bronchitis, acute    2. Upper respiratory infection, acute      Plan   Discharged home.   Patient condition is stable    New Medications     Discharge Medication List as of 10/4/2021 11:02 PM        START taking these medications    Details   cefdinir (OMNICEF) 300 MG capsule Take 1 capsule by mouth 2 times daily for 10 days, Disp-20 capsule, R-0Normal      doxycycline hyclate (VIBRA-TABS) 100 MG tablet Take 1 tablet by mouth 2 times daily for 10 days, Disp-20 tablet, R-0Normal      albuterol sulfate HFA (VENTOLIN HFA) 108 (90 Base) MCG/ACT inhaler Inhale 2 puffs into the lungs 4 times daily as needed for Wheezing or Shortness of Breath, Disp-18 g, R-0Please provide spacerNormal      ipratropium-albuterol (DUONEB) 0.5-2.5 (3) MG/3ML SOLN nebulizer solution Take 3 mLs by nebulization every 4 hours as needed for Shortness of Breath, Disp-360 mL, R-1Normal      predniSONE (DELTASONE) 10 MG tablet Take 4 tablets by mouth daily for 5 days, Disp-20 tablet, R-0Normal      Respiratory Therapy Supplies (FULL KIT NEBULIZER SET) MISC Disp-1 each, R-0, PrintUse as directed with nebulized medication. Electronically signed by Azam Bang PA-C   DD: 10/4/21  **This report was transcribed using voice recognition software. Every effort was made to ensure accuracy; however, inadvertent computerized transcription errors may be present.   END OF ED PROVIDER NOTE         Azam Bang PA-C  10/05/21 0129       Azam Bang PA-C  10/05/21 0131       Jeramie Nevarez DO  10/05/21 0222

## 2021-10-06 PROBLEM — N39.0 UTI (URINARY TRACT INFECTION): Status: RESOLVED | Noted: 2021-09-04 | Resolved: 2021-10-06

## 2021-10-08 ENCOUNTER — TELEPHONE (OUTPATIENT)
Dept: ADMINISTRATIVE | Age: 86
End: 2021-10-08

## 2021-10-08 NOTE — TELEPHONE ENCOUNTER
Pt called to speak to Dr Deutsch Both office, was in ER 10/4, was put on a med and is having some problems, please call pt at [phone]

## 2021-10-08 NOTE — TELEPHONE ENCOUNTER
I spoke with patient, he was seen at the ER for bronchitis and was given antibiotics, PCP changed his antibiotics and patient is having problems with it, I instructed patient to call PCP office and let them know,I also scheduled him for a f/u, he is overdue for an OV

## 2021-10-12 ENCOUNTER — TELEPHONE (OUTPATIENT)
Dept: CARDIOLOGY CLINIC | Age: 86
End: 2021-10-12

## 2021-10-12 RX ORDER — ISOSORBIDE MONONITRATE 30 MG/1
30 TABLET, EXTENDED RELEASE ORAL DAILY
COMMUNITY
End: 2021-10-12 | Stop reason: SDUPTHER

## 2021-10-12 RX ORDER — ISOSORBIDE MONONITRATE 30 MG/1
30 TABLET, EXTENDED RELEASE ORAL DAILY
Qty: 30 TABLET | Refills: 5 | Status: SHIPPED
Start: 2021-10-12 | End: 2021-11-17

## 2021-10-12 NOTE — TELEPHONE ENCOUNTER
Patient states he is having chest pressure, left arm is sore and tingling and numbness in left side of his face, patient says he will not go to ER, please advise

## 2021-11-17 ENCOUNTER — OFFICE VISIT (OUTPATIENT)
Dept: CARDIOLOGY CLINIC | Age: 86
End: 2021-11-17
Payer: MEDICARE

## 2021-11-17 VITALS
HEART RATE: 52 BPM | DIASTOLIC BLOOD PRESSURE: 62 MMHG | RESPIRATION RATE: 16 BRPM | SYSTOLIC BLOOD PRESSURE: 118 MMHG | WEIGHT: 178.6 LBS | BODY MASS INDEX: 28.03 KG/M2 | HEIGHT: 67 IN

## 2021-11-17 DIAGNOSIS — I10 ESSENTIAL HYPERTENSION: Primary | ICD-10-CM

## 2021-11-17 DIAGNOSIS — Z86.79 HX OF CORONARY ARTERY DISEASE: ICD-10-CM

## 2021-11-17 PROCEDURE — 99214 OFFICE O/P EST MOD 30 MIN: CPT | Performed by: INTERNAL MEDICINE

## 2021-11-17 PROCEDURE — 93000 ELECTROCARDIOGRAM COMPLETE: CPT | Performed by: INTERNAL MEDICINE

## 2021-11-17 PROCEDURE — 1036F TOBACCO NON-USER: CPT | Performed by: INTERNAL MEDICINE

## 2021-11-17 PROCEDURE — 1123F ACP DISCUSS/DSCN MKR DOCD: CPT | Performed by: INTERNAL MEDICINE

## 2021-11-17 PROCEDURE — G8427 DOCREV CUR MEDS BY ELIG CLIN: HCPCS | Performed by: INTERNAL MEDICINE

## 2021-11-17 PROCEDURE — G8417 CALC BMI ABV UP PARAM F/U: HCPCS | Performed by: INTERNAL MEDICINE

## 2021-11-17 PROCEDURE — 4040F PNEUMOC VAC/ADMIN/RCVD: CPT | Performed by: INTERNAL MEDICINE

## 2021-11-17 PROCEDURE — G8484 FLU IMMUNIZE NO ADMIN: HCPCS | Performed by: INTERNAL MEDICINE

## 2021-11-17 NOTE — PROGRESS NOTES
CHIEF COMPLAINT: CAD h/o CABG    HISTORY OF PRESENT ILLNESS: Patient is a 80 y.o. male seen at the request of Darrin Velazco MD.      Patient presents in follow up. Patient has history of CAD s/p CABG 2000 x 6 by Dr. Aura Aly.     Mildly abnormal stress but overall low risk 1/17/18. Baseline MACDONALD. No CP. Past Medical History:   Diagnosis Date    BPH (benign prostatic hyperplasia)     CAD (coronary artery disease)     Carotid artery calcification     DJD (degenerative joint disease)     Hyperlipemia     Lung nodule     Macular degeneration     Numbness and tingling in left arm 2/2/2018    Numbness and tingling of left side of face 2/2/2018    S/P CABG x 6 2000    LIMA-LAD, LYNNETTE-LCx,SVG-OM,SVG-LPL,SVG-PDA-RPL    Shingles        Patient Active Problem List   Diagnosis    Hyperlipemia    Asymptomatic bilateral carotid artery stenosis    Malignant neoplasm of lower lobe of left lung (HCC)    Hx of coronary artery disease    Essential hypertension       Allergies   Allergen Reactions    Nitroglycerin     Biaxin [Clarithromycin] Rash    Iodine Swelling and Rash     Internal Iodine only    Quinolones Rash       Current Outpatient Medications   Medication Sig Dispense Refill    Cholecalciferol (VITAMIN D3) 50 MCG (2000 UT) CAPS Take by mouth      aspirin 81 MG EC tablet Take 1 tablet by mouth daily 30 tablet 3    metoprolol succinate (TOPROL XL) 25 MG extended release tablet Take 25 mg by mouth daily      Calcium-Magnesium-Vitamin D (CALCIUM 1200+D3 PO) Take 1 tablet by mouth daily.  atorvastatin (LIPITOR) 40 MG tablet Take 40 mg by mouth daily.  Multiple Vitamins-Minerals (CENTRUM-LUTEIN PO) Take 1 tablet by mouth daily.  Omega-3 Fatty Acids (FISH OIL) 1000 MG CAPS Take 1,000 mg by mouth daily.       isosorbide mononitrate (IMDUR) 30 MG extended release tablet Take 1 tablet by mouth daily (Patient not taking: Reported on 11/17/2021) 30 tablet 5    albuterol sulfate HFA (VENTOLIN HFA) 108 (90 Base) MCG/ACT inhaler Inhale 2 puffs into the lungs 4 times daily as needed for Wheezing or Shortness of Breath (Patient not taking: Reported on 2021) 18 g 0    ipratropium-albuterol (DUONEB) 0.5-2.5 (3) MG/3ML SOLN nebulizer solution Take 3 mLs by nebulization every 4 hours as needed for Shortness of Breath (Patient not taking: Reported on 2021) 360 mL 1    Respiratory Therapy Supplies (FULL KIT NEBULIZER SET) MISC Use as directed with nebulized medication. (Patient not taking: Reported on 2021) 1 each 0     No current facility-administered medications for this visit. Social History     Socioeconomic History    Marital status:      Spouse name: Not on file    Number of children: Not on file    Years of education: Not on file    Highest education level: Not on file   Occupational History    Occupation: retired-    Tobacco Use    Smoking status: Former Smoker     Packs/day: 1.00     Years: 10.00     Pack years: 10.00     Types: Cigarettes     Start date: 1978     Quit date: 1988     Years since quittin.2    Smokeless tobacco: Never Used   Vaping Use    Vaping Use: Never used   Substance and Sexual Activity    Alcohol use: Yes     Comment: socially    Drug use: No    Sexual activity: Not Currently   Other Topics Concern    Not on file   Social History Narrative    Not on file     Social Determinants of Health     Financial Resource Strain:     Difficulty of Paying Living Expenses: Not on file   Food Insecurity:     Worried About [address] in the Last Year: Not on file    [address] [employer] in the Last Year: Not on file   Transportation Needs:     Lack of Transportation (Medical): Not on file    Lack of Transportation (Non-Medical):  Not on file   Physical Activity:     Days of Exercise per Week: Not on file    Minutes of Exercise per Session: Not on file   Stress:     Feeling of Stress : Not on file   Social Connections:     Frequency of Communication with Friends and Family: Not on file    Frequency of Social Gatherings with Friends and Family: Not on file    Attends Zoroastrian Services: Not on file    Active Member of Clubs or Organizations: Not on file    Attends Club or Organization Meetings: Not on file    Marital Status: Not on file   Intimate Partner Violence:     Fear of Current or Ex-Partner: Not on file    Emotionally Abused: Not on file    Physically Abused: Not on file    Sexually Abused: Not on file   Housing Stability:     Unable to Pay for Housing in the Last Year: Not on file    Number of [place] in the Last Year: Not on file    Unstable Housing in the Last Year: Not on file       Family History   Problem Relation Age of Onset    Other Mother         accident age 28 years , Elvie Petties. was 5 months old    Other Father         accident age 43 decesed 8 years when Father passed    Heart Disease Brother     Alzheimer's Disease Sister     Heart Disease Brother     Heart Disease Brother      Review of Systems:  Heart: as above   Lungs: as above   Eyes: denies changes in vision or discharge. Ears: denies changes in hearing or pain. Nose: denies epistaxis or masses   Throat: denies sore throat or trouble swallowing. Neuro: denies numbness, tingling, tremors. Skin: denies rashes or itching. : denies hematuria, dysuria   GI: denies vomiting, diarrhea   Psych: denies mood changed, anxiety, depression. all others systems negative. Physical Exam   /62   Pulse 52   Resp 16   Ht 5' 7\" (1.702 m)   Wt 178 lb 9.6 oz (81 kg)   BMI 27.97 kg/m²   Constitutional: Oriented to person, place, and time. Well-developed and well-nourished. No distress. Head: Normocephalic and atraumatic. Eyes: EOM are normal. Pupils are equal, round, and reactive to light. Neck: Normal range of motion. Neck supple. No hepatojugular reflux and no JVD present.  Carotid bruit is not present. No tracheal deviation present. No thyromegaly present. Cardiovascular: Normal rate, regular rhythm, normal heart sounds and intact distal pulses. Exam reveals no gallop and no friction rub. No murmur heard. Pulmonary/Chest: Effort normal and breath sounds normal. No respiratory distress. No wheezes. No rales. No tenderness. Abdominal: Soft. Bowel sounds are normal. No distension and no mass. No tenderness. No rebound and no guarding. Musculoskeletal: Normal range of motion. No edema and no tenderness. Lymphadenopathy:   No cervical adenopathy. No groin adenopathy. Neurological: Alert and oriented to person, place, and time. Skin: Skin is warm and dry. No rash noted. Not diaphoretic. No erythema. Psychiatric: Normal mood and affect. Behavior is normal.     EKG:  nonspecific ST and T waves changes, sinus bradycardia. Stress Impression 1/17/18:    1. ECG during the infusion did not change. 2. The myocardial perfusion imaging was abnormal.    The abnormality was a moderate sized partially reversible defect in the inferolateral  wall  3. Overall left ventricular systolic function was normal without regional wall motion abnormalities. 4. Intermediate risk general pharmacologic stress test.  5. Compared to prior study results of 11/16/2010, there is a new perfusion defect involving the inferolateral wall with partial reversibility. ASSESSMENT AND PLAN:  Patient Active Problem List   Diagnosis    Hyperlipemia    Asymptomatic bilateral carotid artery stenosis    Malignant neoplasm of lower lobe of left lung (HCC)    Hx of coronary artery disease    Essential hypertension     1. CAD hx of CABG:    Continue BB/ASA/statin. Stable symptoms. 2. Lipids: Statin. Tim Villegas D.O.   Cardiologist  Cardiology, [id number]

## 2021-12-02 ENCOUNTER — TELEPHONE (OUTPATIENT)
Dept: VASCULAR SURGERY | Age: 86
End: 2021-12-02

## 2021-12-02 DIAGNOSIS — I65.23 BILATERAL CAROTID ARTERY STENOSIS: Primary | ICD-10-CM

## 2021-12-02 NOTE — TELEPHONE ENCOUNTER
Spoke to Judah Keyes regarding his carotid testing for 12- with Dr Doreen Carreno.    He wants to go to [facility] in [address] [place] [address] his appt with  for 12- with

## 2021-12-03 ENCOUNTER — OFFICE VISIT (OUTPATIENT)
Dept: HEMATOLOGY | Age: 86
End: 2021-12-03
Payer: MEDICARE

## 2021-12-03 VITALS
OXYGEN SATURATION: 97 % | TEMPERATURE: 97.3 F | RESPIRATION RATE: 18 BRPM | SYSTOLIC BLOOD PRESSURE: 179 MMHG | HEIGHT: 66 IN | WEIGHT: 180 LBS | BODY MASS INDEX: 28.93 KG/M2 | DIASTOLIC BLOOD PRESSURE: 74 MMHG | HEART RATE: 61 BPM

## 2021-12-03 DIAGNOSIS — K76.89 LIVER CYST: ICD-10-CM

## 2021-12-03 DIAGNOSIS — K86.2 PANCREATIC CYST: Primary | ICD-10-CM

## 2021-12-03 PROCEDURE — 99213 OFFICE O/P EST LOW 20 MIN: CPT | Performed by: TRANSPLANT SURGERY

## 2021-12-03 PROCEDURE — G8484 FLU IMMUNIZE NO ADMIN: HCPCS | Performed by: TRANSPLANT SURGERY

## 2021-12-03 PROCEDURE — G8427 DOCREV CUR MEDS BY ELIG CLIN: HCPCS | Performed by: TRANSPLANT SURGERY

## 2021-12-03 PROCEDURE — G8417 CALC BMI ABV UP PARAM F/U: HCPCS | Performed by: TRANSPLANT SURGERY

## 2021-12-03 PROCEDURE — 4040F PNEUMOC VAC/ADMIN/RCVD: CPT | Performed by: TRANSPLANT SURGERY

## 2021-12-03 PROCEDURE — 1036F TOBACCO NON-USER: CPT | Performed by: TRANSPLANT SURGERY

## 2021-12-03 PROCEDURE — 1123F ACP DISCUSS/DSCN MKR DOCD: CPT | Performed by: TRANSPLANT SURGERY

## 2021-12-03 PROCEDURE — 99214 OFFICE O/P EST MOD 30 MIN: CPT | Performed by: TRANSPLANT SURGERY

## 2021-12-03 NOTE — PROGRESS NOTES
RESECTION PROSTATE performed by Hank Cheney MD at [facility] OR       Current Outpatient Medications   Medication Sig Dispense Refill    Cholecalciferol (VITAMIN D3) 50 MCG ( UT) CAPS Take by mouth      aspirin 81 MG EC tablet Take 1 tablet by mouth daily 30 tablet 3    metoprolol succinate (TOPROL XL) 25 MG extended release tablet Take 25 mg by mouth daily      Calcium-Magnesium-Vitamin D (CALCIUM 1200+D3 PO) Take 1 tablet by mouth daily.  atorvastatin (LIPITOR) 40 MG tablet Take 40 mg by mouth daily.  Multiple Vitamins-Minerals (CENTRUM-LUTEIN PO) Take 1 tablet by mouth daily.  Omega-3 Fatty Acids (FISH OIL) 1000 MG CAPS Take 1,000 mg by mouth daily. No current facility-administered medications for this visit. Allergies   Allergen Reactions    Nitroglycerin     Biaxin [Clarithromycin] Rash    Iodine Swelling and Rash     Internal Iodine only    Quinolones Rash       Family History   Problem Relation Age of Onset    Other Mother         accident age 28 years , Kaela Martin. was 5 months old    Other Father         accident age 43 decesed 8 years when Father passed   [facility] Heart Disease Brother     Alzheimer's Disease Sister     Heart Disease Brother     Heart Disease Brother        Social History     Socioeconomic History    Marital status:       Spouse name: Not on file    Number of children: Not on file    Years of education: Not on file    Highest education level: Not on file   Occupational History    Occupation: retired-    Tobacco Use    Smoking status: Former Smoker     Packs/day: 1.00     Years: 10.00     Pack years: 10.00     Types: Cigarettes     Start date: 1978     Quit date: 1988     Years since quittin.2    Smokeless tobacco: Never Used   Vaping Use    Vaping Use: Never used   Substance and Sexual Activity    Alcohol use: Yes     Comment: socially    Drug use: No    Sexual activity: Not Currently   Other Topics Concern    Not on file   Social History Narrative    Not on file     Social Determinants of Health     Financial Resource Strain:     Difficulty of Paying Living Expenses: Not on file   Food Insecurity:     Worried About Running Out of Food in the Last Year: Not on file    Deyanira of Food in the Last Year: Not on file   Transportation Needs:     Lack of Transportation (Medical): Not on file    Lack of Transportation (Non-Medical): Not on file   Physical Activity:     Days of Exercise per Week: Not on file    Minutes of Exercise per Session: Not on file   Stress:     Feeling of Stress : Not on file   Social Connections:     Frequency of Communication with Friends and Family: Not on file    Frequency of Social Gatherings with Friends and Family: Not on file    Attends Confucianism Services: Not on file    Active Member of [address] [place] [address] or Organizations: Not on file    Attends Club or Organization Meetings: Not on file    Marital Status: Not on file   Intimate Partner Violence:     Fear of Current or Ex-Partner: Not on file    Emotionally Abused: Not on file    Physically Abused: Not on file    Sexually Abused: Not on file   Housing Stability:     Unable to Pay for Housing in the Last Year: Not on file    Number of [place] in the Last Year: Not on file    Unstable Housing in the Last Year: Not on file       ROS:   Review of Systems   Constitutional: Negative for chills, diaphoresis and fever. HENT: Negative for congestion, ear discharge, ear pain, hearing loss, nosebleeds and tinnitus. Eyes: Negative for photophobia, pain and discharge. Respiratory: Negative for shortness of breath. Cardiovascular: Negative for palpitations and leg swelling. Gastrointestinal: Negative for abdominal pain, blood in stool, constipation, diarrhea, nausea and vomiting. Endocrine: Negative for polydipsia. Genitourinary: Negative for frequency, hematuria and urgency.    Musculoskeletal: Negative for back pain and neck pain. Skin: Negative for rash. Allergic/Immunologic: Negative for environmental allergies. Neurological: Negative for tremors and seizures. Psychiatric/Behavioral: Negative for hallucinations and suicidal ideas. The patient is not nervous/anxious. Physical Exam:  BP (!) 179/74   Pulse 61   Temp 97.3 °F (36.3 °C) (Temporal)   Resp 18   Ht 5' 6\" (1.676 m)   Wt 180 lb (81.6 kg)   SpO2 97%   BMI 29.05 kg/m²     PSYCH: mood and affect normal, alert and oriented x 3: No apparent distress, comfortable  EYES: Sclera white, pupils equal round and reactive to light  ENMT:  Hearing normal, trachea midline, ears externally intact  LYMPH: no obvious lympadenopathy in neck. RESP: Respiratory effort was normal with no retractions or use of accessory muscles. 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 - we discussed that he does not have pancreatic duct dilation which is reassuring  - will plan for a MRI in 3 months    45 Minutes of which greater than 50% was spent counseling or coordinating his care. Thank you for the consultation allowing me to take part in Mr. Monserrat silva. Please send a copy of my note to Dr. Mary Wheatley.  Rupali Arevalo M.D.  12/3/2021  1:10 PM

## 2021-12-04 ASSESSMENT — ENCOUNTER SYMPTOMS
SHORTNESS OF BREATH: 0
VOMITING: 0
NAUSEA: 0
EYE DISCHARGE: 0
BACK PAIN: 0
CONSTIPATION: 0
EYE PAIN: 0
ABDOMINAL PAIN: 0
PHOTOPHOBIA: 0
BLOOD IN STOOL: 0
DIARRHEA: 0

## 2021-12-10 DIAGNOSIS — I65.23 BILATERAL CAROTID ARTERY STENOSIS: ICD-10-CM

## 2021-12-17 ENCOUNTER — TELEPHONE (OUTPATIENT)
Dept: VASCULAR SURGERY | Age: 86
End: 2021-12-17

## 2021-12-20 ENCOUNTER — OFFICE VISIT (OUTPATIENT)
Dept: VASCULAR SURGERY | Age: 86
End: 2021-12-20
Payer: MEDICARE

## 2021-12-20 VITALS
HEIGHT: 66 IN | SYSTOLIC BLOOD PRESSURE: 136 MMHG | BODY MASS INDEX: 28.12 KG/M2 | RESPIRATION RATE: 18 BRPM | WEIGHT: 175 LBS | OXYGEN SATURATION: 98 % | HEART RATE: 56 BPM | DIASTOLIC BLOOD PRESSURE: 70 MMHG

## 2021-12-20 DIAGNOSIS — I65.23 CAROTID ARTERY STENOSIS, ASYMPTOMATIC, BILATERAL: Primary | ICD-10-CM

## 2021-12-20 PROBLEM — E66.3 OVERWEIGHT WITH BODY MASS INDEX (BMI) 25.0-29.9: Status: ACTIVE | Noted: 2021-12-20

## 2021-12-20 PROBLEM — H90.3 SENSORINEURAL HEARING LOSS (SNHL) OF BOTH EARS: Status: ACTIVE | Noted: 2021-12-20

## 2021-12-20 PROBLEM — D64.9 ANEMIA: Status: ACTIVE | Noted: 2021-12-20

## 2021-12-20 PROBLEM — K21.9 GASTROESOPHAGEAL REFLUX DISEASE: Status: ACTIVE | Noted: 2021-12-20

## 2021-12-20 PROBLEM — C34.90 PRIMARY MALIGNANT NEOPLASM OF LUNG (HCC): Status: ACTIVE | Noted: 2021-12-20

## 2021-12-20 PROBLEM — H35.30 MACULAR DEGENERATION: Status: ACTIVE | Noted: 2021-12-20

## 2021-12-20 PROBLEM — N40.0 BENIGN PROSTATIC HYPERPLASIA: Status: ACTIVE | Noted: 2021-12-20

## 2021-12-20 PROBLEM — N18.9 CHRONIC KIDNEY DISEASE: Status: ACTIVE | Noted: 2021-12-20

## 2021-12-20 PROBLEM — M19.90 OSTEOARTHRITIS: Status: ACTIVE | Noted: 2021-12-20

## 2021-12-20 PROCEDURE — G8427 DOCREV CUR MEDS BY ELIG CLIN: HCPCS | Performed by: SURGERY

## 2021-12-20 PROCEDURE — 4040F PNEUMOC VAC/ADMIN/RCVD: CPT | Performed by: SURGERY

## 2021-12-20 PROCEDURE — 99213 OFFICE O/P EST LOW 20 MIN: CPT | Performed by: SURGERY

## 2021-12-20 PROCEDURE — 1123F ACP DISCUSS/DSCN MKR DOCD: CPT | Performed by: SURGERY

## 2021-12-20 PROCEDURE — G8417 CALC BMI ABV UP PARAM F/U: HCPCS | Performed by: SURGERY

## 2021-12-20 PROCEDURE — G8484 FLU IMMUNIZE NO ADMIN: HCPCS | Performed by: SURGERY

## 2021-12-20 PROCEDURE — 1036F TOBACCO NON-USER: CPT | Performed by: SURGERY

## 2021-12-20 RX ORDER — ISOSORBIDE MONONITRATE 30 MG/1
30 TABLET, EXTENDED RELEASE ORAL DAILY
COMMUNITY
Start: 2021-12-17 | End: 2021-12-20

## 2021-12-20 NOTE — PROGRESS NOTES
Vascular Surgery Outpatient Followup    PCP : Yesenia Olson MD    HISTORY OF PRESENT ILLNESS:    The patient is a 80 y.o. male who is here in regards to hx of ass carotid stenosis. He has a hx of asymptomatic bilateral carotid stenosis. Patient denies hx of stroke, TIA, focal weakness, slurred speech or amaurosis fugax. Patient denies any other changes in overall health. He has no pain associated with his carotid disease. He has not had any recent episodes of exposure to toxic fumes which had precipitated his initial evaluation of his carotid disease in 11/2014 when he had been using gas grill and fertilizer. He denies any changes to his overall health. He still enjoys drinking his red wine. Past Medical History:        Diagnosis Date    BPH (benign prostatic hyperplasia)     CAD (coronary artery disease)     Carotid artery calcification     DJD (degenerative joint disease)     Hyperlipemia     Lung nodule     Macular degeneration     Numbness and tingling in left arm 2/2/2018    Numbness and tingling of left side of face 2/2/2018    S/P CABG x 6 2000    LIMA-LAD, LYNNETTE-LCx,SVG-OM,SVG-LPL,SVG-PDA-RPL    Shingles      Past Surgical History:        Procedure Laterality Date    CARDIAC SURGERY      6 vessel in 2000    DENTAL SURGERY      root canal    DIAGNOSTIC CARDIAC CATH LAB PROCEDURE  9/5/00    LUNG REMOVAL, PARTIAL Left     march    OTHER SURGICAL HISTORY  02/16/2011    Injection right hip  T. Odalys Foster MD    PROSTATE SURGERY Bilateral 05/27/2019    THORACOSCOPY Left 4/12/2019    BRONCHOSCOPY LEFT THORACOSCOPY ROBOTIC VIDEO ASSISTED , WEDGE RESECTION, POSS.  LEFT LOWER LOBECTOMY performed by Luis East MD at [address] [place] [address] Right 03/28/2011    Right BRIONNA  Taras Goldmann, MD    TOTAL HIP ARTHROPLASTY Left 11/09/2009    Left BRIONNA  Taras Goldmann, MD    TURP      TURP N/A 5/30/2019    CYSTOSCOPY, RETROGRADE PYELOGRAM,  URERTHRAL DILITATION, TRANSURETHRAL RESECTION PROSTATE performed by Elaina Blandon MD at [address]     Current Medications:   Current Outpatient Medications   Medication Sig Dispense Refill    Cholecalciferol (VITAMIN D3) 50 MCG ( UT) CAPS Take by mouth      aspirin 81 MG EC tablet Take 1 tablet by mouth daily 30 tablet 3    metoprolol succinate (TOPROL XL) 25 MG extended release tablet Take 25 mg by mouth daily      Calcium-Magnesium-Vitamin D (CALCIUM 1200+D3 PO) Take 1 tablet by mouth daily.  atorvastatin (LIPITOR) 40 MG tablet Take 40 mg by mouth daily.  Multiple Vitamins-Minerals (CENTRUM-LUTEIN PO) Take 1 tablet by mouth daily.  Omega-3 Fatty Acids (FISH OIL) 1000 MG CAPS Take 1,000 mg by mouth daily. No current facility-administered medications for this visit. Allergies:  Nitroglycerin, Sulfamethoxazole, Trimethoprim, Biaxin [clarithromycin], Iodine, and Quinolones  Social History     Socioeconomic History    Marital status:       Spouse name: Not on file    Number of children: Not on file    Years of education: Not on file    Highest education level: Not on file   Occupational History    Occupation: retired-    Tobacco Use    Smoking status: Former Smoker     Packs/day: 1.00     Years: 10.00     Pack years: 10.00     Types: Cigarettes     Start date: 1978     Quit date: 1988     Years since quittin.3    Smokeless tobacco: Never Used   Vaping Use    Vaping Use: Never used   Substance and Sexual Activity    Alcohol use: Yes     Comment: socially    Drug use: No    Sexual activity: Not Currently   Other Topics Concern    Not on file   Social History Narrative    Not on file     Social Determinants of Health     Financial Resource Strain:     Difficulty of Paying Living Expenses: Not on file   Food Insecurity:     Worried About [address] in the Last Year: Not on file    [address] in the Last Year: Not on file   Transportation Needs:     Lack of Transportation (Medical): Not on file    Lack of Transportation (Non-Medical):  Not on file   Physical Activity:     Days of Exercise per Week: Not on file    Minutes of Exercise per Session: Not on file   Stress:     Feeling of Stress : Not on file   Social Connections:     Frequency of Communication with Friends and Family: Not on file    Frequency of Social Gatherings with Friends and Family: Not on file    Attends Faith Services: Not on file    Active Member of [address] [place] [address] or Organizations: Not on file    Attends Club or Organization Meetings: Not on file    Marital Status: Not on file   Intimate Partner Violence:     Fear of Current or Ex-Partner: Not on file    Emotionally Abused: Not on file    Physically Abused: Not on file    Sexually Abused: Not on file   Housing Stability:     Unable to Pay for Housing in the Last Year: Not on file    Number of [place] in the Last Year: Not on file    Unstable Housing in the Last Year: Not on file     Family History   Problem Relation Age of Onset    Other Mother         accident age 28 years , Roberto Crowe. was 5 months old    Other Father         accident age 43 decesed 8 years when Father passed    Heart Disease Brother     Alzheimer's Disease Sister     Heart Disease Brother     Heart Disease Brother      Labs  Lab Results   Component Value Date    WBC 6.4 10/04/2021    HGB 12.1 (L) 10/04/2021    HCT 37.0 10/04/2021     10/04/2021    PROTIME 11.8 2019    INR 1.0 2019    APTT 26.0 2019    K 4.3 10/04/2021    BUN 19 10/04/2021    CREATININE 1.3 (H) 10/04/2021     PHYSICAL EXAM:    CONSTITUTIONAL:  awake, alert, cooperative  CN II - XII no deficits noted except for hearing deficits bilaterally  Glasses used   EYES:  lids and lashes normal  ENT: external ears and nose without lesions  NECK:  supple, symmetrical, trachea midline  Carotid Bruits are absent  LUNGS:  No increased work of breathing                 Clear to auscultation  CARDIOVASCULAR:  regular rate and rhythm   ABDOMEN:  soft, non-distended, non-tender   Aorta is not palpable  SKIN:  normal skin color, texture, turgor  EXTREMITIES:   R UE 5/5 Strength  L UE 5/5 Strength  R LE Edema absent  L LE Edema absent    RADIOLOGY:  US Carotid  R ICA 50-59% stenosis  L ICA 60-69% stenosis      A/p Asymptomatic Bilateral Carotid Stenosis  · US reviewed  · R ICA 50-59% stenosis which is stable as compared to previous  · L ICA 60-69% stenosis which is stable as compared to previous  · Continue medical management with aspirin and statin  · Emphasized importance of continued Tobacco cessation  · No indication for surgical intervention at this time  · Discussed with patient pathophysiology of carotid stenosis and all ?s answered  · Discussed with patient symptoms of stroke, TIA and they understood to go to ER immediately if any symptoms developed  · Fu as needed  · Patient has decided he doesn't want to fu any further and no further us which we had previously discussed  · He understands to call with any issues     Erick Marrero MD

## 2021-12-20 NOTE — PATIENT INSTRUCTIONS
Patient Education        Carotid Stenosis: Care Instructions  Your Care Instructions     Carotid stenosis is narrowing of one or both of the carotid arteries. These arteries take blood from the heart to the brain. There is one on each side of the neck. A substance called plaque builds up inside an artery. This makes it too narrow. Plaque comes from damage to the artery over time. This damage may be caused by high blood pressure, high cholesterol, diabetes, or smoking. Sometimes plaque can break loose from the carotid artery and move to the brain. This can cause a stroke or transient ischemic attack (TIA). The goal of treatment is to lower your risk of having a stroke or TIA. You can lower your risk by making healthy lifestyle changes and taking medicine. Sometimes a surgery or procedure is done. Follow-up care is a key part of your treatment and safety. Be sure to make and go to all appointments, and call your doctor if you are having problems. It's also a good idea to know your test results and keep a list of the medicines you take. How can you care for yourself at home? · Take your medicines exactly as prescribed. Call your doctor if you think you are having a problem with your medicine. You may take medicine to lower your blood pressure, to lower your cholesterol, or to prevent blood clots. · If you take a blood thinner, such as aspirin, be sure to get instructions about how to take your medicine safely. Blood thinners can cause serious bleeding problems. · Do not smoke. People who smoke have a higher chance of stroke than those who quit. If you need help quitting, talk to your doctor about stop-smoking programs and medicines. These can increase your chances of quitting for good. · Eat a healthy diet that is low in saturated fat and salt. Eat lots of fresh fruits and vegetables and foods high in fiber. · Stay at a healthy weight. Lose weight if you need to.   · Talk to your doctor about starting an exercise program. Regular exercise lowers your chance of stroke. · Limit alcohol to 2 drinks a day for men and 1 drink a day for women. Too much alcohol can cause health problems. · Work with your doctor to control high blood pressure, high cholesterol, diabetes, and other conditions that increase your chance of a stroke. A healthy diet, exercise, weight loss (if needed), and medicines can help. · Avoid colds and flu. Get the flu vaccine every year. When should you call for help? Call 911 anytime you think you may need emergency care. For example, call if:    · You passed out (lost consciousness).     · You have symptoms of a stroke. These may include:  ? Sudden numbness, tingling, weakness, or loss of movement in your face, arm, or leg, especially on only one side of your body. ? Sudden vision changes. ? Sudden trouble speaking. ? Sudden confusion or trouble understanding simple statements. ? Sudden problems with walking or balance. ? A sudden, severe headache that is different from past headaches. Call your doctor now or seek immediate medical care if:    · You are dizzy or lightheaded, or you feel like you may faint. Watch closely for changes in your health, and be sure to contact your doctor if you have any problems. Where can you learn more? Go to https://K[employer]. org and sign in to your [employer] account. Enter L314 in the [employer] box to learn more about \"Carotid Stenosis: Care Instructions. \"     If you do not have an account, please click on the \"Sign Up Now\" link. Current as of: April 29, 2021               Content Version: 13.0  © 2006-2021 [employer]. Care instructions adapted under license by [facility] ([facility]). If you have questions about a medical condition or this instruction, always ask your healthcare professional. [place] [address] any warranty or liability for your use of this information.

## 2022-02-16 ENCOUNTER — TELEPHONE (OUTPATIENT)
Dept: HEMATOLOGY | Age: 87
End: 2022-02-16

## 2022-02-16 NOTE — TELEPHONE ENCOUNTER
The patient called and left a voicemail stating he is suppose to have some scans done soon just didn't remember when.  I called the patient back and left a voicemail to let him know that he is due for his scans in march and that our office is working on the [facility] Begin and will call once we schedule  Electronically signed by Karen Mcguire MA on 2/16/2022 at 3:46 PM

## 2022-02-18 ENCOUNTER — TELEPHONE (OUTPATIENT)
Dept: HEMATOLOGY | Age: 87
End: 2022-02-18

## 2022-02-18 NOTE — TELEPHONE ENCOUNTER
I called patient back after he left a message that he still is waiting for his appt with Dr. Lizette Matias. I left a VM that he is due in March for his scan and our office is working on getting them authorized and scheduled and as soon as we get this done we will call him with his scan appt and make him a follow up.   This is second message our office has left with patient for this same request.    Electronically signed by Soheila Vaughn RN on 2/18/2022 at 8:14 AM

## 2022-02-22 ENCOUNTER — TELEPHONE (OUTPATIENT)
Dept: HEMATOLOGY | Age: 87
End: 2022-02-22

## 2022-02-22 DIAGNOSIS — K76.89 LIVER CYST: Primary | ICD-10-CM

## 2022-02-22 DIAGNOSIS — K86.2 PANCREATIC CYST: ICD-10-CM

## 2022-02-22 NOTE — TELEPHONE ENCOUNTER
I called the patient to see if he wanted his mri done at [facility]. I left a voicemail along with the office number for the patient to call me back to let us know to send the info to [facility] or to schedule him at a [facility] facility  Electronically signed by Karen Mcguire MA on 2/22/2022 at 10:30 AM     The patient called the office back and stated he wanted to be scheduled at [facility] in [place]. So I faxed the mri order, lab order, demographics and insurance info to [id number]. I made the patient aware that they will reach out to him and schedule.  I also wrote on the fax cover sheet for someone to reach out to our office so that we know that patient was scheduled and we can make sure to get him a follow up appt  Electronically signed by Karen Mcguire MA on 2/22/2022 at 1:25 PM

## 2022-02-28 ENCOUNTER — TELEPHONE (OUTPATIENT)
Dept: HEMATOLOGY | Age: 87
End: 2022-02-28

## 2022-02-28 NOTE — TELEPHONE ENCOUNTER
The patient is scheduled for his MRI at [facility] on 03/10/2022. I called the patient to make a follow up appt and he wanted a phone call. So I put the patient on for 03/17/22 and made the patient aware that dr Vikki Sr will call him between 3:45pm and 4:45pm and confimed the phone number.  The patient verbalized understanding  Electronically signed by Severiano Kiss, MA on 2/28/2022 at 1:31 PM

## 2022-03-14 DIAGNOSIS — K86.2 PANCREATIC CYST: ICD-10-CM

## 2022-03-17 ENCOUNTER — SCHEDULED TELEPHONE ENCOUNTER (OUTPATIENT)
Dept: HEMATOLOGY | Age: 87
End: 2022-03-17

## 2022-03-17 DIAGNOSIS — D49.0 IPMN (INTRADUCTAL PAPILLARY MUCINOUS NEOPLASM): Primary | ICD-10-CM

## 2022-03-17 PROCEDURE — 99024 POSTOP FOLLOW-UP VISIT: CPT | Performed by: TRANSPLANT SURGERY

## 2022-03-19 NOTE — PROGRESS NOTES
[facility] Surgery    Discussed MRI results with Mr. Lilia Caceres  I reviewed his images. He was found to have a 2cm uncinate process cyst with thin internal septates but no enhancement or communication with his pancreatic duct. There is no pancreatic duct dilation  We discussed repeat his MRI in 6 months.   He is in agreement    Please send a copy of my note to Dr. Jose Francisco Angeles    Electronically signed by Merline Fudge, MD on 3/19/2022 at 9:31 AM

## 2022-08-31 ENCOUNTER — TELEPHONE (OUTPATIENT)
Dept: HEMATOLOGY | Age: 87
End: 2022-08-31

## 2022-08-31 DIAGNOSIS — D49.0 IPMN (INTRADUCTAL PAPILLARY MUCINOUS NEOPLASM): Primary | ICD-10-CM

## 2022-08-31 NOTE — TELEPHONE ENCOUNTER
Faxed order for MRI and BMP to [facility]. Called and LVM with patient requesting a return call to office. Pt will need to schedule f/u appt once MRI has been scheduled.

## 2022-09-22 ENCOUNTER — OFFICE VISIT (OUTPATIENT)
Dept: HEMATOLOGY | Age: 87
End: 2022-09-22
Payer: MEDICARE

## 2022-09-22 VITALS
BODY MASS INDEX: 28.93 KG/M2 | TEMPERATURE: 97.4 F | HEART RATE: 57 BPM | WEIGHT: 180 LBS | HEIGHT: 66 IN | DIASTOLIC BLOOD PRESSURE: 66 MMHG | RESPIRATION RATE: 16 BRPM | SYSTOLIC BLOOD PRESSURE: 150 MMHG | OXYGEN SATURATION: 98 %

## 2022-09-22 DIAGNOSIS — D49.0 IPMN (INTRADUCTAL PAPILLARY MUCINOUS NEOPLASM): Primary | ICD-10-CM

## 2022-09-22 PROCEDURE — 99213 OFFICE O/P EST LOW 20 MIN: CPT | Performed by: TRANSPLANT SURGERY

## 2022-09-22 PROCEDURE — G8427 DOCREV CUR MEDS BY ELIG CLIN: HCPCS | Performed by: TRANSPLANT SURGERY

## 2022-09-22 PROCEDURE — 1036F TOBACCO NON-USER: CPT | Performed by: TRANSPLANT SURGERY

## 2022-09-22 PROCEDURE — 1123F ACP DISCUSS/DSCN MKR DOCD: CPT | Performed by: TRANSPLANT SURGERY

## 2022-09-22 PROCEDURE — G8417 CALC BMI ABV UP PARAM F/U: HCPCS | Performed by: TRANSPLANT SURGERY

## 2022-12-08 ENCOUNTER — TELEPHONE (OUTPATIENT)
Dept: CARDIOLOGY CLINIC | Age: 87
End: 2022-12-08

## 2022-12-08 NOTE — TELEPHONE ENCOUNTER
Patient states that some days he feels light headed and when he was seen at the eye doctor yesterday his bp was 83/39, patient is taking toprol 25mg daily, please advise

## 2023-01-19 ENCOUNTER — OUTSIDE SERVICES (OUTPATIENT)
Dept: PRIMARY CARE CLINIC | Age: 88
End: 2023-01-19

## 2023-01-19 DIAGNOSIS — E78.2 MIXED HYPERLIPIDEMIA: ICD-10-CM

## 2023-01-19 DIAGNOSIS — J44.9 CHRONIC OBSTRUCTIVE PULMONARY DISEASE, UNSPECIFIED COPD TYPE (HCC): ICD-10-CM

## 2023-01-19 DIAGNOSIS — I21.4 NSTEMI (NON-ST ELEVATED MYOCARDIAL INFARCTION) (HCC): ICD-10-CM

## 2023-01-19 DIAGNOSIS — I10 ESSENTIAL HYPERTENSION: Primary | ICD-10-CM

## 2023-01-19 DIAGNOSIS — C34.32 MALIGNANT NEOPLASM OF LOWER LOBE OF LEFT LUNG (HCC): ICD-10-CM

## 2023-01-19 DIAGNOSIS — Z95.1 S/P CABG (CORONARY ARTERY BYPASS GRAFT): ICD-10-CM

## 2023-03-04 ENCOUNTER — APPOINTMENT (OUTPATIENT)
Dept: GENERAL RADIOLOGY | Age: 88
End: 2023-03-04
Payer: MEDICARE

## 2023-03-04 ENCOUNTER — APPOINTMENT (OUTPATIENT)
Dept: CT IMAGING | Age: 88
End: 2023-03-04
Payer: MEDICARE

## 2023-03-04 ENCOUNTER — HOSPITAL ENCOUNTER (EMERGENCY)
Age: 88
Discharge: HOME | End: 2023-03-04
Attending: EMERGENCY MEDICINE
Payer: MEDICARE

## 2023-03-04 VITALS
TEMPERATURE: 97.6 F | HEIGHT: 67 IN | HEART RATE: 58 BPM | SYSTOLIC BLOOD PRESSURE: 169 MMHG | WEIGHT: 168 LBS | RESPIRATION RATE: 16 BRPM | DIASTOLIC BLOOD PRESSURE: 70 MMHG | OXYGEN SATURATION: 95 % | BODY MASS INDEX: 26.37 KG/M2

## 2023-03-04 DIAGNOSIS — M25.551 RIGHT HIP PAIN: Primary | ICD-10-CM

## 2023-03-04 PROCEDURE — 73502 X-RAY EXAM HIP UNI 2-3 VIEWS: CPT

## 2023-03-04 PROCEDURE — 73700 CT LOWER EXTREMITY W/O DYE: CPT

## 2023-03-04 PROCEDURE — 73552 X-RAY EXAM OF FEMUR 2/>: CPT

## 2023-03-04 PROCEDURE — 99284 EMERGENCY DEPT VISIT MOD MDM: CPT

## 2023-03-04 PROCEDURE — 6370000000 HC RX 637 (ALT 250 FOR IP)

## 2023-03-04 RX ORDER — METOPROLOL SUCCINATE 25 MG/1
25 TABLET, EXTENDED RELEASE ORAL ONCE
Status: COMPLETED | OUTPATIENT
Start: 2023-03-04 | End: 2023-03-04

## 2023-03-04 RX ORDER — METOPROLOL SUCCINATE 25 MG/1
TABLET, EXTENDED RELEASE ORAL
Status: COMPLETED
Start: 2023-03-04 | End: 2023-03-04

## 2023-03-04 RX ORDER — ROSUVASTATIN CALCIUM 40 MG/1
40 TABLET, COATED ORAL EVERY EVENING
Status: ON HOLD | COMMUNITY

## 2023-03-04 RX ADMIN — METOPROLOL SUCCINATE 25 MG: 25 TABLET, EXTENDED RELEASE ORAL at 13:44

## 2023-03-04 ASSESSMENT — PAIN SCALES - GENERAL: PAINLEVEL_OUTOF10: 0

## 2023-03-04 ASSESSMENT — LIFESTYLE VARIABLES
HOW MANY STANDARD DRINKS CONTAINING ALCOHOL DO YOU HAVE ON A TYPICAL DAY: PATIENT DOES NOT DRINK
HOW OFTEN DO YOU HAVE A DRINK CONTAINING ALCOHOL: NEVER

## 2023-03-04 NOTE — ED NOTES
Nursing home called for an update. Nurse updated on current XR results and pending CT.      Lorene Riley RN  03/04/23 [id number]

## 2023-03-04 NOTE — ED NOTES
Attempted to walk patient with walker and patient able to stand and bear weight but cannot walk, states \"I get pain when try to walk\". No pain at rest. Dr Mahalia Soulier advised at this time.      Shakira Ortiz RN  03/04/23 [id number]

## 2023-03-04 NOTE — ED PROVIDER NOTES
[facility] ENCOUNTER        Pt Name: Jose Gaviria  MRN: [id number]  [place] 7/18/1931  Date of evaluation: 3/4/2023  Provider: Raj Mcmahan DO  PCP: No primary care provider on file. Note Started: 10:18 AM EST 3/4/23    CHIEF COMPLAINT       Chief Complaint   Patient presents with    Hip Pain     R hip pain onset this am no known injury       HISTORY OF PRESENT ILLNESS: 1 or more Elements   History From: patient    Limitations to history : None    Jose Gaviria is a 80 y.o. male who presents to the emergency department for right hip pain that started yesterday. He reports that his persistent, moderate in severity, and worse when he tries to bear weight on it. Patient is a history of bilateral hip arthroplasties, was done in 2006 and 2008. He reports that he was at the assisted living facility and went to get up out of bed and twisted, experienced right hip pain and was unable to bear weight since then. He reports his pain is 2 out of 10 at this time and does not want anything for pain. He usually walks with a walker, otherwise able to ambulate without difficulty. Patient denies fever, chills, headache, shortness of breath, chest pain, abdominal pain, nausea, vomiting, diarrhea, lightheadedness, dysuria, hematuria, hematochezia, and melena. Denies fall or trauma. Nursing Notes were all reviewed and agreed with or any disagreements were addressed in the HPI. REVIEW OF SYSTEMS :           Positives and Pertinent negatives as per HPI. SURGICAL HISTORY     Past Surgical History:   Procedure Laterality Date    CARDIAC SURGERY      6 vessel in 2000    DENTAL SURGERY      root canal    DIAGNOSTIC CARDIAC CATH LAB PROCEDURE  9/5/00    LUNG REMOVAL, PARTIAL Left     march    OTHER SURGICAL HISTORY  02/16/2011    Injection right hip  ADRIANNE Vaz MD    PROSTATE SURGERY Bilateral 05/27/2019    THORACOSCOPY Left 2019    BRONCHOSCOPY LEFT THORACOSCOPY ROBOTIC VIDEO ASSISTED , WEDGE RESECTION, POSS. LEFT LOWER LOBECTOMY performed by Brook De Paz MD at [address] Right 2011    Right BRIONNA  Alis Claire MD    TOTAL HIP ARTHROPLASTY Left 2009    Left Florence Kaufman MD    TURP      TURP N/A 2019    CYSTOSCOPY, RETROGRADE PYELOGRAM,  URERTHRAL DILITATION, TRANSURETHRAL RESECTION PROSTATE performed by Dawood Weiss MD at [address]       Discharge Medication List as of 3/4/2023  2:39 PM        CONTINUE these medications which have NOT CHANGED    Details   rosuvastatin (CRESTOR) 40 MG tablet Take 40 mg by mouth every eveningHistorical Med      Cholecalciferol (VITAMIN D3) 50 MCG (2000) CAPS Take by mouthHistorical Med      aspirin 81 MG EC tablet Take 1 tablet by mouth daily, Disp-30 tablet, R-3Normal      metoprolol succinate (TOPROL XL) 25 MG extended release tablet Take 25 mg by mouth daily Take 1/2 tablet dailyHistorical Med      Calcium-Magnesium-Vitamin D (CALCIUM 1200+D3 PO) Take 1 tablet by mouth daily. Multiple Vitamins-Minerals (CENTRUM-LUTEIN PO) Take 1 tablet by mouth daily. Omega-3 Fatty Acids (FISH OIL) 1000 MG CAPS Take 1,000 mg by mouth daily.              ALLERGIES     Nitroglycerin, Sulfamethoxazole, Trimethoprim, Biaxin [clarithromycin], Iodine, and Quinolones    FAMILYHISTORY       Family History   Problem Relation Age of Onset    Other Mother         accident age 28 years , Laura Harrison. was 5 months old    Other Father         accident age 43 decesed 8 years when Father passed    Heart Disease Brother     Alzheimer's Disease Sister     Heart Disease Brother     Heart Disease Brother         SOCIAL HISTORY       Social History     Tobacco Use    Smoking status: Former     Packs/day: 1.00     Years: 10.00     Pack years: 10.00     Types: Cigarettes     Start date: 1978     Quit date: 1988     Years since quittin.5 Smokeless tobacco: Never   Vaping Use    Vaping Use: Never used   Substance Use Topics    Alcohol use: Not Currently     Comment: socially    Drug use: No       SCREENINGS        [place] Coma Scale  Eye Opening: Spontaneous  Best Verbal Response: Oriented  Best Motor Response: Obeys commands  [place] Coma Scale Score: 15                CIWA Assessment  BP: (!) 169/70  Heart Rate: 58           PHYSICAL EXAM  1 or more Elements     ED Triage Vitals [03/04/23 0925]   BP Temp Temp Source Heart Rate Resp SpO2 Height Weight   (!) 142/58 97.6 °F (36.4 °C) Oral 60 14 98 % 5' 7\" (1.702 m) 168 lb (76.2 kg)            Constitutional/General: Alert and oriented x3  Head: Normocephalic and atraumatic  Eyes: PERRL, EOMI, conjunctiva normal, sclera non icteric  ENT:  Oropharynx clear, handling secretions, no trismus, no asymmetry of the posterior oropharynx or uvular edema  Neck: Supple, full ROM, no stridor, no meningeal signs  Respiratory: Lungs clear to auscultation bilaterally, no wheezes, rales, or rhonchi. Not in respiratory distress  Cardiovascular:  Regular rate. Regular rhythm. No murmurs, no gallops, no rubs. 2+ distal pulses. Equal extremity pulses. Chest: No chest wall tenderness  GI:  Abdomen Soft, Non tender, Non distended. +BS. No rebound, guarding, or rigidity. No pulsatile masses. Musculoskeletal: Moves all extremities x 4. Warm and well perfused, no clubbing, no cyanosis, no edema. Capillary refill <3 seconds. Right lower extremity appears shorter versus left. Good distal pulses and sensations intact. Integument: skin warm and dry. No rashes.    Neurologic: GCS 15, no focal deficits,   Psychiatric: Normal Affect            DIAGNOSTIC RESULTS         RADIOLOGY:   Non-plain film images such as CT, Ultrasound and MRI are read by the radiologist. Plain radiographic images are visualized and preliminarily interpreted by the ED Provider with the below findings:    No obvious fracture or dislocation. Interpretation per the Radiologist below, if available at the time of this note:    CT FEMUR RIGHT WO CONTRAST   Final Result   1. No evidence of acute osseous abnormality. 2. Generalized osteopenia. XR FEMUR RIGHT (MIN 2 VIEWS)   Final Result   No acute osseous abnormality. XR HIP 2-3 VW W PELVIS RIGHT   Final Result   Right hip arthroplasty, no evidence of complication. No results found. No results found. PROCEDURES   Unless otherwise noted below, none          CRITICAL CARE TIME (.cct)   N/a    PAST MEDICAL HISTORY/Chronic Conditions Affecting Care      has a past medical history of BPH (benign prostatic hyperplasia), CAD (coronary artery disease), Carotid artery calcification, DJD (degenerative joint disease), History of falling, Hyperlipemia, Hypertension, Lung nodule, Macular degeneration, Numbness and tingling in left arm (02/02/2018), Numbness and tingling of left side of face (02/02/2018), S/P CABG x 6 (2000), and Shingles. EMERGENCY DEPARTMENT COURSE    Vitals:    Vitals:    03/04/23 1110 03/04/23 1208 03/04/23 1334 03/04/23 1428   BP: (!) 161/61 (!) 183/69 (!) 190/82 (!) 169/70   Pulse:       Resp:       Temp:       TempSrc:       SpO2: 100% 95%     Weight:       Height:           Patient was given the following medications:  Medications   metoprolol succinate (TOPROL XL) extended release tablet 25 mg (25 mg Oral Given 3/4/23 1344)             Medical Decision Making/Differential Diagnosis:    CC/HPI Summary, Social Determinants of health, Records Reviewed, DDx, testing done/not done, ED Course, Reassessment, disposition considerations/shared decision making with patient, consults, disposition:      ED Course as of 03/05/23 1641   Sat Mar 04, 2023   1447 Reevaluation-patient is able to ambulate in his room with a walker without difficulty. Son is at bedside and is agreeable with plan for discharge to follow-up with orthopedic surgery.  [DT]      ED Course User Index  [DT] Kelsey Mai DO        Patient presented the emergency department for right hip pain, has a history of bilateral hip arthroplasties. He reports that he was trying to get out of bed when he went to twist and experienced right hip pain, unable to bear weight on the hip since then. He does not anything for pain at this time. X-rays of the hip and pelvis are evaluated for possible fracture or dislocation, possible also musculoskeletal pain on differential diagnosis. X-ray of hip showed no evidence of fracture or dislocation. On reevaluation, patient is able to stand but has some pain with walking so ordered a CT of the right hip to evaluate for fracture, showed no evidence of fracture and was unremarkable. On second reevaluation, patient was able to walk around his room with a walker without difficulty and reports his pain is much improved in the emergency department. He has seen Dr. Jessica Luna in the past for his other hip, with shared decision making patient will be discharged to follow-up with orthopedic surgery outpatient. CONSULTS: (Who and What was discussed)  None        I am the Primary Clinician of Record. FINAL IMPRESSION      1.  Right hip pain          DISPOSITION/PLAN     DISPOSITION Decision To Discharge 03/04/2023 02:38:25 PM      PATIENT REFERRED TO:  Kaya White MD  [address]  [phone]    Call in 1 day  for follow up    DISCHARGE MEDICATIONS:  Discharge Medication List as of 3/4/2023  2:39 PM          DISCONTINUED MEDICATIONS:  Discharge Medication List as of 3/4/2023  2:39 PM        STOP taking these medications       atorvastatin (LIPITOR) 40 MG tablet Comments:   Reason for Stopping:                      (Please note that portions of this note were completed with a voice recognition program.  Efforts were made to edit the dictations but occasionally words are mis-transcribed.)    Kelsey Mai DO (electronically signed)           Ramin Johnson DO  Resident  03/05/23 1138

## 2023-03-04 NOTE — ED NOTES
Patient assisted to use urinal, was able to stand by bed and take several steps without pain.      Lovely Myrick RN  03/04/23 3175

## 2023-03-06 ENCOUNTER — TELEPHONE (OUTPATIENT)
Dept: ORTHOPEDIC SURGERY | Age: 88
End: 2023-03-06

## 2023-03-07 ENCOUNTER — TELEPHONE (OUTPATIENT)
Dept: ORTHOPEDIC SURGERY | Age: 88
End: 2023-03-07

## 2023-03-07 NOTE — TELEPHONE ENCOUNTER
Call from pt's Son-in-law, ed f/u appt. ED 3/4/23 TTS on call Bdm;  Chief Complaint   Patient presents with    Hip Pain       R hip pain onset this am no known injury   RADIOLOGY:   Non-plain film images such as CT, Ultrasound and MRI are read by the radiologist. Sherwood Madelin radiographic images are visualized and preliminarily interpreted by the ED Provider with the below findings:     No obvious fracture or dislocation. Interpretation per the Radiologist below, if available at the time of this note:     CT FEMUR RIGHT WO CONTRAST   Final Result   1. No evidence of acute osseous abnormality. 2. Generalized osteopenia. XR FEMUR RIGHT (MIN 2 VIEWS)   Final Result   No acute osseous abnormality. XR HIP 2-3 VW W PELVIS RIGHT   Final Result   Right hip arthroplasty, no evidence of complication. No results found. Routing to provider for guidance.

## 2023-03-07 NOTE — TELEPHONE ENCOUNTER
Call to pt's son-in-law Juanito Gee lvm per review of ED PA recommends to f/u w/ original surgeon who did his right total hip arthroplasty.

## 2023-03-10 ENCOUNTER — HOSPITAL ENCOUNTER (INPATIENT)
Age: 88
LOS: 2 days | Discharge: HOME | DRG: 281 | End: 2023-03-13
Attending: EMERGENCY MEDICINE | Admitting: INTERNAL MEDICINE
Payer: MEDICARE

## 2023-03-10 ENCOUNTER — APPOINTMENT (OUTPATIENT)
Dept: GENERAL RADIOLOGY | Age: 88
DRG: 281 | End: 2023-03-10
Payer: MEDICARE

## 2023-03-10 ENCOUNTER — APPOINTMENT (OUTPATIENT)
Dept: CT IMAGING | Age: 88
DRG: 281 | End: 2023-03-10
Payer: MEDICARE

## 2023-03-10 DIAGNOSIS — I21.4 NSTEMI (NON-ST ELEVATED MYOCARDIAL INFARCTION) (HCC): Primary | ICD-10-CM

## 2023-03-10 LAB
ACANTHOCYTES: ABNORMAL
ALBUMIN SERPL-MCNC: 3.7 G/DL (ref 3.5–5.2)
ALP BLD-CCNC: 99 U/L (ref 40–129)
ALT SERPL-CCNC: 18 U/L (ref 0–40)
ANION GAP SERPL CALCULATED.3IONS-SCNC: 11 MMOL/L (ref 7–16)
AST SERPL-CCNC: 24 U/L (ref 0–39)
BASOPHILS ABSOLUTE: 0.1 E9/L (ref 0–0.2)
BASOPHILS RELATIVE PERCENT: 0.9 % (ref 0–2)
BILIRUB SERPL-MCNC: 1.2 MG/DL (ref 0–1.2)
BUN BLDV-MCNC: 28 MG/DL (ref 6–23)
BURR CELLS: ABNORMAL
CALCIUM SERPL-MCNC: 9.1 MG/DL (ref 8.6–10.2)
CHLORIDE BLD-SCNC: 101 MMOL/L (ref 98–107)
CO2: 22 MMOL/L (ref 22–29)
CREAT SERPL-MCNC: 1 MG/DL (ref 0.7–1.2)
EOSINOPHILS ABSOLUTE: 0.19 E9/L (ref 0.05–0.5)
EOSINOPHILS RELATIVE PERCENT: 1.7 % (ref 0–6)
GFR SERPL CREATININE-BSD FRML MDRD: >60 ML/MIN/1.73
GLUCOSE BLD-MCNC: 115 MG/DL (ref 74–99)
HCT VFR BLD CALC: 35.5 % (ref 37–54)
HEMOGLOBIN: 11.9 G/DL (ref 12.5–16.5)
LACTIC ACID: 1.2 MMOL/L (ref 0.5–2.2)
LIPASE: 40 U/L (ref 13–60)
LYMPHOCYTES ABSOLUTE: 0.88 E9/L (ref 1.5–4)
LYMPHOCYTES RELATIVE PERCENT: 7.7 % (ref 20–42)
MCH RBC QN AUTO: 28.7 PG (ref 26–35)
MCHC RBC AUTO-ENTMCNC: 33.5 % (ref 32–34.5)
MCV RBC AUTO: 85.5 FL (ref 80–99.9)
MONOCYTES ABSOLUTE: 0.55 E9/L (ref 0.1–0.95)
MONOCYTES RELATIVE PERCENT: 5.2 % (ref 2–12)
NEUTROPHILS ABSOLUTE: 9.35 E9/L (ref 1.8–7.3)
NEUTROPHILS RELATIVE PERCENT: 84.5 % (ref 43–80)
NUCLEATED RED BLOOD CELLS: 0 /100 WBC
OVALOCYTES: ABNORMAL
PDW BLD-RTO: 14.6 FL (ref 11.5–15)
PLATELET # BLD: 232 E9/L (ref 130–450)
PMV BLD AUTO: 8.5 FL (ref 7–12)
POIKILOCYTES: ABNORMAL
POTASSIUM REFLEX MAGNESIUM: 4.1 MMOL/L (ref 3.5–5)
RBC # BLD: 4.15 E12/L (ref 3.8–5.8)
SCHISTOCYTES: ABNORMAL
SODIUM BLD-SCNC: 134 MMOL/L (ref 132–146)
TOTAL PROTEIN: 6.9 G/DL (ref 6.4–8.3)
TROPONIN, HIGH SENSITIVITY: 25 NG/L (ref 0–11)
WBC # BLD: 11 E9/L (ref 4.5–11.5)

## 2023-03-10 PROCEDURE — 85025 COMPLETE CBC W/AUTO DIFF WBC: CPT

## 2023-03-10 PROCEDURE — 74177 CT ABD & PELVIS W/CONTRAST: CPT

## 2023-03-10 PROCEDURE — 84484 ASSAY OF TROPONIN QUANT: CPT

## 2023-03-10 PROCEDURE — 83605 ASSAY OF LACTIC ACID: CPT

## 2023-03-10 PROCEDURE — 2580000003 HC RX 258: Performed by: STUDENT IN AN ORGANIZED HEALTH CARE EDUCATION/TRAINING PROGRAM

## 2023-03-10 PROCEDURE — 80053 COMPREHEN METABOLIC PANEL: CPT

## 2023-03-10 PROCEDURE — A4216 STERILE WATER/SALINE, 10 ML: HCPCS | Performed by: STUDENT IN AN ORGANIZED HEALTH CARE EDUCATION/TRAINING PROGRAM

## 2023-03-10 PROCEDURE — 93005 ELECTROCARDIOGRAM TRACING: CPT | Performed by: STUDENT IN AN ORGANIZED HEALTH CARE EDUCATION/TRAINING PROGRAM

## 2023-03-10 PROCEDURE — 96374 THER/PROPH/DIAG INJ IV PUSH: CPT

## 2023-03-10 PROCEDURE — 6360000002 HC RX W HCPCS: Performed by: STUDENT IN AN ORGANIZED HEALTH CARE EDUCATION/TRAINING PROGRAM

## 2023-03-10 PROCEDURE — 83690 ASSAY OF LIPASE: CPT

## 2023-03-10 PROCEDURE — 6370000000 HC RX 637 (ALT 250 FOR IP): Performed by: STUDENT IN AN ORGANIZED HEALTH CARE EDUCATION/TRAINING PROGRAM

## 2023-03-10 PROCEDURE — 6360000004 HC RX CONTRAST MEDICATION: Performed by: RADIOLOGY

## 2023-03-10 PROCEDURE — 71045 X-RAY EXAM CHEST 1 VIEW: CPT

## 2023-03-10 PROCEDURE — 2500000003 HC RX 250 WO HCPCS: Performed by: STUDENT IN AN ORGANIZED HEALTH CARE EDUCATION/TRAINING PROGRAM

## 2023-03-10 PROCEDURE — 96375 TX/PRO/DX INJ NEW DRUG ADDON: CPT

## 2023-03-10 PROCEDURE — 99285 EMERGENCY DEPT VISIT HI MDM: CPT

## 2023-03-10 PROCEDURE — 36415 COLL VENOUS BLD VENIPUNCTURE: CPT

## 2023-03-10 RX ORDER — 0.9 % SODIUM CHLORIDE 0.9 %
1000 INTRAVENOUS SOLUTION INTRAVENOUS ONCE
Status: COMPLETED | OUTPATIENT
Start: 2023-03-10 | End: 2023-03-10

## 2023-03-10 RX ORDER — DIPHENHYDRAMINE HYDROCHLORIDE 50 MG/ML
25 INJECTION INTRAMUSCULAR; INTRAVENOUS ONCE
Status: COMPLETED | OUTPATIENT
Start: 2023-03-10 | End: 2023-03-10

## 2023-03-10 RX ORDER — ONDANSETRON 4 MG/1
4 TABLET, ORALLY DISINTEGRATING ORAL ONCE
Status: COMPLETED | OUTPATIENT
Start: 2023-03-10 | End: 2023-03-10

## 2023-03-10 RX ORDER — METHYLPREDNISOLONE SODIUM SUCCINATE 125 MG/2ML
125 INJECTION, POWDER, LYOPHILIZED, FOR SOLUTION INTRAMUSCULAR; INTRAVENOUS ONCE
Status: COMPLETED | OUTPATIENT
Start: 2023-03-10 | End: 2023-03-10

## 2023-03-10 RX ADMIN — SODIUM CHLORIDE 1000 ML: 9 INJECTION, SOLUTION INTRAVENOUS at 21:21

## 2023-03-10 RX ADMIN — DIPHENHYDRAMINE HYDROCHLORIDE 25 MG: 50 INJECTION, SOLUTION INTRAMUSCULAR; INTRAVENOUS at 21:20

## 2023-03-10 RX ADMIN — IOPAMIDOL 75 ML: 755 INJECTION, SOLUTION INTRAVENOUS at 23:22

## 2023-03-10 RX ADMIN — FAMOTIDINE 20 MG: 10 INJECTION, SOLUTION INTRAVENOUS at 21:20

## 2023-03-10 RX ADMIN — ONDANSETRON 4 MG: 4 TABLET, ORALLY DISINTEGRATING ORAL at 21:20

## 2023-03-10 RX ADMIN — METHYLPREDNISOLONE SODIUM SUCCINATE 125 MG: 125 INJECTION, POWDER, FOR SOLUTION INTRAMUSCULAR; INTRAVENOUS at 21:20

## 2023-03-10 ASSESSMENT — LIFESTYLE VARIABLES
HOW OFTEN DO YOU HAVE A DRINK CONTAINING ALCOHOL: NEVER
HOW MANY STANDARD DRINKS CONTAINING ALCOHOL DO YOU HAVE ON A TYPICAL DAY: PATIENT DOES NOT DRINK

## 2023-03-10 ASSESSMENT — PAIN - FUNCTIONAL ASSESSMENT
PAIN_FUNCTIONAL_ASSESSMENT: NONE - DENIES PAIN
PAIN_FUNCTIONAL_ASSESSMENT: NONE - DENIES PAIN

## 2023-03-11 PROBLEM — R79.89 ELEVATED TROPONIN: Status: ACTIVE | Noted: 2023-03-11

## 2023-03-11 PROBLEM — R77.8 ELEVATED TROPONIN: Status: ACTIVE | Noted: 2023-03-11

## 2023-03-11 PROBLEM — I21.4 NSTEMI (NON-ST ELEVATED MYOCARDIAL INFARCTION) (HCC): Status: ACTIVE | Noted: 2023-03-11

## 2023-03-11 LAB
APTT: 172.6 SEC (ref 24.5–35.1)
APTT: 25.7 SEC (ref 24.5–35.1)
HCT VFR BLD CALC: 34.8 % (ref 37–54)
HEMOGLOBIN: 11.4 G/DL (ref 12.5–16.5)
MCH RBC QN AUTO: 28.1 PG (ref 26–35)
MCHC RBC AUTO-ENTMCNC: 32.8 % (ref 32–34.5)
MCV RBC AUTO: 85.7 FL (ref 80–99.9)
PDW BLD-RTO: 14.7 FL (ref 11.5–15)
PLATELET # BLD: 212 E9/L (ref 130–450)
PMV BLD AUTO: 8.7 FL (ref 7–12)
RBC # BLD: 4.06 E12/L (ref 3.8–5.8)
REASON FOR REJECTION: NORMAL
REJECTED TEST: NORMAL
TROPONIN, HIGH SENSITIVITY: 34 NG/L (ref 0–11)
TROPONIN, HIGH SENSITIVITY: 50 NG/L (ref 0–11)
WBC # BLD: 8.8 E9/L (ref 4.5–11.5)

## 2023-03-11 PROCEDURE — 2580000003 HC RX 258: Performed by: NURSE PRACTITIONER

## 2023-03-11 PROCEDURE — 6370000000 HC RX 637 (ALT 250 FOR IP): Performed by: NURSE PRACTITIONER

## 2023-03-11 PROCEDURE — 99223 1ST HOSP IP/OBS HIGH 75: CPT | Performed by: INTERNAL MEDICINE

## 2023-03-11 PROCEDURE — 85027 COMPLETE CBC AUTOMATED: CPT

## 2023-03-11 PROCEDURE — G0378 HOSPITAL OBSERVATION PER HR: HCPCS

## 2023-03-11 PROCEDURE — 85730 THROMBOPLASTIN TIME PARTIAL: CPT

## 2023-03-11 PROCEDURE — 6360000002 HC RX W HCPCS: Performed by: STUDENT IN AN ORGANIZED HEALTH CARE EDUCATION/TRAINING PROGRAM

## 2023-03-11 PROCEDURE — 93005 ELECTROCARDIOGRAM TRACING: CPT | Performed by: EMERGENCY MEDICINE

## 2023-03-11 PROCEDURE — 6370000000 HC RX 637 (ALT 250 FOR IP): Performed by: STUDENT IN AN ORGANIZED HEALTH CARE EDUCATION/TRAINING PROGRAM

## 2023-03-11 PROCEDURE — 84484 ASSAY OF TROPONIN QUANT: CPT

## 2023-03-11 PROCEDURE — 2060000000 HC ICU INTERMEDIATE R&B

## 2023-03-11 PROCEDURE — 36415 COLL VENOUS BLD VENIPUNCTURE: CPT

## 2023-03-11 PROCEDURE — APPSS60 APP SPLIT SHARED TIME 46-60 MINUTES: Performed by: PHYSICIAN ASSISTANT

## 2023-03-11 RX ORDER — ASPIRIN 81 MG/1
324 TABLET, CHEWABLE ORAL ONCE
Status: COMPLETED | OUTPATIENT
Start: 2023-03-11 | End: 2023-03-11

## 2023-03-11 RX ORDER — ONDANSETRON 4 MG/1
4 TABLET, ORALLY DISINTEGRATING ORAL EVERY 8 HOURS PRN
Status: DISCONTINUED | OUTPATIENT
Start: 2023-03-11 | End: 2023-03-13 | Stop reason: HOSPADM

## 2023-03-11 RX ORDER — SODIUM CHLORIDE 9 MG/ML
INJECTION, SOLUTION INTRAVENOUS PRN
Status: DISCONTINUED | OUTPATIENT
Start: 2023-03-11 | End: 2023-03-13 | Stop reason: HOSPADM

## 2023-03-11 RX ORDER — SENNA PLUS 8.6 MG/1
1 TABLET ORAL DAILY PRN
Status: DISCONTINUED | OUTPATIENT
Start: 2023-03-11 | End: 2023-03-13 | Stop reason: HOSPADM

## 2023-03-11 RX ORDER — ROSUVASTATIN CALCIUM 20 MG/1
40 TABLET, COATED ORAL EVERY EVENING
Status: DISCONTINUED | OUTPATIENT
Start: 2023-03-11 | End: 2023-03-13 | Stop reason: HOSPADM

## 2023-03-11 RX ORDER — ONDANSETRON 2 MG/ML
4 INJECTION INTRAMUSCULAR; INTRAVENOUS EVERY 6 HOURS PRN
Status: DISCONTINUED | OUTPATIENT
Start: 2023-03-11 | End: 2023-03-13 | Stop reason: HOSPADM

## 2023-03-11 RX ORDER — ENOXAPARIN SODIUM 100 MG/ML
40 INJECTION SUBCUTANEOUS DAILY
Status: DISCONTINUED | OUTPATIENT
Start: 2023-03-11 | End: 2023-03-11 | Stop reason: ALTCHOICE

## 2023-03-11 RX ORDER — HEPARIN SODIUM 1000 [USP'U]/ML
60 INJECTION, SOLUTION INTRAVENOUS; SUBCUTANEOUS PRN
Status: DISCONTINUED | OUTPATIENT
Start: 2023-03-11 | End: 2023-03-13 | Stop reason: HOSPADM

## 2023-03-11 RX ORDER — HEPARIN SODIUM 1000 [USP'U]/ML
30 INJECTION, SOLUTION INTRAVENOUS; SUBCUTANEOUS PRN
Status: DISCONTINUED | OUTPATIENT
Start: 2023-03-11 | End: 2023-03-13 | Stop reason: HOSPADM

## 2023-03-11 RX ORDER — SODIUM CHLORIDE 0.9 % (FLUSH) 0.9 %
10 SYRINGE (ML) INJECTION PRN
Status: DISCONTINUED | OUTPATIENT
Start: 2023-03-11 | End: 2023-03-13 | Stop reason: HOSPADM

## 2023-03-11 RX ORDER — ACETAMINOPHEN 650 MG/1
650 SUPPOSITORY RECTAL EVERY 6 HOURS PRN
Status: DISCONTINUED | OUTPATIENT
Start: 2023-03-11 | End: 2023-03-13 | Stop reason: HOSPADM

## 2023-03-11 RX ORDER — HEPARIN SODIUM 1000 [USP'U]/ML
60 INJECTION, SOLUTION INTRAVENOUS; SUBCUTANEOUS ONCE
Status: COMPLETED | OUTPATIENT
Start: 2023-03-11 | End: 2023-03-11

## 2023-03-11 RX ORDER — ACETAMINOPHEN 325 MG/1
650 TABLET ORAL EVERY 6 HOURS PRN
Status: DISCONTINUED | OUTPATIENT
Start: 2023-03-11 | End: 2023-03-13 | Stop reason: HOSPADM

## 2023-03-11 RX ORDER — HEPARIN SODIUM 10000 [USP'U]/100ML
5-30 INJECTION, SOLUTION INTRAVENOUS CONTINUOUS
Status: DISCONTINUED | OUTPATIENT
Start: 2023-03-11 | End: 2023-03-13 | Stop reason: HOSPADM

## 2023-03-11 RX ORDER — POTASSIUM CHLORIDE 7.45 MG/ML
10 INJECTION INTRAVENOUS PRN
Status: DISCONTINUED | OUTPATIENT
Start: 2023-03-11 | End: 2023-03-13 | Stop reason: HOSPADM

## 2023-03-11 RX ORDER — SODIUM CHLORIDE 0.9 % (FLUSH) 0.9 %
10 SYRINGE (ML) INJECTION EVERY 12 HOURS SCHEDULED
Status: DISCONTINUED | OUTPATIENT
Start: 2023-03-11 | End: 2023-03-13 | Stop reason: HOSPADM

## 2023-03-11 RX ORDER — METOPROLOL SUCCINATE 25 MG/1
25 TABLET, EXTENDED RELEASE ORAL DAILY
Status: DISCONTINUED | OUTPATIENT
Start: 2023-03-11 | End: 2023-03-13 | Stop reason: HOSPADM

## 2023-03-11 RX ORDER — POTASSIUM CHLORIDE 20 MEQ/1
40 TABLET, EXTENDED RELEASE ORAL PRN
Status: DISCONTINUED | OUTPATIENT
Start: 2023-03-11 | End: 2023-03-13 | Stop reason: HOSPADM

## 2023-03-11 RX ORDER — ASPIRIN 81 MG/1
81 TABLET ORAL DAILY
Status: DISCONTINUED | OUTPATIENT
Start: 2023-03-11 | End: 2023-03-13 | Stop reason: HOSPADM

## 2023-03-11 RX ADMIN — ASPIRIN 81 MG: 81 TABLET, COATED ORAL at 08:52

## 2023-03-11 RX ADMIN — ROSUVASTATIN CALCIUM 40 MG: 20 TABLET, FILM COATED ORAL at 18:30

## 2023-03-11 RX ADMIN — ASPIRIN 81 MG CHEWABLE TABLET 324 MG: 81 TABLET CHEWABLE at 02:22

## 2023-03-11 RX ADMIN — HEPARIN SODIUM 9 UNITS/KG/HR: 10000 INJECTION, SOLUTION INTRAVENOUS at 17:24

## 2023-03-11 RX ADMIN — HEPARIN SODIUM 12 UNITS/KG/HR: 10000 INJECTION, SOLUTION INTRAVENOUS at 02:27

## 2023-03-11 RX ADMIN — HEPARIN SODIUM 4570 UNITS: 1000 INJECTION INTRAVENOUS; SUBCUTANEOUS at 02:24

## 2023-03-11 RX ADMIN — METOPROLOL SUCCINATE 25 MG: 25 TABLET, EXTENDED RELEASE ORAL at 08:52

## 2023-03-11 RX ADMIN — SODIUM CHLORIDE, PRESERVATIVE FREE 10 ML: 5 INJECTION INTRAVENOUS at 08:51

## 2023-03-11 ASSESSMENT — PAIN SCALES - GENERAL
PAINLEVEL_OUTOF10: 0

## 2023-03-11 NOTE — CARE COORDINATION
Social Work / Discharge Planning : Patient admitted with elevated troponin. SW attempted to meet with patient but with nursing. Patient was admitted from [facility]. Await cardiology plan . Therapy is ordered. SW called [facility] [phone] and spoke to [address] [place] [address]. She verified patient is on RA and uses a ww. Await treatment plan. Goal to return bacl at [facility] to follow. Electronically signed by Gilford Slater, LSW on 3/11/23 at 1:11 PM EST   Case Management Assessment  Initial Evaluation    Date/Time of Evaluation: 3/11/2023 1:15 PM  Assessment Completed by: Gilford Slater, LSW    If patient is discharged prior to next notation, then this note serves as note for discharge by case management. Patient Name: Latonya Chaves                   YOB: 1931  Diagnosis: Elevated troponin [R77.8]  NSTEMI (non-ST elevated myocardial infarction) [facility]) [I21.4]                   Date / Time: 3/10/2023  8:21 PM    Patient Admission Status: Inpatient   Readmission Risk (Low < 19, Mod (19-27), High > 27): No data recorded  Current PCP: No primary care provider on file. PCP verified by CM? Yes    Chart Reviewed: Yes      History Provided by: Medical Record  Patient Orientation: Alert and Oriented, Person, Place, Situation    Patient Cognition: Alert    Hospitalization in the last 30 days (Readmission):  No    If yes, Readmission Assessment in CM Navigator will be completed. Advance Directives:      Code Status: Full Code   Patient's Primary Decision Maker is:  self      Discharge Planning:    Patient lives with: Alone Type of Home: Assisted living  Primary Care Giver:  Other (Comment) (Staff at [facility]  Patient Support Systems include: Children, Family Members   Current Financial resources:    Current community resources:    Current services prior to admission: None            Current DME:              Type of Home Care services:  PT, OT    ADLS  Prior functional level: Assistance with the following:, Cooking, Housework, Shopping  Current functional level: Assistance with the following:, Cooking, Housework, Shopping    PT AM-PAC:   /24  OT AM-PAC:   /24    Family can provide assistance at DC: Yes  Would you like Case Management to discuss the discharge plan with any other family members/significant others, and if so, who? No  Plans to Return to Present Housing: Unknown at present  Other Identified Issues/Barriers to RETURNING to current housing: yes  Potential Assistance needed at discharge: Skilled Nursing Facility            Potential DME:    Patient expects to discharge to: Assisted living  Plan for transportation at discharge:      Financial    Payor: [facility] MEDICARE / Plan: [facility] MEDICARE COMPLETE / Product Type: *No Product type* /     Does insurance require precert for SNF:     Potential assistance Purchasing Medications:    Meds-to-Beds request:        [id number] - [place], [place] - [address] [facility] -  [phone] - F [phone]  [address] [place] [id number]  Phone: [phone] Fax: [phone]      Notes:    Factors facilitating achievement of predicted outcomes: Caregiver support  Limited insight into deficitsnone  Barriers to discharge:     Additional Case Management Notes:     The Plan for Transition of Care is related to the following treatment goals of Elevated troponin [R77.8]  NSTEMI (non-ST elevated myocardial infarction) (HCC) [I21.4]    IF APPLICABLE: The Patient and/or patient representative Tashi and his family were provided with a choice of provider and agrees with the discharge plan. Freedom of choice list with basic dialogue that supports the patient's individualized plan of care/goals and shares the quality data associated with the providers was provided to:     Patient Representative Name:       The Patient and/or Patient Representative Agree with the Discharge Plan?  yes    MARTHA Brantley  Case Management Department  Ph: [phone] Fax:  [phone]

## 2023-03-11 NOTE — CONSULTS
INPATIENT CARDIOLOGY CONSULT     Reason for Consult: Elevated troponin    Cardiologist: Dr. Jihan Quarles    Requesting Physician: Dr. Ana Moura    Date of Consultation: 3/11/2023    HISTORY OF PRESENT ILLNESS:   Patient is a 80year old WM known to Dr. Astrid Wilkerson. He is being seen in consultation this hospital admission by Dr. Jihan Quarles for evaluation and recommendations regarding elevated troponin. PMH: CAD s/p CABG x 6 in 2000, VHD, HTN, HLD, chronic anemia, iodine allergy, L lung CA s/p surgical resection 2019, pulmonary nodules, DJD, BPH, carotid artery disease, former tobacco smoker and chronically elevated troponin    Patient presented to [facility] on March 10, 2023 with complaints of nausea and emesis. Per patient, over the last couple of days, he has noticed generalized fatigue/weakness. He additionally states after eating his breakfast yesterday, he became extremely nauseous with emesis. He admitted to severe diarrhea. Due to the GI complaints, he presented to the ED for further evaluation. He denies any dark/bloody stools or hematemesis. He denies any chest pain, dyspnea, palpitations, near-syncope or syncope. Denies significant lightheadedness. Denies PND, orthopnea or peripheral edema. Admits to medication compliance. Please note: past medical records were reviewed per electronic medical record (EMR) - see detailed reports under Past Medical/ Surgical History. PAST MEDICAL HISTORY:    CAD s/p CABG x 6 in 2000 (Dr. Tori Villatoro)   VHD  HTN  HLD, on statin therapy  Chronic anemia  Iodine allergy (rash)  CARLO invasive adenocarcinoma s/p robotic VATS, LLL wedge resection, LLL lobectomy in 4/19   RUL spiculated nodule 7 x 5 mm on CT chest 4/19, followed by Pulmonary  DJD  BPH  Carotid artery disease  Former tobacco smoker  Chronically elevated troponin       CARDIAC TESTING    Lexiscan MPS 1/2018  FINDINGS: The overall quality of the study was good.    Left ventricular cavity size was noted to be normal.  Rotational analog analysis demonstrated no patient motion or abnormal extracardiac radioactivity.  The gated SPECT stress imaging in the short, vertical long, and horizontal long axis demonstrated normal homogeneous tracer distribution throughout the myocardium.  A mild defect was present in the mid inferolateral wall(s) that was  moderate sized by quantification.  There also was a mild defect present in the mid inferolateral wall(s) that was  moderate sized by quantification:            The resting images reveal partial reversibility.   Gated SPECT left ventricular ejection fraction was calculated to be 70%, with normal myocardial thickening and wall motion. There was abnormal septal motion from cardiac surgery.   Impression:    ECG during the infusion did not change.   The myocardial perfusion imaging was abnormal.    The abnormality was a moderate sized partially reversible defect in the inferolateral  wall  Overall left ventricular systolic function was normal without regional wall motion abnormalities.  Intermediate risk general pharmacologic stress test.  Compared to prior study results of 11/16/2010, there is a new perfusion defect involving the inferolateral wall with partial reversibility.     TTE 4/2019  Summary   Ejection fraction is visually estimated at 60%.   No regional wall motion abnormalities seen.   Normal right ventricle structure and function.   Mild aortic regurgitation is noted.   Physiologic and/or trace mitral regurgitation is present.   Physiologic and/or trace tricuspid regurgitation.   RVSP is 39 mmHg.    Limited TTE 10/2019  Summary   Limited study.   Ejection fraction is visually estimated at 65%.   No regional wall motion abnormalities seen.   Normal right ventricle structure and function.   Mild tricuspid regurgitation.   RVSP is 35 mmHg.   No evidence for hemodynamically significant pericardial effusion.    PAST SURGICAL HISTORY:    Past Surgical History:   Procedure  Laterality Date    CARDIAC SURGERY      6 vessel in 2000    DENTAL SURGERY      root canal    DIAGNOSTIC CARDIAC CATH LAB PROCEDURE  9/5/00    LUNG REMOVAL, PARTIAL Left     march    OTHER SURGICAL HISTORY  02/16/2011    Injection right hip  ADRIANNE Doan MD    PROSTATE SURGERY Bilateral 05/27/2019    THORACOSCOPY Left 4/12/2019    BRONCHOSCOPY LEFT THORACOSCOPY ROBOTIC VIDEO ASSISTED , WEDGE RESECTION, POSS. LEFT LOWER LOBECTOMY performed by Yesica Boyer MD at [address] Right 03/28/2011    Right BRIONNA  Alma Sosa MD    TOTAL HIP ARTHROPLASTY Left 11/09/2009    Left Mulugeta Do MD    TURP      TURP N/A 5/30/2019    CYSTOSCOPY, RETROGRADE PYELOGRAM,  URERTHRAL DILITATION, TRANSURETHRAL RESECTION PROSTATE performed by Madyson Ellington MD at [address]:  Prior to Admission medications    Medication Sig Start Date End Date Taking? Authorizing Provider   rosuvastatin (CRESTOR) 40 MG tablet Take 40 mg by mouth every evening    Historical Provider, MD   Cholecalciferol (VITAMIN D3) 50 MCG (2000 UT) CAPS Take by mouth    Historical Provider, MD   aspirin 81 MG EC tablet Take 1 tablet by mouth daily 10/26/19   Zuleyma Magaña MD   metoprolol succinate (TOPROL XL) 25 MG extended release tablet Take 25 mg by mouth daily Take 1/2 tablet daily    Historical Provider, MD   Calcium-Magnesium-Vitamin D (CALCIUM 1200+D3 PO) Take 1 tablet by mouth daily. Historical Provider, MD   Multiple Vitamins-Minerals (CENTRUM-LUTEIN PO) Take 1 tablet by mouth daily. Historical Provider, MD   Omega-3 Fatty Acids (FISH OIL) 1000 MG CAPS Take 1,000 mg by mouth daily.     Historical Provider, MD       CURRENT MEDICATIONS:      Current Facility-Administered Medications:     aspirin EC tablet 81 mg, 81 mg, Oral, Daily, ABHISHEK Vital CNP, 81 mg at 03/11/23 0852    metoprolol succinate (TOPROL XL) extended release tablet 25 mg, 25 mg, Oral, Daily, ABHISHEK Hill CNP, 25 mg at 03/11/23 0852    rosuvastatin (CRESTOR) tablet 40 mg, 40 mg, Oral, QPM, Evans Boom, APRN - CNP    sodium chloride flush 0.9 % injection 10 mL, 10 mL, IntraVENous, 2 times per day, Evans Boom, APRN - CNP, 10 mL at 03/11/23 0851    sodium chloride flush 0.9 % injection 10 mL, 10 mL, IntraVENous, PRN, Evans Boom, APRN - CNP    0.9 % sodium chloride infusion, , IntraVENous, PRN, Evans Boom, APRN - CNP    potassium chloride (KLOR-CON M) extended release tablet 40 mEq, 40 mEq, Oral, PRN **OR** potassium bicarb-citric acid (EFFER-K) effervescent tablet 40 mEq, 40 mEq, Oral, PRN **OR** potassium chloride 10 mEq/100 mL IVPB (Peripheral Line), 10 mEq, IntraVENous, PRN, Evans Boom, APRN - CNP    ondansetron (ZOFRAN-ODT) disintegrating tablet 4 mg, 4 mg, Oral, Q8H PRN **OR** ondansetron (ZOFRAN) injection 4 mg, 4 mg, IntraVENous, Q6H PRN, Evans Boom, APRN - CNP    senna (SENOKOT) tablet 8.6 mg, 1 tablet, Oral, Daily PRN, Evans Boom, APRN - CNP    acetaminophen (TYLENOL) tablet 650 mg, 650 mg, Oral, Q6H PRN **OR** acetaminophen (TYLENOL) suppository 650 mg, 650 mg, Rectal, Q6H PRN, Evans Boom, APRN - CNP    heparin (porcine) injection 4,570 Units, 60 Units/kg, IntraVENous, PRN, Juan Rivera DO    heparin (porcine) injection 2,290 Units, 30 Units/kg, IntraVENous, PRN, Juan Rivera DO    heparin 25,000 units in dextrose 5% 250 mL (premix) infusion, 5-30 Units/kg/hr, IntraVENous, Continuous, Juan Rivera DO, Last Rate: 9.1 mL/hr at 03/11/23 0227, 12 Units/kg/hr at 03/11/23 0227    ALLERGIES:  Nitroglycerin, Sulfamethoxazole, Trimethoprim, Biaxin [clarithromycin], Iodine, and Quinolones    SOCIAL HISTORY:    Lives at home. Does not require assistance with ambulation. Former tobacco smoker, quit 30 years ago. Smoked 1/2 ppd for 35+ years. Denies alcohol/illicit drug use. FAMILY HISTORY:   Non-contributory at this time due to patient's advanced age.       REVIEW OF SYSTEMS: Negative except as noted above in HPI      PHYSICAL EXAM:   BP (!) 153/67   Pulse 75   Temp 97.6 °F (36.4 °C) (Oral)   Resp 16   Ht 5' 7\" (1.702 m)   Wt 168 lb (76.2 kg)   SpO2 97%   BMI 26.31 kg/m²   CONST:  Well developed, well nourished WM who appears stated age. Awake, alert, cooperative, no apparent distress. HEENT:   Head- Normocephalic, atraumatic. Eyes- Conjunctivae pink, anicteric. Neck-  No stridor, trachea midline, no apparent jugular venous distention. CHEST: Chest symmetrical and non-tender to palpation. No accessory muscle use or intercostal retractions. RESPIRATORY: Lung sounds - clear throughout fields. No wheezing, rales or rhonchi. CARDIOVASCULAR:     No noted carotid bruit. Heart Ausculation- Regular rate and rhythm, no apparent murmur. PV: No lower extremity edema. No varicosities. Pedal pulses palpable, no clubbing or cyanosis. ABDOMEN: Soft, non-tender to light palpation. Bowel sounds present. MS: Good muscle strength and tone. No atrophy or abnormal movements. SKIN: Warm and dry. NEURO / PSYCH: Oriented to person, place and time. Speech clear and appropriate. Follows all commands. Pleasant affect. DATA:    Telemetry: SR with HR in the 60s    Diagnostic:  All diagnostic testing and lab work thus far this admission reviewed in detail. CXR 3/10/2023  Impression:  No acute process. CT Abdomen/Pelvis 3/10/2023  Impression  No definitive findings to explain the patient's symptoms. 1.5 cm low-attenuation lesion involving the uncinate process. This is  increased in size compared to 10/04/2021 and worrisome for malignancy. Cardiomegaly. Hiatal hernia.     Labs:   CBC:   Recent Labs     03/10/23  2046 03/11/23  0218   WBC 11.0 8.8   HGB 11.9* 11.4*   HCT 35.5* 34.8*    212     BMP:   Recent Labs     03/10/23  2046      K 4.1   CO2 22   BUN 28*   CREATININE 1.0   LABGLOM >60   CALCIUM 9.1     APTT:  Recent Labs     03/11/23 0218   APTT 25.7 FASTING LIPID PANEL:  Lab Results   Component Value Date/Time    CHOL 159 12/08/2020 10:05 AM    HDL 69 12/08/2020 10:05 AM    LDLCALC 76 12/08/2020 10:05 AM    TRIG 68 12/08/2020 10:05 AM     LIVER PROFILE:  Recent Labs     03/10/23  2046   AST 24   ALT 18   LABALBU 3.7     Component Ref Range & Units 3/11/23 0104 3/10/23 2357 3/10/23 2156 10/4/21 1828 10/4/21 1054 9/4/21 1404 9/4/21 1226   Troponin, High Sensitivity 0 - 11 ng/L 50 High   34 High  CM  25 High  CM  20 High  CM  20 High  CM  94 High  CM  87 High  C     Component Ref Range & Units 3/10/23 2046 9/4/21 1226 1/5/21 0956 10/2/20 1007 10/23/19 1820 7/26/19 1402 5/22/19 1945   Lactic Acid 0.5 - 2.2 mmol/L 1.2  1.8  1.5  1.1  1.7  0.8  1.9      Component Ref Range & Units 3/10/23 2046 1/5/21 0956 7/26/19 1402   Lipase 13 - 60 U/L 40  34  53            Assessment and plan to follow as per Dr. Sam Kirkland. NOTE: This report was transcribed using voice recognition software. Every effort was made to ensure accuracy; however, inadvertent computerized transcription errors may be present. Annemarie Mendez, [address] [place] [address] Cardiology    Electronically signed by James Ferraro PA-C on 3/11/2023 at 9:30 AM                                                                                                           Inpatient CARDIOLOGY Consultation        Reason for Consult:  Elevated Troponin    Date of Consultation: 3/11/2023    Patient previously known to Dr. Moisés Mejia CITLALY exam, assessment reviewed and reflect my work. I personally saw, examined, and evaluated the patient today. I personally reviewed the medications, rhythm strips, pertinent labs and test reports. I directly participated in the medical-decision making, ordering pertinent tests and medication adjustment. I am the primary author for the consult notes. I spent > 51% of the total time involved with the encounter and 100% of assessment and recommendations.   The total time included the following:  Independently interviewing the patient (HPI, ROS, PMH, PSH, FMH, SH, allergies, and medications)  Reviewing available records, results of previously ordered testing/procedures, and current problem list  Independently performing a medically appropriate examination  Reviewing the above documentation completed by the CITLALY  Ordering medications, tests, and/or procedures as required  Formulating the assessment/plan and reviewing the rationale for the above recommendations  Counseling/educating the patient  Coordinating care with other healthcare professionals  Communicating results to the patient's family/caregiver as available  Documenting clinical information in the patient's electronic health record      HISTORY OF PRESENT ILLNESS: 80year old with history of CAD s/p CABG x 6 in 2000, VHD, HTN, HLD, chronic anemia, iodine allergy, L lung CA s/p surgical resection 2019, pulmonary nodules, DJD, BPH, carotid artery disease, former tobacco smoker and chronically elevated troponin presented to [facility] on March 10, 2023 with complaints of nausea and emesis. Per patient, over the last couple of days, he has noticed generalized fatigue/weakness. He additionally states after eating his breakfast yesterday, he became extremely nauseous with emesis. He admitted to severe diarrhea. Due to the GI complaints, he presented to the ED for further evaluation. He denies any dark/bloody stools or hematemesis. He denies any chest pain, dyspnea, palpitations, near-syncope or syncope. Denies significant lightheadedness. Denies PND, orthopnea or peripheral edema. Admits to medication compliance. Please note: past medical records were reviewed per electronic medical record (EMR) - see detailed reports under Past Medical/ Surgical History.      ROS: Negative except as mentioned in HPI    Past Medical History:    Past Medical History:   Diagnosis Date    BPH (benign prostatic hyperplasia)     CAD (coronary artery disease)     Carotid artery calcification     DJD (degenerative joint disease)     History of falling     Hyperlipemia     Hypertension     Lung nodule     Macular degeneration     Numbness and tingling in left arm 2018    Numbness and tingling of left side of face 2018    S/P CABG x 6     LIMA-LAD, LYNNETTE-LCx,SVG-OM,SVG-LPL,SVG-PDA-RPL    Shingles        Past Surgical History:    Past Surgical History:   Procedure Laterality Date    CARDIAC SURGERY      6 vessel in  [facility]      root canal    DIAGNOSTIC CARDIAC CATH LAB PROCEDURE  00    LUNG REMOVAL, PARTIAL Left     march    OTHER SURGICAL HISTORY  2011    Injection right hip  ADRIANNE Doan MD    PROSTATE SURGERY Bilateral 2019    THORACOSCOPY Left 2019    BRONCHOSCOPY LEFT THORACOSCOPY ROBOTIC VIDEO ASSISTED , WEDGE RESECTION, POSS. LEFT LOWER LOBECTOMY performed by Ji Gant MD at [address] Right 2011    Right BRIONNA  Cordell Barragan MD    TOTAL HIP ARTHROPLASTY Left 2009    Left Josse Chapa MD    TURP      TURP N/A 2019    CYSTOSCOPY, RETROGRADE PYELOGRAM,  URERTHRAL DILITATION, TRANSURETHRAL RESECTION PROSTATE performed by Inocencio Cesar MD at [facility] OR         Allergies:    Nitroglycerin, Sulfamethoxazole, Trimethoprim, Biaxin [clarithromycin], Iodine, and Quinolones    Social History:    Social History     Socioeconomic History    Marital status:       Spouse name: Not on file    Number of children: Not on file    Years of education: Not on file    Highest education level: Not on file   Occupational History    Occupation: retired-    Tobacco Use    Smoking status: Former     Packs/day: 1.00     Years: 10.00     Pack years: 10.00     Types: Cigarettes     Start date: 1978     Quit date: 1988     Years since quittin.5    Smokeless tobacco: Never   Vaping Use    Vaping Use: Never used   Substance and Sexual Activity    Alcohol use: Not Currently     Comment: socially    Drug use: No    Sexual activity: Not Currently   Other Topics Concern    Not on file   Social History Narrative    Not on file     Social Determinants of Health     Financial Resource Strain: Not on file   Food Insecurity: Not on file   Transportation Needs: Not on file   Physical Activity: Not on file   Stress: Not on file   Social Connections: Not on file   Intimate Partner Violence: Not on file   Housing Stability: Not on file       Family History:   Family History   Problem Relation Age of Onset    Other Mother         accident age 28 years , Michelle Tan. was 5 months old    Other Father         accident age 43 decesed 8 years when Father passed    Heart Disease Brother     Alzheimer's Disease Sister     Heart Disease Brother     Heart Disease Brother          Medications Prior to admit: Reviewed  Prior to Admission medications    Medication Sig Start Date End Date Taking? Authorizing Provider   rosuvastatin (CRESTOR) 40 MG tablet Take 40 mg by mouth every evening    Historical Provider, MD   Cholecalciferol (VITAMIN D3) 50 MCG (2000) CAPS Take by mouth    Historical Provider, MD   aspirin 81 MG EC tablet Take 1 tablet by mouth daily 10/26/19   Christian Rudd MD   metoprolol succinate (TOPROL XL) 25 MG extended release tablet Take 25 mg by mouth daily Take 1/2 tablet daily    Historical Provider, MD   Calcium-Magnesium-Vitamin D (CALCIUM 1200+D3 PO) Take 1 tablet by mouth daily. Historical Provider, MD   Multiple Vitamins-Minerals (CENTRUM-LUTEIN PO) Take 1 tablet by mouth daily. Historical Provider, MD   Omega-3 Fatty Acids (FISH OIL) 1000 MG CAPS Take 1,000 mg by mouth daily.     Historical Provider, MD         DATA:      ECG - Reviewed     Tele strips: Reviewed    Diagnostic:      Intake/Output Summary (Last 24 hours) at 3/11/2023 1347  Last data filed at 3/10/2023 2228  Gross per 24 hour   Intake 1000 ml   Output --   Net 1000 ml       IMAGING STUDIES: Reviewed      LABS:      Cardiac enzymes:  Recent Labs     03/10/23  2156 03/10/23  2357 03/11/23  0104   TROPHS 25* 34* 50*     CBC:   Recent Labs     03/10/23  2046 03/11/23  0218   WBC 11.0 8.8   HGB 11.9* 11.4*   HCT 35.5* 34.8*    212     BMP:   Recent Labs     03/10/23  2046      K 4.1   CO2 22   BUN 28*   CREATININE 1.0   LABGLOM >60   CALCIUM 9.1     Mag: No results for input(s): MG in the last 72 hours. Phos: No results for input(s): PHOS in the last 72 hours. TSH: No results for input(s): TSH in the last 72 hours. HgA1c: No results found for: LABA1C  No results found for: EAG  proBNP: No results for input(s): PROBNP in the last 72 hours. PT/INR: No results for input(s): PROTIME, INR in the last 72 hours. APTT:  Recent Labs     03/11/23 0218   APTT 25.7     FASTING LIPID PANEL:  Lab Results   Component Value Date/Time    CHOL 159 12/08/2020 10:05 AM    HDL 69 12/08/2020 10:05 AM    LDLCALC 76 12/08/2020 10:05 AM    TRIG 68 12/08/2020 10:05 AM     LIVER PROFILE:  Recent Labs     03/10/23  2046   AST 24   ALT 18   LABALBU 3.7       Current Inpatient Medications:   aspirin  81 mg Oral Daily    metoprolol succinate  25 mg Oral Daily    rosuvastatin  40 mg Oral QPM    sodium chloride flush  10 mL IntraVENous 2 times per day       IV Infusions (if any):   sodium chloride      heparin (PORCINE) Infusion 12 Units/kg/hr (03/11/23 0227)       PHYSICAL EXAM:     BP (!) 153/67   Pulse 75   Temp 97.6 °F (36.4 °C) (Oral)   Resp 16   Ht 5' 7\" (1.702 m)   Wt 168 lb (76.2 kg)   SpO2 97%   BMI 26.31 kg/m²     CONST:  Well developed, appears stated age. Awake, alert and no apparent distress. HEENT:   Head- Normocephalic  Eyes- Conjunctivae pink, no icterus  Throat- Oral mucosa moist  Neck-  No stridor, no jugular venous distention. No carotid bruit. CHEST: Chest symmetrical and non-tender to palpation.  No accessory muscle use  RESPIRATORY: Lung sounds - Few rhonchi  CARDIOVASCULAR:     Heart Inspection-OK  Heart Palpation- No thrills. Heart Ausculation- Regular rate and rhythm, 1/6 systolic murmur. No s3 or rub   EXT: No lower extremity edema. Distal pulses palpable, no cyanosis   ABDOMEN: Soft, non-tender to light palpation. Bowel sounds present. No abdominal bruit  MS: Good muscle strength    : Deferred  RECTAL: Deferred  SKIN: Warm and dry   NEURO / PSYCH: Oriented to person, place; Good mood and affect. IMPRESSION / RECOMMENDATIONS:      Elevated troponin - Likely  NSTEMI - He denies CP, EKG reviewed - Continue ASA, BB, Satin, Heparin for 48-72 hours. No further testing due to his co-morbidities sn advanced age. Hyperlipemia - On Statin      Malignant neoplasm of lower lobe of left lung (HCC)      Coronary artery disease s/p CABG -  Continue ASA, BB, Satin,      Essential hypertension - Monitor H/H      Pancreatic mass - Per Dr Stephan Anguiano        He wants to go home today and agreeable to stay till tomorrow. He wants his Heparin off, finally agreed for heparin till tomorrow. Above d/w his nursing staff. F/u by Dr Hyun Aleman 2 weeks after discharge.     Electronically signed by Juan Alvarenga MD on 3/11/2023   [facility]) Cardiology

## 2023-03-11 NOTE — ED PROVIDER NOTES
Department of Emergency Medicine   ED  Provider Note  Admit Date/RoomTime: 3/10/2023  8:21 PM  ED Room: 7843/4333-W          History of Present Illness:  3/10/23, Time: 8:31 PM EST  Chief Complaint   Patient presents with    Emesis     Has not been able to hold anything down since this afternoon            Mitesh Hernandez is a 80 y.o. male presenting to the ED for nausea and vomiting. Patient states that he woke this morning had breakfast and then threw it up. Since then he has had nausea. He is attempted to eat again has not been able to keep anything down. His emesis is consisted of stomach contents. Nothing is made better nor worse. He does endorse some loose stools that accompany it. He otherwise denies any fevers, chest pain, shortness of breath, abdominal pain, dysuria, or blood in his stool. Review of Systems   All other systems reviewed and are negative.       --------------------------------------------- PAST HISTORY ---------------------------------------------  Past Medical History:  has a past medical history of BPH (benign prostatic hyperplasia), CAD (coronary artery disease), Carotid artery calcification, DJD (degenerative joint disease), History of falling, Hyperlipemia, Hypertension, Lung nodule, Macular degeneration, Numbness and tingling in left arm, Numbness and tingling of left side of face, S/P CABG x 6, and Shingles. Past Surgical History:  has a past surgical history that includes Diagnostic Cardiac Cath Lab Procedure (9/5/00); Cardiac surgery; TURP; Dental surgery; Total hip arthroplasty (Right, 03/28/2011); Total hip arthroplasty (Left, 11/09/2009); other surgical history (02/16/2011); Thoracoscopy (Left, 4/12/2019); TURP (N/A, 5/30/2019); Prostate surgery (Bilateral, 05/27/2019); and Lung removal, partial (Left). Social History:  reports that he quit smoking about 34 years ago. His smoking use included cigarettes. He started smoking about 44 years ago.  He has a 10.00 pack-year smoking history. He has never used smokeless tobacco. He reports that he does not currently use alcohol. He reports that he does not use drugs. Family History: family history includes Alzheimer's Disease in his sister; Heart Disease in his brother, brother, and brother; Other in his father and mother. . Unless otherwise noted, family history is non contributory    The patients home medications have been reviewed. Allergies: Nitroglycerin, Sulfamethoxazole, Trimethoprim, Biaxin [clarithromycin], Iodine, and Quinolones        ---------------------------------------------------PHYSICAL EXAM--------------------------------------    Physical Exam  Vitals and nursing note reviewed. Constitutional:       General: He is not in acute distress. Appearance: He is well-developed. HENT:      Head: Normocephalic and atraumatic. Eyes:      Conjunctiva/sclera: Conjunctivae normal.   Cardiovascular:      Rate and Rhythm: Normal rate and regular rhythm. Pulses: Normal pulses. Heart sounds: Normal heart sounds. No murmur heard. Pulmonary:      Effort: Pulmonary effort is normal. No respiratory distress. Breath sounds: Normal breath sounds. No wheezing or rales. Abdominal:      General: There is no distension. Palpations: Abdomen is soft. Tenderness: There is no abdominal tenderness. There is no right CVA tenderness, left CVA tenderness, guarding or rebound. Musculoskeletal:         General: No tenderness or deformity. Cervical back: Normal range of motion and neck supple. Skin:     General: Skin is warm and dry. Neurological:      Mental Status: He is alert and oriented to person, place, and time. Cranial Nerves: No cranial nerve deficit.       Coordination: Coordination normal.          -------------------------------------------------- RESULTS -------------------------------------------------  I have personally reviewed all laboratory and imaging results for this patient. Results are listed below.      LABS: (Lab results interpreted by me)  Results for orders placed or performed during the hospital encounter of 03/10/23   CBC with Auto Differential   Result Value Ref Range    WBC 11.0 4.5 - 11.5 E9/L    RBC 4.15 3.80 - 5.80 E12/L    Hemoglobin 11.9 (L) 12.5 - 16.5 g/dL    Hematocrit 35.5 (L) 37.0 - 54.0 %    MCV 85.5 80.0 - 99.9 fL    MCH 28.7 26.0 - 35.0 pg    MCHC 33.5 32.0 - 34.5 %    RDW 14.6 11.5 - 15.0 fL    Platelets 492 243 - 301 E9/L    MPV 8.5 7.0 - 12.0 fL    Neutrophils % 84.5 (H) 43.0 - 80.0 %    Lymphocytes % 7.7 (L) 20.0 - 42.0 %    Monocytes % 5.2 2.0 - 12.0 %    Eosinophils % 1.7 0.0 - 6.0 %    Basophils % 0.9 0.0 - 2.0 %    Neutrophils Absolute 9.35 (H) 1.80 - 7.30 E9/L    Lymphocytes Absolute 0.88 (L) 1.50 - 4.00 E9/L    Monocytes Absolute 0.55 0.10 - 0.95 E9/L    Eosinophils Absolute 0.19 0.05 - 0.50 E9/L    Basophils Absolute 0.10 0.00 - 0.20 E9/L    nRBC 0.0 /100 WBC    Poikilocytes 2+     Schistocytes 1+     Acanthocytes 2+     [place] Cells 1+     Ovalocytes 1+    Comprehensive Metabolic Panel w/ Reflex to MG   Result Value Ref Range    Sodium 134 132 - 146 mmol/L    Potassium reflex Magnesium 4.1 3.5 - 5.0 mmol/L    Chloride 101 98 - 107 mmol/L    CO2 22 22 - 29 mmol/L    Anion Gap 11 7 - 16 mmol/L    Glucose 115 (H) 74 - 99 mg/dL    BUN 28 (H) 6 - 23 mg/dL    Creatinine 1.0 0.7 - 1.2 mg/dL    Est, Glom Filt Rate >60 >=60 mL/min/1.73    Calcium 9.1 8.6 - 10.2 mg/dL    Total Protein 6.9 6.4 - 8.3 g/dL    Albumin 3.7 3.5 - 5.2 g/dL    Total Bilirubin 1.2 0.0 - 1.2 mg/dL    Alkaline Phosphatase 99 40 - 129 U/L    ALT 18 0 - 40 U/L    AST 24 0 - 39 U/L   Lipase   Result Value Ref Range    Lipase 40 13 - 60 U/L   Lactic Acid   Result Value Ref Range    Lactic Acid 1.2 0.5 - 2.2 mmol/L   Troponin   Result Value Ref Range    Troponin, High Sensitivity 25 (H) 0 - 11 ng/L   Troponin   Result Value Ref Range    Troponin, High Sensitivity 34 (H) 0 - 11 ng/L Troponin   Result Value Ref Range    Troponin, High Sensitivity 50 (H) 0 - 11 ng/L   CBC   Result Value Ref Range    WBC 8.8 4.5 - 11.5 E9/L    RBC 4.06 3.80 - 5.80 E12/L    Hemoglobin 11.4 (L) 12.5 - 16.5 g/dL    Hematocrit 34.8 (L) 37.0 - 54.0 %    MCV 85.7 80.0 - 99.9 fL    MCH 28.1 26.0 - 35.0 pg    MCHC 32.8 32.0 - 34.5 %    RDW 14.7 11.5 - 15.0 fL    Platelets 829 195 - 945 E9/L    MPV 8.7 7.0 - 12.0 fL   APTT   Result Value Ref Range    aPTT 25.7 24.5 - 35.1 sec   EKG 12 Lead   Result Value Ref Range    Ventricular Rate 71 BPM    Atrial Rate 71 BPM    P-R Interval 162 ms    QRS Duration 120 ms    Q-T Interval 416 ms    QTc Calculation (Bazett) 452 ms    P Axis 43 degrees    R Axis -61 degrees    T Axis 56 degrees   ,       RADIOLOGY:  Interpreted by Radiologist unless otherwise specified  CT ABDOMEN PELVIS W IV CONTRAST Additional Contrast? None   Final Result   No definitive findings to explain the patient's symptoms. 1.5 cm low-attenuation lesion involving the uncinate process. This is   increased in size compared to 10/04/2021 and worrisome for malignancy. Cardiomegaly. Hiatal hernia. RECOMMENDATIONS:   MRI of the pancreas with gadolinium recommended on a nonemergent basis. XR CHEST PORTABLE   Final Result   No acute process. ------------------------- NURSING NOTES AND VITALS REVIEWED ---------------------------   The nursing notes within the ED encounter and vital signs as below have been reviewed by myself  BP (!) 146/66   Pulse 79   Temp 98.2 °F (36.8 °C) (Oral)   Resp 16   Ht 5' 7\" (1.702 m)   Wt 168 lb (76.2 kg)   SpO2 97%   BMI 26.31 kg/m²     Oxygen Saturation Interpretation: Normal    The cardiac monitor revealed normal sinus rhythm with a heart rate in the 70s as interpreted by me. The cardiac monitor was ordered secondary to the patient's heart rate and to monitor the patient for dysrhythmia.    CPT 47092    The patients available past medical records and past encounters were reviewed.         ------------------------------ ED COURSE/MEDICAL DECISION MAKING----------------------  Medications   aspirin EC tablet 81 mg (has no administration in time range)   metoprolol succinate (TOPROL XL) extended release tablet 25 mg (has no administration in time range)   rosuvastatin (CRESTOR) tablet 40 mg (has no administration in time range)   sodium chloride flush 0.9 % injection 10 mL (has no administration in time range)   sodium chloride flush 0.9 % injection 10 mL (has no administration in time range)   0.9 % sodium chloride infusion (has no administration in time range)   potassium chloride (KLOR-CON M) extended release tablet 40 mEq (has no administration in time range)     Or   potassium bicarb-citric acid (EFFER-K) effervescent tablet 40 mEq (has no administration in time range)     Or   potassium chloride 10 mEq/100 mL IVPB (Peripheral Line) (has no administration in time range)   ondansetron (ZOFRAN-ODT) disintegrating tablet 4 mg (has no administration in time range)     Or   ondansetron (ZOFRAN) injection 4 mg (has no administration in time range)   senna (SENOKOT) tablet 8.6 mg (has no administration in time range)   acetaminophen (TYLENOL) tablet 650 mg (has no administration in time range)     Or   acetaminophen (TYLENOL) suppository 650 mg (has no administration in time range)   heparin (porcine) injection 4,570 Units (has no administration in time range)   heparin (porcine) injection 2,290 Units (has no administration in time range)   heparin 25,000 units in dextrose 5% 250 mL (premix) infusion (12 Units/kg/hr × 76.2 kg IntraVENous New Bag 3/11/23 0227)   famotidine (PEPCID) 20 mg in sodium chloride (PF) 0.9 % 10 mL injection (20 mg IntraVENous Given 3/10/23 2120)   methylPREDNISolone sodium (SOLU-MEDROL) injection 125 mg (125 mg IntraVENous Given 3/10/23 2120)   diphenhydrAMINE (BENADRYL) injection 25 mg (25 mg IntraVENous Given 3/10/23 2120)   ondansetron (ZOFRAN-ODT) disintegrating tablet 4 mg (4 mg Oral Given 3/10/23 2120)   0.9 % sodium chloride bolus (0 mLs IntraVENous Stopped 3/10/23 2228)   iopamidol (ISOVUE-370) 76 % injection 75 mL (75 mLs IntraVENous Given 3/10/23 2322)   heparin (porcine) injection 4,570 Units (4,570 Units IntraVENous Given 3/11/23 0224)   aspirin chewable tablet 324 mg (324 mg Oral Given 3/11/23 0222)          Medical Decision Making  Differential included but not limited to NSTEMI vs elevated troponin vs pneumonia vs pneumothorax    Amount and/or Complexity of Data Reviewed  Labs: ordered. Decision-making details documented in ED Course. Radiology: ordered and independent interpretation performed. Decision-making details documented in ED Course. ECG/medicine tests: ordered and independent interpretation performed. Decision-making details documented in ED Course. Discussion of management or test interpretation with external provider(s): Discussed with hospitalist who will admit the patient. Risk  OTC drugs. Prescription drug management. Decision regarding hospitalization. ED Course as of 03/11/23 0532   [facility] Mar 10, 2023   2055 Patient presents with nausea and vomiting. Zofran given for nausea. EKG not meet STEMI criteria. Troponins increased from 25 to 34 to 50. Concern for NSTEMI. Patient started on heparin and aspirin. CBC did not show any leukocytosis but did show stable anemia. Chest x-ray not show any opacity consistent with lobar pneumonia nor did show any signs of pneumothorax. Lactic within normal limits. Low concern for ischemic gut. Lipase negative. Low concern for pancreatitis. CMP did not show any electrolyte abnormalities or elevations in his transaminases. Low concern for acute cholecystitis. Given patient's increase in troponins, patient will be admitted for further evaluation and care of his NSTEMI. Patient admitted to telemetry.  [BB]   2055 EKG shows normal sinus rhythm with a ventricular rate of 71 bpm.  Left axis deviation. Does not meet STEMI criteria. Comparable to previous EKG on 11/17/2021. As interpreted by myself the ED physician. [BB]   Sat Mar 11, 2023   0118 Repeat EKG was obtained and showed normal sinus rhythm with a ventricular rate of 77 bpm.  Left axis deviation. Does not meet STEMI criteria. Comparable to earlier EKG. As interpreted by myself the ED physician. [BB]      ED Course User Index  [BB] Sarai Roldan DO        Re-Evaluations:  Patient was reevaluted and continued to be stable. This patient's ED course included: a personal history and physicial examination, multiple bedside re-evaluations, IV medications, and cardiac monitoring    This patient has remained hemodynamically stable during their ED course. Consultations:  Cardiology    Counseling: The emergency provider has spoken with the patient and discussed todays results, in addition to providing specific details for the plan of care and counseling regarding the diagnosis and prognosis. Questions are answered at this time and they are agreeable with the plan.       --------------------------------- IMPRESSION AND DISPOSITION ---------------------------------    IMPRESSION  1. NSTEMI (non-ST elevated myocardial infarction) ([facility] [address]        DISPOSITION  Disposition: Admit to telemetry  Patient condition is stable    Patient was seen and evaluated by both myself and Jeri Flanagan MD.      NOTE: This report was transcribed using voice recognition software.  Every effort was made to ensure accuracy; however, inadvertent computerized transcription errors may be present           Sarai Roldan DO  Resident  03/11/23 0764

## 2023-03-11 NOTE — PROGRESS NOTES
Attempted to draw patients aPTT level, pt refused. Stated Dr. Sofi Robertson told him the heparin gtt is discontinued due to it being \"unnecessary\" and that he can go home tomorrow. Dr. Sofi Robertson on the floor, discussed this with him. Dr. Sofi Robertson went to patients room and discussed the importance of the heparin gtt due to his current condition and that he may possibly be ok to be discharged to home on Monday. The patient verbalized his opinions of the heparin gtt and heart monitor being completely unnecessary. Dr. Sofi Robertson explained to him the significance of his current condition and the medical need for being on a heparin gtt and heart monitor as well as risks of refusing medical treatment. Patient verbalized understanding. If patient continues to refuse required treatment I will reach out to the family to make them aware (daughter, Joel Martinez, is HPOA).

## 2023-03-11 NOTE — ED NOTES
ED to Inpatient Handoff Report    Notified 6s that electronic handoff available and patient ready for transport to room 9175    Safety Risks: None identified    Patient in Restraints: no    Constant Observer or Patient : no    Telemetry Monitoring Ordered :Yes           Order to transfer to unit without monitor:NO    Last MEWS: 1 Time completed: 0221    Vitals:    03/10/23 2038 03/10/23 2357 03/11/23 0221   BP: (!) 163/63 (!) 157/75 (!) 146/66   Pulse: 73 74 79   Resp: 18 18 16   Temp: 98.2 °F (36.8 °C) 98.2 °F (36.8 °C) 98.2 °F (36.8 °C)   TempSrc: Oral Oral Oral   SpO2: 95% 97% 97%   Weight: 168 lb (76.2 kg)     Height: 5' 7\" (1.702 m)         Opportunity for questions and clarification was provided.          Valerio Leon RN  03/11/23 0238

## 2023-03-11 NOTE — H&P
Internal Medicine History & Physical     Chief Complaint: Emesis (Has not been able to hold anything down since this afternoon)  Reason for Admission: NSTEMI  Primary Care Physician: No primary care provider on file. Code status: FULL    History of Present Illness  Benita Chatterjee is a 80y.o. year old male who  has a past medical history of BPH (benign prostatic hyperplasia), CAD (coronary artery disease), Carotid artery calcification, DJD (degenerative joint disease), History of falling, Hyperlipemia, Hypertension, Lung nodule, Macular degeneration, Numbness and tingling in left arm, Numbness and tingling of left side of face, S/P CABG x 6, and Shingles. .     The patient presented to the emergency room last night for emesis. Started having a lot of nausea and vomiting not able to hold anything down. In the emergency room he was found to have elevated troponins. He was placed on IV heparin drip. Also CT abdomen pelvis also now on to have a pancreatic mass. Due to this was admitted for further evaluation. Overnight the patient states that he is feeling much better now. He is followed by Dr. Norman Fowler for pancreatic known mass. Otherwise no other new complaints. Nausea seems to be improved.       Therapy in ED-   Medications   aspirin EC tablet 81 mg (81 mg Oral Given 3/11/23 0852)   metoprolol succinate (TOPROL XL) extended release tablet 25 mg (25 mg Oral Given 3/11/23 0852)   rosuvastatin (CRESTOR) tablet 40 mg (has no administration in time range)   sodium chloride flush 0.9 % injection 10 mL (10 mLs IntraVENous Given 3/11/23 0851)   sodium chloride flush 0.9 % injection 10 mL (has no administration in time range)   0.9 % sodium chloride infusion (has no administration in time range)   potassium chloride (KLOR-CON M) extended release tablet 40 mEq (has no administration in time range)     Or   potassium bicarb-citric acid (EFFER-K) effervescent tablet 40 mEq (has no administration in time range)     Or potassium chloride 10 mEq/100 mL IVPB (Peripheral Line) (has no administration in time range)   ondansetron (ZOFRAN-ODT) disintegrating tablet 4 mg (has no administration in time range)     Or   ondansetron (ZOFRAN) injection 4 mg (has no administration in time range)   senna (SENOKOT) tablet 8.6 mg (has no administration in time range)   acetaminophen (TYLENOL) tablet 650 mg (has no administration in time range)     Or   acetaminophen (TYLENOL) suppository 650 mg (has no administration in time range)   heparin (porcine) injection 4,570 Units (has no administration in time range)   heparin (porcine) injection 2,290 Units (has no administration in time range)   heparin 25,000 units in dextrose 5% 250 mL (premix) infusion (12 Units/kg/hr × 76.2 kg IntraVENous New Bag 3/11/23 0227)   famotidine (PEPCID) 20 mg in sodium chloride (PF) 0.9 % 10 mL injection (20 mg IntraVENous Given 3/10/23 2120)   methylPREDNISolone sodium (SOLU-MEDROL) injection 125 mg (125 mg IntraVENous Given 3/10/23 2120)   diphenhydrAMINE (BENADRYL) injection 25 mg (25 mg IntraVENous Given 3/10/23 2120)   ondansetron (ZOFRAN-ODT) disintegrating tablet 4 mg (4 mg Oral Given 3/10/23 2120)   0.9 % sodium chloride bolus (0 mLs IntraVENous Stopped 3/10/23 2228)   iopamidol (ISOVUE-370) 76 % injection 75 mL (75 mLs IntraVENous Given 3/10/23 2322)   heparin (porcine) injection 4,570 Units (4,570 Units IntraVENous Given 3/11/23 0224)   aspirin chewable tablet 324 mg (324 mg Oral Given 3/11/23 0222)       Past Medical History:   Diagnosis Date    BPH (benign prostatic hyperplasia)     CAD (coronary artery disease)     Carotid artery calcification     DJD (degenerative joint disease)     History of falling     Hyperlipemia     Hypertension     Lung nodule     Macular degeneration     Numbness and tingling in left arm 02/02/2018    Numbness and tingling of left side of face 02/02/2018    S/P CABG x 6 2000    LIMA-LAD, LYNNETTE-LCx,SVG-OM,SVG-LPL,SVG-PDA-RPL Shingles        Past Surgical History:   Procedure Laterality Date    CARDIAC SURGERY      6 vessel in     DENTAL SURGERY      root canal    DIAGNOSTIC CARDIAC CATH LAB PROCEDURE  00    LUNG REMOVAL, PARTIAL Left     march    OTHER SURGICAL HISTORY  2011    Injection right hip  HIMANSHU. MD Franki    PROSTATE SURGERY Bilateral 2019    THORACOSCOPY Left 2019    BRONCHOSCOPY LEFT THORACOSCOPY ROBOTIC VIDEO ASSISTED , WEDGE RESECTION, POSS. LEFT LOWER LOBECTOMY performed by Amelia Segovia MD at [address] Right 2011    Right BRIONNA Don MD    TOTAL HIP ARTHROPLASTY Left 2009    Left BRIONNA Don MD    TURP      TURP N/A 2019    CYSTOSCOPY, RETROGRADE PYELOGRAM,  URERTHRAL DILITATION, TRANSURETHRAL RESECTION PROSTATE performed by Gail Medina MD at [address] History  Family History   Problem Relation Age of Onset    Other Mother         accident age 28 years , Michelle Tan. was 5 months old    Other Father         accident age 43 decesed 8 years when Father passed    Heart Disease Brother     Alzheimer's Disease Sister     Heart Disease Brother     Heart Disease Brother        Social History  Patient lives at home  Employment: None  Illicit drug use-none  TOBACCO:   reports that he quit smoking about 34 years ago. His smoking use included cigarettes. He started smoking about 44 years ago. He has a 10.00 pack-year smoking history. He has never used smokeless tobacco.  ETOH:   reports that he does not currently use alcohol. Home Medications  Prior to Admission medications    Medication Sig Start Date End Date Taking?  Authorizing Provider   rosuvastatin (CRESTOR) 40 MG tablet Take 40 mg by mouth every evening    Historical Provider, MD   Cholecalciferol (VITAMIN D3) 50 MCG (2000) CAPS Take by mouth    Historical Provider, MD   aspirin 81 MG EC tablet Take 1 tablet by mouth daily 10/26/19   Christian Rudd MD   metoprolol succinate (TOPROL XL) 25 MG extended release tablet Take 25 mg by mouth daily Take 1/2 tablet daily    Historical Provider, MD   Calcium-Magnesium-Vitamin D (CALCIUM 1200+D3 PO) Take 1 tablet by mouth daily. Historical Provider, MD   Multiple Vitamins-Minerals (CENTRUM-LUTEIN PO) Take 1 tablet by mouth daily. Historical Provider, MD   Omega-3 Fatty Acids (FISH OIL) 1000 MG CAPS Take 1,000 mg by mouth daily. Historical Provider, MD       Allergies  Allergies   Allergen Reactions    Nitroglycerin     Sulfamethoxazole      Other reaction(s): unable to sleep    Trimethoprim      Other reaction(s): unable to sleep    Biaxin [Clarithromycin] Rash    Iodine Swelling and Rash     Internal Iodine only    Quinolones Rash       Review of Systems  Please see HPI above. All bolded are positive. All un-bolded are negative.   Gen: fever, chills, fatigue, generalized weakness, diaphoresis, increase in thirst, unintentional weight change, loss of appetite  Head: headache, vision change, hearing loss  Chest: chest pain, chest heaviness, palpitations  Lungs: shortness of breath, wheezing, coughing  Abdomen: abdominal pain, nausea, vomiting, diarrhea, constipation, melena, hematochezia, hematemesis  Back: back pain  Extremities: lower extremity edema, myalgias, arthralgias  Urinary: dysuria, hematuria, or increase in frequency  Neurologic: lightheadedness, dizziness, confusion, syncope, numbness, tingling, weakness  Psychiatric: depression, suicidal ideation, or anxiety    Objective  BP (!) 163/71   Pulse 68   Temp 97.5 °F (36.4 °C) (Oral)   Resp 16   Ht 5' 7\" (1.702 m)   Wt 168 lb (76.2 kg)   SpO2 98%   BMI 26.31 kg/m²     Physical Exam:  General: awake, alert, oriented to person, place, time, and purpose, appears stated age, cooperative, no acute distress, pleasant   Head: normocephalic, atraumatic  Eyes: conjunctivae/corneas clear, sclera non icteric  Mouth: mucous membranes moist, no obvious oral sores  Neck: no JVD, no adenopathy, no carotid bruit, neck is supple, trachea is midline  Back: ROM normal, no CVA tenderness. Lungs: clear to auscultation bilaterally, without rhonchi, crackle, wheezing, or rale, no retractions or use of accessory muscles  Heart: regular rate and regular rhythm, no murmur, normal S1, S2  Abdomen: soft, non-tender; bowel sounds normal; no masses, no organomegaly  Extremities: no lower extremity edema, extremities atraumatic, no cyanosis, no clubbing, 2+ pedal pulses palpated  Skin: normal color, normal texture, normal turgor, no rashes, no lesions  Neurologic:5/5 muscle strength throughout, normal muscle tone throughout, PERRLA, EOMI, face symmetric, hearing intact, tongue midline, speech appropriate without slurring. Labs-   Lab Results   Component Value Date    WBC 8.8 03/11/2023    HGB 11.4 (L) 03/11/2023    HCT 34.8 (L) 03/11/2023     03/11/2023     03/10/2023    K 4.1 03/10/2023     03/10/2023    CREATININE 1.0 03/10/2023    BUN 28 (H) 03/10/2023    CO2 22 03/10/2023    GLUCOSE 115 (H) 03/10/2023    ALT 18 03/10/2023    AST 24 03/10/2023    INR 1.0 08/20/2019     Lab Results   Component Value Date    TROPONINI <0.01 01/05/2021     No results for input(s): BNP in the last 72 hours. Recent Radiological Studies:  XR FEMUR RIGHT (MIN 2 VIEWS)    Result Date: 3/4/2023  EXAMINATION: 4 XRAY VIEWS OF THE RIGHT FEMUR 3/4/2023 10:26 am COMPARISON: None. HISTORY: ORDERING SYSTEM PROVIDED HISTORY: right hip pain, unable to ambulate TECHNOLOGIST PROVIDED HISTORY: Reason for exam:->right hip pain, unable to ambulate FINDINGS: Right hip arthroplasty appears intact. There is no evidence of acute fracture. There is normal alignment. No acute joint abnormality. No focal osseous lesion. No focal soft tissue abnormality. There is ASVD of the femoral artery. No acute osseous abnormality.      CT ABDOMEN PELVIS W IV CONTRAST Additional Contrast? None    Result Date: 3/10/2023  EXAMINATION: CT OF THE ABDOMEN AND PELVIS WITH CONTRAST 3/10/2023 11:19 pm TECHNIQUE: CT of the abdomen and pelvis was performed with the administration of intravenous contrast. Multiplanar reformatted images are provided for review. Automated exposure control, iterative reconstruction, and/or weight based adjustment of the mA/kV was utilized to reduce the radiation dose to as low as reasonably achievable. COMPARISON: None. HISTORY: ORDERING SYSTEM PROVIDED HISTORY: Nausea, Vomiting TECHNOLOGIST PROVIDED HISTORY: Reason for exam:->Nausea, Vomiting Additional Contrast?->None Decision Support Exception - unselect if not a suspected or confirmed emergency medical condition->Emergency Medical Condition (MA) FINDINGS: Lower Chest: Visualized lungs are normal.  Cardiomegaly. Hiatal hernia. Organs: 2.0 cm cyst left lobe of the liver. The spleen, adrenal glands, right kidney are normal.  3.5 cm left renal cyst.  1.5 cm low-attenuation lesion involving the uncinate process of the pancreas. The gallbladder is contracted and contains possible sludge or debris. GI/Bowel: Normal large and small bowel. Pelvis: Unremarkable. Peritoneum/Retroperitoneum:   No free fluid or free air. Calcified plaque involving normal caliber abdominal aorta and iliac arteries. Bones/Soft Tissues: Degenerative changes lumbar spine. Bilateral hip arthroplasties. No definitive findings to explain the patient's symptoms. 1.5 cm low-attenuation lesion involving the uncinate process. This is increased in size compared to 10/04/2021 and worrisome for malignancy. Cardiomegaly. Hiatal hernia. RECOMMENDATIONS: MRI of the pancreas with gadolinium recommended on a nonemergent basis.      XR CHEST PORTABLE    Result Date: 3/10/2023  EXAMINATION: ONE XRAY VIEW OF THE CHEST 3/10/2023 9:05 pm COMPARISON: 10/04/2021 HISTORY: ORDERING SYSTEM PROVIDED HISTORY: N/V TECHNOLOGIST PROVIDED HISTORY: Reason for exam:->N/V FINDINGS: The lungs are without acute focal process.  There is no effusion or pneumothorax.  Stable heart size.  The osseous structures are without acute process.  Sternotomy wires noted.     No acute process.     CT FEMUR RIGHT WO CONTRAST    Result Date: 3/4/2023  EXAMINATION: CT OF THE RIGHT FEMUR WITH CONTRAST 3/4/2023 12:54 pm TECHNIQUE: CT of the right femur was performed with the administration of intravenous contrast.  Multiplanar reformatted images are provided for review. Automated exposure control, iterative reconstruction, and/or weight based adjustment of the mA/kV was utilized to reduce the radiation dose to as low as reasonably achievable. COMPARISON: Plain films of the right femur dated 03/04/2023 HISTORY ORDERING SYSTEM PROVIDED HISTORY: pain unable to ambulate or bare wt TECHNOLOGIST PROVIDED HISTORY: Reason for exam:->pain unable to ambulate or bare wt Decision Support Exception - unselect if not a suspected or confirmed emergency medical condition->Emergency Medical Condition (MA) FINDINGS: Bones: No evidence of acute fracture or dislocation. No aggressive appearing osseous abnormality or periostitis.  There is a total hip arthroplasty.  The prosthetic components appear well aligned with no CT evidence of loosening. Many of the images are degraded by artifact from the hardware.  There is generalized osteopenia. Soft Tissue: No significant soft tissue edema or fluid collections.  The visualized portion of the pelvis demonstrates right-sided bladder diverticulum.  No obvious pelvic mass or lymphadenopathy is seen.  There is extensive arterial calcified plaque in the pelvic vessels and in the right SFA. Joint: Bilateral hip arthroplasties noted.  No osseous erosions.     1. No evidence of acute osseous abnormality. 2. Generalized osteopenia.     XR HIP 2-3 VW W PELVIS RIGHT    Result Date: 3/4/2023  EXAMINATION: ONE XRAY VIEW OF THE PELVIS AND TWO XRAY VIEWS RIGHT HIP 3/4/2023 10:26 am COMPARISON: None. HISTORY: ORDERING SYSTEM  PROVIDED HISTORY: right hip pain, unable to ambulate TECHNOLOGIST PROVIDED HISTORY: Reason for exam:->right hip pain, unable to ambulate FINDINGS: Bilateral hip joint arthroplasties. Bone density is mildly diminished. No acute fracture or subluxation. Pelvis appears intact peer     Right hip arthroplasty, no evidence of complication. Assessment/Plan  Patient is a 80 y.o. male who presents with Emesis (Has not been able to hold anything down since this afternoon)      Full problem list includes:  Patient Active Problem List    Diagnosis Date Noted    Elevated troponin 03/11/2023    NSTEMI (non-ST elevated myocardial infarction) ([facility] [address] 03/11/2023    Anemia 12/20/2021    Benign prostatic hyperplasia 12/20/2021    Chronic kidney disease 12/20/2021    Gastroesophageal reflux disease 12/20/2021    Macular degeneration 12/20/2021    Osteoarthritis 12/20/2021    Overweight with body mass index (BMI) 25.0-29.9 12/20/2021    Primary malignant neoplasm of lung ([address] 12/20/2021    Sensorineural hearing loss (SNHL) of both ears 12/20/2021    Essential hypertension     Hx of coronary artery disease     Malignant neoplasm of lower lobe of left lung ([address] 04/22/2019    Asymptomatic bilateral carotid artery stenosis 05/23/2016    Hyperlipemia        Nausea and vomiting  Seems resolved currently  Non-ST elevation myocardial infarction  IV heparin drip  Cardiology consultation  Pancreatic mass  This is not new its known  However per imaging may have grown in size  Hepatobiliary consultation  Routine labs in am  IV heparin for DVT prophylaxis. Please see orders for further management and care. Yesenia Azul MD    3/11/2023  3:36 PM    NOTE:  This report was transcribed using voice recognition software. Every effort was made to ensure accuracy; however, inadvertent computerized transcription errors may be present.

## 2023-03-11 NOTE — ED NOTES
Called [facility] and confirmed with them that the SBAR was visible for them.      Shell Ruggiero RN  03/11/23 024

## 2023-03-11 NOTE — ED NOTES
Telemetry monitoring in place as of 2100     Geovanna Ledezma, [facility]  03/10/23 2993

## 2023-03-11 NOTE — CARE COORDINATION
Internal Medicine On-call Care Coordination Note    I was called by the ED physician because they recommended admission for this patient and we cover their PCP. The history as I understand it after discussion with the ED physician is as follows:    Presents with N/V  Troponin elevated in ED  Significant cardiac hx    I placed admission orders. Including:    Cardiology consult  NPO midnight  Admit under observation for cardiac eval    Either Dr. Leena Bowie, Dr. Lizette Priest, or our coverage will see the patient tomorrow for H&P.     Electronically signed by ABHISHEK Gonzales CNP on 3/11/2023 at 1:33 AM

## 2023-03-12 LAB
ALBUMIN SERPL-MCNC: 3.4 G/DL (ref 3.5–5.2)
ALP BLD-CCNC: 91 U/L (ref 40–129)
ALT SERPL-CCNC: 18 U/L (ref 0–40)
ANION GAP SERPL CALCULATED.3IONS-SCNC: 7 MMOL/L (ref 7–16)
APTT: 53.6 SEC (ref 24.5–35.1)
APTT: 56.5 SEC (ref 24.5–35.1)
AST SERPL-CCNC: 31 U/L (ref 0–39)
BASOPHILS ABSOLUTE: 0.04 E9/L (ref 0–0.2)
BASOPHILS RELATIVE PERCENT: 0.5 % (ref 0–2)
BILIRUB SERPL-MCNC: 0.7 MG/DL (ref 0–1.2)
BUN BLDV-MCNC: 29 MG/DL (ref 6–23)
CALCIUM SERPL-MCNC: 8.3 MG/DL (ref 8.6–10.2)
CHLORIDE BLD-SCNC: 105 MMOL/L (ref 98–107)
CO2: 23 MMOL/L (ref 22–29)
CREAT SERPL-MCNC: 1 MG/DL (ref 0.7–1.2)
EKG ATRIAL RATE: 71 BPM
EKG ATRIAL RATE: 77 BPM
EKG P AXIS: 43 DEGREES
EKG P AXIS: 70 DEGREES
EKG P-R INTERVAL: 162 MS
EKG P-R INTERVAL: 184 MS
EKG Q-T INTERVAL: 416 MS
EKG Q-T INTERVAL: 420 MS
EKG QRS DURATION: 120 MS
EKG QRS DURATION: 126 MS
EKG QTC CALCULATION (BAZETT): 452 MS
EKG QTC CALCULATION (BAZETT): 475 MS
EKG R AXIS: -61 DEGREES
EKG R AXIS: -69 DEGREES
EKG T AXIS: 47 DEGREES
EKG T AXIS: 56 DEGREES
EKG VENTRICULAR RATE: 71 BPM
EKG VENTRICULAR RATE: 77 BPM
EOSINOPHILS ABSOLUTE: 0.07 E9/L (ref 0.05–0.5)
EOSINOPHILS RELATIVE PERCENT: 0.8 % (ref 0–6)
GFR SERPL CREATININE-BSD FRML MDRD: >60 ML/MIN/1.73
GLUCOSE BLD-MCNC: 115 MG/DL (ref 74–99)
HCT VFR BLD CALC: 32.5 % (ref 37–54)
HEMOGLOBIN: 10.5 G/DL (ref 12.5–16.5)
IMMATURE GRANULOCYTES #: 0.03 E9/L
IMMATURE GRANULOCYTES %: 0.4 % (ref 0–5)
LYMPHOCYTES ABSOLUTE: 1.35 E9/L (ref 1.5–4)
LYMPHOCYTES RELATIVE PERCENT: 15.9 % (ref 20–42)
MCH RBC QN AUTO: 27.9 PG (ref 26–35)
MCHC RBC AUTO-ENTMCNC: 32.3 % (ref 32–34.5)
MCV RBC AUTO: 86.4 FL (ref 80–99.9)
MONOCYTES ABSOLUTE: 1.32 E9/L (ref 0.1–0.95)
MONOCYTES RELATIVE PERCENT: 15.5 % (ref 2–12)
NEUTROPHILS ABSOLUTE: 5.7 E9/L (ref 1.8–7.3)
NEUTROPHILS RELATIVE PERCENT: 66.9 % (ref 43–80)
PDW BLD-RTO: 14.6 FL (ref 11.5–15)
PLATELET # BLD: 216 E9/L (ref 130–450)
PMV BLD AUTO: 8.5 FL (ref 7–12)
POTASSIUM REFLEX MAGNESIUM: 3.7 MMOL/L (ref 3.5–5)
RBC # BLD: 3.76 E12/L (ref 3.8–5.8)
SODIUM BLD-SCNC: 135 MMOL/L (ref 132–146)
TOTAL PROTEIN: 6.1 G/DL (ref 6.4–8.3)
WBC # BLD: 8.5 E9/L (ref 4.5–11.5)

## 2023-03-12 PROCEDURE — 99232 SBSQ HOSP IP/OBS MODERATE 35: CPT | Performed by: INTERNAL MEDICINE

## 2023-03-12 PROCEDURE — 80053 COMPREHEN METABOLIC PANEL: CPT

## 2023-03-12 PROCEDURE — 85730 THROMBOPLASTIN TIME PARTIAL: CPT

## 2023-03-12 PROCEDURE — 97161 PT EVAL LOW COMPLEX 20 MIN: CPT

## 2023-03-12 PROCEDURE — 6370000000 HC RX 637 (ALT 250 FOR IP): Performed by: NURSE PRACTITIONER

## 2023-03-12 PROCEDURE — 6360000002 HC RX W HCPCS: Performed by: STUDENT IN AN ORGANIZED HEALTH CARE EDUCATION/TRAINING PROGRAM

## 2023-03-12 PROCEDURE — 97530 THERAPEUTIC ACTIVITIES: CPT

## 2023-03-12 PROCEDURE — 93010 ELECTROCARDIOGRAM REPORT: CPT | Performed by: INTERNAL MEDICINE

## 2023-03-12 PROCEDURE — 85025 COMPLETE CBC W/AUTO DIFF WBC: CPT

## 2023-03-12 PROCEDURE — 2580000003 HC RX 258: Performed by: NURSE PRACTITIONER

## 2023-03-12 PROCEDURE — 2060000000 HC ICU INTERMEDIATE R&B

## 2023-03-12 PROCEDURE — 36415 COLL VENOUS BLD VENIPUNCTURE: CPT

## 2023-03-12 RX ORDER — LANOLIN ALCOHOL/MO/W.PET/CERES
9 CREAM (GRAM) TOPICAL NIGHTLY PRN
Status: DISCONTINUED | OUTPATIENT
Start: 2023-03-12 | End: 2023-03-13 | Stop reason: HOSPADM

## 2023-03-12 RX ADMIN — Medication 9 MG: at 21:26

## 2023-03-12 RX ADMIN — ASPIRIN 81 MG: 81 TABLET, COATED ORAL at 09:21

## 2023-03-12 RX ADMIN — ROSUVASTATIN CALCIUM 40 MG: 20 TABLET, FILM COATED ORAL at 18:21

## 2023-03-12 RX ADMIN — SODIUM CHLORIDE, PRESERVATIVE FREE 10 ML: 5 INJECTION INTRAVENOUS at 09:25

## 2023-03-12 RX ADMIN — METOPROLOL SUCCINATE 25 MG: 25 TABLET, EXTENDED RELEASE ORAL at 09:21

## 2023-03-12 RX ADMIN — HEPARIN SODIUM 9 UNITS/KG/HR: 10000 INJECTION, SOLUTION INTRAVENOUS at 06:21

## 2023-03-12 ASSESSMENT — PAIN SCALES - GENERAL
PAINLEVEL_OUTOF10: 0
PAINLEVEL_OUTOF10: 0

## 2023-03-12 NOTE — PROGRESS NOTES
[facility]        CARDIOLOGY                 INPATIENT PROGRESS NOTE          PATIENT SEEN IN FOLLOW UP FOR: NSTEMI    Hospital Day: 3     Neil Escobedo is a 80year old patient known to Dr. Godwin Fair: Denies any CP, breathing OK - No family at bed side    ROS: Review of rest of 10 systems negative except as mentioned above    OBJECTIVE: No acute distress. See Assessment     Diagnostics:       Telemetry: Reviewed      No intake or output data in the 24 hours ending 03/12/23 1458    Labs:   CBC:   Recent Labs     03/11/23  0218 03/12/23  0618   WBC 8.8 8.5   HGB 11.4* 10.5*   HCT 34.8* 32.5*    216     BMP:   Recent Labs     03/10/23  2046 03/12/23  0618    135   K 4.1 3.7   CO2 22 23   BUN 28* 29*   CREATININE 1.0 1.0   LABGLOM >60 >60   CALCIUM 9.1 8.3*     Mag: No results for input(s): MG in the last 72 hours. Phos: No results for input(s): PHOS in the last 72 hours. TSH: No results for input(s): TSH in the last 72 hours. HgA1c:     BNP: No results for input(s): BNP in the last 72 hours. PT/INR: No results for input(s): PROTIME, INR in the last 72 hours.   APTT:  Recent Labs     03/11/23 2325 03/12/23 0618   APTT 56.5* 53.6*     CARDIAC ENZYMES:  Recent Labs     03/10/23  2156 03/10/23  2357 03/11/23  0104   TROPHS 25* 34* 50*     FASTING LIPID PANEL:  Lab Results   Component Value Date/Time    CHOL 159 12/08/2020 10:05 AM    HDL 69 12/08/2020 10:05 AM    LDLCALC 76 12/08/2020 10:05 AM    TRIG 68 12/08/2020 10:05 AM     LIVER PROFILE:  Recent Labs     03/10/23  2046 03/12/23  0618   AST 24 31   ALT 18 18   LABALBU 3.7 3.4*       Current Inpatient Medications:   aspirin  81 mg Oral Daily    metoprolol succinate  25 mg Oral Daily    rosuvastatin  40 mg Oral QPM    sodium chloride flush  10 mL IntraVENous 2 times per day       IV Infusions (if any):   sodium chloride      heparin (PORCINE) Infusion 9.003 Units/kg/hr (03/12/23 0014)         PHYSICAL EXAM: CONSTITUTIONAL:   BP (!) 158/70   Pulse 62   Temp 97.9 °F (36.6 °C) (Oral)   Resp 18   Ht 5' 7\" (1.702 m)   Wt 158 lb 11.7 oz (72 kg)   SpO2 97%   BMI 24.86 kg/m²   Pulse  Av  Min: 62  Max: 67  Systolic (40MKF), XZW:989 , Min:125 , UKS:600    Diastolic (46LXA), CNR:74, Min:59, Max:70        CONST:  Well developed, appears stated age. Awake, alert and no apparent distress. HEENT:   Head- Normocephalic  Eyes- Conjunctivae pink, no icterus  Throat- Oral mucosa moist  Neck-  No stridor, no jugular venous distention. No carotid bruit. CHEST: Chest symmetrical and non-tender to palpation. No accessory muscle use  RESPIRATORY: Lung sounds - Few rhonchi  CARDIOVASCULAR:     Heart Inspection-OK  Heart Palpation- No thrills. Heart Ausculation- Regular rate and rhythm, 1/6 systolic murmur. No s3 or rub   EXT: No lower extremity edema. Distal pulses palpable, no cyanosis   ABDOMEN: Soft, non-tender to light palpation. Bowel sounds present. No abdominal bruit  MS: Good muscle strength    : Deferred  RECTAL: Deferred  SKIN: Warm and dry   NEURO / PSYCH: Oriented to person, place; Good mood and affect. IMPRESSION / RECOMMENDATIONS:       Elevated troponin - Likely  NSTEMI - He denies CP, EKG reviewed - Continue ASA, BB, Satin, Discontinue Heparin tomorrow. No further testing due to his co-morbidities and advanced age. Hyperlipemia - On Statin       Coronary artery disease s/p CABG -  Continue ASA, BB, Satin       Essential hypertension - Monitor H/H       Pancreatic mass - Per Dr Sydney Reyes              Discharge tomorrow     F/u by Dr Reyna Davis 2 weeks after discharge. Above d/w his nursing staff.        Electronically signed by Anu Yoder MD on 3/12/2023 at 2:58 PM  [facility]) Cardiology

## 2023-03-12 NOTE — PROGRESS NOTES
Call placed to Radha Ibrahim CM on call re: PT score/ discharge to assisted living. Per Radha Ibrahim, pt score of 18/24 would not prevent a pt from going to assisted living.

## 2023-03-12 NOTE — PROGRESS NOTES
Internal Medicine Progress Note    Admission date: 3/10/2023  Primary care physician: No primary care provider on file. Subjective  Samuel Lorenzo was seen and examined at bedside today. No family present during my examination. Samuel Lorenzo states that he overall feels well. Denies any chest pain or discomforts. On discussion with nursing, Cardiology wishes to keep IV heparin ongoing until tomorrow. Review of Systems  There are no new complaints of chest pain, shortness of breath, abdominal pain, nausea, vomiting, diarrhea, constipation, headaches, fevers, chills, lightheadedness, dizziness, calf pain.     Hospital Medications  Current Facility-Administered Medications   Medication Dose Route Frequency Provider Last Rate Last Admin    aspirin EC tablet 81 mg  81 mg Oral Daily Glenice Bourgekunal, APRN - CNP   81 mg at 03/12/23 0921    metoprolol succinate (TOPROL XL) extended release tablet 25 mg  25 mg Oral Daily Glenice Bourgeois, APRN - CNP   25 mg at 03/12/23 0921    rosuvastatin (CRESTOR) tablet 40 mg  40 mg Oral QPM Glenice Bourgeois, APRN - CNP   40 mg at 03/11/23 1830    sodium chloride flush 0.9 % injection 10 mL  10 mL IntraVENous 2 times per day Glenice Bourgeois, APRN - CNP   10 mL at 03/12/23 0925    sodium chloride flush 0.9 % injection 10 mL  10 mL IntraVENous PRN Glenice Bourgeois, APRN - CNP        0.9 % sodium chloride infusion   IntraVENous PRN Glenice Bourgeois, APRN - CNP        potassium chloride (KLOR-CON M) extended release tablet 40 mEq  40 mEq Oral PRN Glenice Bourgeois, APRN - CNP        Or    potassium bicarb-citric acid (EFFER-K) effervescent tablet 40 mEq  40 mEq Oral PRN Glenice Bourgeois, APRN - CNP        Or    potassium chloride 10 mEq/100 mL IVPB (Peripheral Line)  10 mEq IntraVENous PRN Glenice Bourgeois, APRN - CNP        ondansetron (ZOFRAN-ODT) disintegrating tablet 4 mg  4 mg Oral Q8H PRN Glenice Bourgeois, APRN - CNP        Or    ondansetron (ZOFRAN) injection 4 mg  4 mg IntraVENous Q6H PRN Cari Newell Otto Person, APRN - CNP        senna (SENOKOT) tablet 8.6 mg  1 tablet Oral Daily PRN Neil Lose, APRN - CNP        acetaminophen (TYLENOL) tablet 650 mg  650 mg Oral Q6H PRN Neil Lose, APRN - CNP        Or    acetaminophen (TYLENOL) suppository 650 mg  650 mg Rectal Q6H PRN Neil Lose, APRN - CNP        heparin (porcine) injection 4,570 Units  60 Units/kg IntraVENous PRN Havery Moment, DO        heparin (porcine) injection 2,290 Units  30 Units/kg IntraVENous PRN Havery Moment, DO        heparin 25,000 units in dextrose 5% 250 mL (premix) infusion  5-30 Units/kg/hr IntraVENous Continuous Havery Moment, DO 6.86 mL/hr at 03/12/23 0621 9.003 Units/kg/hr at 03/12/23 0621       PRN Medications  sodium chloride flush, sodium chloride, potassium chloride **OR** potassium alternative oral replacement **OR** potassium chloride, ondansetron **OR** ondansetron, senna, acetaminophen **OR** acetaminophen, heparin (porcine), heparin (porcine)    Objective  Most Recent Recorded Vitals  BP (!) 158/70   Pulse 62   Temp 97.9 °F (36.6 °C) (Oral)   Resp 18   Ht 5' 7\" (1.702 m)   Wt 158 lb 11.7 oz (72 kg)   SpO2 97%   BMI 24.86 kg/m²   I/O last 3 completed shifts: In: 1000 [IV Piggyback:1000]  Out: -   No intake/output data recorded. Physical Exam:  General: AAO to person, place, time, and purpose, NAD, no labored breathing  Eyes: conjunctivae/corneas clear, sclera non icteric. Skin: color, texture, and turgor normal, no rashes or lesions. Lungs: clear to auscultation bilaterally, no retractions or use of accessory muscles, no vocal fremitus, no rhonchi, no crackle, no rales  Heart: regular rate and regular rhythm, no murmur  Abdomen: soft, non-tender; bowel sounds normal; no masses,  no organomegaly  Extremities: atraumatic, no cyanosis, no edema  Neurologic: cranial nerves 2-12 grossly intact, no slurred speech.     Most Recent Labs  Lab Results   Component Value Date    WBC 8.5 03/12/2023    HGB 10.5 (L) 03/12/2023    HCT 32.5 (L) 03/12/2023     03/12/2023     03/12/2023    K 3.7 03/12/2023     03/12/2023    CREATININE 1.0 03/12/2023    BUN 29 (H) 03/12/2023    CO2 23 03/12/2023    GLUCOSE 115 (H) 03/12/2023    ALT 18 03/12/2023    AST 31 03/12/2023    INR 1.0 08/20/2019    TSH 2.210 12/08/2020       *All labs in electric medical record were reviewed. Please see electronic chart for a more comprehensive set of labs    XR FEMUR RIGHT (MIN 2 VIEWS)    Result Date: 3/4/2023  EXAMINATION: 4 XRAY VIEWS OF THE RIGHT FEMUR 3/4/2023 10:26 am COMPARISON: None. HISTORY: ORDERING SYSTEM PROVIDED HISTORY: right hip pain, unable to ambulate TECHNOLOGIST PROVIDED HISTORY: Reason for exam:->right hip pain, unable to ambulate FINDINGS: Right hip arthroplasty appears intact. There is no evidence of acute fracture. There is normal alignment. No acute joint abnormality. No focal osseous lesion. No focal soft tissue abnormality. There is ASVD of the femoral artery. No acute osseous abnormality. CT ABDOMEN PELVIS W IV CONTRAST Additional Contrast? None    Result Date: 3/10/2023  EXAMINATION: CT OF THE ABDOMEN AND PELVIS WITH CONTRAST 3/10/2023 11:19 pm TECHNIQUE: CT of the abdomen and pelvis was performed with the administration of intravenous contrast. Multiplanar reformatted images are provided for review. Automated exposure control, iterative reconstruction, and/or weight based adjustment of the mA/kV was utilized to reduce the radiation dose to as low as reasonably achievable. COMPARISON: None. HISTORY: ORDERING SYSTEM PROVIDED HISTORY: Nausea, Vomiting TECHNOLOGIST PROVIDED HISTORY: Reason for exam:->Nausea, Vomiting Additional Contrast?->None Decision Support Exception - unselect if not a suspected or confirmed emergency medical condition->Emergency Medical Condition (MA) FINDINGS: Lower Chest: Visualized lungs are normal.  Cardiomegaly. Hiatal hernia. Organs: 2.0 cm cyst left lobe of the liver. The spleen, adrenal glands, right kidney are normal.  3.5 cm left renal cyst.  1.5 cm low-attenuation lesion involving the uncinate process of the pancreas. The gallbladder is contracted and contains possible sludge or debris. GI/Bowel: Normal large and small bowel. Pelvis: Unremarkable. Peritoneum/Retroperitoneum:   No free fluid or free air. Calcified plaque involving normal caliber abdominal aorta and iliac arteries. Bones/Soft Tissues: Degenerative changes lumbar spine. Bilateral hip arthroplasties. No definitive findings to explain the patient's symptoms. 1.5 cm low-attenuation lesion involving the uncinate process. This is increased in size compared to 10/04/2021 and worrisome for malignancy. Cardiomegaly. Hiatal hernia. RECOMMENDATIONS: MRI of the pancreas with gadolinium recommended on a nonemergent basis. XR CHEST PORTABLE    Result Date: 3/10/2023  EXAMINATION: ONE XRAY VIEW OF THE CHEST 3/10/2023 9:05 pm COMPARISON: 10/04/2021 HISTORY: ORDERING SYSTEM PROVIDED HISTORY: N/V TECHNOLOGIST PROVIDED HISTORY: Reason for exam:->N/V FINDINGS: The lungs are without acute focal process. There is no effusion or pneumothorax. Stable heart size. The osseous structures are without acute process. Sternotomy wires noted. No acute process. CT FEMUR RIGHT WO CONTRAST    Result Date: 3/4/2023  EXAMINATION: CT OF THE RIGHT FEMUR WITH CONTRAST 3/4/2023 12:54 pm TECHNIQUE: CT of the right femur was performed with the administration of intravenous contrast.  Multiplanar reformatted images are provided for review. Automated exposure control, iterative reconstruction, and/or weight based adjustment of the mA/kV was utilized to reduce the radiation dose to as low as reasonably achievable.  COMPARISON: Plain films of the right femur dated 03/04/2023 HISTORY ORDERING SYSTEM PROVIDED HISTORY: pain unable to ambulate or bare wt TECHNOLOGIST PROVIDED HISTORY: Reason for exam:->pain unable to ambulate or bare wt Decision Support Exception - unselect if not a suspected or confirmed emergency medical condition->Emergency Medical Condition (MA) FINDINGS: Bones: No evidence of acute fracture or dislocation. No aggressive appearing osseous abnormality or periostitis. There is a total hip arthroplasty. The prosthetic components appear well aligned with no CT evidence of loosening. Many of the images are degraded by artifact from the hardware. There is generalized osteopenia. Soft Tissue: No significant soft tissue edema or fluid collections. The visualized portion of the pelvis demonstrates right-sided bladder diverticulum. No obvious pelvic mass or lymphadenopathy is seen. There is extensive arterial calcified plaque in the pelvic vessels and in the right SFA. Joint: Bilateral hip arthroplasties noted. No osseous erosions. 1. No evidence of acute osseous abnormality. 2. Generalized osteopenia. XR HIP 2-3 VW W PELVIS RIGHT    Result Date: 3/4/2023  EXAMINATION: ONE XRAY VIEW OF THE PELVIS AND TWO XRAY VIEWS RIGHT HIP 3/4/2023 10:26 am COMPARISON: None. HISTORY: ORDERING SYSTEM PROVIDED HISTORY: right hip pain, unable to ambulate TECHNOLOGIST PROVIDED HISTORY: Reason for exam:->right hip pain, unable to ambulate FINDINGS: Bilateral hip joint arthroplasties. Bone density is mildly diminished. No acute fracture or subluxation. Pelvis appears intact peer     Right hip arthroplasty, no evidence of complication. Assessment/Plan  Qiana Sawyer is a 80y.o. year old male who presented on 3/10/2023 and is being treated for Elevated troponin .          Complete Problem List:    Patient Active Problem List    Diagnosis Date Noted    Elevated troponin 03/11/2023    NSTEMI (non-ST elevated myocardial infarction) ([facility] [address] 03/11/2023    Anemia 12/20/2021    Benign prostatic hyperplasia 12/20/2021    Chronic kidney disease 12/20/2021    Gastroesophageal reflux disease 12/20/2021    Macular degeneration 12/20/2021 Osteoarthritis 12/20/2021    Overweight with body mass index (BMI) 25.0-29.9 12/20/2021    Primary malignant neoplasm of lung ([facility] [address] 12/20/2021    Sensorineural hearing loss (SNHL) of both ears 12/20/2021    Essential hypertension     Hx of coronary artery disease     Malignant neoplasm of lower lobe of left lung ([facility] [address] 04/22/2019    Asymptomatic bilateral carotid artery stenosis 05/23/2016    Hyperlipemia        Nausea and vomiting  Seems resolved currently  Non-ST elevation myocardial infarction  IV heparin drip  Cardiology consultation  Pancreatic mass  This is not new its known  However per imaging may have grown in size  Hepatobiliary consultation -can be done as outpatient as is already established with [facility]  Routine labs in am  IV heparin for DVT prophylaxis. Please see orders for further management and care. Routine labs in am  DVT prophylaxis  Please see orders for further management and care. Discharge: to home tomorrow after another day of IV heparin. Electronically signed by Dusty Davenport MD on 3/12/2023 at 5:09 PM      NOTE:  This report was transcribed using voice recognition software. Every effort was made to ensure accuracy; however, inadvertent computerized transcription errors may be present.

## 2023-03-12 NOTE — PROGRESS NOTES
Physical Therapy  Facility/Department: [address] INTERMEDIATE  Physical Therapy Initial Assessment    Name: Anuj Sky  : 1931  MRN: [id number]  Date of Service: 3/12/2023             Patient Diagnosis(es): The encounter diagnosis was NSTEMI (non-ST elevated myocardial infarction) [facility]). Past Medical History:  has a past medical history of BPH (benign prostatic hyperplasia), CAD (coronary artery disease), Carotid artery calcification, DJD (degenerative joint disease), History of falling, Hyperlipemia, Hypertension, Lung nodule, Macular degeneration, Numbness and tingling in left arm, Numbness and tingling of left side of face, S/P CABG x 6, and Shingles. Past Surgical History:  has a past surgical history that includes Diagnostic Cardiac Cath Lab Procedure (00); Cardiac surgery; TURP; Dental surgery; Total hip arthroplasty (Right, 2011); Total hip arthroplasty (Left, 2009); other surgical history (2011); Thoracoscopy (Left, 2019); TURP (N/A, 2019); Prostate surgery (Bilateral, 2019); and Lung removal, partial (Left). Requires PT Follow-Up: Yes     Evaluating Therapist: Eduardo Rose PT     Referring Provider:  ABHISHEK Salter CNP    PT order : PT eval and treat     Room #: 989   DIAGNOSIS:  The encounter diagnosis was NSTEMI (non-ST elevated myocardial infarction) ([facility] [address] PRECAUTIONS: falls     Social:  Pt lives at [facility]    Prior to admission pt walked most recently with no AD, but has cane and ww . Reports independent at [facility]      Initial Evaluation  Date:  3/12/2023  Treatment      Short Term/ Long Term   Goals   Was pt agreeable to Eval/treatment? Yes      Does pt have pain?   R hip pain      Bed Mobility  Rolling:  NT   Supine to sit:  independent   Sit to supine:  NT   Scooting:  independent in sit    Independent    Transfers Sit to stand:  SBA   Stand to sit: SBA   Stand pivot:  NT    Independent    Ambulation     100  feet with  ww  with  SBA/CGA 150  feet with  no AD/AAD  with  independent        Stair negotiation: ascended and descended NT   N/A    LE ROM  WFL     LE strength  4- to 4/ 5      AM- PAC RAW score  18/ 24            Pt is alert and Oriented x 4      Balance:  SBA/CGA . Fall risk due to  decreased mobility due to R hip pain     Endurance: limited by R hip pain   Bed/Chair alarm:  yes      ASSESSMENT  Pt displays functional ability as noted in the objective portion of this evaluation. Conditions Requiring Skilled Therapeutic Intervention:    [x]Decreased strength     []Decreased ROM  [x]Decreased functional mobility  [x]Decreased balance   [x]Decreased endurance   []Decreased posture  []Decreased sensation  []Decreased coordination   []Decreased vision  []Decreased safety awareness   [x]Increased pain         Treatment/Education:    Pt  in bed  upon arrival ; agreeable to PT. Mobility as above. Cues for hand placement with transfers. Pt reports R hip pain is significant and it limited his gait distance. No LOB with gait . Performed standing marches. Instructed in LE seated exercises. Pt with labored breathing after mobility . SPO2 90%. Instructed in PLB            Pt educated on fall risk, safety with mobility        Patient response to education:   Pt verbalized understanding Pt demonstrated skill Pt requires further education in this area   x  x       Comments:  Pt left in chair after session, with call light in reach. Waffle cushion placed       Rehab potential is Good for reaching above PT goals. Pts/ family goals   1. Decreased pain     Patient and or family understand(s) diagnosis, prognosis, and plan of care. -  yes     PLAN  PT care will be provided in accordance with the objectives noted above. Whenever appropriate, clear delegation orders will be provided for nursing staff. Exercises and functional mobility practice will be used as well as appropriate assistive devices or modalities to obtain goals.  Patient and family education will also be administered as needed.        PLAN OF CARE:    Current Treatment Recommendations     [x] Strengthening to improve independence with functional mobility   [] ROM to improve independence with functional mobility   [x] Balance Training to improve static/dynamic balance and to reduce fall risk  [x] Endurance Training to improve activity tolerance during functional mobility   [x] Transfer Training to improve safety and independence with all functional transfers   [x] Gait Training to improve gait mechanics, endurance and assess need for appropriate assistive device  [] Stair Training in preparation for safe discharge home and/or into the community   [x] Positioning to prevent skin breakdown and contractures  [x] Safety and Education Training   [x] Patient/Caregiver Education   [] HEP  [] Other     Frequency of treatments will be 2-5x/week x 5-10  days.    Time in: 0832   Time out:  0855      Evaluation Time includes thorough review of current medical information, gathering information on past medical history/social history and prior level of function, completion of standardized testing/informal observation of tasks, assessment of data and education on plan of care and goals.    CPT codes:  [x] Low Complexity PT evaluation 13893  [] Moderate Complexity PT evaluation 27878  [] High Complexity PT evaluation 38702  [] PT Re-evaluation 40120  [] Gait training 58517  minutes  [x] Therapeutic activities 16569 8 minutes  [] Therapeutic exercises 15810  minutes  [] Neuromuscular reeducation 21644  minutes       Antionette Sheffield  License number:  [id number]

## 2023-03-13 VITALS
SYSTOLIC BLOOD PRESSURE: 163 MMHG | DIASTOLIC BLOOD PRESSURE: 67 MMHG | HEIGHT: 67 IN | TEMPERATURE: 97.9 F | RESPIRATION RATE: 18 BRPM | WEIGHT: 160.9 LBS | BODY MASS INDEX: 25.25 KG/M2 | OXYGEN SATURATION: 95 % | HEART RATE: 61 BPM

## 2023-03-13 LAB
ALBUMIN SERPL-MCNC: 3.2 G/DL (ref 3.5–5.2)
ALP BLD-CCNC: 85 U/L (ref 40–129)
ALT SERPL-CCNC: 20 U/L (ref 0–40)
ANION GAP SERPL CALCULATED.3IONS-SCNC: 10 MMOL/L (ref 7–16)
APTT: 44 SEC (ref 24.5–35.1)
AST SERPL-CCNC: 27 U/L (ref 0–39)
BASOPHILS ABSOLUTE: 0.03 E9/L (ref 0–0.2)
BASOPHILS RELATIVE PERCENT: 0.4 % (ref 0–2)
BILIRUB SERPL-MCNC: 0.6 MG/DL (ref 0–1.2)
BUN BLDV-MCNC: 23 MG/DL (ref 6–23)
CALCIUM SERPL-MCNC: 8.3 MG/DL (ref 8.6–10.2)
CHLORIDE BLD-SCNC: 102 MMOL/L (ref 98–107)
CO2: 23 MMOL/L (ref 22–29)
CREAT SERPL-MCNC: 0.9 MG/DL (ref 0.7–1.2)
EOSINOPHILS ABSOLUTE: 0.29 E9/L (ref 0.05–0.5)
EOSINOPHILS RELATIVE PERCENT: 3.7 % (ref 0–6)
GFR SERPL CREATININE-BSD FRML MDRD: >60 ML/MIN/1.73
GLUCOSE BLD-MCNC: 103 MG/DL (ref 74–99)
HCT VFR BLD CALC: 33.2 % (ref 37–54)
HEMOGLOBIN: 11 G/DL (ref 12.5–16.5)
IMMATURE GRANULOCYTES #: 0.01 E9/L
IMMATURE GRANULOCYTES %: 0.1 % (ref 0–5)
LYMPHOCYTES ABSOLUTE: 1.98 E9/L (ref 1.5–4)
LYMPHOCYTES RELATIVE PERCENT: 25.4 % (ref 20–42)
MCH RBC QN AUTO: 28.2 PG (ref 26–35)
MCHC RBC AUTO-ENTMCNC: 33.1 % (ref 32–34.5)
MCV RBC AUTO: 85.1 FL (ref 80–99.9)
MONOCYTES ABSOLUTE: 1.07 E9/L (ref 0.1–0.95)
MONOCYTES RELATIVE PERCENT: 13.7 % (ref 2–12)
NEUTROPHILS ABSOLUTE: 4.43 E9/L (ref 1.8–7.3)
NEUTROPHILS RELATIVE PERCENT: 56.7 % (ref 43–80)
PDW BLD-RTO: 14.5 FL (ref 11.5–15)
PLATELET # BLD: 218 E9/L (ref 130–450)
PMV BLD AUTO: 8.4 FL (ref 7–12)
POTASSIUM REFLEX MAGNESIUM: 3.9 MMOL/L (ref 3.5–5)
RBC # BLD: 3.9 E12/L (ref 3.8–5.8)
SODIUM BLD-SCNC: 135 MMOL/L (ref 132–146)
TOTAL PROTEIN: 6 G/DL (ref 6.4–8.3)
WBC # BLD: 7.8 E9/L (ref 4.5–11.5)

## 2023-03-13 PROCEDURE — 6370000000 HC RX 637 (ALT 250 FOR IP): Performed by: NURSE PRACTITIONER

## 2023-03-13 PROCEDURE — 36415 COLL VENOUS BLD VENIPUNCTURE: CPT

## 2023-03-13 PROCEDURE — 85730 THROMBOPLASTIN TIME PARTIAL: CPT

## 2023-03-13 PROCEDURE — 97165 OT EVAL LOW COMPLEX 30 MIN: CPT

## 2023-03-13 PROCEDURE — 99222 1ST HOSP IP/OBS MODERATE 55: CPT | Performed by: TRANSPLANT SURGERY

## 2023-03-13 PROCEDURE — 80053 COMPREHEN METABOLIC PANEL: CPT

## 2023-03-13 PROCEDURE — 99231 SBSQ HOSP IP/OBS SF/LOW 25: CPT | Performed by: INTERNAL MEDICINE

## 2023-03-13 PROCEDURE — 6360000002 HC RX W HCPCS: Performed by: STUDENT IN AN ORGANIZED HEALTH CARE EDUCATION/TRAINING PROGRAM

## 2023-03-13 PROCEDURE — 85025 COMPLETE CBC W/AUTO DIFF WBC: CPT

## 2023-03-13 RX ADMIN — ASPIRIN 81 MG: 81 TABLET, COATED ORAL at 08:00

## 2023-03-13 RX ADMIN — METOPROLOL SUCCINATE 25 MG: 25 TABLET, EXTENDED RELEASE ORAL at 08:00

## 2023-03-13 RX ADMIN — HEPARIN SODIUM 2290 UNITS: 1000 INJECTION INTRAVENOUS; SUBCUTANEOUS at 05:51

## 2023-03-13 ASSESSMENT — PAIN SCALES - GENERAL: PAINLEVEL_OUTOF10: 0

## 2023-03-13 NOTE — PROGRESS NOTES
[facility]        CARDIOLOGY                 INPATIENT PROGRESS NOTE          PATIENT SEEN IN FOLLOW UP FOR: NSTEMI    Hospital Day: 4     Bekah Bunch is a 80year old patient known to Dr. Lb Mendoza: Denies any CP, breathing OK - No family at bed side    ROS: Review of rest of 10 systems negative except as mentioned above    OBJECTIVE: No acute distress. See Assessment     Diagnostics:       Telemetry: Reviewed      No intake or output data in the 24 hours ending 03/13/23 0803    Labs:   CBC:   Recent Labs     03/12/23 0618 03/13/23 0420   WBC 8.5 7.8   HGB 10.5* 11.0*   HCT 32.5* 33.2*    218     BMP:   Recent Labs     03/12/23 0618 03/13/23 0420    135   K 3.7 3.9   CO2 23 23   BUN 29* 23   CREATININE 1.0 0.9   LABGLOM >60 >60   CALCIUM 8.3* 8.3*     Mag: No results for input(s): MG in the last 72 hours. Phos: No results for input(s): PHOS in the last 72 hours. TSH: No results for input(s): TSH in the last 72 hours. HgA1c:     BNP: No results for input(s): BNP in the last 72 hours. PT/INR: No results for input(s): PROTIME, INR in the last 72 hours.   APTT:  Recent Labs     03/12/23 0618 03/13/23 0420   APTT 53.6* 44.0*     CARDIAC ENZYMES:  Recent Labs     03/10/23  2156 03/10/23  2357 03/11/23  0104   TROPHS 25* 34* 50*     FASTING LIPID PANEL:  Lab Results   Component Value Date/Time    CHOL 159 12/08/2020 10:05 AM    HDL 69 12/08/2020 10:05 AM    LDLCALC 76 12/08/2020 10:05 AM    TRIG 68 12/08/2020 10:05 AM     LIVER PROFILE:  Recent Labs     03/12/23 0618 03/13/23 0420   AST 31 27   ALT 18 20   LABALBU 3.4* 3.2*       Current Inpatient Medications:   aspirin  81 mg Oral Daily    metoprolol succinate  25 mg Oral Daily    rosuvastatin  40 mg Oral QPM    sodium chloride flush  10 mL IntraVENous 2 times per day       IV Infusions (if any):   sodium chloride      heparin (PORCINE) Infusion 11 Units/kg/hr (03/13/23 0437)         PHYSICAL EXAM: CONSTITUTIONAL:   BP (!) 163/67   Pulse 61   Temp 97.9 °F (36.6 °C) (Oral)   Resp 18   Ht 5' 7\" (1.702 m)   Wt 160 lb 14.4 oz (73 kg)   SpO2 95%   BMI 25.20 kg/m²   Pulse  Av.8  Min: 61  Max: 67  Systolic (80OIF), AGV:182 , Min:125 , RMD:386    Diastolic (99QKO), TCW:19, Min:59, Max:70        CONST:  Well developed, appears stated age. Awake, alert and no apparent distress. HEENT:   Head- Normocephalic  Eyes- Conjunctivae pink, no icterus  Throat- Oral mucosa moist  Neck-  No stridor, no jugular venous distention. No carotid bruit. CHEST: Chest symmetrical and non-tender to palpation. No accessory muscle use  RESPIRATORY: Lung sounds - Few rhonchi  CARDIOVASCULAR:     Heart Inspection-OK  Heart Palpation- No thrills. Heart Ausculation- Regular rate and rhythm, 1/6 systolic murmur. No s3 or rub   EXT: No lower extremity edema. Distal pulses palpable, no cyanosis   ABDOMEN: Soft, non-tender to light palpation. Bowel sounds present. No abdominal bruit  MS: Good muscle strength    : Deferred  RECTAL: Deferred  SKIN: Warm and dry   NEURO / PSYCH: Oriented to person, place; Good mood and affect. IMPRESSION / RECOMMENDATIONS:       Elevated troponin - Likely  NSTEMI - He denies CP, EKG reviewed - Continue ASA, BB, Satin, Discontinue Heparin - No further testing      Hyperlipemia - On Statin       Coronary artery disease s/p CABG -  Continue ASA, BB, Satin       Essential hypertension - Monitor H/H       Pancreatic mass - Hepatobiliary consulted  - Per Dr Velma Corona to discharge today    F/u by Dr Denzel Acosta 2 weeks after discharge. Above d/w him and his son-in-law and his nursing staff.        Electronically signed by Yulissa Grullon MD on 3/13/2023   [facility]) Cardiology

## 2023-03-13 NOTE — PLAN OF CARE
Problem: Safety - Adult  Goal: Free from fall injury  3/13/2023 1352 by Saturnino Dill RN  Outcome: Completed  3/13/2023 0918 by Saturnino Dill RN  Outcome: Progressing  3/13/2023 0918 by Saturnino Dill RN  Outcome: Progressing     Problem: ABCDS Injury Assessment  Goal: Absence of physical injury  3/13/2023 1352 by Saturnino Dill RN  Outcome: Completed  3/13/2023 0918 by Saturnino Dill RN  Outcome: Progressing     Problem: Pain  Goal: Verbalizes/displays adequate comfort level or baseline comfort level  3/13/2023 1352 by Saturnino Dill RN  Outcome: Completed  3/13/2023 0918 by Saturnino Dill RN  Outcome: Progressing

## 2023-03-13 NOTE — CARE COORDINATION
Social Work / Discharge Planning : Patient admitted from [address] . Therapy am/pac supports A.L.. SNF is not an option at discharge under patient insurance due to patient did well with therapy. Sharma Brittle from [facility] updated and will need COVID. EMERY to follow.  Electronically signed by MARTHA Jose on 3/13/23 at 12:26 PM EDT

## 2023-03-13 NOTE — PROGRESS NOTES
Patients daughter, Kathryn Carver, called nurses station and expressed concerns that her  called to talk with the patient and he is very confused and they do not feel he is safe for discharge. Kathryn Carver and her  approached me at the nurses station yesterday and asked for a update and explanation of admitting diagnosis on her dad. I discussed with her in detail his admitting diagnosis, what cardiology's treatment plan is, lab results and what they mean, and what is to be expected. Patient is from [facility] at [facility] living. She mentioned that he is pretty independent and the workers help to prepare his meals. He walks with a walker and manages his daily care. Kathryn Carver mentioned her dad fell down a flight of steps approximately 2 weeks ago, all scans were negative for any breaks or fractures and he is following up with ortho next week. I talked with her about his confusion at night and that he constantly tries to get out of bed. She stated that was probably because he is used to doing things for himself at [facility] and is always up walking around doing little things. I discussed with them that PT scored him a 18/24 and what that meant. This morning I called Kathryn Carver to let her know that her dad will be discharge today as expected and discussed all weekend. She is now expressing concern that her dad is not stable to be discharged to [facility] as they will say he needs a higher level of care. I explained to her again the scores from PT over the weekend and also that OT assessed him today and gave him the same score, 18/24. She verbalized understanding. Shortly after talking with Kathryn Carver she called back and spoke with Usman Pollard RN, stated her  called and her dad is really confused and can not be discharged. I immediately approached Tashi's room expecting a change in condition.  Liv Woodson was sitting up in his bed, A&O x4. I stayed in the room with him for approximately 15 minutes, talking with him about current and past events in the media and with politics, he shared with me some of his past medical history, he was able to verbalized his satisfaction with the care that he has been receiving since his admission. I performed a full head to toe assessment. The patient did not display any of the behaviors that were reported by the family. I spoke with Dr. Chase Gamez and Mary Jo Singh RN about this so they would be aware. Per Dr. Bonner Dear, perform a set of orthostatic blood pressures and report them back to him.

## 2023-03-13 NOTE — PLAN OF CARE
Problem: Safety - Adult  Goal: Free from fall injury  3/13/2023 0918 by Funmilayo Ramos RN  Outcome: Progressing  3/13/2023 0918 by Funmilayo Ramos RN  Outcome: Progressing     Problem: ABCDS Injury Assessment  Goal: Absence of physical injury  Outcome: Progressing     Problem: Pain  Goal: Verbalizes/displays adequate comfort level or baseline comfort level  Outcome: Progressing

## 2023-03-13 NOTE — PROGRESS NOTES
Patient being discharged to [facility] and is being transported by family. Notified CMR of discharge, removed heart monitor, cleaned and placed in closet. Removed IV's, no bleeding noted. Band aid in place. Reviewed discharge instructions with patient and his son in law. Answered all questions to their satisfaction. Patient requested wheelchair transportation to the main lobby.

## 2023-03-13 NOTE — DISCHARGE SUMMARY
Internal Medicine Discharge Summary    NAME: Maynor Lemus :  1931  MRN:  [id number] PCP:No primary care provider on file. ADMITTED: 3/10/2023   DISCHARGED: 3/13/2023  2:19 PM    ADMITTING PHYSICIAN: No att. providers found    PCP: No primary care provider on file. CONSULTANT(S):   IP CONSULT TO CARDIOLOGY  IP CONSULT TO SOCIAL WORK  IP CONSULT TO CARDIOLOGY  IP CONSULT TO GENERAL SURGERY     ADMITTING DIAGNOSIS:   Elevated troponin [R77.8]  NSTEMI (non-ST elevated myocardial infarction) ([facility] [address] [I21.4]     Please see H&P for further details    DISCHARGE DIAGNOSES:   Active Hospital Problems    Diagnosis     Elevated troponin [R77.8]      Priority: Medium    NSTEMI (non-ST elevated myocardial infarction) ([facility] [address] [I21.4]      Priority: Medium    Anemia [D64.9]     Benign prostatic hyperplasia [N40.0]     Chronic kidney disease [N18.9]     Gastroesophageal reflux disease [K21.9]     Macular degeneration [H35.30]     Osteoarthritis [M19.90]     Overweight with body mass index (BMI) 25.0-29.9 [E66.3]     Primary malignant neoplasm of lung (HCC) [C34.90]     Sensorineural hearing loss (SNHL) of both ears [H90.3]     Essential hypertension [I10]     Hx of coronary artery disease [Z86.79]     Malignant neoplasm of lower lobe of left lung (HCC) [C34.32]     Asymptomatic bilateral carotid artery stenosis [I65.23]     Hyperlipemia [E78.5]        BRIEF HISTORY OF PRESENT ILLNESS: Maynor Lemus is a 80 y.o. male patient of No primary care provider on file. who  has a past medical history of BPH (benign prostatic hyperplasia), CAD (coronary artery disease), Carotid artery calcification, DJD (degenerative joint disease), History of falling, Hyperlipemia, Hypertension, Lung nodule, Macular degeneration, Numbness and tingling in left arm, Numbness and tingling of left side of face, S/P CABG x 6, and Shingles. who originally had concerns including Emesis (Has not been able to hold anything down since this afternoon). at presentation on 3/10/2023, and was found to have Elevated troponin [R77.8]  NSTEMI (non-ST elevated myocardial infarction) ([address] [I21.4] after workup. Please see H&P for further details. HOSPITAL COURSE:   The patient presented to the hospital with the chief complaint of Emesis (Has not been able to hold anything down since this afternoon)  . The patient was admitted to the hospital.     Nausea and vomiting  Improved  Resolved   Advance diet as tolerated     Non-ST elevation myocardial infarction  IV heparin drip to stop today  Cardiology consultation  No further testing planned     Pancreatic mass  This is not new its known  However per imaging may have grown in size  Hepatobiliary consultation -can be done as outpatient as is already established with [facility]  Consultation noted   OK for diet per surgery     Routine labs in am  IV heparin for DVT prophylaxis. Please see orders for further management and care. Routine labs in am  DVT prophylaxis  Please see orders for further management and care. Discharge: home today once heparin is stopped and cleared by cardiology       BRIEF PHYSICAL EXAMINATION AND LABORATORIES ON DAY OF DISCHARGE:  VITALS:  BP (!) 163/67   Pulse 61   Temp 97.9 °F (36.6 °C) (Oral)   Resp 18   Ht 5' 7\" (1.702 m)   Wt 160 lb 14.4 oz (73 kg)   SpO2 95%   BMI 25.20 kg/m²       Please see note from the same day.      LABS[de-identified]  Recent Labs     03/10/23  2046 03/12/23  0618 03/13/23  0420    135 135   K 4.1 3.7 3.9    105 102   CO2 22 23 23   BUN 28* 29* 23   CREATININE 1.0 1.0 0.9   GLUCOSE 115* 115* 103*   CALCIUM 9.1 8.3* 8.3*     Recent Labs     03/10/23  2046 03/12/23  0618 03/13/23  0420   ALKPHOS 99 91 85   ALT 18 18 20   AST 24 31 27   PROT 6.9 6.1* 6.0*   BILITOT 1.2 0.7 0.6   LABALBU 3.7 3.4* 3.2*     Recent Labs     03/11/23 0218 03/12/23 0618 03/13/23  0420   WBC 8.8 8.5 7.8   RBC 4.06 3.76* 3.90   HGB 11.4* 10.5* 11.0*   HCT 34.8* 32.5* 33.2*   MCV 85.7 86.4 85.1   MCH 28.1 27.9 28.2   MCHC 32.8 32.3 33.1   RDW 14.7 14.6 14.5    216 218   MPV 8.7 8.5 8.4     No results found for: LABA1C  Lab Results   Component Value Date    INR 1.0 08/20/2019    INR 1.3 05/22/2019    INR 1.0 09/13/2018    PROTIME 11.8 08/20/2019    PROTIME 15.0 (H) 05/22/2019    PROTIME 11.7 09/13/2018      Lab Results   Component Value Date    TSH 2.210 12/08/2020    TSH 5.780 (H) 04/26/2019    TSH 1.770 09/13/2018     Lab Results   Component Value Date    TRIG 68 12/08/2020    TRIG 114 10/03/2020    TRIG 114 04/26/2019    HDL 69 12/08/2020    HDL 54 10/03/2020    HDL 50 04/26/2019    LDLCALC 76 12/08/2020    LDLCALC 67 10/03/2020    LDLCALC 63 04/26/2019     No results for input(s): MG in the last 72 hours. No results for input(s): CKTOTAL, CKMB, TROPONINI in the last 72 hours. Recent Labs     03/10/23  2046   LACTA 1.2       IMAGING:  XR FEMUR RIGHT (MIN 2 VIEWS)    Result Date: 3/4/2023  EXAMINATION: 4 XRAY VIEWS OF THE RIGHT FEMUR 3/4/2023 10:26 am COMPARISON: None. HISTORY: ORDERING SYSTEM PROVIDED HISTORY: right hip pain, unable to ambulate TECHNOLOGIST PROVIDED HISTORY: Reason for exam:->right hip pain, unable to ambulate FINDINGS: Right hip arthroplasty appears intact. There is no evidence of acute fracture. There is normal alignment. No acute joint abnormality. No focal osseous lesion. No focal soft tissue abnormality. There is ASVD of the femoral artery. No acute osseous abnormality. CT ABDOMEN PELVIS W IV CONTRAST Additional Contrast? None    Result Date: 3/10/2023  EXAMINATION: CT OF THE ABDOMEN AND PELVIS WITH CONTRAST 3/10/2023 11:19 pm TECHNIQUE: CT of the abdomen and pelvis was performed with the administration of intravenous contrast. Multiplanar reformatted images are provided for review.  Automated exposure control, iterative reconstruction, and/or weight based adjustment of the mA/kV was utilized to reduce the radiation dose to as low as reasonably achievable. COMPARISON: None. HISTORY: ORDERING SYSTEM PROVIDED HISTORY: Nausea, Vomiting TECHNOLOGIST PROVIDED HISTORY: Reason for exam:->Nausea, Vomiting Additional Contrast?->None Decision Support Exception - unselect if not a suspected or confirmed emergency medical condition->Emergency Medical Condition (MA) FINDINGS: Lower Chest: Visualized lungs are normal.  Cardiomegaly.  Hiatal hernia. Organs: 2.0 cm cyst left lobe of the liver.  The spleen, adrenal glands, right kidney are normal.  3.5 cm left renal cyst.  1.5 cm low-attenuation lesion involving the uncinate process of the pancreas.  The gallbladder is contracted and contains possible sludge or debris. GI/Bowel: Normal large and small bowel. Pelvis: Unremarkable. Peritoneum/Retroperitoneum:   No free fluid or free air.  Calcified plaque involving normal caliber abdominal aorta and iliac arteries. Bones/Soft Tissues: Degenerative changes lumbar spine.  Bilateral hip arthroplasties.     No definitive findings to explain the patient's symptoms. 1.5 cm low-attenuation lesion involving the uncinate process.  This is increased in size compared to 10/04/2021 and worrisome for malignancy. Cardiomegaly. Hiatal hernia. RECOMMENDATIONS: MRI of the pancreas with gadolinium recommended on a nonemergent basis.     XR CHEST PORTABLE    Result Date: 3/10/2023  EXAMINATION: ONE XRAY VIEW OF THE CHEST 3/10/2023 9:05 pm COMPARISON: 10/04/2021 HISTORY: ORDERING SYSTEM PROVIDED HISTORY: N/V TECHNOLOGIST PROVIDED HISTORY: Reason for exam:->N/V FINDINGS: The lungs are without acute focal process.  There is no effusion or pneumothorax.  Stable heart size.  The osseous structures are without acute process.  Sternotomy wires noted.     No acute process.     CT FEMUR RIGHT WO CONTRAST    Result Date: 3/4/2023  EXAMINATION: CT OF THE RIGHT FEMUR WITH CONTRAST 3/4/2023 12:54 pm TECHNIQUE: CT of the right femur was performed with the administration of intravenous contrast.  Multiplanar  reformatted images are provided for review. Automated exposure control, iterative reconstruction, and/or weight based adjustment of the mA/kV was utilized to reduce the radiation dose to as low as reasonably achievable. COMPARISON: Plain films of the right femur dated 03/04/2023 HISTORY ORDERING SYSTEM PROVIDED HISTORY: pain unable to ambulate or bare wt TECHNOLOGIST PROVIDED HISTORY: Reason for exam:->pain unable to ambulate or bare wt Decision Support Exception - unselect if not a suspected or confirmed emergency medical condition->Emergency Medical Condition (MA) FINDINGS: Bones: No evidence of acute fracture or dislocation. No aggressive appearing osseous abnormality or periostitis. There is a total hip arthroplasty. The prosthetic components appear well aligned with no CT evidence of loosening. Many of the images are degraded by artifact from the hardware. There is generalized osteopenia. Soft Tissue: No significant soft tissue edema or fluid collections. The visualized portion of the pelvis demonstrates right-sided bladder diverticulum. No obvious pelvic mass or lymphadenopathy is seen. There is extensive arterial calcified plaque in the pelvic vessels and in the right SFA. Joint: Bilateral hip arthroplasties noted. No osseous erosions. 1. No evidence of acute osseous abnormality. 2. Generalized osteopenia. XR HIP 2-3 VW W PELVIS RIGHT    Result Date: 3/4/2023  EXAMINATION: ONE XRAY VIEW OF THE PELVIS AND TWO XRAY VIEWS RIGHT HIP 3/4/2023 10:26 am COMPARISON: None. HISTORY: ORDERING SYSTEM PROVIDED HISTORY: right hip pain, unable to ambulate TECHNOLOGIST PROVIDED HISTORY: Reason for exam:->right hip pain, unable to ambulate FINDINGS: Bilateral hip joint arthroplasties. Bone density is mildly diminished. No acute fracture or subluxation. Pelvis appears intact peer     Right hip arthroplasty, no evidence of complication.        MICROBIOLOGY:  BLOOD CX #1  No results for input(s): BC in the last 72 hours. BLOOD CX #2  No results for input(s): Milly Casas in the last 72 hours. TIP CULTURE  No results for input(s): CXCATHTIP in the last 72 hours. CULTURE, RESPIRATORY   No results for input(s): CULTRESP in the last 72 hours. RESPIRATORY SMEAR  No results for input(s): RESPSMEAR in the last 72 hours. ECHO:      DISPOSITION:  The patient's condition is good. The patient is being discharged to [place]    DISCHARGE MEDICATIONS:      Medication List        CONTINUE taking these medications      aspirin 81 MG EC tablet  Take 1 tablet by mouth daily     CALCIUM 1200+D3 PO     CENTRUM-LUTEIN PO     Crestor 40 MG tablet  Generic drug: rosuvastatin     fish oil 1000 MG capsule     metoprolol succinate 25 MG extended release tablet  Commonly known as: TOPROL XL     Vitamin D3 50 MCG (2000 UT) Caps              Discharge Medication List as of 3/13/2023  1:15 PM        Discharge Medication List as of 3/13/2023  1:15 PM        Discharge Medication List as of 3/13/2023  1:15 PM          INTERNAL MEDICINE FOLLOW UP/INSTRUCTIONS:  Follow-up with primary care physician within 1 week of discharge from hospital  Please review changes to pre-hospital admission medications and prescriptions for new medications upon discharge from the hospital with PCP  Please review results of labs and imaging studies with PCP  Follow-up with consultants as directed by them   If recurrence or worsening of symptoms please call primary care physician or return to the ER immediately  Diet: ADULT DIET; Regular    Preparing for this patient's discharge, including paperwork, orders, instructions, and meeting with patient did required >35 minutes.     Electronically signed by Jero Zapata MD on 3/13/2023 at 4:17 PM

## 2023-03-13 NOTE — PROGRESS NOTES
Internal Medicine Progress Note    Admission date: 3/10/2023  Primary care physician: No primary care provider on file. Subjective  Zo Rea was seen and examined at bedside today. Doing well today   Son in law at bedside   Initially tells me he is having some light headedness in bed   Nursing staff checked ortho vitals about 30 minutes later   He did not have any symptoms while these were being checked he tells me   After he got up and moved around he said he felt much better   He feels good about the prospect of leaving the hospital today   He denies chest pain sob abdominal pain constipation diarrhea dysuria  All questions answered to patient and son in law     Review of Systems  There are no new complaints of chest pain, shortness of breath, abdominal pain, nausea, vomiting, diarrhea, constipation, headaches, fevers, chills, lightheadedness, dizziness, calf pain.     Hospital Medications  Current Facility-Administered Medications   Medication Dose Route Frequency Provider Last Rate Last Admin    melatonin tablet 9 mg  9 mg Oral Nightly PRN Kari Mushtaq, APRN - CNP   9 mg at 03/12/23 2126    aspirin EC tablet 81 mg  81 mg Oral Daily Kari Mushtaq, APRN - CNP   81 mg at 03/13/23 0800    metoprolol succinate (TOPROL XL) extended release tablet 25 mg  25 mg Oral Daily Kari Mushtaq, APRN - CNP   25 mg at 03/13/23 0800    rosuvastatin (CRESTOR) tablet 40 mg  40 mg Oral QPM Kari Mushtaq, APRN - CNP   40 mg at 03/12/23 1821    sodium chloride flush 0.9 % injection 10 mL  10 mL IntraVENous 2 times per day Kari Mushtaq, APRN - CNP   10 mL at 03/12/23 0925    sodium chloride flush 0.9 % injection 10 mL  10 mL IntraVENous PRN Kari Mushtaq, APRN - CNP        0.9 % sodium chloride infusion   IntraVENous PRN Kari Mushtaq, APRN - CNP        potassium chloride (KLOR-CON M) extended release tablet 40 mEq  40 mEq Oral PRN Kari Mushtaq, APRN - CNP        Or    potassium bicarb-citric acid (EFFER-K) effervescent tablet 40 mEq  40 mEq Oral PRN Glenice Bourgeois, APRN - CNP        Or    potassium chloride 10 mEq/100 mL IVPB (Peripheral Line)  10 mEq IntraVENous PRN Glenice Bourgeois, APRN - CNP        ondansetron (ZOFRAN-ODT) disintegrating tablet 4 mg  4 mg Oral Q8H PRN Glenice Bourgeois, APRN - CNP        Or    ondansetron (ZOFRAN) injection 4 mg  4 mg IntraVENous Q6H PRN Glenice Bourgeois, APRN - CNP        senna (SENOKOT) tablet 8.6 mg  1 tablet Oral Daily PRN Glenice Bourgeois, APRN - CNP        acetaminophen (TYLENOL) tablet 650 mg  650 mg Oral Q6H PRN Glenice Bourgeois, APRN - CNP        Or    acetaminophen (TYLENOL) suppository 650 mg  650 mg Rectal Q6H PRN Glenice Bourgeois, APRN - CNP        heparin (porcine) injection 4,570 Units  60 Units/kg IntraVENous PRN Beth Rook, DO        heparin (porcine) injection 2,290 Units  30 Units/kg IntraVENous PRN Beth Rook, DO   2,290 Units at 03/13/23 0551    heparin 25,000 units in dextrose 5% 250 mL (premix) infusion  5-30 Units/kg/hr IntraVENous Continuous Beth Rook, DO 8.4 mL/hr at 03/13/23 0550 11 Units/kg/hr at 03/13/23 0550       PRN Medications  melatonin, sodium chloride flush, sodium chloride, potassium chloride **OR** potassium alternative oral replacement **OR** potassium chloride, ondansetron **OR** ondansetron, senna, acetaminophen **OR** acetaminophen, heparin (porcine), heparin (porcine)    Objective  Most Recent Recorded Vitals  BP (!) 163/67   Pulse 61   Temp 97.9 °F (36.6 °C) (Oral)   Resp 18   Ht 5' 7\" (1.702 m)   Wt 160 lb 14.4 oz (73 kg)   SpO2 95%   BMI 25.20 kg/m²   No intake/output data recorded. I/O this shift:  In: -   Out: 1375 [Urine:1375]    Physical Exam:  General: AAO to person, place, time, and purpose, NAD, no labored breathing  Eyes: conjunctivae/corneas clear, sclera non icteric. Skin: color, texture, and turgor normal, no rashes or lesions.   Lungs: clear to auscultation bilaterally, no retractions or use of accessory muscles, no vocal fremitus, no rhonchi, no crackle, no rales  Heart: regular rate and regular rhythm, no murmur  Abdomen: soft, non-tender; bowel sounds normal; no masses,  no organomegaly  Extremities: atraumatic, no cyanosis, no edema  Neurologic: cranial nerves 2-12 grossly intact, no slurred speech. Most Recent Labs  Lab Results   Component Value Date    WBC 7.8 03/13/2023    HGB 11.0 (L) 03/13/2023    HCT 33.2 (L) 03/13/2023     03/13/2023     03/13/2023    K 3.9 03/13/2023     03/13/2023    CREATININE 0.9 03/13/2023    BUN 23 03/13/2023    CO2 23 03/13/2023    GLUCOSE 103 (H) 03/13/2023    ALT 20 03/13/2023    AST 27 03/13/2023    INR 1.0 08/20/2019    TSH 2.210 12/08/2020       *All labs in electric medical record were reviewed. Please see electronic chart for a more comprehensive set of labs    XR FEMUR RIGHT (MIN 2 VIEWS)    Result Date: 3/4/2023  EXAMINATION: 4 XRAY VIEWS OF THE RIGHT FEMUR 3/4/2023 10:26 am COMPARISON: None. HISTORY: ORDERING SYSTEM PROVIDED HISTORY: right hip pain, unable to ambulate TECHNOLOGIST PROVIDED HISTORY: Reason for exam:->right hip pain, unable to ambulate FINDINGS: Right hip arthroplasty appears intact. There is no evidence of acute fracture. There is normal alignment. No acute joint abnormality. No focal osseous lesion. No focal soft tissue abnormality. There is ASVD of the femoral artery. No acute osseous abnormality. CT ABDOMEN PELVIS W IV CONTRAST Additional Contrast? None    Result Date: 3/10/2023  EXAMINATION: CT OF THE ABDOMEN AND PELVIS WITH CONTRAST 3/10/2023 11:19 pm TECHNIQUE: CT of the abdomen and pelvis was performed with the administration of intravenous contrast. Multiplanar reformatted images are provided for review. Automated exposure control, iterative reconstruction, and/or weight based adjustment of the mA/kV was utilized to reduce the radiation dose to as low as reasonably achievable. COMPARISON: None.  HISTORY: ORDERING SYSTEM PROVIDED HISTORY: Nausea, Vomiting TECHNOLOGIST PROVIDED HISTORY: Reason for exam:->Nausea, Vomiting Additional Contrast?->None Decision Support Exception - unselect if not a suspected or confirmed emergency medical condition->Emergency Medical Condition (MA) FINDINGS: Lower Chest: Visualized lungs are normal.  Cardiomegaly. Hiatal hernia. Organs: 2.0 cm cyst left lobe of the liver. The spleen, adrenal glands, right kidney are normal.  3.5 cm left renal cyst.  1.5 cm low-attenuation lesion involving the uncinate process of the pancreas. The gallbladder is contracted and contains possible sludge or debris. GI/Bowel: Normal large and small bowel. Pelvis: Unremarkable. Peritoneum/Retroperitoneum:   No free fluid or free air. Calcified plaque involving normal caliber abdominal aorta and iliac arteries. Bones/Soft Tissues: Degenerative changes lumbar spine. Bilateral hip arthroplasties. No definitive findings to explain the patient's symptoms. 1.5 cm low-attenuation lesion involving the uncinate process. This is increased in size compared to 10/04/2021 and worrisome for malignancy. Cardiomegaly. Hiatal hernia. RECOMMENDATIONS: MRI of the pancreas with gadolinium recommended on a nonemergent basis. XR CHEST PORTABLE    Result Date: 3/10/2023  EXAMINATION: ONE XRAY VIEW OF THE CHEST 3/10/2023 9:05 pm COMPARISON: 10/04/2021 HISTORY: ORDERING SYSTEM PROVIDED HISTORY: N/V TECHNOLOGIST PROVIDED HISTORY: Reason for exam:->N/V FINDINGS: The lungs are without acute focal process. There is no effusion or pneumothorax. Stable heart size. The osseous structures are without acute process. Sternotomy wires noted. No acute process. CT FEMUR RIGHT WO CONTRAST    Result Date: 3/4/2023  EXAMINATION: CT OF THE RIGHT FEMUR WITH CONTRAST 3/4/2023 12:54 pm TECHNIQUE: CT of the right femur was performed with the administration of intravenous contrast.  Multiplanar reformatted images are provided for review. Automated exposure control, iterative reconstruction, and/or weight based adjustment of the mA/kV was utilized to reduce the radiation dose to as low as reasonably achievable. COMPARISON: Plain films of the right femur dated 03/04/2023 HISTORY ORDERING SYSTEM PROVIDED HISTORY: pain unable to ambulate or bare wt TECHNOLOGIST PROVIDED HISTORY: Reason for exam:->pain unable to ambulate or bare wt Decision Support Exception - unselect if not a suspected or confirmed emergency medical condition->Emergency Medical Condition (MA) FINDINGS: Bones: No evidence of acute fracture or dislocation. No aggressive appearing osseous abnormality or periostitis. There is a total hip arthroplasty. The prosthetic components appear well aligned with no CT evidence of loosening. Many of the images are degraded by artifact from the hardware. There is generalized osteopenia. Soft Tissue: No significant soft tissue edema or fluid collections. The visualized portion of the pelvis demonstrates right-sided bladder diverticulum. No obvious pelvic mass or lymphadenopathy is seen. There is extensive arterial calcified plaque in the pelvic vessels and in the right SFA. Joint: Bilateral hip arthroplasties noted. No osseous erosions. 1. No evidence of acute osseous abnormality. 2. Generalized osteopenia. XR HIP 2-3 VW W PELVIS RIGHT    Result Date: 3/4/2023  EXAMINATION: ONE XRAY VIEW OF THE PELVIS AND TWO XRAY VIEWS RIGHT HIP 3/4/2023 10:26 am COMPARISON: None. HISTORY: ORDERING SYSTEM PROVIDED HISTORY: right hip pain, unable to ambulate TECHNOLOGIST PROVIDED HISTORY: Reason for exam:->right hip pain, unable to ambulate FINDINGS: Bilateral hip joint arthroplasties. Bone density is mildly diminished. No acute fracture or subluxation. Pelvis appears intact peer     Right hip arthroplasty, no evidence of complication.            Assessment/Plan  Philippe Vicente is a 80y.o. year old male who presented on 3/10/2023 and is being treated for Elevated troponin . Complete Problem List:    Patient Active Problem List    Diagnosis Date Noted    Elevated troponin 03/11/2023    NSTEMI (non-ST elevated myocardial infarction) ([facility] [address] 03/11/2023    Anemia 12/20/2021    Benign prostatic hyperplasia 12/20/2021    Chronic kidney disease 12/20/2021    Gastroesophageal reflux disease 12/20/2021    Macular degeneration 12/20/2021    Osteoarthritis 12/20/2021    Overweight with body mass index (BMI) 25.0-29.9 12/20/2021    Primary malignant neoplasm of lung ([facility] [address] 12/20/2021    Sensorineural hearing loss (SNHL) of both ears 12/20/2021    Essential hypertension     Hx of coronary artery disease     Malignant neoplasm of lower lobe of left lung ([facility] [address] 04/22/2019    Asymptomatic bilateral carotid artery stenosis 05/23/2016    Hyperlipemia        Nausea and vomiting  Improved  Resolved   Advance diet as tolerated     Non-ST elevation myocardial infarction  IV heparin drip to stop today  Cardiology consultation  No further testing planned     Pancreatic mass  This is not new its known  However per imaging may have grown in size  Hepatobiliary consultation -can be done as outpatient as is already established with [facility]  Consultation noted   OK for diet per surgery     Routine labs in am  IV heparin for DVT prophylaxis. Please see orders for further management and care. Routine labs in am  DVT prophylaxis  Please see orders for further management and care. Discharge: home today once heparin is stopped and cleared by cardiology     Electronically signed by Magaly Gutierrez MD on 3/13/2023 at 8:53 AM    NOTE:  This report was transcribed using voice recognition software. Every effort was made to ensure accuracy; however, inadvertent computerized transcription errors may be present.

## 2023-03-13 NOTE — PLAN OF CARE
Problem: Safety - Adult  Goal: Free from fall injury  3/13/2023 1352 by Diann Nguyen RN  Outcome: Completed  3/13/2023 0918 by Diann Nguyen RN  Outcome: Progressing  3/13/2023 0918 by Diann Nguyen RN  Outcome: Progressing     Problem: ABCDS Injury Assessment  Goal: Absence of physical injury  3/13/2023 1352 by Diann Nguyen RN  Outcome: Completed  3/13/2023 0918 by Diann Nguyen RN  Outcome: Progressing     Problem: Pain  Goal: Verbalizes/displays adequate comfort level or baseline comfort level  3/13/2023 1352 by Diann Nguyen RN  Outcome: Completed  3/13/2023 0918 by Diann Nguyen RN  Outcome: Progressing

## 2023-03-13 NOTE — PROGRESS NOTES
Occupational Therapy    OCCUPATIONAL THERAPY INITIAL EVALUATION    [place], [place]         MMOZ:                                                  Patient Name: Anuj Sky    MRN: [id number]    : 1931    Room: [address] [place], [place] [address]      Evaluating OT: Thyra Cure OTR/L   [id number]      Referring ABHISHEK Ferraro CNP    Specific Provider Orders/Date:OT eval and treat 3/11/2023      Diagnosis:  Elevated troponin [R77.8]  NSTEMI (non-ST elevated myocardial infarction) [facility]) [I21.4]     Pertinent Medical History: DJD, HTN, CABG      Precautions:  Fall Risk,      Assessment of current deficits    [x] Functional mobility  [x]ADLs  [x] Strength               []Cognition    [x] Functional transfers   [x] IADLs         [x] Safety Awareness   [x]Endurance    [] Fine Coordination              [x] Balance      [] Vision/perception   []Sensation     []Gross Motor Coordination  [] ROM  [] Delirium                   [] Motor Control     OT PLAN OF CARE   OT POC based on physician orders, patient diagnosis and results of clinical assessment    Frequency/Duration  2-3 days/wk for 2 weeks PRN   Specific OT Treatment Interventions to include:   ADL retraining/adapted techniques and AE recommendations to increase functional independence within precautions                    Energy conservation techniques to improve tolerance for selfcare routine   Functional transfer/mobility training/DME recommendations for increased independence, safety and fall prevention         Patient/family education to increase safety and functional independence             Environmental modifications for safe mobility and completion of ADLs                             Therapeutic activity to improve functional performance during ADLs.                                          Therapeutic exercise to improve tolerance and functional strength for ADLs Balance retraining/tolerance tasks for facilitation of postural control with dynamic challenges during ADLs . Positioning to improve functional independence      Recommended Adaptive Equipment: TBD     Home Living: Pt lives at [facility], uses walker and wheelchair,  assist as needed with ADLs     Pain Level: no pain this session ;   Cognition: A&O: pleasant, following commands, conversing    Memory:  fair +   Sequencing:  fair+   Problem solving:  fair+   Judgement/safety:  fair+     Functional Assessment:  AM-PAC Daily Activity Raw Score: 18/24   Initial Eval Status  Date: 3/13/2023 Treatment Status  Date: STGs = LTGs  Time frame: 10-14 days   Feeding Independent      Grooming SBA/set-up   Mod i   UB Dressing SBA/set-up   Mod I    LB Dressing SBA  Patient able to lift and cross leg to reach socks while seated in chair   Mod I    Bathing SBA  Mod I    Toileting SBA  Independent    Bed Mobility  Up in chair upon entry   SBA  Sit- supine  Indepedndent    Functional Transfers SBA  Sit- stand from chair   Mod I    Functional Mobility SBA, w/walker   Ambulating next to bed   Supervision  with good tolerance    Balance Sitting:     Static:  Independent     Dynamic:SBA   Standing: SBA   Independent    Activity Tolerance No SOB  Good  with ADL activity    Visual/  Perceptual Glasses: none by bedside                Hand Dominance right   AROM (PROM) Strength Additional Info:    RUE  WFL WFL good  and wfl FMC/dexterity noted during ADL tasks       LUE WFL WFl good  and wfl FMC/dexterity noted during ADL tasks       Hearing: WFL   Sensation:  No c/o numbness or tingling   Tone: WFL  Edema: none observed     Comments: Upon arrival patient sitting in chair . At end of session, patient  lying in bed  with call light and phone within reach, all lines and tubes intact. *ALARM On     Overall patient demonstrated  decreased independence and safety during completion of ADL/functional transfer/mobility tasks.   Pt would benefit from continued skilled OT to increase safety and independence with completion of ADL/IADL tasks for functional independence and quality of life. Rehab Potential: good for established goals     Patient / Family Goal: return home      Patient and/or family were instructed on functional diagnosis, prognosis/goals and OT plan of care. Demonstrated fair+ understanding. Eval Complexity: Low    Time In: 1005  Time Out: 1016      Min Units   OT Eval Low 97165 x  1   OT Eval Medium 76621      OT Eval High 22228      OT Re-Eval [id number]       Therapeutic Ex 40551      Therapeutic Activities 04599       ADL/Self Care 02623       Orthotic Management 95654       Manual 11718     Neuro Re-Ed 10463       Non-Billable Time          Evaluation Time additionally includes thorough review of current medical information, gathering information on past medical history/social history and prior level of function, interpretation of standardized testing/informal observation of tasks, assessment of data and development of plan of care and goals.             Patt Rabago  OTR/L  OT [id number]

## 2023-03-13 NOTE — PLAN OF CARE
Problem: Safety - Adult  Goal: Free from fall injury  3/13/2023 1352 by Funmilayo Ramos RN  Outcome: Completed  3/13/2023 0918 by Funmilayo Ramos RN  Outcome: Progressing  3/13/2023 0918 by Funmilayo Ramos RN  Outcome: Progressing     Problem: ABCDS Injury Assessment  Goal: Absence of physical injury  3/13/2023 1352 by Funmilayo Ramos RN  Outcome: Completed  3/13/2023 0918 by Funmilayo Ramos RN  Outcome: Progressing     Problem: Pain  Goal: Verbalizes/displays adequate comfort level or baseline comfort level  3/13/2023 1352 by Funmilayo Ramos RN  Outcome: Completed  3/13/2023 0918 by Funmilayo Ramos RN  Outcome: Progressing

## 2023-03-13 NOTE — CONSULTS
HEPATOBILIARY AND PANCREATIC SURGERY  CONSULT NOTE  3/13/2023    Physician Consulted: Dr. Jose Crews  Reason for Consult: Pancreatic mass  Referring Physician: Dr. Cassy SWAN  Bryan Astudillo is a 80 y.o. male with history of HTN, HLD, CAD s/p  CABG as well as known cystic pancreatic mass previously undergoing imaging surveillance as an outpatient initially presented to ED secondary to multiple episodes of nausea/vomiting. Patient states he had multiple episodes over short period of time prior to arrival to the ED. Patient's states his emesis was primarily gastric contents/food he had eaten. Patient denies any further episodes of nausea/vomiting since admit. Patient states he is passing flatus and having BMs. Patient denies any abdominal pain. Patient was previously followed as an outpatient for a 2 cm pancreatic head cystic lesion with most recent visit 9/2022 where the patient declined any further surveillance with yearly imaging. 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 Secondary to patient's nausea and vomiting in the ED patient underwent CT abdomen pelvis with IV contrast which demonstrated a 1.5 cm cystic lesion in the pancreas. Hepatobiliary and pancreatic surgery was consulted secondary to this imaging finding. Patient was also noted to have elevated troponins in the ED and was started on heparin drip and admitted for further work-up/management of NSTEMI.       Past Medical History:   Diagnosis Date    BPH (benign prostatic hyperplasia)     CAD (coronary artery disease)     Carotid artery calcification     DJD (degenerative joint disease)     History of falling     Hyperlipemia     Hypertension     Lung nodule     Macular degeneration     Numbness and tingling in left arm 02/02/2018    Numbness and tingling of left side of face 02/02/2018    S/P CABG x 6 2000    LIMA-LAD, LYNNETTE-LCx,SVG-OM,SVG-LPL,SVG-PDA-RPL    Shingles        Past Surgical History:   Procedure Laterality Date    CARDIAC SURGERY      6 vessel in 2000    DENTAL SURGERY      root canal    DIAGNOSTIC CARDIAC CATH LAB PROCEDURE  9/5/00    LUNG REMOVAL, PARTIAL Left     march    OTHER SURGICAL HISTORY  02/16/2011    Injection right hip  ADRIANNE Doan MD    PROSTATE SURGERY Bilateral 05/27/2019    THORACOSCOPY Left 4/12/2019    BRONCHOSCOPY LEFT THORACOSCOPY ROBOTIC VIDEO ASSISTED , WEDGE RESECTION, POSS. LEFT LOWER LOBECTOMY performed by Baron Clayton MD at [address] [facility] [address] Right 03/28/2011    Right BRIONNA  Dandre Trejo MD    TOTAL HIP ARTHROPLASTY Left 11/09/2009    Left Coleen Arce MD    TURP      TURP N/A 5/30/2019    CYSTOSCOPY, RETROGRADE PYELOGRAM,  URERTHRAL DILITATION, TRANSURETHRAL RESECTION PROSTATE performed by Lore Galvan MD at [facility] OR       Medications Prior to Admission:    Prior to Admission medications    Medication Sig Start Date End Date Taking? Authorizing Provider   rosuvastatin (CRESTOR) 40 MG tablet Take 40 mg by mouth every evening    Historical Provider, MD   Cholecalciferol (VITAMIN D3) 50 MCG (2000 UT) CAPS Take by mouth    Historical Provider, MD   aspirin 81 MG EC tablet Take 1 tablet by mouth daily 10/26/19   Marybeth Schmid MD   metoprolol succinate (TOPROL XL) 25 MG extended release tablet Take 25 mg by mouth daily Take 1/2 tablet daily    Historical Provider, MD   Calcium-Magnesium-Vitamin D (CALCIUM 1200+D3 PO) Take 1 tablet by mouth daily. Historical Provider, MD   Multiple Vitamins-Minerals (CENTRUM-LUTEIN PO) Take 1 tablet by mouth daily. Historical Provider, MD   Omega-3 Fatty Acids (FISH OIL) 1000 MG CAPS Take 1,000 mg by mouth daily.     Historical Provider, MD       Allergies   Allergen Reactions    Nitroglycerin     Sulfamethoxazole      Other reaction(s): unable to sleep    Trimethoprim      Other reaction(s): unable to sleep Biaxin [Clarithromycin] Rash    Iodine Swelling and Rash     Internal Iodine only    Quinolones Rash       Family History   Problem Relation Age of Onset    Other Mother         accident age 32 years , Tashi ATWOOD was 10 months old    Other Father         accident age 42 decesed 10 years when Father passed    Heart Disease Brother     Alzheimer's Disease Sister     Heart Disease Brother     Heart Disease Brother        Social History     Tobacco Use    Smoking status: Former     Packs/day: 1.00     Years: 10.00     Pack years: 10.00     Types: Cigarettes     Start date: 1978     Quit date: 1988     Years since quittin.5    Smokeless tobacco: Never   Vaping Use    Vaping Use: Never used   Substance Use Topics    Alcohol use: Not Currently     Comment: socially    Drug use: No         Review of Systems reviewed and otherwise negative unless noted in HPI    PHYSICAL EXAM:    Vitals:    23 193   BP: (!) 163/67   Pulse: 61   Resp: 18   Temp: 97.9 °F (36.6 °C)   SpO2: 95%       General Appearance:  awake, alert, oriented, in no acute distress  Skin:  Skin color, texture, turgor normal. No rashes or lesions.  Head/face:  NCAT  Eyes:  No gross abnormalities.  Lungs:  Normal expansion.  Clear to auscultation.  No rales, rhonchi, or wheezing.  Heart:  Heart regular rate and rhythm  Abdomen: Soft, nondistended, nontender without rebound/guarding/rigidity.  Extremities: pulses present in all extremities and trace pedal edema  LABS:    CBC  Recent Labs     23  0420   WBC 7.8   HGB 11.0*   HCT 33.2*        BMP  Recent Labs     23  0420      K 3.9      CO2 23   BUN 23   CREATININE 0.9   CALCIUM 8.3*     Liver Function  Recent Labs     03/10/23  2046 23  0618 23  0420   LIPASE 40  --   --    BILITOT 1.2   < > 0.6   AST 24   < > 27   ALT 18   < > 20   ALKPHOS 99   < > 85   PROT 6.9   < > 6.0*   LABALBU 3.7   < > 3.2*    < > = values in this interval not displayed.  No results for input(s): LACTATE in the last 72 hours. No results for input(s): INR, PTT in the last 72 hours. Invalid input(s): PT    RADIOLOGY    CT ABDOMEN PELVIS W IV CONTRAST Additional Contrast? None    Result Date: 3/10/2023  EXAMINATION: CT OF THE ABDOMEN AND PELVIS WITH CONTRAST 3/10/2023 11:19 pm TECHNIQUE: CT of the abdomen and pelvis was performed with the administration of intravenous contrast. Multiplanar reformatted images are provided for review. Automated exposure control, iterative reconstruction, and/or weight based adjustment of the mA/kV was utilized to reduce the radiation dose to as low as reasonably achievable. COMPARISON: None. HISTORY: ORDERING SYSTEM PROVIDED HISTORY: Nausea, Vomiting TECHNOLOGIST PROVIDED HISTORY: Reason for exam:->Nausea, Vomiting Additional Contrast?->None Decision Support Exception - unselect if not a suspected or confirmed emergency medical condition->Emergency Medical Condition (MA) FINDINGS: Lower Chest: Visualized lungs are normal.  Cardiomegaly. Hiatal hernia. Organs: 2.0 cm cyst left lobe of the liver. The spleen, adrenal glands, right kidney are normal.  3.5 cm left renal cyst.  1.5 cm low-attenuation lesion involving the uncinate process of the pancreas. The gallbladder is contracted and contains possible sludge or debris. GI/Bowel: Normal large and small bowel. Pelvis: Unremarkable. Peritoneum/Retroperitoneum:   No free fluid or free air. Calcified plaque involving normal caliber abdominal aorta and iliac arteries. Bones/Soft Tissues: Degenerative changes lumbar spine. Bilateral hip arthroplasties. No definitive findings to explain the patient's symptoms. 1.5 cm low-attenuation lesion involving the uncinate process. This is increased in size compared to 10/04/2021 and worrisome for malignancy. Cardiomegaly. Hiatal hernia. RECOMMENDATIONS: MRI of the pancreas with gadolinium recommended on a nonemergent basis.      XR CHEST PORTABLE    Result Date: 3/10/2023  EXAMINATION: ONE XRAY VIEW OF THE CHEST 3/10/2023 9:05 pm COMPARISON: 10/04/2021 HISTORY: ORDERING SYSTEM PROVIDED HISTORY: N/V TECHNOLOGIST PROVIDED HISTORY: Reason for exam:->N/V FINDINGS: The lungs are without acute focal process. There is no effusion or pneumothorax. Stable heart size. The osseous structures are without acute process. Sternotomy wires noted. No acute process. ASSESSMENT:  80 y.o. male currently admitted with NSTEMI with known cystic lesion at the pancreatic head likely IPMN    PLAN:  -Imaging reviewed which demonstrated a cystic lesion that is largely unchanged from prior imaging. Patient had MRCP performed 3/14/2022 which demonstrated a 2 cm cystic lesion with thin septation consistent with sidebranch IPMN. Patient followed up 9/2022 and it declined any further surveillance with imaging secondary to his age and unchanged size from prior imaging.  -No plans for acute surgical intervention  -No plans for further surveillance  -Discussed with Dr. Cherelle Lovell for diet from surgery perspective. Electronically signed by Angela Patel DO on 3/13/23 at 6:35 AM EDT    Attending Physician Statement:    Chief Complaint:   Chief Complaint   Patient presents with    Emesis     Has not been able to hold anything down since this afternoon       I have examined the patient and performed the key aspects of physical exam, reviewed the record (including all pertinent and new radiology images and laboratory findings), and discussed the case with the surgical team.  I agree with the assessment and plan with the following additions, corrections, and changes. 14pt review of symptoms completed and negative except as mentioned.     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

## 2023-03-16 ENCOUNTER — OUTSIDE SERVICES (OUTPATIENT)
Dept: PRIMARY CARE CLINIC | Age: 88
End: 2023-03-16

## 2023-03-16 DIAGNOSIS — Z95.1 S/P CABG (CORONARY ARTERY BYPASS GRAFT): ICD-10-CM

## 2023-03-16 DIAGNOSIS — E78.2 MIXED HYPERLIPIDEMIA: ICD-10-CM

## 2023-03-16 DIAGNOSIS — J44.9 CHRONIC OBSTRUCTIVE PULMONARY DISEASE, UNSPECIFIED COPD TYPE (HCC): ICD-10-CM

## 2023-03-16 DIAGNOSIS — I10 ESSENTIAL HYPERTENSION: Primary | ICD-10-CM

## 2023-03-16 DIAGNOSIS — C34.32 MALIGNANT NEOPLASM OF LOWER LOBE OF LEFT LUNG (HCC): ICD-10-CM

## 2023-03-16 DIAGNOSIS — I21.4 NSTEMI (NON-ST ELEVATED MYOCARDIAL INFARCTION) (HCC): ICD-10-CM

## 2023-03-22 ENCOUNTER — OFFICE VISIT (OUTPATIENT)
Dept: ORTHOPEDIC SURGERY | Age: 88
End: 2023-03-22

## 2023-03-22 DIAGNOSIS — M25.551 RIGHT HIP PAIN: Primary | ICD-10-CM

## 2023-03-22 NOTE — LETTER
March 22, 2023       Nikita Farah YOB: 1931   [address] [place] [address] Date of Visit:  3/22/2023       To Whom It May Concern: It is my medical opinion that Nikita Farah may resume physical therapy without restrictions at this time. Please avoid activities that cause him pain. If you have any questions or concerns, please don't hesitate to call.     Sincerely,        Kurt Herring, DO

## 2023-03-22 NOTE — PROGRESS NOTES
[place]    DIAGNOSTIC CARDIAC CATH LAB PROCEDURE  00    LUNG REMOVAL, PARTIAL Left     march    OTHER SURGICAL HISTORY  2011    Injection right hip  ADRIANNE Doan MD    PROSTATE SURGERY Bilateral 2019    THORACOSCOPY Left 2019    BRONCHOSCOPY LEFT THORACOSCOPY ROBOTIC VIDEO ASSISTED , WEDGE RESECTION, POSS.  LEFT LOWER LOBECTOMY performed by Michell Wu MD at [address] Right 2011    Right BRIONNA  Landen Jensen MD    TOTAL HIP ARTHROPLASTY Left 2009    Left BRIONNA  Landen Jensen MD    TURP      TURP N/A 2019    CYSTOSCOPY, RETROGRADE PYELOGRAM,  URERTHRAL DILITATION, TRANSURETHRAL RESECTION PROSTATE performed by Faith Woods MD [facility] HISTORY   Family History   Problem Relation Age of Onset    Other Mother         accident age 28 years , Bart Nguyen. was 5 months old    Other Father         accident age 43 decesed 8 years when Father passed    Heart Disease Brother     Alzheimer's Disease Sister     Heart Disease Brother     Heart Disease Brother        SOCIAL HISTORY  Social History     Occupational History    Occupation: retired-    Tobacco Use    Smoking status: Former     Packs/day: 1.00     Years: 10.00     Pack years: 10.00     Types: Cigarettes     Start date: 1978     Quit date: 1988     Years since quittin.5    Smokeless tobacco: Never   Vaping Use    Vaping Use: Never used   Substance and Sexual Activity    Alcohol use: Not Currently     Comment: socially    Drug use: No    Sexual activity: Not Currently       CURRENT MEDICATIONS     Current Outpatient Medications:     rosuvastatin (CRESTOR) 40 MG tablet, Take 40 mg by mouth every evening, Disp: , Rfl:     Cholecalciferol (VITAMIN D3) 50 MCG (2000) CAPS, Take by mouth, Disp: , Rfl:     aspirin 81 MG EC tablet, Take 1 tablet by mouth daily, Disp: 30 tablet, Rfl: 3    metoprolol succinate (TOPROL XL) 25 MG extended release tablet, Take 25 mg by mouth

## 2023-03-31 ENCOUNTER — OFFICE VISIT (OUTPATIENT)
Dept: CARDIOLOGY CLINIC | Age: 88
End: 2023-03-31
Payer: MEDICARE

## 2023-03-31 ENCOUNTER — TELEPHONE (OUTPATIENT)
Dept: ORTHOPEDIC SURGERY | Age: 88
End: 2023-03-31

## 2023-03-31 VITALS
DIASTOLIC BLOOD PRESSURE: 60 MMHG | HEART RATE: 69 BPM | BODY MASS INDEX: 25.85 KG/M2 | SYSTOLIC BLOOD PRESSURE: 122 MMHG | WEIGHT: 164.7 LBS | HEIGHT: 67 IN

## 2023-03-31 DIAGNOSIS — I10 ESSENTIAL HYPERTENSION: Primary | ICD-10-CM

## 2023-03-31 PROCEDURE — 1123F ACP DISCUSS/DSCN MKR DOCD: CPT | Performed by: INTERNAL MEDICINE

## 2023-03-31 PROCEDURE — G8427 DOCREV CUR MEDS BY ELIG CLIN: HCPCS | Performed by: INTERNAL MEDICINE

## 2023-03-31 PROCEDURE — 99214 OFFICE O/P EST MOD 30 MIN: CPT | Performed by: INTERNAL MEDICINE

## 2023-03-31 PROCEDURE — G8417 CALC BMI ABV UP PARAM F/U: HCPCS | Performed by: INTERNAL MEDICINE

## 2023-03-31 PROCEDURE — 1036F TOBACCO NON-USER: CPT | Performed by: INTERNAL MEDICINE

## 2023-03-31 PROCEDURE — 93000 ELECTROCARDIOGRAM COMPLETE: CPT | Performed by: INTERNAL MEDICINE

## 2023-03-31 PROCEDURE — G8484 FLU IMMUNIZE NO ADMIN: HCPCS | Performed by: INTERNAL MEDICINE

## 2023-03-31 PROCEDURE — 1111F DSCHRG MED/CURRENT MED MERGE: CPT | Performed by: INTERNAL MEDICINE

## 2023-03-31 NOTE — TELEPHONE ENCOUNTER
Received fax from [facility] asking if ok for patient to continue PT.  Fax'd copy of OV note 3/22/2023 stating [phone] Annamarie Bass for PT.

## 2023-04-10 PROBLEM — R77.8 ELEVATED TROPONIN: Status: RESOLVED | Noted: 2023-03-11 | Resolved: 2023-04-10

## 2023-04-10 PROBLEM — R79.89 ELEVATED TROPONIN: Status: RESOLVED | Noted: 2023-03-11 | Resolved: 2023-04-10

## 2023-04-12 PROCEDURE — 99347 HOME/RES VST EST SF MDM 20: CPT | Performed by: FAMILY MEDICINE

## 2023-04-27 ENCOUNTER — OUTSIDE SERVICES (OUTPATIENT)
Dept: PRIMARY CARE CLINIC | Age: 88
End: 2023-04-27
Payer: MEDICARE

## 2023-04-27 DIAGNOSIS — Z86.79 HX OF CORONARY ARTERY DISEASE: ICD-10-CM

## 2023-04-27 DIAGNOSIS — N40.0 BENIGN PROSTATIC HYPERPLASIA, UNSPECIFIED WHETHER LOWER URINARY TRACT SYMPTOMS PRESENT: ICD-10-CM

## 2023-04-27 DIAGNOSIS — I10 ESSENTIAL HYPERTENSION: ICD-10-CM

## 2023-06-06 ENCOUNTER — OUTSIDE SERVICES (OUTPATIENT)
Dept: PRIMARY CARE CLINIC | Age: 88
End: 2023-06-06
Payer: MEDICARE

## 2023-06-06 DIAGNOSIS — M15.9 OSTEOARTHRITIS OF MULTIPLE JOINTS, UNSPECIFIED OSTEOARTHRITIS TYPE: ICD-10-CM

## 2023-06-06 DIAGNOSIS — I10 PRIMARY HYPERTENSION: ICD-10-CM

## 2023-06-06 DIAGNOSIS — E78.5 HYPERLIPIDEMIA, UNSPECIFIED HYPERLIPIDEMIA TYPE: ICD-10-CM

## 2023-06-06 DIAGNOSIS — J44.9 CHRONIC OBSTRUCTIVE PULMONARY DISEASE, UNSPECIFIED COPD TYPE (HCC): ICD-10-CM

## 2023-06-06 DIAGNOSIS — N40.0 BENIGN PROSTATIC HYPERPLASIA, UNSPECIFIED WHETHER LOWER URINARY TRACT SYMPTOMS PRESENT: ICD-10-CM

## 2023-06-06 DIAGNOSIS — Z85.118 HISTORY OF ADENOCARCINOMA OF LUNG: ICD-10-CM

## 2023-06-06 PROCEDURE — 99349 HOME/RES VST EST MOD MDM 40: CPT

## 2023-06-06 ASSESSMENT — ENCOUNTER SYMPTOMS
WHEEZING: 0
PHOTOPHOBIA: 0
VOMITING: 0
CONSTIPATION: 0
DIARRHEA: 0
BACK PAIN: 0
SORE THROAT: 0
RHINORRHEA: 0
COUGH: 0
ABDOMINAL PAIN: 0
SHORTNESS OF BREATH: 0

## 2023-06-06 NOTE — PROGRESS NOTES
12/08/2020    CHOL 144 10/03/2020    CHOL 136 04/26/2019     Lab Results   Component Value Date    TRIG 68 12/08/2020    TRIG 114 10/03/2020    TRIG 114 04/26/2019     Lab Results   Component Value Date    HDL 69 12/08/2020    HDL 54 10/03/2020    HDL 50 04/26/2019     Lab Results   Component Value Date    LDLCALC 76 12/08/2020    LDLCALC 67 10/03/2020    LDLCALC 63 04/26/2019     Lab Results   Component Value Date    LABVLDL 14 12/08/2020    LABVLDL 23 10/03/2020    LABVLDL 23 04/26/2019     No results found for: [facility]  Lab Results   Component Value Date    TSH 2.210 12/08/2020               ASSESSMENT/PLAN:  1. Living in assisted living  2. Chronic obstructive pulmonary disease, unspecified COPD type ([facility] [address]  3. History of adenocarcinoma of lung  4. Benign prostatic hyperplasia, unspecified whether lower urinary tract symptoms present  5. Hyperlipidemia, unspecified hyperlipidemia type  6. Primary hypertension  7. Osteoarthritis of multiple joints, unspecified osteoarthritis type         AL staff to discuss with family. May require chest x-ray. Most likely needs breathing treatment maintenance inhaler. Chart reviewed: Continue with orders per chart in point click care  Hypertension: Continue metoprolol 25 mg daily  Hyperlipidemia: Continue rosuvastatin 40 mg daily. Lipid panel every 6 months  Labs: Collect and monitor per orders simply click care  Acute medical concerns to provider on-call    An electronic signature was used to authenticate this note.     --Tiffanie Severino PA-C

## 2023-07-25 RX ORDER — ONDANSETRON 4 MG/1
4 TABLET, FILM COATED ORAL EVERY 8 HOURS PRN
COMMUNITY
Start: 2023-03-16
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
## 2023-08-20 PROBLEM — S22.49XA FRACTURE OF MULTIPLE RIBS: Status: ACTIVE | Noted: 2023-08-20

## 2023-08-20 PROBLEM — Z85.118 PERSONAL HISTORY OF OTHER MALIGNANT NEOPLASM OF BRONCHUS AND LUNG: Status: ACTIVE | Noted: 2022-12-13

## 2023-08-20 PROBLEM — Z90.2 ACQUIRED ABSENCE OF LUNG (PART OF): Status: ACTIVE | Noted: 2022-12-13

## 2023-08-20 PROBLEM — F03.90 DEMENTIA (HCC): Status: ACTIVE | Noted: 2022-12-13

## 2023-08-21 ENCOUNTER — OFFICE VISIT (OUTPATIENT)
Dept: PRIMARY CARE CLINIC | Age: 88
End: 2023-08-21

## 2023-08-21 VITALS
SYSTOLIC BLOOD PRESSURE: 136 MMHG | WEIGHT: 164.4 LBS | TEMPERATURE: 98.3 F | HEIGHT: 67 IN | OXYGEN SATURATION: 98 % | DIASTOLIC BLOOD PRESSURE: 70 MMHG | HEART RATE: 60 BPM | BODY MASS INDEX: 25.8 KG/M2 | RESPIRATION RATE: 18 BRPM

## 2023-08-21 DIAGNOSIS — Z91.81 HISTORY OF FALLING, PRESENTING HAZARDS TO HEALTH: ICD-10-CM

## 2023-08-21 DIAGNOSIS — C34.32 MALIGNANT NEOPLASM OF LOWER LOBE OF LEFT LUNG (HCC): ICD-10-CM

## 2023-08-21 DIAGNOSIS — K21.9 GASTROESOPHAGEAL REFLUX DISEASE WITHOUT ESOPHAGITIS: ICD-10-CM

## 2023-08-21 DIAGNOSIS — I10 ESSENTIAL HYPERTENSION: Primary | ICD-10-CM

## 2023-08-21 DIAGNOSIS — Z86.79 HX OF CORONARY ARTERY DISEASE: ICD-10-CM

## 2023-08-21 PROBLEM — M15.0 PRIMARY OSTEOARTHRITIS INVOLVING MULTIPLE JOINTS: Status: ACTIVE | Noted: 2023-08-21

## 2023-08-21 PROBLEM — M15.9 PRIMARY OSTEOARTHRITIS INVOLVING MULTIPLE JOINTS: Status: ACTIVE | Noted: 2023-08-21

## 2023-08-21 PROBLEM — N18.9 CHRONIC KIDNEY DISEASE: Status: RESOLVED | Noted: 2021-12-20 | Resolved: 2023-08-21

## 2023-08-21 PROBLEM — H35.3190 NONEXUDATIVE SENILE MACULAR DEGENERATION OF RETINA: Status: ACTIVE | Noted: 2023-08-21

## 2023-08-21 RX ORDER — ZINC GLUCONATE 2 [HP_X]/1
1 LOZENGE ORAL
COMMUNITY
Start: 2023-07-21

## 2023-08-21 RX ORDER — DEXTROMETHORPHAN HYDROBROMIDE, GUAIFENESIN 5; 100 MG/5ML; MG/5ML
20 LIQUID ORAL EVERY 4 HOURS PRN
COMMUNITY
Start: 2023-07-21

## 2023-08-21 RX ORDER — GUAIFENESIN 600 MG/1
1200 TABLET, EXTENDED RELEASE ORAL 2 TIMES DAILY PRN
COMMUNITY
Start: 2023-07-21

## 2023-08-21 RX ORDER — ONDANSETRON HYDROCHLORIDE 8 MG/1
8 TABLET, FILM COATED ORAL EVERY 8 HOURS PRN
COMMUNITY
Start: 2023-07-21

## 2023-08-21 RX ORDER — ACETAMINOPHEN 325 MG/1
650 TABLET ORAL EVERY 4 HOURS PRN
COMMUNITY
Start: 2023-07-21

## 2023-08-21 RX ORDER — GUAIFENESIN AND DEXTROMETHORPHAN HYDROBROMIDE 600; 30 MG/1; MG/1
2 TABLET, EXTENDED RELEASE ORAL 2 TIMES DAILY PRN
COMMUNITY
Start: 2023-07-21

## 2023-08-21 RX ORDER — LOPERAMIDE HYDROCHLORIDE 2 MG/1
2 TABLET ORAL 4 TIMES DAILY PRN
COMMUNITY
Start: 2023-07-21

## 2023-08-21 SDOH — ECONOMIC STABILITY: FOOD INSECURITY: WITHIN THE PAST 12 MONTHS, THE FOOD YOU BOUGHT JUST DIDN'T LAST AND YOU DIDN'T HAVE MONEY TO GET MORE.: NEVER TRUE

## 2023-08-21 SDOH — ECONOMIC STABILITY: HOUSING INSECURITY
IN THE LAST 12 MONTHS, WAS THERE A TIME WHEN YOU DID NOT HAVE A STEADY PLACE TO SLEEP OR SLEPT IN A SHELTER (INCLUDING NOW)?: NO

## 2023-08-21 SDOH — ECONOMIC STABILITY: FOOD INSECURITY: WITHIN THE PAST 12 MONTHS, YOU WORRIED THAT YOUR FOOD WOULD RUN OUT BEFORE YOU GOT MONEY TO BUY MORE.: NEVER TRUE

## 2023-08-21 SDOH — ECONOMIC STABILITY: INCOME INSECURITY: HOW HARD IS IT FOR YOU TO PAY FOR THE VERY BASICS LIKE FOOD, HOUSING, MEDICAL CARE, AND HEATING?: NOT HARD AT ALL

## 2023-08-21 ASSESSMENT — PATIENT HEALTH QUESTIONNAIRE - PHQ9
1. LITTLE INTEREST OR PLEASURE IN DOING THINGS: 0
2. FEELING DOWN, DEPRESSED OR HOPELESS: 0
SUM OF ALL RESPONSES TO PHQ QUESTIONS 1-9: 0
SUM OF ALL RESPONSES TO PHQ QUESTIONS 1-9: 0
SUM OF ALL RESPONSES TO PHQ9 QUESTIONS 1 & 2: 0
SUM OF ALL RESPONSES TO PHQ QUESTIONS 1-9: 0
SUM OF ALL RESPONSES TO PHQ QUESTIONS 1-9: 0

## 2023-08-21 NOTE — PROGRESS NOTES
disease processes, treatment options, meds and coordination of care. Current Outpatient Medications   Medication Sig Dispense Refill    acetaminophen (TYLENOL) 325 MG tablet Take 2 tablets by mouth every 4 hours as needed for Pain or Fever      ondansetron (ZOFRAN) 8 MG tablet Take 1 tablet by mouth every 8 hours as needed for Nausea or Vomiting      Homeopathic Products (COLD-EEZE) LOZG Take 1 lozenge by mouth every 2 hours as needed (sore throat)      loperamide (IMODIUM A-D) 2 MG tablet Take 1 tablet by mouth 4 times daily as needed for Diarrhea Take 4mg 1st diarrhea then 2mg every 6hr after      magnesium hydroxide (MILK OF MAGNESIA) 400 MG/5ML suspension Take by mouth daily as needed for Constipation      Dextromethorphan-guaiFENesin (MUCINEX DM)  MG TB12 Take 2 tablets by mouth 2 times daily as needed (cough)      guaiFENesin (MUCINEX) 600 MG extended release tablet Take 2 tablets by mouth 2 times daily as needed for Congestion      Dextromethorphan-guaiFENesin (ROBITUSSIN COUGH+CHEST URI DM)  MG/20ML LIQD Take 20 mLs by mouth every 4 hours as needed (cough)      ondansetron (ZOFRAN) 4 MG tablet Take 1 tablet by mouth every 8 hours as needed for Nausea or Vomiting      rosuvastatin (CRESTOR) 40 MG tablet Take 1 tablet by mouth every evening      aspirin 81 MG EC tablet Take 1 tablet by mouth daily 30 tablet 3    metoprolol succinate (TOPROL XL) 25 MG extended release tablet Take 1 tablet by mouth daily       No current facility-administered medications for this visit. Return in about 1 month (around 9/21/2023). An  electronic signature was used to authenticate this note.     --Gab Dolan MD on 8/21/2023 at 4:35 PM

## 2023-10-03 ENCOUNTER — OFFICE VISIT (OUTPATIENT)
Dept: PRIMARY CARE CLINIC | Age: 88
End: 2023-10-03
Payer: MEDICARE

## 2023-10-03 VITALS
HEART RATE: 60 BPM | DIASTOLIC BLOOD PRESSURE: 70 MMHG | BODY MASS INDEX: 25.8 KG/M2 | WEIGHT: 164.4 LBS | OXYGEN SATURATION: 95 % | SYSTOLIC BLOOD PRESSURE: 136 MMHG | RESPIRATION RATE: 18 BRPM | HEIGHT: 67 IN | TEMPERATURE: 98.3 F

## 2023-10-03 DIAGNOSIS — I10 ESSENTIAL HYPERTENSION: Primary | ICD-10-CM

## 2023-10-03 DIAGNOSIS — K59.00 CONSTIPATION, UNSPECIFIED CONSTIPATION TYPE: ICD-10-CM

## 2023-10-03 DIAGNOSIS — F03.A0 MILD DEMENTIA, UNSPECIFIED DEMENTIA TYPE, UNSPECIFIED WHETHER BEHAVIORAL, PSYCHOTIC, OR MOOD DISTURBANCE OR ANXIETY (HCC): ICD-10-CM

## 2023-10-03 DIAGNOSIS — Z86.79 HX OF CORONARY ARTERY DISEASE: ICD-10-CM

## 2023-10-03 DIAGNOSIS — D64.9 ANEMIA, UNSPECIFIED TYPE: ICD-10-CM

## 2023-10-03 PROBLEM — S22.49XA FRACTURE OF MULTIPLE RIBS: Status: RESOLVED | Noted: 2023-08-20 | Resolved: 2023-10-03

## 2023-10-03 PROCEDURE — 99497 ADVNCD CARE PLAN 30 MIN: CPT | Performed by: FAMILY MEDICINE

## 2023-10-03 PROCEDURE — 1036F TOBACCO NON-USER: CPT | Performed by: FAMILY MEDICINE

## 2023-10-03 PROCEDURE — 99349 HOME/RES VST EST MOD MDM 40: CPT | Performed by: FAMILY MEDICINE

## 2023-10-03 PROCEDURE — G8417 CALC BMI ABV UP PARAM F/U: HCPCS | Performed by: FAMILY MEDICINE

## 2023-10-03 PROCEDURE — 1123F ACP DISCUSS/DSCN MKR DOCD: CPT | Performed by: FAMILY MEDICINE

## 2023-10-03 PROCEDURE — G8484 FLU IMMUNIZE NO ADMIN: HCPCS | Performed by: FAMILY MEDICINE

## 2023-10-03 NOTE — PROGRESS NOTES
10/3/2023    Home visit medically necessary in lieu of an office visit due to: [place] resident, uses walker, difficult to get out. HPI:  Patient has been doing well. He walks 15 minutes/day. He has not had any falls this month. He does not have any DJD pain. He gets a little short of breath and has no CP. He had CABG x 6 in 2000. He sometimes has trouble falling asleep but usually sleeps 7 hours on those nights. He has hx of LLL lobectomy for lung CA. REVIEW OF SYSTEMS:    GENERAL: Appetite good. No weight change, generally healthy, no change in strength or exercise tolerance. CARDIOVASCULAR: No edema, no chest pains, no murmurs, no palpitations, no syncope, no orthopnea. CABG x 6 in 2000. RESPIRATORY: No shortness of breath, no pain with breathing, no wheezing, no hemoptysis, no cough. GASTROINTESTINAL: No change in appetite, no dysphagia, no heartburn, no abdominal pains, no diarrhea, no bowel habit changes, no emesis, no melena, no hemorrhoids. CONSTIPATION. GENITOURINARY: No incontinence or retention, no urinary urgency, no frequent UTIs, no dysuria, no change in nature of urine. NOCTURIA x 3. MUSCULOSKELETAL: No pain in muscles or joints, no swelling or redness of joints, no limitation of range of motion, no weakness or numbness. NEURO/PSYCH: No weakness, no tremor, no seizures, no changes in mentation, no ataxia. No depressive symptoms, no changes in sleep habits, no changes in thought content. SLEEP PROBLEMS AT TIMES. All other systems negative.     Allergies   Allergen Reactions    Povidone-Iodine      Other reaction(s): lids swell and flake     Soap      Other reaction(s): lids swell and flake     Ciprofloxacin     Nitroglycerin     Quinidine     Sulfamethoxazole      Other reaction(s): unable to sleep    Trimethoprim      Other reaction(s): unable to sleep    Biaxin [Clarithromycin] Rash    Iodine Swelling and Rash     Internal Iodine only    Quinolones Rash     Past Medical

## 2023-10-12 ENCOUNTER — OFFICE VISIT (OUTPATIENT)
Dept: CARDIOLOGY CLINIC | Age: 88
End: 2023-10-12
Payer: MEDICARE

## 2023-10-12 VITALS
RESPIRATION RATE: 18 BRPM | HEIGHT: 67 IN | BODY MASS INDEX: 26.34 KG/M2 | HEART RATE: 57 BPM | DIASTOLIC BLOOD PRESSURE: 62 MMHG | WEIGHT: 167.8 LBS | SYSTOLIC BLOOD PRESSURE: 128 MMHG

## 2023-10-12 DIAGNOSIS — I10 ESSENTIAL HYPERTENSION: Primary | ICD-10-CM

## 2023-10-12 PROCEDURE — 1123F ACP DISCUSS/DSCN MKR DOCD: CPT | Performed by: INTERNAL MEDICINE

## 2023-10-12 PROCEDURE — 99213 OFFICE O/P EST LOW 20 MIN: CPT | Performed by: INTERNAL MEDICINE

## 2023-10-12 PROCEDURE — 93000 ELECTROCARDIOGRAM COMPLETE: CPT | Performed by: INTERNAL MEDICINE

## 2023-10-12 RX ORDER — METOPROLOL SUCCINATE 25 MG/1
25 TABLET, EXTENDED RELEASE ORAL DAILY
Qty: 90 TABLET | Refills: 3 | Status: SHIPPED | OUTPATIENT
Start: 2023-10-12

## 2023-10-12 NOTE — PROGRESS NOTES
joints    History of falling, presenting hazards to health    Constipation       Allergies   Allergen Reactions    Povidone-Iodine      Other reaction(s): lids swell and flake     Soap      Other reaction(s): lids swell and flake     Ciprofloxacin     Nitroglycerin     Quinidine     Sulfamethoxazole      Other reaction(s): unable to sleep    Trimethoprim      Other reaction(s): unable to sleep    Biaxin [Clarithromycin] Rash    Iodine Swelling and Rash     Internal Iodine only    Quinolones Rash       Current Outpatient Medications   Medication Sig Dispense Refill    Multiple Vitamin (MULTIVITAMIN ADULT PO) Take 1 tablet by mouth daily      Omega-3 Fatty Acids (FISH OIL OMEGA-3 PO) Take 2 caplets by mouth daily      Calcium Carbonate (CALCIUM 600 PO) Take 1 tablet by mouth daily      acetaminophen (TYLENOL) 325 MG tablet Take 2 tablets by mouth every 4 hours as needed for Pain or Fever      aspirin 81 MG EC tablet Take 1 tablet by mouth daily 30 tablet 3    metoprolol succinate (TOPROL XL) 25 MG extended release tablet Take 0.5 tablets by mouth in the morning and at bedtime      ondansetron (ZOFRAN) 8 MG tablet Take 1 tablet by mouth every 8 hours as needed for Nausea or Vomiting (Patient not taking: Reported on 10/12/2023)      Homeopathic Products (COLD-EEZE) LOZG Take 1 lozenge by mouth every 2 hours as needed (sore throat) (Patient not taking: Reported on 10/12/2023)      loperamide (IMODIUM A-D) 2 MG tablet Take 1 tablet by mouth 4 times daily as needed for Diarrhea Take 4mg 1st diarrhea then 2mg every 6hr after (Patient not taking: Reported on 10/12/2023)      magnesium hydroxide (MILK OF MAGNESIA) 400 MG/5ML suspension Take by mouth daily as needed for Constipation (Patient not taking: Reported on 10/12/2023)      Dextromethorphan-guaiFENesin (MUCINEX DM)  MG TB12 Take 2 tablets by mouth 2 times daily as needed (cough) (Patient not taking: Reported on 10/12/2023)      guaiFENesin (MUCINEX) 600 MG

## 2023-10-30 NOTE — PROGRESS NOTES
10/31/2023    Home visit medically necessary in lieu of an office visit due to: [place] resident, uses walker, difficult to get out. HPI:  Patient has been feeling well. He walks 15 minutes/day. He has not had any falls this month. He does not have any DJD pain. He gets a little short of breath and has no CP. He had CABG x 6 in 2000. He sometimes has trouble falling asleep but usually sleeps 7 hours on those nights. He has hx of LLL lobectomy for lung CA. He had some pain in R testicle for a day this month. Urologist Dr Hale told him it was a cyst and not to worry about it. REVIEW OF SYSTEMS:    GENERAL: Appetite good. No weight change, generally healthy, no change in strength or exercise tolerance. CARDIOVASCULAR: No edema, no chest pains, no murmurs, no palpitations, no syncope, no orthopnea. CABG x 6 in 2000. RESPIRATORY: No shortness of breath, no pain with breathing, no wheezing, no hemoptysis, no cough. GASTROINTESTINAL: No change in appetite, no dysphagia, no heartburn, no abdominal pains, no diarrhea, no bowel habit changes, no emesis, no melena, no hemorrhoids. CONSTIPATION. GENITOURINARY: No incontinence or retention, no urinary urgency, no frequent UTIs, no dysuria, no change in nature of urine. NOCTURIA x 3. MUSCULOSKELETAL: No pain in muscles or joints, no swelling or redness of joints, no limitation of range of motion, no weakness or numbness. NEURO/PSYCH: No weakness, no tremor, no seizures, no changes in mentation, no ataxia. No depressive symptoms, no changes in sleep habits, no changes in thought content. SLEEP PROBLEMS AT TIMES. All other systems negative.     Allergies   Allergen Reactions    Povidone-Iodine      Other reaction(s): lids swell and flake     Soap      Other reaction(s): lids swell and flake     Ciprofloxacin     Nitroglycerin     Quinidine     Sulfamethoxazole      Other reaction(s): unable to sleep    Trimethoprim      Other reaction(s): unable to

## 2023-10-31 ENCOUNTER — OFFICE VISIT (OUTPATIENT)
Dept: PRIMARY CARE CLINIC | Age: 88
End: 2023-10-31
Payer: MEDICARE

## 2023-10-31 VITALS
SYSTOLIC BLOOD PRESSURE: 130 MMHG | HEART RATE: 64 BPM | RESPIRATION RATE: 14 BRPM | HEIGHT: 67 IN | OXYGEN SATURATION: 94 % | DIASTOLIC BLOOD PRESSURE: 64 MMHG | WEIGHT: 164 LBS | TEMPERATURE: 98.3 F | BODY MASS INDEX: 25.74 KG/M2

## 2023-10-31 DIAGNOSIS — M15.9 PRIMARY OSTEOARTHRITIS INVOLVING MULTIPLE JOINTS: ICD-10-CM

## 2023-10-31 DIAGNOSIS — Z91.81 HISTORY OF FALLING, PRESENTING HAZARDS TO HEALTH: ICD-10-CM

## 2023-10-31 DIAGNOSIS — Z86.79 HX OF CORONARY ARTERY DISEASE: ICD-10-CM

## 2023-10-31 DIAGNOSIS — F03.A0 MILD DEMENTIA, UNSPECIFIED DEMENTIA TYPE, UNSPECIFIED WHETHER BEHAVIORAL, PSYCHOTIC, OR MOOD DISTURBANCE OR ANXIETY (HCC): ICD-10-CM

## 2023-10-31 DIAGNOSIS — Z85.118 PERSONAL HISTORY OF OTHER MALIGNANT NEOPLASM OF BRONCHUS AND LUNG: ICD-10-CM

## 2023-10-31 DIAGNOSIS — I10 ESSENTIAL HYPERTENSION: Primary | ICD-10-CM

## 2023-10-31 DIAGNOSIS — K59.00 CONSTIPATION, UNSPECIFIED CONSTIPATION TYPE: ICD-10-CM

## 2023-10-31 PROCEDURE — 99349 HOME/RES VST EST MOD MDM 40: CPT | Performed by: FAMILY MEDICINE

## 2023-10-31 PROCEDURE — 1123F ACP DISCUSS/DSCN MKR DOCD: CPT | Performed by: FAMILY MEDICINE

## 2023-11-15 NOTE — PROGRESS NOTES
CLINICAL PHARMACY: DISCHARGE MED RECONCILIATION/REVIEW    [facility] ([facility]) Select Patient?: No  Total # of Interventions Recommended: 0   -   Total # Interventions Accepted: 0  Intervention Severity:   - Level 1 Intervention Present?: No   - Level 2 #: 0   - Level 3 #: 0   Time Spent (min): 180 Joaquim Vu Pharm D 5/24/2019 2:11 PM Problem: At Risk for Falls  Goal: # Patient does not fall  Outcome: Outcome Not Met, Continue to Monitor  Goal: # Takes action to control fall-related risks  Outcome: Outcome Not Met, Continue to Monitor  Goal: # Verbalizes understanding of fall risk/precautions  Description: Document education using the patient education activity  Outcome: Outcome Not Met, Continue to Monitor     Problem:  At Risk for Injury Due to Fall  Goal: # Patient does not fall  Outcome: Outcome Not Met, Continue to Monitor  Goal: # Takes action to control condition specific risks  Outcome: Outcome Not Met, Continue to Monitor  Goal: # Verbalizes understanding of fall-related injury personal risks  Description: Document education using the patient education activity  Outcome: Outcome Not Met, Continue to Monitor     Problem: Adult Mental Health  Goal: Reports or exhibits reduction in symptoms associated with elevated or labile mood/charlotte  Outcome: Outcome Not Met, Continue to Monitor  Goal: Reports or exhibits improvement in depressive mood signs/symptoms  Outcome: Outcome Not Met, Continue to Monitor  Goal: Reports or exhibits an improvement in mood signs/symptoms associated with anxiety  Outcome: Outcome Not Met, Continue to Monitor  Goal: Demonstrates ability to proactively perform self cares  Outcome: Outcome Not Met, Continue to Monitor  Goal: Demonstrates the ability to engage in reality-based communication and refrains from acting on delusional thoughts  Outcome: Outcome Not Met, Continue to Monitor  Goal: Reports or exhibits hallucinations absent or at manageable level  Outcome: Outcome Not Met, Continue to Monitor  Goal: Demonstrates improved ability to track conversation, process information  Outcome: Outcome Not Met, Continue to Monitor  Goal: Reports decrease in thoughts of suicide  Outcome: Outcome Not Met, Continue to Monitor  Goal: Reports decrease in thoughts of violence  Outcome: Outcome Not Met, Continue to Monitor  Goal: Reports decrease in thoughts of self harm  Outcome: Outcome Not Met, Continue to Monitor  Goal: Verbalizes when symptoms of illness are triggered and reports to staff when experiencing urges/intent of suicide  Outcome: Outcome Not Met, Continue to Monitor  Goal: Verbalizes when symptoms of illness are triggered and reports to staff when experiencing urges/intent of violence  Outcome: Outcome Not Met, Continue to Monitor  Goal: Verbalizes when symptoms of illness are triggered and reports to staff when experiencing urges/intent of self harm  Outcome: Outcome Not Met, Continue to Monitor  Goal: Denies having a suicidal plan  Outcome: Outcome Not Met, Continue to Monitor  Goal: Denies having a homicidal plan  Outcome: Outcome Not Met, Continue to Monitor  Goal: Verbalizes understanding of positive individual coping skills  Outcome: Outcome Not Met, Continue to Monitor  Goal: Demonstrates control of behavior, refraining from hostility, aggression or threats of violence  Outcome: Outcome Not Met, Continue to Monitor  Goal: Refrains from self harm behavior/cutting  Outcome: Outcome Not Met, Continue to Monitor  Goal: Verbalizes understanding of diagnosis, reason for treament and treatment program  Outcome: Outcome Not Met, Continue to Monitor  Goal: Verbalizes understanding of medication management/monitoring/assessment of effectiveness  Outcome: Outcome Not Met, Continue to Monitor  Goal: Demonstrates or reports decrease in symptoms of paranoia or delusional thoughts  Outcome: Outcome Not Met, Continue to Monitor

## 2023-11-29 NOTE — PROGRESS NOTES
([address])    Mild dementia, unspecified dementia type, unspecified whether behavioral, psychotic, or mood disturbance or anxiety ([address])    Primary osteoarthritis involving multiple joints    Acquired absence of lung (part of)    History of falling, presenting hazards to health       PLAN:  Check labs. Advised patient to rest and drink plenty of fluids. Notify nurse if any symptoms develop. Continue other meds as per Rx List. Recheck 1 month. 50 minutes spent on visit, 30 minutes involved education/counseling regarding fatigue, HTN, cardiac, disease processes, treatment options, meds and coordination of care. Current Outpatient Medications   Medication Sig Dispense Refill    Multiple Vitamin (MULTIVITAMIN ADULT PO) Take 1 tablet by mouth daily      Omega-3 Fatty Acids (FISH OIL OMEGA-3 PO) Take 2 caplets by mouth daily      Calcium Carbonate (CALCIUM 600 PO) Take 1 tablet by mouth daily      metoprolol succinate (TOPROL XL) 25 MG extended release tablet Take 1 tablet by mouth daily 90 tablet 3    acetaminophen (TYLENOL) 325 MG tablet Take 2 tablets by mouth every 4 hours as needed for Pain or Fever      aspirin 81 MG EC tablet Take 1 tablet by mouth daily 30 tablet 3     No current facility-administered medications for this visit. Return in about 1 month (around 12/30/2023). An  electronic signature was used to authenticate this note.     --Luz Fraser MD on 11/30/2023 at 3:41 PM

## 2023-11-30 ENCOUNTER — OFFICE VISIT (OUTPATIENT)
Dept: PRIMARY CARE CLINIC | Age: 88
End: 2023-11-30

## 2023-11-30 ENCOUNTER — OFFICE VISIT (OUTPATIENT)
Dept: PRIMARY CARE CLINIC | Age: 88
End: 2023-11-30
Payer: MEDICARE

## 2023-11-30 VITALS
RESPIRATION RATE: 19 BRPM | TEMPERATURE: 97.6 F | HEIGHT: 66 IN | BODY MASS INDEX: 26.52 KG/M2 | WEIGHT: 165 LBS | DIASTOLIC BLOOD PRESSURE: 64 MMHG | OXYGEN SATURATION: 95 % | HEART RATE: 60 BPM | SYSTOLIC BLOOD PRESSURE: 116 MMHG

## 2023-11-30 VITALS
WEIGHT: 165 LBS | OXYGEN SATURATION: 95 % | HEIGHT: 66 IN | RESPIRATION RATE: 19 BRPM | HEART RATE: 60 BPM | DIASTOLIC BLOOD PRESSURE: 64 MMHG | SYSTOLIC BLOOD PRESSURE: 116 MMHG | BODY MASS INDEX: 26.52 KG/M2 | TEMPERATURE: 97.6 F

## 2023-11-30 DIAGNOSIS — K59.00 CONSTIPATION, UNSPECIFIED CONSTIPATION TYPE: ICD-10-CM

## 2023-11-30 DIAGNOSIS — I10 ESSENTIAL HYPERTENSION: ICD-10-CM

## 2023-11-30 DIAGNOSIS — N40.0 BENIGN PROSTATIC HYPERPLASIA, UNSPECIFIED WHETHER LOWER URINARY TRACT SYMPTOMS PRESENT: ICD-10-CM

## 2023-11-30 DIAGNOSIS — D64.9 ANEMIA, UNSPECIFIED TYPE: ICD-10-CM

## 2023-11-30 DIAGNOSIS — Z85.118 PERSONAL HISTORY OF OTHER MALIGNANT NEOPLASM OF BRONCHUS AND LUNG: ICD-10-CM

## 2023-11-30 DIAGNOSIS — M15.9 PRIMARY OSTEOARTHRITIS INVOLVING MULTIPLE JOINTS: ICD-10-CM

## 2023-11-30 DIAGNOSIS — Z90.2 ACQUIRED ABSENCE OF LUNG (PART OF): ICD-10-CM

## 2023-11-30 DIAGNOSIS — Z91.81 HISTORY OF FALLING, PRESENTING HAZARDS TO HEALTH: ICD-10-CM

## 2023-11-30 DIAGNOSIS — C34.32 MALIGNANT NEOPLASM OF LOWER LOBE OF LEFT LUNG (HCC): ICD-10-CM

## 2023-11-30 DIAGNOSIS — E78.2 MIXED HYPERLIPIDEMIA: ICD-10-CM

## 2023-11-30 DIAGNOSIS — I65.23 ASYMPTOMATIC BILATERAL CAROTID ARTERY STENOSIS: ICD-10-CM

## 2023-11-30 DIAGNOSIS — R53.83 FATIGUE, UNSPECIFIED TYPE: Primary | ICD-10-CM

## 2023-11-30 DIAGNOSIS — H90.3 SENSORINEURAL HEARING LOSS (SNHL) OF BOTH EARS: ICD-10-CM

## 2023-11-30 DIAGNOSIS — Z86.79 HX OF CORONARY ARTERY DISEASE: ICD-10-CM

## 2023-11-30 DIAGNOSIS — Z00.00 INITIAL MEDICARE ANNUAL WELLNESS VISIT: ICD-10-CM

## 2023-11-30 DIAGNOSIS — I65.23 ASYMPTOMATIC BILATERAL CAROTID ARTERY STENOSIS: Primary | ICD-10-CM

## 2023-11-30 DIAGNOSIS — F03.A0 MILD DEMENTIA, UNSPECIFIED DEMENTIA TYPE, UNSPECIFIED WHETHER BEHAVIORAL, PSYCHOTIC, OR MOOD DISTURBANCE OR ANXIETY (HCC): ICD-10-CM

## 2023-11-30 PROCEDURE — 1123F ACP DISCUSS/DSCN MKR DOCD: CPT | Performed by: FAMILY MEDICINE

## 2023-11-30 PROCEDURE — G0438 PPPS, INITIAL VISIT: HCPCS | Performed by: FAMILY MEDICINE

## 2023-11-30 ASSESSMENT — PATIENT HEALTH QUESTIONNAIRE - PHQ9
2. FEELING DOWN, DEPRESSED OR HOPELESS: 0
SUM OF ALL RESPONSES TO PHQ QUESTIONS 1-9: 0
SUM OF ALL RESPONSES TO PHQ9 QUESTIONS 1 & 2: 0
1. LITTLE INTEREST OR PLEASURE IN DOING THINGS: 0
SUM OF ALL RESPONSES TO PHQ QUESTIONS 1-9: 0

## 2023-11-30 NOTE — PROGRESS NOTES
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)  Acquired absence of lung (part of)  Mild dementia, unspecified dementia type, unspecified whether behavioral, psychotic, or mood disturbance or anxiety (HCC)  Sensorineural hearing loss (SNHL) of both ears  Primary osteoarthritis involving multiple joints  Benign prostatic hyperplasia, unspecified whether lower urinary tract symptoms present  Personal history of other malignant neoplasm of bronchus and lung  History of falling, presenting hazards to health  Constipation, unspecified constipation type  Hx of coronary artery disease  Anemia, unspecified type  Mixed hyperlipidemia  Initial Medicare annual wellness visit    Recommendations for Preventive Services Due: see orders and patient instructions/AVS.  Recommended screening schedule for the next 5-10 years is provided to the patient in written form: see Patient Instructions/AVS.     Return in 1 year (on 11/30/2024) for Medicare Annual Wellness Visit in 1 year. Subjective     Patient's complete Health Risk Assessment and screening values have been reviewed and are found in Flowsheets. The following problems were reviewed today and where indicated follow up appointments were made and/or referrals ordered. Positive Risk Factor Screenings with Interventions:    Fall Risk:  Do you feel unsteady or are you worried about falling? : (!) yes  2 or more falls in past year?: no  Fall with injury in past year?: (!) yes     Interventions:    Patient lives at [facility] under close supervision. . He has had PT for fall prevention. Hearing Screen:  Do you or your family notice any trouble with your hearing that hasn't been managed with hearing aids?: (!) Yes    Interventions:  He does not use his hearing aids much.       ADL's:   Patient

## 2023-12-01 LAB
ALBUMIN SERPL-MCNC: 3.7 G/DL (ref 3.5–5.2)
ALP SERPL-CCNC: 69 U/L (ref 40–129)
ALT SERPL-CCNC: 8 U/L (ref 0–40)
ANION GAP SERPL CALCULATED.3IONS-SCNC: 13 MMOL/L (ref 7–16)
AST SERPL-CCNC: 14 U/L (ref 0–39)
BASOPHILS # BLD: 0.1 K/UL (ref 0–0.2)
BASOPHILS NFR BLD: 2 % (ref 0–2)
BILIRUB SERPL-MCNC: 0.8 MG/DL (ref 0–1.2)
BUN SERPL-MCNC: 20 MG/DL (ref 6–23)
CALCIUM SERPL-MCNC: 9 MG/DL (ref 8.6–10.2)
CHLORIDE SERPL-SCNC: 103 MMOL/L (ref 98–107)
CO2 SERPL-SCNC: 23 MMOL/L (ref 22–29)
CREAT SERPL-MCNC: 1.2 MG/DL (ref 0.7–1.2)
EOSINOPHIL # BLD: 0.49 K/UL (ref 0.05–0.5)
EOSINOPHILS RELATIVE PERCENT: 8 % (ref 0–6)
ERYTHROCYTE [DISTWIDTH] IN BLOOD BY AUTOMATED COUNT: 14 % (ref 11.5–15)
GFR SERPL CREATININE-BSD FRML MDRD: 57 ML/MIN/1.73M2
GLUCOSE SERPL-MCNC: 82 MG/DL (ref 74–99)
HCT VFR BLD AUTO: 33.7 % (ref 37–54)
HGB BLD-MCNC: 11.2 G/DL (ref 12.5–16.5)
IMM GRANULOCYTES # BLD AUTO: <0.03 K/UL (ref 0–0.58)
IMM GRANULOCYTES NFR BLD: 0 % (ref 0–5)
LYMPHOCYTES NFR BLD: 1.9 K/UL (ref 1.5–4)
LYMPHOCYTES RELATIVE PERCENT: 30 % (ref 20–42)
MCH RBC QN AUTO: 28.9 PG (ref 26–35)
MCHC RBC AUTO-ENTMCNC: 33.2 G/DL (ref 32–34.5)
MCV RBC AUTO: 86.9 FL (ref 80–99.9)
MONOCYTES NFR BLD: 0.83 K/UL (ref 0.1–0.95)
MONOCYTES NFR BLD: 13 % (ref 2–12)
NEUTROPHILS NFR BLD: 48 % (ref 43–80)
NEUTS SEG NFR BLD: 3.08 K/UL (ref 1.8–7.3)
PLATELET # BLD AUTO: 230 K/UL (ref 130–450)
PMV BLD AUTO: 9 FL (ref 7–12)
POTASSIUM SERPL-SCNC: 4.2 MMOL/L (ref 3.5–5)
PROT SERPL-MCNC: 6.2 G/DL (ref 6.4–8.3)
RBC # BLD AUTO: 3.88 M/UL (ref 3.8–5.8)
SODIUM SERPL-SCNC: 139 MMOL/L (ref 132–146)
TSH SERPL DL<=0.05 MIU/L-ACNC: 2.53 UIU/ML (ref 0.27–4.2)
WBC OTHER # BLD: 6.4 K/UL (ref 4.5–11.5)

## 2024-01-09 NOTE — PROGRESS NOTES
Quinidine     Sulfamethoxazole      Other reaction(s): unable to sleep    Trimethoprim      Other reaction(s): unable to sleep    Biaxin [Clarithromycin] Rash    Iodine Swelling and Rash     Internal Iodine only    Quinolones Rash     Past Medical History:   Diagnosis Date    BPH (benign prostatic hyperplasia)     CAD (coronary artery disease)     Carotid artery calcification     DJD (degenerative joint disease)     History of falling     Hyperlipemia     Hypertension     Lung nodule     Macular degeneration     Numbness and tingling in left arm 2018    Numbness and tingling of left side of face 2018    S/P CABG x 6     LIMA-LAD, LYNNETTE-LCx,SVG-OM,SVG-LPL,SVG-PDA-RPL    Shingles      Past Surgical History:   Procedure Laterality Date    CARDIAC SURGERY      6 vessel in     DENTAL SURGERY      root canal    DIAGNOSTIC CARDIAC CATH LAB PROCEDURE  00    LUNG REMOVAL, PARTIAL Left     march    OTHER SURGICAL HISTORY  2011    Injection right hip  ADRIANNE Doan MD    PROSTATE SURGERY Bilateral 2019    THORACOSCOPY Left 2019    BRONCHOSCOPY LEFT THORACOSCOPY ROBOTIC VIDEO ASSISTED , WEDGE RESECTION, POSS. LEFT LOWER LOBECTOMY performed by Ortiz Pedroza MD at [facility]    TOTAL HIP ARTHROPLASTY Right 2011    Right BRIONNA  ADRIANNE Doan MD    TOTAL HIP ARTHROPLASTY Left 2009    Left BRIONNA  ADRIANNE Doan MD    TURP      TURP N/A 2019    CYSTOSCOPY, RETROGRADE PYELOGRAM,  URERTHRAL DILITATION, TRANSURETHRAL RESECTION PROSTATE performed by Carter Hale MD at [facility]     Family History   Problem Relation Age of Onset    Other Mother         accident age 32 years , Tashi ATWOOD was 10 months old    Other Father         accident age 42 decesed 10 years when Father passed    Heart Disease Brother     Alzheimer's Disease Sister     Heart Disease Brother     Heart Disease Brother      Social History     Socioeconomic History    Marital status:     Number of children: 3 (1

## 2024-01-10 ENCOUNTER — OFFICE VISIT (OUTPATIENT)
Dept: PRIMARY CARE CLINIC | Age: 89
End: 2024-01-10
Payer: MEDICARE

## 2024-01-10 VITALS
SYSTOLIC BLOOD PRESSURE: 110 MMHG | DIASTOLIC BLOOD PRESSURE: 64 MMHG | WEIGHT: 166.3 LBS | RESPIRATION RATE: 18 BRPM | BODY MASS INDEX: 26.73 KG/M2 | HEART RATE: 58 BPM | TEMPERATURE: 98.8 F | HEIGHT: 66 IN | OXYGEN SATURATION: 95 %

## 2024-01-10 DIAGNOSIS — I10 ESSENTIAL HYPERTENSION: Primary | ICD-10-CM

## 2024-01-10 DIAGNOSIS — F03.A0 MILD DEMENTIA, UNSPECIFIED DEMENTIA TYPE, UNSPECIFIED WHETHER BEHAVIORAL, PSYCHOTIC, OR MOOD DISTURBANCE OR ANXIETY (HCC): ICD-10-CM

## 2024-01-10 DIAGNOSIS — M15.9 PRIMARY OSTEOARTHRITIS INVOLVING MULTIPLE JOINTS: ICD-10-CM

## 2024-01-10 DIAGNOSIS — Z91.81 HISTORY OF FALLING, PRESENTING HAZARDS TO HEALTH: ICD-10-CM

## 2024-01-10 PROCEDURE — 1123F ACP DISCUSS/DSCN MKR DOCD: CPT | Performed by: FAMILY MEDICINE

## 2024-01-10 PROCEDURE — 99349 HOME/RES VST EST MOD MDM 40: CPT | Performed by: FAMILY MEDICINE

## 2024-02-06 NOTE — PROGRESS NOTES
2/7/2024    Home visit medically necessary in lieu of an office visit due to:  [place], uses walker, difficult to get out.     HPI:  Patient says he feels okay but he still feels more tired today than usual. He slept well last night. He sometimes gets a little short of breath with walking but has not had CP. He has not had any CP, SOB, cough or URI symptoms. He has not had any recent falls. He does not have any DJD pain.  He is moving his bowels better with extra PEG. He has not had any urinary symptoms. He had CABG x 6 in 2000. He has hx of LLL lobectomy for lung CA.     REVIEW OF SYSTEMS:    GENERAL: Appetite good.  No weight change, generally healthy, no change in strength or exercise tolerance.        CARDIOVASCULAR: No edema, no chest pains, no murmurs, no palpitations, no syncope, no orthopnea. CABG x 6 in 2000.  RESPIRATORY: No shortness of breath, no pain with breathing, no wheezing, no hemoptysis, no cough.    GASTROINTESTINAL: No change in appetite, no dysphagia, no heartburn, no abdominal pains, no diarrhea, no bowel habit changes, no emesis, no melena, no hemorrhoids. CONSTIPATION.     GENITOURINARY: No incontinence or retention, no urinary urgency, no frequent UTIs, no dysuria, no change in nature of urine. NOCTURIA x 3.  MUSCULOSKELETAL: No pain in muscles or joints, no swelling or redness of joints, no limitation of range of motion, no weakness or numbness.       NEURO/PSYCH: No weakness, no tremor, no seizures, no changes in mentation, no ataxia. No depressive symptoms, no changes in sleep habits, no changes in thought content. SLEEP PROBLEMS AT TIMES.  All other systems negative.    Allergies   Allergen Reactions    Povidone-Iodine      Other reaction(s): lids swell and flake     Soap      Other reaction(s): lids swell and flake     Ciprofloxacin     Ciprofloxacin Hcl     Nitroglycerin     Quinidine     Sulfamethoxazole      Other reaction(s): unable to sleep    Trimethoprim      Other

## 2024-02-07 ENCOUNTER — OFFICE VISIT (OUTPATIENT)
Dept: PRIMARY CARE CLINIC | Age: 89
End: 2024-02-07
Payer: MEDICARE

## 2024-02-07 VITALS
RESPIRATION RATE: 16 BRPM | HEIGHT: 66 IN | WEIGHT: 170 LBS | HEART RATE: 55 BPM | SYSTOLIC BLOOD PRESSURE: 126 MMHG | TEMPERATURE: 98.2 F | OXYGEN SATURATION: 95 % | BODY MASS INDEX: 27.32 KG/M2 | DIASTOLIC BLOOD PRESSURE: 82 MMHG

## 2024-02-07 DIAGNOSIS — I10 ESSENTIAL HYPERTENSION: Primary | ICD-10-CM

## 2024-02-07 DIAGNOSIS — F03.A0 MILD DEMENTIA, UNSPECIFIED DEMENTIA TYPE, UNSPECIFIED WHETHER BEHAVIORAL, PSYCHOTIC, OR MOOD DISTURBANCE OR ANXIETY (HCC): ICD-10-CM

## 2024-02-07 DIAGNOSIS — M15.9 PRIMARY OSTEOARTHRITIS INVOLVING MULTIPLE JOINTS: ICD-10-CM

## 2024-02-07 DIAGNOSIS — I65.23 ASYMPTOMATIC BILATERAL CAROTID ARTERY STENOSIS: ICD-10-CM

## 2024-02-07 DIAGNOSIS — K59.00 CONSTIPATION, UNSPECIFIED CONSTIPATION TYPE: ICD-10-CM

## 2024-02-07 PROCEDURE — 99349 HOME/RES VST EST MOD MDM 40: CPT | Performed by: FAMILY MEDICINE

## 2024-02-07 PROCEDURE — 1123F ACP DISCUSS/DSCN MKR DOCD: CPT | Performed by: FAMILY MEDICINE

## 2024-02-21 ENCOUNTER — TELEPHONE (OUTPATIENT)
Dept: PRIMARY CARE CLINIC | Age: 89
End: 2024-02-21

## 2024-02-21 NOTE — TELEPHONE ENCOUNTER
----- Message from Terri Yun sent at 2/21/2024  3:14 PM EST -----  Subject: Appointment Request    Reason for Call: Established Patient Appointment needed: Routine Existing   Condition Follow Up    QUESTIONS    Reason for appointment request? No appointments available during search     Additional Information for Provider? Patient is requesting to reschedule   the 4/17/24 appt to a later time. Please call patient with new time.   ---------------------------------------------------------------------------  --------------  CALL BACK INFO  [phone]; OK to leave message on voicemail  ---------------------------------------------------------------------------  --------------  SCRIPT ANSWERS

## 2024-03-05 NOTE — PROGRESS NOTES
ecchymosis, or other lesions. NEURO:   No tremor, motor UEs 5/5 LEs  5/5, sensory normal, able to stand and walk using a cane with mild ataxia. PSYCH:  Pleasant and cooperative.  Fluid speech, oriented to person, place and time.  Remember 3 of 3 items on 11/30/23. No delusional statements.    Medications reviewed.  Labs 2/25/24 HH 12.5/37, GFR 52, lytes nl, LFTs nl, A1c 5.8  12/1/23 HH 11/34, GFR 57, lytes nl, LFTs nl, TSH 2.5  8/24/23 Hgb 12.4, GFR 57, lytes nl  3/13/23 HH 11/33, GFR>60, lytes nl, LFTs nl, TP 6.0    ASSESSMENT:  Elderly male has Hyperlipemia; Asymptomatic bilateral carotid artery stenosis; Malignant neoplasm of lower lobe of left lung (HCC); Hx of coronary artery disease; Essential hypertension; Anemia; Benign prostatic hyperplasia; Gastroesophageal reflux disease; Macular degeneration; Overweight with body mass index (BMI) 25.0-29.9; Primary malignant neoplasm of lung (HCC); Sensorineural hearing loss (SNHL) of both ears; NSTEMI (non-ST elevated myocardial infarction) (HCC); Fall down steps; Mild dementia (HCC); Personal history of other malignant neoplasm of bronchus and lung; Acquired absence of lung (part of); Nonexudative senile macular degeneration of retina; Primary osteoarthritis involving multiple joints; History of falling, presenting hazards to health; and Constipation on their problem list.     Diagnoses attached to this encounter:  Tashi was seen today for hypertension, coronary artery disease and joint pain.    Diagnoses and all orders for this visit:    Essential hypertension    Asymptomatic bilateral carotid artery stenosis    Coronary artery disease involving native coronary artery of native heart without angina pectoris    Hx of coronary artery disease    Mild dementia, unspecified dementia type, unspecified whether behavioral, psychotic, or mood disturbance or anxiety (HCC)    Primary osteoarthritis involving multiple joints    Constipation, unspecified constipation

## 2024-03-06 ENCOUNTER — OFFICE VISIT (OUTPATIENT)
Dept: PRIMARY CARE CLINIC | Age: 89
End: 2024-03-06
Payer: MEDICARE

## 2024-03-06 VITALS
WEIGHT: 170 LBS | HEART RATE: 55 BPM | SYSTOLIC BLOOD PRESSURE: 129 MMHG | TEMPERATURE: 98.2 F | HEIGHT: 66 IN | OXYGEN SATURATION: 95 % | DIASTOLIC BLOOD PRESSURE: 67 MMHG | RESPIRATION RATE: 16 BRPM | BODY MASS INDEX: 27.32 KG/M2

## 2024-03-06 VITALS
DIASTOLIC BLOOD PRESSURE: 67 MMHG | HEART RATE: 55 BPM | TEMPERATURE: 98.2 F | OXYGEN SATURATION: 95 % | WEIGHT: 170 LBS | BODY MASS INDEX: 27.44 KG/M2 | SYSTOLIC BLOOD PRESSURE: 129 MMHG | RESPIRATION RATE: 16 BRPM

## 2024-03-06 DIAGNOSIS — F03.A0 MILD DEMENTIA, UNSPECIFIED DEMENTIA TYPE, UNSPECIFIED WHETHER BEHAVIORAL, PSYCHOTIC, OR MOOD DISTURBANCE OR ANXIETY (HCC): ICD-10-CM

## 2024-03-06 DIAGNOSIS — M15.9 PRIMARY OSTEOARTHRITIS INVOLVING MULTIPLE JOINTS: ICD-10-CM

## 2024-03-06 DIAGNOSIS — E78.2 MIXED HYPERLIPIDEMIA: ICD-10-CM

## 2024-03-06 DIAGNOSIS — K21.9 GASTROESOPHAGEAL REFLUX DISEASE WITHOUT ESOPHAGITIS: ICD-10-CM

## 2024-03-06 DIAGNOSIS — I10 ESSENTIAL HYPERTENSION: Primary | ICD-10-CM

## 2024-03-06 DIAGNOSIS — Z86.79 HX OF CORONARY ARTERY DISEASE: ICD-10-CM

## 2024-03-06 DIAGNOSIS — Z91.81 HISTORY OF FALLING, PRESENTING HAZARDS TO HEALTH: ICD-10-CM

## 2024-03-06 DIAGNOSIS — D64.9 ANEMIA, UNSPECIFIED TYPE: ICD-10-CM

## 2024-03-06 DIAGNOSIS — Z90.2 ACQUIRED ABSENCE OF LUNG (PART OF): ICD-10-CM

## 2024-03-06 DIAGNOSIS — I10 ESSENTIAL HYPERTENSION: ICD-10-CM

## 2024-03-06 DIAGNOSIS — Z00.00 MEDICARE ANNUAL WELLNESS VISIT, SUBSEQUENT: ICD-10-CM

## 2024-03-06 DIAGNOSIS — K59.00 CONSTIPATION, UNSPECIFIED CONSTIPATION TYPE: ICD-10-CM

## 2024-03-06 DIAGNOSIS — C34.32 MALIGNANT NEOPLASM OF LOWER LOBE OF LEFT LUNG (HCC): ICD-10-CM

## 2024-03-06 DIAGNOSIS — I65.23 ASYMPTOMATIC BILATERAL CAROTID ARTERY STENOSIS: Primary | ICD-10-CM

## 2024-03-06 DIAGNOSIS — I65.23 ASYMPTOMATIC BILATERAL CAROTID ARTERY STENOSIS: ICD-10-CM

## 2024-03-06 DIAGNOSIS — N40.0 BENIGN PROSTATIC HYPERPLASIA, UNSPECIFIED WHETHER LOWER URINARY TRACT SYMPTOMS PRESENT: ICD-10-CM

## 2024-03-06 DIAGNOSIS — C34.92 PRIMARY MALIGNANT NEOPLASM OF LEFT LUNG (HCC): ICD-10-CM

## 2024-03-06 DIAGNOSIS — I25.10 CORONARY ARTERY DISEASE INVOLVING NATIVE CORONARY ARTERY OF NATIVE HEART WITHOUT ANGINA PECTORIS: ICD-10-CM

## 2024-03-06 PROCEDURE — 1123F ACP DISCUSS/DSCN MKR DOCD: CPT | Performed by: FAMILY MEDICINE

## 2024-03-06 PROCEDURE — 99349 HOME/RES VST EST MOD MDM 40: CPT | Performed by: FAMILY MEDICINE

## 2024-03-06 PROCEDURE — G0439 PPPS, SUBSEQ VISIT: HCPCS | Performed by: FAMILY MEDICINE

## 2024-03-06 ASSESSMENT — PATIENT HEALTH QUESTIONNAIRE - PHQ9
1. LITTLE INTEREST OR PLEASURE IN DOING THINGS: 1
SUM OF ALL RESPONSES TO PHQ9 QUESTIONS 1 & 2: 2
SUM OF ALL RESPONSES TO PHQ QUESTIONS 1-9: 2
2. FEELING DOWN, DEPRESSED OR HOPELESS: 1

## 2024-03-06 NOTE — PATIENT INSTRUCTIONS
[place] [employer] online.  You need 8895-9692 mg of calcium and 0497-6787 IU of vitamin D per day. It is possible to meet your calcium requirement with diet alone, but a vitamin D supplement is usually necessary to meet this goal.  When exposed to the sun, use a sunscreen that protects against both UVA and UVB radiation with an SPF of 30 or greater. Reapply every 2 to 3 hours or after sweating, drying off with a towel, or swimming.  Always wear a seat belt when traveling in a car. Always wear a helmet when riding a bicycle or motorcycle.

## 2024-03-06 NOTE — PROGRESS NOTES
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)  Primary malignant neoplasm of left lung (HCC)  Gastroesophageal reflux disease without esophagitis  Mild dementia, unspecified dementia type, unspecified whether behavioral, psychotic, or mood disturbance or anxiety (HCC)  Primary osteoarthritis involving multiple joints  Benign prostatic hyperplasia, unspecified whether lower urinary tract symptoms present  Mixed hyperlipidemia  Hx of coronary artery disease  Anemia, unspecified type  Acquired absence of lung (part of)  History of falling, presenting hazards to health  Constipation, unspecified constipation type  Medicare annual wellness visit, subsequent    Recommendations for Preventive Services Due: see orders and patient instructions/AVS.  Recommended screening schedule for the next 5-10 years is provided to the patient in written form: see Patient Instructions/AVS.     Return for Medicare Annual Wellness Visit in 1 year.     Subjective     Patient's complete Health Risk Assessment and screening values have been reviewed and are found in Flowsheets. The following problems were reviewed today and where indicated follow up appointments were made and/or referrals ordered.    Positive Risk Factor Screenings with Interventions:       Cognitive:   Clock Drawing Test (CDT): Normal  Words recalled: 3 Words Recalled     Total Score Interpretation: Abnormal Mini-Cog  Interventions:  Normal cognitive exam             Dentist Screen:  Have you seen the dentist within the past year?: (!) No    Intervention:  Goes to dentist prn.       ADL's:   Patient reports needing help with:  Select all that apply: (!) Laundry, Housekeeping, Shopping, Food Preparation  Interventions:  Lives at [facility] and has all his needs cared for.            Objective   Vitals:

## 2024-04-17 ENCOUNTER — OFFICE VISIT (OUTPATIENT)
Dept: PRIMARY CARE CLINIC | Age: 89
End: 2024-04-17
Payer: MEDICARE

## 2024-04-17 VITALS
SYSTOLIC BLOOD PRESSURE: 140 MMHG | DIASTOLIC BLOOD PRESSURE: 67 MMHG | TEMPERATURE: 98 F | RESPIRATION RATE: 18 BRPM | HEIGHT: 66 IN | HEART RATE: 78 BPM | WEIGHT: 166 LBS | BODY MASS INDEX: 26.68 KG/M2 | OXYGEN SATURATION: 98 %

## 2024-04-17 DIAGNOSIS — I10 ESSENTIAL HYPERTENSION: Primary | ICD-10-CM

## 2024-04-17 DIAGNOSIS — F03.A0 MILD DEMENTIA, UNSPECIFIED DEMENTIA TYPE, UNSPECIFIED WHETHER BEHAVIORAL, PSYCHOTIC, OR MOOD DISTURBANCE OR ANXIETY (HCC): ICD-10-CM

## 2024-04-17 DIAGNOSIS — K59.00 CONSTIPATION, UNSPECIFIED CONSTIPATION TYPE: ICD-10-CM

## 2024-04-17 DIAGNOSIS — M15.9 PRIMARY OSTEOARTHRITIS INVOLVING MULTIPLE JOINTS: ICD-10-CM

## 2024-04-17 PROCEDURE — 99349 HOME/RES VST EST MOD MDM 40: CPT | Performed by: FAMILY MEDICINE

## 2024-04-17 PROCEDURE — 1123F ACP DISCUSS/DSCN MKR DOCD: CPT | Performed by: FAMILY MEDICINE

## 2024-04-17 NOTE — PROGRESS NOTES
4/17/2024    Home visit medically necessary in lieu of an office visit due to:  [place], uses walker, difficult to get out.     HPI:  Patient says he is feeling very good this month. He says his R hip has been bothering him some. He has been taking Tylenol in AM which helps. His energy also is better this month. He sometimes feels a little short of breath with walking. He has not had CP. He has not had any CP, cough or URI symptoms. He has not had any recent falls. He does not have any DJD pain.  He is moving his bowels better with extra PEG. He has not had any urinary symptoms. He had CABG x 6 in 2000. He has hx of LLL lobectomy for lung CA.     REVIEW OF SYSTEMS:    GENERAL: Appetite good.  No weight change, generally healthy, no change in strength or exercise tolerance.        CARDIOVASCULAR: No edema, no chest pains, no murmurs, no palpitations, no syncope, no orthopnea. CABG x 6 in 2000.  RESPIRATORY: No shortness of breath, no pain with breathing, no wheezing, no hemoptysis, no cough.    GASTROINTESTINAL: No change in appetite, no dysphagia, no heartburn, no abdominal pains, no diarrhea, no bowel habit changes, no emesis, no melena, no hemorrhoids. CONSTIPATION.     GENITOURINARY: No incontinence or retention, no urinary urgency, no frequent UTIs, no dysuria, no change in nature of urine. NOCTURIA x 3.  MUSCULOSKELETAL: No pain in muscles or joints, no swelling or redness of joints, no limitation of range of motion, no weakness or numbness.       NEURO/PSYCH: No weakness, no tremor, no seizures, no changes in mentation, no ataxia. No depressive symptoms, no changes in sleep habits, no changes in thought content. SLEEP PROBLEMS AT TIMES.  All other systems negative.    Allergies   Allergen Reactions    Povidone-Iodine      Other reaction(s): lids swell and flake     Soap      Other reaction(s): lids swell and flake     Ciprofloxacin     Ciprofloxacin Hcl     Nitroglycerin     Quinidine     Sulfamethoxazole

## 2024-05-16 NOTE — PROGRESS NOTES
5/17/2024    Home visit medically necessary in lieu of an office visit due to:  [place] resident, uses walker, difficult to get out.     HPI:  Patient says he is feeling good this month. His energy also is better this month. He continues to walk daily. He sometimes feels a little short of breath with walking. He says his R hip has been bothering him some. He has been taking Tylenol in AM which helps. He has not had CP.  He has not had any recent falls. He does not have any DJD pain.  He is moving his bowels better with Fig Newtons. He has not had any urinary symptoms. He had CABG x 6 in 2000. He has hx of LLL lobectomy for lung CA.     REVIEW OF SYSTEMS:    GENERAL: Appetite good.  No weight change, generally healthy, no change in strength or exercise tolerance.        CARDIOVASCULAR: No edema, no chest pains, no murmurs, no palpitations, no syncope, no orthopnea. CABG x 6 in 2000.  RESPIRATORY: No shortness of breath, no pain with breathing, no wheezing, no hemoptysis, no cough.    GASTROINTESTINAL: No change in appetite, no dysphagia, no heartburn, no abdominal pains, no diarrhea, no bowel habit changes, no emesis, no melena, no hemorrhoids. CONSTIPATION.     GENITOURINARY: No incontinence or retention, no urinary urgency, no frequent UTIs, no dysuria, no change in nature of urine. NOCTURIA x 3.  MUSCULOSKELETAL: No pain in muscles or joints, no swelling or redness of joints, no limitation of range of motion, no weakness or numbness.       NEURO/PSYCH: No weakness, no tremor, no seizures, no changes in mentation, no ataxia. No depressive symptoms, no changes in sleep habits, no changes in thought content. SLEEP PROBLEMS AT TIMES.  All other systems negative.    Allergies   Allergen Reactions    Povidone-Iodine      Other reaction(s): lids swell and flake     Soap      Other reaction(s): lids swell and flake     Ciprofloxacin     Ciprofloxacin Hcl     Nitroglycerin     Quinidine     Sulfamethoxazole      Other

## 2024-05-17 ENCOUNTER — OFFICE VISIT (OUTPATIENT)
Dept: PRIMARY CARE CLINIC | Age: 89
End: 2024-05-17

## 2024-05-17 VITALS
BODY MASS INDEX: 26.58 KG/M2 | OXYGEN SATURATION: 96 % | TEMPERATURE: 98.3 F | HEIGHT: 66 IN | HEART RATE: 66 BPM | WEIGHT: 165.4 LBS | RESPIRATION RATE: 18 BRPM | DIASTOLIC BLOOD PRESSURE: 72 MMHG | SYSTOLIC BLOOD PRESSURE: 140 MMHG

## 2024-05-17 DIAGNOSIS — M15.9 PRIMARY OSTEOARTHRITIS INVOLVING MULTIPLE JOINTS: ICD-10-CM

## 2024-05-17 DIAGNOSIS — I65.23 ASYMPTOMATIC BILATERAL CAROTID ARTERY STENOSIS: ICD-10-CM

## 2024-05-17 DIAGNOSIS — Z91.81 HISTORY OF FALLING, PRESENTING HAZARDS TO HEALTH: ICD-10-CM

## 2024-05-17 DIAGNOSIS — I10 ESSENTIAL HYPERTENSION: Primary | ICD-10-CM

## 2024-05-17 DIAGNOSIS — Z90.2 ACQUIRED ABSENCE OF LUNG (PART OF): ICD-10-CM

## 2024-06-20 NOTE — PROGRESS NOTES
6/21/2024    Home visit medically necessary in lieu of an office visit due to:  [place] resident, uses walker, difficult to get out.     HPI:  Patient says he is feeling okay this month. He had an abscess on L scrotum a couple weeks ago that is getting better on its own but . It was draining but dry now. His energy seems to be waning. He continues to walk daily. He gets a little short of breath with walking. He says his R hip sometimes bothers him some, especially when he sleeps on it. He has been taking Tylenol in AM which helps. He has not had CP.  He has not had any recent falls. He does not have any DJD pain.  He is moving his bowels better with Fig Newtons. He has not had any urinary symptoms. He had CABG x 6 in 2000. He has hx of LLL lobectomy for lung CA.     REVIEW OF SYSTEMS:    GENERAL: Appetite good.  No weight change, generally healthy, no change in strength or exercise tolerance.        CARDIOVASCULAR: No edema, no chest pains, no murmurs, no palpitations, no syncope, no orthopnea. CABG x 6 in 2000.  RESPIRATORY: No shortness of breath, no pain with breathing, no wheezing, no hemoptysis, no cough.    GASTROINTESTINAL: No change in appetite, no dysphagia, no heartburn, no abdominal pains, no diarrhea, no bowel habit changes, no emesis, no melena, no hemorrhoids. CONSTIPATION.     GENITOURINARY: No incontinence or retention, no urinary urgency, no frequent UTIs, no dysuria, no change in nature of urine. NOCTURIA x 3.  MUSCULOSKELETAL: No pain in muscles or joints, no swelling or redness of joints, no limitation of range of motion, no weakness or numbness.       NEURO/PSYCH: No weakness, no tremor, no seizures, no changes in mentation, no ataxia. No depressive symptoms, no changes in sleep habits, no changes in thought content. SLEEP PROBLEMS AT TIMES.  All other systems negative.    Allergies   Allergen Reactions    Povidone-Iodine      Other reaction(s): lids swell and flake     Soap      Other

## 2024-06-21 ENCOUNTER — OFFICE VISIT (OUTPATIENT)
Dept: PRIMARY CARE CLINIC | Age: 89
End: 2024-06-21
Payer: MEDICARE

## 2024-06-21 VITALS
OXYGEN SATURATION: 96 % | WEIGHT: 166 LBS | DIASTOLIC BLOOD PRESSURE: 78 MMHG | BODY MASS INDEX: 26.68 KG/M2 | TEMPERATURE: 98.2 F | HEART RATE: 65 BPM | HEIGHT: 66 IN | RESPIRATION RATE: 18 BRPM | SYSTOLIC BLOOD PRESSURE: 148 MMHG

## 2024-06-21 DIAGNOSIS — Z91.81 HISTORY OF FALLING, PRESENTING HAZARDS TO HEALTH: ICD-10-CM

## 2024-06-21 DIAGNOSIS — I10 ESSENTIAL HYPERTENSION: Primary | ICD-10-CM

## 2024-06-21 DIAGNOSIS — N49.2 ABSCESS, SCROTUM: ICD-10-CM

## 2024-06-21 DIAGNOSIS — I25.10 CORONARY ARTERY DISEASE INVOLVING NATIVE CORONARY ARTERY OF NATIVE HEART WITHOUT ANGINA PECTORIS: ICD-10-CM

## 2024-06-21 DIAGNOSIS — Z90.2 ACQUIRED ABSENCE OF LUNG (PART OF): ICD-10-CM

## 2024-06-21 PROCEDURE — 1123F ACP DISCUSS/DSCN MKR DOCD: CPT | Performed by: FAMILY MEDICINE

## 2024-06-21 PROCEDURE — 99349 HOME/RES VST EST MOD MDM 40: CPT | Performed by: FAMILY MEDICINE

## 2024-07-25 NOTE — PROGRESS NOTES
kyphosis, non-tender to palp.  EXTREMITIES:  No edema, no ulcerations, varicosities or erythema.  No gross deformities.   MUSCULOSKELETAL: Good ROM of all joints, non tender to palp and or with movement.  SKIN: 2x2 cm abscess L scrotum, mildly tender, no pus draining today. No ulcerations or breakdown, rash, ecchymosis, or other lesions. NEURO:   No tremor, motor UEs 5/5 LEs  5/5, sensory normal, able to stand and walk using a cane with mild ataxia. PSYCH:  Pleasant and cooperative.  Fluid speech, oriented to person, place and time.  Remember 3 of 3 items on 3/6/24. No delusional statements.    Medications reviewed.  Labs 2/25/24 HH 12.5/37, GFR 52, lytes nl, LFTs nl, A1c 5.8  12/1/23 HH 11/34, GFR 57, lytes nl, LFTs nl, TSH 2.5  8/24/23 Hgb 12.4, GFR 57, lytes nl  3/13/23 HH 11/33, GFR>60, lytes nl, LFTs nl, TP 6.0    ASSESSMENT:  Elderly male has Coronary artery disease involving native coronary artery of native heart without angina pectoris; Hyperlipemia; Asymptomatic bilateral carotid artery stenosis; Malignant neoplasm of lower lobe of left lung (HCC); Hx of coronary artery disease; Essential hypertension; Anemia; Benign prostatic hyperplasia; Gastroesophageal reflux disease; Macular degeneration; Overweight with body mass index (BMI) 25.0-29.9; Primary malignant neoplasm of lung ([facility]); Sensorineural hearing loss (SNHL) of both ears; NSTEMI (non-ST elevated myocardial infarction) ([facility]); Fall down steps; Mild dementia ([facility]); Personal history of other malignant neoplasm of bronchus and lung; Acquired absence of lung (part of); Nonexudative senile macular degeneration of retina; Primary osteoarthritis involving multiple joints; History of falling, presenting hazards to health; and Constipation on their problem list.     Diagnoses attached to this encounter:  Tashi was seen today for hypertension.    Diagnoses and all orders for this visit:    Essential hypertension  Comments:  Stable. May need to adjust BP meds.

## 2024-07-26 ENCOUNTER — OFFICE VISIT (OUTPATIENT)
Dept: PRIMARY CARE CLINIC | Age: 89
End: 2024-07-26
Payer: MEDICARE

## 2024-07-26 VITALS
DIASTOLIC BLOOD PRESSURE: 60 MMHG | TEMPERATURE: 98.2 F | RESPIRATION RATE: 16 BRPM | SYSTOLIC BLOOD PRESSURE: 140 MMHG | WEIGHT: 167.6 LBS | HEART RATE: 62 BPM | OXYGEN SATURATION: 98 % | BODY MASS INDEX: 27.05 KG/M2

## 2024-07-26 DIAGNOSIS — C34.92 PRIMARY MALIGNANT NEOPLASM OF LEFT LUNG (HCC): ICD-10-CM

## 2024-07-26 DIAGNOSIS — F03.A0 MILD DEMENTIA, UNSPECIFIED DEMENTIA TYPE, UNSPECIFIED WHETHER BEHAVIORAL, PSYCHOTIC, OR MOOD DISTURBANCE OR ANXIETY (HCC): ICD-10-CM

## 2024-07-26 DIAGNOSIS — I10 ESSENTIAL HYPERTENSION: Primary | ICD-10-CM

## 2024-07-26 DIAGNOSIS — C34.32 MALIGNANT NEOPLASM OF LOWER LOBE OF LEFT LUNG (HCC): ICD-10-CM

## 2024-07-26 DIAGNOSIS — I25.10 CORONARY ARTERY DISEASE INVOLVING NATIVE CORONARY ARTERY OF NATIVE HEART WITHOUT ANGINA PECTORIS: ICD-10-CM

## 2024-07-26 DIAGNOSIS — K21.9 GASTROESOPHAGEAL REFLUX DISEASE WITHOUT ESOPHAGITIS: ICD-10-CM

## 2024-07-26 PROCEDURE — 1123F ACP DISCUSS/DSCN MKR DOCD: CPT | Performed by: FAMILY MEDICINE

## 2024-07-26 PROCEDURE — 99349 HOME/RES VST EST MOD MDM 40: CPT | Performed by: FAMILY MEDICINE

## 2024-08-08 NOTE — ED NOTES
Pt. Laying in bed, requesting update on status.   Report given and care transferred to [facility], [address] [facility], [address] [place] [address]  03/10/23 7772 No normal...

## 2024-08-22 PROBLEM — I25.2 HISTORY OF NON-ST ELEVATION MYOCARDIAL INFARCTION (NSTEMI): Status: ACTIVE | Noted: 2023-03-11

## 2024-08-23 ENCOUNTER — OFFICE VISIT (OUTPATIENT)
Dept: PRIMARY CARE CLINIC | Age: 89
End: 2024-08-23

## 2024-08-23 VITALS
OXYGEN SATURATION: 94 % | TEMPERATURE: 98.3 F | RESPIRATION RATE: 16 BRPM | SYSTOLIC BLOOD PRESSURE: 122 MMHG | WEIGHT: 168.2 LBS | BODY MASS INDEX: 27.03 KG/M2 | HEIGHT: 66 IN | DIASTOLIC BLOOD PRESSURE: 68 MMHG | HEART RATE: 72 BPM

## 2024-08-23 DIAGNOSIS — I25.10 CORONARY ARTERY DISEASE INVOLVING NATIVE CORONARY ARTERY OF NATIVE HEART WITHOUT ANGINA PECTORIS: ICD-10-CM

## 2024-08-23 DIAGNOSIS — M15.9 PRIMARY OSTEOARTHRITIS INVOLVING MULTIPLE JOINTS: ICD-10-CM

## 2024-08-23 DIAGNOSIS — I10 ESSENTIAL HYPERTENSION: Primary | ICD-10-CM

## 2024-08-23 NOTE — PROGRESS NOTES
normal BS.   BACK:  no scoliosis or kyphosis, non-tender to palp.  EXTREMITIES:  No edema, no ulcerations, varicosities or erythema.  No gross deformities.   MUSCULOSKELETAL: Good ROM of all joints, non tender to palp and or with movement.  SKIN: 2x2 cm abscess L scrotum, mildly tender, no pus draining today. No ulcerations or breakdown, rash, ecchymosis, or other lesions. NEURO:   No tremor, motor UEs 5/5 LEs  5/5, sensory normal, able to stand and walk using a cane with mild ataxia. PSYCH:  Pleasant and cooperative.  Fluid speech, oriented to person, place and time.  Remember 3 of 3 items on 3/6/24. No delusional statements.    Medications reviewed.  Labs 2/25/24 HH 12.5/37, GFR 52, lytes nl, LFTs nl, A1c 5.8  12/1/23 HH 11/34, GFR 57, lytes nl, LFTs nl, TSH 2.5  8/24/23 Hgb 12.4, GFR 57, lytes nl  3/13/23 HH 11/33, GFR>60, lytes nl, LFTs nl, TP 6.0    ASSESSMENT:    Diagnoses attached to this encounter:  Tashi was seen today for hypertension and coronary artery disease.    Diagnoses and all orders for this visit:    Essential hypertension    Coronary artery disease involving native coronary artery of native heart without angina pectoris    Primary osteoarthritis involving multiple joints      PLAN:   [facility] May continue Tylenol ES in AM and Tylenol PM in PM. Continue PEG as needed for constipation. Discussed diet and exercise. PRN meds for UR congestion. Continue other meds as per Rx List. Return in about 5 weeks (around 9/27/2024).  40 minutes spent on visit, 25 minutes involved education/counseling regarding fatigue, HTN, cardiac, disease processes, treatment options, meds and coordination of care.   Current Outpatient Medications   Medication Sig Dispense Refill    metoprolol tartrate (LOPRESSOR) 25 MG tablet Take 0.5 tablets by mouth 2 times daily      Multiple Vitamin (MULTIVITAMIN ADULT PO) Take 1 tablet by mouth daily      Omega-3 Fatty Acids (FISH OIL OMEGA-3 PO) Take 2 caplets

## 2024-09-22 ENCOUNTER — APPOINTMENT (OUTPATIENT)
Dept: GENERAL RADIOLOGY | Age: 89
End: 2024-09-22
Payer: MEDICARE

## 2024-09-22 ENCOUNTER — APPOINTMENT (OUTPATIENT)
Dept: CT IMAGING | Age: 89
End: 2024-09-22
Payer: MEDICARE

## 2024-09-22 ENCOUNTER — HOSPITAL ENCOUNTER (INPATIENT)
Age: 89
LOS: 4 days | Discharge: NURSING, REHAB OR LONG-TERM CARE | End: 2024-09-26
Attending: STUDENT IN AN ORGANIZED HEALTH CARE EDUCATION/TRAINING PROGRAM | Admitting: INTERNAL MEDICINE
Payer: MEDICARE

## 2024-09-22 DIAGNOSIS — U07.1 COVID-19: ICD-10-CM

## 2024-09-22 DIAGNOSIS — R09.02 HYPOXEMIA: Primary | ICD-10-CM

## 2024-09-22 LAB
ALBUMIN SERPL-MCNC: 3.8 G/DL (ref 3.5–5.2)
ALP SERPL-CCNC: 69 U/L (ref 40–129)
ALT SERPL-CCNC: 13 U/L (ref 0–40)
ANION GAP SERPL CALCULATED.3IONS-SCNC: 11 MMOL/L (ref 7–16)
AST SERPL-CCNC: 33 U/L (ref 0–39)
BACTERIA URNS QL MICRO: ABNORMAL
BASOPHILS # BLD: 0.05 K/UL (ref 0–0.2)
BASOPHILS NFR BLD: 1 % (ref 0–2)
BILIRUB SERPL-MCNC: 0.9 MG/DL (ref 0–1.2)
BILIRUB UR QL STRIP: NEGATIVE
BUN SERPL-MCNC: 31 MG/DL (ref 6–23)
CALCIUM SERPL-MCNC: 8.4 MG/DL (ref 8.6–10.2)
CHLORIDE SERPL-SCNC: 100 MMOL/L (ref 98–107)
CLARITY UR: CLEAR
CO2 SERPL-SCNC: 23 MMOL/L (ref 22–29)
COLOR UR: YELLOW
CREAT SERPL-MCNC: 1.1 MG/DL (ref 0.7–1.2)
EOSINOPHIL # BLD: 0 K/UL (ref 0.05–0.5)
EOSINOPHILS RELATIVE PERCENT: 0 % (ref 0–6)
ERYTHROCYTE [DISTWIDTH] IN BLOOD BY AUTOMATED COUNT: 14.4 % (ref 11.5–15)
GFR, ESTIMATED: 63 ML/MIN/1.73M2
GLUCOSE SERPL-MCNC: 113 MG/DL (ref 74–99)
GLUCOSE UR STRIP-MCNC: NEGATIVE MG/DL
HCT VFR BLD AUTO: 34.2 % (ref 37–54)
HGB BLD-MCNC: 11.2 G/DL (ref 12.5–16.5)
HGB UR QL STRIP.AUTO: NEGATIVE
IMM GRANULOCYTES # BLD AUTO: <0.03 K/UL (ref 0–0.58)
IMM GRANULOCYTES NFR BLD: 0 % (ref 0–5)
KETONES UR STRIP-MCNC: 15 MG/DL
LEUKOCYTE ESTERASE UR QL STRIP: ABNORMAL
LYMPHOCYTES NFR BLD: 0.79 K/UL (ref 1.5–4)
LYMPHOCYTES RELATIVE PERCENT: 11 % (ref 20–42)
MCH RBC QN AUTO: 28.6 PG (ref 26–35)
MCHC RBC AUTO-ENTMCNC: 32.7 G/DL (ref 32–34.5)
MCV RBC AUTO: 87.2 FL (ref 80–99.9)
MONOCYTES NFR BLD: 1.3 K/UL (ref 0.1–0.95)
MONOCYTES NFR BLD: 19 % (ref 2–12)
NEUTROPHILS NFR BLD: 69 % (ref 43–80)
NEUTS SEG NFR BLD: 4.75 K/UL (ref 1.8–7.3)
NITRITE UR QL STRIP: NEGATIVE
PH UR STRIP: 6 [PH] (ref 5–9)
PLATELET CONFIRMATION: NORMAL
PLATELET, FLUORESCENCE: 177 K/UL (ref 130–450)
PMV BLD AUTO: 9.2 FL (ref 7–12)
POTASSIUM SERPL-SCNC: 4.6 MMOL/L (ref 3.5–5)
PROT SERPL-MCNC: 6.6 G/DL (ref 6.4–8.3)
PROT UR STRIP-MCNC: 30 MG/DL
RBC # BLD AUTO: 3.92 M/UL (ref 3.8–5.8)
RBC #/AREA URNS HPF: ABNORMAL /HPF
SARS-COV-2 RDRP RESP QL NAA+PROBE: DETECTED
SODIUM SERPL-SCNC: 134 MMOL/L (ref 132–146)
SP GR UR STRIP: 1.02 (ref 1–1.03)
SPECIMEN DESCRIPTION: ABNORMAL
TROPONIN I SERPL HS-MCNC: 122 NG/L (ref 0–11)
TROPONIN I SERPL HS-MCNC: NORMAL NG/L (ref 0–22)
TROPONIN INTERP: NORMAL
TROPONIN T SERPL-MCNC: NORMAL NG/ML
UROBILINOGEN UR STRIP-ACNC: 0.2 EU/DL (ref 0–1)
WBC #/AREA URNS HPF: ABNORMAL /HPF
WBC OTHER # BLD: 6.9 K/UL (ref 4.5–11.5)

## 2024-09-22 PROCEDURE — 84484 ASSAY OF TROPONIN QUANT: CPT

## 2024-09-22 PROCEDURE — 87635 SARS-COV-2 COVID-19 AMP PRB: CPT

## 2024-09-22 PROCEDURE — 2060000000 HC ICU INTERMEDIATE R&B

## 2024-09-22 PROCEDURE — 99285 EMERGENCY DEPT VISIT HI MDM: CPT

## 2024-09-22 PROCEDURE — 73620 X-RAY EXAM OF FOOT: CPT

## 2024-09-22 PROCEDURE — 3E0333Z INTRODUCTION OF ANTI-INFLAMMATORY INTO PERIPHERAL VEIN, PERCUTANEOUS APPROACH: ICD-10-PCS | Performed by: INTERNAL MEDICINE

## 2024-09-22 PROCEDURE — 2580000003 HC RX 258

## 2024-09-22 PROCEDURE — 6360000002 HC RX W HCPCS: Performed by: STUDENT IN AN ORGANIZED HEALTH CARE EDUCATION/TRAINING PROGRAM

## 2024-09-22 PROCEDURE — 70450 CT HEAD/BRAIN W/O DYE: CPT

## 2024-09-22 PROCEDURE — 87899 AGENT NOS ASSAY W/OPTIC: CPT

## 2024-09-22 PROCEDURE — 6360000002 HC RX W HCPCS: Performed by: FAMILY MEDICINE

## 2024-09-22 PROCEDURE — 96374 THER/PROPH/DIAG INJ IV PUSH: CPT

## 2024-09-22 PROCEDURE — 85025 COMPLETE CBC W/AUTO DIFF WBC: CPT

## 2024-09-22 PROCEDURE — 6370000000 HC RX 637 (ALT 250 FOR IP): Performed by: STUDENT IN AN ORGANIZED HEALTH CARE EDUCATION/TRAINING PROGRAM

## 2024-09-22 PROCEDURE — 6370000000 HC RX 637 (ALT 250 FOR IP): Performed by: FAMILY MEDICINE

## 2024-09-22 PROCEDURE — 87449 NOS EACH ORGANISM AG IA: CPT

## 2024-09-22 PROCEDURE — 71045 X-RAY EXAM CHEST 1 VIEW: CPT

## 2024-09-22 PROCEDURE — 80053 COMPREHEN METABOLIC PANEL: CPT

## 2024-09-22 PROCEDURE — 81001 URINALYSIS AUTO W/SCOPE: CPT

## 2024-09-22 PROCEDURE — 93005 ELECTROCARDIOGRAM TRACING: CPT

## 2024-09-22 PROCEDURE — 72125 CT NECK SPINE W/O DYE: CPT

## 2024-09-22 RX ORDER — ACETAMINOPHEN 325 MG/1
650 TABLET ORAL EVERY 4 HOURS PRN
Status: DISCONTINUED | OUTPATIENT
Start: 2024-09-22 | End: 2024-09-26 | Stop reason: HOSPADM

## 2024-09-22 RX ORDER — METOPROLOL TARTRATE 25 MG/1
12.5 TABLET, FILM COATED ORAL 2 TIMES DAILY
Status: DISCONTINUED | OUTPATIENT
Start: 2024-09-22 | End: 2024-09-23

## 2024-09-22 RX ORDER — ENOXAPARIN SODIUM 100 MG/ML
30 INJECTION SUBCUTANEOUS DAILY
Status: DISCONTINUED | OUTPATIENT
Start: 2024-09-22 | End: 2024-09-22 | Stop reason: DRUGHIGH

## 2024-09-22 RX ORDER — DEXAMETHASONE SODIUM PHOSPHATE 10 MG/ML
8 INJECTION INTRAMUSCULAR; INTRAVENOUS ONCE
Status: COMPLETED | OUTPATIENT
Start: 2024-09-22 | End: 2024-09-22

## 2024-09-22 RX ORDER — ENOXAPARIN SODIUM 100 MG/ML
40 INJECTION SUBCUTANEOUS DAILY
Status: DISCONTINUED | OUTPATIENT
Start: 2024-09-22 | End: 2024-09-26 | Stop reason: HOSPADM

## 2024-09-22 RX ORDER — METOPROLOL TARTRATE 25 MG/1
12.5 TABLET, FILM COATED ORAL ONCE
Status: COMPLETED | OUTPATIENT
Start: 2024-09-22 | End: 2024-09-22

## 2024-09-22 RX ORDER — GUAIFENESIN 400 MG/1
400 TABLET ORAL 3 TIMES DAILY
Status: DISCONTINUED | OUTPATIENT
Start: 2024-09-22 | End: 2024-09-26 | Stop reason: HOSPADM

## 2024-09-22 RX ORDER — DEXAMETHASONE 4 MG/1
6 TABLET ORAL DAILY
Status: DISCONTINUED | OUTPATIENT
Start: 2024-09-23 | End: 2024-09-26 | Stop reason: HOSPADM

## 2024-09-22 RX ORDER — GUAIFENESIN 600 MG/1
600 TABLET, EXTENDED RELEASE ORAL 2 TIMES DAILY
Status: DISCONTINUED | OUTPATIENT
Start: 2024-09-22 | End: 2024-09-22 | Stop reason: CLARIF

## 2024-09-22 RX ORDER — 0.9 % SODIUM CHLORIDE 0.9 %
1000 INTRAVENOUS SOLUTION INTRAVENOUS ONCE
Status: COMPLETED | OUTPATIENT
Start: 2024-09-22 | End: 2024-09-22

## 2024-09-22 RX ADMIN — METOPROLOL TARTRATE 12.5 MG: 25 TABLET, FILM COATED ORAL at 20:41

## 2024-09-22 RX ADMIN — GUAIFENESIN 400 MG: 400 TABLET ORAL at 18:39

## 2024-09-22 RX ADMIN — SODIUM CHLORIDE 1000 ML: 9 INJECTION, SOLUTION INTRAVENOUS at 05:28

## 2024-09-22 RX ADMIN — METOPROLOL TARTRATE 12.5 MG: 25 TABLET, FILM COATED ORAL at 10:42

## 2024-09-22 RX ADMIN — GUAIFENESIN 400 MG: 400 TABLET ORAL at 20:41

## 2024-09-22 RX ADMIN — ENOXAPARIN SODIUM 40 MG: 100 INJECTION SUBCUTANEOUS at 18:40

## 2024-09-22 RX ADMIN — DEXAMETHASONE SODIUM PHOSPHATE 8 MG: 10 INJECTION INTRAMUSCULAR; INTRAVENOUS at 08:46

## 2024-09-22 ASSESSMENT — PAIN - FUNCTIONAL ASSESSMENT: PAIN_FUNCTIONAL_ASSESSMENT: NONE - DENIES PAIN

## 2024-09-22 NOTE — PLAN OF CARE
Problem: Discharge Planning  Goal: Discharge to home or other facility with appropriate resources  Outcome: Not Progressing     Problem: Respiratory - Adult  Goal: Achieves optimal ventilation and oxygenation  Outcome: Not Progressing     Problem: Musculoskeletal - Adult  Goal: Return mobility to safest level of function  Outcome: Not Progressing     Problem: Gastrointestinal - Adult  Goal: Minimal or absence of nausea and vomiting  Outcome: Not Progressing  Goal: Maintains adequate nutritional intake  Outcome: Not Progressing

## 2024-09-22 NOTE — ED NOTES
..ED to Inpatient Handoff Report    Notified Jennifer  that electronic handoff available and patient ready for transport to room 403.    Safety Risks: Hearing problems and Risk of falls    Patient in Restraints: no    Constant Observer or Patient : no    Telemetry Monitoring Ordered :Yes           Order to transfer to unit without monitor:YES    Last MEWS: 1 Time completed: 1249    Deterioration Index Score:   Predictive Model Details          40 (Caution)  Factor Value    Calculated 9/22/2024 12:51 35% Age 89 years old    Deterioration Index Model 28% Supplemental oxygen Nasal cannula     17% Systolic 193     8% Potassium 4.6 mmol/L     6% Respiratory rate 18     2% BUN abnormal (31 mg/dL)     2% Sodium 134 mmol/L     1% Pulse oximetry 94 %     1% Pulse 63     1% Hematocrit abnormal (34.2 %)     0% Temperature 98.3 °F (36.8 °C)     0% WBC count 6.9 k/uL        Vitals:    09/22/24 1041 09/22/24 1042 09/22/24 1056 09/22/24 1249   BP: (!) 193/77 (!) 192/77  (!) 193/83   Pulse: 67 (!) 107 70 63   Resp: 21 26 18   Temp:    98.3 °F (36.8 °C)   TempSrc:       SpO2: 95%  96% 94%   Weight:       Height:             Opportunity for questions and clarification was provided.

## 2024-09-22 NOTE — PROGRESS NOTES
4 Eyes Skin Assessment     NAME:  Tashi Parry  YOB: 1931  MEDICAL RECORD NUMBER:  [id number]    The patient is being assessed for  Admission    I agree that at least one RN has performed a thorough Head to Toe Skin Assessment on the patient. ALL assessment sites listed below have been assessed.      Areas assessed by both nurses:    Head, Face, Ears, Shoulders, Back, Chest, and Legs. Feet and Heels        Does the Patient have a Wound? No noted wound(s)       Cornell Prevention initiated by RN: No  Wound Care Orders initiated by RN: No    Pressure Injury (Stage 3,4, Unstageable, DTI, NWPT, and Complex wounds) if present, place Wound referral order by RN under : No    New Ostomies, if present place, Ostomy referral order under : no    Nurse 1 eSignature: Electronically signed by MINA LEE RN on 9/22/24 at 3:48 PM EDT    **SHARE this note so that the co-signing nurse can place an eSignature**    Nurse 2 eSignature: {Esignature:[id number]}

## 2024-09-22 NOTE — PROGRESS NOTES
DVT Prophylaxis Adjustment Policy (DVT Prophylaxis)     This patient is on DVT Prophylaxis medication that requires a dose adjustment      Date Body Weight IBW  Adjusted BW SCr  CrCl Dialysis status   9/22/2024 73 kg (161 lb) Ideal body weight: 63.8 kg (140 lb 10.5 oz)  Adjusted ideal body weight: 67.5 kg (148 lb 12.7 oz) Serum creatinine: 1.1 mg/dL 09/22/24 0520  Estimated creatinine clearance: 38 mL/min N/a       Pharmacy has dose-adjusted the DVT Prophylaxis regimen to match   the recommendations from the following table        Ordered Medication:Lovenox 30mg daily    Order Changed/converted to: Lovenox 40mg daily      These changes were made per protocol according to the [facility] Pharmacist   Review for Appropriate Use and Automatic Dose Adjustments of   Subcutaneous Anticoagulants Policy     *Please note this dose may need readjusted if patient's condition changes.    Please contact pharmacy with any questions regarding these changes.    mary loving RPH  9/22/2024  2:20 PM

## 2024-09-22 NOTE — ED PROVIDER NOTES
soft.      Tenderness: There is no abdominal tenderness. There is no guarding or rebound.   Musculoskeletal:         General: Normal range of motion.      Right lower leg: No edema.      Left lower leg: No edema.   Skin:     General: Skin is warm and dry.      Capillary Refill: Capillary refill takes less than 2 seconds.   Neurological:      General: No focal deficit present.      Mental Status: He is alert.   Psychiatric:         Mood and Affect: Mood normal.         Behavior: Behavior normal.          DIAGNOSTIC RESULTS   LABS:    Labs Reviewed   COVID-19, RAPID - Abnormal; Notable for the following components:       Result Value    SARS-CoV-2, Rapid DETECTED (*)     All other components within normal limits   CBC WITH AUTO DIFFERENTIAL - Abnormal; Notable for the following components:    Hemoglobin 11.2 (*)     Hematocrit 34.2 (*)     Lymphocytes % 11 (*)     Monocytes % 19 (*)     Lymphocytes Absolute 0.79 (*)     Monocytes Absolute 1.30 (*)     Eosinophils Absolute 0.00 (*)     All other components within normal limits   COMPREHENSIVE METABOLIC PANEL - Abnormal; Notable for the following components:    Glucose 113 (*)     BUN 31 (*)     Calcium 8.4 (*)     All other components within normal limits   URINALYSIS WITH MICROSCOPIC - Abnormal; Notable for the following components:    Ketones, Urine 15 (*)     Protein, UA 30 (*)     Leukocyte Esterase, Urine MODERATE (*)     Bacteria, UA 1+ (*)     All other components within normal limits   TROPONIN - Abnormal; Notable for the following components:    Troponin, High Sensitivity 122 (*)     All other components within normal limits   LEGIONELLA ANTIGEN, URINE   STREP PNEUMONIAE ANTIGEN   TROPONIN   PLATELET CONFIRMATION   CBC WITH AUTO DIFFERENTIAL   COMPREHENSIVE METABOLIC PANEL W/ REFLEX TO MG FOR LOW K   C-REACTIVE PROTEIN   FERRITIN       As interpreted by me, the above displayed labs are abnormal. All other labs obtained during this visit were within normal  but otherwise stable vitals.  Differential diagnosis includes but is not limited to ACS, COVID, infection, head injury, neck injury, foot injury, pneumonia, pneumothorax, UTI, to name a few.    Patient was initially medicated with a liter of fluids.  ACS was considered, EKG shows no evidence of acute ischemia, delta troponins were ordered and pending at this time.  COVID was considered, COVID test is pending at this time.  Head injury/intracranial hemorrhage was considered but CT head shows no acute intracranial abnormality.  CT cervical spine showed no acute cervical spine fracture with severe compression deformity of T4 but was new from 2021 but of indeterminate acuity.  Foot injury was considered but x-ray foot shows no acute displaced fracture, pes planus configuration.  Chest x-ray was ordered to rule out pneumonia or pneumothorax, chest x-ray showed cardiomegaly, median sternotomy and CABG, likely atelectasis of scarring left lung, no definite focal consolidation, there is a sizable pleural effusion with no gross pneumothorax.    Patient does not require oxygen at assisted living, patient was placed on 4 L per EMS, patient had a trial of taking off nasal cannula to assess patient's oxygenation status, patient desaturated into the 80s and was placed back on 4 L.    Pending COVID and troponin, patient will likely be admitted for new oxygen requirement and hypoxia.    Upon chart review, troponins were elevated at 122, COVID was detected.    Patient was admitted by Dr. Arroyo who spoke with internal medicine doctor who agreed to accept the patient for admission.    CONSULTS: (Who and What was discussed)  IP CONSULT TO INTERNAL MEDICINE          FINAL IMPRESSION      1. Hypoxemia    2. COVID-19          DISPOSITION/PLAN     DISPOSITION Admitted 09/22/2024 09:24:52 AM    DISPOSITION  Disposition: Admit to telemetry  Patient condition is poor    9/22/24, 4:28 AM EDT.    Reji Perkins, PGY-1  Emergency

## 2024-09-23 LAB
L PNEUMO1 AG UR QL IA.RAPID: NEGATIVE
S PNEUM AG SPEC QL: NEGATIVE
SPECIMEN SOURCE: NORMAL

## 2024-09-23 PROCEDURE — 6370000000 HC RX 637 (ALT 250 FOR IP): Performed by: INTERNAL MEDICINE

## 2024-09-23 PROCEDURE — 6360000002 HC RX W HCPCS: Performed by: FAMILY MEDICINE

## 2024-09-23 PROCEDURE — 2700000000 HC OXYGEN THERAPY PER DAY

## 2024-09-23 PROCEDURE — 6370000000 HC RX 637 (ALT 250 FOR IP): Performed by: FAMILY MEDICINE

## 2024-09-23 PROCEDURE — 2060000000 HC ICU INTERMEDIATE R&B

## 2024-09-23 PROCEDURE — 3E0DX3Z INTRODUCTION OF ANTI-INFLAMMATORY INTO MOUTH AND PHARYNX, EXTERNAL APPROACH: ICD-10-PCS | Performed by: INTERNAL MEDICINE

## 2024-09-23 RX ORDER — METOPROLOL TARTRATE 25 MG/1
25 TABLET, FILM COATED ORAL 2 TIMES DAILY
Status: DISCONTINUED | OUTPATIENT
Start: 2024-09-23 | End: 2024-09-24

## 2024-09-23 RX ORDER — HYDRALAZINE HYDROCHLORIDE 20 MG/ML
10 INJECTION INTRAMUSCULAR; INTRAVENOUS EVERY 6 HOURS PRN
Status: DISCONTINUED | OUTPATIENT
Start: 2024-09-23 | End: 2024-09-24

## 2024-09-23 RX ADMIN — ENOXAPARIN SODIUM 40 MG: 100 INJECTION SUBCUTANEOUS at 09:15

## 2024-09-23 RX ADMIN — GUAIFENESIN 400 MG: 400 TABLET ORAL at 16:33

## 2024-09-23 RX ADMIN — METOPROLOL TARTRATE 25 MG: 25 TABLET, FILM COATED ORAL at 21:46

## 2024-09-23 RX ADMIN — GUAIFENESIN 400 MG: 400 TABLET ORAL at 09:14

## 2024-09-23 RX ADMIN — GUAIFENESIN 400 MG: 400 TABLET ORAL at 21:46

## 2024-09-23 RX ADMIN — DEXAMETHASONE 6 MG: 4 TABLET ORAL at 09:15

## 2024-09-23 NOTE — H&P
[facility]  History and Physical      CHIEF COMPLAINT:    Chief Complaint   Patient presents with    OTHER     From a facility, has not eaten in 3 days and feels like they are dying. No other c/o.         Patient of Romaine Arnold MD presents with:  COVID-19    History of Present Illness:   Patient is a 89-year-old male with past medical history of BPH CAD s/p CABG x 6, HLD, HTN who presents emergency room for failure to thrive.  Patient resides in a [facility] assisted living facility has not eaten in 3 days.  Chest x-ray was obtained on arrival revealing no acute findings.  An x-ray of his right foot was also obtained due to pain revealing no acute fractures or dislocations.  A CT C-spine revealed a severe compression deformity of T4 unsure of acuity.  Labs on arrival revealed a troponin of 122, anemia of 11.2/34.2 with a rapid COVID test positive.  Patient is currently requiring 2 L supplemental O2 to maintain adequate oxygenation.  Patient is admitted to intermediate telemetry and further workup and treatment.  Pleasant gentleman resting comfortably in no acute distress.  He denies any shortness of breath.  Requiring 2 L of oxygen on my evaluation this afternoon.  He tells me he had no symptoms except for extreme fatigue and slept for about a day and a half which is unusual for him.  He denies any chest pain or shortness of breath.  Blood pressures however are significantly elevated ranging from 150s to 190s.  REVIEW OF SYSTEMS:  Pertinent negatives are above in HPI.  10 point ROS otherwise negative.      Past Medical History:   Diagnosis Date    BPH (benign prostatic hyperplasia)     CAD (coronary artery disease)     Carotid artery calcification     DJD (degenerative joint disease)     History of falling     Hyperlipemia     Hypertension     Lung nodule     Macular degeneration     Numbness and tingling in left arm 02/02/2018    Numbness and tingling of left side of face 02/02/2018    S/P CABG x 6  tobacco. He reports that he does not currently use alcohol. He reports that he does not use drugs.    Family History:   family history includes Alzheimer's Disease in his sister; Heart Disease in his brother, brother, and brother; Other in his father and mother.      PHYSICAL EXAM:    Vitals:  BP (!) 146/61   Pulse 55   Temp 97.8 °F (36.6 °C) (Temporal)   Resp 18   Ht 1.676 m (5' 6\")   Wt 73 kg (161 lb)   SpO2 98%   BMI 25.99 kg/m²       General appearance: NAD, conversant, wearing 2 L of oxygen and able to breathe fine  Eyes: Sclerae anicteric, PERRLA  HEENT: AT/NC, MMM  Neck: FROM, supple, no thyromegaly  Lymph: No cervical / supraclavicular lymphadenopathy  Lungs: Clear to auscultation, WOB normal-no evidence of rales rhonchi or wheeze  CV: RRR, no MRGs, no lower extremity edema  Abdomen: Soft, non-tender; no masses or HSM, +BS  Extremities: FROM without synovitis.  No clubbing or cyanosis of the hands.  Skin: no rash, induration, lesions, or ulcers  Psych: Calm and cooperative.  Normal judgement and insight.  Normal mood and affect.  Neuro: Alert and interactive, face symmetric, speech fluent.    LABS:  All labs reviewed.  Of note:  CBC:   Lab Results   Component Value Date/Time    WBC 6.9 09/22/2024 05:20 AM    RBC 3.92 09/22/2024 05:20 AM    HGB 11.2 09/22/2024 05:20 AM    HCT 34.2 09/22/2024 05:20 AM    MCV 87.2 09/22/2024 05:20 AM    MCH 28.6 09/22/2024 05:20 AM    MCHC 32.7 09/22/2024 05:20 AM    RDW 14.4 09/22/2024 05:20 AM     12/01/2023 02:43 AM    MPV 9.2 09/22/2024 05:20 AM     BMP:    Lab Results   Component Value Date/Time     09/22/2024 05:20 AM    K 4.6 09/22/2024 05:20 AM    K 3.9 03/13/2023 04:20 AM     09/22/2024 05:20 AM    CO2 23 09/22/2024 05:20 AM    BUN 31 09/22/2024 05:20 AM    CREATININE 1.1 09/22/2024 05:20 AM    CALCIUM 8.4 09/22/2024 05:20 AM    GFRAA >60 10/04/2021 10:54 AM    LABGLOM 63 09/22/2024 05:20 AM    LABGLOM 57 12/01/2023 02:43 AM    GLUCOSE 113

## 2024-09-23 NOTE — PLAN OF CARE
Problem: Discharge Planning  Goal: Discharge to home or other facility with appropriate resources  9/22/2024 2231 by Urmila Devine RN  Outcome: Progressing  9/22/2024 1539 by Jennifer Gutierrez RN  Outcome: Not Progressing  Flowsheets (Taken 9/22/2024 1525)  Discharge to home or other facility with appropriate resources: Refer to discharge planning if patient needs post-hospital services based on physician order or complex needs related to functional status, cognitive ability or social support system     Problem: Skin/Tissue Integrity  Goal: Absence of new skin breakdown  Description: 1.  Monitor for areas of redness and/or skin breakdown  2.  Assess vascular access sites hourly  3.  Every 4-6 hours minimum:  Change oxygen saturation probe site  4.  Every 4-6 hours:  If on nasal continuous positive airway pressure, respiratory therapy assess nares and determine need for appliance change or resting period.  9/22/2024 2231 by Urmila Devine RN  Outcome: Progressing  9/22/2024 1539 by Jennifer Gutierrez RN  Outcome: Progressing     Problem: Respiratory - Adult  Goal: Achieves optimal ventilation and oxygenation  9/22/2024 2231 by Urmila Devine RN  Outcome: Progressing  9/22/2024 1539 by Jennifer Gutierrez RN  Outcome: Not Progressing     Problem: Cardiovascular - Adult  Goal: Absence of cardiac dysrhythmias or at baseline  9/22/2024 2231 by Urmila Devine RN  Outcome: Progressing  9/22/2024 1539 by Jennifer Gutierrez RN  Outcome: Progressing     Problem: Musculoskeletal - Adult  Goal: Return mobility to safest level of function  9/22/2024 2231 by Urmila Devine RN  Outcome: Progressing  9/22/2024 1539 by Jennifer Gutierrez RN  Outcome: Not Progressing     Problem: Gastrointestinal - Adult  Goal: Minimal or absence of nausea and vomiting  9/22/2024 2231 by Urmila Devine RN  Outcome: Progressing  9/22/2024 1539 by Jennifer Gutierrez RN  Outcome: Not Progressing  Goal: Maintains adequate

## 2024-09-23 NOTE — CARE COORDINATION
Social Work:    Chart reviewed. Mr. Parry admitted from [facility] where he resides due to +covid -19.  He is currently on oral decadron and supplemental 02 (1-2 liters). Social service met with Mr. Parry and spoke with his daughter Mary Kate via phone. Mr. Parry advises that the uses a wheeled walker at [facility] and hopes to return there. If snf is needed Mary Kate advises that Mr. Parry will likely prefer [facility] snf as he was pleased with care in the past.  Mary Kate stressed that Mr. Parry will make the final decision. Social service prompted for therapy orders to assist with discharge planning needs.  Social service to follow-up with facility liaison after therapy evaluations, etc.    Electronically signed by MARTHA Parker on 9/23/2024 at 11:06 AM

## 2024-09-23 NOTE — ACP (ADVANCE CARE PLANNING)
Advance Care Planning   Healthcare Decision Maker:    Primary Decision Maker: Mary Kate Leahy ARMANDO - Child - [phone]    Click here to complete Healthcare Decision Makers including selection of the Healthcare Decision Maker Relationship (ie \"Primary\").

## 2024-09-23 NOTE — PROGRESS NOTES
4 Eyes Skin Assessment     NAME:  Tashi Parry  YOB: 1931  MEDICAL RECORD NUMBER:  [id number]    The patient is being assessed for  Admission    I agree that at least one RN has performed a thorough Head to Toe Skin Assessment on the patient. ALL assessment sites listed below have been assessed.      Areas assessed by both nurses:    Head, Face, Ears, Shoulders, Back, Chest, Arms, Elbows, Hands, Sacrum. Buttock, Coccyx, Ischium, Legs. Feet and Heels, and Under Medical Devices         Does the Patient have a Wound? No noted wound(s)       Cornell Prevention initiated by RN: Yes  Wound Care Orders initiated by RN: No    Pressure Injury (Stage 3,4, Unstageable, DTI, NWPT, and Complex wounds) if present, place Wound referral order by RN under : No    New Ostomies, if present place, Ostomy referral order under : No     Nurse 1 eSignature: Electronically signed by Urmila Devine RN on 9/23/24 at 3:33 AM EDT    **SHARE this note so that the co-signing nurse can place an eSignature**    Nurse 2 eSignature: Electronically signed by Dona Dietz RN on 9/23/24 at 6:01 AM EDT

## 2024-09-24 LAB
ALBUMIN SERPL-MCNC: 3.5 G/DL (ref 3.5–5.2)
ALP SERPL-CCNC: 63 U/L (ref 40–129)
ALT SERPL-CCNC: 13 U/L (ref 0–40)
ANION GAP SERPL CALCULATED.3IONS-SCNC: 11 MMOL/L (ref 7–16)
AST SERPL-CCNC: 29 U/L (ref 0–39)
BASOPHILS # BLD: 0.02 K/UL (ref 0–0.2)
BASOPHILS NFR BLD: 0 % (ref 0–2)
BILIRUB SERPL-MCNC: 0.8 MG/DL (ref 0–1.2)
BUN SERPL-MCNC: 27 MG/DL (ref 6–23)
CALCIUM SERPL-MCNC: 8.4 MG/DL (ref 8.6–10.2)
CHLORIDE SERPL-SCNC: 103 MMOL/L (ref 98–107)
CO2 SERPL-SCNC: 23 MMOL/L (ref 22–29)
CREAT SERPL-MCNC: 0.9 MG/DL (ref 0.7–1.2)
EKG ATRIAL RATE: 67 BPM
EKG P AXIS: 72 DEGREES
EKG P-R INTERVAL: 178 MS
EKG Q-T INTERVAL: 448 MS
EKG QRS DURATION: 132 MS
EKG QTC CALCULATION (BAZETT): 473 MS
EKG R AXIS: -64 DEGREES
EKG T AXIS: 55 DEGREES
EKG VENTRICULAR RATE: 67 BPM
EOSINOPHIL # BLD: 0 K/UL (ref 0.05–0.5)
EOSINOPHILS RELATIVE PERCENT: 0 % (ref 0–6)
ERYTHROCYTE [DISTWIDTH] IN BLOOD BY AUTOMATED COUNT: 14.2 % (ref 11.5–15)
GFR, ESTIMATED: 76 ML/MIN/1.73M2
GLUCOSE SERPL-MCNC: 102 MG/DL (ref 74–99)
HCT VFR BLD AUTO: 33.4 % (ref 37–54)
HGB BLD-MCNC: 11.1 G/DL (ref 12.5–16.5)
IMM GRANULOCYTES # BLD AUTO: <0.03 K/UL (ref 0–0.58)
IMM GRANULOCYTES NFR BLD: 0 % (ref 0–5)
LYMPHOCYTES NFR BLD: 1.3 K/UL (ref 1.5–4)
LYMPHOCYTES RELATIVE PERCENT: 17 % (ref 20–42)
MCH RBC QN AUTO: 28 PG (ref 26–35)
MCHC RBC AUTO-ENTMCNC: 33.2 G/DL (ref 32–34.5)
MCV RBC AUTO: 84.1 FL (ref 80–99.9)
MONOCYTES NFR BLD: 0.98 K/UL (ref 0.1–0.95)
MONOCYTES NFR BLD: 13 % (ref 2–12)
NEUTROPHILS NFR BLD: 69 % (ref 43–80)
NEUTS SEG NFR BLD: 5.29 K/UL (ref 1.8–7.3)
PLATELET # BLD AUTO: 188 K/UL (ref 130–450)
PMV BLD AUTO: 9.2 FL (ref 7–12)
POTASSIUM SERPL-SCNC: 3.8 MMOL/L (ref 3.5–5)
PROT SERPL-MCNC: 6.3 G/DL (ref 6.4–8.3)
RBC # BLD AUTO: 3.97 M/UL (ref 3.8–5.8)
SODIUM SERPL-SCNC: 137 MMOL/L (ref 132–146)
WBC OTHER # BLD: 7.6 K/UL (ref 4.5–11.5)

## 2024-09-24 PROCEDURE — 2700000000 HC OXYGEN THERAPY PER DAY

## 2024-09-24 PROCEDURE — 6370000000 HC RX 637 (ALT 250 FOR IP): Performed by: FAMILY MEDICINE

## 2024-09-24 PROCEDURE — 6360000002 HC RX W HCPCS: Performed by: INTERNAL MEDICINE

## 2024-09-24 PROCEDURE — 97165 OT EVAL LOW COMPLEX 30 MIN: CPT

## 2024-09-24 PROCEDURE — 36415 COLL VENOUS BLD VENIPUNCTURE: CPT

## 2024-09-24 PROCEDURE — 80053 COMPREHEN METABOLIC PANEL: CPT

## 2024-09-24 PROCEDURE — 6360000002 HC RX W HCPCS: Performed by: FAMILY MEDICINE

## 2024-09-24 PROCEDURE — 85025 COMPLETE CBC W/AUTO DIFF WBC: CPT

## 2024-09-24 PROCEDURE — 93010 ELECTROCARDIOGRAM REPORT: CPT | Performed by: INTERNAL MEDICINE

## 2024-09-24 PROCEDURE — 97161 PT EVAL LOW COMPLEX 20 MIN: CPT

## 2024-09-24 PROCEDURE — 2060000000 HC ICU INTERMEDIATE R&B

## 2024-09-24 PROCEDURE — 6370000000 HC RX 637 (ALT 250 FOR IP): Performed by: NURSE PRACTITIONER

## 2024-09-24 PROCEDURE — 6370000000 HC RX 637 (ALT 250 FOR IP): Performed by: INTERNAL MEDICINE

## 2024-09-24 RX ORDER — CARVEDILOL 6.25 MG/1
12.5 TABLET ORAL 2 TIMES DAILY WITH MEALS
Status: DISCONTINUED | OUTPATIENT
Start: 2024-09-24 | End: 2024-09-26 | Stop reason: HOSPADM

## 2024-09-24 RX ORDER — HYDRALAZINE HYDROCHLORIDE 20 MG/ML
10 INJECTION INTRAMUSCULAR; INTRAVENOUS EVERY 6 HOURS PRN
Status: DISCONTINUED | OUTPATIENT
Start: 2024-09-24 | End: 2024-09-26 | Stop reason: HOSPADM

## 2024-09-24 RX ADMIN — GUAIFENESIN 400 MG: 400 TABLET ORAL at 20:22

## 2024-09-24 RX ADMIN — ENOXAPARIN SODIUM 40 MG: 100 INJECTION SUBCUTANEOUS at 09:44

## 2024-09-24 RX ADMIN — METOPROLOL TARTRATE 25 MG: 25 TABLET, FILM COATED ORAL at 09:43

## 2024-09-24 RX ADMIN — HYDRALAZINE HYDROCHLORIDE 10 MG: 20 INJECTION INTRAMUSCULAR; INTRAVENOUS at 12:58

## 2024-09-24 RX ADMIN — GUAIFENESIN 400 MG: 400 TABLET ORAL at 09:43

## 2024-09-24 RX ADMIN — HYDRALAZINE HYDROCHLORIDE 10 MG: 20 INJECTION INTRAMUSCULAR; INTRAVENOUS at 04:14

## 2024-09-24 RX ADMIN — CARVEDILOL 12.5 MG: 6.25 TABLET, FILM COATED ORAL at 18:05

## 2024-09-24 RX ADMIN — DEXAMETHASONE 6 MG: 4 TABLET ORAL at 09:43

## 2024-09-24 RX ADMIN — GUAIFENESIN 400 MG: 400 TABLET ORAL at 12:58

## 2024-09-24 NOTE — CARE COORDINATION
Social Work:    Social service was advised by liaison for [facility]. that Mr. Parry can return to [facility] (possible new 02). Liaison advised that family can drive patient back or they can private pay [employer] ambulette. Social work reviewed chart notes and presently Mr. Parry is in need of 02. Social work notified daughter Mary Kate of possible discharge back to [facility] with 02 today. Mary Kate has no agency preference for home 02 and prefers to drive Mr. Parry back to [facility] when discharged. NP Melania Ramos was advised that patient can return to [facility] with 02 when stable.  Social work called a referral to Tashi at [facility]. Tashi will need notified of discharge so he can arrange delivery of the port 02 to room. N-N needed to [phone].    Electronically signed by MARTHA Parker on 9/24/2024 at 1:58 PM

## 2024-09-24 NOTE — PROGRESS NOTES
Pulse ox was 92% on room air at rest.   Ambulated patient on room air.   Oxygen saturation was 84% on room air while ambulating.   Oxygen applied.   Recovery pulse ox was 93% on 2 liters of oxygen while ambulating

## 2024-09-24 NOTE — PROGRESS NOTES
OCCUPATIONAL THERAPY INITIAL EVALUATION    [facility]   [address]        Date:2024                                                  Patient Name: Tashi Parry    MRN: [id number]    : 1931    Room: [address] [place] [address]    Evaluating OT: Cynthia Galloway OTR/L #[id number]    Referring Provider:  Yamileth Patel MD     Specific Provider Orders/Date:  OT Eval and Treat , 2024     Diagnosis:   1. Hypoxemia    2. COVID-19         Surgery: None       Pertinent Medical History: CAD, HTN, MD, B BRIONNA     Precautions:  Fall Risk, alarm, droplet plus isolation, COVID+, O2 - new user      Assessment of current deficits    [x] Functional mobility  [x]ADLs  [x] Strength               []Cognition    [x] Functional transfers   [x] IADLs         [x] Safety Awareness   [x]Endurance    [] Fine Coordination              [x] Balance      [] Vision/perception   []Sensation     []Gross Motor Coordination  [] ROM  [] Delirium                   [] Motor Control     OT PLAN OF CARE   OT POC based on physician orders, patient diagnosis and results of clinical assessment    Frequency/Duration 2-5 days/wk for 2-4 weeks PRN     Specific OT Treatment Interventions to include:   * Instruction/training on adapted ADL techniques and AE recommendations to increase functional independence within precautions       * Training on energy conservation strategies, correct breathing pattern and techniques to improve independence/tolerance for self-care routine  * Functional transfer/mobility training/DME recommendations for increased independence, safety, and fall prevention  * Patient/Family education to increase follow through with safety techniques and functional independence  * Recommendation of environmental modifications for increased safety with functional transfers/mobility and ADLs  * Therapeutic exercise to improve motor endurance, ROM, and functional strength for  ADLs/functional transfers  * Therapeutic activities to facilitate/challenge dynamic balance, stand tolerance for increased safety and independence with ADLs  * Therapeutic activities to facilitate gross/fine motor skills for increased independence with ADLs  * Positioning to improve skin integrity, interaction with environment and functional independence    Recommended Adaptive Equipment: TBD      Home Living: Lives at an [facility]     Prior Level of Function: Modified [place] with ADLs. Ambulated with fww PTA.     Pain Level: No c/o pain      Cognition: A&O: 4/4; Follows 3 step directions   Memory: good    Sequencing: good    Problem solving: fair+   Judgement/safety: fair+     Functional Assessment: AM-PAC Daily Activity Raw Score: 19/24   Initial Eval Status  Date: 9/24/24   Treatment Status  Date:  STGs = LTGs  Time frame: 10-14 days   Feeding Independent      Independent    Grooming Stand by Assist     Modified [place]    UB Dressing Minimal Assist    Modified [place]    LB Dressing Minimal Assist    Modified [place]    Bathing Minimal Assist    Modified [place]    Toileting Stand by Assist     Modified [place]    Bed Mobility  Supine to sit: Stand by Assist   Sit to supine: Stand by Assist     Supine to sit: Independent   Sit to supine: Independent    Functional Transfers Sit to stand: Stand by Assist   Stand to sit: Stand by Assist     Transfer training with verbal cues for hand placement to improve safety.     Independent    Functional Mobility Stand by Assist with fww to improve balance in room to simulate household distances, verbal cues for walker sequence and safety.     Modified [place] with use of fww    Balance Sitting:     Static: fair plus     Dynamic: fair plus   Standing: fair with fww     Sitting:     Static: good    Dynamic: good  Standing: good with fww    Activity Tolerance fair      SpO2 decreasing to 84% on RA with mobility. Slowing recovered into low 90s on 2L.

## 2024-09-24 NOTE — CARE COORDINATION
Social Work:    Advised by ISAURA Sigala that NP advised of possible discharge back to [facility] tomorrow as patient may not need 02 then. Social service updated daughter Mary Kate who now advises that they will pay [employer] ambulette to transport Mr. Parry back to [facility] due to covid exposure.  Mary Kate will need notified of discharge day and will need [employer]'s number to pre-pay once time is arranged.    Electronically signed by MARTHA Parker on 9/24/2024 at 2:44 PM

## 2024-09-24 NOTE — PROGRESS NOTES
[facility]                                Progress note    Subjective:  Denies chest pain and dyspnea  States that he was able to ambulate with PT in his room very well with O2 via NC    Objective:  Sitting up in bed, conversing well  No acute distress   BP (!) 149/74   Pulse 65   Temp 98.4 °F (36.9 °C) (Oral)   Resp 18   Ht 1.676 m (5' 6\")   Wt 73 kg (161 lb)   SpO2 93%   BMI 25.99 kg/m²   General appearance: NAD, conversant, wearing 2 L of oxygen and able to breathe fine  Eyes: Sclerae anicteric, PERRLA  HEENT: AT/NC, MMM  Neck: FROM, supple, no thyromegaly  Lymph: No cervical / supraclavicular lymphadenopathy  Lungs: Clear to auscultation, WOB normal-no evidence of rales rhonchi or wheeze  CV: RRR, no MRGs, no lower extremity edema  Abdomen: Soft, non-tender; no masses or HSM, +BS  Extremities: FROM without synovitis.  No clubbing or cyanosis of the hands.  Skin: no rash, induration, lesions, or ulcers  Psych: Calm and cooperative.  Normal judgement and insight.  Normal mood and affect.  Neuro: Alert and interactive, face symmetric, speech fluent.     Recent Labs     09/22/24  0520 09/24/24  0606   WBC 6.9 7.6   HGB 11.2* 11.1*   HCT 34.2* 33.4*   PLT  --  188       Recent Labs     09/22/24  0520 09/24/24  0606    137   K 4.6 3.8    103   CO2 23 23   BUN 31* 27*   CREATININE 1.1 0.9   CALCIUM 8.4* 8.4*     CT head negative  CT C-spine with severe compression deformity of T4  CXR negative       ASSESSMENT/PLAN:  Patient is a 89-year-old male admitted to [facility] for  Acute respiratory failure with hypoxia secondary to COVID-19 infection  -Monitor labs  -Monitor inflammatory markers-mainly CRP  -Continue oral Decadron 6 mg daily, can be discontinued in the next few days  -Currently requiring 2 L nasal cannula satting 98%-does not meet criteria for remdesivir or tocilizumab  -Continue supportive care  -PT OT     Elevated troponin  No chest pain is reported  Likely troponin

## 2024-09-24 NOTE — CARE COORDINATION
Social Work:    Social service as the therapy department to prioritize the evaluation of Mr. Parry to confirm return to [facility] with possible 02 vs snf.     Electronically signed by MARTHA Parker on 9/24/2024 at 10:35 AM

## 2024-09-24 NOTE — PROGRESS NOTES
Physical Therapy  Facility/Department: [address] INTERNAL MEDICINE 2  Physical Therapy Initial Assessment    Name: Tashi Parry  : 1931  MRN: [id number]  Date of Service: 2024      Patient Diagnosis(es): The primary encounter diagnosis was Hypoxemia. A diagnosis of COVID-19 was also pertinent to this visit.  Past Medical History:  has a past medical history of BPH (benign prostatic hyperplasia), CAD (coronary artery disease), Carotid artery calcification, DJD (degenerative joint disease), History of falling, Hyperlipemia, Hypertension, Lung nodule, Macular degeneration, Numbness and tingling in left arm, Numbness and tingling of left side of face, S/P CABG x 6, and Shingles.  Past Surgical History:  has a past surgical history that includes Diagnostic Cardiac Cath Lab Procedure (00); Cardiac surgery; TURP; Dental surgery; Total hip arthroplasty (Right, 2011); Total hip arthroplasty (Left, 2009); other surgical history (2011); Thoracoscopy (Left, 2019); TURP (N/A, 2019); Prostate surgery (Bilateral, 2019); and Lung removal, partial (Left).      Referring provider:  Yamileth Patel MD    PT Order:  PT eval and treat     Evaluating PT:  Yuni Yepez PT, DPT PT [id number]    Room #:  0403/0403-A  Diagnosis:  Hypoxemia [R09.02]  COVID-19 [U07.1]  Precautions:  fall risk, droplet +  Equipment Needs:  none.  Pt owns a walker    SUBJECTIVE:    [place] lives at [facility].  Pt reported being independent with all mobility and uses walker for ambulation.     OBJECTIVE:   Initial Evaluation  Date: 24 Treatment Short Term/ Long Term   Goals   Was pt agreeable to Eval/treatment? yes     Does pt have pain? None reported     Bed Mobility  Rolling: SBA  Supine to sit: SBA  Sit to supine: SBA  Scooting: SBA to sitting EOB  Modified independent   Transfers Sit to stand: SBA  Stand to sit: SBA  Stand pivot: SBA  Modified independent   Ambulation    60 feet x 2 with w/w SBA   150+ feet with ww

## 2024-09-25 LAB
ALBUMIN SERPL-MCNC: 3.8 G/DL (ref 3.5–5.2)
ALP SERPL-CCNC: 68 U/L (ref 40–129)
ALT SERPL-CCNC: 17 U/L (ref 0–40)
ANION GAP SERPL CALCULATED.3IONS-SCNC: 12 MMOL/L (ref 7–16)
AST SERPL-CCNC: 30 U/L (ref 0–39)
BASOPHILS # BLD: 0.02 K/UL (ref 0–0.2)
BASOPHILS NFR BLD: 0 % (ref 0–2)
BILIRUB SERPL-MCNC: 1 MG/DL (ref 0–1.2)
BUN SERPL-MCNC: 33 MG/DL (ref 6–23)
CALCIUM SERPL-MCNC: 8.6 MG/DL (ref 8.6–10.2)
CHLORIDE SERPL-SCNC: 102 MMOL/L (ref 98–107)
CO2 SERPL-SCNC: 23 MMOL/L (ref 22–29)
CREAT SERPL-MCNC: 1.1 MG/DL (ref 0.7–1.2)
EOSINOPHIL # BLD: 0.01 K/UL (ref 0.05–0.5)
EOSINOPHILS RELATIVE PERCENT: 0 % (ref 0–6)
ERYTHROCYTE [DISTWIDTH] IN BLOOD BY AUTOMATED COUNT: 14.3 % (ref 11.5–15)
GFR, ESTIMATED: 66 ML/MIN/1.73M2
GLUCOSE SERPL-MCNC: 104 MG/DL (ref 74–99)
HCT VFR BLD AUTO: 35.5 % (ref 37–54)
HGB BLD-MCNC: 11.6 G/DL (ref 12.5–16.5)
IMM GRANULOCYTES # BLD AUTO: 0.04 K/UL (ref 0–0.58)
IMM GRANULOCYTES NFR BLD: 1 % (ref 0–5)
LYMPHOCYTES NFR BLD: 1.04 K/UL (ref 1.5–4)
LYMPHOCYTES RELATIVE PERCENT: 12 % (ref 20–42)
MCH RBC QN AUTO: 28.2 PG (ref 26–35)
MCHC RBC AUTO-ENTMCNC: 32.7 G/DL (ref 32–34.5)
MCV RBC AUTO: 86.2 FL (ref 80–99.9)
MONOCYTES NFR BLD: 0.85 K/UL (ref 0.1–0.95)
MONOCYTES NFR BLD: 10 % (ref 2–12)
NEUTROPHILS NFR BLD: 78 % (ref 43–80)
NEUTS SEG NFR BLD: 6.89 K/UL (ref 1.8–7.3)
PLATELET # BLD AUTO: 204 K/UL (ref 130–450)
PMV BLD AUTO: 9.2 FL (ref 7–12)
POTASSIUM SERPL-SCNC: 3.7 MMOL/L (ref 3.5–5)
PROT SERPL-MCNC: 6.7 G/DL (ref 6.4–8.3)
RBC # BLD AUTO: 4.12 M/UL (ref 3.8–5.8)
SODIUM SERPL-SCNC: 137 MMOL/L (ref 132–146)
WBC OTHER # BLD: 8.9 K/UL (ref 4.5–11.5)

## 2024-09-25 PROCEDURE — 2060000000 HC ICU INTERMEDIATE R&B

## 2024-09-25 PROCEDURE — 36415 COLL VENOUS BLD VENIPUNCTURE: CPT

## 2024-09-25 PROCEDURE — 6370000000 HC RX 637 (ALT 250 FOR IP): Performed by: FAMILY MEDICINE

## 2024-09-25 PROCEDURE — 6360000002 HC RX W HCPCS: Performed by: FAMILY MEDICINE

## 2024-09-25 PROCEDURE — 6370000000 HC RX 637 (ALT 250 FOR IP): Performed by: NURSE PRACTITIONER

## 2024-09-25 PROCEDURE — 80053 COMPREHEN METABOLIC PANEL: CPT

## 2024-09-25 PROCEDURE — 85025 COMPLETE CBC W/AUTO DIFF WBC: CPT

## 2024-09-25 PROCEDURE — 2700000000 HC OXYGEN THERAPY PER DAY

## 2024-09-25 RX ORDER — AMLODIPINE BESYLATE 2.5 MG/1
2.5 TABLET ORAL DAILY
Status: DISCONTINUED | OUTPATIENT
Start: 2024-09-25 | End: 2024-09-26 | Stop reason: HOSPADM

## 2024-09-25 RX ADMIN — CARVEDILOL 12.5 MG: 6.25 TABLET, FILM COATED ORAL at 08:14

## 2024-09-25 RX ADMIN — CARVEDILOL 12.5 MG: 6.25 TABLET, FILM COATED ORAL at 18:28

## 2024-09-25 RX ADMIN — DEXAMETHASONE 6 MG: 4 TABLET ORAL at 08:14

## 2024-09-25 RX ADMIN — GUAIFENESIN 400 MG: 400 TABLET ORAL at 20:14

## 2024-09-25 RX ADMIN — AMLODIPINE BESYLATE 2.5 MG: 2.5 TABLET ORAL at 15:00

## 2024-09-25 RX ADMIN — ENOXAPARIN SODIUM 40 MG: 100 INJECTION SUBCUTANEOUS at 08:15

## 2024-09-25 RX ADMIN — GUAIFENESIN 400 MG: 400 TABLET ORAL at 15:00

## 2024-09-25 RX ADMIN — GUAIFENESIN 400 MG: 400 TABLET ORAL at 08:14

## 2024-09-25 NOTE — PLAN OF CARE
Problem: Discharge Planning  Goal: Discharge to home or other facility with appropriate resources  Outcome: Progressing     Problem: Skin/Tissue Integrity  Goal: Absence of new skin breakdown  Description: 1.  Monitor for areas of redness and/or skin breakdown  2.  Assess vascular access sites hourly  3.  Every 4-6 hours minimum:  Change oxygen saturation probe site  4.  Every 4-6 hours:  If on nasal continuous positive airway pressure, respiratory therapy assess nares and determine need for appliance change or resting period.  Outcome: Progressing     Problem: Respiratory - Adult  Goal: Achieves optimal ventilation and oxygenation  Outcome: Progressing     Problem: Cardiovascular - Adult  Goal: Absence of cardiac dysrhythmias or at baseline  Outcome: Progressing     Problem: Musculoskeletal - Adult  Goal: Return mobility to safest level of function  Outcome: Progressing     Problem: Gastrointestinal - Adult  Goal: Minimal or absence of nausea and vomiting  Outcome: Progressing  Goal: Maintains adequate nutritional intake  Outcome: Progressing     Problem: Hematologic - Adult  Goal: Maintains hematologic stability  Outcome: Progressing     Problem: Safety - Adult  Goal: Free from fall injury  Outcome: Progressing     Problem: ABCDS Injury Assessment  Goal: Absence of physical injury  Outcome: Progressing

## 2024-09-25 NOTE — PROGRESS NOTES
[facility]                                Progress note    Subjective:  Denies chest pain and dyspnea    Objective:  Lying almost completely flat in bed, conversing well  No acute distress   BP (!) 157/95   Pulse 58   Temp 97.5 °F (36.4 °C) (Temporal)   Resp 20   Ht 1.676 m (5' 6\")   Wt 73 kg (161 lb)   SpO2 100%   BMI 25.99 kg/m²   General appearance: NAD, conversant, wearing 2 L of oxygen and able to breathe fine  Eyes: Sclerae anicteric, PERRLA  HEENT: AT/NC, MMM  Neck: FROM, supple, no thyromegaly  Lymph: No cervical / supraclavicular lymphadenopathy  Lungs: Clear to auscultation, WOB normal-no evidence of rales rhonchi or wheeze  CV: RRR, no MRGs, no lower extremity edema  Abdomen: Soft, non-tender; no masses or HSM, +BS  Extremities: FROM without synovitis.  No clubbing or cyanosis of the hands.  Skin: no rash, induration, lesions, or ulcers  Psych: Calm and cooperative.  Normal judgement and insight.  Normal mood and affect.  Neuro: Alert and interactive, face symmetric, speech fluent.     Recent Labs     09/24/24  0606 09/25/24  0833   WBC 7.6 8.9   HGB 11.1* 11.6*   HCT 33.4* 35.5*    204       Recent Labs     09/24/24  0606 09/25/24  0833    137   K 3.8 3.7    102   CO2 23 23   BUN 27* 33*   CREATININE 0.9 1.1   CALCIUM 8.4* 8.6     CT head negative  CT C-spine with severe compression deformity of T4  CXR negative       ASSESSMENT/PLAN:  Patient is a 89-year-old male admitted to [facility] for  Acute respiratory failure with hypoxia secondary to COVID-19 infection  -Monitor labs  -Monitor inflammatory markers-mainly CRP  -Continue oral Decadron 6 mg daily, can be discontinued in the next few days  -Currently requiring 2 L nasal cannula satting 98%-does not meet criteria for remdesivir or tocilizumab  -Continue supportive care  -PT OT     Elevated troponin  No chest pain is reported  Likely troponin leak from elevated blood pressures  If chest pain ensues,

## 2024-09-25 NOTE — DISCHARGE INSTR - COC
Continuity of Care Form    Patient Name: Tashi Parry   :  1931  MRN:  [id number]    Admit date:  2024  Discharge date:  2024    Code Status Order: DNR-CC   Advance Directives:   Advance Care Flowsheet Documentation             Admitting Physician:  Yamileth Patel MD  PCP: Romaine Arnold MD    Discharging Nurse: MELL RN  Discharging Hospital Unit/Room#: 0403/0403-A  Discharging Unit Phone Number: [phone]    Emergency Contact:   Extended Emergency Contact Information  Primary Emergency Contact: Mary Kate Leahy  Address: [facility] [address] [place] [id number] [facility]  Home Phone: [phone]  Mobile Phone: [phone]  Relation: Child  Preferred language: English   needed? No    Past Surgical History:  Past Surgical History:   Procedure Laterality Date    CARDIAC SURGERY      6 vessel in     DENTAL SURGERY      root canal    DIAGNOSTIC CARDIAC CATH LAB PROCEDURE  00    LUNG REMOVAL, PARTIAL Left     march    OTHER SURGICAL HISTORY  2011    Injection right hip  ADRIANNE Doan MD    PROSTATE SURGERY Bilateral 2019    THORACOSCOPY Left 2019    BRONCHOSCOPY LEFT THORACOSCOPY ROBOTIC VIDEO ASSISTED , WEDGE RESECTION, POSS. LEFT LOWER LOBECTOMY performed by Ortiz Pedroza MD at [facility]    TOTAL HIP ARTHROPLASTY Right 2011    Right BRIONNA  ADRIANNE Doan MD    TOTAL HIP ARTHROPLASTY Left 2009    Left BRIONNA  ADRIANNE Doan MD    TURP      TURP N/A 2019    CYSTOSCOPY, RETROGRADE PYELOGRAM,  URERTHRAL DILITATION, TRANSURETHRAL RESECTION PROSTATE performed by Carter Hale MD at [facility]       Immunization History:   Immunization History   Administered Date(s) Administered    COVID-19, PFIZER GRAY top, DO NOT Dilute, (age 12 y+), IM, 30 mcg/0.3 mL 2022    COVID-19, PFIZER PURPLE top, DILUTE for use, (age 12 y+), 30mcg/0.3mL 2021, 2021, 2021    Hep A, HAVRIX, VAQTA, (age 12m-18y), IM, 0.5mL 2009    Influenza Vaccine,

## 2024-09-25 NOTE — CARE COORDINATION
Social Work:    Social service received a call from daughter Mary Kate expressing concern for Mr. Parry's cognitive state as he phoned her yesterday evening very confused. Social service met with Mr. Parry this a.m. and he was aware of his surrounding, aware he was to return to [facility] and asked social service the cost of the private pay ambulette for return to [facility]  ISAURA Sigala advised social service that the Decadron Mr. Parry is taking may be causing some confusion. Social service updated Mary Kate and she explained that she is not comfortable with return to [facility] as she will be out of town. She explained that  Mr. Parry did a lot on his own in [facility] and will need more assistance. Social service advised Mary Kate about PT/OT scores and concern for possible snf denial and  placed a call to Jennifer at [facility].  Jennifer advised social service that she has bed and will start the authorization to see if the insurance will approve skilled time. Social service updated Mary Kate, as well as Mr. Parry. Mr. Chamorro expressed a strong desire to return to [facility] however, RN advises that he is experiencing some intermittent confusion.     Electronically signed by MARTHA Parker on 9/25/2024 at 10:26 AM

## 2024-09-26 VITALS
WEIGHT: 161 LBS | HEIGHT: 66 IN | HEART RATE: 53 BPM | TEMPERATURE: 98 F | OXYGEN SATURATION: 94 % | RESPIRATION RATE: 18 BRPM | DIASTOLIC BLOOD PRESSURE: 56 MMHG | BODY MASS INDEX: 25.88 KG/M2 | SYSTOLIC BLOOD PRESSURE: 152 MMHG

## 2024-09-26 PROCEDURE — 2700000000 HC OXYGEN THERAPY PER DAY

## 2024-09-26 PROCEDURE — 6360000002 HC RX W HCPCS: Performed by: FAMILY MEDICINE

## 2024-09-26 PROCEDURE — 6370000000 HC RX 637 (ALT 250 FOR IP): Performed by: NURSE PRACTITIONER

## 2024-09-26 PROCEDURE — 6370000000 HC RX 637 (ALT 250 FOR IP): Performed by: FAMILY MEDICINE

## 2024-09-26 RX ORDER — POTASSIUM CHLORIDE 1500 MG/1
20 TABLET, EXTENDED RELEASE ORAL ONCE
Status: COMPLETED | OUTPATIENT
Start: 2024-09-26 | End: 2024-09-26

## 2024-09-26 RX ORDER — CARVEDILOL 12.5 MG/1
12.5 TABLET ORAL 2 TIMES DAILY WITH MEALS
Qty: 60 TABLET | Refills: 3 | Status: SHIPPED | OUTPATIENT
Start: 2024-09-26

## 2024-09-26 RX ORDER — DEXAMETHASONE 6 MG/1
6 TABLET ORAL DAILY
Qty: 6 TABLET | Refills: 0 | Status: SHIPPED | OUTPATIENT
Start: 2024-09-27 | End: 2024-10-03

## 2024-09-26 RX ORDER — AMLODIPINE BESYLATE 2.5 MG/1
2.5 TABLET ORAL DAILY
Qty: 30 TABLET | Refills: 3 | Status: SHIPPED | OUTPATIENT
Start: 2024-09-27

## 2024-09-26 RX ORDER — GUAIFENESIN 400 MG/1
400 TABLET ORAL 3 TIMES DAILY
Qty: 56 TABLET | Refills: 0 | Status: SHIPPED | OUTPATIENT
Start: 2024-09-26

## 2024-09-26 RX ADMIN — AMLODIPINE BESYLATE 2.5 MG: 2.5 TABLET ORAL at 07:41

## 2024-09-26 RX ADMIN — POTASSIUM CHLORIDE 20 MEQ: 1500 TABLET, EXTENDED RELEASE ORAL at 09:55

## 2024-09-26 RX ADMIN — GUAIFENESIN 400 MG: 400 TABLET ORAL at 07:41

## 2024-09-26 RX ADMIN — CARVEDILOL 12.5 MG: 6.25 TABLET, FILM COATED ORAL at 07:41

## 2024-09-26 RX ADMIN — ENOXAPARIN SODIUM 40 MG: 100 INJECTION SUBCUTANEOUS at 07:42

## 2024-09-26 RX ADMIN — DEXAMETHASONE 6 MG: 4 TABLET ORAL at 07:41

## 2024-09-26 NOTE — CARE COORDINATION
Social Work:    Jennifer at [facility] received authorization.  Social service arranged [employer] ambulette to transport Mr. Parry today at 2:00 p.m. Social service updated patient, daughter Fiona, RN, and Jennifer at [facility].  RN to check pulse ox prior to transfer to confirm 02 need.    Electronically signed by MARTHA Parker on 9/26/2024 at 10:48 AM

## 2024-09-26 NOTE — PROGRESS NOTES
DC order noted. IV site and telemetry monitor removed. Belongings packed with patient. Nurse to nurse report called to [facility] and all questions answered. DC paperwork faxed to facility per their request.

## 2024-09-26 NOTE — PROGRESS NOTES
[facility]                                Progress note    Subjective:  Denies chest pain and dyspnea  Complains of productive cough with clear sputum    Objective:  Lying almost completely flat in bed, conversing well  No acute distress   BP (!) 191/82   Pulse 61   Temp 98 °F (36.7 °C) (Axillary)   Resp 18   Ht 1.676 m (5' 6\")   Wt 73 kg (161 lb)   SpO2 94%   BMI 25.99 kg/m²   General appearance: NAD, conversant, wearing 2 L of oxygen and able to breathe fine  Eyes: Sclerae anicteric, PERRLA  HEENT: AT/NC, MMM  Neck: FROM, supple, no thyromegaly  Lymph: No cervical / supraclavicular lymphadenopathy  Lungs: Clear to auscultation, WOB normal-no evidence of rales rhonchi or wheeze  CV: RRR, no MRGs, no lower extremity edema  Abdomen: Soft, non-tender; no masses or HSM, +BS  Extremities: FROM without synovitis.  No clubbing or cyanosis of the hands.  Skin: no rash, induration, lesions, or ulcers  Psych: Calm and cooperative.  Normal judgement and insight.  Normal mood and affect.  Neuro: Alert and interactive, face symmetric, speech fluent.     Recent Labs     09/24/24  0606 09/25/24  0833   WBC 7.6 8.9   HGB 11.1* 11.6*   HCT 33.4* 35.5*    204       Recent Labs     09/24/24  0606 09/25/24  0833    137   K 3.8 3.7    102   CO2 23 23   BUN 27* 33*   CREATININE 0.9 1.1   CALCIUM 8.4* 8.6     CT head negative  CT C-spine with severe compression deformity of T4  CXR negative       ASSESSMENT/PLAN:  Patient is a 89-year-old male admitted to [facility] for  Acute respiratory failure with hypoxia secondary to COVID-19 infection  -Monitor labs  -Monitor inflammatory markers-mainly CRP  -Continue oral Decadron 6 mg daily, can be discontinued in the next few days  -Currently requiring 2 L nasal cannula satting 98%-does not meet criteria for remdesivir or tocilizumab  -Continue supportive care  -PT OT     Elevated troponin  No chest pain is reported  Likely troponin leak from  elevated blood pressures  If chest pain ensues, cardiology evaluation will be obtained  His code status notably is DNR comfort care     Hypertension accelerated  -Continue home medications with parameters  -Continue to monitor Bps, adjust meds as warranted  -Add as needed meds    09/24/2024  HTN, needs more control   Change Lopressor to Coreg  Hydralazine 10mg IV prn SBP>160mmHg  SaO2 99% on 1 L NC  Monitor BMP, CBC  PT OT  Discharge planning, possibly tomorrow    09/25/2024  HTN somewhat improved with Coreg, will add Norvasc as HR drops into the 50s at times  SaO2 100% 2 L NC   Mild Anemia, stable  PT OT  Discharge planning  Patient confused overnight speaking with daughter, currently A&O x 3  Daughter requesting SNF instead of return to assisted living   Pre Auth pending     09/26/2024  Continue PO Decadron and Mucinex  Monitor VS  Continue Coreg with Norvasc  SaO2 94% on 1 L  Supplement potassium  Monitor BMP  Mild Anemia with H&H 11.6/35.5  Discharge planning to facility  Pre Auth pending      Code status: DNR CC  Consultants:  None  DVT Prophylaxis Lovenox 40mg daily   PT/OT  Discharge planning       I have spent a total time of 30 minutes of this patient encounter reviewing chart, labs, coordinating care with interdisciplinary teams, face to face encounter with patient, providing counseling/education to patient/family.      Melania Ramos, ABHISHEK - CNP,  9:37 AM  9/26/2024

## 2024-09-26 NOTE — PROGRESS NOTES
SPO2 dropped to 83% on room air with ambulation. 2 liters applied, SPO2 94% on 2 liters. Patient discharged with oxygen tank.

## 2024-09-26 NOTE — DISCHARGE SUMMARY
[facility]   Discharge summary   Patient ID:  Tashi Parry  [id number]  89 y.o.  7/18/1931    Admit date: 9/22/2024    Discharge date and time: 9/26/2024    Admission Diagnoses:   Patient Active Problem List   Diagnosis    Coronary artery disease involving native coronary artery of native heart without angina pectoris    Hyperlipemia    Asymptomatic bilateral carotid artery stenosis    Malignant neoplasm of lower lobe of left lung (HCC)    Hx of coronary artery disease    Essential hypertension    Anemia    Benign prostatic hyperplasia    Gastroesophageal reflux disease    Macular degeneration    Overweight with body mass index (BMI) 25.0-29.9    Primary malignant neoplasm of lung (HCC)    Sensorineural hearing loss (SNHL) of both ears    History of non-ST elevation myocardial infarction (NSTEMI)    Fall down steps    Mild dementia (HCC)    Personal history of other malignant neoplasm of bronchus and lung    Acquired absence of lung (part of)    Nonexudative senile macular degeneration of retina    Primary osteoarthritis involving multiple joints    History of falling, presenting hazards to health    Constipation    COVID-19       Discharge Diagnoses:   Patient Active Problem List   Diagnosis    Coronary artery disease involving native coronary artery of native heart without angina pectoris    Hyperlipemia    Asymptomatic bilateral carotid artery stenosis    Malignant neoplasm of lower lobe of left lung (HCC)    Hx of coronary artery disease    Essential hypertension    Anemia    Benign prostatic hyperplasia    Gastroesophageal reflux disease    Macular degeneration    Overweight with body mass index (BMI) 25.0-29.9    Primary malignant neoplasm of lung (HCC)    Sensorineural hearing loss (SNHL) of both ears    History of non-ST elevation myocardial infarction (NSTEMI)    Fall down steps    Mild dementia (HCC)    Personal history of other malignant neoplasm of bronchus and lung    Acquired absence  TECHNOLOGIST PROVIDED HISTORY: Has a \"code stroke\" or \"stroke alert\" been called?->No Reason for exam:->fall Decision Support Exception - unselect if not a suspected or confirmed emergency medical condition->Emergency Medical Condition (MA) FINDINGS: BRAIN: Gray-white differentiation is intact.  Mild to moderate white matter hypoattenuation which can be seen with chronic small vessel ischemic change. CSF SPACES: No hydrocephalus. Diffuse cerebral volume loss. HEMORRHAGE: No acute intracranial hemorrhage is identified. MASS EFFECT: No midline shift. SINUS/MASTOIDS: Clear. SCALP: Grossly unremarkable. CALVARIUM: Intact.     No acute intracranial abnormality.       Discharge Exam:  General appearance: NAD, conversant, wearing 2 L of oxygen and able to breathe fine  Eyes: Sclerae anicteric, PERRLA  HEENT: AT/NC, MMM  Neck: FROM, supple, no thyromegaly  Lymph: No cervical / supraclavicular lymphadenopathy  Lungs: Clear to auscultation, WOB normal-no evidence of rales rhonchi or wheeze  CV: RRR, no MRGs, no lower extremity edema  Abdomen: Soft, non-tender; no masses or HSM, +BS  Extremities: FROM without synovitis.  No clubbing or cyanosis of the hands.  Skin: no rash, induration, lesions, or ulcers  Psych: Calm and cooperative.  Normal judgement and insight.  Normal mood and affect.  Neuro: Alert and interactive, face symmetric, speech fluent.    Disposition: [facility]     Patient Condition at Discharge: Stable    Patient Instructions:      Medication List        START taking these medications      amLODIPine 2.5 MG tablet  Commonly known as: NORVASC  Take 1 tablet by mouth daily  Start taking on: September 27, 2024     carvedilol 12.5 MG tablet  Commonly known as: COREG  Take 1 tablet by mouth 2 times daily (with meals)     dexAMETHasone 6 MG tablet  Commonly known as: DECADRON  Take 1 tablet by mouth daily for 6 doses  Start taking on: September 27, 2024     guaiFENesin 400 MG tablet  Take 1 tablet by mouth 3 times daily

## 2024-09-27 ENCOUNTER — OUTSIDE SERVICES (OUTPATIENT)
Dept: PRIMARY CARE CLINIC | Age: 89
End: 2024-09-27

## 2024-09-27 DIAGNOSIS — R53.1 GENERALIZED WEAKNESS: ICD-10-CM

## 2024-09-27 DIAGNOSIS — E78.2 MIXED HYPERLIPIDEMIA: ICD-10-CM

## 2024-09-27 DIAGNOSIS — Z91.81 AT MAXIMUM RISK FOR FALL: ICD-10-CM

## 2024-09-27 DIAGNOSIS — Z95.1 S/P CABG (CORONARY ARTERY BYPASS GRAFT): ICD-10-CM

## 2024-09-27 DIAGNOSIS — J96.01 ACUTE RESPIRATORY FAILURE WITH HYPOXIA: Primary | ICD-10-CM

## 2024-09-27 DIAGNOSIS — U07.1 COVID-19: ICD-10-CM

## 2024-09-27 DIAGNOSIS — I10 ESSENTIAL HYPERTENSION: ICD-10-CM

## 2024-09-27 DIAGNOSIS — H90.3 SENSORINEURAL HEARING LOSS (SNHL) OF BOTH EARS: ICD-10-CM

## 2024-09-27 DIAGNOSIS — F03.A0 MILD DEMENTIA, UNSPECIFIED DEMENTIA TYPE, UNSPECIFIED WHETHER BEHAVIORAL, PSYCHOTIC, OR MOOD DISTURBANCE OR ANXIETY (HCC): ICD-10-CM

## 2024-09-27 DIAGNOSIS — R79.89 ELEVATED TROPONIN: ICD-10-CM

## 2024-09-27 DIAGNOSIS — R53.81 PHYSICAL DECONDITIONING: ICD-10-CM

## 2024-09-27 DIAGNOSIS — I25.10 CORONARY ARTERY DISEASE INVOLVING NATIVE CORONARY ARTERY OF NATIVE HEART WITHOUT ANGINA PECTORIS: ICD-10-CM

## 2024-10-03 ENCOUNTER — TELEPHONE (OUTPATIENT)
Dept: PRIMARY CARE CLINIC | Age: 89
End: 2024-10-03

## 2024-10-03 NOTE — TELEPHONE ENCOUNTER
Dr Arnold going to see pt 10/4/24, he ordered mobile xray for cough, and changed him to robitussin dm q4h prn

## 2024-10-03 NOTE — TELEPHONE ENCOUNTER
Pt states he is home now from his hospital stay for covid. He feels ok and his vitals are good. The only complaint he has and had before going home was very dark red/black looking mucous he coughs up    advise

## 2024-10-04 ENCOUNTER — OFFICE VISIT (OUTPATIENT)
Dept: PRIMARY CARE CLINIC | Age: 89
End: 2024-10-04

## 2024-10-04 VITALS
DIASTOLIC BLOOD PRESSURE: 66 MMHG | BODY MASS INDEX: 26.68 KG/M2 | OXYGEN SATURATION: 94 % | RESPIRATION RATE: 16 BRPM | WEIGHT: 166 LBS | HEIGHT: 66 IN | SYSTOLIC BLOOD PRESSURE: 116 MMHG | HEART RATE: 60 BPM | TEMPERATURE: 98.3 F

## 2024-10-04 DIAGNOSIS — Z09 HOSPITAL DISCHARGE FOLLOW-UP: ICD-10-CM

## 2024-10-04 DIAGNOSIS — U07.1 COVID-19: Primary | ICD-10-CM

## 2024-10-04 DIAGNOSIS — J96.01 ACUTE RESPIRATORY FAILURE WITH HYPOXIA: ICD-10-CM

## 2024-10-04 DIAGNOSIS — I10 ESSENTIAL HYPERTENSION: ICD-10-CM

## 2024-10-04 DIAGNOSIS — D64.9 ANEMIA, UNSPECIFIED TYPE: ICD-10-CM

## 2024-10-04 NOTE — PROGRESS NOTES
criteria for remdesivir or tocilizumab and was tx'd with supportive care. Blood pressures were significantly elevated ranging from 150s to 190s. He continued on supplemental O2 2 LPM NC with SpO2 100%. HTN meds were adjusted and HTN improved with Coreg and Norvasc. He was discharged on 9/26/24 to [facility] for rehab. He reports he improved with PT in rehab and was discharged to [facility] on 10/3/24 in improved condition.     Inpatient course: Discharge summary reviewed- see chart.    Interval history/Current status: Patient returned from rehab yesterday and says he is gradually feeling better. He continues to have a cough productive of blood tinged sputum, but states he is not coughing often or very much.  He continues to feel very fatigued but is not SOB. He remains on supplemental O2 with SpO2 in mid 90s. He appetite is good and he is moving his bowels with occasional use of PEG laxative. He has not had any GERD symptoms    Patient Active Problem List   Diagnosis    Coronary artery disease involving native coronary artery of native heart without angina pectoris    Hyperlipemia    Asymptomatic bilateral carotid artery stenosis    Malignant neoplasm of lower lobe of left lung (HCC)    Hx of coronary artery disease    Essential hypertension    Anemia    Benign prostatic hyperplasia    Gastroesophageal reflux disease    Macular degeneration    Overweight with body mass index (BMI) 25.0-29.9    Primary malignant neoplasm of lung (HCC)    Sensorineural hearing loss (SNHL) of both ears    History of non-ST elevation myocardial infarction (NSTEMI)    Fall down steps    Mild dementia (HCC)    Personal history of other malignant neoplasm of bronchus and lung    Acquired absence of lung (part of)    Nonexudative senile macular degeneration of retina    Primary osteoarthritis involving multiple joints    History of falling, presenting hazards to health    Constipation    COVID-19     Medications listed as ordered at

## 2024-10-14 DIAGNOSIS — R06.00 DYSPNEA DUE TO COVID-19: ICD-10-CM

## 2024-10-14 DIAGNOSIS — U07.1 COVID-19: Primary | ICD-10-CM

## 2024-10-14 DIAGNOSIS — U07.1 DYSPNEA DUE TO COVID-19: ICD-10-CM

## 2024-10-15 ENCOUNTER — HOSPITAL ENCOUNTER (OUTPATIENT)
Age: 89
Discharge: HOME | End: 2024-10-17
Payer: MEDICARE

## 2024-10-15 ENCOUNTER — HOSPITAL ENCOUNTER (OUTPATIENT)
Dept: GENERAL RADIOLOGY | Age: 89
Discharge: HOME | End: 2024-10-17
Payer: MEDICARE

## 2024-10-15 DIAGNOSIS — R06.02 SOB (SHORTNESS OF BREATH): ICD-10-CM

## 2024-10-15 DIAGNOSIS — J18.9 PNEUMONIA DUE TO INFECTIOUS ORGANISM, UNSPECIFIED LATERALITY, UNSPECIFIED PART OF LUNG: ICD-10-CM

## 2024-10-15 PROCEDURE — 71046 X-RAY EXAM CHEST 2 VIEWS: CPT

## 2024-10-24 ENCOUNTER — HOSPITAL ENCOUNTER (OUTPATIENT)
Dept: CT IMAGING | Age: 89
Discharge: HOME | End: 2024-10-26
Payer: MEDICARE

## 2024-10-24 DIAGNOSIS — R93.89 ABNORMAL X-RAY: ICD-10-CM

## 2024-10-24 PROCEDURE — 71250 CT THORAX DX C-: CPT

## 2024-10-28 ENCOUNTER — TELEPHONE (OUTPATIENT)
Dept: PULMONOLOGY | Age: 89
End: 2024-10-28

## 2024-10-28 ENCOUNTER — APPOINTMENT (OUTPATIENT)
Dept: CT IMAGING | Age: 89
DRG: 196 | End: 2024-10-28
Payer: MEDICARE

## 2024-10-28 ENCOUNTER — APPOINTMENT (OUTPATIENT)
Dept: GENERAL RADIOLOGY | Age: 89
DRG: 196 | End: 2024-10-28
Payer: MEDICARE

## 2024-10-28 ENCOUNTER — HOSPITAL ENCOUNTER (INPATIENT)
Age: 89
LOS: 7 days | Discharge: NURSING, REHAB OR LONG-TERM CARE | DRG: 196 | End: 2024-11-04
Attending: EMERGENCY MEDICINE | Admitting: INTERNAL MEDICINE
Payer: MEDICARE

## 2024-10-28 DIAGNOSIS — J18.9 PNEUMONIA OF BOTH LOWER LOBES DUE TO INFECTIOUS ORGANISM: Primary | ICD-10-CM

## 2024-10-28 DIAGNOSIS — R93.89 ABNORMAL X-RAY: Primary | ICD-10-CM

## 2024-10-28 DIAGNOSIS — J18.9 PNEUMONIA DUE TO INFECTIOUS ORGANISM, UNSPECIFIED LATERALITY, UNSPECIFIED PART OF LUNG: ICD-10-CM

## 2024-10-28 DIAGNOSIS — R06.02 SHORTNESS OF BREATH: ICD-10-CM

## 2024-10-28 PROBLEM — J84.9 BILATERAL INTERSTITIAL PNEUMONIA (HCC): Status: ACTIVE | Noted: 2024-10-28

## 2024-10-28 LAB
ALBUMIN SERPL-MCNC: 3.6 G/DL (ref 3.5–5.2)
ALP SERPL-CCNC: 101 U/L (ref 40–129)
ALT SERPL-CCNC: 12 U/L (ref 0–40)
ANION GAP SERPL CALCULATED.3IONS-SCNC: 10 MMOL/L (ref 7–16)
AST SERPL-CCNC: 21 U/L (ref 0–39)
BASOPHILS # BLD: 0.08 K/UL (ref 0–0.2)
BASOPHILS NFR BLD: 1 % (ref 0–2)
BILIRUB DIRECT SERPL-MCNC: 0.3 MG/DL (ref 0–0.3)
BILIRUB INDIRECT SERPL-MCNC: 1 MG/DL (ref 0–1)
BILIRUB SERPL-MCNC: 1.3 MG/DL (ref 0–1.2)
BNP SERPL-MCNC: 8864 PG/ML (ref 0–450)
BUN SERPL-MCNC: 22 MG/DL (ref 6–23)
CALCIUM SERPL-MCNC: 8.8 MG/DL (ref 8.6–10.2)
CHLORIDE SERPL-SCNC: 105 MMOL/L (ref 98–107)
CO2 SERPL-SCNC: 27 MMOL/L (ref 22–29)
CREAT SERPL-MCNC: 0.9 MG/DL (ref 0.7–1.2)
CRP SERPL HS-MCNC: 34 MG/L (ref 0–5)
EOSINOPHIL # BLD: 0.16 K/UL (ref 0.05–0.5)
EOSINOPHILS RELATIVE PERCENT: 2 % (ref 0–6)
ERYTHROCYTE [DISTWIDTH] IN BLOOD BY AUTOMATED COUNT: 16.2 % (ref 11.5–15)
ERYTHROCYTE [SEDIMENTATION RATE] IN BLOOD BY WESTERGREN METHOD: 34 MM/HR (ref 0–15)
GFR, ESTIMATED: 78 ML/MIN/1.73M2
GLUCOSE SERPL-MCNC: 109 MG/DL (ref 74–99)
HCT VFR BLD AUTO: 34.6 % (ref 37–54)
HGB BLD-MCNC: 10.5 G/DL (ref 12.5–16.5)
IMM GRANULOCYTES # BLD AUTO: 0.03 K/UL (ref 0–0.58)
IMM GRANULOCYTES NFR BLD: 0 % (ref 0–5)
LYMPHOCYTES NFR BLD: 1.42 K/UL (ref 1.5–4)
LYMPHOCYTES RELATIVE PERCENT: 16 % (ref 20–42)
MCH RBC QN AUTO: 27.6 PG (ref 26–35)
MCHC RBC AUTO-ENTMCNC: 30.3 G/DL (ref 32–34.5)
MCV RBC AUTO: 91.1 FL (ref 80–99.9)
MONOCYTES NFR BLD: 1.04 K/UL (ref 0.1–0.95)
MONOCYTES NFR BLD: 12 % (ref 2–12)
NEUTROPHILS NFR BLD: 69 % (ref 43–80)
NEUTS SEG NFR BLD: 6.05 K/UL (ref 1.8–7.3)
PLATELET # BLD AUTO: 351 K/UL (ref 130–450)
PMV BLD AUTO: 8.7 FL (ref 7–12)
POTASSIUM SERPL-SCNC: 4.1 MMOL/L (ref 3.5–5)
PROCALCITONIN SERPL-MCNC: 0.05 NG/ML (ref 0–0.08)
PROT SERPL-MCNC: 6.8 G/DL (ref 6.4–8.3)
RBC # BLD AUTO: 3.8 M/UL (ref 3.8–5.8)
SODIUM SERPL-SCNC: 142 MMOL/L (ref 132–146)
TROPONIN I SERPL HS-MCNC: 39 NG/L (ref 0–11)
TROPONIN I SERPL HS-MCNC: 41 NG/L (ref 0–11)
WBC OTHER # BLD: 8.8 K/UL (ref 4.5–11.5)

## 2024-10-28 PROCEDURE — 71275 CT ANGIOGRAPHY CHEST: CPT

## 2024-10-28 PROCEDURE — 2580000003 HC RX 258

## 2024-10-28 PROCEDURE — 80053 COMPREHEN METABOLIC PANEL: CPT

## 2024-10-28 PROCEDURE — 6360000002 HC RX W HCPCS: Performed by: EMERGENCY MEDICINE

## 2024-10-28 PROCEDURE — 82248 BILIRUBIN DIRECT: CPT

## 2024-10-28 PROCEDURE — 6370000000 HC RX 637 (ALT 250 FOR IP)

## 2024-10-28 PROCEDURE — 6360000002 HC RX W HCPCS

## 2024-10-28 PROCEDURE — 2580000003 HC RX 258: Performed by: EMERGENCY MEDICINE

## 2024-10-28 PROCEDURE — 87081 CULTURE SCREEN ONLY: CPT

## 2024-10-28 PROCEDURE — 93005 ELECTROCARDIOGRAM TRACING: CPT | Performed by: EMERGENCY MEDICINE

## 2024-10-28 PROCEDURE — 83880 ASSAY OF NATRIURETIC PEPTIDE: CPT

## 2024-10-28 PROCEDURE — 6360000004 HC RX CONTRAST MEDICATION: Performed by: RADIOLOGY

## 2024-10-28 PROCEDURE — 87899 AGENT NOS ASSAY W/OPTIC: CPT

## 2024-10-28 PROCEDURE — 2060000000 HC ICU INTERMEDIATE R&B

## 2024-10-28 PROCEDURE — 85025 COMPLETE CBC W/AUTO DIFF WBC: CPT

## 2024-10-28 PROCEDURE — 84484 ASSAY OF TROPONIN QUANT: CPT

## 2024-10-28 PROCEDURE — 71045 X-RAY EXAM CHEST 1 VIEW: CPT

## 2024-10-28 PROCEDURE — 86140 C-REACTIVE PROTEIN: CPT

## 2024-10-28 PROCEDURE — 85652 RBC SED RATE AUTOMATED: CPT

## 2024-10-28 PROCEDURE — 87040 BLOOD CULTURE FOR BACTERIA: CPT

## 2024-10-28 PROCEDURE — 6370000000 HC RX 637 (ALT 250 FOR IP): Performed by: EMERGENCY MEDICINE

## 2024-10-28 PROCEDURE — 84145 PROCALCITONIN (PCT): CPT

## 2024-10-28 PROCEDURE — 99285 EMERGENCY DEPT VISIT HI MDM: CPT

## 2024-10-28 PROCEDURE — 87449 NOS EACH ORGANISM AG IA: CPT

## 2024-10-28 RX ORDER — SODIUM CHLORIDE 9 MG/ML
INJECTION, SOLUTION INTRAVENOUS PRN
Status: DISCONTINUED | OUTPATIENT
Start: 2024-10-28 | End: 2024-11-04 | Stop reason: HOSPADM

## 2024-10-28 RX ORDER — DIPHENHYDRAMINE HCL 25 MG
50 TABLET ORAL ONCE
Status: COMPLETED | OUTPATIENT
Start: 2024-10-28 | End: 2024-10-28

## 2024-10-28 RX ORDER — ENOXAPARIN SODIUM 100 MG/ML
40 INJECTION SUBCUTANEOUS DAILY
Status: DISCONTINUED | OUTPATIENT
Start: 2024-10-29 | End: 2024-10-30

## 2024-10-28 RX ORDER — GUAIFENESIN 400 MG/1
400 TABLET ORAL 3 TIMES DAILY
Status: DISCONTINUED | OUTPATIENT
Start: 2024-10-28 | End: 2024-11-04 | Stop reason: HOSPADM

## 2024-10-28 RX ORDER — ACETAMINOPHEN 650 MG/1
650 SUPPOSITORY RECTAL EVERY 6 HOURS PRN
Status: DISCONTINUED | OUTPATIENT
Start: 2024-10-28 | End: 2024-11-04 | Stop reason: HOSPADM

## 2024-10-28 RX ORDER — CALCIUM CARBONATE 500(1250)
500 TABLET ORAL DAILY
Status: DISCONTINUED | OUTPATIENT
Start: 2024-10-29 | End: 2024-11-04 | Stop reason: HOSPADM

## 2024-10-28 RX ORDER — ONDANSETRON 4 MG/1
4 TABLET, ORALLY DISINTEGRATING ORAL EVERY 8 HOURS PRN
Status: DISCONTINUED | OUTPATIENT
Start: 2024-10-28 | End: 2024-11-04 | Stop reason: HOSPADM

## 2024-10-28 RX ORDER — ACETAMINOPHEN 325 MG/1
650 TABLET ORAL EVERY 6 HOURS PRN
Status: DISCONTINUED | OUTPATIENT
Start: 2024-10-28 | End: 2024-11-04 | Stop reason: HOSPADM

## 2024-10-28 RX ORDER — PIPERACILLIN SODIUM, TAZOBACTAM SODIUM 4; .5 G/20ML; G/20ML
4500 INJECTION, POWDER, LYOPHILIZED, FOR SOLUTION INTRAVENOUS EVERY 6 HOURS
Status: DISCONTINUED | OUTPATIENT
Start: 2024-10-29 | End: 2024-10-30

## 2024-10-28 RX ORDER — SODIUM CHLORIDE 9 MG/ML
20 INJECTION, SOLUTION INTRAMUSCULAR; INTRAVENOUS; SUBCUTANEOUS EVERY 6 HOURS
Status: DISCONTINUED | OUTPATIENT
Start: 2024-10-29 | End: 2024-10-30

## 2024-10-28 RX ORDER — MULTIVITAMIN WITH IRON
1 TABLET ORAL DAILY
Status: DISCONTINUED | OUTPATIENT
Start: 2024-10-29 | End: 2024-11-04 | Stop reason: HOSPADM

## 2024-10-28 RX ORDER — AMLODIPINE BESYLATE 2.5 MG/1
2.5 TABLET ORAL DAILY
Status: DISCONTINUED | OUTPATIENT
Start: 2024-10-29 | End: 2024-10-31

## 2024-10-28 RX ORDER — SODIUM CHLORIDE 0.9 % (FLUSH) 0.9 %
10 SYRINGE (ML) INJECTION PRN
Status: DISCONTINUED | OUTPATIENT
Start: 2024-10-28 | End: 2024-11-04 | Stop reason: HOSPADM

## 2024-10-28 RX ORDER — ONDANSETRON 2 MG/ML
4 INJECTION INTRAMUSCULAR; INTRAVENOUS EVERY 6 HOURS PRN
Status: DISCONTINUED | OUTPATIENT
Start: 2024-10-28 | End: 2024-11-04 | Stop reason: HOSPADM

## 2024-10-28 RX ORDER — FUROSEMIDE 10 MG/ML
20 INJECTION INTRAMUSCULAR; INTRAVENOUS ONCE
Status: COMPLETED | OUTPATIENT
Start: 2024-10-28 | End: 2024-10-28

## 2024-10-28 RX ORDER — CARVEDILOL 6.25 MG/1
12.5 TABLET ORAL 2 TIMES DAILY WITH MEALS
Status: DISCONTINUED | OUTPATIENT
Start: 2024-10-29 | End: 2024-11-04 | Stop reason: HOSPADM

## 2024-10-28 RX ORDER — IPRATROPIUM BROMIDE AND ALBUTEROL SULFATE 2.5; .5 MG/3ML; MG/3ML
1 SOLUTION RESPIRATORY (INHALATION)
Status: DISCONTINUED | OUTPATIENT
Start: 2024-10-29 | End: 2024-11-04 | Stop reason: HOSPADM

## 2024-10-28 RX ORDER — IOPAMIDOL 755 MG/ML
75 INJECTION, SOLUTION INTRAVASCULAR
Status: COMPLETED | OUTPATIENT
Start: 2024-10-28 | End: 2024-10-28

## 2024-10-28 RX ORDER — ASPIRIN 81 MG/1
81 TABLET ORAL DAILY
Status: DISCONTINUED | OUTPATIENT
Start: 2024-10-29 | End: 2024-11-04 | Stop reason: HOSPADM

## 2024-10-28 RX ORDER — SODIUM CHLORIDE 0.9 % (FLUSH) 0.9 %
5-40 SYRINGE (ML) INJECTION EVERY 12 HOURS SCHEDULED
Status: DISCONTINUED | OUTPATIENT
Start: 2024-10-28 | End: 2024-11-04 | Stop reason: HOSPADM

## 2024-10-28 RX ADMIN — DIPHENHYDRAMINE HCL 50 MG: 25 TABLET ORAL at 17:00

## 2024-10-28 RX ADMIN — GUAIFENESIN 400 MG: 400 TABLET ORAL at 21:59

## 2024-10-28 RX ADMIN — PREDNISONE 50 MG: 20 TABLET ORAL at 16:59

## 2024-10-28 RX ADMIN — IOPAMIDOL 75 ML: 755 INJECTION, SOLUTION INTRAVENOUS at 18:05

## 2024-10-28 RX ADMIN — SODIUM CHLORIDE, PRESERVATIVE FREE 10 ML: 5 INJECTION INTRAVENOUS at 22:00

## 2024-10-28 RX ADMIN — PREDNISONE 50 MG: 20 TABLET ORAL at 21:59

## 2024-10-28 RX ADMIN — FUROSEMIDE 20 MG: 10 INJECTION, SOLUTION INTRAMUSCULAR; INTRAVENOUS at 21:59

## 2024-10-28 RX ADMIN — PIPERACILLIN AND TAZOBACTAM 4500 MG: 4; .5 INJECTION, POWDER, FOR SOLUTION INTRAVENOUS at 20:53

## 2024-10-28 RX ADMIN — VANCOMYCIN HYDROCHLORIDE 1500 MG: 10 INJECTION, POWDER, LYOPHILIZED, FOR SOLUTION INTRAVENOUS at 23:03

## 2024-10-28 ASSESSMENT — PAIN - FUNCTIONAL ASSESSMENT: PAIN_FUNCTIONAL_ASSESSMENT: NONE - DENIES PAIN

## 2024-10-28 NOTE — ED NOTES
Name: Tashi Parry  : 1931  MRN: [id number]    Date: 10/28/2024    Benefits of immediately proceeding with Radiology exam outweigh the risks and therefore the following is being waived:      [] Pregnancy test    [] Protocol for Iodine allergy    [] MRI questionnaire    [x] BUN/Creatinine        DO Montez Mariee Charles, DO  10/28/24 2357

## 2024-10-28 NOTE — ED PROVIDER NOTES
chronic compression deformities are noted in the mid upper thoracic spine.  There is no acute bony abnormality. IMPRESSION: Bilateral mixed airspace and interstitial opacities may represent an atypical pneumonia or possibly an inflammatory process. Small bilateral pleural effusions. 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 Multiple chronic compression deformities in the thoracic spine. Cholelithiasis. Interpreted by: Spencer Keyes MD Signed by: Spencer Keyes MD 10/24/24 Final result    XR CHEST (2 VW)    Result Date: 10/15/2024  EXAMINATION: TWO XRAY VIEWS OF THE CHEST10/15/2024 2:01 pm COMPARISON: 09/22/2024 HISTORY: ORDERING SYSTEM PROVIDED HISTORY: SOB (shortness of breath) TECHNOLOGIST PROVIDED HISTORY: Reason for exam:->pneumonia possible, FINDINGS: Stable heart and mediastinum with previous sternotomy and bypass surgery. There is mild blunting of the left costophrenic angle that may indicate small pleural effusion.  No significant right-sided effusion. There are persistent ground-glass and airspace type of infiltrates in both lungs that have increased.  Dorsal spondylosis.  There is a calcific loose body in the left subcoracoid region.     Worsening of bilateral lung infiltrates that is probably from infectious inflammatory etiology.  Since this is non resolving, evaluation with a CT of the chest should be considered.     XR CHEST (2 VW)    Result Date: 10/15/2024  EXAMINATION: TWO XRAY VIEWS OF THE CHEST10/15/2024 2:01 pm COMPARISON: 09/22/2024 HISTORY: ORDERING SYSTEM PROVIDED HISTORY: SOB (shortness of breath) TECHNOLOGIST PROVIDED HISTORY: Reason for exam:->pneumonia possible, FINDINGS: Stable heart and mediastinum with previous sternotomy and bypass surgery. There is mild blunting of the left costophrenic angle that may indicate small pleural effusion.  No significant right-sided effusion. There are persistent ground-glass and airspace type of infiltrates in both lungs

## 2024-10-28 NOTE — ED TRIAGE NOTES
family history includes Alzheimer's Disease in his sister; Heart Disease in his brother, brother, and brother; Other in his father and mother.    Allergies: Ciprofloxacin, Ciprofloxacin hcl, Nitroglycerin, Quinidine, Sulfamethoxazole, Trimethoprim, Biaxin [clarithromycin], Iodine, and Quinolones     Initial Plan of Care: Initiate Treatment-Testing, Proceed toTreatment Area When Bed Available for ED Attending/MLP to Continue Care      ---END OF FIRST PROVIDER CONTACT ASSESSMENT NOTE---  Electronically signed by Fiona Vidal PA-C   DD: 10/28/24

## 2024-10-28 NOTE — TELEPHONE ENCOUNTER
Called patient and also called daughter and went over CT results.  CT still showing bilateral infiltrates.  Patient weak fatigue having productive cough.  Having desaturations with exertion.  I recommend patient go to the hospital for admission for further treatment and evaluation.  Patient and family verbalized understanding.

## 2024-10-29 ENCOUNTER — APPOINTMENT (OUTPATIENT)
Dept: GENERAL RADIOLOGY | Age: 89
DRG: 196 | End: 2024-10-29
Payer: MEDICARE

## 2024-10-29 LAB
ANION GAP SERPL CALCULATED.3IONS-SCNC: 11 MMOL/L (ref 7–16)
B PARAP IS1001 DNA NPH QL NAA+NON-PROBE: NOT DETECTED
B PERT DNA SPEC QL NAA+PROBE: NOT DETECTED
BASOPHILS # BLD: 0.01 K/UL (ref 0–0.2)
BASOPHILS NFR BLD: 0 % (ref 0–2)
BNP SERPL-MCNC: 9773 PG/ML (ref 0–450)
BUN SERPL-MCNC: 21 MG/DL (ref 6–23)
C PNEUM DNA NPH QL NAA+NON-PROBE: NOT DETECTED
CALCIUM SERPL-MCNC: 8.7 MG/DL (ref 8.6–10.2)
CHLORIDE SERPL-SCNC: 101 MMOL/L (ref 98–107)
CO2 SERPL-SCNC: 27 MMOL/L (ref 22–29)
CREAT SERPL-MCNC: 1 MG/DL (ref 0.7–1.2)
EKG ATRIAL RATE: 67 BPM
EKG P AXIS: 66 DEGREES
EKG P-R INTERVAL: 178 MS
EKG Q-T INTERVAL: 438 MS
EKG QRS DURATION: 122 MS
EKG QTC CALCULATION (BAZETT): 462 MS
EKG R AXIS: -53 DEGREES
EKG T AXIS: 32 DEGREES
EKG VENTRICULAR RATE: 67 BPM
EOSINOPHIL # BLD: 0 K/UL (ref 0.05–0.5)
EOSINOPHILS RELATIVE PERCENT: 0 % (ref 0–6)
ERYTHROCYTE [DISTWIDTH] IN BLOOD BY AUTOMATED COUNT: 15.9 % (ref 11.5–15)
FLUAV RNA NPH QL NAA+NON-PROBE: NOT DETECTED
FLUBV RNA NPH QL NAA+NON-PROBE: NOT DETECTED
GFR, ESTIMATED: 70 ML/MIN/1.73M2
GLUCOSE SERPL-MCNC: 147 MG/DL (ref 74–99)
HADV DNA NPH QL NAA+NON-PROBE: NOT DETECTED
HCOV 229E RNA NPH QL NAA+NON-PROBE: NOT DETECTED
HCOV HKU1 RNA NPH QL NAA+NON-PROBE: NOT DETECTED
HCOV NL63 RNA NPH QL NAA+NON-PROBE: NOT DETECTED
HCOV OC43 RNA NPH QL NAA+NON-PROBE: NOT DETECTED
HCT VFR BLD AUTO: 32.1 % (ref 37–54)
HGB BLD-MCNC: 10 G/DL (ref 12.5–16.5)
HMPV RNA NPH QL NAA+NON-PROBE: NOT DETECTED
HPIV1 RNA NPH QL NAA+NON-PROBE: NOT DETECTED
HPIV2 RNA NPH QL NAA+NON-PROBE: NOT DETECTED
HPIV3 RNA NPH QL NAA+NON-PROBE: NOT DETECTED
HPIV4 RNA NPH QL NAA+NON-PROBE: NOT DETECTED
IMM GRANULOCYTES # BLD AUTO: <0.03 K/UL (ref 0–0.58)
IMM GRANULOCYTES NFR BLD: 0 % (ref 0–5)
L PNEUMO1 AG UR QL IA.RAPID: NEGATIVE
LYMPHOCYTES NFR BLD: 0.44 K/UL (ref 1.5–4)
LYMPHOCYTES RELATIVE PERCENT: 9 % (ref 20–42)
M PNEUMO DNA NPH QL NAA+NON-PROBE: NOT DETECTED
MCH RBC QN AUTO: 27.7 PG (ref 26–35)
MCHC RBC AUTO-ENTMCNC: 31.2 G/DL (ref 32–34.5)
MCV RBC AUTO: 88.9 FL (ref 80–99.9)
MONOCYTES NFR BLD: 0.07 K/UL (ref 0.1–0.95)
MONOCYTES NFR BLD: 1 % (ref 2–12)
NEUTROPHILS NFR BLD: 90 % (ref 43–80)
NEUTS SEG NFR BLD: 4.63 K/UL (ref 1.8–7.3)
PLATELET # BLD AUTO: 335 K/UL (ref 130–450)
PMV BLD AUTO: 8.8 FL (ref 7–12)
POTASSIUM SERPL-SCNC: 4 MMOL/L (ref 3.5–5)
PROCALCITONIN SERPL-MCNC: 0.04 NG/ML (ref 0–0.08)
RBC # BLD AUTO: 3.61 M/UL (ref 3.8–5.8)
RBC # BLD: ABNORMAL 10*6/UL
RSV RNA NPH QL NAA+NON-PROBE: NOT DETECTED
RV+EV RNA NPH QL NAA+NON-PROBE: NOT DETECTED
S PNEUM AG SPEC QL: NEGATIVE
SARS-COV-2 RNA NPH QL NAA+NON-PROBE: NOT DETECTED
SODIUM SERPL-SCNC: 139 MMOL/L (ref 132–146)
SPECIMEN DESCRIPTION: NORMAL
SPECIMEN SOURCE: NORMAL
TROPONIN I SERPL HS-MCNC: 48 NG/L (ref 0–11)
WBC OTHER # BLD: 5.2 K/UL (ref 4.5–11.5)

## 2024-10-29 PROCEDURE — 2060000000 HC ICU INTERMEDIATE R&B

## 2024-10-29 PROCEDURE — 84484 ASSAY OF TROPONIN QUANT: CPT

## 2024-10-29 PROCEDURE — 74230 X-RAY XM SWLNG FUNCJ C+: CPT

## 2024-10-29 PROCEDURE — 6360000002 HC RX W HCPCS

## 2024-10-29 PROCEDURE — 93010 ELECTROCARDIOGRAM REPORT: CPT | Performed by: INTERNAL MEDICINE

## 2024-10-29 PROCEDURE — 0202U NFCT DS 22 TRGT SARS-COV-2: CPT

## 2024-10-29 PROCEDURE — 85025 COMPLETE CBC W/AUTO DIFF WBC: CPT

## 2024-10-29 PROCEDURE — 6360000002 HC RX W HCPCS: Performed by: INTERNAL MEDICINE

## 2024-10-29 PROCEDURE — 94669 MECHANICAL CHEST WALL OSCILL: CPT

## 2024-10-29 PROCEDURE — 92611 MOTION FLUOROSCOPY/SWALLOW: CPT

## 2024-10-29 PROCEDURE — 94664 DEMO&/EVAL PT USE INHALER: CPT

## 2024-10-29 PROCEDURE — 2700000000 HC OXYGEN THERAPY PER DAY

## 2024-10-29 PROCEDURE — 2500000003 HC RX 250 WO HCPCS: Performed by: RADIOLOGY

## 2024-10-29 PROCEDURE — 6370000000 HC RX 637 (ALT 250 FOR IP)

## 2024-10-29 PROCEDURE — 83880 ASSAY OF NATRIURETIC PEPTIDE: CPT

## 2024-10-29 PROCEDURE — 92526 ORAL FUNCTION THERAPY: CPT

## 2024-10-29 PROCEDURE — 2580000003 HC RX 258

## 2024-10-29 PROCEDURE — 97165 OT EVAL LOW COMPLEX 30 MIN: CPT

## 2024-10-29 PROCEDURE — 84145 PROCALCITONIN (PCT): CPT

## 2024-10-29 PROCEDURE — 94640 AIRWAY INHALATION TREATMENT: CPT

## 2024-10-29 PROCEDURE — 80048 BASIC METABOLIC PNL TOTAL CA: CPT

## 2024-10-29 RX ORDER — FUROSEMIDE 10 MG/ML
20 INJECTION INTRAMUSCULAR; INTRAVENOUS DAILY
Status: DISCONTINUED | OUTPATIENT
Start: 2024-10-29 | End: 2024-11-04 | Stop reason: HOSPADM

## 2024-10-29 RX ADMIN — SODIUM CHLORIDE 20 ML: 9 INJECTION INTRAMUSCULAR; INTRAVENOUS; SUBCUTANEOUS at 15:41

## 2024-10-29 RX ADMIN — MULTIVITAMIN TABLET 1 TABLET: TABLET at 07:59

## 2024-10-29 RX ADMIN — BARIUM SULFATE 10 ML: 400 PASTE ORAL at 15:13

## 2024-10-29 RX ADMIN — SODIUM CHLORIDE 20 ML: 9 INJECTION INTRAMUSCULAR; INTRAVENOUS; SUBCUTANEOUS at 21:13

## 2024-10-29 RX ADMIN — BARIUM SULFATE 70 G: 0.81 POWDER, FOR SUSPENSION ORAL at 15:13

## 2024-10-29 RX ADMIN — AMLODIPINE BESYLATE 2.5 MG: 2.5 TABLET ORAL at 07:59

## 2024-10-29 RX ADMIN — SODIUM CHLORIDE, PRESERVATIVE FREE 10 ML: 5 INJECTION INTRAVENOUS at 08:00

## 2024-10-29 RX ADMIN — FUROSEMIDE 20 MG: 10 INJECTION, SOLUTION INTRAMUSCULAR; INTRAVENOUS at 15:35

## 2024-10-29 RX ADMIN — SODIUM CHLORIDE 20 ML: 9 INJECTION INTRAMUSCULAR; INTRAVENOUS; SUBCUTANEOUS at 03:45

## 2024-10-29 RX ADMIN — BARIUM SULFATE 120 ML: 400 SUSPENSION ORAL at 15:14

## 2024-10-29 RX ADMIN — IPRATROPIUM BROMIDE AND ALBUTEROL SULFATE 1 DOSE: 2.5; .5 SOLUTION RESPIRATORY (INHALATION) at 12:41

## 2024-10-29 RX ADMIN — SODIUM CHLORIDE 20 ML: 9 INJECTION INTRAMUSCULAR; INTRAVENOUS; SUBCUTANEOUS at 09:30

## 2024-10-29 RX ADMIN — ASPIRIN 81 MG: 81 TABLET, COATED ORAL at 07:59

## 2024-10-29 RX ADMIN — PREDNISONE 50 MG: 20 TABLET ORAL at 03:46

## 2024-10-29 RX ADMIN — PIPERACILLIN SODIUM AND TAZOBACTAM SODIUM 4500 MG: 4; .5 INJECTION, POWDER, LYOPHILIZED, FOR SOLUTION INTRAVENOUS at 21:12

## 2024-10-29 RX ADMIN — ENOXAPARIN SODIUM 40 MG: 100 INJECTION SUBCUTANEOUS at 08:00

## 2024-10-29 RX ADMIN — CALCIUM 500 MG: 500 TABLET ORAL at 07:59

## 2024-10-29 RX ADMIN — PIPERACILLIN SODIUM AND TAZOBACTAM SODIUM 4500 MG: 4; .5 INJECTION, POWDER, LYOPHILIZED, FOR SOLUTION INTRAVENOUS at 03:45

## 2024-10-29 RX ADMIN — SODIUM CHLORIDE, PRESERVATIVE FREE 10 ML: 5 INJECTION INTRAVENOUS at 21:15

## 2024-10-29 RX ADMIN — GUAIFENESIN 400 MG: 400 TABLET ORAL at 07:59

## 2024-10-29 RX ADMIN — PIPERACILLIN SODIUM AND TAZOBACTAM SODIUM 4500 MG: 4; .5 INJECTION, POWDER, LYOPHILIZED, FOR SOLUTION INTRAVENOUS at 15:41

## 2024-10-29 RX ADMIN — CARVEDILOL 12.5 MG: 6.25 TABLET, FILM COATED ORAL at 17:52

## 2024-10-29 RX ADMIN — GUAIFENESIN 400 MG: 400 TABLET ORAL at 21:12

## 2024-10-29 RX ADMIN — GUAIFENESIN 400 MG: 400 TABLET ORAL at 15:38

## 2024-10-29 RX ADMIN — IPRATROPIUM BROMIDE AND ALBUTEROL SULFATE 1 DOSE: 2.5; .5 SOLUTION RESPIRATORY (INHALATION) at 08:30

## 2024-10-29 RX ADMIN — CARVEDILOL 12.5 MG: 6.25 TABLET, FILM COATED ORAL at 07:59

## 2024-10-29 RX ADMIN — IPRATROPIUM BROMIDE AND ALBUTEROL SULFATE 1 DOSE: 2.5; .5 SOLUTION RESPIRATORY (INHALATION) at 16:32

## 2024-10-29 RX ADMIN — PIPERACILLIN SODIUM AND TAZOBACTAM SODIUM 4500 MG: 4; .5 INJECTION, POWDER, LYOPHILIZED, FOR SOLUTION INTRAVENOUS at 09:30

## 2024-10-29 NOTE — PLAN OF CARE
Problem: ABCDS Injury Assessment  Goal: Absence of physical injury  Outcome: Progressing     Problem: Discharge Planning  Goal: Discharge to home or other facility with appropriate resources  Outcome: Progressing  Flowsheets (Taken 10/28/2024 2115)  Discharge to home or other facility with appropriate resources: Identify barriers to discharge with patient and caregiver     Problem: Safety - Adult  Goal: Free from fall injury  Outcome: Progressing

## 2024-10-29 NOTE — ACP (ADVANCE CARE PLANNING)
Advance Care Planning   Healthcare Decision Maker:    Primary Decision Maker: Mary Kate Leahy ARMANDO - Child - [phone]    Click here to complete Healthcare Decision Makers including selection of the Healthcare Decision Maker Relationship (ie \"Primary\").

## 2024-10-29 NOTE — PLAN OF CARE
Problem: ABCDS Injury Assessment  Goal: Absence of physical injury  10/29/2024 0943 by Dolores Reynoso RN  Outcome: Progressing  10/29/2024 0158 by Page Huff RN  Outcome: Progressing     Problem: Discharge Planning  Goal: Discharge to home or other facility with appropriate resources  10/29/2024 0943 by Dolores Reynoso RN  Outcome: Progressing  10/29/2024 0158 by Page Huff RN  Outcome: Progressing  Flowsheets (Taken 10/28/2024 2115)  Discharge to home or other facility with appropriate resources: Identify barriers to discharge with patient and caregiver     Problem: Safety - Adult  Goal: Free from fall injury  10/29/2024 0943 by Dolores Reynoso RN  Outcome: Progressing  10/29/2024 0158 by Page Huff, RN  Outcome: Progressing

## 2024-10-29 NOTE — PLAN OF CARE
Problem: ABCDS Injury Assessment  Goal: Absence of physical injury  10/29/2024 1943 by Joseph Goyal, RN  Outcome: Progressing  10/29/2024 0943 by Dolores Reynoso RN  Outcome: Progressing     Problem: Discharge Planning  Goal: Discharge to home or other facility with appropriate resources  10/29/2024 1943 by Joseph Goyal, RN  Outcome: Progressing  10/29/2024 0943 by Dolores Reynoso RN  Outcome: Progressing     Problem: Safety - Adult  Goal: Free from fall injury  10/29/2024 1943 by Joseph Goyal, RN  Outcome: Progressing  10/29/2024 0943 by Dolores Reynoso RN  Outcome: Progressing

## 2024-10-29 NOTE — CARE COORDINATION
Social work / Discharge planning:           Patient is from [facility].     He has a walker, wc and cane.    Baseline oxygen is 5 liters.    AM PAC 17/24 with patient ambulating 25 feet with a cane.     Patient anticipates return to assisted living when medically stable.   Pulmonology following.   Electronically signed by MARTHA Bragg on 10/29/2024 at 2:46 PM

## 2024-10-29 NOTE — H&P
Shingles          Past Surgical History:   Procedure Laterality Date    CARDIAC SURGERY      6 vessel in 2000    DENTAL SURGERY      root canal    DIAGNOSTIC CARDIAC CATH LAB PROCEDURE  9/5/00    LUNG REMOVAL, PARTIAL Left     march    OTHER SURGICAL HISTORY  02/16/2011    Injection right hip  ADRIANNE Doan MD    PROSTATE SURGERY Bilateral 05/27/2019    THORACOSCOPY Left 4/12/2019    BRONCHOSCOPY LEFT THORACOSCOPY ROBOTIC VIDEO ASSISTED , WEDGE RESECTION, POSS. LEFT LOWER LOBECTOMY performed by Ortiz Pedroza MD at [facility]    TOTAL HIP ARTHROPLASTY Right 03/28/2011    Right BRIONNA  ADRIANNE Doan MD    TOTAL HIP ARTHROPLASTY Left 11/09/2009    Left BRIONNA  ADRIANNE Doan MD    TURP      TURP N/A 5/30/2019    CYSTOSCOPY, RETROGRADE PYELOGRAM,  URERTHRAL DILITATION, TRANSURETHRAL RESECTION PROSTATE performed by Carter Hale MD at [facility] OR       Medications Prior to Admission:    Medications Prior to Admission: loperamide (IMODIUM) 2 MG capsule, Take 1 capsule by mouth as needed for Diarrhea  oxymetazoline (AFRIN) 0.05 % nasal spray, 2 sprays by Nasal route as needed for Congestion  Hexylresorcinol (SB SORETHROAT LOZENGES MT), Take by mouth every 2 hours as needed  ondansetron (ZOFRAN) 4 MG tablet, Take 1 tablet by mouth every 8 hours as needed for Nausea or Vomiting  Dextromethorphan-guaiFENesin (TUSSIN DM COUGH + CHEST)  MG/20ML LIQD, Take by mouth every 4-6 hours as needed  carvedilol (COREG) 12.5 MG tablet, Take 1 tablet by mouth 2 times daily (with meals)  amLODIPine (NORVASC) 2.5 MG tablet, Take 1 tablet by mouth daily  guaiFENesin 400 MG tablet, Take 1 tablet by mouth 3 times daily  Multiple Vitamin (MULTIVITAMIN ADULT PO), Take 1 tablet by mouth daily  Omega-3 Fatty Acids (FISH OIL OMEGA-3 PO), Take 2 caplets by mouth daily  Calcium Carbonate (CALCIUM 600 PO), Take 1 tablet by mouth daily  acetaminophen (TYLENOL) 325 MG tablet, Take 2 tablets by mouth every 4 hours as needed for Pain or Fever  aspirin 81 MG EC

## 2024-10-30 LAB
ANION GAP SERPL CALCULATED.3IONS-SCNC: 9 MMOL/L (ref 7–16)
BASOPHILS # BLD: 0.02 K/UL (ref 0–0.2)
BASOPHILS NFR BLD: 0 % (ref 0–2)
BUN SERPL-MCNC: 37 MG/DL (ref 6–23)
CALCIUM SERPL-MCNC: 8.8 MG/DL (ref 8.6–10.2)
CHLORIDE SERPL-SCNC: 102 MMOL/L (ref 98–107)
CO2 SERPL-SCNC: 27 MMOL/L (ref 22–29)
CREAT SERPL-MCNC: 1.8 MG/DL (ref 0.7–1.2)
EOSINOPHIL # BLD: 0 K/UL (ref 0.05–0.5)
EOSINOPHILS RELATIVE PERCENT: 0 % (ref 0–6)
ERYTHROCYTE [DISTWIDTH] IN BLOOD BY AUTOMATED COUNT: 15.9 % (ref 11.5–15)
GFR, ESTIMATED: 35 ML/MIN/1.73M2
GLUCOSE SERPL-MCNC: 124 MG/DL (ref 74–99)
HCT VFR BLD AUTO: 30.9 % (ref 37–54)
HGB BLD-MCNC: 9.9 G/DL (ref 12.5–16.5)
IMM GRANULOCYTES # BLD AUTO: 0.07 K/UL (ref 0–0.58)
IMM GRANULOCYTES NFR BLD: 1 % (ref 0–5)
LYMPHOCYTES NFR BLD: 1.17 K/UL (ref 1.5–4)
LYMPHOCYTES RELATIVE PERCENT: 9 % (ref 20–42)
MAGNESIUM SERPL-MCNC: 2 MG/DL (ref 1.6–2.6)
MCH RBC QN AUTO: 28 PG (ref 26–35)
MCHC RBC AUTO-ENTMCNC: 32 G/DL (ref 32–34.5)
MCV RBC AUTO: 87.3 FL (ref 80–99.9)
MICROORGANISM SPEC CULT: NORMAL
MONOCYTES NFR BLD: 1.11 K/UL (ref 0.1–0.95)
MONOCYTES NFR BLD: 9 % (ref 2–12)
NEUTROPHILS NFR BLD: 82 % (ref 43–80)
NEUTS SEG NFR BLD: 10.66 K/UL (ref 1.8–7.3)
PLATELET # BLD AUTO: 342 K/UL (ref 130–450)
PMV BLD AUTO: 8.9 FL (ref 7–12)
POTASSIUM SERPL-SCNC: 3.5 MMOL/L (ref 3.5–5)
RBC # BLD AUTO: 3.54 M/UL (ref 3.8–5.8)
SERVICE CMNT-IMP: NORMAL
SODIUM SERPL-SCNC: 138 MMOL/L (ref 132–146)
SPECIMEN DESCRIPTION: NORMAL
TROPONIN I SERPL HS-MCNC: 52 NG/L (ref 0–11)
TROPONIN I SERPL HS-MCNC: 54 NG/L (ref 0–11)
WBC OTHER # BLD: 13 K/UL (ref 4.5–11.5)

## 2024-10-30 PROCEDURE — 92526 ORAL FUNCTION THERAPY: CPT

## 2024-10-30 PROCEDURE — 6370000000 HC RX 637 (ALT 250 FOR IP)

## 2024-10-30 PROCEDURE — 2060000000 HC ICU INTERMEDIATE R&B

## 2024-10-30 PROCEDURE — 6360000002 HC RX W HCPCS

## 2024-10-30 PROCEDURE — 94640 AIRWAY INHALATION TREATMENT: CPT

## 2024-10-30 PROCEDURE — 84484 ASSAY OF TROPONIN QUANT: CPT

## 2024-10-30 PROCEDURE — 80048 BASIC METABOLIC PNL TOTAL CA: CPT

## 2024-10-30 PROCEDURE — 83735 ASSAY OF MAGNESIUM: CPT

## 2024-10-30 PROCEDURE — 6360000002 HC RX W HCPCS: Performed by: INTERNAL MEDICINE

## 2024-10-30 PROCEDURE — 2580000003 HC RX 258

## 2024-10-30 PROCEDURE — 85025 COMPLETE CBC W/AUTO DIFF WBC: CPT

## 2024-10-30 PROCEDURE — 2700000000 HC OXYGEN THERAPY PER DAY

## 2024-10-30 RX ORDER — SODIUM CHLORIDE 9 MG/ML
20 INJECTION, SOLUTION INTRAMUSCULAR; INTRAVENOUS; SUBCUTANEOUS EVERY 8 HOURS
Status: DISCONTINUED | OUTPATIENT
Start: 2024-10-30 | End: 2024-11-04 | Stop reason: HOSPADM

## 2024-10-30 RX ORDER — PIPERACILLIN SODIUM, TAZOBACTAM SODIUM 4; .5 G/20ML; G/20ML
4500 INJECTION, POWDER, LYOPHILIZED, FOR SOLUTION INTRAVENOUS EVERY 8 HOURS
Status: DISPENSED | OUTPATIENT
Start: 2024-10-30 | End: 2024-11-03

## 2024-10-30 RX ORDER — ENOXAPARIN SODIUM 100 MG/ML
30 INJECTION SUBCUTANEOUS DAILY
Status: DISCONTINUED | OUTPATIENT
Start: 2024-10-30 | End: 2024-11-04 | Stop reason: DRUGHIGH

## 2024-10-30 RX ADMIN — PIPERACILLIN AND TAZOBACTAM 4500 MG: 4; .5 INJECTION, POWDER, LYOPHILIZED, FOR SOLUTION INTRAVENOUS at 11:43

## 2024-10-30 RX ADMIN — MULTIVITAMIN TABLET 1 TABLET: TABLET at 08:30

## 2024-10-30 RX ADMIN — FUROSEMIDE 20 MG: 10 INJECTION, SOLUTION INTRAMUSCULAR; INTRAVENOUS at 08:30

## 2024-10-30 RX ADMIN — CALCIUM 500 MG: 500 TABLET ORAL at 08:30

## 2024-10-30 RX ADMIN — GUAIFENESIN 400 MG: 400 TABLET ORAL at 14:25

## 2024-10-30 RX ADMIN — SODIUM CHLORIDE 20 ML: 9 INJECTION INTRAMUSCULAR; INTRAVENOUS; SUBCUTANEOUS at 11:43

## 2024-10-30 RX ADMIN — CARVEDILOL 12.5 MG: 6.25 TABLET, FILM COATED ORAL at 17:30

## 2024-10-30 RX ADMIN — PIPERACILLIN SODIUM AND TAZOBACTAM SODIUM 4500 MG: 4; .5 INJECTION, POWDER, LYOPHILIZED, FOR SOLUTION INTRAVENOUS at 03:40

## 2024-10-30 RX ADMIN — AMLODIPINE BESYLATE 2.5 MG: 2.5 TABLET ORAL at 08:30

## 2024-10-30 RX ADMIN — SODIUM CHLORIDE 20 ML: 9 INJECTION INTRAMUSCULAR; INTRAVENOUS; SUBCUTANEOUS at 20:43

## 2024-10-30 RX ADMIN — ENOXAPARIN SODIUM 30 MG: 100 INJECTION SUBCUTANEOUS at 08:30

## 2024-10-30 RX ADMIN — IPRATROPIUM BROMIDE AND ALBUTEROL SULFATE 1 DOSE: 2.5; .5 SOLUTION RESPIRATORY (INHALATION) at 07:36

## 2024-10-30 RX ADMIN — SODIUM CHLORIDE, PRESERVATIVE FREE 10 ML: 5 INJECTION INTRAVENOUS at 08:31

## 2024-10-30 RX ADMIN — CARVEDILOL 12.5 MG: 6.25 TABLET, FILM COATED ORAL at 08:30

## 2024-10-30 RX ADMIN — SODIUM CHLORIDE 20 ML: 9 INJECTION INTRAMUSCULAR; INTRAVENOUS; SUBCUTANEOUS at 03:41

## 2024-10-30 RX ADMIN — IPRATROPIUM BROMIDE AND ALBUTEROL SULFATE 1 DOSE: 2.5; .5 SOLUTION RESPIRATORY (INHALATION) at 12:18

## 2024-10-30 RX ADMIN — PIPERACILLIN AND TAZOBACTAM 4500 MG: 4; .5 INJECTION, POWDER, LYOPHILIZED, FOR SOLUTION INTRAVENOUS at 20:43

## 2024-10-30 RX ADMIN — IPRATROPIUM BROMIDE AND ALBUTEROL SULFATE 1 DOSE: 2.5; .5 SOLUTION RESPIRATORY (INHALATION) at 17:02

## 2024-10-30 RX ADMIN — GUAIFENESIN 400 MG: 400 TABLET ORAL at 08:30

## 2024-10-30 RX ADMIN — ASPIRIN 81 MG: 81 TABLET, COATED ORAL at 08:30

## 2024-10-30 RX ADMIN — GUAIFENESIN 400 MG: 400 TABLET ORAL at 20:43

## 2024-10-30 NOTE — DISCHARGE INSTRUCTIONS
***HEART FAILURE - CONGESTIVE HEART FAILURE***  DISCHARGE INSTRUCTIONS:  GUIDELINES TO FOLLOW AT [facility]/SNF/ Assisted Living    Future Appointments   Date Time Provider Department Center   11/1/2024 10:30 AM Romaine Arnold MD TRAIL [facility]   12/10/2024 10:20 AM Gabe Carter, DO [facility]          MEDICATIONS:  Please notify the doctor if patient is not able to take their medications or if medications are being held for any reasons (such as low blood pressure ect.)  Do not give the patient ibuprofen (Advil or Motrin), naproxen (Aleve) without talking to the doctor first. This could make their heart failure worse.         WEIGHT MONITORING:   Weigh patient every day in the morning after they void (If patient is able to stand, please get a standing weight.)   Notify the doctor of a weight gain of 3 pounds or more in 1 day   OR  a total of 5 pounds or more in 1 week             DIET   Cardiac heart healthy diet:  Low sodium diet: no  more than 2,000mg (2 grams) of salt / sodium per day (which equals to a little less than  a teaspoon of salt)/ Cardiac Diet: Low saturated / low trans fat, no added salt, caffeine restricted    If patient is there for rehab and will be returning home in the near future; reinforce with the patient and the family to follow a low sodium diet (2,000 mg)- avoid using salt at the table, avoid / limit use of canned soups, processed / packaged foods, salted snacks, olives and pickles.  Do not use a salt substitute without checking with the doctor. (Mrs. Schroeder is safe to use).       NOTIFY THE DOCTOR THE FIRST DAY OF ONSET OF ANY OF THESE   SYMPTOMS:   Weight gain of 3 pounds or more in 1 day         OR 5 pounds or more in one week  More shortness of breath  More swelling in stomach, legs, ankles or feet  Feeling more tired, No energy  Dry hacky cough  Dizziness  More chest pain / discomfort  Hard time breathing laying down

## 2024-10-30 NOTE — PLAN OF CARE
Patient's chart updated to reflect:      .    - HF care plan, HF education points and HF discharge instructions.  -Orders: 2 gram sodium diet, daily weights, I/O.  -PCP and cardiology follow up appointments to be scheduled within 7 days of hospital discharge.  -CHF education session will be provided to the patient prior to hospital discharge.    Karen Beach RN   Heart Failure Navigator

## 2024-10-30 NOTE — PLAN OF CARE
Problem: ABCDS Injury Assessment  Goal: Absence of physical injury  Outcome: Progressing     Problem: Discharge Planning  Goal: Discharge to home or other facility with appropriate resources  Outcome: Progressing  Flowsheets (Taken 10/29/2024 2058 by Joseph Goyal RN)  Discharge to home or other facility with appropriate resources: Identify barriers to discharge with patient and caregiver     Problem: Safety - Adult  Goal: Free from fall injury  Outcome: Progressing

## 2024-10-31 ENCOUNTER — APPOINTMENT (OUTPATIENT)
Dept: ULTRASOUND IMAGING | Age: 89
DRG: 196 | End: 2024-10-31
Payer: MEDICARE

## 2024-10-31 ENCOUNTER — APPOINTMENT (OUTPATIENT)
Dept: GENERAL RADIOLOGY | Age: 89
DRG: 196 | End: 2024-10-31
Payer: MEDICARE

## 2024-10-31 ENCOUNTER — APPOINTMENT (OUTPATIENT)
Age: 89
DRG: 196 | End: 2024-10-31
Payer: MEDICARE

## 2024-10-31 PROBLEM — I27.20 PULMONARY HTN (HCC): Status: ACTIVE | Noted: 2024-10-31

## 2024-10-31 PROBLEM — I50.21 ACUTE HFREF (HEART FAILURE WITH REDUCED EJECTION FRACTION) (HCC): Status: ACTIVE | Noted: 2024-10-31

## 2024-10-31 PROBLEM — I34.0 NONRHEUMATIC MITRAL VALVE REGURGITATION: Status: ACTIVE | Noted: 2024-10-31

## 2024-10-31 LAB
ANION GAP SERPL CALCULATED.3IONS-SCNC: 10 MMOL/L (ref 7–16)
BASOPHILS # BLD: 0.06 K/UL (ref 0–0.2)
BASOPHILS NFR BLD: 1 % (ref 0–2)
BILIRUB UR QL STRIP: NEGATIVE
BNP SERPL-MCNC: 3941 PG/ML (ref 0–450)
BUN SERPL-MCNC: 34 MG/DL (ref 6–23)
CALCIUM SERPL-MCNC: 8.2 MG/DL (ref 8.6–10.2)
CHLORIDE SERPL-SCNC: 103 MMOL/L (ref 98–107)
CLARITY UR: CLEAR
CO2 SERPL-SCNC: 26 MMOL/L (ref 22–29)
COLOR UR: YELLOW
CREAT SERPL-MCNC: 1.7 MG/DL (ref 0.7–1.2)
CREAT UR-MCNC: 85 MG/DL (ref 40–278)
CREAT UR-MCNC: 86.8 MG/DL (ref 40–278)
ECHO AO ASC DIAM: 3.7 CM
ECHO AO ASCENDING AORTA INDEX: 2.13 CM/M2
ECHO AO ROOT DIAM: 3.9 CM
ECHO AO ROOT INDEX: 2.24 CM/M2
ECHO AO SINUS VALSALVA DIAM: 3.6 CM
ECHO AO SINUS VALSALVA INDEX: 2.07 CM/M2
ECHO AR MAX VEL PISA: 3.9 M/S
ECHO AV AREA PEAK VELOCITY: 2.4 CM2
ECHO AV AREA VTI: 2.2 CM2
ECHO AV AREA/BSA PEAK VELOCITY: 1.4 CM2/M2
ECHO AV AREA/BSA VTI: 1.3 CM2/M2
ECHO AV CUSP MM: 1.4 CM
ECHO AV MEAN GRADIENT: 4 MMHG
ECHO AV MEAN VELOCITY: 0.9 M/S
ECHO AV PEAK GRADIENT: 8 MMHG
ECHO AV PEAK VELOCITY: 1.4 M/S
ECHO AV REGURGITANT PHT: 581.4 MILLISECOND
ECHO AV VELOCITY RATIO: 0.79
ECHO AV VTI: 30.3 CM
ECHO BSA: 1.87 M2
ECHO BSA: 1.87 M2
ECHO EST RA PRESSURE: 8 MMHG
ECHO LA DIAMETER INDEX: 2.53 CM/M2
ECHO LA DIAMETER: 4.4 CM
ECHO LA TO AORTIC ROOT RATIO: 1.13
ECHO LA VOL A-L A2C: 140 ML (ref 18–58)
ECHO LA VOL A-L A4C: 92 ML (ref 18–58)
ECHO LA VOL MOD A2C: 128 ML (ref 18–58)
ECHO LA VOL MOD A4C: 89 ML (ref 18–58)
ECHO LA VOLUME AREA LENGTH: 118 ML
ECHO LA VOLUME INDEX A-L A2C: 80 ML/M2 (ref 16–34)
ECHO LA VOLUME INDEX A-L A4C: 53 ML/M2 (ref 16–34)
ECHO LA VOLUME INDEX AREA LENGTH: 68 ML/M2 (ref 16–34)
ECHO LA VOLUME INDEX MOD A2C: 74 ML/M2 (ref 16–34)
ECHO LA VOLUME INDEX MOD A4C: 51 ML/M2 (ref 16–34)
ECHO LV E' LATERAL VELOCITY: 4 CM/S
ECHO LV E' SEPTAL VELOCITY: 4 CM/S
ECHO LV EDV A2C: 88 ML
ECHO LV EDV A4C: 102 ML
ECHO LV EDV BP: 98 ML (ref 67–155)
ECHO LV EDV INDEX A4C: 59 ML/M2
ECHO LV EDV INDEX BP: 56 ML/M2
ECHO LV EDV NDEX A2C: 51 ML/M2
ECHO LV EF PHYSICIAN: 45 %
ECHO LV EJECTION FRACTION A2C: 45 %
ECHO LV EJECTION FRACTION A4C: 45 %
ECHO LV EJECTION FRACTION BIPLANE: 46 % (ref 55–100)
ECHO LV ESV A2C: 49 ML
ECHO LV ESV A4C: 57 ML
ECHO LV ESV BP: 53 ML (ref 22–58)
ECHO LV ESV INDEX A2C: 28 ML/M2
ECHO LV ESV INDEX A4C: 33 ML/M2
ECHO LV ESV INDEX BP: 30 ML/M2
ECHO LV FRACTIONAL SHORTENING: 22 % (ref 28–44)
ECHO LV INTERNAL DIMENSION DIASTOLE INDEX: 2.93 CM/M2
ECHO LV INTERNAL DIMENSION DIASTOLIC: 5.1 CM (ref 4.2–5.9)
ECHO LV INTERNAL DIMENSION SYSTOLIC INDEX: 2.3 CM/M2
ECHO LV INTERNAL DIMENSION SYSTOLIC: 4 CM
ECHO LV ISOVOLUMETRIC RELAXATION TIME (IVRT): 92.3 MS
ECHO LV IVSD: 1.2 CM (ref 0.6–1)
ECHO LV IVSS: 1.4 CM
ECHO LV MASS 2D: 241.2 G (ref 88–224)
ECHO LV MASS INDEX 2D: 138.6 G/M2 (ref 49–115)
ECHO LV POSTERIOR WALL DIASTOLIC: 1.2 CM (ref 0.6–1)
ECHO LV POSTERIOR WALL SYSTOLIC: 1.4 CM
ECHO LV RELATIVE WALL THICKNESS RATIO: 0.47
ECHO LVOT AREA: 3.1 CM2
ECHO LVOT AV VTI INDEX: 0.71
ECHO LVOT DIAM: 2 CM
ECHO LVOT MEAN GRADIENT: 3 MMHG
ECHO LVOT PEAK GRADIENT: 5 MMHG
ECHO LVOT PEAK VELOCITY: 1.1 M/S
ECHO LVOT STROKE VOLUME INDEX: 39 ML/M2
ECHO LVOT SV: 67.8 ML
ECHO LVOT VTI: 21.6 CM
ECHO MV "A" WAVE DURATION: 83 MSEC
ECHO MV A VELOCITY: 0.99 M/S
ECHO MV AREA PHT: 2.1 CM2
ECHO MV AREA VTI: 1.6 CM2
ECHO MV E DECELERATION TIME (DT): 256.2 MS
ECHO MV E VELOCITY: 1.23 M/S
ECHO MV E/A RATIO: 1.24
ECHO MV E/E' LATERAL: 30.75
ECHO MV E/E' RATIO (AVERAGED): 30.75
ECHO MV E/E' SEPTAL: 30.75
ECHO MV EROA PISA: 0.1 CM2
ECHO MV LVOT VTI INDEX: 1.92
ECHO MV MAX VELOCITY: 1.4 M/S
ECHO MV MEAN GRADIENT: 3 MMHG
ECHO MV MEAN VELOCITY: 0.8 M/S
ECHO MV PEAK GRADIENT: 8 MMHG
ECHO MV PRESSURE HALF TIME (PHT): 103.3 MS
ECHO MV REGURGITANT ALIASING (NYQUIST) VELOCITY: 34 CM/S
ECHO MV REGURGITANT RADIUS PISA: 0.56 CM
ECHO MV REGURGITANT VELOCITY PISA: 6.4 M/S
ECHO MV REGURGITANT VOLUME PISA: 23.59 ML
ECHO MV REGURGITANT VTIA: 225.5 CM
ECHO MV VTI: 41.5 CM
ECHO PULMONARY ARTERY END DIASTOLIC PRESSURE: 5 MMHG
ECHO PV MAX VELOCITY: 0.8 M/S
ECHO PV MEAN GRADIENT: 1 MMHG
ECHO PV MEAN VELOCITY: 0.6 M/S
ECHO PV PEAK GRADIENT: 2 MMHG
ECHO PV REGURGITANT MAX VELOCITY: 1.2 M/S
ECHO PV VTI: 16.4 CM
ECHO PVEIN A DURATION: 87.7 MS
ECHO PVEIN A VELOCITY: 0.1 M/S
ECHO PVEIN PEAK D VELOCITY: 0.4 M/S
ECHO PVEIN PEAK S VELOCITY: 0.5 M/S
ECHO PVEIN S/D RATIO: 1.3
ECHO RIGHT VENTRICULAR SYSTOLIC PRESSURE (RVSP): 62 MMHG
ECHO RV INTERNAL DIMENSION: 4.4 CM
ECHO RV TAPSE: 1.4 CM (ref 1.7–?)
ECHO TV REGURGITANT MAX VELOCITY: 3.69 M/S
ECHO TV REGURGITANT PEAK GRADIENT: 54 MMHG
EOSINOPHIL # BLD: 0.25 K/UL (ref 0.05–0.5)
EOSINOPHILS RELATIVE PERCENT: 3 % (ref 0–6)
ERYTHROCYTE [DISTWIDTH] IN BLOOD BY AUTOMATED COUNT: 15.9 % (ref 11.5–15)
GFR, ESTIMATED: 36 ML/MIN/1.73M2
GLUCOSE SERPL-MCNC: 108 MG/DL (ref 74–99)
GLUCOSE UR STRIP-MCNC: NEGATIVE MG/DL
HCT VFR BLD AUTO: 28.4 % (ref 37–54)
HGB BLD-MCNC: 8.9 G/DL (ref 12.5–16.5)
HGB UR QL STRIP.AUTO: NEGATIVE
IMM GRANULOCYTES # BLD AUTO: 0.04 K/UL (ref 0–0.58)
IMM GRANULOCYTES NFR BLD: 0 % (ref 0–5)
KETONES UR STRIP-MCNC: NEGATIVE MG/DL
LEFT VENTRICULAR EJECTION FRACTION HIGH VALUE: 50 %
LEFT VENTRICULAR EJECTION FRACTION MODE: NORMAL
LEUKOCYTE ESTERASE UR QL STRIP: ABNORMAL
LV EF: 45 %
LYMPHOCYTES NFR BLD: 1.75 K/UL (ref 1.5–4)
LYMPHOCYTES RELATIVE PERCENT: 18 % (ref 20–42)
MAGNESIUM SERPL-MCNC: 1.9 MG/DL (ref 1.6–2.6)
MCH RBC QN AUTO: 28 PG (ref 26–35)
MCHC RBC AUTO-ENTMCNC: 31.3 G/DL (ref 32–34.5)
MCV RBC AUTO: 89.3 FL (ref 80–99.9)
MICROALBUMIN UR-MCNC: 30 MG/L (ref 0–19)
MICROALBUMIN/CREAT UR-RTO: 36 MCG/MG CREAT (ref 0–30)
MONOCYTES NFR BLD: 1.13 K/UL (ref 0.1–0.95)
MONOCYTES NFR BLD: 12 % (ref 2–12)
NEUTROPHILS NFR BLD: 66 % (ref 43–80)
NEUTS SEG NFR BLD: 6.36 K/UL (ref 1.8–7.3)
NITRITE UR QL STRIP: NEGATIVE
PH UR STRIP: 6 [PH] (ref 5–9)
PLATELET # BLD AUTO: 289 K/UL (ref 130–450)
PMV BLD AUTO: 8.7 FL (ref 7–12)
POTASSIUM SERPL-SCNC: 3.5 MMOL/L (ref 3.5–5)
PROT UR STRIP-MCNC: NEGATIVE MG/DL
RBC # BLD AUTO: 3.18 M/UL (ref 3.8–5.8)
RBC #/AREA URNS HPF: ABNORMAL /HPF
SODIUM SERPL-SCNC: 139 MMOL/L (ref 132–146)
SODIUM UR-SCNC: 82 MMOL/L
SP GR UR STRIP: 1.01 (ref 1–1.03)
TOTAL PROTEIN, URINE: 16 MG/DL (ref 0–12)
URINE TOTAL PROTEIN CREATININE RATIO: 0.19 (ref 0–0.2)
UROBILINOGEN UR STRIP-ACNC: 0.2 EU/DL (ref 0–1)
UUN UR-MCNC: 462 MG/DL (ref 800–1666)
WBC #/AREA URNS HPF: ABNORMAL /HPF
WBC OTHER # BLD: 9.6 K/UL (ref 4.5–11.5)

## 2024-10-31 PROCEDURE — 6370000000 HC RX 637 (ALT 250 FOR IP)

## 2024-10-31 PROCEDURE — 6360000002 HC RX W HCPCS: Performed by: INTERNAL MEDICINE

## 2024-10-31 PROCEDURE — 82570 ASSAY OF URINE CREATININE: CPT

## 2024-10-31 PROCEDURE — 84540 ASSAY OF URINE/UREA-N: CPT

## 2024-10-31 PROCEDURE — 2700000000 HC OXYGEN THERAPY PER DAY

## 2024-10-31 PROCEDURE — 83735 ASSAY OF MAGNESIUM: CPT

## 2024-10-31 PROCEDURE — 2580000003 HC RX 258: Performed by: NURSE PRACTITIONER

## 2024-10-31 PROCEDURE — 99223 1ST HOSP IP/OBS HIGH 75: CPT | Performed by: INTERNAL MEDICINE

## 2024-10-31 PROCEDURE — 80048 BASIC METABOLIC PNL TOTAL CA: CPT

## 2024-10-31 PROCEDURE — 6360000002 HC RX W HCPCS: Performed by: NURSE PRACTITIONER

## 2024-10-31 PROCEDURE — 81001 URINALYSIS AUTO W/SCOPE: CPT

## 2024-10-31 PROCEDURE — 6360000002 HC RX W HCPCS

## 2024-10-31 PROCEDURE — 2060000000 HC ICU INTERMEDIATE R&B

## 2024-10-31 PROCEDURE — APPSS60 APP SPLIT SHARED TIME 46-60 MINUTES

## 2024-10-31 PROCEDURE — 84156 ASSAY OF PROTEIN URINE: CPT

## 2024-10-31 PROCEDURE — 94640 AIRWAY INHALATION TREATMENT: CPT

## 2024-10-31 PROCEDURE — 76770 US EXAM ABDO BACK WALL COMP: CPT

## 2024-10-31 PROCEDURE — 82043 UR ALBUMIN QUANTITATIVE: CPT

## 2024-10-31 PROCEDURE — 85025 COMPLETE CBC W/AUTO DIFF WBC: CPT

## 2024-10-31 PROCEDURE — 84300 ASSAY OF URINE SODIUM: CPT

## 2024-10-31 PROCEDURE — 71045 X-RAY EXAM CHEST 1 VIEW: CPT

## 2024-10-31 PROCEDURE — 94762 N-INVAS EAR/PLS OXIMTRY CONT: CPT

## 2024-10-31 PROCEDURE — 2580000003 HC RX 258

## 2024-10-31 PROCEDURE — 83880 ASSAY OF NATRIURETIC PEPTIDE: CPT

## 2024-10-31 PROCEDURE — 93306 TTE W/DOPPLER COMPLETE: CPT | Performed by: INTERNAL MEDICINE

## 2024-10-31 PROCEDURE — 6360000004 HC RX CONTRAST MEDICATION: Performed by: NURSE PRACTITIONER

## 2024-10-31 PROCEDURE — C8929 TTE W OR WO FOL WCON,DOPPLER: HCPCS

## 2024-10-31 RX ORDER — HYDRALAZINE HYDROCHLORIDE 10 MG/1
10 TABLET, FILM COATED ORAL EVERY 8 HOURS SCHEDULED
Status: DISCONTINUED | OUTPATIENT
Start: 2024-10-31 | End: 2024-11-04 | Stop reason: HOSPADM

## 2024-10-31 RX ORDER — ISOSORBIDE DINITRATE 10 MG/1
5 TABLET ORAL 3 TIMES DAILY
Status: DISCONTINUED | OUTPATIENT
Start: 2024-10-31 | End: 2024-11-04 | Stop reason: HOSPADM

## 2024-10-31 RX ADMIN — SODIUM CHLORIDE 20 ML: 9 INJECTION INTRAMUSCULAR; INTRAVENOUS; SUBCUTANEOUS at 04:07

## 2024-10-31 RX ADMIN — HYDRALAZINE HYDROCHLORIDE 10 MG: 10 TABLET ORAL at 15:23

## 2024-10-31 RX ADMIN — GUAIFENESIN 400 MG: 400 TABLET ORAL at 13:56

## 2024-10-31 RX ADMIN — ASPIRIN 81 MG: 81 TABLET, COATED ORAL at 08:37

## 2024-10-31 RX ADMIN — CARVEDILOL 12.5 MG: 6.25 TABLET, FILM COATED ORAL at 16:21

## 2024-10-31 RX ADMIN — CARVEDILOL 12.5 MG: 6.25 TABLET, FILM COATED ORAL at 08:37

## 2024-10-31 RX ADMIN — CALCIUM 500 MG: 500 TABLET ORAL at 08:38

## 2024-10-31 RX ADMIN — PIPERACILLIN AND TAZOBACTAM 4500 MG: 4; .5 INJECTION, POWDER, LYOPHILIZED, FOR SOLUTION INTRAVENOUS at 11:36

## 2024-10-31 RX ADMIN — SODIUM CHLORIDE, PRESERVATIVE FREE 10 ML: 5 INJECTION INTRAVENOUS at 20:59

## 2024-10-31 RX ADMIN — AMLODIPINE BESYLATE 2.5 MG: 2.5 TABLET ORAL at 08:37

## 2024-10-31 RX ADMIN — PIPERACILLIN AND TAZOBACTAM 4500 MG: 4; .5 INJECTION, POWDER, LYOPHILIZED, FOR SOLUTION INTRAVENOUS at 21:03

## 2024-10-31 RX ADMIN — FUROSEMIDE 20 MG: 10 INJECTION, SOLUTION INTRAMUSCULAR; INTRAVENOUS at 08:38

## 2024-10-31 RX ADMIN — PERFLUTREN 1.5 ML: 6.52 INJECTION, SUSPENSION INTRAVENOUS at 08:03

## 2024-10-31 RX ADMIN — SODIUM CHLORIDE, PRESERVATIVE FREE 10 ML: 5 INJECTION INTRAVENOUS at 08:38

## 2024-10-31 RX ADMIN — IPRATROPIUM BROMIDE AND ALBUTEROL SULFATE 1 DOSE: 2.5; .5 SOLUTION RESPIRATORY (INHALATION) at 21:06

## 2024-10-31 RX ADMIN — ISOSORBIDE DINITRATE 5 MG: 10 TABLET ORAL at 20:55

## 2024-10-31 RX ADMIN — MULTIVITAMIN TABLET 1 TABLET: TABLET at 08:37

## 2024-10-31 RX ADMIN — HYDRALAZINE HYDROCHLORIDE 10 MG: 10 TABLET ORAL at 20:55

## 2024-10-31 RX ADMIN — GUAIFENESIN 400 MG: 400 TABLET ORAL at 08:37

## 2024-10-31 RX ADMIN — ENOXAPARIN SODIUM 30 MG: 100 INJECTION SUBCUTANEOUS at 08:38

## 2024-10-31 RX ADMIN — ISOSORBIDE DINITRATE 5 MG: 10 TABLET ORAL at 15:23

## 2024-10-31 RX ADMIN — GUAIFENESIN 400 MG: 400 TABLET ORAL at 20:55

## 2024-10-31 RX ADMIN — SODIUM CHLORIDE 20 ML: 9 INJECTION INTRAMUSCULAR; INTRAVENOUS; SUBCUTANEOUS at 21:34

## 2024-10-31 RX ADMIN — IPRATROPIUM BROMIDE AND ALBUTEROL SULFATE 1 DOSE: 2.5; .5 SOLUTION RESPIRATORY (INHALATION) at 15:37

## 2024-10-31 RX ADMIN — SODIUM CHLORIDE 20 ML: 9 INJECTION INTRAMUSCULAR; INTRAVENOUS; SUBCUTANEOUS at 11:36

## 2024-10-31 RX ADMIN — PIPERACILLIN AND TAZOBACTAM 4500 MG: 4; .5 INJECTION, POWDER, LYOPHILIZED, FOR SOLUTION INTRAVENOUS at 04:06

## 2024-10-31 NOTE — CARE COORDINATION
Social work / Discharge planning:         Social work spoke to patient's daughter via phone.   She feels patient would benefit from [facility] rather than directly returning to assisted living.    She is requesting [facility].    Referral made to liaison.    Awaiting response.    Electronically signed by MARTHA Bragg on 10/31/2024 at 1:20 PM

## 2024-10-31 NOTE — CARE COORDINATION
Social work / Discharge planning:          Patient accepted by [facility] and precert is being submitted.    Will need to wait for insurance approval prior to discharge.    [place], [address] and transport form completed.   Electronically signed by MARTHA Bragg on 10/31/2024 at 2:40 PM

## 2024-10-31 NOTE — PLAN OF CARE
Problem: ABCDS Injury Assessment  Goal: Absence of physical injury  10/31/2024 0947 by Dolores Reynoso RN  Outcome: Progressing  10/30/2024 2117 by Johanny Hernandez RN  Outcome: Progressing     Problem: Discharge Planning  Goal: Discharge to home or other facility with appropriate resources  10/31/2024 0947 by Dolores Reynoso RN  Outcome: Progressing  10/30/2024 2117 by Johanny Hernandez RN  Outcome: Progressing     Problem: Safety - Adult  Goal: Free from fall injury  10/31/2024 0947 by Dolores Reynoso RN  Outcome: Progressing  10/30/2024 2117 by Johanny Hernandez RN  Outcome: Progressing     Problem: Chronic Conditions and Co-morbidities  Goal: Patient's chronic conditions and co-morbidity symptoms are monitored and maintained or improved  10/31/2024 0947 by Dolores Reynoso RN  Outcome: Progressing  10/30/2024 2117 by Johanny Hernandez RN  Outcome: Progressing

## 2024-10-31 NOTE — DISCHARGE INSTR - COC
Continuity of Care Form    Patient Name: Tashi Parry   :  1931  MRN:  [id number]    Admit date:  10/28/2024  Discharge date:  ***    Code Status Order: Full Code   Advance Directives:   Advance Care Flowsheet Documentation             Admitting Physician:  Yamileth Patel MD  PCP: Romaine Arnold MD    Discharging Nurse: ***  Discharging Hospital Unit/Room#: 0623/0623-A  Discharging Unit Phone Number: ***    Emergency Contact:   Extended Emergency Contact Information  Primary Emergency Contact: Mary Kate Leahy  Address: [address] [place] [address]  Home Phone: [phone]  Mobile Phone: [phone]  Relation: Child  Preferred language: English   needed? No  Secondary Emergency Contact: José Dubon  Mobile Phone: [phone]  Relation: Son-in-Law    Past Surgical History:  Past Surgical History:   Procedure Laterality Date    CARDIAC SURGERY      6 vessel in     DENTAL SURGERY      root canal    DIAGNOSTIC CARDIAC CATH LAB PROCEDURE  00    LUNG REMOVAL, PARTIAL Left     march    OTHER SURGICAL HISTORY  2011    Injection right hip  ADRIANNE Doan MD    PROSTATE SURGERY Bilateral 2019    THORACOSCOPY Left 2019    BRONCHOSCOPY LEFT THORACOSCOPY ROBOTIC VIDEO ASSISTED , WEDGE RESECTION, POSS. LEFT LOWER LOBECTOMY performed by Ortiz Pedroza MD at [facility] OR    TOTAL HIP ARTHROPLASTY Right 2011    Right BRIONNA  ADRIANNE Doan MD    TOTAL HIP ARTHROPLASTY Left 2009    Left BRIONNA  ADRIANNE Doan MD    TURP      TURP N/A 2019    CYSTOSCOPY, RETROGRADE PYELOGRAM,  URERTHRAL DILITATION, TRANSURETHRAL RESECTION PROSTATE performed by Carter Hale MD at [facility] OR       Immunization History:   Immunization History   Administered Date(s) Administered    COVID-19, PFIZER GRAY top, DO NOT Dilute, (age 12 y+), IM, 30 mcg/0.3 mL 2022    COVID-19, PFIZER PURPLE top, DILUTE for use, (age 12 y+), 30mcg/0.3mL 2021, 2021, 2021    Hep A,

## 2024-11-01 LAB
25(OH)D3 SERPL-MCNC: 47 NG/ML (ref 30–100)
ALBUMIN SERPL-MCNC: 3.1 G/DL (ref 3.5–5.2)
ANION GAP SERPL CALCULATED.3IONS-SCNC: 9 MMOL/L (ref 7–16)
BASOPHILS # BLD: 0.1 K/UL (ref 0–0.2)
BASOPHILS NFR BLD: 1 % (ref 0–2)
BUN SERPL-MCNC: 29 MG/DL (ref 6–23)
CA-I BLD-SCNC: 1.16 MMOL/L (ref 1.15–1.33)
CALCIUM SERPL-MCNC: 8.1 MG/DL (ref 8.6–10.2)
CHLORIDE SERPL-SCNC: 101 MMOL/L (ref 98–107)
CO2 SERPL-SCNC: 26 MMOL/L (ref 22–29)
CREAT SERPL-MCNC: 1.5 MG/DL (ref 0.7–1.2)
EOSINOPHIL # BLD: 0.46 K/UL (ref 0.05–0.5)
EOSINOPHILS RELATIVE PERCENT: 5 % (ref 0–6)
ERYTHROCYTE [DISTWIDTH] IN BLOOD BY AUTOMATED COUNT: 15.9 % (ref 11.5–15)
GFR, ESTIMATED: 43 ML/MIN/1.73M2
GLUCOSE SERPL-MCNC: 96 MG/DL (ref 74–99)
HCT VFR BLD AUTO: 31.7 % (ref 37–54)
HGB BLD-MCNC: 10 G/DL (ref 12.5–16.5)
IMM GRANULOCYTES # BLD AUTO: 0.04 K/UL (ref 0–0.58)
IMM GRANULOCYTES NFR BLD: 0 % (ref 0–5)
LYMPHOCYTES NFR BLD: 1.26 K/UL (ref 1.5–4)
LYMPHOCYTES RELATIVE PERCENT: 12 % (ref 20–42)
MAGNESIUM SERPL-MCNC: 1.9 MG/DL (ref 1.6–2.6)
MCH RBC QN AUTO: 27.9 PG (ref 26–35)
MCHC RBC AUTO-ENTMCNC: 31.5 G/DL (ref 32–34.5)
MCV RBC AUTO: 88.3 FL (ref 80–99.9)
MONOCYTES NFR BLD: 1.12 K/UL (ref 0.1–0.95)
MONOCYTES NFR BLD: 11 % (ref 2–12)
NEUTROPHILS NFR BLD: 71 % (ref 43–80)
NEUTS SEG NFR BLD: 7.19 K/UL (ref 1.8–7.3)
PHOSPHATE SERPL-MCNC: 2.5 MG/DL (ref 2.5–4.5)
PLATELET # BLD AUTO: 330 K/UL (ref 130–450)
PMV BLD AUTO: 8.9 FL (ref 7–12)
POTASSIUM SERPL-SCNC: 3.5 MMOL/L (ref 3.5–5)
PTH-INTACT SERPL-MCNC: 32.1 PG/ML (ref 15–65)
RBC # BLD AUTO: 3.59 M/UL (ref 3.8–5.8)
SODIUM SERPL-SCNC: 136 MMOL/L (ref 132–146)
WBC OTHER # BLD: 10.2 K/UL (ref 4.5–11.5)

## 2024-11-01 PROCEDURE — 82040 ASSAY OF SERUM ALBUMIN: CPT

## 2024-11-01 PROCEDURE — 6370000000 HC RX 637 (ALT 250 FOR IP)

## 2024-11-01 PROCEDURE — 2580000003 HC RX 258

## 2024-11-01 PROCEDURE — 80048 BASIC METABOLIC PNL TOTAL CA: CPT

## 2024-11-01 PROCEDURE — 82306 VITAMIN D 25 HYDROXY: CPT

## 2024-11-01 PROCEDURE — 6360000002 HC RX W HCPCS

## 2024-11-01 PROCEDURE — 83970 ASSAY OF PARATHORMONE: CPT

## 2024-11-01 PROCEDURE — 82330 ASSAY OF CALCIUM: CPT

## 2024-11-01 PROCEDURE — 6360000002 HC RX W HCPCS: Performed by: INTERNAL MEDICINE

## 2024-11-01 PROCEDURE — 2580000003 HC RX 258: Performed by: NURSE PRACTITIONER

## 2024-11-01 PROCEDURE — 6360000002 HC RX W HCPCS: Performed by: NURSE PRACTITIONER

## 2024-11-01 PROCEDURE — 85025 COMPLETE CBC W/AUTO DIFF WBC: CPT

## 2024-11-01 PROCEDURE — 84100 ASSAY OF PHOSPHORUS: CPT

## 2024-11-01 PROCEDURE — 2700000000 HC OXYGEN THERAPY PER DAY

## 2024-11-01 PROCEDURE — 94640 AIRWAY INHALATION TREATMENT: CPT

## 2024-11-01 PROCEDURE — 2060000000 HC ICU INTERMEDIATE R&B

## 2024-11-01 PROCEDURE — 83735 ASSAY OF MAGNESIUM: CPT

## 2024-11-01 PROCEDURE — 99233 SBSQ HOSP IP/OBS HIGH 50: CPT | Performed by: INTERNAL MEDICINE

## 2024-11-01 RX ADMIN — GUAIFENESIN 400 MG: 400 TABLET ORAL at 20:07

## 2024-11-01 RX ADMIN — SODIUM CHLORIDE 20 ML: 9 INJECTION INTRAMUSCULAR; INTRAVENOUS; SUBCUTANEOUS at 20:07

## 2024-11-01 RX ADMIN — ENOXAPARIN SODIUM 30 MG: 100 INJECTION SUBCUTANEOUS at 09:04

## 2024-11-01 RX ADMIN — ASPIRIN 81 MG: 81 TABLET, COATED ORAL at 09:03

## 2024-11-01 RX ADMIN — SODIUM CHLORIDE, PRESERVATIVE FREE 10 ML: 5 INJECTION INTRAVENOUS at 20:08

## 2024-11-01 RX ADMIN — IPRATROPIUM BROMIDE AND ALBUTEROL SULFATE 1 DOSE: 2.5; .5 SOLUTION RESPIRATORY (INHALATION) at 12:33

## 2024-11-01 RX ADMIN — GUAIFENESIN 400 MG: 400 TABLET ORAL at 09:04

## 2024-11-01 RX ADMIN — ISOSORBIDE DINITRATE 5 MG: 10 TABLET ORAL at 09:03

## 2024-11-01 RX ADMIN — IPRATROPIUM BROMIDE AND ALBUTEROL SULFATE 1 DOSE: 2.5; .5 SOLUTION RESPIRATORY (INHALATION) at 08:30

## 2024-11-01 RX ADMIN — SODIUM CHLORIDE, PRESERVATIVE FREE 10 ML: 5 INJECTION INTRAVENOUS at 09:04

## 2024-11-01 RX ADMIN — PIPERACILLIN AND TAZOBACTAM 4500 MG: 4; .5 INJECTION, POWDER, LYOPHILIZED, FOR SOLUTION INTRAVENOUS at 20:07

## 2024-11-01 RX ADMIN — ISOSORBIDE DINITRATE 5 MG: 10 TABLET ORAL at 14:47

## 2024-11-01 RX ADMIN — CARVEDILOL 12.5 MG: 6.25 TABLET, FILM COATED ORAL at 17:53

## 2024-11-01 RX ADMIN — IPRATROPIUM BROMIDE AND ALBUTEROL SULFATE 1 DOSE: 2.5; .5 SOLUTION RESPIRATORY (INHALATION) at 20:27

## 2024-11-01 RX ADMIN — FUROSEMIDE 20 MG: 10 INJECTION, SOLUTION INTRAMUSCULAR; INTRAVENOUS at 09:04

## 2024-11-01 RX ADMIN — SODIUM CHLORIDE 20 ML: 9 INJECTION INTRAMUSCULAR; INTRAVENOUS; SUBCUTANEOUS at 12:10

## 2024-11-01 RX ADMIN — MULTIVITAMIN TABLET 1 TABLET: TABLET at 09:04

## 2024-11-01 RX ADMIN — HYDRALAZINE HYDROCHLORIDE 10 MG: 10 TABLET ORAL at 04:48

## 2024-11-01 RX ADMIN — GUAIFENESIN 400 MG: 400 TABLET ORAL at 14:46

## 2024-11-01 RX ADMIN — CALCIUM 500 MG: 500 TABLET ORAL at 09:04

## 2024-11-01 RX ADMIN — SODIUM CHLORIDE 20 ML: 9 INJECTION INTRAMUSCULAR; INTRAVENOUS; SUBCUTANEOUS at 04:47

## 2024-11-01 RX ADMIN — HYDRALAZINE HYDROCHLORIDE 10 MG: 10 TABLET ORAL at 14:47

## 2024-11-01 RX ADMIN — HYDRALAZINE HYDROCHLORIDE 10 MG: 10 TABLET ORAL at 20:07

## 2024-11-01 RX ADMIN — CARVEDILOL 12.5 MG: 6.25 TABLET, FILM COATED ORAL at 09:04

## 2024-11-01 RX ADMIN — PIPERACILLIN AND TAZOBACTAM 4500 MG: 4; .5 INJECTION, POWDER, LYOPHILIZED, FOR SOLUTION INTRAVENOUS at 04:47

## 2024-11-01 RX ADMIN — PIPERACILLIN AND TAZOBACTAM 4500 MG: 4; .5 INJECTION, POWDER, LYOPHILIZED, FOR SOLUTION INTRAVENOUS at 12:10

## 2024-11-01 RX ADMIN — ISOSORBIDE DINITRATE 5 MG: 10 TABLET ORAL at 20:07

## 2024-11-01 RX ADMIN — IPRATROPIUM BROMIDE AND ALBUTEROL SULFATE 1 DOSE: 2.5; .5 SOLUTION RESPIRATORY (INHALATION) at 16:15

## 2024-11-01 NOTE — CONSULTS
Wellington Godfrey M.D.,[facility]  Palmer Ray D.O., MICHAELLE., [facility]  Akhil Mccollum M.D.  Lyssa Ugarte M.D.   Gabe Carter D.O.  Alonso Tillman M.D.       Patient:  Tashi Parry 89 y.o. male MRN: [id number]           PULMONARY CONSULTATION    Reason for Consultation: pneumonia  Referring Physician: Dr Yamileth Patel MD    Communication with the referring physician will be sent via the electronic medical record.    Chief Complaint: shortness of breath     CODE STATUS: FULL     Primary pulmonologist Dr Gabe Carter    SUBJECTIVE:  HPI:  Tashi Parry is a 89 y.o. male who we are asked to evaluate for pneumonia.  He has a past medical history significant for CAD with CABG x 6, herpes zoster, DJD, coronary artery calcification, BPH requiring TURP, hypertension, hyperlipidemia, right and left total hip arthroplasty, lung nodule, prior nicotine dependence 10 pack years quit 1988, COPD, COVID September 2024.    He is a known patient to our practice followed by Dr. Gabe Carter, last seen by NP in office on 10/15/2024.  He is followed for mild stage I COPD, non-small cell lung cancer that required a left lower lobectomy in 2019 by Dr. Ortiz Pedroza.  He recently had COVID-19 infection September 2024 requiring oxygen 2 L nasal cannula and treated with dexamethasone.  He was hospitalized briefly and was weaned off oxygen and sent back to his assisted living facility.  Since his hospital discharge he was having episodes of desaturation at the facility into the 70s requiring up to 5 L nasal cannula to recover.  He was coughing up mucus with blood, continues to take Mucinex since COVID infection to help expectorate secretions.  He was placed on doxycycline for 7 days at last office visit and chest x-ray was ordered which revealed worsening bilateral lung infiltrates.  CT chest was completed on 10/24/2024 as outpatient with evidence of bilateral airspace disease and interstitial opacities, small bilateral pleural effusions. 
[facility]  Nephrology Consult Note  Patient's Name: Tashi Parry  2:13 PM  10/31/2024    PCP: Romaine Arnold MD  Nephrologist: N/A    Reason for Consult:  CORBIN  Requesting Physician: Yamileth Patel MD       Chief Complaint:  weakness, shortness of breath    History Obtained From:  pt, daughter, chart    History of Present Illness:    Tashi Parry is a 89 y.o. male with PMHx coronary artery disease status post CABG x 6 in 2000, hypertension, hyperlipidemia, COPD.  He has history of BPH and urinary retention, had episode of neurogenic bladder and obstructive uropathy requiring intervention by urology in 2019, follows with Dr. Hale previously.  Also has history of non-small cell lung cancer, left upper lobe invasive adenocarcinoma status post robotic VATS and wedge resection with left lower lobe lobectomy in April 2019, he continues to have a right upper lobe spiculated nodule which presently is followed by Dr. Carter.    He also appears to have CKD stage II/borderline 3A disease.  A recent GFR was around 56 mL/min on outpatient labs at the [facility].  Creatinine at baseline recently 0.9 to 1.2 mg/dL.  Though in the past possibly prior to losing some muscle mass that had been 1.1 to 1.4 mg/dL dating back to 2011.      He had a recent bout of COVID-19 pneumonia which required hospital admission from September 22 through September 27, 2024.  Patient was treated with dexamethasone.  He has remained weak and deconditioned.  His Lopressor was changed to Coreg during that admission.  Discharged on that as well as amlodipine for hypertension which was above goal during that hospital stay.  Did not see nephrology during admission.    Patient's daughter is present.  States that he is continue to lose energy over the last month.  Before coming any told her that he had essentially 0 energy.    Patient presented to the hospital on 10/28 after an outpatient CT showed some pleural effusions and bilateral airspace 
notified of inaccurate franz weights or I/O        Discharge Plan:  Above identified barriers reviewed and needs addressed    Patient/family educated on daily monitoring tools for CHF, made aware of signs and symptoms of worsening HF and to notify provider immediately of change in symptoms.     Heart Failure Home Instructions placed in patient's discharge instructions    Per AHA guidelines patient to be closely monitored following discharge with 7 day follow up appointment    Scheduling with the CHF clinic Patient deferring at this time. He will follow up with Dr Lyon and Dr Arnold after discharge.     Future Appointments   Date Time Provider Department Center   11/1/2024 10:30 AM Romaine Arnold MD [facility]   11/13/2024  8:00 AM Shiloh Mg, APRN - CNS [facility] CHF [facility]   12/10/2024 10:20 AM Gabe Carter, DO [facility]       Patient Education:  Self Monitoring/management:  Reviewed the introduction to Heart Failure, the HF zones, signs and symptoms to report on day 1 of onset, medications, medication compliance, the importance of obtaining daily weights, following a low sodium diet, reading food labels for the sodium content, keeping physician appointments, and smoking cessation.  Discussed writing / tracking daily weights on a calendar / log because a 5 pound gain in 1 week can sneak up if you are not tracking it.   Advised patient they can reduce the risk for Heart Failure exacerbations by modifying / controlling the risk factors.  Discussed self-managed care which includes the following: take medications as prescribed, report any intolerable side effects of medications to the medical provider (PCP or cardiologist), do not just stop taking the medication; follow a cardiac heart healthy / low sodium diet; weigh yourself daily, exercise regularly- per doctor recommendation and not to smoke or use an excess amount of alcohol.  Discussed calling the cardiologist / doctor with a weight gain 
respiratory distress, or palpitations. SR on EKG and telemetry.    Review of Systems:   Cardiac: As per HPI  General: No fever, chills  Pulmonary: As per HPI  HEENT: No visual disturbances, difficult swallowing  GI: No nausea, vomiting  : No dysuria, hematuria  Endocrine: No thyroid disease or DM  Musculoskeletal: SWARTZ x 4, no focal motor deficits  Skin: Intact, no rashes  Neuro: No headache, seizures  Psych: Currently with no depression, anxiety    Physical Exam:  BP (!) 122/57   Pulse 59   Temp 97.6 °F (36.4 °C) (Oral)   Resp 20   Ht 1.676 m (5' 6\")   Wt 65.3 kg (143 lb 15.4 oz)   SpO2 96%   BMI 23.24 kg/m²   Wt Readings from Last 3 Encounters:   10/31/24 65.3 kg (143 lb 15.4 oz)   10/15/24 75.3 kg (166 lb)   10/04/24 75.3 kg (166 lb)     Appearance: Awake, alert, no acute respiratory distress  Skin: Intact, no rash  Head: Normocephalic, atraumatic  Eyes: EOMI, no conjunctival erythema  ENMT: No pharyngeal erythema, MMM, no rhinorrhea  Neck: Supple, no carotid bruits  Lungs: Decreased BS B/L, no wheezing  Cardiac: Regular rate and rhythm, +S1S2, no murmurs apparent  Abdomen: Soft, nontender, +bowel sounds  Extremities: Moves all extremities x 4, no lower extremity edema  Neurologic: No focal motor deficits apparent, normal mood and affect    Laboratory Tests:  Recent Labs     10/29/24  0400 10/30/24  0507 10/31/24  0455    138 139   K 4.0 3.5 3.5    102 103   CO2 27 27 26   BUN 21 37* 34*   CREATININE 1.0 1.8* 1.7*   GLUCOSE 147* 124* 108*   CALCIUM 8.7 8.8 8.2*     Lab Results   Component Value Date/Time    MG 1.9 10/31/2024 04:55 AM     Recent Labs     10/28/24  1637   ALKPHOS 101   ALT 12   AST 21   BILITOT 1.3*   BILIDIR 0.3     Recent Labs     10/29/24  0400 10/30/24  0507 10/31/24  0455   WBC 5.2 13.0* 9.6   RBC 3.61* 3.54* 3.18*   HGB 10.0* 9.9* 8.9*   HCT 32.1* 30.9* 28.4*   MCV 88.9 87.3 89.3   MCH 27.7 28.0 28.0   MCHC 31.2* 32.0 31.3*   RDW 15.9* 15.9* 15.9*    342 289   MPV

## 2024-11-01 NOTE — PLAN OF CARE
Problem: ABCDS Injury Assessment  Goal: Absence of physical injury  10/31/2024 2228 by Johanny Hernandez RN  Outcome: Progressing  10/31/2024 0947 by Dolores Reynoso RN  Outcome: Progressing     Problem: Discharge Planning  Goal: Discharge to home or other facility with appropriate resources  10/31/2024 2228 by Johanny Hernandez RN  Outcome: Progressing  10/31/2024 0947 by Dolores Reynoso RN  Outcome: Progressing     Problem: Safety - Adult  Goal: Free from fall injury  10/31/2024 2228 by Johanny Hernandez RN  Outcome: Progressing  10/31/2024 0947 by Dolores Reynoso RN  Outcome: Progressing     Problem: Chronic Conditions and Co-morbidities  Goal: Patient's chronic conditions and co-morbidity symptoms are monitored and maintained or improved  10/31/2024 2228 by Johanny Hernandez RN  Outcome: Progressing  10/31/2024 0947 by Dolores Reynoso RN  Outcome: Progressing

## 2024-11-02 LAB
ALBUMIN SERPL-MCNC: 3 G/DL (ref 3.5–5.2)
ALP SERPL-CCNC: 67 U/L (ref 40–129)
ALT SERPL-CCNC: 11 U/L (ref 0–40)
ANION GAP SERPL CALCULATED.3IONS-SCNC: 10 MMOL/L (ref 7–16)
AST SERPL-CCNC: 18 U/L (ref 0–39)
BILIRUB SERPL-MCNC: 0.8 MG/DL (ref 0–1.2)
BUN SERPL-MCNC: 24 MG/DL (ref 6–23)
CALCIUM SERPL-MCNC: 8.2 MG/DL (ref 8.6–10.2)
CHLORIDE SERPL-SCNC: 101 MMOL/L (ref 98–107)
CO2 SERPL-SCNC: 27 MMOL/L (ref 22–29)
CREAT SERPL-MCNC: 1.3 MG/DL (ref 0.7–1.2)
ERYTHROCYTE [DISTWIDTH] IN BLOOD BY AUTOMATED COUNT: 15.9 % (ref 11.5–15)
FOLATE SERPL-MCNC: 16.3 NG/ML (ref 4.8–24.2)
GFR, ESTIMATED: 52 ML/MIN/1.73M2
GLUCOSE SERPL-MCNC: 109 MG/DL (ref 74–99)
HCT VFR BLD AUTO: 29.4 % (ref 37–54)
HGB BLD-MCNC: 9.3 G/DL (ref 12.5–16.5)
MAGNESIUM SERPL-MCNC: 2 MG/DL (ref 1.6–2.6)
MCH RBC QN AUTO: 27.9 PG (ref 26–35)
MCHC RBC AUTO-ENTMCNC: 31.6 G/DL (ref 32–34.5)
MCV RBC AUTO: 88.3 FL (ref 80–99.9)
MICROORGANISM SPEC CULT: NORMAL
MICROORGANISM SPEC CULT: NORMAL
PHOSPHATE SERPL-MCNC: 2.7 MG/DL (ref 2.5–4.5)
PLATELET # BLD AUTO: 297 K/UL (ref 130–450)
PMV BLD AUTO: 9.1 FL (ref 7–12)
POTASSIUM SERPL-SCNC: 3.7 MMOL/L (ref 3.5–5)
PROT SERPL-MCNC: 5.4 G/DL (ref 6.4–8.3)
RBC # BLD AUTO: 3.33 M/UL (ref 3.8–5.8)
SERVICE CMNT-IMP: NORMAL
SERVICE CMNT-IMP: NORMAL
SODIUM SERPL-SCNC: 138 MMOL/L (ref 132–146)
SPECIMEN DESCRIPTION: NORMAL
SPECIMEN DESCRIPTION: NORMAL
VIT B12 SERPL-MCNC: 527 PG/ML (ref 211–946)
WBC OTHER # BLD: 9.2 K/UL (ref 4.5–11.5)

## 2024-11-02 PROCEDURE — 83540 ASSAY OF IRON: CPT

## 2024-11-02 PROCEDURE — 6360000002 HC RX W HCPCS: Performed by: NURSE PRACTITIONER

## 2024-11-02 PROCEDURE — 83550 IRON BINDING TEST: CPT

## 2024-11-02 PROCEDURE — 2580000003 HC RX 258: Performed by: NURSE PRACTITIONER

## 2024-11-02 PROCEDURE — 94640 AIRWAY INHALATION TREATMENT: CPT

## 2024-11-02 PROCEDURE — 6370000000 HC RX 637 (ALT 250 FOR IP)

## 2024-11-02 PROCEDURE — 83735 ASSAY OF MAGNESIUM: CPT

## 2024-11-02 PROCEDURE — 82728 ASSAY OF FERRITIN: CPT

## 2024-11-02 PROCEDURE — 6360000002 HC RX W HCPCS: Performed by: INTERNAL MEDICINE

## 2024-11-02 PROCEDURE — 82746 ASSAY OF FOLIC ACID SERUM: CPT

## 2024-11-02 PROCEDURE — 36415 COLL VENOUS BLD VENIPUNCTURE: CPT

## 2024-11-02 PROCEDURE — 82607 VITAMIN B-12: CPT

## 2024-11-02 PROCEDURE — 85027 COMPLETE CBC AUTOMATED: CPT

## 2024-11-02 PROCEDURE — 6360000002 HC RX W HCPCS

## 2024-11-02 PROCEDURE — 2580000003 HC RX 258

## 2024-11-02 PROCEDURE — 2060000000 HC ICU INTERMEDIATE R&B

## 2024-11-02 PROCEDURE — 2700000000 HC OXYGEN THERAPY PER DAY

## 2024-11-02 PROCEDURE — 99232 SBSQ HOSP IP/OBS MODERATE 35: CPT | Performed by: INTERNAL MEDICINE

## 2024-11-02 PROCEDURE — 84100 ASSAY OF PHOSPHORUS: CPT

## 2024-11-02 PROCEDURE — 80053 COMPREHEN METABOLIC PANEL: CPT

## 2024-11-02 RX ADMIN — HYDRALAZINE HYDROCHLORIDE 10 MG: 10 TABLET ORAL at 13:19

## 2024-11-02 RX ADMIN — SODIUM CHLORIDE 20 ML: 9 INJECTION INTRAMUSCULAR; INTRAVENOUS; SUBCUTANEOUS at 03:44

## 2024-11-02 RX ADMIN — PIPERACILLIN AND TAZOBACTAM 4500 MG: 4; .5 INJECTION, POWDER, LYOPHILIZED, FOR SOLUTION INTRAVENOUS at 03:44

## 2024-11-02 RX ADMIN — HYDRALAZINE HYDROCHLORIDE 10 MG: 10 TABLET ORAL at 20:00

## 2024-11-02 RX ADMIN — GUAIFENESIN 400 MG: 400 TABLET ORAL at 07:50

## 2024-11-02 RX ADMIN — GUAIFENESIN 400 MG: 400 TABLET ORAL at 19:59

## 2024-11-02 RX ADMIN — SODIUM CHLORIDE 20 ML: 9 INJECTION INTRAMUSCULAR; INTRAVENOUS; SUBCUTANEOUS at 19:58

## 2024-11-02 RX ADMIN — ISOSORBIDE DINITRATE 5 MG: 10 TABLET ORAL at 19:59

## 2024-11-02 RX ADMIN — CARVEDILOL 12.5 MG: 6.25 TABLET, FILM COATED ORAL at 16:10

## 2024-11-02 RX ADMIN — ISOSORBIDE DINITRATE 5 MG: 10 TABLET ORAL at 13:19

## 2024-11-02 RX ADMIN — IPRATROPIUM BROMIDE AND ALBUTEROL SULFATE 1 DOSE: 2.5; .5 SOLUTION RESPIRATORY (INHALATION) at 20:12

## 2024-11-02 RX ADMIN — PIPERACILLIN AND TAZOBACTAM 4500 MG: 4; .5 INJECTION, POWDER, LYOPHILIZED, FOR SOLUTION INTRAVENOUS at 11:14

## 2024-11-02 RX ADMIN — CALCIUM 500 MG: 500 TABLET ORAL at 07:51

## 2024-11-02 RX ADMIN — CARVEDILOL 12.5 MG: 6.25 TABLET, FILM COATED ORAL at 07:50

## 2024-11-02 RX ADMIN — IPRATROPIUM BROMIDE AND ALBUTEROL SULFATE 1 DOSE: 2.5; .5 SOLUTION RESPIRATORY (INHALATION) at 08:04

## 2024-11-02 RX ADMIN — SODIUM CHLORIDE, PRESERVATIVE FREE 10 ML: 5 INJECTION INTRAVENOUS at 20:00

## 2024-11-02 RX ADMIN — ENOXAPARIN SODIUM 30 MG: 100 INJECTION SUBCUTANEOUS at 07:51

## 2024-11-02 RX ADMIN — IPRATROPIUM BROMIDE AND ALBUTEROL SULFATE 1 DOSE: 2.5; .5 SOLUTION RESPIRATORY (INHALATION) at 12:15

## 2024-11-02 RX ADMIN — SODIUM CHLORIDE 20 ML: 9 INJECTION INTRAMUSCULAR; INTRAVENOUS; SUBCUTANEOUS at 11:14

## 2024-11-02 RX ADMIN — IPRATROPIUM BROMIDE AND ALBUTEROL SULFATE 1 DOSE: 2.5; .5 SOLUTION RESPIRATORY (INHALATION) at 16:07

## 2024-11-02 RX ADMIN — ASPIRIN 81 MG: 81 TABLET, COATED ORAL at 07:50

## 2024-11-02 RX ADMIN — MULTIVITAMIN TABLET 1 TABLET: TABLET at 07:51

## 2024-11-02 RX ADMIN — HYDRALAZINE HYDROCHLORIDE 10 MG: 10 TABLET ORAL at 06:10

## 2024-11-02 RX ADMIN — FUROSEMIDE 20 MG: 10 INJECTION, SOLUTION INTRAMUSCULAR; INTRAVENOUS at 07:50

## 2024-11-02 RX ADMIN — PIPERACILLIN AND TAZOBACTAM 4500 MG: 4; .5 INJECTION, POWDER, LYOPHILIZED, FOR SOLUTION INTRAVENOUS at 19:58

## 2024-11-02 RX ADMIN — GUAIFENESIN 400 MG: 400 TABLET ORAL at 13:18

## 2024-11-02 RX ADMIN — ISOSORBIDE DINITRATE 5 MG: 10 TABLET ORAL at 07:51

## 2024-11-02 RX ADMIN — SODIUM CHLORIDE, PRESERVATIVE FREE 10 ML: 5 INJECTION INTRAVENOUS at 07:51

## 2024-11-03 ENCOUNTER — APPOINTMENT (OUTPATIENT)
Dept: GENERAL RADIOLOGY | Age: 89
DRG: 196 | End: 2024-11-03
Payer: MEDICARE

## 2024-11-03 LAB
ALBUMIN SERPL-MCNC: 3.2 G/DL (ref 3.5–5.2)
ALP SERPL-CCNC: 66 U/L (ref 40–129)
ALT SERPL-CCNC: 11 U/L (ref 0–40)
ANION GAP SERPL CALCULATED.3IONS-SCNC: 8 MMOL/L (ref 7–16)
AST SERPL-CCNC: 16 U/L (ref 0–39)
BILIRUB SERPL-MCNC: 0.6 MG/DL (ref 0–1.2)
BUN SERPL-MCNC: 21 MG/DL (ref 6–23)
CALCIUM SERPL-MCNC: 8.1 MG/DL (ref 8.6–10.2)
CHLORIDE SERPL-SCNC: 101 MMOL/L (ref 98–107)
CO2 SERPL-SCNC: 29 MMOL/L (ref 22–29)
CREAT SERPL-MCNC: 1.5 MG/DL (ref 0.7–1.2)
ERYTHROCYTE [DISTWIDTH] IN BLOOD BY AUTOMATED COUNT: 16 % (ref 11.5–15)
FERRITIN SERPL-MCNC: 173 NG/ML
GFR, ESTIMATED: 45 ML/MIN/1.73M2
GLUCOSE SERPL-MCNC: 129 MG/DL (ref 74–99)
HCT VFR BLD AUTO: 29.5 % (ref 37–54)
HGB BLD-MCNC: 9.2 G/DL (ref 12.5–16.5)
IRON SATN MFR SERPL: 26 % (ref 20–55)
IRON SERPL-MCNC: 51 UG/DL (ref 59–158)
MAGNESIUM SERPL-MCNC: 2.1 MG/DL (ref 1.6–2.6)
MCH RBC QN AUTO: 27.8 PG (ref 26–35)
MCHC RBC AUTO-ENTMCNC: 31.2 G/DL (ref 32–34.5)
MCV RBC AUTO: 89.1 FL (ref 80–99.9)
PHOSPHATE SERPL-MCNC: 2.3 MG/DL (ref 2.5–4.5)
PLATELET # BLD AUTO: 286 K/UL (ref 130–450)
PMV BLD AUTO: 9.1 FL (ref 7–12)
POTASSIUM SERPL-SCNC: 3.3 MMOL/L (ref 3.5–5)
PROT SERPL-MCNC: 5.7 G/DL (ref 6.4–8.3)
RBC # BLD AUTO: 3.31 M/UL (ref 3.8–5.8)
SODIUM SERPL-SCNC: 138 MMOL/L (ref 132–146)
TIBC SERPL-MCNC: 194 UG/DL (ref 250–450)
WBC OTHER # BLD: 9 K/UL (ref 4.5–11.5)

## 2024-11-03 PROCEDURE — 97530 THERAPEUTIC ACTIVITIES: CPT

## 2024-11-03 PROCEDURE — 6360000002 HC RX W HCPCS: Performed by: INTERNAL MEDICINE

## 2024-11-03 PROCEDURE — 83735 ASSAY OF MAGNESIUM: CPT

## 2024-11-03 PROCEDURE — 85027 COMPLETE CBC AUTOMATED: CPT

## 2024-11-03 PROCEDURE — 6370000000 HC RX 637 (ALT 250 FOR IP)

## 2024-11-03 PROCEDURE — 94640 AIRWAY INHALATION TREATMENT: CPT

## 2024-11-03 PROCEDURE — 2700000000 HC OXYGEN THERAPY PER DAY

## 2024-11-03 PROCEDURE — 84100 ASSAY OF PHOSPHORUS: CPT

## 2024-11-03 PROCEDURE — 6360000002 HC RX W HCPCS: Performed by: NURSE PRACTITIONER

## 2024-11-03 PROCEDURE — 71045 X-RAY EXAM CHEST 1 VIEW: CPT

## 2024-11-03 PROCEDURE — 2580000003 HC RX 258

## 2024-11-03 PROCEDURE — 94669 MECHANICAL CHEST WALL OSCILL: CPT

## 2024-11-03 PROCEDURE — 36415 COLL VENOUS BLD VENIPUNCTURE: CPT

## 2024-11-03 PROCEDURE — 2060000000 HC ICU INTERMEDIATE R&B

## 2024-11-03 PROCEDURE — 80053 COMPREHEN METABOLIC PANEL: CPT

## 2024-11-03 PROCEDURE — 6360000002 HC RX W HCPCS

## 2024-11-03 PROCEDURE — 2580000003 HC RX 258: Performed by: NURSE PRACTITIONER

## 2024-11-03 PROCEDURE — 6370000000 HC RX 637 (ALT 250 FOR IP): Performed by: INTERNAL MEDICINE

## 2024-11-03 RX ORDER — POTASSIUM CHLORIDE 1500 MG/1
20 TABLET, EXTENDED RELEASE ORAL ONCE
Status: DISCONTINUED | OUTPATIENT
Start: 2024-11-03 | End: 2024-11-03

## 2024-11-03 RX ADMIN — MULTIVITAMIN TABLET 1 TABLET: TABLET at 08:39

## 2024-11-03 RX ADMIN — CALCIUM 500 MG: 500 TABLET ORAL at 08:39

## 2024-11-03 RX ADMIN — ISOSORBIDE DINITRATE 5 MG: 10 TABLET ORAL at 08:39

## 2024-11-03 RX ADMIN — CARVEDILOL 12.5 MG: 6.25 TABLET, FILM COATED ORAL at 08:39

## 2024-11-03 RX ADMIN — GUAIFENESIN 400 MG: 400 TABLET ORAL at 14:04

## 2024-11-03 RX ADMIN — IPRATROPIUM BROMIDE AND ALBUTEROL SULFATE 1 DOSE: 2.5; .5 SOLUTION RESPIRATORY (INHALATION) at 16:09

## 2024-11-03 RX ADMIN — SODIUM CHLORIDE 20 ML: 9 INJECTION INTRAMUSCULAR; INTRAVENOUS; SUBCUTANEOUS at 03:30

## 2024-11-03 RX ADMIN — CARVEDILOL 12.5 MG: 6.25 TABLET, FILM COATED ORAL at 16:11

## 2024-11-03 RX ADMIN — POTASSIUM & SODIUM PHOSPHATES POWDER PACK 280-160-250 MG 250 MG: 280-160-250 PACK at 20:26

## 2024-11-03 RX ADMIN — PIPERACILLIN AND TAZOBACTAM 4500 MG: 4; .5 INJECTION, POWDER, LYOPHILIZED, FOR SOLUTION INTRAVENOUS at 11:16

## 2024-11-03 RX ADMIN — PIPERACILLIN AND TAZOBACTAM 4500 MG: 4; .5 INJECTION, POWDER, LYOPHILIZED, FOR SOLUTION INTRAVENOUS at 03:30

## 2024-11-03 RX ADMIN — SODIUM CHLORIDE 20 ML: 9 INJECTION INTRAMUSCULAR; INTRAVENOUS; SUBCUTANEOUS at 20:28

## 2024-11-03 RX ADMIN — ISOSORBIDE DINITRATE 5 MG: 10 TABLET ORAL at 14:04

## 2024-11-03 RX ADMIN — HYDRALAZINE HYDROCHLORIDE 10 MG: 10 TABLET ORAL at 14:04

## 2024-11-03 RX ADMIN — FUROSEMIDE 20 MG: 10 INJECTION, SOLUTION INTRAMUSCULAR; INTRAVENOUS at 08:39

## 2024-11-03 RX ADMIN — POTASSIUM & SODIUM PHOSPHATES POWDER PACK 280-160-250 MG 250 MG: 280-160-250 PACK at 14:52

## 2024-11-03 RX ADMIN — IPRATROPIUM BROMIDE AND ALBUTEROL SULFATE 1 DOSE: 2.5; .5 SOLUTION RESPIRATORY (INHALATION) at 13:01

## 2024-11-03 RX ADMIN — GUAIFENESIN 400 MG: 400 TABLET ORAL at 20:27

## 2024-11-03 RX ADMIN — ASPIRIN 81 MG: 81 TABLET, COATED ORAL at 08:40

## 2024-11-03 RX ADMIN — SODIUM CHLORIDE, PRESERVATIVE FREE 10 ML: 5 INJECTION INTRAVENOUS at 20:27

## 2024-11-03 RX ADMIN — PIPERACILLIN AND TAZOBACTAM 4500 MG: 4; .5 INJECTION, POWDER, LYOPHILIZED, FOR SOLUTION INTRAVENOUS at 20:27

## 2024-11-03 RX ADMIN — GUAIFENESIN 400 MG: 400 TABLET ORAL at 08:40

## 2024-11-03 RX ADMIN — ENOXAPARIN SODIUM 30 MG: 100 INJECTION SUBCUTANEOUS at 08:39

## 2024-11-03 RX ADMIN — SODIUM CHLORIDE, PRESERVATIVE FREE 10 ML: 5 INJECTION INTRAVENOUS at 08:40

## 2024-11-03 RX ADMIN — IPRATROPIUM BROMIDE AND ALBUTEROL SULFATE 1 DOSE: 2.5; .5 SOLUTION RESPIRATORY (INHALATION) at 20:41

## 2024-11-03 RX ADMIN — ISOSORBIDE DINITRATE 5 MG: 10 TABLET ORAL at 20:27

## 2024-11-03 RX ADMIN — HYDRALAZINE HYDROCHLORIDE 10 MG: 10 TABLET ORAL at 20:27

## 2024-11-03 RX ADMIN — IPRATROPIUM BROMIDE AND ALBUTEROL SULFATE 1 DOSE: 2.5; .5 SOLUTION RESPIRATORY (INHALATION) at 03:51

## 2024-11-03 RX ADMIN — HYDRALAZINE HYDROCHLORIDE 10 MG: 10 TABLET ORAL at 05:41

## 2024-11-03 RX ADMIN — IPRATROPIUM BROMIDE AND ALBUTEROL SULFATE 1 DOSE: 2.5; .5 SOLUTION RESPIRATORY (INHALATION) at 09:22

## 2024-11-03 RX ADMIN — SODIUM CHLORIDE 20 ML: 9 INJECTION INTRAMUSCULAR; INTRAVENOUS; SUBCUTANEOUS at 11:16

## 2024-11-04 ENCOUNTER — TELEPHONE (OUTPATIENT)
Dept: CARDIOLOGY CLINIC | Age: 89
End: 2024-11-04

## 2024-11-04 VITALS
SYSTOLIC BLOOD PRESSURE: 112 MMHG | BODY MASS INDEX: 25.07 KG/M2 | OXYGEN SATURATION: 97 % | DIASTOLIC BLOOD PRESSURE: 56 MMHG | WEIGHT: 156 LBS | HEART RATE: 64 BPM | TEMPERATURE: 98.3 F | RESPIRATION RATE: 16 BRPM | HEIGHT: 66 IN

## 2024-11-04 LAB
ALBUMIN SERPL-MCNC: 3.2 G/DL (ref 3.5–5.2)
ALP SERPL-CCNC: 69 U/L (ref 40–129)
ALT SERPL-CCNC: 11 U/L (ref 0–40)
ANION GAP SERPL CALCULATED.3IONS-SCNC: 9 MMOL/L (ref 7–16)
AST SERPL-CCNC: 16 U/L (ref 0–39)
BILIRUB SERPL-MCNC: 0.5 MG/DL (ref 0–1.2)
BUN SERPL-MCNC: 25 MG/DL (ref 6–23)
CALCIUM SERPL-MCNC: 8.6 MG/DL (ref 8.6–10.2)
CHLORIDE SERPL-SCNC: 102 MMOL/L (ref 98–107)
CO2 SERPL-SCNC: 26 MMOL/L (ref 22–29)
CREAT SERPL-MCNC: 1.4 MG/DL (ref 0.7–1.2)
ERYTHROCYTE [DISTWIDTH] IN BLOOD BY AUTOMATED COUNT: 15.9 % (ref 11.5–15)
GFR, ESTIMATED: 49 ML/MIN/1.73M2
GLUCOSE SERPL-MCNC: 105 MG/DL (ref 74–99)
HCT VFR BLD AUTO: 29.4 % (ref 37–54)
HGB BLD-MCNC: 9.2 G/DL (ref 12.5–16.5)
MAGNESIUM SERPL-MCNC: 2.1 MG/DL (ref 1.6–2.6)
MCH RBC QN AUTO: 27.5 PG (ref 26–35)
MCHC RBC AUTO-ENTMCNC: 31.3 G/DL (ref 32–34.5)
MCV RBC AUTO: 88 FL (ref 80–99.9)
PHOSPHATE SERPL-MCNC: 3.2 MG/DL (ref 2.5–4.5)
PLATELET # BLD AUTO: 297 K/UL (ref 130–450)
PMV BLD AUTO: 9.2 FL (ref 7–12)
POTASSIUM SERPL-SCNC: 3.3 MMOL/L (ref 3.5–5)
PROT SERPL-MCNC: 5.7 G/DL (ref 6.4–8.3)
RBC # BLD AUTO: 3.34 M/UL (ref 3.8–5.8)
SODIUM SERPL-SCNC: 137 MMOL/L (ref 132–146)
WBC OTHER # BLD: 11.3 K/UL (ref 4.5–11.5)

## 2024-11-04 PROCEDURE — 94669 MECHANICAL CHEST WALL OSCILL: CPT

## 2024-11-04 PROCEDURE — 84100 ASSAY OF PHOSPHORUS: CPT

## 2024-11-04 PROCEDURE — 80053 COMPREHEN METABOLIC PANEL: CPT

## 2024-11-04 PROCEDURE — 6360000002 HC RX W HCPCS: Performed by: INTERNAL MEDICINE

## 2024-11-04 PROCEDURE — 6370000000 HC RX 637 (ALT 250 FOR IP): Performed by: INTERNAL MEDICINE

## 2024-11-04 PROCEDURE — 97530 THERAPEUTIC ACTIVITIES: CPT

## 2024-11-04 PROCEDURE — 94640 AIRWAY INHALATION TREATMENT: CPT

## 2024-11-04 PROCEDURE — 6370000000 HC RX 637 (ALT 250 FOR IP)

## 2024-11-04 PROCEDURE — 2700000000 HC OXYGEN THERAPY PER DAY

## 2024-11-04 PROCEDURE — 85027 COMPLETE CBC AUTOMATED: CPT

## 2024-11-04 PROCEDURE — 83735 ASSAY OF MAGNESIUM: CPT

## 2024-11-04 PROCEDURE — 6360000002 HC RX W HCPCS

## 2024-11-04 PROCEDURE — 2580000003 HC RX 258

## 2024-11-04 PROCEDURE — 2580000003 HC RX 258: Performed by: NURSE PRACTITIONER

## 2024-11-04 RX ORDER — POTASSIUM CHLORIDE 1500 MG/1
40 TABLET, EXTENDED RELEASE ORAL ONCE
Status: COMPLETED | OUTPATIENT
Start: 2024-11-04 | End: 2024-11-04

## 2024-11-04 RX ORDER — ISOSORBIDE DINITRATE 5 MG/1
5 TABLET ORAL 3 TIMES DAILY
Qty: 90 TABLET | Refills: 3 | Status: SHIPPED | OUTPATIENT
Start: 2024-11-04

## 2024-11-04 RX ORDER — ENOXAPARIN SODIUM 100 MG/ML
40 INJECTION SUBCUTANEOUS DAILY
Status: DISCONTINUED | OUTPATIENT
Start: 2024-11-04 | End: 2024-11-04 | Stop reason: HOSPADM

## 2024-11-04 RX ORDER — HYDRALAZINE HYDROCHLORIDE 10 MG/1
10 TABLET, FILM COATED ORAL EVERY 8 HOURS SCHEDULED
Qty: 90 TABLET | Refills: 3 | Status: SHIPPED | OUTPATIENT
Start: 2024-11-04

## 2024-11-04 RX ADMIN — MULTIVITAMIN TABLET 1 TABLET: TABLET at 09:47

## 2024-11-04 RX ADMIN — HYDRALAZINE HYDROCHLORIDE 10 MG: 10 TABLET ORAL at 14:03

## 2024-11-04 RX ADMIN — SODIUM CHLORIDE, PRESERVATIVE FREE 10 ML: 5 INJECTION INTRAVENOUS at 07:55

## 2024-11-04 RX ADMIN — ENOXAPARIN SODIUM 40 MG: 100 INJECTION SUBCUTANEOUS at 09:49

## 2024-11-04 RX ADMIN — IPRATROPIUM BROMIDE AND ALBUTEROL SULFATE 1 DOSE: 2.5; .5 SOLUTION RESPIRATORY (INHALATION) at 08:32

## 2024-11-04 RX ADMIN — CARVEDILOL 12.5 MG: 6.25 TABLET, FILM COATED ORAL at 16:10

## 2024-11-04 RX ADMIN — IPRATROPIUM BROMIDE AND ALBUTEROL SULFATE 1 DOSE: 2.5; .5 SOLUTION RESPIRATORY (INHALATION) at 12:14

## 2024-11-04 RX ADMIN — FUROSEMIDE 20 MG: 10 INJECTION, SOLUTION INTRAMUSCULAR; INTRAVENOUS at 09:48

## 2024-11-04 RX ADMIN — SODIUM CHLORIDE 20 ML: 9 INJECTION INTRAMUSCULAR; INTRAVENOUS; SUBCUTANEOUS at 14:02

## 2024-11-04 RX ADMIN — POTASSIUM CHLORIDE 40 MEQ: 1500 TABLET, EXTENDED RELEASE ORAL at 16:10

## 2024-11-04 RX ADMIN — IPRATROPIUM BROMIDE AND ALBUTEROL SULFATE 1 DOSE: 2.5; .5 SOLUTION RESPIRATORY (INHALATION) at 15:57

## 2024-11-04 RX ADMIN — GUAIFENESIN 400 MG: 400 TABLET ORAL at 14:02

## 2024-11-04 RX ADMIN — CARVEDILOL 12.5 MG: 6.25 TABLET, FILM COATED ORAL at 09:47

## 2024-11-04 RX ADMIN — HYDRALAZINE HYDROCHLORIDE 10 MG: 10 TABLET ORAL at 07:56

## 2024-11-04 RX ADMIN — ISOSORBIDE DINITRATE 5 MG: 10 TABLET ORAL at 14:02

## 2024-11-04 RX ADMIN — CALCIUM 500 MG: 500 TABLET ORAL at 09:47

## 2024-11-04 RX ADMIN — ISOSORBIDE DINITRATE 5 MG: 10 TABLET ORAL at 09:48

## 2024-11-04 RX ADMIN — ASPIRIN 81 MG: 81 TABLET, COATED ORAL at 09:47

## 2024-11-04 RX ADMIN — GUAIFENESIN 400 MG: 400 TABLET ORAL at 09:47

## 2024-11-04 NOTE — CARE COORDINATION
Social work / Discharge planning:         Social work updated liaison for [facility] that patient is being discharged.    Social work contacted patient's daughter who states she will transport him at 4pm.    Charge nurse updated.     Phone number for nurse to nurse [phone].  Electronically signed by MARTHA Bragg on 11/4/2024 at 2:44 PM

## 2024-11-04 NOTE — CARE COORDINATION
Social work / Discharge planning:          Precert denied for [facility].    [place] assisted living can accept patient back per liaison.    Currently on IV Lasix.    Baseline oxygen is 5 liters .   Electronically signed by MARTHA Bragg on 11/4/2024 at 10:16 AM

## 2024-11-04 NOTE — CARE COORDINATION
Social work / Discharge planning:         Patient's daughter arrived for discharge and requested a wheelchair for patient at assisted living.    She stated that prior to hospital admission, a wheelchair was ordered from [facility] but it is on back order.    Social work recommended that she confirm with [facility] that insurance has not been billed yet.    She updated social work that patient's insurance had not been billed and would like to use [facility] for a wheelchair.    Social work updated her that because it was late in the day, the wheelchair would be arranged tomorrow.   Patient's daughter agreed with that plan.   Electronically signed by MARTHA Bragg on 11/4/2024 at 3:54 PM

## 2024-11-04 NOTE — PROGRESS NOTES
[facility]        CARDIOLOGY                 INPATIENT PROGRESS NOTE          PATIENT SEEN IN FOLLOW UP FOR: CHF, VHD    Hospital Day: 6     Tashi Parry is a 89 year old patient known to Dr. Mckeon      SUBJECTIVE: Denies any Cp or SOB. No dizzy. No distress    ROS: Review of rest of 10 systems negative except as mentioned above    OBJECTIVE: No acute distress.  See Assessment     Diagnostics:       Telemetry: Reviewed    TTE 10/31/2024    Left Ventricle: EF by visual approximation is 45-50%. Left ventricle size is normal. Mild concentric hypertrophy. Stage 2 diastolic dysfunction.    Right Ventricle: Right ventricle is dilated. Reduced systolic function. TAPSE is 1.4 cm.    Left Atrium: Left atrial volume index is severely increased (>48 mL/m2).    Aortic Valve: Mild to moderate regurgitation. No stenosis.    Mitral Valve: Moderate regurgitation. No stenosis noted.    Tricuspid Valve: Mild regurgitation. The estimated RVSP is 62 mmHg.    Contrast used: Definity.          Intake/Output Summary (Last 24 hours) at 11/2/2024 1338  Last data filed at 11/2/2024 1312  Gross per 24 hour   Intake --   Output 750 ml   Net -750 ml       Labs:   CBC:   Recent Labs     11/01/24  0645 11/02/24  0630   WBC 10.2 9.2   HGB 10.0* 9.3*   HCT 31.7* 29.4*    297     BMP:   Recent Labs     11/01/24  0440 11/02/24  0630    138   K 3.5 3.7   CO2 26 27   BUN 29* 24*   CREATININE 1.5* 1.3*   LABGLOM 43* 52*   CALCIUM 8.1* 8.2*     Mag:   Recent Labs     11/01/24  0440 11/02/24  0630   MG 1.9 2.0     Phos:   Recent Labs     11/01/24  0440 11/02/24  0630   PHOS 2.5 2.7     TSH: No results for input(s): \"TSH\" in the last 72 hours.  HgA1c:     BNP: No results for input(s): \"BNP\" in the last 72 hours.  PT/INR: No results for input(s): \"PROTIME\", \"INR\" in the last 72 hours.  APTT:No results for input(s): \"APTT\" in the last 72 hours.  CARDIAC ENZYMES:No results for input(s): \"CKTOTAL\", \"TROPHS\" in the 
           Wellington Godfrey M.D.,[facility]  Palmer Ray D.O., F.TISHA., [facility]  Milla Mccollum M.D.  Lyssa Ugarte M.D.   Gabe Carter D.O.  Alonso Tillman M.D.         Daily Pulmonary Progress Note    Patient:  Tashi Parry 89 y.o. male MRN: [id number]            Synopsis     We are following patient for  COPD, pneumonia    \"CC\" shortness of breath     Code status: FULL CODE      Subjective      Patient was seen and examined sitting up on the side of the bed in no distress.  His oxygen was currently off and SpO2 was 91%.  He has faint crackles on exam.  He is not in any distress and appears to be doing well.  Awaiting placement to [facility].    Review of Systems:  Constitutional: Denies fever, weight loss, night sweats, and fatigue  Skin: Denies pigmentation, dark lesions, and rashes   HEENT: Denies hearing loss, tinnitus, ear drainage, epistaxis, sore throat, and hoarseness.  Cardiovascular: Denies palpitations, chest pain, and chest pressure.  Respiratory: mild hypoxia, faint exp wheezing   Gastrointestinal: Denies nausea, vomiting, poor appetite, diarrhea, heartburn or reflux  Genitourinary: Denies dysuria, frequency, urgency or hematuria  Musculoskeletal: Denies myalgias, muscle weakness, and bone pain  Neurological: Denies dizziness, vertigo, headache, and focal weakness  Psychological: Denies anxiety and depression  Endocrine: Denies heat intolerance and cold intolerance  Hematopoietic/Lymphatic: Denies bleeding problems and blood transfusions    24-hour events:  No new events    Objective   OBJECTIVE:   BP (!) 179/72   Pulse 63   Temp 98.2 °F (36.8 °C) (Oral)   Resp 16   Ht 1.676 m (5' 6\")   Wt 70.8 kg (156 lb)   SpO2 98%   BMI 25.18 kg/m²   SpO2 Readings from Last 1 Encounters:   11/04/24 98%        I/O:    Intake/Output Summary (Last 24 hours) at 11/4/2024 1031  Last data filed at 11/3/2024 1113  Gross per 24 hour   Intake --   Output 150 ml   Net -150 ml                      CURRENT MEDS 
      Wellington Godfrey M.D.  Palmer Ray D.O.  Milla Mccollum M.D.  Lyssa Ugarte M.D.   Gabe Carter D.O.  Alonso Tillman M.D.           Daily Pulmonary Progress Note    Patient:  Tsahi Parry 89 y.o. male MRN: [id number]     Date of Service: 11/2/2024        Subjective      Patient was seen and examined.    Feels weak and fatigued.  Remains on 3 L O2.    Objective   Vitals: BP (!) 139/57   Pulse 63   Temp 98.1 °F (36.7 °C) (Oral)   Resp 18   Ht 1.676 m (5' 6\")   Wt 65.3 kg (143 lb 15.4 oz)   SpO2 94%   BMI 23.24 kg/m²     I/O:    Intake/Output Summary (Last 24 hours) at 11/2/2024 1439  Last data filed at 11/2/2024 1312  Gross per 24 hour   Intake --   Output 750 ml   Net -750 ml       CURRENT MEDS :  Scheduled Meds:   hydrALAZINE  10 mg Oral 3 times per day    isosorbide dinitrate  5 mg Oral TID    enoxaparin  30 mg SubCUTAneous Daily    piperacillin-tazobactam  4,500 mg IntraVENous Q8H    And    sodium chloride (PF)  20 mL IntraVENous q8h    furosemide  20 mg IntraVENous Daily    aspirin  81 mg Oral Daily    calcium elemental  500 mg Oral Daily    carvedilol  12.5 mg Oral BID WC    guaiFENesin  400 mg Oral TID    multivitamin  1 tablet Oral Daily    sodium chloride flush  5-40 mL IntraVENous 2 times per day    ipratropium 0.5 mg-albuterol 2.5 mg  1 Dose Inhalation Q4H WA RT       Continuous Infusions:   sodium chloride         PRN Meds:  sodium chloride flush, sodium chloride, ondansetron **OR** ondansetron, magnesium hydroxide, acetaminophen **OR** acetaminophen      Physical Exam:  Physical Exam  Constitutional:       Comments: +frail appearing   HENT:      Head: Normocephalic and atraumatic.   Eyes:      Conjunctiva/sclera: Conjunctivae normal.   Neck:      Trachea: No tracheal deviation.   Cardiovascular:      Rate and Rhythm: Normal rate and regular rhythm.      Heart sounds: Normal heart sounds.   Abdominal:      General: Bowel sounds are normal.      Palpations: Abdomen is soft.      
    [facility]                                Progress note    Subjective:    The patient is awake and alert.    Sitting up in a chair receiving a breathing treatment  States his breathing is improving    Objective:    BP (!) 145/58   Pulse 60   Temp 97.7 °F (36.5 °C) (Oral)   Resp 18   Ht 1.676 m (5' 6\")   Wt 75.3 kg (166 lb)   SpO2 92%   BMI 26.79 kg/m²     No intake/output data recorded.  No intake/output data recorded.    General appearance: NAD, conversant  HEENT: AT/NC, MMM  Neck: FROM, supple  Lungs: Few crackles at the bases bilaterally, 2L NC  CV: RRR, no MRGs  Vasc: Radial pulses 2+  Abdomen: Soft, non-tender; no masses or HSM  Extremities: No peripheral edema or digital cyanosis  Skin: no rash, lesions or ulcers  Psych: Alert and oriented to person, place and time  Neuro: Alert and interactive     Recent Labs     10/28/24  1637 10/29/24  0400 10/30/24  0507   WBC 8.8 5.2 13.0*   HGB 10.5* 10.0* 9.9*   HCT 34.6* 32.1* 30.9*    335 342       Recent Labs     10/28/24  1637 10/29/24  0400 10/30/24  0507    139 138   K 4.1 4.0 3.5    101 102   CO2 27 27 27   BUN 22 21 37*   CREATININE 0.9 1.0 1.8*   CALCIUM 8.8 8.7 8.8       Assessment:    Principal Problem:    Bilateral interstitial pneumonia (HCC)  Resolved Problems:    * No resolved hospital problems. *      Plan:    Patient is a 89-year-old male admitted to [facility] for  Bilateral interstitial pneumonia  -Monitor labs  -CRP 34, sed rate 34  -Check procalcitonin  -Pulmonology consulted  -Elevated proBNP 8,864 > 9,773  -Currently utilizing 4 L nasal cannula satting 96%  -Continue scheduled breathing treatments  -Respiratory panel pending  -Currently on IV Zosyn     Bilateral pleural effusions  -No history of heart failure  -Last echo 10/2019 > EF 65% with mild TR  - Obtain new echo  -Light Diuresis to be continued with caution in this elderly frail man  - T wave inversions in multiple leads-no chest pain 
    [facility]                                Progress note    Subjective:    The patient is awake and alert.    Sitting up in a chair eating lunch on assessment  Has no complaints    Objective:    BP (!) 112/56   Pulse 59   Temp 98.3 °F (36.8 °C) (Oral)   Resp 16   Ht 1.676 m (5' 6\")   Wt 70.8 kg (156 lb)   SpO2 98%   BMI 25.18 kg/m²     In: -   Out: 150   In: -   Out: 150 [Urine:150]    General appearance: NAD, conversant  HEENT: AT/NC, MMM  Neck: FROM, supple  Lungs: Clear lungs on right, left lung markedly diminished breath sounds due to history lobectomy, 4L NC  CV: RRR, no MRGs  Vasc: Radial pulses 2+  Abdomen: Soft, non-tender; no masses or HSM  Extremities: No digital cyanosis, RLE with trace edema   Skin: no rash, lesions or ulcers  Psych: Alert and oriented to person, place and time  Neuro: Alert and interactive     Recent Labs     11/02/24  0630 11/03/24  0946 11/04/24  0341   WBC 9.2 9.0 11.3   HGB 9.3* 9.2* 9.2*   HCT 29.4* 29.5* 29.4*    286 297       Recent Labs     11/02/24  0630 11/03/24  0946 11/04/24  0341    138 137   K 3.7 3.3* 3.3*    101 102   CO2 27 29 26   BUN 24* 21 25*   CREATININE 1.3* 1.5* 1.4*   CALCIUM 8.2* 8.1* 8.6       Assessment:    Principal Problem:    Bilateral interstitial pneumonia (HCC)  Active Problems:    Coronary artery disease involving coronary bypass graft of native heart without angina pectoris    Acute HFrEF (heart failure with reduced ejection fraction) (HCC)    Nonrheumatic mitral valve regurgitation    Pulmonary HTN (HCC)  Resolved Problems:    * No resolved hospital problems. *      Plan:    Patient is a 89-year-old male admitted to [facility] for  Bilateral interstitial pneumonia  -Monitor labs  -CRP 34, sed rate 34  -Check procalcitonin  -Pulmonology consulted  -Elevated proBNP 8,864 > 9,773  -Currently utilizing 4 L nasal cannula satting 96%  -Continue scheduled breathing treatments  -Respiratory panel 
    [facility]                                Progress note    Subjective:    The patient is awake and alert.    Resting comfortably, complaining of just feeling profoundly weak  Objective:    BP (!) 122/40   Pulse 66   Temp 98.1 °F (36.7 °C) (Oral)   Resp 18   Ht 1.676 m (5' 6\")   Wt 65.3 kg (143 lb 15.4 oz)   SpO2 94%   BMI 23.24 kg/m²     In: 180 [P.O.:180]  Out: 825   In: 180   Out: 825 [Urine:825]    General appearance: NAD, conversant  HEENT: AT/NC, MMM  Neck: FROM, supple  Lungs: Clear lungs on right, left lung markedly diminished breath sounds due to history lobectomy, 2L NC  CV: RRR, no MRGs  Vasc: Radial pulses 2+  Abdomen: Soft, non-tender; no masses or HSM  Extremities: No peripheral edema or digital cyanosis  Skin: no rash, lesions or ulcers  Psych: Alert and oriented to person, place and time  Neuro: Alert and interactive     Recent Labs     10/31/24  0455 11/01/24  0645 11/02/24  0630   WBC 9.6 10.2 9.2   HGB 8.9* 10.0* 9.3*   HCT 28.4* 31.7* 29.4*    330 297       Recent Labs     10/31/24  0455 11/01/24  0440 11/02/24  0630    136 138   K 3.5 3.5 3.7    101 101   CO2 26 26 27   BUN 34* 29* 24*   CREATININE 1.7* 1.5* 1.3*   CALCIUM 8.2* 8.1* 8.2*       Assessment:    Principal Problem:    Bilateral interstitial pneumonia (HCC)  Active Problems:    Coronary artery disease involving coronary bypass graft of native heart without angina pectoris    Acute HFrEF (heart failure with reduced ejection fraction) ([facility])    Nonrheumatic mitral valve regurgitation    Pulmonary HTN (HCC)  Resolved Problems:    * No resolved hospital problems. *      Plan:    Patient is a 89-year-old male admitted to [facility] for  Bilateral interstitial pneumonia  -Monitor labs  -CRP 34, sed rate 34  -Check procalcitonin  -Pulmonology consulted  -Elevated proBNP 8,864 > 9,773  -Currently utilizing 4 L nasal cannula satting 96%  -Continue scheduled breathing treatments  -Respiratory panel 
    [facility]                                Progress note    Subjective:    The patient is awake and alert.    Not short of breath  Feels well resting comfortably  Objective:    BP (!) 122/57   Pulse 59   Temp 97.6 °F (36.4 °C) (Oral)   Resp 20   Ht 1.676 m (5' 6\")   Wt 65.3 kg (143 lb 15.4 oz)   SpO2 96%   BMI 23.24 kg/m²     No intake/output data recorded.  No intake/output data recorded.    General appearance: NAD, conversant  HEENT: AT/NC, MMM  Neck: FROM, supple  Lungs: Clear lungs on right, left lung markedly diminished breath sounds due to history lobectomy, 2L NC  CV: RRR, no MRGs  Vasc: Radial pulses 2+  Abdomen: Soft, non-tender; no masses or HSM  Extremities: No peripheral edema or digital cyanosis  Skin: no rash, lesions or ulcers  Psych: Alert and oriented to person, place and time  Neuro: Alert and interactive     Recent Labs     10/29/24  0400 10/30/24  0507 10/31/24  0455   WBC 5.2 13.0* 9.6   HGB 10.0* 9.9* 8.9*   HCT 32.1* 30.9* 28.4*    342 289       Recent Labs     10/29/24  0400 10/30/24  0507 10/31/24  0455    138 139   K 4.0 3.5 3.5    102 103   CO2 27 27 26   BUN 21 37* 34*   CREATININE 1.0 1.8* 1.7*   CALCIUM 8.7 8.8 8.2*       Assessment:    Principal Problem:    Bilateral interstitial pneumonia (HCC)  Resolved Problems:    * No resolved hospital problems. *      Plan:    Patient is a 89-year-old male admitted to [facility] for  Bilateral interstitial pneumonia  -Monitor labs  -CRP 34, sed rate 34  -Check procalcitonin  -Pulmonology consulted  -Elevated proBNP 8,864 > 9,773  -Currently utilizing 4 L nasal cannula satting 96%  -Continue scheduled breathing treatments  -Respiratory panel pending  -Currently on IV Zosyn     Bilateral pleural effusions  -No history of heart failure  -Last echo 10/2019 > EF 65% with mild TR  - Obtain new echo  -Light Diuresis to be continued with caution in this elderly frail man  - T wave inversions in multiple 
    [facility]                                Progress note    Subjective:    The patient is awake and alert.    Sitting up in chair resting comfortably  He tells me he is open to stress test if needed  Objective:    /61   Pulse 72   Temp 98 °F (36.7 °C) (Oral)   Resp 20   Ht 1.676 m (5' 6\")   Wt 65.3 kg (143 lb 15.4 oz)   SpO2 93%   BMI 23.24 kg/m²     In: 430 [P.O.:430]  Out: 400   In: 430   Out: 400 [Urine:400]    General appearance: NAD, conversant  HEENT: AT/NC, MMM  Neck: FROM, supple  Lungs: Clear lungs on right, left lung markedly diminished breath sounds due to history lobectomy, 2L NC  CV: RRR, no MRGs  Vasc: Radial pulses 2+  Abdomen: Soft, non-tender; no masses or HSM  Extremities: No peripheral edema or digital cyanosis  Skin: no rash, lesions or ulcers  Psych: Alert and oriented to person, place and time  Neuro: Alert and interactive     Recent Labs     10/30/24  0507 10/31/24  0455 11/01/24  0645   WBC 13.0* 9.6 10.2   HGB 9.9* 8.9* 10.0*   HCT 30.9* 28.4* 31.7*    289 330       Recent Labs     10/30/24  0507 10/31/24  0455 11/01/24  0440    139 136   K 3.5 3.5 3.5    103 101   CO2 27 26 26   BUN 37* 34* 29*   CREATININE 1.8* 1.7* 1.5*   CALCIUM 8.8 8.2* 8.1*       Assessment:    Principal Problem:    Bilateral interstitial pneumonia (HCC)  Active Problems:    Coronary artery disease involving coronary bypass graft of native heart without angina pectoris    Acute HFrEF (heart failure with reduced ejection fraction) ([facility])    Nonrheumatic mitral valve regurgitation    Pulmonary HTN (HCC)  Resolved Problems:    * No resolved hospital problems. *      Plan:    Patient is a 89-year-old male admitted to [facility] for  Bilateral interstitial pneumonia  -Monitor labs  -CRP 34, sed rate 34  -Check procalcitonin  -Pulmonology consulted  -Elevated proBNP 8,864 > 9,773  -Currently utilizing 4 L nasal cannula satting 96%  -Continue scheduled breathing 
    This patient is on medication that requires renal, weight, and/or indication dose adjustment.      Date Body Weight IBW  Adjusted BW SCr  CrCl Dialysis status   10/30/2024 75.3 kg (166 lb) Ideal body weight: 63.8 kg (140 lb 10.5 oz)  Adjusted ideal body weight: 68.4 kg (150 lb 12.7 oz) Serum creatinine: 1.8 mg/dL (H) 10/30/24 0507  Estimated creatinine clearance: 23 mL/min (A) N/a       Pharmacy has dose-adjusted the following medication(s):    Date Previous Order Adjusted Order   10/30/2024 Lovenox 40 mg daily Lovenox 30 mg daily       These changes were made per protocol according to the [facility]   Automatic Renal Dose Adjustment Policy.     *Please note this dose may need readjusted if patient's condition changes.    Please contact pharmacy with any questions regarding these changes.    Minnie Pires RPH  10/30/2024  7:47 AM    
    This patient is on medication that requires renal, weight, and/or indication dose adjustment.      Date Body Weight IBW  Adjusted BW SCr  CrCl Dialysis status   10/30/2024 75.3 kg (166 lb) Ideal body weight: 63.8 kg (140 lb 10.5 oz)  Adjusted ideal body weight: 68.4 kg (150 lb 12.7 oz) Serum creatinine: 1.8 mg/dL (H) 10/30/24 0507  Estimated creatinine clearance: 23 mL/min (A) N/a       Pharmacy has dose-adjusted the following medication(s):    Date Previous Order Adjusted Order   10/30/2024 Zosyn 4500 mg q6h Zosyn 4500mg q8h       These changes were made per protocol according to the [facility]   Automatic Renal Dose Adjustment Policy.     *Please note this dose may need readjusted if patient's condition changes.    Please contact pharmacy with any questions regarding these changes.    Minnie Pires RPH  10/30/2024  7:49 AM    
    [facility]                                Progress note    Subjective:    The patient is awake and alert.    Resting comfortably, complaining of just feeling profoundly weak  Objective:    BP (!) 109/49   Pulse 61   Temp 98.9 °F (37.2 °C) (Oral)   Resp 20   Ht 1.676 m (5' 6\")   Wt 65.3 kg (143 lb 15.4 oz)   SpO2 96%   BMI 23.24 kg/m²     In: -   Out: 550   In: -   Out: 550 [Urine:550]    General appearance: NAD, conversant  HEENT: AT/NC, MMM  Neck: FROM, supple  Lungs: Clear lungs on right, left lung markedly diminished breath sounds due to history lobectomy, 2L NC  CV: RRR, no MRGs  Vasc: Radial pulses 2+  Abdomen: Soft, non-tender; no masses or HSM  Extremities: No peripheral edema or digital cyanosis  Skin: no rash, lesions or ulcers  Psych: Alert and oriented to person, place and time  Neuro: Alert and interactive     Recent Labs     11/01/24  0645 11/02/24  0630 11/03/24  0946   WBC 10.2 9.2 9.0   HGB 10.0* 9.3* 9.2*   HCT 31.7* 29.4* 29.5*    297 286       Recent Labs     11/01/24  0440 11/02/24  0630 11/03/24  0946    138 138   K 3.5 3.7 3.3*    101 101   CO2 26 27 29   BUN 29* 24* 21   CREATININE 1.5* 1.3* 1.5*   CALCIUM 8.1* 8.2* 8.1*       Assessment:    Principal Problem:    Bilateral interstitial pneumonia (HCC)  Active Problems:    Coronary artery disease involving coronary bypass graft of native heart without angina pectoris    Acute HFrEF (heart failure with reduced ejection fraction) (HCC)    Nonrheumatic mitral valve regurgitation    Pulmonary HTN (HCC)  Resolved Problems:    * No resolved hospital problems. *      Plan:    Patient is a 89-year-old male admitted to [facility] for  Bilateral interstitial pneumonia  -Monitor labs  -CRP 34, sed rate 34  -Check procalcitonin  -Pulmonology consulted  -Elevated proBNP 8,864 > 9,773  -Currently utilizing 4 L nasal cannula satting 96%  -Continue scheduled breathing treatments  -Respiratory panel 
  Physician Progress Note      PATIENT:               EVANGELINA JOHNSON  CSN #:                  [id number]  :                       1931  ADMIT DATE:       10/28/2024 2:34 PM  DISCH DATE:  RESPONDING  PROVIDER #:        Yamileth Patel MD          QUERY TEXT:    Pt admitted with shortness of breath and has CHF documented. If possible,   please document in progress notes and discharge summary further specificity   regarding the type and acuity of CHF:    The medical record reflects the following:  Risk Factors: advanced age, recent COVID  Clinical Indicators: BNP 8864, +1/+2 bilateral leg pitting edeam per nursing,   per pulmonary \"...Ongoing dyspnea-multifactorial:HFrEF, pneumonia...\"  Treatment: IV Lasix  Options provided:  -- Acute on Chronic Systolic CHF/HFrEF  -- Acute Systolic CHF/HFrEF  -- Chronic Systolic CHF/HFrEF  -- Other - I will add my own diagnosis  -- Disagree - Not applicable / Not valid  -- Disagree - Clinically unable to determine / Unknown  -- Refer to Clinical Documentation Reviewer    PROVIDER RESPONSE TEXT:    This patient is in acute on chronic systolic CHF/HFrEF.    Query created by: Melquiades April on 10/31/2024 10:59 AM      Electronically signed by:  Yamileth Patel MD 2024 6:41 AM          
  Physician Progress Note      PATIENT:               EVANGELINA JOHNSON  [facility] #:                  [id number]  :                       1931  ADMIT DATE:       10/28/2024 2:34 PM  DISCH DATE:  RESPONDING  PROVIDER #:        Gabe Carter DO          QUERY TEXT:    Patient admitted with shortness of breath. Noted documentation of acute   respiratory failure in pulmonary notes.  In order to support the diagnosis of   acute respiratory failure, please include additional clinical indicators in   your documentation.  Or please document if the diagnosis of acute respiratory   failure has been ruled out after further study.    The medical record reflects the following:  Risk Factors: recent COVID, baseline 5L  Clinical Indicators: respirations 15-22, uses 5L at baseline, 95% 6L, per   pulmonary \"...Acute respiratory failure with hypoxia...\"  Treatment: O2 therapy  Options provided:  -- Acute Respiratory Failure as evidenced by, Please document evidence.  -- Acute Respiratory Failure ruled out after study and Chronic Respiratory   Failure confirmed  -- Other - I will add my own diagnosis  -- Disagree - Not applicable / Not valid  -- Disagree - Clinically unable to determine / Unknown  -- Refer to Clinical Documentation Reviewer    PROVIDER RESPONSE TEXT:    This patient is in acute respiratory failure as evidenced by Patient doesn't   have a chronic baseline of 5L. He still is in acute phase and required   increased O2.    Query created by: Melquiades April on 10/31/2024 10:56 AM      Electronically signed by:  Gabe Carter DO 2024 2:27 PM          
4 Eyes Skin Assessment     NAME:  Tashi Parry  YOB: 1931  MEDICAL RECORD NUMBER:  [id number]    The patient is being assessed for  Admission    I agree that at least one RN has performed a thorough Head to Toe Skin Assessment on the patient. ALL assessment sites listed below have been assessed.      Areas assessed by both nurses:    Head, Face, Ears, Shoulders, Back, Chest, Arms, Elbows, Hands, Sacrum. Buttock, Coccyx, Ischium, Legs. Feet and Heels, and Under Medical Devices         Does the Patient have a Wound? No noted wound(s)       Cornell Prevention initiated by RN: No  Wound Care Orders initiated by RN: No    Pressure Injury (Stage 3,4, Unstageable, DTI, NWPT, and Complex wounds) if present, place Wound referral order by RN under : No    New Ostomies, if present place, Ostomy referral order under : No     Nurse 1 eSignature: Electronically signed by Page Huff RN on 10/29/24 at 1:52 AM EDT    **SHARE this note so that the co-signing nurse can place an eSignature**    Nurse 2 eSignature: Electronically signed by Rama Gonsalez RN on 10/29/24 at 1:53 AM EDT   
Aidan sent to Janet De La Rosa about BP. Awaiting response back.    Message read, no new orders  
Comprehensive Nutrition Assessment    Type and Reason for Visit:  Initial, LOS    Nutrition Recommendations/Plan:   Continue current diet, start HC/HP ONS BID  Will continue to monitor while inpatient     Malnutrition Assessment:  Malnutrition Status:  At risk for malnutrition (11/04/24 1400)    Context:  Acute Illness     Findings of the 6 clinical characteristics of malnutrition:  Energy Intake:  Mild decrease in energy intake  Weight Loss:  No weight loss (-7.2% x 3 mo)     Body Fat Loss:  No body fat loss     Muscle Mass Loss:  No muscle mass loss    Fluid Accumulation:  No fluid accumulation     Strength:  Not Performed    Nutrition Assessment:    Pt w/ acute resp failure w/ hypoxia, COPD exacerbation, CORBIN. Hx CAD s/p CABG x 6, COPD, L lobectomy 2/2 lung CA. Pt w/ recent hx of Covid- states since having Covid, has \"moderate\" appetite and noticed ~10 lb weight loss x 1 mo. Pt currently eating >75% of meals per flowsheet and tray observation. Continue current diet. Will add HC/HP ONS BID to promote protein/energy intake. Will continue to monitor.    Nutrition Related Findings:    A&O x3, abd WDL, BLE trace edema, -1.7 L, K+ 3.3, BUN/Cr 25/1.4, Lasix, \"moderate\" appetite Wound Type: None       Current Nutrition Intake & Therapies:    Average Meal Intake: % (per flowsheet and meal observation)  Average Supplements Intake: None Ordered  ADULT DIET; Regular  ADULT ORAL NUTRITION SUPPLEMENT; Breakfast, Dinner; Standard High Calorie/High Protein Oral Supplement    Anthropometric Measures:  Height: 167.6 cm (5' 6\")  Ideal Body Weight (IBW): 142 lbs (65 kg)    Admission Body Weight: 75.3 kg (166 lb) (10/28 stated)  Current Body Weight: 70.8 kg (156 lb) (11/4), 109.9 % IBW. Weight Source: Not specified  Current BMI (kg/m2): 25.2  Usual Body Weight: 76.3 kg (168 lb 3 oz)     % Weight Change (Calculated): -7.2  Weight Adjustment For: No Adjustment     BMI Categories: Overweight (BMI 25.0-29.9)    Estimated Daily 
DVT Prophylaxis Adjustment Policy (DVT Prophylaxis)     This patient is on DVT Prophylaxis medication that requires a dose adjustment      Date Body Weight IBW  Adjusted BW SCr  CrCl Dialysis status   11/4/2024 65.3 kg (143 lb 15.4 oz) Ideal body weight: 63.8 kg (140 lb 10.5 oz)  Adjusted ideal body weight: 64.4 kg (141 lb 15.6 oz) Serum creatinine: 1.4 mg/dL (H) 11/04/24 0341  Estimated creatinine clearance: 30 mL/min (A) N/a       Pharmacy has dose-adjusted the DVT Prophylaxis regimen to match   the recommendations from the following table        Ordered Medication:Lovenox 30mg daily    Order Changed/converted to: Lovenox 40mg daily      These changes were made per protocol according to the [facility] Pharmacist   Review for Appropriate Use and Automatic Dose Adjustments of   Subcutaneous Anticoagulants Policy     *Please note this dose may need readjusted if patient's condition changes.    Please contact pharmacy with any questions regarding these changes.    Caroline Reynoso RPH  11/4/2024  6:24 AM      
Database initiated. Patient is A&O comes in from [facility]. He uses a walker and a wheelchair and wears 5 liters oxygen at baseline. He he a little hard of hearing. He gives himself his own medications there but states he only takes 2 medications, the rest he has slacked off on.. DNR-CC verified using facility paperwork.   
Neck circumference: 21 inches   [place] Sleepiness Scale 1  
New consult sent to [facility] via [employer] message.  
Nurse-to-nurse report called to [place]. ISAURA Peterson took report. Facility updated and questions answered.  
Occupational Therapy  OT BEDSIDE TREATMENT NOTE      Date:2024  Patient Name: Tashi Parry  MRN: [id number]  : 1931  Room: [address] [place], [place] [address]      Evaluating OT: Toyin Dodson OTR/L   [id number]       Referring Provider:Jessica Molina APRN - CNP     Specific Provider Orders/Date:OT eval and treat 10/28/2024       Diagnosis:  Pneumonia of both lower lobes due to infectious organism [J18.9]  Bilateral interstitial pneumonia (HCC) [J84.9]     Pertinent Medical History: DJD, HTN, macular degeneration, cardiac surgery,  hip surgery     Precautions:  Fall Risk, O2      Assessment of current deficits    [x] Functional mobility            [x]ADLs           [x] Strength                  [x]Cognition    [x] Functional transfers          [x] IADLs         [x] Safety Awareness   [x]Endurance    [x] Fine Coordination                         [x] Balance      [] Vision/perception   [x]Sensation      []Gross Motor Coordination             [] ROM           [] Delirium                   [] Motor Control      OT PLAN OF CARE   OT POC based on physician orders, patient diagnosis and results of clinical assessment     Frequency/Duration  2-3 days/wk for 2 - 4weeks PRN   Specific OT Treatment Interventions to include:   ADL retraining/adapted techniques and AE recommendations to increase functional independence within precautions                    Energy conservation techniques to improve tolerance for selfcare routine   Functional transfer/mobility training/DME recommendations for increased independence, safety and fall prevention         Patient/family education to increase safety and functional independence             Environmental modifications for safe mobility and completion of ADLs                             Therapeutic activity to improve functional performance during ADLs.                                         Therapeutic exercise to improve tolerance and functional strength for ADLs    Balance retraining/tolerance 
Occupational Therapy  OT BEDSIDE TREATMENT NOTE      Pt laying in the bed.  Declined to participate in therapy.  States he is too cold to get OOB.  Visitor present and encouraged pt to participate.  Pt continued to refuse.  Will attempt another time.         Coral BARNEY [id number]    
Per update from Dr. Phoenix on roundsgabriela for DC from her POV.    Electronically signed by Tiffany Robles RN on 11/4/2024 at 1:57 PM    
Performed overnight pulse oximeter on pt last night. Upon ending study, pt was awake eating breakfast, pulse ox unit was off along with finger probe. When attempting to download results, pulse ox unit would not turn on. BMET/clinical engineering came and was able to turn unit on. Upon attempting to download information, program showed message the unit was empty - no recorded info to download. BMET took unit to be serviced. Notified pts nurse Dayanara DELAROSA and routing this note to Dr. Carter.   
Physical Therapy  Facility/Department: [address] MED SURG  Physical Therapy Treatment Note    Name: Tashi Parry  : 1931  MRN: [id number]  Date of Service: 11/3/2024         Patient Diagnosis(es): The primary encounter diagnosis was Pneumonia of both lower lobes due to infectious organism. A diagnosis of Shortness of breath was also pertinent to this visit.  Past Medical History:  has a past medical history of BPH (benign prostatic hyperplasia), CAD (coronary artery disease), Carotid artery calcification, DJD (degenerative joint disease), History of falling, Hyperlipemia, Hypertension, Lung nodule, Macular degeneration, Numbness and tingling in left arm, Numbness and tingling of left side of face, S/P CABG x 6, and Shingles.  Past Surgical History:  has a past surgical history that includes Diagnostic Cardiac Cath Lab Procedure (00); Cardiac surgery; TURP; Dental surgery; Total hip arthroplasty (Right, 2011); Total hip arthroplasty (Left, 2009); other surgical history (2011); Thoracoscopy (Left, 2019); TURP (N/A, 2019); Prostate surgery (Bilateral, 2019); and Lung removal, partial (Left).              Requires PT Follow-Up: Yes     Evaluating Therapist: Antionette Sheffield PT      Referring Provider:      Jessica Molina APRN - NURY            PT order : PT eval and treat      Room #: 623  DIAGNOSIS: The encounter diagnosis was Pleural effusion.  Additional Pertinent History:COVID   PRECAUTIONS:  falls, O2, droplet plus      Social:  Pt lives at [facility]  in a 1 floor plan   Prior to admission pt walked with  cane . Pt reports he does not wear O2, but chart states O2@ 5 LNC at baseline        Initial Evaluation  Date:  10/29/2024  Treatment  11/3/2024    Short Term/ Long Term   Goals   Was pt agreeable to Eval/treatment? Yes   yes     Does pt have pain?   No complaints      Bed Mobility  Rolling:  NT   Supine to sit:  NT   Sit to supine:  NT   Scooting:  independent in sit  NT  
Physical Therapy  Facility/Department: [address] MED SURG  Physical Therapy Initial Assessment    Name: Tashi Parry  : 1931  MRN: [id number]  Date of Service: 10/29/2024         Patient Diagnosis(es): The encounter diagnosis was Pneumonia of both lower lobes due to infectious organism.  Past Medical History:  has a past medical history of BPH (benign prostatic hyperplasia), CAD (coronary artery disease), Carotid artery calcification, DJD (degenerative joint disease), History of falling, Hyperlipemia, Hypertension, Lung nodule, Macular degeneration, Numbness and tingling in left arm, Numbness and tingling of left side of face, S/P CABG x 6, and Shingles.  Past Surgical History:  has a past surgical history that includes Diagnostic Cardiac Cath Lab Procedure (00); Cardiac surgery; TURP; Dental surgery; Total hip arthroplasty (Right, 2011); Total hip arthroplasty (Left, 2009); other surgical history (2011); Thoracoscopy (Left, 2019); TURP (N/A, 2019); Prostate surgery (Bilateral, 2019); and Lung removal, partial (Left).              Requires PT Follow-Up: Yes     Evaluating Therapist: Antionette Sheffield PT      Referring Provider:      Jessica Molina APRN - CNP            PT order : PT eval and treat      Room #: 623  DIAGNOSIS: The encounter diagnosis was Pleural effusion.  Additional Pertinent History:COVID   PRECAUTIONS:  falls, O2, droplet plus      Social:  Pt lives at [facility]  in a 1 floor plan   Prior to admission pt walked with  cane . Pt reports he does not wear O2, but chart states O2@ 5 LNC at baseline        Initial Evaluation  Date:  10/29/2024  Treatment      Short Term/ Long Term   Goals   Was pt agreeable to Eval/treatment? Yes        Does pt have pain?         Bed Mobility  Rolling:  NT   Supine to sit:  NT   Sit to supine:  NT   Scooting:  independent in sit     Independent    Transfers Sit to stand:  SBA   Stand to sit:  SBA   Stand pivot:  NT     S/I  
Pulse oximetry assessment   86% at rest on room air (if 88% or less, skip next steps)    91% at rest on 2LPM  92% while ambulating on 2LPM    
SPEECH LANGUAGE PATHOLOGY  DAILY PROGRESS NOTE      PATIENT NAME:  Tashi Parry      :  1931          TODAY'S DATE:  10/30/2024 ROOM:  [address] [place], [place] [address]    Current Diet: ADULT DIET; Regular    Patient seen for ongoing dysphagia tx. SLP observed meal with patient's current diet of regular solids and thin liquids. Patient alert, sitting upright at edge of bed, and self fed. Per patient report, no difficulty with current diet. Patient with good mastication and oral clearance of solids. No overt s/s of aspiration noted with thin liquids or solid food.    Recommendation: Continue on current diet      CPT code(s) 07520  dysphagia tx  Total minutes :  15 minutes    Kim Arevalo  Speech Pathology     Felipa Miranda M.S. CCC-SLP/L  Speech Language Pathologist  [id number]     
SPEECH/LANGUAGE PATHOLOGY  VIDEOFLUOROSCOPIC STUDY OF SWALLOWING (MBS)   and PLAN OF CARE    PATIENT NAME:  Tashi Parry  (male)     MRN:  [id number]    :  1931  (89 y.o.)  STATUS:  Inpatient: Room 23/-A    TODAY'S DATE:  10/29/2024  ORDER DATE, DESCRIPTION AND REFERRING PROVIDER:  Dr. Carter : FL MODIFIED BARIUM SWALLOW W VIDEO   REASON FOR REFERRAL: Evaluate oropharyngeal swallow function   EVALUATING THERAPIST: Felipa Miranda, SLP      RESULTS:      DYSPHAGIA DIAGNOSIS:  Clinical indicators of minimal-mild pharyngeal phase dysphagia     DIET RECOMMENDATIONS:  Regular consistency solids (IDDSI level 7) with  thin liquids (IDDSI level 0)    FEEDING RECOMMENDATIONS:    Assistance level:  Set-up is required for all oral intake     Compensatory strategies recommended: Thorough oral care to prevent colonization of oral bacteria   Upright in bed/ chair as tolerated  Fully alert for all PO  NO STRAW     Discussed recommendations with:  report sent to referring provider    Laryngeal Penetration and Aspiration:  Penetration WITHOUT aspiration was observed in today's study with  thin liquid, mildly thick liquid (nectar)    SPEECH THERAPY  PLAN OF CARE   The dysphagia POC is established based on physician order and dysphagia diagnosis    Meal time assessment for 1-2 sessions to provide diet modification and compensatory strategy implementation due to need to ensure proper implementation of compensatory strategies during PO intake       Conditions Requiring Skilled Therapeutic Intervention for dysphagia:    Patient is performing below functional baseline d/t  current acute condition, Multiple diagnoses, multiple medications, and increased dependency upon caregivers.  swallow triggered once bolus head entered the valleculae    SPECIFIC DYSPHAGIA INTERVENTIONS TO INCLUDE:     ongoing mealtime assessment to provide diet modification and compensatory strategy implementation to minimize risk of aspiration 
Spiritual Health History and Assessment/Progress Note  [facility]    Initial Encounter, Attempted Encounter,  ,  ,      Name: Tashi Parry MRN: [id number]    Age: 89 y.o.     Sex: male   Language: English   Latter-day: Yazidi Yarsanism   Bilateral interstitial pneumonia (HCC)     Date: 11/1/2024                           Spiritual Assessment began in [address] MED SURG        Referral/Consult From: Rounding   Encounter Overview/Reason: Initial Encounter, Attempted Encounter  Service Provided For: Patient, Patient not available    Ayah, Belief, Meaning:   Patient unable to assess at this time  Family/Friends No family/friends present      Importance and Influence:  Patient unable to assess at this time  Family/Friends No family/friends present    Community:  Patient feels well-supported. Support system includes: Children and Extended family  Family/Friends No family/friends present    Assessment and Plan of Care:     Patient Interventions include: Other: Staff addressing the patient while rounding, silent prayer offered for the patient at the time.  Family/Friends Interventions include: No family/friends present    Patient Plan of Care: Spiritual Care available upon further referral  Family/Friends Plan of Care: No family/friends present    Electronically signed by Chaplain Carole on 11/1/2024 at 11:40 AM   
[facility]  Nephrology Progress Note  Patient's Name: Tashi Parry  4:52 PM  11/2/2024    PCP: Romaine Arnold MD  Nephrologist: Dr. Lizett MD    Reason for Consult:  CORBIN  Requesting Physician: Yamileth Patel MD       Chief Complaint:  weakness, shortness of breath    History Obtained From:  pt, daughter, chart    History of Present Illness  from the 10/31/24 note by Dr. Gamez:    Tashi Parry is a 89 y.o. male with PMHx coronary artery disease status post CABG x 6 in 2000, hypertension, hyperlipidemia, COPD.  He has history of BPH and urinary retention, had episode of neurogenic bladder and obstructive uropathy requiring intervention by urology in 2019, follows with Dr. Hale previously.  Also has history of non-small cell lung cancer, left upper lobe invasive adenocarcinoma status post robotic VATS and wedge resection with left lower lobe lobectomy in April 2019, he continues to have a right upper lobe spiculated nodule which presently is followed by Dr. Carter.    He also appears to have CKD stage II/borderline 3A disease.  A recent GFR was around 56 mL/min on outpatient labs at the [facility].  Creatinine at baseline recently 0.9 to 1.2 mg/dL.  Though in the past possibly prior to losing some muscle mass that had been 1.1 to 1.4 mg/dL dating back to 2011.      He had a recent bout of COVID-19 pneumonia which required hospital admission from September 22 through September 27, 2024.  Patient was treated with dexamethasone.  He has remained weak and deconditioned.  His Lopressor was changed to Coreg during that admission.  Discharged on that as well as amlodipine for hypertension which was above goal during that hospital stay.  Did not see nephrology during admission.    Patient's daughter is present.  States that he is continue to lose energy over the last month.  Before coming any told her that he had essentially 0 energy.    Patient presented to the hospital on 10/28 after an outpatient CT showed some 
Jessica R, APRN - CNP        0.9 % sodium chloride infusion   IntraVENous PRN Braund, Jessica R, APRN - CNP        ondansetron (ZOFRAN-ODT) disintegrating tablet 4 mg  4 mg Oral Q8H PRN Braund, Jessica R, APRN - CNP        Or    ondansetron (ZOFRAN) injection 4 mg  4 mg IntraVENous Q6H PRN Braund, Jessica R, APRN - CNP        magnesium hydroxide (MILK OF MAGNESIA) 400 MG/5ML suspension 30 mL  30 mL Oral Daily PRN Braund, Jessica R, APRN - CNP        acetaminophen (TYLENOL) tablet 650 mg  650 mg Oral Q6H PRN Braund, Jessica R, APRN - CNP        Or    acetaminophen (TYLENOL) suppository 650 mg  650 mg Rectal Q6H PRN Braund, Jessica R, APRN - CNP        ipratropium 0.5 mg-albuterol 2.5 mg (DUONEB) nebulizer solution 1 Dose  1 Dose Inhalation Q4H WA RT Tracy, Jessica R, APRN - CNP   1 Dose at 11/01/24 1615      sodium chloride         Physical Exam:  BP (!) 148/73   Pulse 60   Temp 97.9 °F (36.6 °C) (Oral)   Resp 18   Ht 1.676 m (5' 6\")   Wt 65.3 kg (143 lb 15.4 oz)   SpO2 92%   BMI 23.24 kg/m²   Wt Readings from Last 3 Encounters:   10/31/24 65.3 kg (143 lb 15.4 oz)   10/15/24 75.3 kg (166 lb)   10/04/24 75.3 kg (166 lb)     Appearance: Awake, alert, no acute respiratory distress  Skin: Intact, no rash  Head: Normocephalic, atraumatic  Eyes: EOMI, no conjunctival erythema  ENMT: No pharyngeal erythema, MMM, no rhinorrhea  Neck: Supple, no elevated JVP, no carotid bruits  Lungs: Bilateral diminished breath sounds with scattered wheezing.  Cardiac: Regular rate and rhythm, +S1S2, no murmurs apparent  Abdomen: Soft, nontender, +bowel sounds  Extremities: Moves all extremities x 4, no lower extremity edema  Neurologic: No focal motor deficits apparent, normal mood and affect  Peripheral Pulses: Intact posterior tibial pulses bilaterally    Intake/Output:    Intake/Output Summary (Last 24 hours) at 11/1/2024 1653  Last data filed at 11/1/2024 0720  Gross per 24 hour   Intake 330 ml   Output 150 ml   Net 180 ml     I/O this 
Pt would benefit from continued skilled OT to increase safety and independence with completion of ADL/IADL tasks for functional independence and quality of life.            Rehab Potential: good for established goals     Patient / Family Goal: return home      Patient and/or family were instructed on functional diagnosis, prognosis/goals and OT plan of care. Demonstrated good understanding.     Eval Complexity: Low    Time In: 1135  Time Out: 1146      Min Units   OT Eval Low 97165 x  1   OT Eval Medium 75025      OT Eval High 64774      OT Re-Eval 25451       Therapeutic Ex 84669      Therapeutic Activities 51232       ADL/Self Care 49975      Orthotic Management 35500       Manual 05514     Neuro Re-Ed 06638       Non-Billable Time       OT Re eval 08814        Evaluation Time additionally includes thorough review of current medical information, gathering information on past medical history/social history and prior level of function, interpretation of standardized testing/informal observation of tasks, assessment of data and development of plan of care and goals.            Toyin Dodson  OTR/L  OT [id number]                           
Date/Time     10/30/2024 05:07 AM    K 3.5 10/30/2024 05:07 AM    K 3.9 03/13/2023 04:20 AM     10/30/2024 05:07 AM    CO2 27 10/30/2024 05:07 AM    BUN 37 10/30/2024 05:07 AM    CREATININE 1.8 10/30/2024 05:07 AM    CALCIUM 8.8 10/30/2024 05:07 AM    GFRAA >60 10/04/2021 10:54 AM    LABGLOM 35 10/30/2024 05:07 AM    LABGLOM 57 12/01/2023 02:43 AM     Lab Results   Component Value Date/Time    PROTIME 11.8 08/20/2019 10:42 AM    INR 1.0 08/20/2019 10:42 AM     Recent Labs     10/29/24  0400   PROBNP 9,773*     No results for input(s): \"TROPONINI\" in the last 72 hours.  Recent Labs     10/29/24  0400   PROCAL 0.04     This SmartLink has not been configured with any valid records.   CRP 34.0  Sed rate 34.0    Micro:  No results for input(s): \"CULTRESP\" in the last 72 hours.  No results for input(s): \"LABGRAM\" in the last 72 hours.  No results for input(s): \"LEGUR\" in the last 72 hours.  No results for input(s): \"STREPNEUMAGU\" in the last 72 hours.  No results for input(s): \"LP1UAG\" in the last 72 hours.  2019 pathology  Diagnosis:     A. Lung, wedge biopsy, left lower lobe: Invasive adenocarcinoma,        moderate to poorly differentiated, margins of excision negative for        tumor, see comment      B. Lymph node, biopsy, inferior pulmonary ligament: Moderate        anthracosis, negative for metastatic carcinoma      C. Lymph node, biopsy, inferior hilar: Severe anthracosis negative for        metastatic carcinoma      D. Lymph node, biopsy, subcarinal #1: Moderate anthracosis negative        for metastatic carcinoma      E. Lymph node, biopsy, subcarinal #2: Adipose tissue, negative for        lymphoid tissue, negative for metastatic carcinoma      F. Lymph node, biopsy, posterior hilar: Lymph nodes x2 with moderate        anthracosis negative for metastatic carcinoma      G. Lymph node, biopsy, bronchial: Moderate anthracosis negative for        metastatic carcinoma      H. Lymph node, biopsy, 
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 await full interpretation/results through sleep lab.    Chest imaging reviewed 10/31/2024. Areas of chronic fibrosis, mild pulmonary vascular congestion repeat pro bnp  Placed on Zosyn for HCAP coverage until 11/3/24.  Okay to stop upon discharge.  Procal low 0.04 Received 1 dose of vancomycin in ED.  Strep and Legionella urine antigen negative.  Respiratory viral panel negative.  Blood cultures no growth 2 days.  No further hemoptysis reported  Scheduled bronchodilators-DuoNebs every 4 hours while awake.  Takes no medication regularly at home for his lungs.  Flutter valve, incentive spirometer-instructed on use  Speech therapy recommending regular solids and thin liquids based on video swallow study   Diuresis Lasix 20 mg IV daily. Plan to switch to oral soon. Creat 1.5 baseline 0.9-1.1  Cardiology consult for mgmt CHF -med adjustment noted  PT/OT , dispo SNF  Repeat CT chest 6 to 8 weeks as outpatient message routed for follow up  CHF clinic as outpatient, consult to CHF nurse during inpatient 
11/01/2024 06:45 AM    EOSABS 0.46 11/01/2024 06:45 AM    BASOSABS 0.10 11/01/2024 06:45 AM     BMP:    Lab Results   Component Value Date/Time     11/04/2024 03:41 AM    K 3.3 11/04/2024 03:41 AM    K 3.9 03/13/2023 04:20 AM     11/04/2024 03:41 AM    CO2 26 11/04/2024 03:41 AM    BUN 25 11/04/2024 03:41 AM    CREATININE 1.4 11/04/2024 03:41 AM    CALCIUM 8.6 11/04/2024 03:41 AM    GFRAA >60 10/04/2021 10:54 AM    LABGLOM 49 11/04/2024 03:41 AM    LABGLOM 57 12/01/2023 02:43 AM    GLUCOSE 105 11/04/2024 03:41 AM    GLUCOSE 109 05/08/2012 08:54 AM     Albumin:  No results found for: \"LABALBU\"  Magnesium:    Lab Results   Component Value Date/Time    MG 2.1 11/04/2024 03:41 AM     Phosphorus:    Lab Results   Component Value Date/Time    PHOS 3.2 11/04/2024 03:41 AM     LDH:  No results found for: \"LDH\"  Uric Acid:    Lab Results   Component Value Date/Time    URICACID 6.3 12/08/2020 10:05 AM    URICACID 6.9 06/27/2013 08:40 AM     Troponin:    Lab Results   Component Value Date/Time    TROPONINI <0.01 01/05/2021 09:56 AM     U/A:    Lab Results   Component Value Date/Time    COLORU Yellow 10/31/2024 05:00 PM    PROTEINU NEGATIVE 10/31/2024 05:00 PM    PHUR 6.0 10/31/2024 05:00 PM    PHUR 7.0 09/04/2021 12:26 PM    LABCAST RARE 04/14/2019 08:10 PM    WBCUA 0 TO 5 10/31/2024 05:00 PM    RBCUA 0 TO 2 10/31/2024 05:00 PM    MUCUS Present 12/28/2020 08:17 AM    BACTERIA 1+ 09/22/2024 05:20 AM    CLARITYU CLOUDY 09/04/2021 12:26 PM    LEUKOCYTESUR TRACE 10/31/2024 05:00 PM    UROBILINOGEN 0.2 10/31/2024 05:00 PM    BILIRUBINUR NEGATIVE 10/31/2024 05:00 PM    BLOODU TRACE-INTACT 09/04/2021 12:26 PM    GLUCOSEU NEGATIVE 10/31/2024 05:00 PM     HgBA1c:  No results found for: \"LABA1C\"  Microalbumen/Creatinine ratio:  No components found for: \"RUCREAT\"  FLP:    Lab Results   Component Value Date/Time    TRIG 68 12/08/2020 10:05 AM    HDL 69 12/08/2020 10:05 AM     TSH:    Lab Results   Component Value Date/Time    
Raul    Electronically signed by ABHISHEK Marquez - CNP on 10/31/2024 at 12:06 PM        I personally saw, examined, and cared for the patient. I performed the substantive portion of the visit. Labs, medications, radiographs reviewed. I agree with history exam and plans detailed in NP note.    Echo with reduced EF.  Will consult cardiology for recommendations.  Input appreciated.    Gabe Carter, DO        
10:05 AM     TSH:    Lab Results   Component Value Date/Time    TSH 2.53 12/01/2023 02:43 AM     VITAMIN B12: No components found for: \"B12\"  FOLATE:  No results found for: \"FOLATE\"  IRON:    Lab Results   Component Value Date/Time    IRON 22 09/06/2021 02:08 AM     Iron Saturation:  No components found for: \"PERCENTFE\"  TIBC:    Lab Results   Component Value Date/Time    TIBC 206 09/06/2021 02:08 AM     FERRITIN:    Lab Results   Component Value Date/Time    FERRITIN 205 09/06/2021 02:08 AM        Imaging:    US RETROPERITONEAL COMPLETE [[id number]] Collected: 10/31/24 1535     Order Status: Completed Updated: 10/31/24 1546     Body Surface Area 1.87 m2     Narrative:      EXAMINATION:  RETROPERITONEAL ULTRASOUND OF THE KIDNEYS AND URINARY BLADDER    10/31/2024    COMPARISON:  None    HISTORY:  ORDERING SYSTEM PROVIDED HISTORY: corbin, hx urinary retention  TECHNOLOGIST PROVIDED HISTORY:    Reason for exam:->corbin, hx urinary retention    FINDINGS:    Kidneys:    The right kidney measures 9.4 cm in length and the left kidney measures  10.0  cm in length.    Bilateral hyperechoic renal cortices.  No evidence of hydronephrosis or  intrarenal stones.  1.1 cm right and 4.4 cm left simple renal cyst.  No  further workup or follow-up necessary.      Bladder:    Diffuse urinary bladder wall thickening up to 5.2 mm.  Bilateral ureteral  jets noted.    MISCELLANEOUS:    Prostatomegaly with a volume of 49.6 cc.    Impression:      1. Bilateral hyperechoic renal cortices consistent with medical renal disease.  2. Bilateral simple renal cysts. No further workup or follow-up necessary.  3. Diffuse urinary bladder wall thickening up to 5.2 mm.  4. Prostatomegaly with a volume of 49.6 cc.          Assessment/Plan:    1-Stage I CORBIN on CKD G3A  -Baseline creatinine recently appears to be 0.9 to 1.1 mg/dL presumed sec to microvasc disease  -Patient has had history of acute kidney injury in the past if not at rate CKD with baseline 
chronic anemia which is normocytic  -His last iron panel in September 2021   -B12 527, Folate 16  PLAN:  -Await Fe++  -Follow CBC    6-Nutrition  -Albumin 3.6 g/dL on arrival now down to 3.2  PLAN:  -monitor      Thank you Yamileth Patel MD for allowing us to participate in care of Tashi Parry       Amara Phoenix MD  2:22 PM  11/3/2024

## 2024-11-04 NOTE — TELEPHONE ENCOUNTER
Francia Castro MD McGee, Shelia, MA  Schedule post-hospital f/u office visit with Dr Lyon 2-3 weeks    Francia Castro MD    Please schedule

## 2024-11-05 ENCOUNTER — TELEPHONE (OUTPATIENT)
Dept: PRIMARY CARE CLINIC | Age: 89
End: 2024-11-05

## 2024-11-05 NOTE — DISCHARGE SUMMARY
[facility]   Discharge summary   Patient ID:  Tashi Parry  [id number]  89 y.o.  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 (HCC)    Hx of coronary artery disease    Essential hypertension    Anemia    Benign prostatic hyperplasia    Gastroesophageal reflux disease    Macular degeneration    Overweight with body mass index (BMI) 25.0-29.9    Primary malignant neoplasm of lung (HCC)    Sensorineural hearing loss (SNHL) of both ears    History of non-ST elevation myocardial infarction (NSTEMI)    Fall down steps    Mild dementia (HCC)    Personal history of other malignant neoplasm of bronchus and lung    Acquired absence of lung (part of)    Nonexudative senile macular degeneration of retina    Primary osteoarthritis involving multiple joints    History of falling, presenting hazards to health    Constipation    COVID-19    Bilateral interstitial pneumonia (HCC)    Acute HFrEF (heart failure with reduced ejection fraction) (HCC)    Nonrheumatic mitral valve regurgitation    Pulmonary HTN (HCC)       Discharge Diagnoses: Shortness of breath     Consults: cardiology, pulmonary/intensive care, and nephrology    Procedures: n/a    Hospital Course: The patient is a 89 y.o. male of Romaine Arnold MD with significant past medical history of BPH, CAD, HLD, HTN, macular degeneration, CABG x 6  who presents with shortness of breathe     Assessment:     Principal Problem:    Bilateral interstitial pneumonia (HCC)  Active Problems:    Coronary artery disease involving coronary bypass graft of native heart without angina pectoris    Acute HFrEF (heart failure with reduced ejection fraction) (HCC)

## 2024-11-05 NOTE — TELEPHONE ENCOUNTER
Pts daughter calling, her dad needs a hospital f/u TCM appt do you want him to come to office or will you add him to a schedule. His next regular appt is not until Dec 6th    advise

## 2024-11-07 ENCOUNTER — TELEPHONE (OUTPATIENT)
Dept: PRIMARY CARE CLINIC | Age: 89
End: 2024-11-07

## 2024-11-07 NOTE — TELEPHONE ENCOUNTER
Care Transitions Initial Follow Up Call    Outreach made within 2 business days of discharge: Yes    Patient: Tashi Parry Patient : 1931   MRN: [id number]  Reason for Admission: 10/28/24  Discharge Date: 24       Spoke with: joselin daughter    Discharge department/facility: [facility] Patient Contact:  Was patient able to fill all prescriptions: Yes  Was patient instructed to bring all medications to the follow-up visit: Yes  Is patient taking all medications as directed in the discharge summary? Yes  Does patient understand their discharge instructions: Yes  Does patient have questions or concerns that need addressed prior to 7-14 day follow up office visit: no    Additional needs identified to be addressed with provider  No needs identified             Scheduled appointment with PCP within 7-14 days    Follow Up  Future Appointments   Date Time Provider Department Center   2024 11:30 AM Romaine Arnold MD TRAIL [facility]   2024  8:00 AM Shiloh Mg, APRN - CNS BDM CHF [facility]   2024 10:30 AM Romaine Arnold MD TRAIL [facility]   12/10/2024 10:20 AM Gabe Carter,  [facility]       Raquel Glover

## 2024-11-08 ENCOUNTER — OFFICE VISIT (OUTPATIENT)
Dept: PRIMARY CARE CLINIC | Age: 89
End: 2024-11-08

## 2024-11-08 VITALS
BODY MASS INDEX: 25.07 KG/M2 | HEIGHT: 66 IN | HEART RATE: 64 BPM | DIASTOLIC BLOOD PRESSURE: 56 MMHG | RESPIRATION RATE: 16 BRPM | SYSTOLIC BLOOD PRESSURE: 112 MMHG | TEMPERATURE: 98.3 F | OXYGEN SATURATION: 97 % | WEIGHT: 156 LBS

## 2024-11-08 DIAGNOSIS — I50.21 ACUTE HFREF (HEART FAILURE WITH REDUCED EJECTION FRACTION) (HCC): ICD-10-CM

## 2024-11-08 DIAGNOSIS — Z09 HOSPITAL DISCHARGE FOLLOW-UP: ICD-10-CM

## 2024-11-08 DIAGNOSIS — N17.9 ACUTE KIDNEY INJURY SUPERIMPOSED ON CHRONIC KIDNEY DISEASE (HCC): ICD-10-CM

## 2024-11-08 DIAGNOSIS — D64.9 ANEMIA, UNSPECIFIED TYPE: ICD-10-CM

## 2024-11-08 DIAGNOSIS — I25.810 CORONARY ARTERY DISEASE INVOLVING CORONARY BYPASS GRAFT OF NATIVE HEART WITHOUT ANGINA PECTORIS: ICD-10-CM

## 2024-11-08 DIAGNOSIS — I10 ESSENTIAL HYPERTENSION: ICD-10-CM

## 2024-11-08 DIAGNOSIS — J96.11 CHRONIC RESPIRATORY FAILURE WITH HYPOXIA: ICD-10-CM

## 2024-11-08 DIAGNOSIS — N18.9 ACUTE KIDNEY INJURY SUPERIMPOSED ON CHRONIC KIDNEY DISEASE (HCC): ICD-10-CM

## 2024-11-08 DIAGNOSIS — J84.9 BILATERAL INTERSTITIAL PNEUMONIA (HCC): Primary | ICD-10-CM

## 2024-11-08 PROBLEM — R26.2 DIFFICULTY IN WALKING, NOT ELSEWHERE CLASSIFIED: Status: ACTIVE | Noted: 2024-09-26

## 2024-11-08 PROBLEM — M62.81 MUSCLE WEAKNESS (GENERALIZED): Status: ACTIVE | Noted: 2024-09-26

## 2024-11-08 PROBLEM — R09.02 HYPOXEMIA: Status: ACTIVE | Noted: 2024-09-26

## 2024-11-08 RX ORDER — DOCUSATE CALCIUM 240 MG
240 CAPSULE ORAL 2 TIMES DAILY PRN
COMMUNITY

## 2024-11-08 NOTE — PROGRESS NOTES
>60 10/04/2021     Lab Results   Component Value Date    TSH 2.53 12/01/2023    T4FREE 1.25 12/08/2020     Lab Results   Component Value Date/Time    MG 2.1 11/04/2024 03:41 AM     PLAN:  Reviewed meds, labs and x-ray results and hospitalization info with patient and daughter. Encouraged good fluid intake. Restart Surfak 240 mg daily, ClearLax prn. Flutter valve and incentive spirometer. Discussed blood tinged sputum. Instructed to take Mucinex BID or Robitussin QID to loosen phlegm. Encouraged to drink plenty of fluids. May continue Tylenol ES in AM and Tylenol PM in PM. Restart stool softener 240 mg daily. Continue PEG as needed for constipation. Discussed diet and exercise. Continue other meds as per Rx List. F/U with specialists as scheduled. Recheck in 5-6 weeks.    An electronic signature was used to authenticate this note.  --Romaine Arnold MD

## 2024-11-12 ENCOUNTER — TELEPHONE (OUTPATIENT)
Dept: PRIMARY CARE CLINIC | Age: 89
End: 2024-11-12

## 2024-11-12 DIAGNOSIS — R09.02 HYPOXEMIA: Primary | ICD-10-CM

## 2024-11-12 DIAGNOSIS — J96.11 CHRONIC RESPIRATORY FAILURE WITH HYPOXIA: ICD-10-CM

## 2024-11-12 NOTE — TELEPHONE ENCOUNTER
Please send order for concentrator and I will tell patient to call [facility] to see if it is covered.

## 2024-11-12 NOTE — TELEPHONE ENCOUNTER
Patient's daughter phoned in requesting a portable O2 tank.  Mary Kate not sure if he is going to be a long term O2, but would be more convenient with a portable one.  He uses a mini portable to go down for meals which last  <1/2 hour and a bigger portable tank for office visits which only last 2-3 hrs.  They have a few appointments coming up and concerned tanks will not make it.    Please advise.

## 2024-11-13 ENCOUNTER — HOSPITAL ENCOUNTER (INPATIENT)
Age: 89
LOS: 6 days | Discharge: NURSING, REHAB OR LONG-TERM CARE | DRG: 291 | End: 2024-11-19
Attending: STUDENT IN AN ORGANIZED HEALTH CARE EDUCATION/TRAINING PROGRAM | Admitting: INTERNAL MEDICINE
Payer: MEDICARE

## 2024-11-13 ENCOUNTER — OFFICE VISIT (OUTPATIENT)
Age: 89
End: 2024-11-13
Payer: MEDICARE

## 2024-11-13 ENCOUNTER — APPOINTMENT (OUTPATIENT)
Dept: GENERAL RADIOLOGY | Age: 89
DRG: 291 | End: 2024-11-13
Payer: MEDICARE

## 2024-11-13 VITALS
RESPIRATION RATE: 18 BRPM | SYSTOLIC BLOOD PRESSURE: 188 MMHG | DIASTOLIC BLOOD PRESSURE: 78 MMHG | OXYGEN SATURATION: 95 % | BODY MASS INDEX: 26.95 KG/M2 | WEIGHT: 167 LBS

## 2024-11-13 DIAGNOSIS — I50.9 ACUTE ON CHRONIC CONGESTIVE HEART FAILURE, UNSPECIFIED HEART FAILURE TYPE (HCC): ICD-10-CM

## 2024-11-13 DIAGNOSIS — R33.9 URINARY RETENTION: Primary | ICD-10-CM

## 2024-11-13 DIAGNOSIS — I50.43 CHF (CONGESTIVE HEART FAILURE), NYHA CLASS I, ACUTE ON CHRONIC, COMBINED (HCC): ICD-10-CM

## 2024-11-13 DIAGNOSIS — I50.22 CHRONIC HEART FAILURE WITH MILDLY REDUCED EJECTION FRACTION (HFMREF, 41-49%) (HCC): Primary | ICD-10-CM

## 2024-11-13 LAB
ALBUMIN SERPL-MCNC: 3.6 G/DL (ref 3.5–5.2)
ALP SERPL-CCNC: 100 U/L (ref 40–129)
ALT SERPL-CCNC: 13 U/L (ref 0–40)
ANION GAP SERPL CALCULATED.3IONS-SCNC: 9 MMOL/L (ref 7–16)
AST SERPL-CCNC: 24 U/L (ref 0–39)
BACTERIA URNS QL MICRO: ABNORMAL
BASOPHILS # BLD: 0.11 K/UL (ref 0–0.2)
BASOPHILS NFR BLD: 1 % (ref 0–2)
BILIRUB DIRECT SERPL-MCNC: 0.2 MG/DL (ref 0–0.3)
BILIRUB INDIRECT SERPL-MCNC: 0.8 MG/DL (ref 0–1)
BILIRUB SERPL-MCNC: 1 MG/DL (ref 0–1.2)
BILIRUB UR QL STRIP: NEGATIVE
BNP SERPL-MCNC: ABNORMAL PG/ML (ref 0–450)
BUN SERPL-MCNC: 27 MG/DL (ref 6–23)
CALCIUM SERPL-MCNC: 8.7 MG/DL (ref 8.6–10.2)
CHLORIDE SERPL-SCNC: 103 MMOL/L (ref 98–107)
CLARITY UR: CLEAR
CO2 SERPL-SCNC: 25 MMOL/L (ref 22–29)
COLOR UR: YELLOW
CREAT SERPL-MCNC: 1.1 MG/DL (ref 0.7–1.2)
EKG ATRIAL RATE: 59 BPM
EKG P AXIS: 64 DEGREES
EKG P-R INTERVAL: 162 MS
EKG Q-T INTERVAL: 440 MS
EKG QRS DURATION: 120 MS
EKG QTC CALCULATION (BAZETT): 435 MS
EKG R AXIS: -37 DEGREES
EKG T AXIS: -38 DEGREES
EKG VENTRICULAR RATE: 59 BPM
EOSINOPHIL # BLD: 0.27 K/UL (ref 0.05–0.5)
EOSINOPHILS RELATIVE PERCENT: 3 % (ref 0–6)
ERYTHROCYTE [DISTWIDTH] IN BLOOD BY AUTOMATED COUNT: 16.4 % (ref 11.5–15)
FERRITIN SERPL-MCNC: 216 NG/ML
GFR, ESTIMATED: 65 ML/MIN/1.73M2
GLUCOSE SERPL-MCNC: 109 MG/DL (ref 74–99)
GLUCOSE UR STRIP-MCNC: NEGATIVE MG/DL
HCT VFR BLD AUTO: 28.8 % (ref 37–54)
HGB BLD-MCNC: 8.9 G/DL (ref 12.5–16.5)
HGB UR QL STRIP.AUTO: NEGATIVE
IMM GRANULOCYTES # BLD AUTO: 0.03 K/UL (ref 0–0.58)
IMM GRANULOCYTES NFR BLD: 0 % (ref 0–5)
IRON SATN MFR SERPL: 16 % (ref 20–55)
IRON SERPL-MCNC: 37 UG/DL (ref 59–158)
KETONES UR STRIP-MCNC: NEGATIVE MG/DL
LEUKOCYTE ESTERASE UR QL STRIP: NEGATIVE
LYMPHOCYTES NFR BLD: 1.07 K/UL (ref 1.5–4)
LYMPHOCYTES RELATIVE PERCENT: 10 % (ref 20–42)
MCH RBC QN AUTO: 27.6 PG (ref 26–35)
MCHC RBC AUTO-ENTMCNC: 30.9 G/DL (ref 32–34.5)
MCV RBC AUTO: 89.2 FL (ref 80–99.9)
MONOCYTES NFR BLD: 0.97 K/UL (ref 0.1–0.95)
MONOCYTES NFR BLD: 9 % (ref 2–12)
NEUTROPHILS NFR BLD: 76 % (ref 43–80)
NEUTS SEG NFR BLD: 7.84 K/UL (ref 1.8–7.3)
NITRITE UR QL STRIP: NEGATIVE
PH UR STRIP: 6 [PH] (ref 5–9)
PLATELET # BLD AUTO: 273 K/UL (ref 130–450)
PMV BLD AUTO: 8.6 FL (ref 7–12)
POTASSIUM SERPL-SCNC: 4.3 MMOL/L (ref 3.5–5)
PROT SERPL-MCNC: 6.9 G/DL (ref 6.4–8.3)
PROT UR STRIP-MCNC: 30 MG/DL
RBC # BLD AUTO: 3.23 M/UL (ref 3.8–5.8)
RBC #/AREA URNS HPF: ABNORMAL /HPF
SODIUM SERPL-SCNC: 137 MMOL/L (ref 132–146)
SP GR UR STRIP: 1.02 (ref 1–1.03)
T4 FREE SERPL-MCNC: 1.4 NG/DL (ref 0.9–1.7)
TIBC SERPL-MCNC: 231 UG/DL (ref 250–450)
TROPONIN I SERPL HS-MCNC: 62 NG/L (ref 0–11)
TROPONIN I SERPL HS-MCNC: 64 NG/L (ref 0–11)
TSH SERPL DL<=0.05 MIU/L-ACNC: 2.86 UIU/ML (ref 0.27–4.2)
UROBILINOGEN UR STRIP-ACNC: 1 EU/DL (ref 0–1)
WBC #/AREA URNS HPF: ABNORMAL /HPF
WBC OTHER # BLD: 10.3 K/UL (ref 4.5–11.5)

## 2024-11-13 PROCEDURE — 85025 COMPLETE CBC W/AUTO DIFF WBC: CPT

## 2024-11-13 PROCEDURE — 83550 IRON BINDING TEST: CPT

## 2024-11-13 PROCEDURE — 6360000002 HC RX W HCPCS: Performed by: FAMILY MEDICINE

## 2024-11-13 PROCEDURE — 6370000000 HC RX 637 (ALT 250 FOR IP): Performed by: FAMILY MEDICINE

## 2024-11-13 PROCEDURE — 84484 ASSAY OF TROPONIN QUANT: CPT

## 2024-11-13 PROCEDURE — 80053 COMPREHEN METABOLIC PANEL: CPT

## 2024-11-13 PROCEDURE — 93010 ELECTROCARDIOGRAM REPORT: CPT | Performed by: INTERNAL MEDICINE

## 2024-11-13 PROCEDURE — 71045 X-RAY EXAM CHEST 1 VIEW: CPT

## 2024-11-13 PROCEDURE — 81001 URINALYSIS AUTO W/SCOPE: CPT

## 2024-11-13 PROCEDURE — 99285 EMERGENCY DEPT VISIT HI MDM: CPT

## 2024-11-13 PROCEDURE — 6360000002 HC RX W HCPCS: Performed by: STUDENT IN AN ORGANIZED HEALTH CARE EDUCATION/TRAINING PROGRAM

## 2024-11-13 PROCEDURE — 93005 ELECTROCARDIOGRAM TRACING: CPT | Performed by: STUDENT IN AN ORGANIZED HEALTH CARE EDUCATION/TRAINING PROGRAM

## 2024-11-13 PROCEDURE — 82728 ASSAY OF FERRITIN: CPT

## 2024-11-13 PROCEDURE — 96374 THER/PROPH/DIAG INJ IV PUSH: CPT

## 2024-11-13 PROCEDURE — 2700000000 HC OXYGEN THERAPY PER DAY

## 2024-11-13 PROCEDURE — 2060000000 HC ICU INTERMEDIATE R&B

## 2024-11-13 PROCEDURE — 36415 COLL VENOUS BLD VENIPUNCTURE: CPT | Performed by: CLINICAL NURSE SPECIALIST

## 2024-11-13 PROCEDURE — 84443 ASSAY THYROID STIM HORMONE: CPT

## 2024-11-13 PROCEDURE — 82248 BILIRUBIN DIRECT: CPT

## 2024-11-13 PROCEDURE — 84439 ASSAY OF FREE THYROXINE: CPT

## 2024-11-13 PROCEDURE — 83880 ASSAY OF NATRIURETIC PEPTIDE: CPT

## 2024-11-13 PROCEDURE — 83540 ASSAY OF IRON: CPT

## 2024-11-13 PROCEDURE — 2580000003 HC RX 258: Performed by: FAMILY MEDICINE

## 2024-11-13 RX ORDER — PHENOL 1.4 %
1 AEROSOL, SPRAY (ML) MUCOUS MEMBRANE DAILY
COMMUNITY

## 2024-11-13 RX ORDER — SODIUM CHLORIDE 0.9 % (FLUSH) 0.9 %
10 SYRINGE (ML) INJECTION PRN
Status: DISCONTINUED | OUTPATIENT
Start: 2024-11-13 | End: 2024-11-19 | Stop reason: HOSPADM

## 2024-11-13 RX ORDER — ACETAMINOPHEN 650 MG/1
650 SUPPOSITORY RECTAL EVERY 6 HOURS PRN
Status: DISCONTINUED | OUTPATIENT
Start: 2024-11-13 | End: 2024-11-19 | Stop reason: HOSPADM

## 2024-11-13 RX ORDER — ENOXAPARIN SODIUM 100 MG/ML
40 INJECTION SUBCUTANEOUS DAILY
Status: DISCONTINUED | OUTPATIENT
Start: 2024-11-13 | End: 2024-11-19 | Stop reason: DRUGHIGH

## 2024-11-13 RX ORDER — CARVEDILOL 6.25 MG/1
12.5 TABLET ORAL 2 TIMES DAILY WITH MEALS
Status: DISCONTINUED | OUTPATIENT
Start: 2024-11-13 | End: 2024-11-19 | Stop reason: HOSPADM

## 2024-11-13 RX ORDER — MAGNESIUM SULFATE IN WATER 40 MG/ML
2000 INJECTION, SOLUTION INTRAVENOUS PRN
Status: DISCONTINUED | OUTPATIENT
Start: 2024-11-13 | End: 2024-11-19 | Stop reason: HOSPADM

## 2024-11-13 RX ORDER — SODIUM CHLORIDE 9 MG/ML
INJECTION, SOLUTION INTRAVENOUS PRN
Status: DISCONTINUED | OUTPATIENT
Start: 2024-11-13 | End: 2024-11-19 | Stop reason: HOSPADM

## 2024-11-13 RX ORDER — HYDRALAZINE HYDROCHLORIDE 10 MG/1
10 TABLET, FILM COATED ORAL EVERY 8 HOURS SCHEDULED
Status: DISCONTINUED | OUTPATIENT
Start: 2024-11-13 | End: 2024-11-19 | Stop reason: HOSPADM

## 2024-11-13 RX ORDER — CALCIUM CARBONATE/VITAMIN D3 500MG-5MCG
1 TABLET ORAL DAILY
COMMUNITY
End: 2024-11-13 | Stop reason: ALTCHOICE

## 2024-11-13 RX ORDER — GUAIFENESIN/DEXTROMETHORPHAN 100-10MG/5
10 SYRUP ORAL EVERY 6 HOURS PRN
Status: DISCONTINUED | OUTPATIENT
Start: 2024-11-13 | End: 2024-11-19 | Stop reason: HOSPADM

## 2024-11-13 RX ORDER — ACETAMINOPHEN 325 MG/1
650 TABLET ORAL EVERY 4 HOURS PRN
Status: DISCONTINUED | OUTPATIENT
Start: 2024-11-13 | End: 2024-11-13 | Stop reason: DRUGHIGH

## 2024-11-13 RX ORDER — SODIUM CHLORIDE 0.9 % (FLUSH) 0.9 %
5-40 SYRINGE (ML) INJECTION EVERY 12 HOURS SCHEDULED
Status: DISCONTINUED | OUTPATIENT
Start: 2024-11-13 | End: 2024-11-19 | Stop reason: HOSPADM

## 2024-11-13 RX ORDER — GUAIFENESIN 400 MG/1
400 TABLET ORAL EVERY 4 HOURS PRN
Status: DISCONTINUED | OUTPATIENT
Start: 2024-11-13 | End: 2024-11-19 | Stop reason: HOSPADM

## 2024-11-13 RX ORDER — ACETAMINOPHEN 325 MG/1
650 TABLET ORAL EVERY 6 HOURS PRN
Status: DISCONTINUED | OUTPATIENT
Start: 2024-11-13 | End: 2024-11-19 | Stop reason: HOSPADM

## 2024-11-13 RX ORDER — ONDANSETRON 4 MG/1
4 TABLET, ORALLY DISINTEGRATING ORAL EVERY 8 HOURS PRN
Status: DISCONTINUED | OUTPATIENT
Start: 2024-11-13 | End: 2024-11-19 | Stop reason: HOSPADM

## 2024-11-13 RX ORDER — GUAIFENESIN 600 MG/1
1200 TABLET, EXTENDED RELEASE ORAL 2 TIMES DAILY PRN
COMMUNITY

## 2024-11-13 RX ORDER — FUROSEMIDE 10 MG/ML
20 INJECTION INTRAMUSCULAR; INTRAVENOUS ONCE
Status: COMPLETED | OUTPATIENT
Start: 2024-11-13 | End: 2024-11-13

## 2024-11-13 RX ORDER — POTASSIUM CHLORIDE 7.45 MG/ML
10 INJECTION INTRAVENOUS PRN
Status: DISCONTINUED | OUTPATIENT
Start: 2024-11-13 | End: 2024-11-13 | Stop reason: RX

## 2024-11-13 RX ORDER — ONDANSETRON 2 MG/ML
4 INJECTION INTRAMUSCULAR; INTRAVENOUS EVERY 6 HOURS PRN
Status: DISCONTINUED | OUTPATIENT
Start: 2024-11-13 | End: 2024-11-19 | Stop reason: HOSPADM

## 2024-11-13 RX ORDER — ASPIRIN 81 MG/1
81 TABLET ORAL DAILY
Status: DISCONTINUED | OUTPATIENT
Start: 2024-11-13 | End: 2024-11-19 | Stop reason: HOSPADM

## 2024-11-13 RX ORDER — GUAIFENESIN 600 MG/1
1200 TABLET, EXTENDED RELEASE ORAL 2 TIMES DAILY PRN
Status: DISCONTINUED | OUTPATIENT
Start: 2024-11-13 | End: 2024-11-13 | Stop reason: CLARIF

## 2024-11-13 RX ORDER — ISOSORBIDE DINITRATE 10 MG/1
5 TABLET ORAL 3 TIMES DAILY
Status: DISCONTINUED | OUTPATIENT
Start: 2024-11-13 | End: 2024-11-19 | Stop reason: HOSPADM

## 2024-11-13 RX ORDER — NICOTINE POLACRILEX 2 MG
1 LOZENGE BUCCAL EVERY MORNING
COMMUNITY

## 2024-11-13 RX ORDER — ZINC GLUCONATE 2 [HP_X]/1
1 LOZENGE ORAL
COMMUNITY
End: 2024-11-13 | Stop reason: ALTCHOICE

## 2024-11-13 RX ORDER — BUMETANIDE 0.25 MG/ML
1 INJECTION, SOLUTION INTRAMUSCULAR; INTRAVENOUS 2 TIMES DAILY
Status: DISCONTINUED | OUTPATIENT
Start: 2024-11-13 | End: 2024-11-18

## 2024-11-13 RX ORDER — POTASSIUM CHLORIDE 1500 MG/1
40 TABLET, EXTENDED RELEASE ORAL PRN
Status: DISCONTINUED | OUTPATIENT
Start: 2024-11-13 | End: 2024-11-18

## 2024-11-13 RX ADMIN — ENOXAPARIN SODIUM 40 MG: 100 INJECTION SUBCUTANEOUS at 21:40

## 2024-11-13 RX ADMIN — FUROSEMIDE 20 MG: 10 INJECTION, SOLUTION INTRAMUSCULAR; INTRAVENOUS at 11:51

## 2024-11-13 RX ADMIN — HYDRALAZINE HYDROCHLORIDE 10 MG: 10 TABLET ORAL at 16:16

## 2024-11-13 RX ADMIN — BUMETANIDE 1 MG: 0.25 INJECTION INTRAMUSCULAR; INTRAVENOUS at 16:16

## 2024-11-13 RX ADMIN — ISOSORBIDE DINITRATE 5 MG: 10 TABLET ORAL at 21:40

## 2024-11-13 RX ADMIN — ISOSORBIDE DINITRATE 5 MG: 10 TABLET ORAL at 16:16

## 2024-11-13 RX ADMIN — ASPIRIN 81 MG: 81 TABLET, COATED ORAL at 16:15

## 2024-11-13 RX ADMIN — SODIUM CHLORIDE, PRESERVATIVE FREE 10 ML: 5 INJECTION INTRAVENOUS at 23:59

## 2024-11-13 RX ADMIN — CARVEDILOL 12.5 MG: 6.25 TABLET, FILM COATED ORAL at 16:16

## 2024-11-13 RX ADMIN — HYDRALAZINE HYDROCHLORIDE 10 MG: 10 TABLET ORAL at 21:40

## 2024-11-13 ASSESSMENT — PAIN - FUNCTIONAL ASSESSMENT
PAIN_FUNCTIONAL_ASSESSMENT: NONE - DENIES PAIN

## 2024-11-13 ASSESSMENT — PAIN SCALES - GENERAL: PAINLEVEL_OUTOF10: 0

## 2024-11-13 NOTE — ED NOTES
Department of Emergency Medicine  FIRST PROVIDER TRIAGE NOTE             Independent MLP           11/13/24  9:47 AM EST    Date of Encounter: 11/13/24   MRN: [id number]      HPI: Tashi Parry is a 89 y.o. male who presents to the ED for No chief complaint on file.     NO URINE OUTPUT SINCE THIS MORNING.  SENT OVER FROM CHF CLINIC FOR EVALUATION.   STATES IT FEELS LIKE HE HAS TO URINATE.     ROS: Negative for cp or sob.    PE: Gen Appearance/Constitutional: alert  HEENT: NC/NT. PERRLA,  Airway patent.     Initial Plan of Care: All treatment areas with department are currently occupied. Plan to order/Initiate the following while awaiting opening in ED.  Initiate Treatment-Testing, Proceed toTreatment Area When Bed Available for ED Attending/MLP to Continue Care    Electronically signed by ABHISHEK Bowers CNP   DD: 11/13/24      Víctor Menard APRN - CNP  11/13/24 0949

## 2024-11-13 NOTE — ED PROVIDER NOTES
CARDIOLOGY        I am the Primary Clinician of Record.    FINAL IMPRESSION      1. Urinary retention    2. Acute on chronic congestive heart failure, unspecified heart failure type (HCC)          DISPOSITION/PLAN     DISPOSITION Admitted 11/13/2024 12:12:38 PM      PATIENT REFERRED TO:  No follow-up provider specified.    DISCHARGE MEDICATIONS:  New Prescriptions    No medications on file       DISCONTINUED MEDICATIONS:  Discontinued Medications    CALCIUM CARB-CHOLECALCIFEROL (OYSTER SHELL CALCIUM PLUS D) 500-5 MG-MCG TABS    Take 1 tablet by mouth daily    CALCIUM CARBONATE (CALCIUM 600 PO)    Take 1 tablet by mouth daily    DOCUSATE CALCIUM (SURFAK) 240 MG CAPSULE    Take 1 capsule by mouth 2 times daily as needed for Constipation    GUAIFENESIN 400 MG TABLET    Take 1 tablet by mouth 3 times daily    HEXYLRESORCINOL (SB SORETHROAT LOZENGES MT)    Take by mouth every 2 hours as needed    HOMEOPATHIC PRODUCTS (COLD-EEZE) LOZG    Take 1 lozenge by mouth every 2 hours as needed (SORE THROAT)    LOPERAMIDE (IMODIUM) 2 MG CAPSULE    Take 1 capsule by mouth as needed for Diarrhea    MULTIPLE VITAMIN (MULTIVITAMIN ADULT) TABS    Take 1 tablet by mouth daily    OMEGA-3 FATTY ACIDS (FISH OIL OMEGA-3) 1000 MG CAPS    Take 2,000 mg by mouth daily    ONDANSETRON (ZOFRAN) 4 MG TABLET    Take 1-2 tablets by mouth every 8 hours as needed for Nausea or Vomiting    OXYGEN    Inhale 5 L/min into the lungs as needed for Shortness of Breath (OR O2 SAT >90%)    OXYMETAZOLINE (AFRIN) 0.05 % NASAL SPRAY    2 sprays by Nasal route 2 times daily as needed for Congestion              (Please note that portions of this note were completed with a voice recognition program.  Efforts were made to edit the dictations but occasionally words are mis-transcribed.)    Juice Tillman MD (electronically signed)            Juice Tillman MD  11/13/24 4300

## 2024-11-13 NOTE — ED NOTES
ED to Inpatient Handoff Report    Notified Floor that electronic handoff available and patient ready for transport to room 443.    Safety Risks: Hearing problems and Risk of falls    Patient in Restraints: no    Constant Observer or Patient : no    Telemetry Monitoring Ordered :Yes           Order to transfer to unit without monitor:YES    Last MEWS: 1 Time completed: 1446    Deterioration Index Score:   Predictive Model Details          38 (Caution)  Factor Value    Calculated 11/13/2024 14:45 37% Age 89 years old    Deterioration Index Model 29% Supplemental oxygen Nasal cannula     16% Systolic 187     5% Potassium 4.3 mmol/L     3% WBC count 10.3 k/uL     3% Hematocrit abnormal (28.8 %)     3% Pulse oximetry 93 %     2% BUN abnormal (27 mg/dL)     1% Pulse 62     1% Sodium 137 mmol/L     0% Temperature 97.5 °F (36.4 °C)     0% Respiratory rate 16        Vitals:    11/13/24 0950 11/13/24 1151 11/13/24 1157 11/13/24 1441   BP: (!) 176/73 (!) 203/77 (!) 203/77 (!) 187/67   Pulse: 58  60 62   Resp: 14  16 16   Temp: 97.7 °F (36.5 °C)  97.7 °F (36.5 °C) 97.5 °F (36.4 °C)   TempSrc: Oral  Oral Oral   SpO2: 100%  93% 93%   Weight: 75.8 kg (167 lb)      Height: 1.676 m (5' 6\")            Opportunity for questions and clarification was provided.

## 2024-11-13 NOTE — PROGRESS NOTES
1/2018    History of NSVT    VHD: moderate MR, mild to moderate AI    History of non-small cell lung cancer-invasive adenocarcinoma, moderate to poorly differentiated with history of left lower lobectomy 2019     Recent CORBIN with peak Cr 1.8    Pancreatic mass     BPH: questionable urinary retention     Hypoalbuminemia     Chronic normocytic anemia     Chronically elevated troponin: likely chronic myocardial strain    13. Iodine allergy: rash     14.  Remote history of tobacco use      PLAN:    Echocardiogram results reviewed with Mr. Parry, his daughter and son in law.     Given significant hypervolemia and decrease urinary output, will send to ER for further evaluation. He and his family are agreeable.     Follow up in CHF clinic and with Dr. Lyon post discharge.       ABHISHEK Mcgee-CNS  [facility] Cardiology

## 2024-11-14 PROBLEM — I38 VHD (VALVULAR HEART DISEASE): Status: ACTIVE | Noted: 2024-11-14

## 2024-11-14 PROBLEM — I50.9 ACUTE ON CHRONIC CONGESTIVE HEART FAILURE (HCC): Status: ACTIVE | Noted: 2024-11-14

## 2024-11-14 PROBLEM — I50.810 RVF (RIGHT VENTRICULAR FAILURE) (HCC): Status: ACTIVE | Noted: 2024-11-14

## 2024-11-14 PROBLEM — I25.5 ISCHEMIC CARDIOMYOPATHY: Status: ACTIVE | Noted: 2024-11-14

## 2024-11-14 LAB
ANION GAP SERPL CALCULATED.3IONS-SCNC: 11 MMOL/L (ref 7–16)
BUN SERPL-MCNC: 24 MG/DL (ref 6–23)
CALCIUM SERPL-MCNC: 8.6 MG/DL (ref 8.6–10.2)
CHLORIDE SERPL-SCNC: 103 MMOL/L (ref 98–107)
CHOLEST SERPL-MCNC: 206 MG/DL
CO2 SERPL-SCNC: 24 MMOL/L (ref 22–29)
CREAT SERPL-MCNC: 1.2 MG/DL (ref 0.7–1.2)
GFR, ESTIMATED: 59 ML/MIN/1.73M2
GLUCOSE SERPL-MCNC: 105 MG/DL (ref 74–99)
HDLC SERPL-MCNC: 43 MG/DL
LDLC SERPL CALC-MCNC: 147 MG/DL
MAGNESIUM SERPL-MCNC: 1.9 MG/DL (ref 1.6–2.6)
POTASSIUM SERPL-SCNC: 4 MMOL/L (ref 3.5–5)
SODIUM SERPL-SCNC: 138 MMOL/L (ref 132–146)
TRIGL SERPL-MCNC: 80 MG/DL
VLDLC SERPL CALC-MCNC: 16 MG/DL

## 2024-11-14 PROCEDURE — 6370000000 HC RX 637 (ALT 250 FOR IP): Performed by: FAMILY MEDICINE

## 2024-11-14 PROCEDURE — 80048 BASIC METABOLIC PNL TOTAL CA: CPT

## 2024-11-14 PROCEDURE — 2700000000 HC OXYGEN THERAPY PER DAY

## 2024-11-14 PROCEDURE — 6360000002 HC RX W HCPCS: Performed by: FAMILY MEDICINE

## 2024-11-14 PROCEDURE — 99223 1ST HOSP IP/OBS HIGH 75: CPT | Performed by: INTERNAL MEDICINE

## 2024-11-14 PROCEDURE — 83735 ASSAY OF MAGNESIUM: CPT

## 2024-11-14 PROCEDURE — 51798 US URINE CAPACITY MEASURE: CPT

## 2024-11-14 PROCEDURE — 2580000003 HC RX 258: Performed by: FAMILY MEDICINE

## 2024-11-14 PROCEDURE — 2060000000 HC ICU INTERMEDIATE R&B

## 2024-11-14 PROCEDURE — 80061 LIPID PANEL: CPT

## 2024-11-14 PROCEDURE — APPSS180 APP SPLIT SHARED TIME > 60 MINUTES: Performed by: NURSE PRACTITIONER

## 2024-11-14 RX ORDER — OXYMETAZOLINE HYDROCHLORIDE 0.05 G/100ML
2 SPRAY NASAL 2 TIMES DAILY PRN
COMMUNITY
Start: 2023-07-21

## 2024-11-14 RX ORDER — CHLORAL HYDRATE 500 MG
2 CAPSULE ORAL DAILY
COMMUNITY
Start: 2024-10-03

## 2024-11-14 RX ORDER — CALCIUM CARBONATE/VITAMIN D3 500MG-5MCG
1 TABLET ORAL DAILY
COMMUNITY

## 2024-11-14 RX ORDER — BISACODYL 10 MG
10 SUPPOSITORY, RECTAL RECTAL ONCE
Status: COMPLETED | OUTPATIENT
Start: 2024-11-14 | End: 2024-11-14

## 2024-11-14 RX ORDER — ONDANSETRON 8 MG/1
8 TABLET, ORALLY DISINTEGRATING ORAL EVERY 8 HOURS PRN
COMMUNITY

## 2024-11-14 RX ORDER — M-VIT,TX,IRON,MINS/CALC/FOLIC 27MG-0.4MG
1 TABLET ORAL DAILY
COMMUNITY

## 2024-11-14 RX ADMIN — ENOXAPARIN SODIUM 40 MG: 100 INJECTION SUBCUTANEOUS at 20:05

## 2024-11-14 RX ADMIN — BUMETANIDE 1 MG: 0.25 INJECTION INTRAMUSCULAR; INTRAVENOUS at 17:17

## 2024-11-14 RX ADMIN — BUMETANIDE 1 MG: 0.25 INJECTION INTRAMUSCULAR; INTRAVENOUS at 08:48

## 2024-11-14 RX ADMIN — ISOSORBIDE DINITRATE 5 MG: 10 TABLET ORAL at 08:48

## 2024-11-14 RX ADMIN — HYDRALAZINE HYDROCHLORIDE 10 MG: 10 TABLET ORAL at 20:06

## 2024-11-14 RX ADMIN — GUAIFENESIN AND DEXTROMETHORPHAN 10 ML: 100; 10 SYRUP ORAL at 21:40

## 2024-11-14 RX ADMIN — HYDRALAZINE HYDROCHLORIDE 10 MG: 10 TABLET ORAL at 04:03

## 2024-11-14 RX ADMIN — ISOSORBIDE DINITRATE 5 MG: 10 TABLET ORAL at 20:06

## 2024-11-14 RX ADMIN — SODIUM CHLORIDE, PRESERVATIVE FREE 10 ML: 5 INJECTION INTRAVENOUS at 20:06

## 2024-11-14 RX ADMIN — BISACODYL 10 MG: 10 SUPPOSITORY RECTAL at 15:33

## 2024-11-14 RX ADMIN — MAGNESIUM HYDROXIDE 30 ML: 400 SUSPENSION ORAL at 08:51

## 2024-11-14 RX ADMIN — SODIUM CHLORIDE, PRESERVATIVE FREE 10 ML: 5 INJECTION INTRAVENOUS at 08:52

## 2024-11-14 RX ADMIN — ASPIRIN 81 MG: 81 TABLET, COATED ORAL at 08:48

## 2024-11-14 RX ADMIN — CARVEDILOL 12.5 MG: 6.25 TABLET, FILM COATED ORAL at 08:48

## 2024-11-14 ASSESSMENT — PAIN SCALES - GENERAL: PAINLEVEL_OUTOF10: 0

## 2024-11-14 NOTE — H&P
[facility]  History and Physical      CHIEF COMPLAINT:    Chief Complaint   Patient presents with    Urinary Retention     Has not urinated since 6 am. Also has been having trouble urinating for the past 5 days.    Leg Swelling     Bilat. Sent over by chf clinic.         Patient of Romaine Arnold MD presents with:  CHF (congestive heart failure), NYHA class I, acute on chronic, combined (HCC)    History of Present Illness:   Mr. Parry is a 89 year old  male with a past medical history of chronic HFmEF; coronary artery disease s/p CABG in 2000; moderate MR and AI; COPD; non small cell lung CA s/p VATS, left lower lobe wedge resection and lobectomy in April 2019; right upper lobe spiculated lung nodule; and pancreatic mass.   Mr. Parry presented to [facility] ED on 11/13/2024 with complaints of urinary retention over the past 5 days with BLE edema. He has also been having increased MACDONALD and weight gain this past week. He denies abdominal pain, nausea, diarrhea, and chest pain. He was sent to the ED from the CHF Clinic.  Upon arrival to the ED, his VS were 97.7-58-18-95% 4 L-188/78. CXR with bronchial wall thickening with bilateral interstitial and airspace opacities. This as increased since last time. Consider component of edema/infection in the acute setting. EKG SB with HR 59. He received Lasix 20mg IV and was admitted to a telemetry monitored unit.   On evaluation resting comfortably in no apparent acute distress.  He indicates his breathing is somewhat improved today from when he initially came in after diuresis.  He denies any chest heaviness or discomfort.        REVIEW OF SYSTEMS:  Pertinent negatives are above in HPI.  10 point ROS otherwise negative.      Past Medical History:   Diagnosis Date    BPH (benign prostatic hyperplasia)     CAD (coronary artery disease)     Carotid artery calcification     DJD (degenerative joint disease)     History of falling     Hyperlipemia

## 2024-11-14 NOTE — CARE COORDINATION
CASE MANAGEMENT.... Chart reviewed. Patient is a readmission with same issue of sob/fluid overload. Was denied by insurance for [facility]. Was sent in from CHF Clinic and being treated for CHF exac. Cardio/CHF nurse on board. He is receiving iv bumex bid and requiring o2 4Lnc which he says is new for him. Mr Parry confirmed he is from [facility] and that he has cane, walker and wheelchair ([facility]). Per chart review he has no h/o HHC, but has been in [facility] in the past. PT/OT on board to assist with dc planning. States he is NOT interested in [facility]. Agreeable for me to discuss hospital stay/dc plans with his daughter, Mray Kate. Called her at [phone]. No answer. Left vm. At this time dc plans/needs are TBD. Phone number for nurse to nurse at [facility] is [phone].  Will follow.

## 2024-11-14 NOTE — ACP (ADVANCE CARE PLANNING)
Advance Care Planning   Healthcare Decision Maker:    Primary Decision Maker: Mary Kate Leahy - [facility] [phone]

## 2024-11-14 NOTE — PLAN OF CARE
Problem: Chronic Conditions and Co-morbidities  Goal: Patient's chronic conditions and co-morbidity symptoms are monitored and maintained or improved  11/14/2024 1132 by Emelia Ortiz RN  Outcome: Progressing     Problem: Discharge Planning  Goal: Discharge to home or other facility with appropriate resources  11/14/2024 1132 by Emelia Ortiz RN  Outcome: Progressing     Problem: Safety - Adult  Goal: Free from fall injury  11/14/2024 1132 by Emelia Ortiz RN  Outcome: Progressing     Problem: ABCDS Injury Assessment  Goal: Absence of physical injury  11/14/2024 1132 by Emelia Ortiz RN  Outcome: Progressing     Problem: Pain  Goal: Verbalizes/displays adequate comfort level or baseline comfort level  11/14/2024 1132 by Emelia Ortiz RN  Outcome: Progressing     Problem: Respiratory - Adult  Goal: Achieves optimal ventilation and oxygenation  11/14/2024 1132 by Emelia Ortiz RN  Outcome: Progressing     Problem: Cardiovascular - Adult  Goal: Maintains optimal cardiac output and hemodynamic stability  11/14/2024 1132 by Emelia Ortiz RN  Outcome: Progressing    Problem: Cardiovascular - Adult  Goal: Absence of cardiac dysrhythmias or at baseline  11/14/2024 1132 by Emelia Ortiz RN  Outcome: Progressing     Problem: Musculoskeletal - Adult  Goal: Return mobility to safest level of function  11/14/2024 1132 by Emelia Ortiz RN  Outcome: Progressing     Problem: Musculoskeletal - Adult  Goal: Return ADL status to a safe level of function  11/14/2024 1132 by Emelia Ortiz RN  Outcome: Progressing     Problem: Genitourinary - Adult  Goal: Absence of urinary retention  Outcome: Progressing     Problem: Genitourinary - Adult  Goal: Urinary catheter remains patent  11/14/2024 1132 by Emelia Ortiz RN  Outcome: Progressing     Problem: Metabolic/Fluid and Electrolytes - Adult  Goal: Electrolytes maintained within normal limits  Outcome: Progressing    Problem: Neurosensory -

## 2024-11-14 NOTE — CONSULTS
Comprehensive Nutrition Assessment    Type and Reason for Visit:  Initial, Consult, Patient education    Nutrition Recommendations/Plan:   Continue current diet  Start HC/HP ONS w/ meals  Will continue to monitor while inpatient     Malnutrition Assessment:  Malnutrition Status:  At risk for malnutrition (11/14/24 1439)    Context:  Acute Illness     Findings of the 6 clinical characteristics of malnutrition:  Energy Intake:  Mild decrease in energy intake  Weight Loss:  Unable to assess (d/t possible fluid shifts)     Body Fat Loss:  No body fat loss     Muscle Mass Loss:  Mild muscle mass loss Temples (temporalis)  Fluid Accumulation:  Unable to assess (d/t multifactorial)     Strength:  Not Performed    Nutrition Assessment:    Pt w/ acute on chronic CHF, urinary retention. Hx lung CA s/p L lobectomy, CAD s/p CABG x 6, COPD. Consult for CHF diet instruction- pt from assisted living facility; education not appropriate at this time. Pt w/ decreased appetite, consuming variable amounts of meals (0%, %). Will add HC/HP ONS w/ meals to promote protein/energy intake. Will continue to monitor.    Nutrition Related Findings:    A&O x4, abd WDL, BLE trace edema, -2.2 L, bumex Wound Type: None       Current Nutrition Intake & Therapies:    Average Meal Intake: 0%, %  Average Supplements Intake: None Ordered  ADULT DIET; Regular; Low Sodium (2 gm)  ADULT ORAL NUTRITION SUPPLEMENT; Breakfast, Lunch, Dinner; Standard High Calorie/High Protein Oral Supplement    Anthropometric Measures:  Height: 167.6 cm (5' 6\")  Ideal Body Weight (IBW): 142 lbs (65 kg)    Admission Body Weight: 74 kg (163 lb 1.6 oz) (11/13 bed scale)  Current Body Weight: 73.6 kg (162 lb 4.8 oz) (11/14), 114.3 % IBW. Weight Source: Bed scale  Current BMI (kg/m2): 26.2  Usual Body Weight: 76.3 kg (168 lb 3 oz) (8/23/24)     % Weight Change (Calculated): -3.5  Weight Adjustment For: No Adjustment     BMI Categories: Overweight (BMI

## 2024-11-14 NOTE — CONSULTS
Inpatient Cardiology Consultation      Reason for Consult: CHF exacerbation    Consulting Physician: Dr. Whittaker    Requesting Physician:  ABHISHEK Downey CNP     Date of Consultation: 11/14/2024    HISTORY OF PRESENT ILLNESS: 89 y.o.  male, known to [facility] cardiology and follows with Dr. Lyon.  He was seen through inpatient consultation with Dr. Castro 3/11/2023 for elevated troponin.  He followed up with Dr. Lyon in the office on 3/31/2023, last seen by Dr. Castro 10/28/2024 inpatient for follow-up for CHF and VHD, and discussed Lexiscan, which patient declined.  He also follows with the CHF clinic, seen by ELKIN Mcgee, 11/13/2024, weight 167 LBS (9 pound weight gain in 5 days), /78, O2 sat 95% on 4 L NC, appeared hypervolemic, hypoxic, and having decreased urinary output, was advised to report to the ED for further evaluation.    11/13/2024: Presented to the ED from the CHF clinic for evaluation of 5-day history of difficulties with urination, and urinary retention, with CHF clinic noting weight gain, dyspnea with exertion, and significant leg swelling.    Patient is sitting up to the back of bed, alert and oriented x 3, no obvious signs of distress.  He reports that he went to the CHF clinic yesterday, was told that he had weight gain, and lower extremity edema.  He states he has been having difficulties with urination recently, with decreased output, but that has improved since yesterday.  He states he has not had shortness of breath, but then clarified and said that he has not had worsening shortness of breath, he has had ongoing shortness of breath chronically for the last several months.  Denies any associated symptoms, including chest pain, palpitations, neck pain, jaw pain, arm pain, numbness, tingling, abdominal pain, nausea, vomiting, fever or chills.  He reports he is taking all of his medications like he supposed to, has not missed any doses, has not had

## 2024-11-14 NOTE — DISCHARGE INSTRUCTIONS
***HEART FAILURE - CONGESTIVE HEART FAILURE***  DISCHARGE INSTRUCTIONS:  GUIDELINES TO FOLLOW AT [facility]/SNF/ Assisted Living    Future Appointments   Date Time Provider Department Center   12/6/2024 10:30 AM Romaine Arnold MD TRAIL [facility]   12/10/2024 10:20 AM Gabe Carter, DO [facility]   12/13/2024 11:00 AM Romaine Arnold MD TRAIL [facility]          MEDICATIONS:  Please notify the doctor if patient is not able to take their medications or if medications are being held for any reasons (such as low blood pressure ect.)  Do not give the patient ibuprofen (Advil or Motrin), naproxen (Aleve) without talking to the doctor first. This could make their heart failure worse.         WEIGHT MONITORING:   Weigh patient every day in the morning after they void (If patient is able to stand, please get a standing weight.)   Notify the doctor of a weight gain of 3 pounds or more in 1 day   OR  a total of 5 pounds or more in 1 week             DIET   Cardiac heart healthy diet:  Low sodium diet: no  more than 2,000mg (2 grams) of salt / sodium per day (which equals to a little less than  a teaspoon of salt)/ Cardiac Diet: Low saturated / low trans fat, no added salt, caffeine restricted    If patient is there for rehab and will be returning home in the near future; reinforce with the patient and the family to follow a low sodium diet (2,000 mg)- avoid using salt at the table, avoid / limit use of canned soups, processed / packaged foods, salted snacks, olives and pickles.  Do not use a salt substitute without checking with the doctor. (Mrs. Schroeder is safe to use).       NOTIFY THE DOCTOR THE FIRST DAY OF ONSET OF ANY OF THESE   SYMPTOMS:   Weight gain of 3 pounds or more in 1 day         OR 5 pounds or more in one week  More shortness of breath  More swelling in stomach, legs, ankles or feet  Feeling more tired, No energy  Dry hacky cough  Dizziness  More chest pain / discomfort  Hard time breathing  Patient is a 89y old  Female who presents with a chief complaint of GI bleed (25 Nov 2022 10:36)        Over Night Events:  this AM became increasingly tachypneic, hypoxic, and hypotensive requiring increasing vasopressor  I discussed with the patient's son by phone and later at bedside, plan for CMO.      ROS:     Unable to assess ROS: patient unresponsive.        PHYSICAL EXAM    ICU Vital Signs Last 24 Hrs  T(C): 36.2 (04 Dec 2022 08:00), Max: 37.1 (04 Dec 2022 04:00)  T(F): 97.2 (04 Dec 2022 08:00), Max: 98.8 (04 Dec 2022 04:00)  HR: 63 (04 Dec 2022 10:00) (63 - 97)  BP: 115/58 (04 Dec 2022 10:00) (90/49 - 115/58)  BP(mean): 80 (04 Dec 2022 10:00) (60 - 80)  ABP: 84/60 (04 Dec 2022 07:00) (79/53 - 110/68)  ABP(mean): 71 (04 Dec 2022 07:00) (65 - 84)  RR: 37 (04 Dec 2022 10:00) (26 - 47)  SpO2: 57% (04 Dec 2022 10:00) (57% - 100%)    O2 Parameters below as of 04 Dec 2022 10:00  Patient On (Oxygen Delivery Method): mask, nonrebreather    O2 Concentration (%): 100        Constitutional: no acute distress, well nourished well developed  Neuro: moving all 4 limbs spontaneously, no facial droop or dysarthria  HEENT: NCAT, anicteric  Neck: no visible lymphadenopathy or goiter  Pulm: no respiratory distress. clear to auscultation bilaterally  Cardiac: extremities appear pink and well-perfused.  regular rhythm and rate, no murmur detected  Abdomen: non-distended  Extremities: no peripheral edema      12-03-22 @ 07:01  -  12-04-22 @ 07:00  --------------------------------------------------------  IN:    IV PiggyBack: 50 mL    Norepinephrine: 24 mL    Pantoprazole: 240 mL  Total IN: 314 mL    OUT:    Indwelling Catheter - Urethral (mL): 720 mL  Total OUT: 720 mL    Total NET: -406 mL      12-04-22 @ 07:01  -  12-04-22 @ 10:14  --------------------------------------------------------  IN:    Norepinephrine: 75 mL    Pantoprazole: 30 mL  Total IN: 105 mL    OUT:    Indwelling Catheter - Urethral (mL): 15 mL  Total OUT: 15 mL    Total NET: 90 mL          LABS:                            9.2    30.00 )-----------( 131      ( 04 Dec 2022 06:30 )             30.6                                               12-04    139  |  107  |  43<H>  ----------------------------<  144<H>  4.8   |  18  |  2.4<H>    Ca    7.3<L>      04 Dec 2022 06:30  Phos  6.1     12-04  Mg     2.2     12-04    TPro  4.8<L>  /  Alb  2.4<L>  /  TBili  0.4  /  DBili  x   /  AST  15  /  ALT  17  /  AlkPhos  112  12-04                                                                                           LIVER FUNCTIONS - ( 04 Dec 2022 06:30 )  Alb: 2.4 g/dL / Pro: 4.8 g/dL / ALK PHOS: 112 U/L / ALT: 17 U/L / AST: 15 U/L / GGT: x                                                                                                                                   ABG - ( 02 Dec 2022 14:15 )  pH, Arterial: 7.33  pH, Blood: x     /  pCO2: 35    /  pO2: 83    / HCO3: 18    / Base Excess: -6.6  /  SaO2: 97.4                MEDICATIONS  (STANDING):  cefepime   IVPB      cefepime   IVPB 2000 milliGRAM(s) IV Intermittent every 24 hours  chlorhexidine 2% Cloths 1 Application(s) Topical <User Schedule>  dextrose 5%. 1000 milliLiter(s) (50 mL/Hr) IV Continuous <Continuous>  dextrose 5%. 1000 milliLiter(s) (100 mL/Hr) IV Continuous <Continuous>  dextrose 50% Injectable 25 Gram(s) IV Push once  dextrose 50% Injectable 12.5 Gram(s) IV Push once  dextrose 50% Injectable 25 Gram(s) IV Push once  glucagon  Injectable 1 milliGRAM(s) IntraMuscular once  insulin lispro (ADMELOG) corrective regimen sliding scale   SubCutaneous every 6 hours  morphine  - Injectable 2 milliGRAM(s) IV Push once  morphine  Infusion 1 mG/Hr (1 mL/Hr) IV Continuous <Continuous>  norepinephrine Infusion 0.05 MICROgram(s)/kG/Min (4.49 mL/Hr) IV Continuous <Continuous>  pantoprazole Infusion 8 mG/Hr (10 mL/Hr) IV Continuous <Continuous>    MEDICATIONS  (PRN):  dextrose Oral Gel 15 Gram(s) Oral once PRN Blood Glucose LESS THAN 70 milliGRAM(s)/deciliter      New X-rays reviewed:       ECHO reviewed    CXR interpreted by me:

## 2024-11-15 PROBLEM — R33.9 URINARY RETENTION: Status: ACTIVE | Noted: 2024-11-15

## 2024-11-15 LAB
ANION GAP SERPL CALCULATED.3IONS-SCNC: 10 MMOL/L (ref 7–16)
BASOPHILS # BLD: 0.1 K/UL (ref 0–0.2)
BASOPHILS NFR BLD: 1 % (ref 0–2)
BNP SERPL-MCNC: 9233 PG/ML (ref 0–450)
BUN SERPL-MCNC: 28 MG/DL (ref 6–23)
CALCIUM SERPL-MCNC: 8.6 MG/DL (ref 8.6–10.2)
CHLORIDE SERPL-SCNC: 99 MMOL/L (ref 98–107)
CO2 SERPL-SCNC: 28 MMOL/L (ref 22–29)
CREAT SERPL-MCNC: 1.2 MG/DL (ref 0.7–1.2)
EOSINOPHIL # BLD: 0.58 K/UL (ref 0.05–0.5)
EOSINOPHILS RELATIVE PERCENT: 5 % (ref 0–6)
ERYTHROCYTE [DISTWIDTH] IN BLOOD BY AUTOMATED COUNT: 16 % (ref 11.5–15)
GFR, ESTIMATED: 55 ML/MIN/1.73M2
GLUCOSE SERPL-MCNC: 133 MG/DL (ref 74–99)
HCT VFR BLD AUTO: 26.4 % (ref 37–54)
HGB BLD-MCNC: 8.5 G/DL (ref 12.5–16.5)
IMM GRANULOCYTES # BLD AUTO: 0.06 K/UL (ref 0–0.58)
IMM GRANULOCYTES NFR BLD: 1 % (ref 0–5)
LYMPHOCYTES NFR BLD: 1.2 K/UL (ref 1.5–4)
LYMPHOCYTES RELATIVE PERCENT: 11 % (ref 20–42)
MAGNESIUM SERPL-MCNC: 2 MG/DL (ref 1.6–2.6)
MCH RBC QN AUTO: 28.2 PG (ref 26–35)
MCHC RBC AUTO-ENTMCNC: 32.2 G/DL (ref 32–34.5)
MCV RBC AUTO: 87.7 FL (ref 80–99.9)
MONOCYTES NFR BLD: 1.03 K/UL (ref 0.1–0.95)
MONOCYTES NFR BLD: 9 % (ref 2–12)
NEUTROPHILS NFR BLD: 74 % (ref 43–80)
NEUTS SEG NFR BLD: 8.32 K/UL (ref 1.8–7.3)
PLATELET # BLD AUTO: 250 K/UL (ref 130–450)
PMV BLD AUTO: 8.6 FL (ref 7–12)
POTASSIUM SERPL-SCNC: 4.1 MMOL/L (ref 3.5–5)
RBC # BLD AUTO: 3.01 M/UL (ref 3.8–5.8)
SODIUM SERPL-SCNC: 137 MMOL/L (ref 132–146)
WBC OTHER # BLD: 11.3 K/UL (ref 4.5–11.5)

## 2024-11-15 PROCEDURE — 80048 BASIC METABOLIC PNL TOTAL CA: CPT

## 2024-11-15 PROCEDURE — 97165 OT EVAL LOW COMPLEX 30 MIN: CPT

## 2024-11-15 PROCEDURE — 6370000000 HC RX 637 (ALT 250 FOR IP): Performed by: INTERNAL MEDICINE

## 2024-11-15 PROCEDURE — 99222 1ST HOSP IP/OBS MODERATE 55: CPT | Performed by: NURSE PRACTITIONER

## 2024-11-15 PROCEDURE — 83880 ASSAY OF NATRIURETIC PEPTIDE: CPT

## 2024-11-15 PROCEDURE — 2060000000 HC ICU INTERMEDIATE R&B

## 2024-11-15 PROCEDURE — 6360000002 HC RX W HCPCS: Performed by: INTERNAL MEDICINE

## 2024-11-15 PROCEDURE — 2580000003 HC RX 258: Performed by: INTERNAL MEDICINE

## 2024-11-15 PROCEDURE — 2580000003 HC RX 258: Performed by: FAMILY MEDICINE

## 2024-11-15 PROCEDURE — 97161 PT EVAL LOW COMPLEX 20 MIN: CPT

## 2024-11-15 PROCEDURE — 6370000000 HC RX 637 (ALT 250 FOR IP): Performed by: FAMILY MEDICINE

## 2024-11-15 PROCEDURE — 85025 COMPLETE CBC W/AUTO DIFF WBC: CPT

## 2024-11-15 PROCEDURE — 83735 ASSAY OF MAGNESIUM: CPT

## 2024-11-15 PROCEDURE — 99233 SBSQ HOSP IP/OBS HIGH 50: CPT | Performed by: INTERNAL MEDICINE

## 2024-11-15 PROCEDURE — 6360000002 HC RX W HCPCS: Performed by: FAMILY MEDICINE

## 2024-11-15 PROCEDURE — 2700000000 HC OXYGEN THERAPY PER DAY

## 2024-11-15 PROCEDURE — 97535 SELF CARE MNGMENT TRAINING: CPT

## 2024-11-15 RX ORDER — METOLAZONE 5 MG/1
5 TABLET ORAL ONCE
Status: COMPLETED | OUTPATIENT
Start: 2024-11-15 | End: 2024-11-15

## 2024-11-15 RX ADMIN — SODIUM CHLORIDE, PRESERVATIVE FREE 10 ML: 5 INJECTION INTRAVENOUS at 09:39

## 2024-11-15 RX ADMIN — HYDRALAZINE HYDROCHLORIDE 10 MG: 10 TABLET ORAL at 20:30

## 2024-11-15 RX ADMIN — CARVEDILOL 12.5 MG: 6.25 TABLET, FILM COATED ORAL at 08:32

## 2024-11-15 RX ADMIN — BUMETANIDE 1 MG: 0.25 INJECTION INTRAMUSCULAR; INTRAVENOUS at 17:04

## 2024-11-15 RX ADMIN — ENOXAPARIN SODIUM 40 MG: 100 INJECTION SUBCUTANEOUS at 20:30

## 2024-11-15 RX ADMIN — ACETAMINOPHEN 650 MG: 325 TABLET ORAL at 14:26

## 2024-11-15 RX ADMIN — BUMETANIDE 1 MG: 0.25 INJECTION INTRAMUSCULAR; INTRAVENOUS at 08:32

## 2024-11-15 RX ADMIN — HYDRALAZINE HYDROCHLORIDE 10 MG: 10 TABLET ORAL at 05:35

## 2024-11-15 RX ADMIN — SODIUM CHLORIDE 125 MG: 900 INJECTION INTRAVENOUS at 11:23

## 2024-11-15 RX ADMIN — CARVEDILOL 12.5 MG: 6.25 TABLET, FILM COATED ORAL at 17:04

## 2024-11-15 RX ADMIN — METOLAZONE 5 MG: 5 TABLET ORAL at 14:26

## 2024-11-15 RX ADMIN — ISOSORBIDE DINITRATE 5 MG: 10 TABLET ORAL at 20:30

## 2024-11-15 RX ADMIN — ISOSORBIDE DINITRATE 5 MG: 10 TABLET ORAL at 08:32

## 2024-11-15 RX ADMIN — ASPIRIN 81 MG: 81 TABLET, COATED ORAL at 08:32

## 2024-11-15 RX ADMIN — SODIUM CHLORIDE, PRESERVATIVE FREE 10 ML: 5 INJECTION INTRAVENOUS at 20:30

## 2024-11-15 ASSESSMENT — PAIN DESCRIPTION - DESCRIPTORS: DESCRIPTORS: DISCOMFORT

## 2024-11-15 ASSESSMENT — PAIN DESCRIPTION - LOCATION: LOCATION: GENERALIZED

## 2024-11-15 ASSESSMENT — PAIN SCALES - GENERAL
PAINLEVEL_OUTOF10: 4
PAINLEVEL_OUTOF10: 0

## 2024-11-15 NOTE — CARE COORDINATION
Social Work/Discharge Planning:  Received notification patient sabrina Hartmann is requesting for her father to have rehab at [facility].  Patient admitted from [place] at [facility].  Called liakenny Negron with [facility] and confirmed facility can accept patient, but will need a pre-cert.  Facility to initiate pre-cert on Monday 11/18 if medically stable.  Therapy to evaluate patient.  Met with patient and provided him with an update.  Patient currently on three liters oxygen.  He states he wears four liters oxygen at the [facility].  PTA he uses a cane.  Patient is agreeable to rehab at [facility] at discharge.  Called patient daughter Mary Kate (ph: [phone]) and provided her with an update.  Mary Kate states her father's oxygen supplier is [employer].  Will continue to follow and assist with discharge planning.  Electronically signed by MARTHA Lopez on 11/15/2024 at 12:52 PM

## 2024-11-15 NOTE — CONSULTS
Palliative Care Department  [phone]  Palliative Care Initial Consult  Provider ABHISHEK Mishra CNP      PATIENT: Tashi Parry  : 1931  MRN: [id number]  ADMISSION DATE: 2024  9:54 AM  Referring Provider: Yamileth Patel MD     Palliative Medicine was consulted on hospital day 2 for assistance with Goals of care, Code Status Discussion    HPI:     Clinical Summary:Tashi Parry is a 89 y.o. y/o male with a history of chronic HFmEF; coronary artery disease s/p CABG in ; moderate MR and AI; COPD; non small cell lung CA s/p VATS, left lower lobe wedge resection and lobectomy in 2019; right upper lobe spiculated lung nodule; and pancreatic mass who presented to [facility] on 2024 with urinary retention and bilateral lower extremity edema.  Chest x-ray showed bilateral interstitial airspace opacities.  He was started on diuretics and admitted to telemetry.    ASSESSMENT/PLAN:     Pertinent Hospital Diagnoses     Acute on chronic CHF exacerbation  Acute on chronic hypoxic respiratory failure  Urinary retention    Palliative Care Encounter / Counseling Regarding Goals of Care  Please see detailed goals of care discussion as below  At this time, Tashi Parry, Does have capacity for medical decision-making.  Capacity is time limited and situation/question specific  Outcome of goals of care meeting:  Continue current medical management  CODE STATUS changed to limited DNR CCA/DNI  Plan is rehab at discharge  Code status Limited DNRCCA/ DNI  Advanced Directives: no POA or living will in epic  Surrogate/Legal NOK:  Mary Kate Leahy - Child - [phone]     Spiritual assessment: no spiritual distress identified  Bereavement and grief: to be determined  Referrals to: none today    Thank you for the opportunity to participate in the care of Tashi Parry.     ABHISHEK Mishra CNP   Palliative Medicine     SUBJECTIVE:     Details of Conversation: Chart reviewed and met with

## 2024-11-15 NOTE — PLAN OF CARE
Problem: Genitourinary - Adult  Goal: Urinary catheter remains patent  11/15/2024 1146 by Emelia Ortiz RN  Outcome: Completed

## 2024-11-15 NOTE — PLAN OF CARE
Problem: Chronic Conditions and Co-morbidities  Goal: Patient's chronic conditions and co-morbidity symptoms are monitored and maintained or improved  Outcome: Progressing     Problem: Discharge Planning  Goal: Discharge to home or other facility with appropriate resources  Outcome: Progressing     Problem: Safety - Adult  Goal: Free from fall injury  Outcome: Progressing     Problem: ABCDS Injury Assessment  Goal: Absence of physical injury  Outcome: Progressing     Problem: Pain  Goal: Verbalizes/displays adequate comfort level or baseline comfort level  Outcome: Progressing     Problem: Respiratory - Adult  Goal: Achieves optimal ventilation and oxygenation  Outcome: Progressing     Problem: Cardiovascular - Adult  Goal: Maintains optimal cardiac output and hemodynamic stability  Outcome: Progressing     Problem: Cardiovascular - Adult  Goal: Absence of cardiac dysrhythmias or at baseline  Outcome: Progressing     Problem: Musculoskeletal - Adult  Goal: Return mobility to safest level of function  Outcome: Progressing     Problem: Musculoskeletal - Adult  Goal: Return ADL status to a safe level of function  Outcome: Progressing     Problem: Genitourinary - Adult  Goal: Absence of urinary retention  Outcome: Progressing       Problem: Metabolic/Fluid and Electrolytes - Adult  Goal: Electrolytes maintained within normal limits  Outcome: Progressing  Goal: Hemodynamic stability and optimal renal function maintained  Outcome: Progressing     Problem: Neurosensory - Adult  Goal: Achieves stable or improved neurological status  Outcome: Progressing     Problem: Neurosensory - Adult  Goal: Achieves maximal functionality and self care  Outcome: Progressing     Problem: Skin/Tissue Integrity - Adult  Goal: Skin integrity remains intact  Outcome: Progressing     Problem: Gastrointestinal - Adult  Goal: Minimal or absence of nausea and vomiting  Outcome: Progressing  Goal: Maintains or returns to baseline bowel

## 2024-11-15 NOTE — DISCHARGE INSTR - COC
use, (age 12 y+), 30mcg/0.3mL 01/18/2021, 02/08/2021, 11/04/2021    Hep A, HAVRIX, VAQTA, (age 12m-18y), IM, 0.5mL 07/14/2009    Influenza Vaccine, unspecified formulation 10/14/2016    Influenza Virus Vaccine 10/01/2012, 10/09/2013, 11/01/2014, 01/01/2016, 03/01/2017, 10/06/2017, 09/30/2018, 11/13/2019, 09/28/2020, 11/02/2021, 10/23/2022    Influenza, FLUAD, (age 65 y+), IM, Quadv, 0.5mL 11/04/2021    Influenza, FLUCELVAX, (age 6 mo+), MDCK, Quadv MDV, 0.5mL 11/20/2018, 11/26/2019    Influenza, FLUZONE High Dose (age 65 y+), IM, Quadv, 0.7mL 11/21/2022, 10/19/2023    Influenza, FLUZONE High Dose, (age 65 y+), IM, Trivalent PF, 0.5mL 10/09/2020    Pneumococcal Vaccine 10/01/2011    Pneumococcal, PCV-13, PREVNAR 13, (age 6w+), IM, 0.5mL 04/12/2017    Pneumococcal, PPSV23, PNEUMOVAX 23, (age 2y+), SC/IM, 0.5mL 10/01/2011, 11/26/2019    TDaP, ADACEL (age 10y-64y), BOOSTRIX (age 10y+), IM, 0.5mL 12/10/2022    Zoster Recombinant (Shingrix) 09/12/2019, 12/20/2019       Active Problems:  Patient Active Problem List   Diagnosis Code    Coronary artery disease involving coronary bypass graft of native heart without angina pectoris I25.810    Hyperlipemia E78.5    Asymptomatic bilateral carotid artery stenosis I65.23    Malignant neoplasm of lower lobe of left lung (HCC) C34.32    Hx of coronary artery disease Z86.79    Essential hypertension I10    Anemia D64.9    Benign prostatic hyperplasia without lower urinary tract symptoms N40.0    Gastro-esophageal reflux disease without esophagitis K21.9    Macular degeneration H35.30    Overweight with body mass index (BMI) 25.0-29.9 E66.3    Primary malignant neoplasm of lung (HCC) C34.90    Sensorineural hearing loss (SNHL) of both ears H90.3    History of non-ST elevation myocardial infarction (NSTEMI) I25.2    Fall down steps W10.8XXA    Unspecified dementia, mild, without behavioral disturbance, psychotic disturbance, mood disturbance, and anxiety (HCC) F03.A0    Personal

## 2024-11-15 NOTE — CONSULTS
[address] [place] [address] Consultation      REASON FOR CONSULTATION:  Medical evaluation    ATTENDING/ADMITTING PHYSICIAN:Carter Hale MD    HISTORY OF PRESENT ILLNESS:      The patient is a 80 y.o. male patient of Ted Lee MD who presents with hematuria. Patient recently had urinary retention in the setting of surgery. Patient needed for the Insertion but unfortunately had urosepsis. Patient returns for cystoscopy retrograde pyelogram, urethral dilatation, transurethral resection of prostate. Patient did well postop. He has no complaints the time of my visit. Past Medical History:    Past Medical History:   Diagnosis Date    BPH (benign prostatic hyperplasia)     CAD (coronary artery disease)     Carotid artery calcification     DJD (degenerative joint disease)     Hyperlipemia     Lung nodule     Macular degeneration     Numbness and tingling in left arm 2/2/2018    Numbness and tingling of left side of face 2/2/2018    S/P CABG x 6 2000    LIMA-LAD, LYNNETTE-LCx,SVG-OM,SVG-LPL,SVG-PDA-RPL       Past Surgical History:    Past Surgical History:   Procedure Laterality Date    CARDIAC SURGERY      6 vessel in 2000   [facility] DENTAL SURGERY      root canal    DIAGNOSTIC CARDIAC CATH LAB PROCEDURE  9/5/00    OTHER SURGICAL HISTORY  02/16/2011    Injection right hip  T. Quinton Arceo MD    THORACOSCOPY Left 4/12/2019    BRONCHOSCOPY LEFT THORACOSCOPY ROBOTIC VIDEO ASSISTED , WEDGE RESECTION, POSS.  LEFT LOWER LOBECTOMY performed by Warren Lira MD at [address] [place] [address] Right 03/28/2011    Right BRIONNA Starks MD    TOTAL HIP ARTHROPLASTY Left 11/09/2009    Left BRIONNA Starks MD    TURP      TURP N/A 5/30/2019    CYSTOSCOPY, RETROGRADE PYELOGRAM,  URERTHRAL DILITATION, TRANSURETHRAL RESECTION PROSTATE performed by Matt Ladd MD at [facility] OR       Medications Prior to Admission:    Medications Prior to Admission: cephALEXin (KEFLEX) 500 MG capsule, Population Health Chart Review & Patient Outreach Details      Additional [facility] Health Notes:               Updates Requested / Reviewed:      Updated Care Coordination Note and Immunizations Reconciliation Completed or Queried: [facility] Topics Overdue:      [facility] Score: 0     Patient is not due for any topics at this time.    Influenza Vaccine  Pneumonia Vaccine  RSV Vaccine                  Health Maintenance Topic(s) Outreach Outcomes & Actions Taken:    Eye Exam - Outreach Outcomes & Actions Taken  : Diabetic Eye External Records Uploaded, Care Team & History Updated if Applicable     Take 1 capsule by mouth 3 times daily for 14 days  metoprolol succinate (TOPROL XL) 25 MG extended release tablet, Take 25 mg by mouth daily  atorvastatin (LIPITOR) 40 MG tablet, Take 40 mg by mouth daily. Bevacizumab (AVASTIN IV), Infuse intravenously See Admin Instructions Gets injection in eyes about every 11 weeks  docusate (COLACE, DULCOLAX) 100 MG CAPS, Take 100 mg by mouth 2 times daily as needed (constipation)  Calcium-Magnesium-Vitamin D (CALCIUM 1200+D3 PO), Take 1 tablet by mouth daily. Multiple Vitamins-Minerals (CENTRUM-LUTEIN PO), Take 1 tablet by mouth daily. Omega-3 Fatty Acids (FISH OIL) 1000 MG CAPS, Take 1,000 mg by mouth daily. Coenzyme Q10 (COQ10) 100 MG CAPS, Take 100 mg by mouth daily. [DISCONTINUED] aspirin 81 MG EC tablet, Take 81 mg by mouth daily. Allergies:    Nitroglycerin; Biaxin [clarithromycin]; Iodine; and Quinolones    Social History:    reports that he has quit smoking. His smoking use included cigarettes. He has a 10.00 pack-year smoking history. He has never used smokeless tobacco. He reports that he drinks alcohol. He reports that he does not use drugs. Family History:   family history includes Alzheimer's Disease in his sister; Heart Disease in his brother, brother, and brother; Other in his father and mother. REVIEW OF SYSTEMS:  As above in the HPI, otherwise negative    PHYSICAL EXAM:    Vitals:  BP (!) 152/60   Pulse 68   Temp 97.7 °F (36.5 °C) (Temporal)   Resp 16   Ht 5' 7\" (1.702 m)   Wt 173 lb (78.5 kg)   SpO2 99%   BMI 27.10 kg/m²     General:  Awake, alert, oriented X 3. Well developed, well nourished, well groomed. No apparent distress. HEENT:  Normocephalic, atraumatic. Pupils equal, round, reactive to light. No scleral icterus. No conjunctival injection. Normal lips, teeth, and gums. No nasal discharge.   Neck:  Supple  Heart:  RRR, no murmurs, gallops, rubs  Lungs:  CTA bilaterally, bilat symmetrical expansion, no wheeze, rales, or rhonchi  Abdomen: Bowel sounds present, soft, nontender, no masses, no organomegaly, no peritoneal signs  Extremities:  No clubbing, cyanosis, or edema  Skin:  Warm and dry, no open lesions or rash  Neuro:  Cranial nerves 2-12 intact, no focal deficits  Dunham catheter is draining bloody urine.     LABS:    Recent Results (from the past 24 hour(s))   Basic Metabolic Panel    Collection Time: 05/30/19  5:20 PM   Result Value Ref Range    Sodium 142 132 - 146 mmol/L    Potassium 4.1 3.5 - 5.0 mmol/L    Chloride 107 98 - 107 mmol/L    CO2 25 22 - 29 mmol/L    Anion Gap 10 7 - 16 mmol/L    Glucose 110 (H) 74 - 99 mg/dL    BUN 19 8 - 23 mg/dL    CREATININE 1.1 0.7 - 1.2 mg/dL    GFR Non-African American >60 >=60 mL/min/1.73    GFR African American >60     Calcium 8.6 8.6 - 10.2 mg/dL   CBC without Diff    Collection Time: 05/30/19  5:20 PM   Result Value Ref Range    WBC 8.1 4.5 - 11.5 E9/L    RBC 3.95 3.80 - 5.80 E12/L    Hemoglobin 11.1 (L) 12.5 - 16.5 g/dL    Hematocrit 34.6 (L) 37.0 - 54.0 %    MCV 87.6 80.0 - 99.9 fL    MCH 28.1 26.0 - 35.0 pg    MCHC 32.1 32.0 - 34.5 %    RDW 13.8 11.5 - 15.0 fL    Platelets 629 223 - 075 E9/L    MPV 8.4 7.0 - 12.0 fL   CBC auto differential    Collection Time: 05/31/19  6:17 AM   Result Value Ref Range    WBC 6.3 4.5 - 11.5 E9/L    RBC 3.61 (L) 3.80 - 5.80 E12/L    Hemoglobin 10.3 (L) 12.5 - 16.5 g/dL    Hematocrit 30.5 (L) 37.0 - 54.0 %    MCV 84.5 80.0 - 99.9 fL    MCH 28.5 26.0 - 35.0 pg    MCHC 33.8 32.0 - 34.5 %    RDW 13.5 11.5 - 15.0 fL    Platelets 779 037 - 731 E9/L    MPV 8.6 7.0 - 12.0 fL    Neutrophils % 87.0 (H) 43.0 - 80.0 %    Lymphocytes % 11.3 (L) 20.0 - 42.0 %    Monocytes % 1.7 (L) 2.0 - 12.0 %    Eosinophils % 0.0 0.0 - 6.0 %    Basophils % 0.3 0.0 - 2.0 %    Neutrophils # 5.48 1.80 - 7.30 E9/L    Lymphocytes # 0.69 (L) 1.50 - 4.00 E9/L    Monocytes # 0.13 0.10 - 0.95 E9/L    Eosinophils # 0.00 (L) 0.05 - 0.50 E9/L    Basophils # 0.00 0.00 - 0.20 E9/L Poikilocytes 3+     Schistocytes 1+     Helix Cells 3+     Ovalocytes 1+        FLUORO FOR SURGICAL PROCEDURES   Final Result      Retrograde pyelograms are performed.           ASSESSMENT:      Patient Active Hospital Problem List:   Gross hematuria (5/30/2019)  BPH  Postop urinary retention  Non-small cell lung cancer  Hyperlipidemia  Coronary artery disease status post bypass surgery  Recent UTI      PLAN:    Okay to discharge when okay with urology  Follow as needed    Trev Luu  3:14 PM  5/31/2019

## 2024-11-16 LAB
ANION GAP SERPL CALCULATED.3IONS-SCNC: 10 MMOL/L (ref 7–16)
BUN SERPL-MCNC: 33 MG/DL (ref 6–23)
CALCIUM SERPL-MCNC: 8.8 MG/DL (ref 8.6–10.2)
CHLORIDE SERPL-SCNC: 96 MMOL/L (ref 98–107)
CO2 SERPL-SCNC: 29 MMOL/L (ref 22–29)
CREAT SERPL-MCNC: 1.3 MG/DL (ref 0.7–1.2)
GFR, ESTIMATED: 50 ML/MIN/1.73M2
GLUCOSE SERPL-MCNC: 117 MG/DL (ref 74–99)
MAGNESIUM SERPL-MCNC: 1.9 MG/DL (ref 1.6–2.6)
POTASSIUM SERPL-SCNC: 3.5 MMOL/L (ref 3.5–5)
SODIUM SERPL-SCNC: 135 MMOL/L (ref 132–146)

## 2024-11-16 PROCEDURE — 2060000000 HC ICU INTERMEDIATE R&B

## 2024-11-16 PROCEDURE — 6370000000 HC RX 637 (ALT 250 FOR IP): Performed by: FAMILY MEDICINE

## 2024-11-16 PROCEDURE — 2700000000 HC OXYGEN THERAPY PER DAY

## 2024-11-16 PROCEDURE — 6360000002 HC RX W HCPCS: Performed by: INTERNAL MEDICINE

## 2024-11-16 PROCEDURE — 2580000003 HC RX 258: Performed by: INTERNAL MEDICINE

## 2024-11-16 PROCEDURE — 2580000003 HC RX 258: Performed by: FAMILY MEDICINE

## 2024-11-16 PROCEDURE — 6360000002 HC RX W HCPCS: Performed by: FAMILY MEDICINE

## 2024-11-16 PROCEDURE — 83735 ASSAY OF MAGNESIUM: CPT

## 2024-11-16 PROCEDURE — 80048 BASIC METABOLIC PNL TOTAL CA: CPT

## 2024-11-16 PROCEDURE — 99233 SBSQ HOSP IP/OBS HIGH 50: CPT | Performed by: INTERNAL MEDICINE

## 2024-11-16 RX ADMIN — BUMETANIDE 1 MG: 0.25 INJECTION INTRAMUSCULAR; INTRAVENOUS at 08:48

## 2024-11-16 RX ADMIN — SODIUM CHLORIDE, PRESERVATIVE FREE 10 ML: 5 INJECTION INTRAVENOUS at 08:48

## 2024-11-16 RX ADMIN — CARVEDILOL 12.5 MG: 6.25 TABLET, FILM COATED ORAL at 08:48

## 2024-11-16 RX ADMIN — BUMETANIDE 1 MG: 0.25 INJECTION INTRAMUSCULAR; INTRAVENOUS at 17:02

## 2024-11-16 RX ADMIN — POTASSIUM CHLORIDE 40 MEQ: 1500 TABLET, EXTENDED RELEASE ORAL at 08:49

## 2024-11-16 RX ADMIN — SODIUM CHLORIDE 125 MG: 900 INJECTION INTRAVENOUS at 08:55

## 2024-11-16 RX ADMIN — ENOXAPARIN SODIUM 40 MG: 100 INJECTION SUBCUTANEOUS at 20:41

## 2024-11-16 RX ADMIN — ASPIRIN 81 MG: 81 TABLET, COATED ORAL at 08:48

## 2024-11-16 RX ADMIN — ISOSORBIDE DINITRATE 5 MG: 10 TABLET ORAL at 08:48

## 2024-11-16 RX ADMIN — ISOSORBIDE DINITRATE 5 MG: 10 TABLET ORAL at 14:09

## 2024-11-16 RX ADMIN — CARVEDILOL 12.5 MG: 6.25 TABLET, FILM COATED ORAL at 17:01

## 2024-11-16 RX ADMIN — SODIUM CHLORIDE, PRESERVATIVE FREE 10 ML: 5 INJECTION INTRAVENOUS at 20:41

## 2024-11-16 RX ADMIN — HYDRALAZINE HYDROCHLORIDE 10 MG: 10 TABLET ORAL at 14:10

## 2024-11-17 LAB
ANION GAP SERPL CALCULATED.3IONS-SCNC: 8 MMOL/L (ref 7–16)
BNP SERPL-MCNC: 5203 PG/ML (ref 0–450)
BUN SERPL-MCNC: 32 MG/DL (ref 6–23)
CALCIUM SERPL-MCNC: 8.8 MG/DL (ref 8.6–10.2)
CHLORIDE SERPL-SCNC: 96 MMOL/L (ref 98–107)
CO2 SERPL-SCNC: 31 MMOL/L (ref 22–29)
CREAT SERPL-MCNC: 1.2 MG/DL (ref 0.7–1.2)
GFR, ESTIMATED: 54 ML/MIN/1.73M2
GLUCOSE SERPL-MCNC: 105 MG/DL (ref 74–99)
MAGNESIUM SERPL-MCNC: 1.9 MG/DL (ref 1.6–2.6)
POTASSIUM SERPL-SCNC: 3.6 MMOL/L (ref 3.5–5)
SODIUM SERPL-SCNC: 135 MMOL/L (ref 132–146)

## 2024-11-17 PROCEDURE — 2580000003 HC RX 258: Performed by: FAMILY MEDICINE

## 2024-11-17 PROCEDURE — 80048 BASIC METABOLIC PNL TOTAL CA: CPT

## 2024-11-17 PROCEDURE — 83735 ASSAY OF MAGNESIUM: CPT

## 2024-11-17 PROCEDURE — 2700000000 HC OXYGEN THERAPY PER DAY

## 2024-11-17 PROCEDURE — 6370000000 HC RX 637 (ALT 250 FOR IP): Performed by: INTERNAL MEDICINE

## 2024-11-17 PROCEDURE — 83880 ASSAY OF NATRIURETIC PEPTIDE: CPT

## 2024-11-17 PROCEDURE — 6360000002 HC RX W HCPCS: Performed by: FAMILY MEDICINE

## 2024-11-17 PROCEDURE — 2060000000 HC ICU INTERMEDIATE R&B

## 2024-11-17 PROCEDURE — 99233 SBSQ HOSP IP/OBS HIGH 50: CPT | Performed by: INTERNAL MEDICINE

## 2024-11-17 PROCEDURE — 6370000000 HC RX 637 (ALT 250 FOR IP): Performed by: FAMILY MEDICINE

## 2024-11-17 RX ORDER — METOLAZONE 5 MG/1
5 TABLET ORAL ONCE
Status: COMPLETED | OUTPATIENT
Start: 2024-11-17 | End: 2024-11-17

## 2024-11-17 RX ADMIN — ASPIRIN 81 MG: 81 TABLET, COATED ORAL at 08:54

## 2024-11-17 RX ADMIN — BUMETANIDE 1 MG: 0.25 INJECTION INTRAMUSCULAR; INTRAVENOUS at 17:38

## 2024-11-17 RX ADMIN — SODIUM CHLORIDE, PRESERVATIVE FREE 10 ML: 5 INJECTION INTRAVENOUS at 20:39

## 2024-11-17 RX ADMIN — ENOXAPARIN SODIUM 40 MG: 100 INJECTION SUBCUTANEOUS at 20:39

## 2024-11-17 RX ADMIN — ISOSORBIDE DINITRATE 5 MG: 10 TABLET ORAL at 14:13

## 2024-11-17 RX ADMIN — METOLAZONE 5 MG: 5 TABLET ORAL at 18:47

## 2024-11-17 RX ADMIN — CARVEDILOL 12.5 MG: 6.25 TABLET, FILM COATED ORAL at 17:38

## 2024-11-17 RX ADMIN — SODIUM CHLORIDE, PRESERVATIVE FREE 10 ML: 5 INJECTION INTRAVENOUS at 08:54

## 2024-11-17 RX ADMIN — BUMETANIDE 1 MG: 0.25 INJECTION INTRAMUSCULAR; INTRAVENOUS at 08:54

## 2024-11-17 RX ADMIN — HYDRALAZINE HYDROCHLORIDE 10 MG: 10 TABLET ORAL at 14:14

## 2024-11-18 LAB
ANION GAP SERPL CALCULATED.3IONS-SCNC: 10 MMOL/L (ref 7–16)
BILIRUB UR QL STRIP: NEGATIVE
BUN SERPL-MCNC: 34 MG/DL (ref 6–23)
CALCIUM SERPL-MCNC: 8.8 MG/DL (ref 8.6–10.2)
CHLORIDE SERPL-SCNC: 94 MMOL/L (ref 98–107)
CLARITY UR: CLEAR
CO2 SERPL-SCNC: 32 MMOL/L (ref 22–29)
COLOR UR: YELLOW
CREAT SERPL-MCNC: 1.3 MG/DL (ref 0.7–1.2)
GFR, ESTIMATED: 52 ML/MIN/1.73M2
GLUCOSE SERPL-MCNC: 111 MG/DL (ref 74–99)
GLUCOSE UR STRIP-MCNC: NEGATIVE MG/DL
HGB UR QL STRIP.AUTO: NEGATIVE
KETONES UR STRIP-MCNC: NEGATIVE MG/DL
LEUKOCYTE ESTERASE UR QL STRIP: NEGATIVE
MAGNESIUM SERPL-MCNC: 1.8 MG/DL (ref 1.6–2.6)
NITRITE UR QL STRIP: NEGATIVE
PH UR STRIP: 6.5 [PH] (ref 5–9)
POTASSIUM SERPL-SCNC: 3.4 MMOL/L (ref 3.5–5)
PROT UR STRIP-MCNC: NEGATIVE MG/DL
RBC #/AREA URNS HPF: NORMAL /HPF
SODIUM SERPL-SCNC: 136 MMOL/L (ref 132–146)
SP GR UR STRIP: 1.01 (ref 1–1.03)
UROBILINOGEN UR STRIP-ACNC: 0.2 EU/DL (ref 0–1)
WBC #/AREA URNS HPF: NORMAL /HPF

## 2024-11-18 PROCEDURE — 2060000000 HC ICU INTERMEDIATE R&B

## 2024-11-18 PROCEDURE — 81001 URINALYSIS AUTO W/SCOPE: CPT

## 2024-11-18 PROCEDURE — 83735 ASSAY OF MAGNESIUM: CPT

## 2024-11-18 PROCEDURE — 87077 CULTURE AEROBIC IDENTIFY: CPT

## 2024-11-18 PROCEDURE — 6360000002 HC RX W HCPCS: Performed by: NURSE PRACTITIONER

## 2024-11-18 PROCEDURE — 80048 BASIC METABOLIC PNL TOTAL CA: CPT

## 2024-11-18 PROCEDURE — 97535 SELF CARE MNGMENT TRAINING: CPT

## 2024-11-18 PROCEDURE — 2580000003 HC RX 258: Performed by: FAMILY MEDICINE

## 2024-11-18 PROCEDURE — 2700000000 HC OXYGEN THERAPY PER DAY

## 2024-11-18 PROCEDURE — 6370000000 HC RX 637 (ALT 250 FOR IP): Performed by: FAMILY MEDICINE

## 2024-11-18 PROCEDURE — 97530 THERAPEUTIC ACTIVITIES: CPT

## 2024-11-18 PROCEDURE — 6370000000 HC RX 637 (ALT 250 FOR IP): Performed by: NURSE PRACTITIONER

## 2024-11-18 PROCEDURE — 87086 URINE CULTURE/COLONY COUNT: CPT

## 2024-11-18 PROCEDURE — 6360000002 HC RX W HCPCS: Performed by: FAMILY MEDICINE

## 2024-11-18 RX ORDER — POTASSIUM CHLORIDE 1500 MG/1
20 TABLET, EXTENDED RELEASE ORAL ONCE
Status: COMPLETED | OUTPATIENT
Start: 2024-11-18 | End: 2024-11-18

## 2024-11-18 RX ORDER — BUMETANIDE 1 MG/1
1 TABLET ORAL DAILY
Status: DISCONTINUED | OUTPATIENT
Start: 2024-11-19 | End: 2024-11-19 | Stop reason: HOSPADM

## 2024-11-18 RX ORDER — MAGNESIUM SULFATE IN WATER 40 MG/ML
2000 INJECTION, SOLUTION INTRAVENOUS ONCE
Status: COMPLETED | OUTPATIENT
Start: 2024-11-18 | End: 2024-11-18

## 2024-11-18 RX ADMIN — SODIUM CHLORIDE, PRESERVATIVE FREE 10 ML: 5 INJECTION INTRAVENOUS at 09:19

## 2024-11-18 RX ADMIN — POTASSIUM CHLORIDE 40 MEQ: 1500 TABLET, EXTENDED RELEASE ORAL at 05:16

## 2024-11-18 RX ADMIN — GUAIFENESIN 400 MG: 400 TABLET ORAL at 05:15

## 2024-11-18 RX ADMIN — BUMETANIDE 1 MG: 0.25 INJECTION INTRAMUSCULAR; INTRAVENOUS at 09:19

## 2024-11-18 RX ADMIN — CARVEDILOL 12.5 MG: 6.25 TABLET, FILM COATED ORAL at 09:18

## 2024-11-18 RX ADMIN — ASPIRIN 81 MG: 81 TABLET, COATED ORAL at 09:18

## 2024-11-18 RX ADMIN — MAGNESIUM SULFATE HEPTAHYDRATE 2000 MG: 40 INJECTION, SOLUTION INTRAVENOUS at 09:50

## 2024-11-18 RX ADMIN — HYDRALAZINE HYDROCHLORIDE 10 MG: 10 TABLET ORAL at 14:37

## 2024-11-18 RX ADMIN — ISOSORBIDE DINITRATE 5 MG: 10 TABLET ORAL at 09:23

## 2024-11-18 RX ADMIN — ENOXAPARIN SODIUM 40 MG: 100 INJECTION SUBCUTANEOUS at 20:21

## 2024-11-18 RX ADMIN — SODIUM CHLORIDE, PRESERVATIVE FREE 10 ML: 5 INJECTION INTRAVENOUS at 20:22

## 2024-11-18 RX ADMIN — HYDRALAZINE HYDROCHLORIDE 10 MG: 10 TABLET ORAL at 05:15

## 2024-11-18 RX ADMIN — POTASSIUM CHLORIDE 20 MEQ: 1500 TABLET, EXTENDED RELEASE ORAL at 09:18

## 2024-11-18 RX ADMIN — ISOSORBIDE DINITRATE 5 MG: 10 TABLET ORAL at 14:38

## 2024-11-18 RX ADMIN — BUMETANIDE 1 MG: 0.25 INJECTION INTRAMUSCULAR; INTRAVENOUS at 16:21

## 2024-11-18 NOTE — CARE COORDINATION
Social Work/Discharge Planning:  Migdalia with [facility] started pre-cert.  Updated patient.  Electronic N-17 in epic and [id number] completed.  Will continue to follow and wait for pre-cert.  Electronically signed by MARTHA Lopez on 11/18/2024 at 2:01 PM

## 2024-11-18 NOTE — CARE COORDINATION
Social Work/Discharge Planning:  Received call from patient daughter Mary Kate with her concerns regarding discharge.  Informed Mary Kate that there is no discharge order and awaiting insurance approval to [facility].  Mary Kate is requesting for Physician and Cardiologist to call her.  Updated Nurse Practitioner Melania and she will call patient daughter.  Notified charge nurse of patient daughters request for Cardiology to call her.  Will continue to follow.  Electronically signed by MARTHA Lopez on 11/18/2024 at 2:42 PM

## 2024-11-18 NOTE — CARE COORDINATION
Social Work/Discharge Planning:  Notified therapy of need for updated notes.  Erickson Schroeder to start pre-cert once therapy noted.  Will continue to follow and wait for pre-cert.  Electronically signed by MARTHA Lopez on 11/18/2024 at 8:41 AM

## 2024-11-18 NOTE — PLAN OF CARE
Problem: Chronic Conditions and Co-morbidities  Goal: Patient's chronic conditions and co-morbidity symptoms are monitored and maintained or improved  Outcome: Progressing     Problem: Discharge Planning  Goal: Discharge to home or other facility with appropriate resources  Outcome: Progressing     Problem: Safety - Adult  Goal: Free from fall injury  Outcome: Progressing     Problem: ABCDS Injury Assessment  Goal: Absence of physical injury  Outcome: Progressing     Problem: Pain  Goal: Verbalizes/displays adequate comfort level or baseline comfort level  Outcome: Progressing     Problem: Respiratory - Adult  Goal: Achieves optimal ventilation and oxygenation  Outcome: Progressing     Problem: Cardiovascular - Adult  Goal: Maintains optimal cardiac output and hemodynamic stability  Outcome: Progressing     Problem: Musculoskeletal - Adult  Goal: Return mobility to safest level of function  Outcome: Progressing

## 2024-11-18 NOTE — CONSULTS
11/18/2024 3:46 PM  Tashi Parry  [id number]     Chief Complaint:    Urinary retention and incontinence      History of Present Illness:      The patient is a 89 y.o. male patient who presented to the hospital with complaints of urinary retention and bilateral lower extremity edema. He was admitted for acute on chronic heart failure.     Urology is asked to evaluate the patient for urinary retention and incontinence. He is known to our practice and is a patient of Dr. Carter Hale. On 5/30/2019 he underwent cystoscopy, retrograde pyelogram, urethral dilatation, transurethral resection of 20g of tissue with Dr. Carter Hale. He was last seen in the office 2/2022 and was seen for right hydrocele, and intermittently self cathing. His PVR at that time was 61cc. He had multiple episodes of incontinence and PVR was 123cc. He currently has an external catheter in place. He is unsure if he is emptying his bladder all the way. No family is present at the bedside. The patient is forgetful at times and medical history is obtained from review of the chart.     Past Medical History:   Diagnosis Date    BPH (benign prostatic hyperplasia)     CAD (coronary artery disease)     Carotid artery calcification     DJD (degenerative joint disease)     History of falling     Hyperlipemia     Hypertension     Lung nodule     Macular degeneration     Numbness and tingling in left arm 02/02/2018    Numbness and tingling of left side of face 02/02/2018    S/P CABG x 6 2000    LIMA-LAD, LYNNETTE-LCx,SVG-OM,SVG-LPL,SVG-PDA-RPL    Shingles          Past Surgical History:   Procedure Laterality Date    CARDIAC SURGERY      6 vessel in 2000    DENTAL SURGERY      root canal    DIAGNOSTIC CARDIAC CATH LAB PROCEDURE  9/5/00    LUNG REMOVAL, PARTIAL Left     march    OTHER SURGICAL HISTORY  02/16/2011    Injection right hip  ADRIANNE Doan MD    PROSTATE SURGERY Bilateral 05/27/2019    THORACOSCOPY Left 4/12/2019    BRONCHOSCOPY LEFT THORACOSCOPY  [FreeTextEntry1] : -CT Neck 11/3/21:  Stable follow-up CT examination without evidence of residual or recurrent tumor in the left parotidectomy bed.  Unchanged lytic lucency within the left side of the mandible of uncertain etiology. It demonstrated FDG uptake in the past. Additional carious left-sided maxillary second molar tooth. Correlate with dental examination.  No new or enlarging cervical lymph nodes.  Worsening left-sided mastoid air cell effusion.  -CT Chest 11/3/21:  New and enlarging left pulmonary metastases.  Enlarging right axillary chest wall metastasis.  -PET/CT 12/15/21:  Compared to FDG-PET/CT scan dated 5/28/2019: 1. Non-FDG-avid postsurgical changes of left sided parotidectomy and flap reconstruction and left neck dissection, unchanged. Resolution of ill-defined, mild FDG activity in parotid bed. 2. New, intense FDG activity in left greater than right temporalis muscle and right greater than left masseter and pterygoid muscles likely is related to bruxism. Please correlate clinically. A new focus of increased FDG activity inferior, and medial to left mastoid (SUV 6.6; image 47 of dedicated head and neck series), without definite corresponding abnormality on CT, may be physiologic activity. 3. Resolution of FDG activity in molar region of left mandible. Previously seen corresponding lucency in left mandibular alveolus is decreased in size (image 61). 4. New small FDG-avid left lower lobe pulmonary nodule, better delineated on recent diagnostic CT, is indeterminate. Comparison with recent diagnostic CT is limited due to differences in technique. Differential diagnosis includes inflammatory/infectious and metastatic disease. Additional subcentimeter nodule seen on recent diagnostic CT are not well evaluated due to CT technique, and are too small to characterize with PET. Nonspecific, tiny focus of mild FDG activity in left upper lobe is unchanged. 5. Focus of mild FDG activity in left perihilar region is significantly decreased in metabolism (SUV 2.9; image 86; previous SUV 6.0). Resolution of additional previously seen FDG-avid foci in bilateral hilar regions. 6. Nonspecific small, mildly FDG-avid focus of skin thickening in right axillary region, new as compared to prior PET/CT, and corresponding to location of 2 cm peripherally enhancing hypodense cutaneous lesion seen on CT dated 11/3/2021 (SUV 1.7; image 70), likely resolving inflammatory process.  -CT Chest 4/2/22:  Stable pulmonary nodules.  - CT Neck 7/12/22:  Status post left parotidectomy. No recurrent mass or abnormal enhancement is identified. No cervical lymphadenopathy.    -CT C/A/P 7/12/22:  Numerous left lung/pleural metastases, several of which have mildly enlarged (by 1-2 mm) since April 2022.  No metastatic disease in the abdomen or pelvis.  -CT Chest 10/3/22:  Several left lung and pleural metastases, some unchanged and others slightly larger.  -CT Chest 3/9/23:  Status post wedge resection in the left lower lobe.  Multiple nodules are noted in the left lower lobe as well as involving the left major fissure. They have increased in size when compared to previous exam.  -CT Chest 8/11/23:  Since 3/9/2023: Decreased size of left lower lobe and left pleural metastases. No new lung nodule.  Images Reviewed/Interpreted:  -CT C/A/P 12/8/23:  Stable pulmonary and pleural-based left lower lobe metastatic pulmonary nodules compared with CT 8/11/2023 and substantially decreased compared with CT 3/9/2023.  No metastatic disease in the abdomen or pelvis.

## 2024-11-19 VITALS
RESPIRATION RATE: 20 BRPM | HEART RATE: 65 BPM | BODY MASS INDEX: 24.93 KG/M2 | TEMPERATURE: 97.6 F | OXYGEN SATURATION: 97 % | HEIGHT: 66 IN | DIASTOLIC BLOOD PRESSURE: 52 MMHG | SYSTOLIC BLOOD PRESSURE: 113 MMHG | WEIGHT: 155.1 LBS

## 2024-11-19 LAB
ALBUMIN SERPL-MCNC: 3.3 G/DL (ref 3.5–5.2)
ALP SERPL-CCNC: 211 U/L (ref 40–129)
ALT SERPL-CCNC: 31 U/L (ref 0–40)
ANION GAP SERPL CALCULATED.3IONS-SCNC: 11 MMOL/L (ref 7–16)
AST SERPL-CCNC: 27 U/L (ref 0–39)
BASOPHILS # BLD: 0.09 K/UL (ref 0–0.2)
BASOPHILS NFR BLD: 1 % (ref 0–2)
BILIRUB SERPL-MCNC: 0.6 MG/DL (ref 0–1.2)
BNP SERPL-MCNC: 5135 PG/ML (ref 0–450)
BUN SERPL-MCNC: 40 MG/DL (ref 6–23)
CALCIUM SERPL-MCNC: 9.4 MG/DL (ref 8.6–10.2)
CHLORIDE SERPL-SCNC: 91 MMOL/L (ref 98–107)
CO2 SERPL-SCNC: 31 MMOL/L (ref 22–29)
CREAT SERPL-MCNC: 1.5 MG/DL (ref 0.7–1.2)
EOSINOPHIL # BLD: 0.81 K/UL (ref 0.05–0.5)
EOSINOPHILS RELATIVE PERCENT: 9 % (ref 0–6)
ERYTHROCYTE [DISTWIDTH] IN BLOOD BY AUTOMATED COUNT: 16.2 % (ref 11.5–15)
GFR, ESTIMATED: 44 ML/MIN/1.73M2
GLUCOSE SERPL-MCNC: 116 MG/DL (ref 74–99)
HCT VFR BLD AUTO: 30.2 % (ref 37–54)
HGB BLD-MCNC: 9.6 G/DL (ref 12.5–16.5)
IMM GRANULOCYTES # BLD AUTO: 0.04 K/UL (ref 0–0.58)
IMM GRANULOCYTES NFR BLD: 1 % (ref 0–5)
LYMPHOCYTES NFR BLD: 1.35 K/UL (ref 1.5–4)
LYMPHOCYTES RELATIVE PERCENT: 15 % (ref 20–42)
MCH RBC QN AUTO: 27.6 PG (ref 26–35)
MCHC RBC AUTO-ENTMCNC: 31.8 G/DL (ref 32–34.5)
MCV RBC AUTO: 86.8 FL (ref 80–99.9)
MONOCYTES NFR BLD: 1.13 K/UL (ref 0.1–0.95)
MONOCYTES NFR BLD: 13 % (ref 2–12)
NEUTROPHILS NFR BLD: 61 % (ref 43–80)
NEUTS SEG NFR BLD: 5.43 K/UL (ref 1.8–7.3)
PLATELET # BLD AUTO: 311 K/UL (ref 130–450)
PMV BLD AUTO: 8.6 FL (ref 7–12)
POTASSIUM SERPL-SCNC: 4.1 MMOL/L (ref 3.5–5)
PROT SERPL-MCNC: 6.5 G/DL (ref 6.4–8.3)
RBC # BLD AUTO: 3.48 M/UL (ref 3.8–5.8)
SODIUM SERPL-SCNC: 133 MMOL/L (ref 132–146)
WBC OTHER # BLD: 8.9 K/UL (ref 4.5–11.5)

## 2024-11-19 PROCEDURE — 6370000000 HC RX 637 (ALT 250 FOR IP): Performed by: INTERNAL MEDICINE

## 2024-11-19 PROCEDURE — 83880 ASSAY OF NATRIURETIC PEPTIDE: CPT

## 2024-11-19 PROCEDURE — 85025 COMPLETE CBC W/AUTO DIFF WBC: CPT

## 2024-11-19 PROCEDURE — 2580000003 HC RX 258: Performed by: FAMILY MEDICINE

## 2024-11-19 PROCEDURE — 2700000000 HC OXYGEN THERAPY PER DAY

## 2024-11-19 PROCEDURE — 51798 US URINE CAPACITY MEASURE: CPT

## 2024-11-19 PROCEDURE — 80053 COMPREHEN METABOLIC PANEL: CPT

## 2024-11-19 PROCEDURE — 6370000000 HC RX 637 (ALT 250 FOR IP): Performed by: FAMILY MEDICINE

## 2024-11-19 RX ORDER — BUMETANIDE 1 MG/1
1 TABLET ORAL DAILY
Qty: 30 TABLET | Refills: 3 | Status: SHIPPED | OUTPATIENT
Start: 2024-11-20

## 2024-11-19 RX ORDER — ENOXAPARIN SODIUM 100 MG/ML
30 INJECTION SUBCUTANEOUS DAILY
Status: DISCONTINUED | OUTPATIENT
Start: 2024-11-19 | End: 2024-11-19 | Stop reason: HOSPADM

## 2024-11-19 RX ADMIN — SODIUM CHLORIDE, PRESERVATIVE FREE 10 ML: 5 INJECTION INTRAVENOUS at 07:58

## 2024-11-19 RX ADMIN — ISOSORBIDE DINITRATE 5 MG: 10 TABLET ORAL at 07:58

## 2024-11-19 RX ADMIN — ASPIRIN 81 MG: 81 TABLET, COATED ORAL at 07:58

## 2024-11-19 RX ADMIN — CARVEDILOL 12.5 MG: 6.25 TABLET, FILM COATED ORAL at 15:54

## 2024-11-19 RX ADMIN — CARVEDILOL 12.5 MG: 6.25 TABLET, FILM COATED ORAL at 07:58

## 2024-11-19 RX ADMIN — BUMETANIDE 1 MG: 1 TABLET ORAL at 07:58

## 2024-11-19 ASSESSMENT — PAIN SCALES - GENERAL
PAINLEVEL_OUTOF10: 0

## 2024-11-19 NOTE — CARE COORDINATION
Social Work/Discharge Planning:  Discharge order noted.  Patient to discharge to [facility].  Unit arranged ambulette transport with [facility] for 3:30pm.  Notified patient daughter Mary Kate of discharge time.  Mary Kate is requesting to transport her father to facility at 5:30pm and she will bring portable oxygen tank.  Confirmed with RN that Mary Kate can transport patient.  Notified RN and liaison Migdalia with [facility] of discharge time.  Electronically signed by MARTHA Lopez on 11/19/2024 at 1:44 PM

## 2024-11-19 NOTE — PLAN OF CARE
Problem: Chronic Conditions and Co-morbidities  Goal: Patient's chronic conditions and co-morbidity symptoms are monitored and maintained or improved  11/18/2024 2212 by Abby Lipscomb RN  Outcome: Progressing     Problem: Discharge Planning  Goal: Discharge to home or other facility with appropriate resources  11/18/2024 2212 by Abby Lipscomb RN  Outcome: Progressing     Problem: Safety - Adult  Goal: Free from fall injury  11/18/2024 2212 by Abby Lipscomb RN  Outcome: Progressing     Problem: ABCDS Injury Assessment  Goal: Absence of physical injury  11/18/2024 2212 by Abby Lipscomb RN  Outcome: Progressing     Problem: Pain  Goal: Verbalizes/displays adequate comfort level or baseline comfort level  11/18/2024 2212 by Abby Lipscomb RN  Outcome: Progressing     Problem: Respiratory - Adult  Goal: Achieves optimal ventilation and oxygenation  11/18/2024 2212 by Abby Lipscomb RN  Outcome: Progressing     Problem: Cardiovascular - Adult  Goal: Maintains optimal cardiac output and hemodynamic stability  11/18/2024 2212 by Abby Lipscomb RN  Outcome: Progressing     Problem: Cardiovascular - Adult  Goal: Absence of cardiac dysrhythmias or at baseline  Outcome: Progressing     Problem: Musculoskeletal - Adult  Goal: Return mobility to safest level of function  11/18/2024 2212 by Abby Lipscomb RN  Outcome: Progressing     Problem: Musculoskeletal - Adult  Goal: Return ADL status to a safe level of function  Outcome: Progressing     Problem: Genitourinary - Adult  Goal: Absence of urinary retention  Outcome: Progressing     Problem: Metabolic/Fluid and Electrolytes - Adult  Goal: Electrolytes maintained within normal limits  Outcome: Progressing     Problem: Metabolic/Fluid and Electrolytes - Adult  Goal: Hemodynamic stability and optimal renal function maintained  Outcome: Progressing     Problem: Neurosensory - Adult  Goal: Achieves stable or improved neurological status  Outcome: Progressing     Problem:

## 2024-11-19 NOTE — PROGRESS NOTES
SUBJECTIVE:    Afebrile  Pt comfortable     OBJECTIVE:    /63   Pulse 62   Temp 97.6 °F (36.4 °C) (Axillary)   Resp 20   Ht 1.676 m (5' 6\")   Wt 70.4 kg (155 lb 1.6 oz)   SpO2 97%   BMI 25.03 kg/m²     PHYSICAL EXAMINATION:  Skin: dry, without rashes  Respirations: non-labored, intact  Abdomen: soft, non-tender, non-distended      Lab Results   Component Value Date    WBC 8.9 2024    HGB 9.6 (L) 2024    HCT 30.2 (L) 2024    MCV 86.8 2024     2024       Lab Results   Component Value Date    CREATININE 1.5 (H) 2024       Lab Results   Component Value Date    PSA 10.15 (H) 2021    PSA SEE NOTE (AA) 2020    PSA 1.34 2020       REVIEW OF SYSTEMS:    [unfilled]    PAST FAMILY HISTORY:    Family History   Problem Relation Age of Onset    Other Mother         accident age 32 years , Tashi ATWOOD was 10 months old    Other Father         accident age 42 decesed 10 years when Father passed    Heart Disease Brother     Alzheimer's Disease Sister     Heart Disease Brother     Heart Disease Brother      PAST SOCIAL HISTORY:    Social History     Socioeconomic History    Marital status:    Occupational History    Occupation: retired-    Tobacco Use    Smoking status: Former     Current packs/day: 0.00     Average packs/day: 1 pack/day for 10.3 years (10.3 ttl pk-yrs)     Types: Cigarettes     Start date: 1978     Quit date: 1988     Years since quittin.2    Smokeless tobacco: Never   Vaping Use    Vaping status: Never Used    Passive vaping exposure: Yes   Substance and Sexual Activity    Alcohol use: Not Currently     Comment: socially    Drug use: No    Sexual activity: Not Currently     Social Determinants of Health     Financial Resource Strain: Low Risk  (2023)    Overall Financial Resource Strain (CARDIA)     Difficulty of Paying 
    [facility]                                Progress note    Subjective:    The patient is awake and alert.  Resting comfortably in no apparent acute distress  Sleeping actually and easily awakened.  No family members at bedside  On 1-1/2 L of oxygen  Objective:    BP (!) 117/48   Pulse 64   Temp 97.7 °F (36.5 °C) (Oral)   Resp 18   Ht 1.676 m (5' 6\")   Wt 73 kg (161 lb)   SpO2 96%   BMI 25.99 kg/m²     In: 1190 [P.O.:1080]  Out: 2500   In: 1190   Out: 2500 [Urine:2500]    General appearance: NAD, conversant, fatigued  HEENT: AT/NC, MMM  Neck: FROM, supple  Lungs: Diminished in the bases  CV: RRR, no MRGs  Vasc: Radial pulses 2+  Abdomen: Soft, non-tender; no masses or HSM  Extremities: No peripheral edema or digital cyanosis, 1+ bilateral lower extremity edema  Skin: no rash, lesions or ulcers  Psych: Alert and oriented to person, place and time  Neuro: Alert and interactive     Recent Labs     11/15/24  0530   WBC 11.3   HGB 8.5*   HCT 26.4*          Recent Labs     11/14/24  0212 11/15/24  0530 11/16/24  0110    137 135   K 4.0 4.1 3.5    99 96*   CO2 24 28 29   BUN 24* 28* 33*   CREATININE 1.2 1.2 1.3*   CALCIUM 8.6 8.6 8.8       Assessment:    Principal Problem:    CHF (congestive heart failure), NYHA class I, acute on chronic, combined (HCC)  Active Problems:    Pulmonary hypertension (HCC)    Acute on chronic congestive heart failure (HCC)    Ischemic cardiomyopathy    VHD (valvular heart disease)    RVF (right ventricular failure) ([facility])    Urinary retention  Resolved Problems:    * No resolved hospital problems. *      Plan:  89-year-old male with a history of CHF is admitted to telemetry unit with CHF  ASSESSMENT:  Acute on chronic HFmEF. LVEF 65% 2019>>50 to 55% 12/2022>>45 to 50% witstage II diastolic dysfunction. NT ProBNP 13,074  Known Hx CAD S/p CABG x 6 in 2000; Small, partially reversible inferolateral perfusion defect on stress 1/2018  Chronically elevated 
    [facility]                                Progress note    Subjective:    The patient is awake and alert.  Lying in bed without complaints.  Appears a bit weak.  No acute events overnight.    Denies chest pain, angina, continues on supplemental oxygen    Objective:    BP (!) 107/44   Pulse 61   Temp 98.1 °F (36.7 °C) (Oral)   Resp 18   Ht 1.676 m (5' 6\")   Wt 73.1 kg (161 lb 3.2 oz)   SpO2 96%   BMI 26.02 kg/m²     In: 1270 [P.O.:1260; I.V.:10]  Out: 723   In: 1270   Out: 723 [Urine:723]    General appearance: NAD, conversant, fatigued  HEENT: AT/NC, MMM  Neck: FROM, supple  Lungs: Diminished in the bases  CV: RRR, no MRGs  Vasc: Radial pulses 2+  Abdomen: Soft, non-tender; no masses or HSM  Extremities: No peripheral edema or digital cyanosis, 1+ bilateral lower extremity edema  Skin: no rash, lesions or ulcers  Psych: Alert and oriented to person, place and time  Neuro: Alert and interactive     Recent Labs     11/13/24  1018 11/15/24  0530   WBC 10.3 11.3   HGB 8.9* 8.5*   HCT 28.8* 26.4*    250       Recent Labs     11/13/24  1018 11/14/24  0212 11/15/24  0530    138 137   K 4.3 4.0 4.1    103 99   CO2 25 24 28   BUN 27* 24* 28*   CREATININE 1.1 1.2 1.2   CALCIUM 8.7 8.6 8.6       Assessment:    Principal Problem:    CHF (congestive heart failure), NYHA class I, acute on chronic, combined (HCC)  Active Problems:    Pulmonary hypertension (HCC)    Acute on chronic congestive heart failure (HCC)    Ischemic cardiomyopathy    VHD (valvular heart disease)    RVF (right ventricular failure) ([facility])    Urinary retention  Resolved Problems:    * No resolved hospital problems. *      Plan:  89-year-old male with a history of CHF is admitted to telemetry unit with CHF  ASSESSMENT:  Acute on chronic HFmEF. LVEF 65% 2019>>50 to 55% 12/2022>>45 to 50% witstage II diastolic dysfunction. NT ProBNP 13,074  Known Hx CAD S/p CABG x 6 in 2000; Small, partially reversible inferolateral 
    INPATIENT CARDIOLOGY FOLLOW-UP    Name: Tashi Parry    Age: 89 y.o.    Date of Admission: 11/13/2024  9:54 AM    Date of Service: 11/17/2024    Primary Cardiologist: Dr. Lyon    Chief Complaint: Follow-up for decompensated heart    Interim History:  No new overnight cardiac complaints. Currently with no complaints of CP, SOB, palpitations, dizziness, or lightheadedness.  Sinus rhythm on telemetry.  No significant arrhythmia seen.  5.8 L negative since admission.  Remains on 2 L of oxygen via nasal cannula.  Review of Systems:   Negative except as described above    Problem List:  Patient Active Problem List   Diagnosis    Coronary artery disease involving coronary bypass graft of native heart without angina pectoris    Hyperlipemia    Asymptomatic bilateral carotid artery stenosis    Malignant neoplasm of lower lobe of left lung (HCC)    Hx of coronary artery disease    Essential hypertension    Anemia    Benign prostatic hyperplasia without lower urinary tract symptoms    Gastro-esophageal reflux disease without esophagitis    Macular degeneration    Overweight with body mass index (BMI) 25.0-29.9    Primary malignant neoplasm of lung (HCC)    Sensorineural hearing loss (SNHL) of both ears    History of non-ST elevation myocardial infarction (NSTEMI)    Fall down steps    Unspecified dementia, mild, without behavioral disturbance, psychotic disturbance, mood disturbance, and anxiety (HCC)    Personal history of other malignant neoplasm of bronchus and lung    Acquired absence of lung (part of)    Nonexudative senile macular degeneration of retina    Primary osteoarthritis involving multiple joints    History of falling, presenting hazards to health    Constipation    COVID-19    Bilateral interstitial pneumonia (HCC)    Acute HFrEF (heart failure with reduced ejection fraction) (HCC)    Nonrheumatic mitral valve regurgitation    Pulmonary hypertension (HCC)    Muscle weakness (generalized)    Hypoxemia 
    [facility]                                Progress note    Subjective:    The patient is awake and alert.  Comfortable and pleasant, no shortness of breath or any acute issues    Objective:    BP (!) 142/68   Pulse 58   Temp 98.8 °F (37.1 °C) (Oral)   Resp 19   Ht 1.676 m (5' 6\")   Wt 73 kg (161 lb)   SpO2 93%   BMI 25.99 kg/m²     In: 110   Out: 2300   In: 110   Out: 2300 [Urine:2300]    General appearance: NAD, conversant, fatigued  HEENT: AT/NC, MMM  Neck: FROM, supple  Lungs: Diminished in the bases  CV: RRR, no MRGs  Vasc: Radial pulses 2+  Abdomen: Soft, non-tender; no masses or HSM  Extremities: No peripheral edema or digital cyanosis, 1+ bilateral lower extremity edema  Skin: no rash, lesions or ulcers  Psych: Alert and oriented to person, place and time  Neuro: Alert and interactive     Recent Labs     11/15/24  0530   WBC 11.3   HGB 8.5*   HCT 26.4*          Recent Labs     11/15/24  0530 11/16/24  0110 11/17/24  0330    135 135   K 4.1 3.5 3.6   CL 99 96* 96*   CO2 28 29 31*   BUN 28* 33* 32*   CREATININE 1.2 1.3* 1.2   CALCIUM 8.6 8.8 8.8       Assessment:    Principal Problem:    CHF (congestive heart failure), NYHA class I, acute on chronic, combined (HCC)  Active Problems:    Pulmonary hypertension (HCC)    Acute on chronic congestive heart failure (HCC)    Ischemic cardiomyopathy    VHD (valvular heart disease)    RVF (right ventricular failure) ([facility])    Urinary retention  Resolved Problems:    * No resolved hospital problems. *      Plan:  89-year-old male with a history of CHF is admitted to telemetry unit with CHF  ASSESSMENT:  Acute on chronic HFmEF. LVEF 65% 2019>>50 to 55% 12/2022>>45 to 50% witstage II diastolic dysfunction. NT ProBNP 13,074  Known Hx CAD S/p CABG x 6 in 2000; Small, partially reversible inferolateral perfusion defect on stress 1/2018  Chronically elevated troponin: Likely chronic myocardial strain  History of NSVT  Known Hx VHD: 
    [facility]                                Progress note    Subjective:  Denies chest pain and dyspnea.     Objective:  Lying on hiss left side, almost flat, conversing without difficulty  No distress  No family present at the bedside.   BP (!) 125/56   Pulse 62   Temp 98.3 °F (36.8 °C) (Oral)   Resp 18   Ht 1.676 m (5' 6\")   Wt 73 kg (161 lb)   SpO2 94%   BMI 25.99 kg/m²   CONST:  Well developed, well nourished elderly  male who appears stated age. Awake, alert, cooperative, no apparent distress  HEENT:   Head- Normocephalic, atraumatic   Eyes- Conjunctivae pink, anicteric  Throat- Oral mucosa pink and moist  Neck-  No stridor, trachea midline, no jugular venous distention. No adenopathy   CHEST: Chest symmetrical and non-tender to palpation. No accessory muscle use or intercostal retractions  RESPIRATORY: Lung sounds - clear throughout fields   CARDIOVASCULAR:     No carotid bruit  Heart Inspection- shows no noted pulsations  Heart Palpation- no heaves or thrills; PMI is non-displaced   Heart Ausculation- Regular rate and rhythm, no murmur. No s3, s4 or rub   PV: No lower extremity edema. No varicosities. Pedal pulses palpable, no clubbing or cyanosis   ABDOMEN: Soft, non-tender to light palpation. Bowel sounds present. No palpable masses no organomegaly; no abdominal bruit  MS: Good muscle strength and tone. No atrophy or abnormal movements.   : Deferred  SKIN: Warm and dry no statis dermatitis or ulcers   NEURO / PSYCH: Oriented to person, place and time. Speech clear and appropriate. Follows all commands. Pleasant affect       ASSESSMENT:  Acute on chronic HFmEF. LVEF 65% 2019>>50 to 55% 12/2022>>45 to 50% with stage II diastolic dysfunction. NT ProBNP 13,074  Known Hx CAD S/p CABG x 6 in 2000; Small, partially reversible inferolateral perfusion defect on stress 1/2018  Chronically elevated troponin: Likely chronic myocardial strain  History of NSVT  Known Hx VHD: Moderate 
24 hour chart check complete   
4 Eyes Skin Assessment     NAME:  Tashi Parry  YOB: 1931  MEDICAL RECORD NUMBER:  [id number]    The patient is being assessed for  Admission    I agree that at least one RN has performed a thorough Head to Toe Skin Assessment on the patient. ALL assessment sites listed below have been assessed.      Areas assessed by both nurses:    Head, Face, Ears, Shoulders, Back, Chest, Arms, Elbows, Hands, Sacrum. Buttock, Coccyx, Ischium, and Legs. Feet and Heels        Does the Patient have a Wound? No noted wound(s)       [place] Prevention initiated by RN: No  Wound Care Orders initiated by RN: No    Pressure Injury (Stage 3,4, Unstageable, DTI, NWPT, and Complex wounds) if present, place Wound referral order by RN under : No    New Ostomies, if present place, Ostomy referral order under : No     Nurse 1 eSignature: Electronically signed by Arlyn Lockett RN on 11/13/24 at 4:05 PM EST    **SHARE this note so that the co-signing nurse can place an eSignature**    Nurse 2 eSignature: Electronically signed by Mora Sutton RN on 11/13/24 at 4:53 PM EST  
Antionette Learn NP made aware of patients request for something to help move bowels. Milk of mag ineffective. Orders obtained. See MAR.   
Cardio consult placed  
Comprehensive Nutrition Assessment    Type and Reason for Visit:  Reassess    Nutrition Recommendations/Plan:   Continue current diet and ONS  Will continue to monitor while inpatient     Malnutrition Assessment:  Malnutrition Status:  At risk for malnutrition (11/14/24 1439)    Context:  Acute Illness     Findings of the 6 clinical characteristics of malnutrition:  Energy Intake:  Mild decrease in energy intake  Weight Loss:  Unable to assess (d/t possible fluid shifts)     Body Fat Loss:  No body fat loss     Muscle Mass Loss:  Mild muscle mass loss Temples (temporalis)  Fluid Accumulation:  Unable to assess (d/t multifactorial)     Strength:  Not Performed    Nutrition Assessment:    Pt consuming variable amounts of meals (1-25%, %) and >75% of ONS per flowsheet. Continue current diet and ONS. Will continue to monitor.    Nutrition Related Findings:    A&O x4, missing teeth, abd WDL, BLE trace edema, -8.8 L, bumex Wound Type: None       Current Nutrition Intake & Therapies:    Average Meal Intake: 1-25%, 51-75%, %  Average Supplements Intake: %  ADULT DIET; Regular; Low Sodium (2 gm)  ADULT ORAL NUTRITION SUPPLEMENT; Breakfast, Lunch, Dinner; Standard High Calorie/High Protein Oral Supplement    Anthropometric Measures:  Height: 167.6 cm (5' 6\")  Ideal Body Weight (IBW): 142 lbs (65 kg)    Admission Body Weight: 74 kg (163 lb 1.6 oz) (11/13 bed scale)  Current Body Weight: 70.4 kg (155 lb 1.6 oz) (11/19), 114.3 % IBW. Weight Source: Bed scale  Current BMI (kg/m2): 25  Usual Body Weight: 76.3 kg (168 lb 3 oz) (8/23/24)     % Weight Change (Calculated): -3.5  Weight Adjustment For: No Adjustment     BMI Categories: Overweight (BMI 25.0-29.9)    Estimated Daily Nutrient Needs:  Energy Requirements Based On: Formula  Weight Used for Energy Requirements: Current  Energy (kcal/day): 8969-8296  Weight Used for Protein Requirements: Ideal  Protein (g/day): 75-85  Method Used for Fluid Requirements: 
DVT Prophylaxis Adjustment Policy (DVT Prophylaxis)     This patient is on DVT Prophylaxis medication that requires a dose adjustment      Date Body Weight IBW  Adjusted BW SCr  CrCl Dialysis status   11/19/2024 70.4 kg (155 lb 1.6 oz) Ideal body weight: 63.8 kg (140 lb 10.5 oz)  Adjusted ideal body weight: 66.4 kg (146 lb 6.9 oz) Serum creatinine: 1.5 mg/dL (H) 11/19/24 0206  Estimated creatinine clearance: 28 mL/min (A) N/a       Pharmacy has dose-adjusted the DVT Prophylaxis regimen to match   the recommendations from the following table        Ordered Medication:Lovenox 40mg daily    Order Changed/converted to: Lovenox 30mg daily      These changes were made per protocol according to the [facility] Pharmacist   Review for Appropriate Use and Automatic Dose Adjustments of   Subcutaneous Anticoagulants Policy     *Please note this dose may need readjusted if patient's condition changes.    Please contact pharmacy with any questions regarding these changes.    mary lvoing RPH  11/19/2024  6:37 AM    
Dunham removed per protocol. No complications. Pt tolerated well.  
Lucia served Dr Kolb per daughter, Mary Kate's request. She is requesting another phone call from him since she \"thought of more questions after he called her the first time.\"  
Melania Ramos NP on floor, updated of pre-cert being obtained.   
Message left for Dr. Patel in regards to pre-cert being obtained.   
Message sent to CNP with [facility] cardiology regarding request of the daughter to have cardiology call her   
New palliative care consult placed to Radha Trevino NP.  
Nurse to nurse called to Zaira at [facility], updated of daughter transporting at 1730.   
Occupational Therapy  OCCUPATIONAL THERAPY INITIAL EVALUATION  [facility]  [address], [place]    Date: 11/15/2024     Patient Name: Tashi Parry  MRN: [id number]  : 1931  Room: [facility]    Evaluating OT: Toña Jon, OTR/L - [id number]    Referring Provider: Yamileth Patel MD  Specific Provider Orders/Date: \"OT eval and treat\" - 2024    Diagnosis: Urinary retention [R33.9]  CHF (congestive heart failure), NYHA class I, acute on chronic, combined (HCC) [I50.43]  Acute on chronic congestive heart failure, unspecified heart failure type (HCC) [I50.9]     Pertinent Medical History: CAD, DJD, BPH, macular degeneration, HTN     Precautions: fall risk, bed/chair alarms, O2 via nasal cannula, [place]    Assessment of Current Deficits:    [x] Functional mobility   [x] ADLs  [x] Strength               [x] Cognition   [x] Functional transfers   [x] IADLs         [x] Safety Awareness   [x] Endurance   [] Fine Motor Coordination  [x] Balance      [] Vision/Perception   [x] Sensation    [] Gross Motor Coordination [] ROM          [] Delirium                  [] Motor Control     OT PLAN OF CARE   OT POC is based on physician orders, patient diagnosis, and results of clinical assessment.  Frequency/Duration 2-5 days/week for 2-4 weeks PRN   Specific OT Treatment Interventions to Include:   * Instruction/training on adapted ADL techniques and AE recommendations to increase functional independence within precautions       * Training on energy conservation strategies, correct breathing pattern and techniques to improve independence/tolerance for self-care routine  * Functional transfer/mobility training/DME recommendations for increased independence, safety, and fall prevention  * Patient/Family education to increase follow through with safety techniques and functional independence  * Recommendation of environmental modifications for increased 
Occupational Therapy  OT BEDSIDE TREATMENT NOTE      Date:2024  Patient Name: Tashi Parry  MRN: [id number]  : 1931  Room: [address] [place] [address]     Per OT Eval:      Evaluating OT: Toña Jon, OTR/L - [id number]     Referring Provider: Yamileth Patel MD  Specific Provider Orders/Date: \"OT eval and treat\" - 2024     Diagnosis: Urinary retention [R33.9]  CHF (congestive heart failure), NYHA class I, acute on chronic, combined (HCC) [I50.43]  Acute on chronic congestive heart failure, unspecified heart failure type (HCC) [I50.9]      Pertinent Medical History: CAD, DJD, BPH, macular degeneration, HTN      Precautions: fall risk, bed/chair alarms, O2 via nasal cannula, [place]     Assessment of Current Deficits:    [x] Functional mobility             [x] ADLs          [x] Strength                  [x] Cognition   [x] Functional transfers           [x] IADLs         [x] Safety Awareness   [x] Endurance   [] Fine Motor Coordination    [x] Balance      [] Vision/Perception   [x] Sensation    [] Gross Motor Coordination [] ROM          [] Delirium                  [] Motor Control      OT PLAN OF CARE   OT POC is based on physician orders, patient diagnosis, and results of clinical assessment.  Frequency/Duration 2-5 days/week for 2-4 weeks PRN   Specific OT Treatment Interventions to Include:   * Instruction/training on adapted ADL techniques and AE recommendations to increase functional independence within precautions       * Training on energy conservation strategies, correct breathing pattern and techniques to improve independence/tolerance for self-care routine  * Functional transfer/mobility training/DME recommendations for increased independence, safety, and fall prevention  * Patient/Family education to increase follow through with safety techniques and functional independence  * Recommendation of environmental modifications for increased safety with functional transfers/mobility and ADLs  * Cognitive 
Patient has had multiple episodes of incontinence. Daughter concerned for urinary retention. Post void bladder scan for 123ml.  
Physical Therapy  Facility/Department: [address] INTERMEDIATE 1  Treatment Note    Name: Tashi Parry  : 1931  MRN: [id number]  Date of Service: 2024           Patient Diagnosis(es): The primary encounter diagnosis was Urinary retention. A diagnosis of Acute on chronic congestive heart failure, unspecified heart failure type (HCC) was also pertinent to this visit.  Past Medical History:  has a past medical history of BPH (benign prostatic hyperplasia), CAD (coronary artery disease), Carotid artery calcification, DJD (degenerative joint disease), History of falling, Hyperlipemia, Hypertension, Lung nodule, Macular degeneration, Numbness and tingling in left arm, Numbness and tingling of left side of face, S/P CABG x 6, and Shingles.  Past Surgical History:  has a past surgical history that includes Diagnostic Cardiac Cath Lab Procedure (00); Cardiac surgery; TURP; Dental surgery; Total hip arthroplasty (Right, 2011); Total hip arthroplasty (Left, 2009); other surgical history (2011); Thoracoscopy (Left, 2019); TURP (N/A, 2019); Prostate surgery (Bilateral, 2019); and Lung removal, partial (Left).    Evaluating Therapist: Elizabeth Engel PT    Room #:  0443/0443-A  Diagnosis:  Urinary retention [R33.9]  CHF (congestive heart failure), NYHA class I, acute on chronic, combined (HCC) [I50.43]  Acute on chronic congestive heart failure, unspecified heart failure type (HCC) [I50.9]  PMHx/PSHx:  CAD, HTN, Lung nodule, macular degeneration  Precautions:  falls, alarm, O2      Social:  Pt admitted from [facility] ambulates with ww or cane. Uses O2 at baseline    Initial Evaluation  Date: 11/15/24 Treatment Date: 24      Short Term/ Long Term   Goals   Was pt agreeable to Eval/treatment? yes Yes    Does pt have pain? None reported No complaints of pain    Bed Mobility  Rolling: SBA  Supine to sit: SBA  Sit to supine: NT  Scooting: SBA Rolling: NT  Supine to sit: NT  Sit to supine: 
Physical Therapy  Facility/Department: [address] INTERMEDIATE 1  Physical Therapy Initial Assessment    Name: Tashi Parry  : 1931  MRN: [id number]  Date of Service: 11/15/2024           Patient Diagnosis(es): The primary encounter diagnosis was Urinary retention. A diagnosis of Acute on chronic congestive heart failure, unspecified heart failure type (HCC) was also pertinent to this visit.  Past Medical History:  has a past medical history of BPH (benign prostatic hyperplasia), CAD (coronary artery disease), Carotid artery calcification, DJD (degenerative joint disease), History of falling, Hyperlipemia, Hypertension, Lung nodule, Macular degeneration, Numbness and tingling in left arm, Numbness and tingling of left side of face, S/P CABG x 6, and Shingles.  Past Surgical History:  has a past surgical history that includes Diagnostic Cardiac Cath Lab Procedure (00); Cardiac surgery; TURP; Dental surgery; Total hip arthroplasty (Right, 2011); Total hip arthroplasty (Left, 2009); other surgical history (2011); Thoracoscopy (Left, 2019); TURP (N/A, 2019); Prostate surgery (Bilateral, 2019); and Lung removal, partial (Left).    Evaluating Therapist: Elizabeth Engel PT    Room #:  0443/0443-A  Diagnosis:  Urinary retention [R33.9]  CHF (congestive heart failure), NYHA class I, acute on chronic, combined (HCC) [I50.43]  Acute on chronic congestive heart failure, unspecified heart failure type (HCC) [I50.9]  PMHx/PSHx:  CAD, HTN, Lung nodule, macular degeneration  Precautions:  falls, alarm, O2      Social:  Pt admitted from [facility] ambulates with ww or cane. Uses O2 at baseline    Initial Evaluation  Date: 11/15/24 Treatment      Short Term/ Long Term   Goals   Was pt agreeable to Eval/treatment? yes     Does pt have pain? None reported     Bed Mobility  Rolling: SBA  Supine to sit: SBA  Sit to supine: NT  Scooting: SBA  independent   Transfers Sit to stand: min assist  Stand to sit: 
Received call from daughter Mary Kate requesting phone calls from Dr Goodrich and Dr Whittaker for prognosis and testing, etc. ( See sticky note on chart)  
Spoke to daughter Mary Kate, She is concerned about patient's incontinence. Patient says that he is unable to hold his urine. Mary Kate wants to know how this could affect placement or returning to assisted living. She wants to discuss with Dr Patel a plan to follow up with urology       
Spoke to daughter Mary Kate, she said patient now agrees to have advance cardiac testing and wants us to notify cardiology. Spoke to patient, he agrees to have advance cardiac testing. Notified Dr Whittaker  
Transport arranged with [facility] for 330p -(530p)  
Updated Mary Kate on POC for tonight.  At the bedside.  Afsaneh Xavier RN NM  3:36 PM    
Urology notified of consult via answering service  Sony bello  
shows a pseudonormal pattern of left ventricular diastolic filling.    4. The left ventricular posterior wall thickness is moderately increased.    5. There is moderately reduced right ventricular systolic function.    6. The left atrium is moderately dilated.    7. There is global hypokinesis of the left ventricle with minor regional variations.      TTE 10/31/2024    Left Ventricle: EF by visual approximation is 45-50%. Left ventricle size is normal. Mild concentric hypertrophy. Stage 2 diastolic dysfunction.    Right Ventricle: Right ventricle is dilated. Reduced systolic function. TAPSE is 1.4 cm.    Left Atrium: Left atrial volume index is severely increased (>48 mL/m2).    Aortic Valve: Mild to moderate regurgitation. No stenosis.    Mitral Valve: Moderate regurgitation. No stenosis noted.    Tricuspid Valve: Mild regurgitation. The estimated RVSP is 62 mmHg.    Contrast used: Definity.             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  UOP 1.3 L net -6.6 L.  -10/31/2024 NT Pro-BNP 3,941  -11/13/2024 NT Pro-BNP 13,074  Hypoxic respiratory failure   CAD s/p CABG x 6 in 2000 (LIMA-LAD, LYNNETTE-Lcx, SVG-OM, SVG-LPL, SVG-PDA-RPL) Dr. Guerra  Chronically elevated troponin  VHD: Moderate MR, mild to moderate AI and mild TR  HTN  HLD, on statin therapy  Chronic anemia  Iodine allergy (rash)  COPD  CKD 3A, creatinine 1.3>> 1.2>> 1.3  History of non-small cell lung cancer - CARLO invasive adenocarcinoma s/p robotic VATS, LLL wedge resection, LLL lobectomy in 4/19 - RUL spiculated nodule 7 x 5 mm on CT chest 4/19, followed by Pulmonary  Pancreatic mass  DJD  BPH  Carotid artery disease  Former tobacco smoker  Mild hypokalemia     Plan:  Change Bumex to 1 mg p.o. daily  Continue GDMT in the form of Isordil, hydralazine, Coreg.  Continue home aspirin.  Can consider Jardiance initiation in the coming days but will avoid at this time due to ongoing urinary 
systolic function is low normal with a 50-55% estimated ejection fraction.    2. Severely increased left ventricular septal thickness.    3. Spectral Doppler shows a pseudonormal pattern of left ventricular diastolic filling.    4. The left ventricular posterior wall thickness is moderately increased.    5. There is moderately reduced right ventricular systolic function.    6. The left atrium is moderately dilated.    7. There is global hypokinesis of the left ventricle with minor regional variations.      TTE 10/31/2024    Left Ventricle: EF by visual approximation is 45-50%. Left ventricle size is normal. Mild concentric hypertrophy. Stage 2 diastolic dysfunction.    Right Ventricle: Right ventricle is dilated. Reduced systolic function. TAPSE is 1.4 cm.    Left Atrium: Left atrial volume index is severely increased (>48 mL/m2).    Aortic Valve: Mild to moderate regurgitation. No stenosis.    Mitral Valve: Moderate regurgitation. No stenosis noted.    Tricuspid Valve: Mild regurgitation. The estimated RVSP is 62 mmHg.    Contrast used: Definity.             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 Guerra  Chronically elevated troponin  VHD: Moderate MR, mild to moderate AI and mild TR  HTN  HLD, on statin therapy  Chronic anemia  Iodine allergy (rash)  COPD  History of non-small cell lung cancer - CARLO invasive adenocarcinoma s/p robotic VATS, LLL wedge resection, LLL lobectomy in 4/19 - RUL spiculated nodule 7 x 5 mm on CT chest 4/19, followed by Pulmonary  Pancreatic mass  DJD  BPH  Carotid artery disease  Former tobacco smoker     Plan:  Strict I&O.  Agree with IV diuretics.  Reasonable response of far.  Give a dose of metolazone today.  Got a dose yesterday as well.  Will continue IV diuretics for 24 more hours  Continue 
contributions from group 3  Daily weight - NO BED WEIGHTS  Monitor electrolytes  Should be considered for amyloid  scan - patient has been refusing advanced testing    Needs IV diuresis for another 24 to 48 hours.  Wean oxygen as able.  Continue with PT OT      Greater than 50 minutes was spent counseling the patient, reviewing the rationale for the above recommendations and reviewing the patient's current medication list, problem list and results of all previously ordered testing.      Jarrett Whittaker MD, [facility] Cardiology    NOTE: This report was transcribed using voice recognition software. Every effort was made to ensure accuracy; however, inadvertent computerized transcription errors may be present.

## 2024-11-19 NOTE — CARE COORDINATION
Social Work/Discharge Planning:  Migdalia with [facility] called with pre-cert approval.  Patient pre-cert is good until 11/21.  Notified charge nurse.  Will continue to follow.  Electronically signed by MARTHA Lopez on 11/19/2024 at 11:33 AM

## 2024-11-19 NOTE — DISCHARGE SUMMARY
amyloid testing   Spoke with patient's daughter (Mary Kate Leahy) via telephone ([phone]), discussed concern regarding cardiac testing (amyloid scan) and requesting to have testing completed, and urinary retention prior to presentation with urgency and incontinence now \"no ability to stop or start\".   Will consult Urology with his Hx of urologic issues in the past, ? BPH with straight cathing self at home.   Check UA, urine culture     11/19/2024  VS stable  UA negative, urine culture negative thus far  No complaints of hematuria or dysuria  Pre-CERT excepted, discharged to facility today  Continue Bumex per Cardiology, follow-up with CHF clinic upon discharge  Amyloid testing to be completed as an outpatient per Cardiology  External Dunham catheter upon discharge per Urology  Spoke with patient's daughter (Mary Kate Leahy) via telephone ([phone]), discussed her concern regarding continued urinary incontinence without etiology.  She is requesting to speak directly to urology, nursing staff notified of this.  She was also notified of Cardiology plans for outpatient amyloid testing and is agreeable for discharge today.      Recent Labs     11/19/24  0206   WBC 8.9   HGB 9.6*   HCT 30.2*          Recent Labs     11/17/24  0330 11/18/24  0310 11/19/24  0206    136 133   K 3.6 3.4* 4.1   CL 96* 94* 91*   CO2 31* 32* 31*   BUN 32* 34* 40*   CREATININE 1.2 1.3* 1.5*   CALCIUM 8.8 8.8 9.4       XR CHEST PORTABLE    Result Date: 11/13/2024  EXAMINATION: ONE XRAY VIEW OF THE CHEST 11/13/2024 10:19 am COMPARISON: 11/03/2024 HISTORY: ORDERING SYSTEM PROVIDED HISTORY: fluid overload TECHNOLOGIST PROVIDED HISTORY: Reason for exam:->fluid overload FINDINGS: Osteopenia.  Sternotomy and postsurgical mediastinum.  Slightly low lung volumes.  Trace bilateral pleural effusions/thickening.  Bronchial wall thickening with bilateral interstitial and airspace opacities.  This as increased since last time.  Consider

## 2024-11-19 NOTE — PLAN OF CARE
Problem: Chronic Conditions and Co-morbidities  Goal: Patient's chronic conditions and co-morbidity symptoms are monitored and maintained or improved  11/19/2024 1039 by Mora Sutton RN  Outcome: Progressing     Problem: Discharge Planning  Goal: Discharge to home or other facility with appropriate resources  11/19/2024 1039 by Mora Sutton RN  Outcome: Progressing     Problem: Safety - Adult  Goal: Free from fall injury  11/19/2024 1039 by Mora Sutton RN  Outcome: Progressing     Problem: ABCDS Injury Assessment  Goal: Absence of physical injury  11/19/2024 1039 by Mora Sutton RN  Outcome: Progressing     Problem: Pain  Goal: Verbalizes/displays adequate comfort level or baseline comfort level  11/19/2024 1039 by Mora Sutton RN  Outcome: Progressing     Problem: Respiratory - Adult  Goal: Achieves optimal ventilation and oxygenation  11/19/2024 1039 by Mora Sutton RN  Outcome: Progressing     Problem: Cardiovascular - Adult  Goal: Maintains optimal cardiac output and hemodynamic stability  11/19/2024 1039 by Mora Sutton RN  Outcome: Progressing     Problem: Cardiovascular - Adult  Goal: Absence of cardiac dysrhythmias or at baseline  11/19/2024 1039 by Mora Sutton RN  Outcome: Progressing     Problem: Musculoskeletal - Adult  Goal: Return mobility to safest level of function  11/19/2024 1039 by Mora Sutton RN  Outcome: Progressing     Problem: Musculoskeletal - Adult  Goal: Return ADL status to a safe level of function  11/19/2024 1039 by Mora Sutton RN  Outcome: Progressing     Problem: Genitourinary - Adult  Goal: Absence of urinary retention  11/19/2024 1039 by Mora Sutton RN  Outcome: Progressing     Problem: Metabolic/Fluid and Electrolytes - Adult  Goal: Electrolytes maintained within normal limits  11/19/2024 1039 by Mora Sutton RN  Outcome: Progressing     Problem: Metabolic/Fluid and Electrolytes - Adult  Goal: Hemodynamic stability

## 2024-11-19 NOTE — PLAN OF CARE
Problem: Chronic Conditions and Co-morbidities  Goal: Patient's chronic conditions and co-morbidity symptoms are monitored and maintained or improved  11/19/2024 1302 by Mora Sutton RN  Outcome: Completed     Problem: Discharge Planning  Goal: Discharge to home or other facility with appropriate resources  11/19/2024 1302 by Mora Sutton RN  Outcome: Completed     Problem: Safety - Adult  Goal: Free from fall injury  11/19/2024 1302 by Mroa Sutton RN  Outcome: Completed     Problem: ABCDS Injury Assessment  Goal: Absence of physical injury  11/19/2024 1302 by Mora Sutton RN  Outcome: Completed     Problem: Pain  Goal: Verbalizes/displays adequate comfort level or baseline comfort level  11/19/2024 1302 by Mora Sutton RN  Outcome: Completed     Problem: Respiratory - Adult  Goal: Achieves optimal ventilation and oxygenation  11/19/2024 1302 by Mora Sutton RN  Outcome: Completed     Problem: Cardiovascular - Adult  Goal: Maintains optimal cardiac output and hemodynamic stability  11/19/2024 1302 by Mora Sutton RN  Outcome: Completed     Problem: Cardiovascular - Adult  Goal: Absence of cardiac dysrhythmias or at baseline  11/19/2024 1302 by Mora Sutton RN  Outcome: Completed     Problem: Musculoskeletal - Adult  Goal: Return mobility to safest level of function  11/19/2024 1302 by Mora Sutton RN  Outcome: Completed     Problem: Musculoskeletal - Adult  Goal: Return ADL status to a safe level of function  11/19/2024 1302 by Mora Sutton RN  Outcome: Completed     Problem: Genitourinary - Adult  Goal: Absence of urinary retention  11/19/2024 1302 by Mora Sutton RN  Outcome: Completed     Problem: Metabolic/Fluid and Electrolytes - Adult  Goal: Electrolytes maintained within normal limits  11/19/2024 1302 by Mora Sutton RN  Outcome: Completed     Problem: Metabolic/Fluid and Electrolytes - Adult  Goal: Hemodynamic stability and optimal renal function

## 2024-11-20 ENCOUNTER — TELEPHONE (OUTPATIENT)
Dept: PRIMARY CARE CLINIC | Age: 89
End: 2024-11-20

## 2024-11-20 ENCOUNTER — TELEPHONE (OUTPATIENT)
Dept: CARDIOLOGY CLINIC | Age: 89
End: 2024-11-20

## 2024-11-20 ENCOUNTER — TELEPHONE (OUTPATIENT)
Dept: ADMINISTRATIVE | Age: 89
End: 2024-11-20

## 2024-11-20 ENCOUNTER — OUTSIDE SERVICES (OUTPATIENT)
Dept: PRIMARY CARE CLINIC | Age: 89
End: 2024-11-20

## 2024-11-20 DIAGNOSIS — F03.A0 MILD DEMENTIA, UNSPECIFIED DEMENTIA TYPE, UNSPECIFIED WHETHER BEHAVIORAL, PSYCHOTIC, OR MOOD DISTURBANCE OR ANXIETY (HCC): ICD-10-CM

## 2024-11-20 DIAGNOSIS — I25.810 CORONARY ARTERY DISEASE INVOLVING CORONARY BYPASS GRAFT OF NATIVE HEART WITHOUT ANGINA PECTORIS: ICD-10-CM

## 2024-11-20 DIAGNOSIS — N18.9 ACUTE KIDNEY INJURY SUPERIMPOSED ON CKD (HCC): ICD-10-CM

## 2024-11-20 DIAGNOSIS — Z91.81 AT MAXIMUM RISK FOR FALL: ICD-10-CM

## 2024-11-20 DIAGNOSIS — I50.21 ACUTE HFREF (HEART FAILURE WITH REDUCED EJECTION FRACTION) (HCC): Primary | ICD-10-CM

## 2024-11-20 DIAGNOSIS — J96.11 CHRONIC RESPIRATORY FAILURE WITH HYPOXIA: ICD-10-CM

## 2024-11-20 DIAGNOSIS — R53.81 PHYSICAL DECONDITIONING: ICD-10-CM

## 2024-11-20 DIAGNOSIS — N17.9 ACUTE KIDNEY INJURY SUPERIMPOSED ON CKD (HCC): ICD-10-CM

## 2024-11-20 DIAGNOSIS — Z95.1 S/P CABG (CORONARY ARTERY BYPASS GRAFT): ICD-10-CM

## 2024-11-20 DIAGNOSIS — R33.9 URINARY RETENTION: ICD-10-CM

## 2024-11-20 LAB
MICROORGANISM SPEC CULT: ABNORMAL
SERVICE CMNT-IMP: ABNORMAL
SPECIMEN DESCRIPTION: ABNORMAL

## 2024-11-20 NOTE — TELEPHONE ENCOUNTER
Daughter states that while patient was hospitalized a recommendation for an amyloid study was mentioned, she wants to know if you agree with ordering test, she also wants to know if the outcome is not good what would be the next step. She discussed with patient and family and they don't want to put the patient through un necessary testing or treatment, please advise

## 2024-11-20 NOTE — TELEPHONE ENCOUNTER
Patient discharged 11/19/2024.  Daughter would like to set up hospital follow up.  He already had a TCM 11/8/2024.  When would you like to see him. He is scheduled 12/6/2024 originally.    Please advise.

## 2024-11-20 NOTE — TELEPHONE ENCOUNTER
I spoke with patient's daughter and informed her that patient has a HFU with NP Yulisa Clarke on 1/2.

## 2024-11-20 NOTE — TELEPHONE ENCOUNTER
Would hold off for now.     Arrange an OV in a month.    Rosaura Lyon D.O.  Cardiologist  Cardiology, [facility]

## 2024-11-20 NOTE — PROGRESS NOTES
Blood pressure is mildly elevated however we will just watch for now and continue his current dosage of hydralazine and Coreg.  If blood pressure remains elevated would consider increasing his dosage of hydralazine as he is only on 10 mg 3 times daily.  Patient does appear euvolemic today and thus we will just watch for now.  Will see how he responds to therapy.  Begin discharge planning.            An electronic signature was used to authenticate this note.    --Larry Sandoval, DO

## 2024-11-20 NOTE — TELEPHONE ENCOUNTER
Caller: Viral Bach    Relationship: Self    Best call back number:     [phone]       What medication are you requesting:     What are your current symptoms: FREQUENT NEED TO USE THE BATHROOM AT NIGHT- PATIENT GETTING UP ONCE AN HOUR.    How long have you been experiencing symptoms: 1 YEAR    Have you had these symptoms before:    [] Yes  [] No    Have you been treated for these symptoms before:   [] Yes  [] No    If a prescription is needed, what is your preferred pharmacy and phone number: [id number] - [place], [place] - [address] AT [address] [facility] [address]  - [phone]  - [phone] FX     Additional notes:           Daughter notified and follow up scheduled.

## 2024-11-20 NOTE — TELEPHONE ENCOUNTER
Patient's daughter did not state he was in skilled side for rehab.  You will not be seeing him Friday.  She will call us after he is moved back to [facility].

## 2024-11-29 ENCOUNTER — OUTSIDE SERVICES (OUTPATIENT)
Dept: PRIMARY CARE CLINIC | Age: 88
End: 2024-11-29

## 2024-11-29 DIAGNOSIS — I50.21 ACUTE HFREF (HEART FAILURE WITH REDUCED EJECTION FRACTION) (HCC): Primary | ICD-10-CM

## 2024-11-29 DIAGNOSIS — J96.11 CHRONIC RESPIRATORY FAILURE WITH HYPOXIA: ICD-10-CM

## 2024-11-29 DIAGNOSIS — N17.9 ACUTE KIDNEY INJURY SUPERIMPOSED ON CKD (HCC): ICD-10-CM

## 2024-11-29 DIAGNOSIS — I10 ESSENTIAL HYPERTENSION: ICD-10-CM

## 2024-11-29 DIAGNOSIS — N18.9 ACUTE KIDNEY INJURY SUPERIMPOSED ON CKD (HCC): ICD-10-CM

## 2024-11-29 NOTE — PROGRESS NOTES
Tashi Parry (:  1931) is a 89 y.o. male.    Subjective     Patient states that he feels okay.  He states therapy is going okay.  He has no specific concerns or complaints today.  Denies any shortness of breath, fever, or chills.    Location: [facility] room 214  Progress notes reviewed.    Past Medical History:   Diagnosis Date    BPH (benign prostatic hyperplasia)     CAD (coronary artery disease)     Carotid artery calcification     DJD (degenerative joint disease)     History of falling     Hyperlipemia     Hypertension     Lung nodule     Macular degeneration     Numbness and tingling in left arm 2018    Numbness and tingling of left side of face 2018    S/P CABG x 6 2000    LIMA-LAD, LYNNETTE-LCx,SVG-OM,SVG-LPL,SVG-PDA-RPL    Shingles        Allergies   Allergen Reactions    Bactrim [Sulfamethoxazole-Trimethoprim] Other (See Comments)     INSOMNIA    Biaxin [Clarithromycin] Rash    Cipro [Ciprofloxacin Hcl] Other (See Comments)     UNKNOWN REACTION, NOT LISTED ON FACILITY PAPERWORK.     Iodinated Contrast Media Swelling and Rash    Nitroglycerin Rash    Quinidine Rash             Review of Systems-as above       Objective   Physical Exam  Constitutional:       Appearance: He is well-developed.   HENT:      Head: Normocephalic and atraumatic.   Eyes:      General:         Right eye: No discharge.         Left eye: No discharge.      Conjunctiva/sclera: Conjunctivae normal.   Neck:      Trachea: No tracheal deviation.   Cardiovascular:      Rate and Rhythm: Normal rate and regular rhythm.      Heart sounds: Normal heart sounds.   Pulmonary:      Effort: Pulmonary effort is normal. No respiratory distress.      Breath sounds: No wheezing.      Comments: Diminished bilaterally  On oxygen via NC  Abdominal:      General: Bowel sounds are normal. There is no distension.      Palpations: Abdomen is soft.      Tenderness: There is no abdominal tenderness.   Musculoskeletal:      Cervical back:

## 2024-12-03 ENCOUNTER — HOSPITAL ENCOUNTER (OUTPATIENT)
Dept: OTHER | Age: 88
Setting detail: THERAPIES SERIES
Discharge: HOME | End: 2024-12-03
Payer: MEDICARE

## 2024-12-03 ENCOUNTER — TELEPHONE (OUTPATIENT)
Dept: OTHER | Age: 88
End: 2024-12-03

## 2024-12-03 VITALS
OXYGEN SATURATION: 96 % | DIASTOLIC BLOOD PRESSURE: 56 MMHG | WEIGHT: 149 LBS | BODY MASS INDEX: 24.05 KG/M2 | HEART RATE: 55 BPM | SYSTOLIC BLOOD PRESSURE: 109 MMHG

## 2024-12-03 LAB
ANION GAP SERPL CALCULATED.3IONS-SCNC: 9 MMOL/L (ref 7–16)
BNP SERPL-MCNC: 6546 PG/ML (ref 0–450)
BUN SERPL-MCNC: 31 MG/DL (ref 6–23)
CALCIUM SERPL-MCNC: 8.9 MG/DL (ref 8.6–10.2)
CHLORIDE SERPL-SCNC: 100 MMOL/L (ref 98–107)
CO2 SERPL-SCNC: 25 MMOL/L (ref 22–29)
CREAT SERPL-MCNC: 1.4 MG/DL (ref 0.7–1.2)
GFR, ESTIMATED: 48 ML/MIN/1.73M2
GLUCOSE SERPL-MCNC: 107 MG/DL (ref 74–99)
POTASSIUM SERPL-SCNC: 4.6 MMOL/L (ref 3.5–5)
SODIUM SERPL-SCNC: 134 MMOL/L (ref 132–146)

## 2024-12-03 PROCEDURE — 83880 ASSAY OF NATRIURETIC PEPTIDE: CPT

## 2024-12-03 PROCEDURE — 36415 COLL VENOUS BLD VENIPUNCTURE: CPT

## 2024-12-03 PROCEDURE — 80048 BASIC METABOLIC PNL TOTAL CA: CPT

## 2024-12-03 PROCEDURE — 99205 OFFICE O/P NEW HI 60 MIN: CPT

## 2024-12-03 RX ORDER — POLYETHYLENE GLYCOL 3350 17 G/17G
17 POWDER, FOR SOLUTION ORAL DAILY PRN
COMMUNITY

## 2024-12-03 ASSESSMENT — PATIENT HEALTH QUESTIONNAIRE - PHQ9
SUM OF ALL RESPONSES TO PHQ QUESTIONS 1-9: 0
2. FEELING DOWN, DEPRESSED OR HOPELESS: NOT AT ALL
SUM OF ALL RESPONSES TO PHQ9 QUESTIONS 1 & 2: 0
SUM OF ALL RESPONSES TO PHQ QUESTIONS 1-9: 0
1. LITTLE INTEREST OR PLEASURE IN DOING THINGS: NOT AT ALL

## 2024-12-03 NOTE — PROGRESS NOTES
Congestive Heart Failure Clinic   [facility]      Referring Provider: Dr. Kolb     Primary Care Physician: Romaine Arnold MD   Cardiologist: Dr. Lyon  Nephrologist:       HISTORY OF PRESENT ILLNESS:     Tashi Parry is a 89 y.o. 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    In the CHF clinic today he denies any adverse symptoms except:  [] Dizziness or lightheadedness   [] Syncope or near syncope  [] Recent Fall  [] Shortness of breath at rest   [] Dyspnea with exertion  [] Decline in functional capacity (unable to perform activities they had previously been able to do)  [] Fatigue   [] Orthopnea  [] PND  [] Nocturnal cough  [] Hemoptysis  [] Chest pain, pressure, or discomfort  [] Palpitations  [] Abdominal distention  [] Abdominal bloating  [] Early satiety  [] Blood in stool   [] Diarrhea  [] Constipation  [] Nausea/Vomiting  [] Decreased urinary response to oral diuretic   [] Scrotal swelling   [] Lower extremity edema  [] Used PRN doses of oral diuretic   [] Weight gain    Wt Readings from Last 3 Encounters:   12/03/24 67.6 kg (149 lb)   11/19/24 70.4 kg (155 lb 1.6 oz)   11/13/24 75.8 kg (167 lb)           SOCIAL HISTORY:  [x] Denies tobacco, alcohol or illicit drug abuse  [] Tobacco use:  [] ETOH use:  [] Illicit drug use:        MEDICATIONS:    Allergies   Allergen Reactions    Bactrim [Sulfamethoxazole-Trimethoprim] Other (See Comments)     INSOMNIA    Biaxin [Clarithromycin] Rash    Cipro [Ciprofloxacin Hcl] Other (See Comments)     UNKNOWN REACTION, NOT LISTED ON FACILITY PAPERWORK.     Iodinated Contrast Media Swelling and Rash    Nitroglycerin Rash    Quinidine Rash     Prior to Visit  10

## 2024-12-03 NOTE — TELEPHONE ENCOUNTER
2:14 PM Spoke with Zaira at [facility] regarding orders Per Yulisa Clarke, ABHISHEK-CNP    Labs and CHF clinic note reviewed  Please have facility monitor closely for fluid retention (daily weights, swelling, etc)  They can give PRN bumex 1 mg daily      Weight gain 3-5 pounds in 2-3 days OR notice that you are retaining fluid in anyway      Call CHF clinic for sooner appointment if needed     Thank you     Electronically signed by Sheng Sun RN on 12/3/2024 at 2:14 PM

## 2024-12-03 NOTE — RESULT ENCOUNTER NOTE
Labs and CHF clinic note reviewed  Please have facility monitor closely for fluid retention (daily weights, swelling, etc)  They can give PRN bumex 1 mg daily     Weight gain 3-5 pounds in 2-3 days OR notice that you are retaining fluid in anyway     Call CHF clinic for sooner appointment if needed    Thank you

## 2024-12-06 ENCOUNTER — OFFICE VISIT (OUTPATIENT)
Dept: PRIMARY CARE CLINIC | Age: 88
End: 2024-12-06

## 2024-12-06 VITALS
BODY MASS INDEX: 23.95 KG/M2 | DIASTOLIC BLOOD PRESSURE: 70 MMHG | OXYGEN SATURATION: 94 % | SYSTOLIC BLOOD PRESSURE: 110 MMHG | RESPIRATION RATE: 20 BRPM | HEIGHT: 66 IN | HEART RATE: 60 BPM | TEMPERATURE: 98.5 F | WEIGHT: 149 LBS

## 2024-12-06 DIAGNOSIS — I10 ESSENTIAL HYPERTENSION: ICD-10-CM

## 2024-12-06 DIAGNOSIS — N40.0 BENIGN PROSTATIC HYPERPLASIA WITHOUT LOWER URINARY TRACT SYMPTOMS: ICD-10-CM

## 2024-12-06 DIAGNOSIS — M62.81 MUSCLE WEAKNESS (GENERALIZED): ICD-10-CM

## 2024-12-06 DIAGNOSIS — N18.9 ACUTE KIDNEY INJURY SUPERIMPOSED ON CHRONIC KIDNEY DISEASE (HCC): ICD-10-CM

## 2024-12-06 DIAGNOSIS — N17.9 ACUTE KIDNEY INJURY SUPERIMPOSED ON CHRONIC KIDNEY DISEASE (HCC): ICD-10-CM

## 2024-12-06 DIAGNOSIS — I50.43 CHF (CONGESTIVE HEART FAILURE), NYHA CLASS I, ACUTE ON CHRONIC, COMBINED (HCC): Primary | ICD-10-CM

## 2024-12-06 DIAGNOSIS — I27.20 PULMONARY HYPERTENSION (HCC): ICD-10-CM

## 2024-12-06 DIAGNOSIS — Z09 HOSPITAL DISCHARGE FOLLOW-UP: ICD-10-CM

## 2024-12-06 DIAGNOSIS — I50.21 ACUTE HFREF (HEART FAILURE WITH REDUCED EJECTION FRACTION) (HCC): ICD-10-CM

## 2024-12-06 DIAGNOSIS — J96.11 CHRONIC RESPIRATORY FAILURE WITH HYPOXIA: ICD-10-CM

## 2024-12-06 PROBLEM — I50.9 ACUTE ON CHRONIC CONGESTIVE HEART FAILURE (HCC): Status: RESOLVED | Noted: 2024-11-14 | Resolved: 2024-12-06

## 2024-12-06 PROBLEM — J84.9 BILATERAL INTERSTITIAL PNEUMONIA (HCC): Status: RESOLVED | Noted: 2024-10-28 | Resolved: 2024-12-06

## 2024-12-06 PROBLEM — U07.1 COVID-19: Status: RESOLVED | Noted: 2024-09-22 | Resolved: 2024-12-06

## 2024-12-06 NOTE — PROGRESS NOTES
Post-Discharge Transitional Care  Follow Up    Tashi Parry   YOB: 1931    Date of Office Visit:  12/6/2024  Date of Hospital Admission: 11/13/24  Date of Hospital Discharge: 11/19/24  Date of Rehab Discharge:  12/5/24  Risk of hospital readmission (high >=14%. Medium >=10%) :Readmission Risk Score: 23.7    Care management risk score Rising risk (score 2-5) and Complex Care (Scores >=6): No Risk Score On File     Non face to face  following discharge, date last encounter closed (first attempt may have been earlier): Seen with 2 business days of discharge.    Call initiated 2 business days of discharge: Seen with 2 business days of discharge.    ASSESSMENT/PLAN:   CHF (congestive heart failure), NYHA class I, acute on chronic, combined (HCC)  Comments:  Improved.  Continue carvedilol and Bumex 1 mg daily.  Followed by cardiology and at CHF clinic.  Acute HFrEF (heart failure with reduced ejection fraction) ([facility])  Comments:  Resolved.  Pulmonary hypertension (HCC)  Comments:  Improved on Bumex.  Followed by cardiology.  Chronic respiratory failure with hypoxia  Comments:  Stable on supplemental oxygen.  Followed by pulmonology.  Acute kidney injury superimposed on chronic kidney disease (HCC)  Comments:  Improved after hospitalization.  Will continue to follow renal function.  Benign prostatic hyperplasia without lower urinary tract symptoms  Comments:  Stable.  Followed by urology.  Muscle weakness (generalized)  Comments:  Improved.  Encouraged use of walker.  Essential hypertension  Comments:  Well-controlled on hydralazine, carvedilol and Bumex.  Hospital discharge follow-up  -     KS DISCHARGE MEDS RECONCILED W/ CURRENT OUTPATIENT MED LIST      Medical Decision Making: high complexity  Return in 7 weeks (on 1/24/2025).    On this date 12/6/2024 I have spent 60 minutes reviewing previous notes, test results and face to face with the patient discussing the diagnosis and importance of compliance

## 2024-12-13 ENCOUNTER — TELEPHONE (OUTPATIENT)
Dept: CARDIOLOGY CLINIC | Age: 88
End: 2024-12-13

## 2024-12-13 NOTE — TELEPHONE ENCOUNTER
----- Message from Dr. Jarrett Whittaker MD sent at 11/17/2024  3:53 PM EST -----  Can we get him scheduled for an amyloid PYP scan?  He is currently admitted at [facility]

## 2024-12-18 ENCOUNTER — HOSPITAL ENCOUNTER (OUTPATIENT)
Dept: CT IMAGING | Age: 88
Discharge: HOME | End: 2024-12-20
Attending: INTERNAL MEDICINE
Payer: MEDICARE

## 2024-12-18 DIAGNOSIS — J18.9 PNEUMONIA DUE TO INFECTIOUS ORGANISM, UNSPECIFIED LATERALITY, UNSPECIFIED PART OF LUNG: ICD-10-CM

## 2024-12-18 PROCEDURE — 71250 CT THORAX DX C-: CPT

## 2024-12-19 NOTE — TELEPHONE ENCOUNTER
Name of Medication(s) Requested:  Requested Prescriptions     Pending Prescriptions Disp Refills    potassium chloride (K-TAB) 20 MEQ TBCR extended release tablet 90 tablet 3     Sig: Take 1 tablet by mouth daily    carvedilol (COREG) 12.5 MG tablet 180 tablet 3     Sig: Take 1 tablet by mouth 2 times daily (with meals)    Multiple Vitamins-Minerals (THERAPEUTIC MULTIVITAMIN-MINERALS) tablet 90 tablet 3     Sig: Take 1 tablet by mouth daily       Medication is on current medication list Yes    Dosage and directions were verified? Yes    Quantity verified: 90 day supply     Pharmacy Verified?  Yes    Last Appointment:  12/6/2024    Future appts:  Future Appointments   Date Time Provider Department Center   12/26/2024 10:20 AM Gabe Carter, DO [facility]   12/30/2024 12:45 PM Rosaura Lyon,  [facility]   1/6/2025 12:00 PM [facility] ROOM 2 [facility]   1/24/2025 11:30 AM Romaine Arnold MD TRAIL [facility] DEP        (If no appt send self scheduling link. .REFILLAPPT)  Scheduling request sent?     [] Yes  [] No    Does patient need updated?  [] Yes  [] No

## 2024-12-20 RX ORDER — M-VIT,TX,IRON,MINS/CALC/FOLIC 27MG-0.4MG
1 TABLET ORAL DAILY
Qty: 90 TABLET | Refills: 3 | Status: SHIPPED | OUTPATIENT
Start: 2024-12-20

## 2024-12-20 RX ORDER — CARVEDILOL 12.5 MG/1
12.5 TABLET ORAL 2 TIMES DAILY WITH MEALS
Qty: 180 TABLET | Refills: 3 | Status: SHIPPED | OUTPATIENT
Start: 2024-12-20

## 2024-12-20 RX ORDER — POTASSIUM CHLORIDE 1500 MG/1
20 TABLET, EXTENDED RELEASE ORAL DAILY
Qty: 90 TABLET | Refills: 3 | Status: SHIPPED | OUTPATIENT
Start: 2024-12-20

## 2024-12-30 ENCOUNTER — OFFICE VISIT (OUTPATIENT)
Dept: CARDIOLOGY CLINIC | Age: 88
End: 2024-12-30
Payer: MEDICARE

## 2024-12-30 VITALS
SYSTOLIC BLOOD PRESSURE: 128 MMHG | DIASTOLIC BLOOD PRESSURE: 60 MMHG | HEART RATE: 56 BPM | WEIGHT: 161 LBS | RESPIRATION RATE: 18 BRPM | HEIGHT: 66 IN | BODY MASS INDEX: 25.88 KG/M2

## 2024-12-30 DIAGNOSIS — I25.810 CORONARY ARTERY DISEASE INVOLVING CORONARY BYPASS GRAFT OF NATIVE HEART WITHOUT ANGINA PECTORIS: Primary | ICD-10-CM

## 2024-12-30 PROCEDURE — 99214 OFFICE O/P EST MOD 30 MIN: CPT | Performed by: INTERNAL MEDICINE

## 2024-12-30 PROCEDURE — 1159F MED LIST DOCD IN RCRD: CPT | Performed by: INTERNAL MEDICINE

## 2024-12-30 PROCEDURE — 93000 ELECTROCARDIOGRAM COMPLETE: CPT | Performed by: INTERNAL MEDICINE

## 2024-12-30 PROCEDURE — 1123F ACP DISCUSS/DSCN MKR DOCD: CPT | Performed by: INTERNAL MEDICINE

## 2024-12-30 NOTE — PROGRESS NOTES
vomiting, diarrhea   Psych: denies mood changed, anxiety, depression.  all others systems negative.    Physical Exam   /60   Pulse 56   Resp 18   Ht 1.676 m (5' 6\")   Wt 73 kg (161 lb)   BMI 25.99 kg/m²   Constitutional: Oriented to person, place, and time. Well-developed and well-nourished. No distress.    Head: Normocephalic and atraumatic.   Eyes: EOM are normal. Pupils are equal, round, and reactive to light.   Neck: Normal range of motion. Neck supple. No hepatojugular reflux and no JVD present. Carotid bruit is not present. No tracheal deviation present. No thyromegaly present.   Cardiovascular: Normal rate, regular rhythm, normal heart sounds and intact distal pulses.  Exam reveals no gallop and no friction rub.  No murmur heard.  Pulmonary/Chest: Effort normal and breath sounds normal. No respiratory distress. No wheezes. No rales. No tenderness.   Abdominal: Soft. Bowel sounds are normal. No distension and no mass. No tenderness. No rebound and no guarding.   Musculoskeletal: Normal range of motion. No edema and no tenderness.   Lymphadenopathy:   No cervical adenopathy. No groin adenopathy.  Neurological: Alert and oriented to person, place, and time.   Skin: Skin is warm and dry. No rash noted. Not diaphoretic. No erythema.   Psychiatric: Normal mood and affect. Behavior is normal.     EKG:  sinus bradycardia, nonspecific ST and T waves changes.    Stress Impression 1/17/18:    1. ECG during the infusion did not change.   2. The myocardial perfusion imaging was abnormal.    The abnormality was a moderate sized partially reversible defect in the inferolateral  wall  3. Overall left ventricular systolic function was normal without regional wall motion abnormalities.  4. Intermediate risk general pharmacologic stress test.  5. Compared to prior study results of 11/16/2010, there is a new perfusion defect involving the inferolateral wall with partial reversibility.     TTE 4/2019  Summary

## 2025-01-01 ENCOUNTER — RESULTS FOLLOW-UP (OUTPATIENT)
Age: 89
End: 2025-01-01

## 2025-01-01 RX ORDER — METOPROLOL SUCCINATE 25 MG/1
25 TABLET, EXTENDED RELEASE ORAL DAILY
Qty: 90 TABLET | Refills: 1 | Status: SHIPPED
Start: 2025-01-01 | End: 2025-04-15

## 2025-01-01 RX ORDER — HYDRALAZINE HYDROCHLORIDE 25 MG/1
25 TABLET, FILM COATED ORAL 3 TIMES DAILY
Qty: 90 TABLET | Refills: 3 | Status: SHIPPED
Start: 2025-01-01 | End: 2025-04-15

## 2025-01-06 ENCOUNTER — TELEPHONE (OUTPATIENT)
Dept: OTHER | Age: 89
End: 2025-01-06

## 2025-01-06 ENCOUNTER — HOSPITAL ENCOUNTER (OUTPATIENT)
Dept: OTHER | Age: 89
Setting detail: THERAPIES SERIES
Discharge: HOME | End: 2025-01-06
Payer: MEDICARE

## 2025-01-06 VITALS
WEIGHT: 160 LBS | RESPIRATION RATE: 18 BRPM | BODY MASS INDEX: 25.82 KG/M2 | HEART RATE: 53 BPM | OXYGEN SATURATION: 98 % | DIASTOLIC BLOOD PRESSURE: 84 MMHG | SYSTOLIC BLOOD PRESSURE: 158 MMHG

## 2025-01-06 DIAGNOSIS — I50.20 HFREF (HEART FAILURE WITH REDUCED EJECTION FRACTION) (HCC): Primary | ICD-10-CM

## 2025-01-06 LAB
ANION GAP SERPL CALCULATED.3IONS-SCNC: 11 MMOL/L (ref 7–16)
BNP SERPL-MCNC: 5976 PG/ML (ref 0–450)
BUN SERPL-MCNC: 30 MG/DL (ref 6–23)
CALCIUM SERPL-MCNC: 8.8 MG/DL (ref 8.6–10.2)
CHLORIDE SERPL-SCNC: 101 MMOL/L (ref 98–107)
CO2 SERPL-SCNC: 25 MMOL/L (ref 22–29)
CREAT SERPL-MCNC: 1.2 MG/DL (ref 0.7–1.2)
GFR, ESTIMATED: 56 ML/MIN/1.73M2
GLUCOSE SERPL-MCNC: 125 MG/DL (ref 74–99)
POTASSIUM SERPL-SCNC: 4.3 MMOL/L (ref 3.5–5)
SODIUM SERPL-SCNC: 137 MMOL/L (ref 132–146)

## 2025-01-06 PROCEDURE — 99214 OFFICE O/P EST MOD 30 MIN: CPT

## 2025-01-06 PROCEDURE — 36415 COLL VENOUS BLD VENIPUNCTURE: CPT

## 2025-01-06 PROCEDURE — 6360000002 HC RX W HCPCS

## 2025-01-06 PROCEDURE — 83880 ASSAY OF NATRIURETIC PEPTIDE: CPT

## 2025-01-06 PROCEDURE — 96374 THER/PROPH/DIAG INJ IV PUSH: CPT

## 2025-01-06 PROCEDURE — 80048 BASIC METABOLIC PNL TOTAL CA: CPT

## 2025-01-06 PROCEDURE — 2500000003 HC RX 250 WO HCPCS: Performed by: INTERNAL MEDICINE

## 2025-01-06 RX ORDER — MULTIVIT WITH MINERALS/LUTEIN
250 TABLET ORAL DAILY
COMMUNITY

## 2025-01-06 RX ORDER — BUMETANIDE 0.25 MG/ML
INJECTION, SOLUTION INTRAMUSCULAR; INTRAVENOUS
Status: COMPLETED
Start: 2025-01-06 | End: 2025-01-06

## 2025-01-06 RX ORDER — BUMETANIDE 0.25 MG/ML
2 INJECTION, SOLUTION INTRAMUSCULAR; INTRAVENOUS ONCE
Status: COMPLETED | OUTPATIENT
Start: 2025-01-06 | End: 2025-01-06

## 2025-01-06 RX ORDER — SODIUM CHLORIDE 0.9 % (FLUSH) 0.9 %
10 SYRINGE (ML) INJECTION ONCE
Status: COMPLETED | OUTPATIENT
Start: 2025-01-06 | End: 2025-01-06

## 2025-01-06 RX ADMIN — SODIUM CHLORIDE, PRESERVATIVE FREE 10 ML: 5 INJECTION INTRAVENOUS at 12:44

## 2025-01-06 RX ADMIN — BUMETANIDE 2 MG: 0.25 INJECTION INTRAMUSCULAR; INTRAVENOUS at 12:44

## 2025-01-06 RX ADMIN — BUMETANIDE 2 MG: 0.25 INJECTION, SOLUTION INTRAMUSCULAR; INTRAVENOUS at 12:44

## 2025-01-06 NOTE — PROGRESS NOTES
Congestive Heart Failure Clinic   [facility]      Referring Provider: Dr. Kolb     Primary Care Physician: Romaine Arnold MD   Cardiologist: Dr. Lyon  Nephrologist:       HISTORY OF PRESENT ILLNESS:     Tashi Parry is a 89 y.o. 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    In the CHF clinic today he denies any adverse symptoms except:  [] Dizziness or lightheadedness   [] Syncope or near syncope  [] Recent Fall  [] Shortness of breath at rest   [x] Dyspnea with exertion  [] Decline in functional capacity (unable to perform activities they had previously been able to do)  [] Fatigue   [] Orthopnea  [] PND  [] Nocturnal cough  [] Hemoptysis  [] Chest pain, pressure, or discomfort  [] Palpitations  [] Abdominal distention  [] Abdominal bloating  [x] Early satiety  [] Blood in stool   [] Diarrhea  [] Constipation  [] Nausea/Vomiting  [] Decreased urinary response to oral diuretic   [] Scrotal swelling   [x] Lower extremity edema  [] Used PRN doses of oral diuretic   [x] Weight gain    Wt Readings from Last 3 Encounters:   01/06/25 72.6 kg (160 lb)   12/30/24 73 kg (161 lb)   12/26/24 67.6 kg (149 lb)           SOCIAL HISTORY:  [x] Denies tobacco, alcohol or illicit drug abuse  [] Tobacco use:  [] ETOH use:  [] Illicit drug use:        MEDICATIONS:    Allergies   Allergen Reactions    Bactrim [Sulfamethoxazole-Trimethoprim] Other (See Comments)     INSOMNIA    Biaxin [Clarithromycin] Rash    Cipro [Ciprofloxacin Hcl] Other (See Comments)     UNKNOWN REACTION, NOT LISTED ON FACILITY PAPERWORK.     Iodinated Contrast Media Swelling and Rash    Nitroglycerin Rash    Quinidine Rash     Prior to Visit

## 2025-01-06 NOTE — TELEPHONE ENCOUNTER
4:05 PM  Spoke with Yvonne from [facility] regarding medication adjustments/recommendations by Yulisa WEISS-CNP:    Start Entresto 24/26 mg BID   Increase Bumex 2 mg daily x 3 days and then return to 1 mg daily   Follow up labs in facility in 1 week and fax to CHF clinic   Follow up in CHF clinic as scheduled - sooner if needed     I have reviewed the provider's instructions with the patient, answering all questions to his satisfaction.    Electronically signed by Heather Samaniego RN on 1/6/2025 at 4:06 PM    Orders also faxed to facility and informed them of the need for daily weights.

## 2025-01-06 NOTE — RESULT ENCOUNTER NOTE
Labs and CHF clinic note reviewed  Spoke with Heather DELAROSA  Hypervolemic in CHF clinic (11 lb weight increase) not weighing himself     Vitals: 158/84, 53    Current GDMT:  Coreg 12.5 mg BID  Hydralazine 10 mg TID  Isordil 5 mg TID  Bumex 1 mg daily   Potassium 20 meq daily     Start Entresto 24/26 mg BID  Increase Bumex 2 mg daily x 3 days and then return to 1 mg daily   Follow up labs in facility in 1 week and fax to CHF clinic   Follow up in CHF clinic as scheduled - sooner if needed    Thank you

## 2025-01-14 ENCOUNTER — TELEPHONE (OUTPATIENT)
Dept: OTHER | Age: 89
End: 2025-01-14

## 2025-01-14 NOTE — TELEPHONE ENCOUNTER
3:01 PM  Received weight log from [facility]. Documented weight gain 6 lbs  x 1 week.     Call placed to facility regarding. Patient did in fact gain 6 lbs x 1 week. Refused labs this am.     Spoke with Yulisa GRANGER. Orders given. Give PRN dose of Bumex 1 mg now. Attempt to redraw labs tomorrow (nurse Connor will speak with patient and daughter on importance) and adjust PO diuretics after tomorrow's blood work. Spoke with facility all questions answered at this time.     Electronically signed by Ema Dolan RN on 1/14/2025 at 3:10 PM

## 2025-01-16 ENCOUNTER — TELEPHONE (OUTPATIENT)
Dept: OTHER | Age: 89
End: 2025-01-16

## 2025-01-16 NOTE — TELEPHONE ENCOUNTER
4:38 PM   Outpatient blood work [facility] Received (per request of Yulisa GRANGER). Message sent to Yulisa at this time.     Na: 137  K: 4.5  Cl: 100  CO2: 26  BUN: 37  Scr: 1.6  Glucose: 120  Calcium: 8.7  Pro-BNP: 3373    Electronically signed by Ema Dolan RN on 1/16/2025 at 4:40 PM

## 2025-01-17 ENCOUNTER — TELEPHONE (OUTPATIENT)
Dept: OTHER | Age: 89
End: 2025-01-17

## 2025-01-17 RX ORDER — BUMETANIDE 2 MG/1
2 TABLET ORAL DAILY
Qty: 30 TABLET | Refills: 3 | Status: SHIPPED | OUTPATIENT
Start: 2025-01-17

## 2025-01-17 NOTE — TELEPHONE ENCOUNTER
10:44 AM   Received call from [facility] re: patient up 3 lbs overnight. Call placed to Yulisa WEISS-CNP.  Orders received.         -Yulisa Clarke, APRN - CNP  Ema Dolan RN  Increase Bumex 2 mg daily  Continue potassium 20 meq daily  Follow up in CHF clinic next week as scheduled      New orders faxed [phone]. Spoke with nurse Field to confirm orders received.    Electronically signed by Ema Dolan RN on 1/17/2025 at 10:46 AM      ,

## 2025-01-20 ENCOUNTER — TELEPHONE (OUTPATIENT)
Dept: PRIMARY CARE CLINIC | Age: 89
End: 2025-01-20

## 2025-01-20 NOTE — TELEPHONE ENCOUNTER
Daughter phoned in requesting a nasal spray for her dad because he is complaining of 1 side of his nose is congested and trouble getting O2 through that side of nose.  She will  something OTC based on your recommendation.    Please advise.

## 2025-01-20 NOTE — TELEPHONE ENCOUNTER
I recommend OTC Afrin or the generic equivalent nasal spray.  He can use 1 to 2 sprays in nostril twice a day as needed for congestion.  The other option is Benzedrex nasal inhaler which can be used every 3-4 hours as needed.

## 2025-01-23 ENCOUNTER — TELEPHONE (OUTPATIENT)
Dept: OTHER | Age: 89
End: 2025-01-23

## 2025-01-23 ENCOUNTER — HOSPITAL ENCOUNTER (OUTPATIENT)
Dept: OTHER | Age: 89
Setting detail: THERAPIES SERIES
Discharge: HOME | End: 2025-01-23
Payer: MEDICARE

## 2025-01-23 VITALS
WEIGHT: 162 LBS | RESPIRATION RATE: 18 BRPM | BODY MASS INDEX: 26.15 KG/M2 | SYSTOLIC BLOOD PRESSURE: 139 MMHG | OXYGEN SATURATION: 99 % | DIASTOLIC BLOOD PRESSURE: 52 MMHG | HEART RATE: 53 BPM

## 2025-01-23 DIAGNOSIS — I50.20 HFREF (HEART FAILURE WITH REDUCED EJECTION FRACTION) (HCC): Primary | ICD-10-CM

## 2025-01-23 LAB
ANION GAP SERPL CALCULATED.3IONS-SCNC: 11 MMOL/L (ref 7–16)
BNP SERPL-MCNC: 3895 PG/ML (ref 0–450)
BUN SERPL-MCNC: 35 MG/DL (ref 6–23)
CALCIUM SERPL-MCNC: 8.7 MG/DL (ref 8.6–10.2)
CHLORIDE SERPL-SCNC: 98 MMOL/L (ref 98–107)
CO2 SERPL-SCNC: 25 MMOL/L (ref 22–29)
CREAT SERPL-MCNC: 1.6 MG/DL (ref 0.7–1.2)
GFR, ESTIMATED: 40 ML/MIN/1.73M2
GLUCOSE SERPL-MCNC: 118 MG/DL (ref 74–99)
POTASSIUM SERPL-SCNC: 4.8 MMOL/L (ref 3.5–5)
SODIUM SERPL-SCNC: 134 MMOL/L (ref 132–146)

## 2025-01-23 PROCEDURE — 80048 BASIC METABOLIC PNL TOTAL CA: CPT

## 2025-01-23 PROCEDURE — 99214 OFFICE O/P EST MOD 30 MIN: CPT

## 2025-01-23 PROCEDURE — 36415 COLL VENOUS BLD VENIPUNCTURE: CPT

## 2025-01-23 PROCEDURE — 83880 ASSAY OF NATRIURETIC PEPTIDE: CPT

## 2025-01-23 RX ORDER — BUMETANIDE 1 MG/1
1 TABLET ORAL DAILY
Qty: 90 TABLET | Refills: 3 | Status: SHIPPED | OUTPATIENT
Start: 2025-01-23

## 2025-01-23 RX ORDER — BUMETANIDE 1 MG/1
1 TABLET ORAL DAILY PRN
COMMUNITY

## 2025-01-23 RX ORDER — FINASTERIDE 5 MG/1
5 TABLET, FILM COATED ORAL DAILY
COMMUNITY
Start: 2025-01-07

## 2025-01-23 RX ORDER — HYDRALAZINE HYDROCHLORIDE 10 MG/1
10 TABLET, FILM COATED ORAL 3 TIMES DAILY
Qty: 90 TABLET | Refills: 5 | Status: SHIPPED
Start: 2025-01-23

## 2025-01-23 NOTE — RESULT ENCOUNTER NOTE
Labs and CHF clinic note reviewed  Vitals: 139/52, 53    Bumex increased on 1/17, patient now euvolemic with increased SCR 1.2 > 1.6    Current GDMT:  Entresto 24/26 mg BID  Coreg 12.5 mg BID  Hydralazine 10 mg TID  Isordil 5 mg TID  Bumex 2 mg daily   Potassium 20 meq daily     Decrease Bumex 1 mg daily   Stop potassium   Increase Entresto 49/51 mg BID  Follow up BMP in 1 week and fax to CHF clinic   Follow up in CHF clinic in 2-3 weeks

## 2025-01-23 NOTE — PROGRESS NOTES
1/24/2025    Chief Complaint   Patient presents with    Congestive Heart Failure     Home visit medically necessary in lieu of an office visit due to:  [facility] resident, uses walker, difficult to get out. Seen with Mary Kate and SHAYLA Kumar.    HPI:  Patient reports that he is making slow progress. He was seen in CHF clinic yesterday and Entresto was adjusted. He is not retaining fluid currently and feels breathing is good. He does not have a cough. He has a nebulizer but has not felt any benefit from it. He continues supplemental O2 continuously at 2 LPM via NC. He denies chest or abdominal pain.  He has a follow-up appointments with pulmonology, Dr. Carter, and cardiology, Dr. Lyon, this month. He is moving his bowels satisfactorily with stool softener.  He had CABG x 6 in 2000. He has hx of LLL lobectomy for lung CA. Medications, labs and other diagnostic tests were reviewed with patient and his daughter Mary Kate who was present throughout the visit.     REVIEW OF SYSTEMS: as per HPI.      Allergies   Allergen Reactions    Bactrim [Sulfamethoxazole-Trimethoprim] Other (See Comments)     INSOMNIA    Biaxin [Clarithromycin] Rash    Cipro [Ciprofloxacin Hcl] Other (See Comments)     UNKNOWN REACTION, NOT LISTED ON FACILITY PAPERWORK.     Iodinated Contrast Media Swelling and Rash    Nitroglycerin Rash    Quinidine Rash     Past Medical History:   Diagnosis Date    Acute HFrEF (heart failure with reduced ejection fraction) ([facility]) 10/31/2024    Acute kidney injury superimposed on chronic kidney disease ([facility]) 11/04/2024    BPH (benign prostatic hyperplasia)     CAD (coronary artery disease)     Carotid artery calcification     DJD (degenerative joint disease)     History of falling     Hyperlipemia     Hypertension     Lung nodule     Macular degeneration     Numbness and tingling in left arm 02/02/2018    Numbness and tingling of left side of face 02/02/2018    S/P CABG x 6 2000    LIMA-LAD, LYNNETTE-LCx,SVG-OM,SVG-LPL,SVG-PDA-RPL

## 2025-01-23 NOTE — TELEPHONE ENCOUNTER
----- Message from ABHISHEK Steven - CNP sent at 1/23/2025  2:54 PM EST -----  Labs and CHF clinic note reviewed  Vitals: 139/52, 53    Bumex increased on 1/17, patient now euvolemic with increased SCR 1.2 > 1.6    Current GDMT:  Entresto 24/26 mg BID  Coreg 12.5 mg BID  Hydralazine 10 mg TID  Isordil 5 mg TID  Bumex 2 mg daily   Potassium 20 meq daily     Decrease Bumex 1 mg daily   Stop potassium   Increase Entresto 49/51 mg BID  Follow up BMP in 1 week and fax to CHF clinic   Follow up in CHF clinic in 2-3 weeks

## 2025-01-23 NOTE — TELEPHONE ENCOUNTER
Spoke to nurse Connor at the [facility] regarding medication instructions.    Entresto 24/26 mg BID  Coreg 12.5 mg BID  Hydralazine 10 mg TID  Isordil 5 mg TID  Bumex 2 mg daily   Potassium 20 meq daily      Decrease Bumex 1 mg daily   Stop potassium   Increase Entresto 49/51 mg BID  Follow up BMP in 1 week and fax to CHF clinic   Follow up in CHF clinic in 2-3 weeks     Faxed over all instructions/orders and lab order to Connor at the [facility].

## 2025-01-23 NOTE — PROGRESS NOTES
11/19/2024 8.9     Hemoglobin (g/dL)   Date Value   11/19/2024 9.6 (L)     Hematocrit (%)   Date Value   11/19/2024 30.2 (L)     Platelets (k/uL)   Date Value   11/19/2024 311     Iron Studies:  Ferritin (ng/mL)   Date Value   11/13/2024 216     Iron (ug/dL)   Date Value   11/13/2024 37 (L)     TIBC (ug/dL)   Date Value   11/13/2024 231 (L)     Hepatic:  AST (U/L)   Date Value   11/19/2024 27     ALT (U/L)   Date Value   11/19/2024 31     Total Bilirubin (mg/dL)   Date Value   11/19/2024 0.6     Alkaline Phosphatase (U/L)   Date Value   11/19/2024 211 (H)     INR:  INR (no units)   Date Value   08/20/2019 1.0         Wt Readings from Last 3 Encounters:   01/23/25 73.5 kg (162 lb)   01/06/25 72.6 kg (160 lb)   12/30/24 73 kg (161 lb)           ASSESSMENT/PLAN:    [x] Euvolemic          [] Hypervolemic, with increase from baseline:  [] Shortness of breath/MACDONALD  [] JVD-   [] HJR  [] Abnormal lung assessment:   [] Orthopnea  [] PND  [] Decreased urinary response to oral diuretic   [] Scrotal swelling   [] Lower extremity edema-   [] Compression stockings provided  [] Decline in functional capacity (unable to perform activities they had previously been able to do)  [] Weight gain-     [] IV diuretics given  [] Provider notified of recurrent IV diuretic use    Additional Notes:   Euvolemic on exam.  Patient states he is getting a great response to his oral diuretic.  Patient to follow up in 1 month. Per patient they are not weighing him the same time everyday at the facility.  Patient states that they will weigh him sometimes after he eats.  Wrote on instructions to [facility] to weigh patient every morning and same time everyday.     Orders from 1/17/24  Yulisa Clarke APRN - CNP   Increase Bumex 2 mg daily  Continue potassium 20 meq daily  Follow up in CHF clinic next week as scheduled             [x]Lab work obtained    [x] Patient/Family Educated On:  [x] HF zones (Green, Yellow, Red) and aware of when to take action

## 2025-01-24 ENCOUNTER — OFFICE VISIT (OUTPATIENT)
Dept: PRIMARY CARE CLINIC | Age: 89
End: 2025-01-24

## 2025-01-24 ENCOUNTER — OFFICE VISIT (OUTPATIENT)
Dept: PRIMARY CARE CLINIC | Age: 89
End: 2025-01-24
Payer: MEDICARE

## 2025-01-24 VITALS
TEMPERATURE: 98.9 F | WEIGHT: 161.6 LBS | DIASTOLIC BLOOD PRESSURE: 60 MMHG | BODY MASS INDEX: 25.97 KG/M2 | HEIGHT: 66 IN | OXYGEN SATURATION: 91 % | RESPIRATION RATE: 18 BRPM | HEART RATE: 53 BPM | SYSTOLIC BLOOD PRESSURE: 112 MMHG

## 2025-01-24 VITALS
WEIGHT: 161.6 LBS | HEART RATE: 53 BPM | OXYGEN SATURATION: 91 % | SYSTOLIC BLOOD PRESSURE: 112 MMHG | DIASTOLIC BLOOD PRESSURE: 60 MMHG | BODY MASS INDEX: 26.08 KG/M2 | TEMPERATURE: 98.9 F | RESPIRATION RATE: 18 BRPM

## 2025-01-24 DIAGNOSIS — K21.9 GASTRO-ESOPHAGEAL REFLUX DISEASE WITHOUT ESOPHAGITIS: ICD-10-CM

## 2025-01-24 DIAGNOSIS — I50.43 CHF (CONGESTIVE HEART FAILURE), NYHA CLASS I, ACUTE ON CHRONIC, COMBINED (HCC): Primary | ICD-10-CM

## 2025-01-24 DIAGNOSIS — I50.810 RVF (RIGHT VENTRICULAR FAILURE) (HCC): ICD-10-CM

## 2025-01-24 DIAGNOSIS — I25.810 CORONARY ARTERY DISEASE INVOLVING CORONARY BYPASS GRAFT OF NATIVE HEART WITHOUT ANGINA PECTORIS: ICD-10-CM

## 2025-01-24 DIAGNOSIS — Z00.00 MEDICARE ANNUAL WELLNESS VISIT, SUBSEQUENT: Primary | ICD-10-CM

## 2025-01-24 DIAGNOSIS — J96.11 CHRONIC RESPIRATORY FAILURE WITH HYPOXIA: ICD-10-CM

## 2025-01-24 DIAGNOSIS — I27.20 PULMONARY HYPERTENSION (HCC): ICD-10-CM

## 2025-01-24 PROCEDURE — G0439 PPPS, SUBSEQ VISIT: HCPCS | Performed by: FAMILY MEDICINE

## 2025-01-24 PROCEDURE — 1123F ACP DISCUSS/DSCN MKR DOCD: CPT | Performed by: FAMILY MEDICINE

## 2025-01-24 ASSESSMENT — PATIENT HEALTH QUESTIONNAIRE - PHQ9
SUM OF ALL RESPONSES TO PHQ QUESTIONS 1-9: 0
SUM OF ALL RESPONSES TO PHQ QUESTIONS 1-9: 0
2. FEELING DOWN, DEPRESSED OR HOPELESS: NOT AT ALL
SUM OF ALL RESPONSES TO PHQ QUESTIONS 1-9: 0
SUM OF ALL RESPONSES TO PHQ9 QUESTIONS 1 & 2: 0
1. LITTLE INTEREST OR PLEASURE IN DOING THINGS: NOT AT ALL
SUM OF ALL RESPONSES TO PHQ QUESTIONS 1-9: 0

## 2025-01-24 NOTE — PATIENT INSTRUCTIONS
attack. These may include:    Chest pain or pressure, or a strange feeling in the chest.     Sweating.     Shortness of breath.     Pain, pressure, or a strange feeling in the back, neck, jaw, or upper belly or in one or both shoulders or arms.     Lightheadedness or sudden weakness.     A fast or irregular heartbeat.   After you call 911, the  may tell you to chew 1 adult-strength or 2 to 4 low-dose aspirin. Wait for an ambulance. Do not try to drive yourself.  Watch closely for changes in your health, and be sure to contact your doctor if you have any problems.  Where can you learn more?  Go to https://www.[employer].net/patientEd and enter F075 to learn more about \"A Healthy Heart: Care Instructions.\"  Current as of: July 31, 2024  Content Version: 14.3  © 2024 [employer].   Care instructions adapted under license by [employer]. If you have questions about a medical condition or this instruction, always ask your healthcare professional. [employer] disclaims any warranty or liability for your use of this information.    Personalized Preventive Plan for Tashi Parry - 1/24/2025  Medicare offers a range of preventive health benefits. Some of the tests and screenings are paid in full while other may be subject to a deductible, co-insurance, and/or copay.  Some of these benefits include a comprehensive review of your medical history including lifestyle, illnesses that may run in your family, and various assessments and screenings as appropriate.  After reviewing your medical record and screening and assessments performed today your provider may have ordered immunizations, labs, imaging, and/or referrals for you.  A list of these orders (if applicable) as well as your Preventive Care list are included within your After Visit Summary for your review.

## 2025-01-24 NOTE — PROGRESS NOTES
Medicare Annual Wellness Visit    Tashi Parry is here for Medicare AWV    Assessment & Plan   Medicare annual wellness visit, subsequent     Return for Medicare Annual Wellness Visit in 1 year.     Subjective     Patient's complete Health Risk Assessment and screening values have been reviewed and are found in Flowsheets. The following problems were reviewed today and where indicated follow up appointments were made and/or referrals ordered.    Positive Risk Factor Screenings with Interventions:                     ADL's:   Patient reports needing help with:  Select all that apply: (!) Walking/Balance  Select all that apply: (!) Laundry, Taking Medications, Housekeeping, Banking/Finances, Shopping, Transportation, Food Preparation  Interventions:  Patient lives at [facility] and has all his needs cared for.          Objective   Vitals:    01/24/25 0836   BP: 112/60   Pulse: 53   Resp: 18   Temp: 98.9 °F (37.2 °C)   SpO2: 91%   Weight: 73.3 kg (161 lb 9.6 oz)      Body mass index is 26.08 kg/m².             Allergies   Allergen Reactions    Bactrim [Sulfamethoxazole-Trimethoprim] Other (See Comments)     INSOMNIA    Biaxin [Clarithromycin] Rash    Cipro [Ciprofloxacin Hcl] Other (See Comments)     UNKNOWN REACTION, NOT LISTED ON FACILITY PAPERWORK.     Iodinated Contrast Media Swelling and Rash    Nitroglycerin Rash    Quinidine Rash     Prior to Visit Medications    Medication Sig Taking? Authorizing Provider   bumetanide (BUMEX) 1 MG tablet Take 1 tablet by mouth daily as needed (PRN for gain of 3 lbs in a day)  Viral Mckeon MD   finasteride (PROSCAR) 5 MG tablet Take 1 tablet by mouth daily  Viral Mckeon MD   hydrALAZINE (APRESOLINE) 10 MG tablet Take 1 tablet by mouth 3 times daily  Romaine Arnold MD   bumetanide (BUMEX) 1 MG tablet Take 1 tablet by mouth daily  Yulisa Clarke, APRN - CNP   sacubitril-valsartan (ENTRESTO) 49-51 MG per tablet Take 1 tablet by mouth 2 times daily  Yulisa Clarke,

## 2025-01-27 RX ORDER — MULTIVIT WITH MINERALS/LUTEIN
250 TABLET ORAL DAILY
Qty: 100 TABLET | Refills: 3 | Status: SHIPPED | OUTPATIENT
Start: 2025-01-27

## 2025-02-05 ENCOUNTER — HOSPITAL ENCOUNTER (OUTPATIENT)
Dept: OTHER | Age: 89
Setting detail: THERAPIES SERIES
Discharge: HOME | End: 2025-02-05
Payer: MEDICARE

## 2025-02-05 VITALS
HEART RATE: 56 BPM | WEIGHT: 169 LBS | BODY MASS INDEX: 27.28 KG/M2 | DIASTOLIC BLOOD PRESSURE: 52 MMHG | OXYGEN SATURATION: 100 % | SYSTOLIC BLOOD PRESSURE: 112 MMHG | RESPIRATION RATE: 18 BRPM

## 2025-02-05 LAB
ANION GAP SERPL CALCULATED.3IONS-SCNC: 9 MMOL/L (ref 7–16)
BNP SERPL-MCNC: 5974 PG/ML (ref 0–450)
BUN SERPL-MCNC: 29 MG/DL (ref 6–23)
CALCIUM SERPL-MCNC: 8.8 MG/DL (ref 8.6–10.2)
CHLORIDE SERPL-SCNC: 101 MMOL/L (ref 98–107)
CO2 SERPL-SCNC: 24 MMOL/L (ref 22–29)
CREAT SERPL-MCNC: 1.5 MG/DL (ref 0.7–1.2)
GFR, ESTIMATED: 43 ML/MIN/1.73M2
GLUCOSE SERPL-MCNC: 95 MG/DL (ref 74–99)
POTASSIUM SERPL-SCNC: 4.6 MMOL/L (ref 3.5–5)
SODIUM SERPL-SCNC: 134 MMOL/L (ref 132–146)

## 2025-02-05 PROCEDURE — 80048 BASIC METABOLIC PNL TOTAL CA: CPT

## 2025-02-05 PROCEDURE — 36415 COLL VENOUS BLD VENIPUNCTURE: CPT

## 2025-02-05 PROCEDURE — 83880 ASSAY OF NATRIURETIC PEPTIDE: CPT

## 2025-02-05 PROCEDURE — 2500000003 HC RX 250 WO HCPCS: Performed by: NURSE PRACTITIONER

## 2025-02-05 PROCEDURE — 99214 OFFICE O/P EST MOD 30 MIN: CPT

## 2025-02-05 PROCEDURE — 6360000002 HC RX W HCPCS

## 2025-02-05 RX ORDER — BUMETANIDE 0.25 MG/ML
INJECTION, SOLUTION INTRAMUSCULAR; INTRAVENOUS
Status: COMPLETED
Start: 2025-02-05 | End: 2025-02-05

## 2025-02-05 RX ORDER — BUMETANIDE 2 MG/1
2 TABLET ORAL DAILY
Qty: 90 TABLET | Refills: 3 | Status: SHIPPED | OUTPATIENT
Start: 2025-02-05

## 2025-02-05 RX ORDER — SODIUM CHLORIDE 0.9 % (FLUSH) 0.9 %
10 SYRINGE (ML) INJECTION ONCE
Status: COMPLETED | OUTPATIENT
Start: 2025-02-05 | End: 2025-02-05

## 2025-02-05 RX ADMIN — BUMETANIDE 2 MG: 0.25 INJECTION INTRAMUSCULAR; INTRAVENOUS at 13:30

## 2025-02-05 RX ADMIN — SODIUM CHLORIDE, PRESERVATIVE FREE 10 ML: 5 INJECTION INTRAVENOUS at 13:28

## 2025-02-05 NOTE — CARE COORDINATION
Social Work / Discharge Planning : SW met with patient and explained role as discharge planner/ transition of care. Patient admitted with UTI. Patient verified  plan at discharge is HOME. Patient denies HOME neds. Therapy am/;pac 22/24. Await plan. Patient does NOT use a device and resides alone on [address] [place] [address]. Patient still drives. Patient PCP is DR Ector June and his Pharmacy is [facility] in [place]. SW to follow.  Electronically signed by MARTHA Mireles on 9/7/21 at 9:48 AM EDT
5

## 2025-02-05 NOTE — PROGRESS NOTES
Congestive Heart Failure Clinic   [facility]      Referring Provider: Dr. Kolb     Primary Care Physician: Romaine Arnold MD   Cardiologist: Dr. Lyon  Nephrologist: Dr Phoenix      HISTORY OF PRESENT ILLNESS:     Tashi Parry is a 89 y.o. 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    In the CHF clinic today he denies any adverse symptoms except:  [] Dizziness or lightheadedness   [] Syncope or near syncope  [] Recent Fall  [] Shortness of breath at rest   [x] Dyspnea with exertion recovers with rest  [] Decline in functional capacity (unable to perform activities they had previously been able to do)  [] Fatigue   [] Orthopnea  [] PND  [] Nocturnal cough  [] Hemoptysis  [] Chest pain, pressure, or discomfort  [] Palpitations  [] Abdominal distention  [] Abdominal bloating  [] Early satiety  [] Blood in stool   [] Diarrhea  [] Constipation  [] Nausea/Vomiting  [] Decreased urinary response to oral diuretic   [] Scrotal swelling   [x] Lower extremity edema  [] Used PRN doses of oral diuretic   [x] Weight gain    Wt Readings from Last 3 Encounters:   02/05/25 76.7 kg (169 lb)   01/24/25 73.3 kg (161 lb 9.6 oz)   01/24/25 73.3 kg (161 lb 9.6 oz)           SOCIAL HISTORY:  [x] Denies tobacco, alcohol or illicit drug abuse  [] Tobacco use:  [] ETOH use:  [] Illicit drug use:        MEDICATIONS:    Allergies   Allergen Reactions    Bactrim [Sulfamethoxazole-Trimethoprim] Other (See Comments)     INSOMNIA    Biaxin [Clarithromycin] Rash    Cipro [Ciprofloxacin Hcl] Other (See Comments)     UNKNOWN REACTION, NOT LISTED ON FACILITY PAPERWORK.     Iodinated Contrast Media Swelling and Rash    Nitroglycerin Rash

## 2025-02-05 NOTE — RESULT ENCOUNTER NOTE
Labs and CHF clinic note reviewed  Vitals: 112/52, 56    Current GDMT:  Entresto 49/51 mg BID  Coreg 12.5 mg BID  Hydralazine 10 mg TID  Isordil 5 mg TID  Bumex 1 mg daily     Increase Bumex 2 mg daily   BMP in 1 week   Follow up in CHF clinic as scheduled    Thank you

## 2025-02-11 LAB
BUN BLDV-MCNC: NORMAL MG/DL
CALCIUM SERPL-MCNC: NORMAL MG/DL
CHLORIDE BLD-SCNC: NORMAL MMOL/L
CO2: NORMAL
CREAT SERPL-MCNC: NORMAL MG/DL
EGFR: NORMAL
GLUCOSE BLD-MCNC: NORMAL MG/DL
POTASSIUM SERPL-SCNC: NORMAL MMOL/L
SODIUM BLD-SCNC: NORMAL MMOL/L

## 2025-02-12 ENCOUNTER — TELEPHONE (OUTPATIENT)
Dept: OTHER | Age: 89
End: 2025-02-12

## 2025-02-12 ENCOUNTER — HOSPITAL ENCOUNTER (INPATIENT)
Age: 89
LOS: 4 days | Discharge: HOME | DRG: 291 | End: 2025-02-16
Attending: STUDENT IN AN ORGANIZED HEALTH CARE EDUCATION/TRAINING PROGRAM | Admitting: INTERNAL MEDICINE
Payer: MEDICARE

## 2025-02-12 ENCOUNTER — APPOINTMENT (OUTPATIENT)
Dept: GENERAL RADIOLOGY | Age: 89
DRG: 291 | End: 2025-02-12
Payer: MEDICARE

## 2025-02-12 DIAGNOSIS — R07.9 CHEST PAIN, UNSPECIFIED TYPE: Primary | ICD-10-CM

## 2025-02-12 DIAGNOSIS — I50.20 HFREF (HEART FAILURE WITH REDUCED EJECTION FRACTION) (HCC): ICD-10-CM

## 2025-02-12 DIAGNOSIS — I48.91 NEW ONSET ATRIAL FIBRILLATION (HCC): ICD-10-CM

## 2025-02-12 DIAGNOSIS — I48.92 ATRIAL FLUTTER, UNSPECIFIED TYPE (HCC): ICD-10-CM

## 2025-02-12 DIAGNOSIS — I50.9 ACUTE ON CHRONIC CONGESTIVE HEART FAILURE, UNSPECIFIED HEART FAILURE TYPE (HCC): ICD-10-CM

## 2025-02-12 LAB
ALBUMIN SERPL-MCNC: 3.8 G/DL (ref 3.5–5.2)
ALP SERPL-CCNC: 78 U/L (ref 40–129)
ALT SERPL-CCNC: 9 U/L (ref 0–40)
AST SERPL-CCNC: 16 U/L (ref 0–39)
BASOPHILS # BLD: 0.09 K/UL (ref 0–0.2)
BASOPHILS NFR BLD: 1 % (ref 0–2)
BILIRUB DIRECT SERPL-MCNC: <0.2 MG/DL (ref 0–0.3)
BILIRUB INDIRECT SERPL-MCNC: NORMAL MG/DL (ref 0–1)
BILIRUB SERPL-MCNC: 0.5 MG/DL (ref 0–1.2)
BNP SERPL-MCNC: 6811 PG/ML (ref 0–450)
BUN SERPL-MCNC: 40 MG/DL (ref 6–23)
CALCIUM SERPL-MCNC: 8.9 MG/DL (ref 8.6–10.2)
CHLORIDE SERPL-SCNC: 115 MMOL/L (ref 98–107)
CO2 SERPL-SCNC: 26 MMOL/L (ref 22–29)
CREAT SERPL-MCNC: 1.7 MG/DL (ref 0.7–1.2)
EOSINOPHIL # BLD: 0.53 K/UL (ref 0.05–0.5)
EOSINOPHILS RELATIVE PERCENT: 7 % (ref 0–6)
ERYTHROCYTE [DISTWIDTH] IN BLOOD BY AUTOMATED COUNT: 16.7 % (ref 11.5–15)
ERYTHROCYTE [DISTWIDTH] IN BLOOD BY AUTOMATED COUNT: 16.7 % (ref 11.5–15)
GFR, ESTIMATED: 37 ML/MIN/1.73M2
GLUCOSE SERPL-MCNC: 123 MG/DL (ref 74–99)
HCT VFR BLD AUTO: 29.1 % (ref 37–54)
HCT VFR BLD AUTO: 29.9 % (ref 37–54)
HGB BLD-MCNC: 9.1 G/DL (ref 12.5–16.5)
HGB BLD-MCNC: 9.4 G/DL (ref 12.5–16.5)
IMM GRANULOCYTES # BLD AUTO: <0.03 K/UL (ref 0–0.58)
IMM GRANULOCYTES NFR BLD: 0 % (ref 0–5)
INFLUENZA A BY PCR: NOT DETECTED
INFLUENZA B BY PCR: NOT DETECTED
LYMPHOCYTES NFR BLD: 1.25 K/UL (ref 1.5–4)
LYMPHOCYTES RELATIVE PERCENT: 16 % (ref 20–42)
MCH RBC QN AUTO: 27 PG (ref 26–35)
MCH RBC QN AUTO: 27 PG (ref 26–35)
MCHC RBC AUTO-ENTMCNC: 31.3 G/DL (ref 32–34.5)
MCHC RBC AUTO-ENTMCNC: 31.4 G/DL (ref 32–34.5)
MCV RBC AUTO: 85.9 FL (ref 80–99.9)
MCV RBC AUTO: 86.4 FL (ref 80–99.9)
MONOCYTES NFR BLD: 1.18 K/UL (ref 0.1–0.95)
MONOCYTES NFR BLD: 15 % (ref 2–12)
NEUTROPHILS NFR BLD: 60 % (ref 43–80)
NEUTS SEG NFR BLD: 4.69 K/UL (ref 1.8–7.3)
PARTIAL THROMBOPLASTIN TIME: 31.8 SEC (ref 24.5–35.1)
PLATELET # BLD AUTO: 210 K/UL (ref 130–450)
PLATELET # BLD AUTO: 212 K/UL (ref 130–450)
PMV BLD AUTO: 8.9 FL (ref 7–12)
PMV BLD AUTO: 9.3 FL (ref 7–12)
POTASSIUM SERPL-SCNC: 4.2 MMOL/L (ref 3.5–5)
PROT SERPL-MCNC: 7.1 G/DL (ref 6.4–8.3)
RBC # BLD AUTO: 3.37 M/UL (ref 3.8–5.8)
RBC # BLD AUTO: 3.48 M/UL (ref 3.8–5.8)
SARS-COV-2 RDRP RESP QL NAA+PROBE: NOT DETECTED
SODIUM SERPL-SCNC: 133 MMOL/L (ref 132–146)
SPECIMEN DESCRIPTION: NORMAL
T4 FREE SERPL-MCNC: 1.4 NG/DL (ref 0.9–1.7)
TROPONIN I SERPL HS-MCNC: 72 NG/L (ref 0–11)
TROPONIN I SERPL HS-MCNC: 72 NG/L (ref 0–11)
TSH SERPL DL<=0.05 MIU/L-ACNC: 4.58 UIU/ML (ref 0.27–4.2)
WBC OTHER # BLD: 7.7 K/UL (ref 4.5–11.5)
WBC OTHER # BLD: 7.8 K/UL (ref 4.5–11.5)

## 2025-02-12 PROCEDURE — 85025 COMPLETE CBC W/AUTO DIFF WBC: CPT

## 2025-02-12 PROCEDURE — 82248 BILIRUBIN DIRECT: CPT

## 2025-02-12 PROCEDURE — 6360000002 HC RX W HCPCS: Performed by: NURSE PRACTITIONER

## 2025-02-12 PROCEDURE — 85730 THROMBOPLASTIN TIME PARTIAL: CPT

## 2025-02-12 PROCEDURE — 71045 X-RAY EXAM CHEST 1 VIEW: CPT

## 2025-02-12 PROCEDURE — 80053 COMPREHEN METABOLIC PANEL: CPT

## 2025-02-12 PROCEDURE — 2060000000 HC ICU INTERMEDIATE R&B

## 2025-02-12 PROCEDURE — 6360000002 HC RX W HCPCS: Performed by: STUDENT IN AN ORGANIZED HEALTH CARE EDUCATION/TRAINING PROGRAM

## 2025-02-12 PROCEDURE — 93005 ELECTROCARDIOGRAM TRACING: CPT | Performed by: STUDENT IN AN ORGANIZED HEALTH CARE EDUCATION/TRAINING PROGRAM

## 2025-02-12 PROCEDURE — 87635 SARS-COV-2 COVID-19 AMP PRB: CPT

## 2025-02-12 PROCEDURE — 83880 ASSAY OF NATRIURETIC PEPTIDE: CPT

## 2025-02-12 PROCEDURE — 99285 EMERGENCY DEPT VISIT HI MDM: CPT

## 2025-02-12 PROCEDURE — 6370000000 HC RX 637 (ALT 250 FOR IP): Performed by: STUDENT IN AN ORGANIZED HEALTH CARE EDUCATION/TRAINING PROGRAM

## 2025-02-12 PROCEDURE — 87502 INFLUENZA DNA AMP PROBE: CPT

## 2025-02-12 PROCEDURE — 84484 ASSAY OF TROPONIN QUANT: CPT

## 2025-02-12 PROCEDURE — 6370000000 HC RX 637 (ALT 250 FOR IP): Performed by: NURSE PRACTITIONER

## 2025-02-12 PROCEDURE — 84443 ASSAY THYROID STIM HORMONE: CPT

## 2025-02-12 PROCEDURE — 2500000003 HC RX 250 WO HCPCS: Performed by: NURSE PRACTITIONER

## 2025-02-12 PROCEDURE — 96374 THER/PROPH/DIAG INJ IV PUSH: CPT

## 2025-02-12 PROCEDURE — 84439 ASSAY OF FREE THYROXINE: CPT

## 2025-02-12 PROCEDURE — 85027 COMPLETE CBC AUTOMATED: CPT

## 2025-02-12 RX ORDER — HEPARIN SODIUM 1000 [USP'U]/ML
4000 INJECTION, SOLUTION INTRAVENOUS; SUBCUTANEOUS PRN
Status: DISCONTINUED | OUTPATIENT
Start: 2025-02-12 | End: 2025-02-14

## 2025-02-12 RX ORDER — HYDRALAZINE HYDROCHLORIDE 10 MG/1
10 TABLET, FILM COATED ORAL 3 TIMES DAILY
Status: DISCONTINUED | OUTPATIENT
Start: 2025-02-12 | End: 2025-02-16 | Stop reason: HOSPADM

## 2025-02-12 RX ORDER — ACETAMINOPHEN 325 MG/1
650 TABLET ORAL EVERY 6 HOURS PRN
Status: DISCONTINUED | OUTPATIENT
Start: 2025-02-12 | End: 2025-02-16 | Stop reason: HOSPADM

## 2025-02-12 RX ORDER — SODIUM CHLORIDE 0.9 % (FLUSH) 0.9 %
10 SYRINGE (ML) INJECTION PRN
Status: DISCONTINUED | OUTPATIENT
Start: 2025-02-12 | End: 2025-02-16 | Stop reason: HOSPADM

## 2025-02-12 RX ORDER — POLYETHYLENE GLYCOL 3350 17 G/17G
17 POWDER, FOR SOLUTION ORAL DAILY PRN
Status: DISCONTINUED | OUTPATIENT
Start: 2025-02-12 | End: 2025-02-16 | Stop reason: HOSPADM

## 2025-02-12 RX ORDER — HEPARIN SODIUM 10000 [USP'U]/100ML
5-30 INJECTION, SOLUTION INTRAVENOUS CONTINUOUS
Status: DISCONTINUED | OUTPATIENT
Start: 2025-02-12 | End: 2025-02-14

## 2025-02-12 RX ORDER — ACETAMINOPHEN/DIPHENHYDRAMINE 500MG-25MG
1 TABLET ORAL NIGHTLY PRN
COMMUNITY

## 2025-02-12 RX ORDER — FUROSEMIDE 10 MG/ML
20 INJECTION INTRAMUSCULAR; INTRAVENOUS ONCE
Status: COMPLETED | OUTPATIENT
Start: 2025-02-12 | End: 2025-02-12

## 2025-02-12 RX ORDER — ISOSORBIDE DINITRATE 10 MG/1
5 TABLET ORAL 3 TIMES DAILY
Status: DISCONTINUED | OUTPATIENT
Start: 2025-02-12 | End: 2025-02-16 | Stop reason: HOSPADM

## 2025-02-12 RX ORDER — SODIUM CHLORIDE 0.9 % (FLUSH) 0.9 %
5-40 SYRINGE (ML) INJECTION EVERY 12 HOURS SCHEDULED
Status: DISCONTINUED | OUTPATIENT
Start: 2025-02-12 | End: 2025-02-16 | Stop reason: HOSPADM

## 2025-02-12 RX ORDER — ASPIRIN 81 MG/1
324 TABLET, CHEWABLE ORAL ONCE
Status: COMPLETED | OUTPATIENT
Start: 2025-02-12 | End: 2025-02-12

## 2025-02-12 RX ORDER — ONDANSETRON 2 MG/ML
4 INJECTION INTRAMUSCULAR; INTRAVENOUS EVERY 6 HOURS PRN
Status: DISCONTINUED | OUTPATIENT
Start: 2025-02-12 | End: 2025-02-16 | Stop reason: HOSPADM

## 2025-02-12 RX ORDER — HEPARIN SODIUM 1000 [USP'U]/ML
4000 INJECTION, SOLUTION INTRAVENOUS; SUBCUTANEOUS ONCE
Status: COMPLETED | OUTPATIENT
Start: 2025-02-12 | End: 2025-02-12

## 2025-02-12 RX ORDER — HEPARIN SODIUM 1000 [USP'U]/ML
2000 INJECTION, SOLUTION INTRAVENOUS; SUBCUTANEOUS PRN
Status: DISCONTINUED | OUTPATIENT
Start: 2025-02-12 | End: 2025-02-14

## 2025-02-12 RX ORDER — CARVEDILOL 6.25 MG/1
12.5 TABLET ORAL 2 TIMES DAILY WITH MEALS
Status: DISCONTINUED | OUTPATIENT
Start: 2025-02-12 | End: 2025-02-15

## 2025-02-12 RX ORDER — SODIUM CHLORIDE 9 MG/ML
INJECTION, SOLUTION INTRAVENOUS PRN
Status: DISCONTINUED | OUTPATIENT
Start: 2025-02-12 | End: 2025-02-16 | Stop reason: HOSPADM

## 2025-02-12 RX ORDER — DOCUSATE SODIUM 100 MG/1
200 CAPSULE, LIQUID FILLED ORAL DAILY
COMMUNITY

## 2025-02-12 RX ORDER — ASPIRIN 81 MG/1
81 TABLET ORAL DAILY
Status: DISCONTINUED | OUTPATIENT
Start: 2025-02-13 | End: 2025-02-16 | Stop reason: HOSPADM

## 2025-02-12 RX ORDER — ACETAMINOPHEN 650 MG/1
650 SUPPOSITORY RECTAL EVERY 6 HOURS PRN
Status: DISCONTINUED | OUTPATIENT
Start: 2025-02-12 | End: 2025-02-16 | Stop reason: HOSPADM

## 2025-02-12 RX ORDER — ONDANSETRON 4 MG/1
4 TABLET, ORALLY DISINTEGRATING ORAL EVERY 8 HOURS PRN
Status: DISCONTINUED | OUTPATIENT
Start: 2025-02-12 | End: 2025-02-16 | Stop reason: HOSPADM

## 2025-02-12 RX ORDER — FINASTERIDE 5 MG/1
5 TABLET, FILM COATED ORAL DAILY
Status: DISCONTINUED | OUTPATIENT
Start: 2025-02-13 | End: 2025-02-16 | Stop reason: HOSPADM

## 2025-02-12 RX ORDER — ZINC GLUCONATE 2 [HP_X]/1
1 LOZENGE ORAL
COMMUNITY

## 2025-02-12 RX ORDER — SACUBITRIL AND VALSARTAN 49; 51 MG/1; MG/1
1 TABLET, FILM COATED ORAL 2 TIMES DAILY
Status: DISCONTINUED | OUTPATIENT
Start: 2025-02-12 | End: 2025-02-15

## 2025-02-12 RX ORDER — BUMETANIDE 1 MG/1
2 TABLET ORAL DAILY
Status: DISCONTINUED | OUTPATIENT
Start: 2025-02-13 | End: 2025-02-16 | Stop reason: HOSPADM

## 2025-02-12 RX ORDER — ONDANSETRON 8 MG/1
8 TABLET, FILM COATED ORAL EVERY 8 HOURS PRN
COMMUNITY

## 2025-02-12 RX ADMIN — HYDRALAZINE HYDROCHLORIDE 10 MG: 10 TABLET ORAL at 20:23

## 2025-02-12 RX ADMIN — ISOSORBIDE DINITRATE 5 MG: 10 TABLET ORAL at 20:29

## 2025-02-12 RX ADMIN — CARVEDILOL 12.5 MG: 6.25 TABLET, FILM COATED ORAL at 20:23

## 2025-02-12 RX ADMIN — HEPARIN SODIUM 4000 UNITS: 1000 INJECTION INTRAVENOUS; SUBCUTANEOUS at 20:22

## 2025-02-12 RX ADMIN — HEPARIN SODIUM 12 UNITS/KG/HR: 10000 INJECTION, SOLUTION INTRAVENOUS at 20:25

## 2025-02-12 RX ADMIN — SACUBITRIL AND VALSARTAN 1 TABLET: 49; 51 TABLET, FILM COATED ORAL at 23:10

## 2025-02-12 RX ADMIN — SODIUM CHLORIDE, PRESERVATIVE FREE 10 ML: 5 INJECTION INTRAVENOUS at 20:23

## 2025-02-12 RX ADMIN — FUROSEMIDE 20 MG: 10 INJECTION, SOLUTION INTRAMUSCULAR; INTRAVENOUS at 17:17

## 2025-02-12 RX ADMIN — ASPIRIN 81 MG CHEWABLE TABLET 324 MG: 81 TABLET CHEWABLE at 15:45

## 2025-02-12 ASSESSMENT — PAIN DESCRIPTION - LOCATION: LOCATION: CHEST

## 2025-02-12 ASSESSMENT — PAIN - FUNCTIONAL ASSESSMENT: PAIN_FUNCTIONAL_ASSESSMENT: NONE - DENIES PAIN

## 2025-02-12 ASSESSMENT — PAIN DESCRIPTION - DESCRIPTORS: DESCRIPTORS: PRESSURE

## 2025-02-12 ASSESSMENT — PAIN SCALES - GENERAL
PAINLEVEL_OUTOF10: 4
PAINLEVEL_OUTOF10: 0
PAINLEVEL_OUTOF10: 4

## 2025-02-12 NOTE — ED PROVIDER NOTES
[facility] EMERGENCY DEPARTMENT  EMERGENCY DEPARTMENT ENCOUNTER        Pt Name: Tashi Parry  MRN: [id number]  Birthdate 7/18/1931  Date of evaluation: 2/12/2025  Provider: Juice Tillman MD  PCP: Romaine Arnold MD  Note Started: 3:17 PM EST 2/12/25    CHIEF COMPLAINT       Chief Complaint   Patient presents with    Chest Pain     Chest pressure x a few days; wears 2L O2 at baseline, 89-92% on 2L at triage        HISTORY OF PRESENT ILLNESS: 1 or more Elements        Limitations to history : None    Tashi Parry is a 89 y.o. male who presents to the emergency room for chest pressure ongoing for the last 2 days.  Has been feeling a bit more fatigued and short of breath as well.  No fevers, no chills, no nausea or vomiting.  Denies any diaphoresis, notably in A-fib, denies a history of this.  Did not take anything at home for his symptoms.    Nursing Notes were all reviewed and agreed with or any disagreements were addressed in the HPI.      REVIEW OF EXTERNAL NOTE :       Admission 11/13/2024 patient was admitted for urinary retention, acute on chronic congestive heart failure    REVIEW OF SYSTEMS :      Positives and Pertinent negatives as per HPI.     SURGICAL HISTORY     Past Surgical History:   Procedure Laterality Date    CARDIAC SURGERY      6 vessel in 2000    DENTAL SURGERY      root canal    DIAGNOSTIC CARDIAC CATH LAB PROCEDURE  9/5/00    LUNG REMOVAL, PARTIAL Left     march    OTHER SURGICAL HISTORY  02/16/2011    Injection right hip  ADRIANNE Doan MD    PROSTATE SURGERY Bilateral 05/27/2019    THORACOSCOPY Left 4/12/2019    BRONCHOSCOPY LEFT THORACOSCOPY ROBOTIC VIDEO ASSISTED , WEDGE RESECTION, POSS. LEFT LOWER LOBECTOMY performed by Ortiz Pedroza MD at [facility] OR    TOTAL HIP ARTHROPLASTY Right 03/28/2011    Right BRIONNA  ADRIANNE Doan MD    TOTAL HIP ARTHROPLASTY Left 11/09/2009    Left BRIONNA  ADRIANNE Doan MD    TURP      TURP N/A 5/30/2019    CYSTOSCOPY, RETROGRADE PYELOGRAM,

## 2025-02-12 NOTE — TELEPHONE ENCOUNTER
Received labs from [facility].  Drawn on 2/11/25.    SODIUM 138  POTASSIUM 4.4  CHLORIDE 101  CO2 31  BUN 39  CREATININE 1.6  CALCIUM 8.5  GFR 41  GLUCOSE 106

## 2025-02-12 NOTE — ED NOTES
ED to Inpatient Handoff Report    Notified Damaris that electronic handoff available and patient ready for transport to room 631.    Safety Risks: Risk of falls    Patient in Restraints: no    Constant Observer or Patient : no    Telemetry Monitoring Ordered: Yes          Order to transfer to unit without monitor: NA    Last MEWS: 1 Time completed: 1814    Deterioration Index: 35.47    Vitals:    02/12/25 1521 02/12/25 1647 02/12/25 1717 02/12/25 1814   BP: 136/67 (!) 147/57 (!) 151/76 (!) 152/74   Pulse: 70 61  62   Resp: 21 18  18   Temp: 97.8 °F (36.6 °C)   98.2 °F (36.8 °C)   TempSrc:    Oral   SpO2: 97% 100%  97%   Weight: 75.3 kg (166 lb)      Height: 1.676 m (5' 6\")          Opportunity for questions and clarification was provided.

## 2025-02-13 LAB
ANION GAP SERPL CALCULATED.3IONS-SCNC: 9 MMOL/L (ref 7–16)
BUN SERPL-MCNC: 38 MG/DL (ref 6–23)
CALCIUM SERPL-MCNC: 8.8 MG/DL (ref 8.6–10.2)
CHLORIDE SERPL-SCNC: 99 MMOL/L (ref 98–107)
CO2 SERPL-SCNC: 27 MMOL/L (ref 22–29)
CREAT SERPL-MCNC: 1.5 MG/DL (ref 0.7–1.2)
EKG ATRIAL RATE: 267 BPM
EKG ATRIAL RATE: 61 BPM
EKG Q-T INTERVAL: 460 MS
EKG Q-T INTERVAL: 472 MS
EKG QRS DURATION: 120 MS
EKG QRS DURATION: 132 MS
EKG QTC CALCULATION (BAZETT): 455 MS
EKG QTC CALCULATION (BAZETT): 467 MS
EKG R AXIS: -63 DEGREES
EKG R AXIS: 124 DEGREES
EKG T AXIS: 54 DEGREES
EKG T AXIS: 9 DEGREES
EKG VENTRICULAR RATE: 59 BPM
EKG VENTRICULAR RATE: 59 BPM
ERYTHROCYTE [DISTWIDTH] IN BLOOD BY AUTOMATED COUNT: 16.5 % (ref 11.5–15)
GFR, ESTIMATED: 45 ML/MIN/1.73M2
GLUCOSE SERPL-MCNC: 104 MG/DL (ref 74–99)
HBA1C MFR BLD: 5.7 % (ref 4–5.6)
HCT VFR BLD AUTO: 30.4 % (ref 37–54)
HGB BLD-MCNC: 9.8 G/DL (ref 12.5–16.5)
INR PPP: 1.2
MAGNESIUM SERPL-MCNC: 2.1 MG/DL (ref 1.6–2.6)
MCH RBC QN AUTO: 27.6 PG (ref 26–35)
MCHC RBC AUTO-ENTMCNC: 32.2 G/DL (ref 32–34.5)
MCV RBC AUTO: 85.6 FL (ref 80–99.9)
PARTIAL THROMBOPLASTIN TIME: 138.4 SEC (ref 24.5–35.1)
PARTIAL THROMBOPLASTIN TIME: 43.1 SEC (ref 24.5–35.1)
PARTIAL THROMBOPLASTIN TIME: 74 SEC (ref 24.5–35.1)
PLATELET # BLD AUTO: 184 K/UL (ref 130–450)
PMV BLD AUTO: 8.4 FL (ref 7–12)
POTASSIUM SERPL-SCNC: 3.7 MMOL/L (ref 3.5–5)
PROTHROMBIN TIME: 12.5 SEC (ref 9.3–12.4)
RBC # BLD AUTO: 3.55 M/UL (ref 3.8–5.8)
SODIUM SERPL-SCNC: 135 MMOL/L (ref 132–146)
WBC OTHER # BLD: 8.2 K/UL (ref 4.5–11.5)

## 2025-02-13 PROCEDURE — 6370000000 HC RX 637 (ALT 250 FOR IP): Performed by: NURSE PRACTITIONER

## 2025-02-13 PROCEDURE — 93010 ELECTROCARDIOGRAM REPORT: CPT | Performed by: INTERNAL MEDICINE

## 2025-02-13 PROCEDURE — 5A2204Z RESTORATION OF CARDIAC RHYTHM, SINGLE: ICD-10-PCS | Performed by: INTERNAL MEDICINE

## 2025-02-13 PROCEDURE — 85730 THROMBOPLASTIN TIME PARTIAL: CPT

## 2025-02-13 PROCEDURE — 83735 ASSAY OF MAGNESIUM: CPT

## 2025-02-13 PROCEDURE — 85027 COMPLETE CBC AUTOMATED: CPT

## 2025-02-13 PROCEDURE — 2060000000 HC ICU INTERMEDIATE R&B

## 2025-02-13 PROCEDURE — 2500000003 HC RX 250 WO HCPCS: Performed by: NURSE PRACTITIONER

## 2025-02-13 PROCEDURE — 80048 BASIC METABOLIC PNL TOTAL CA: CPT

## 2025-02-13 PROCEDURE — 83036 HEMOGLOBIN GLYCOSYLATED A1C: CPT

## 2025-02-13 PROCEDURE — APPSS60 APP SPLIT SHARED TIME 46-60 MINUTES

## 2025-02-13 PROCEDURE — 6360000002 HC RX W HCPCS

## 2025-02-13 PROCEDURE — 93005 ELECTROCARDIOGRAM TRACING: CPT

## 2025-02-13 PROCEDURE — 99223 1ST HOSP IP/OBS HIGH 75: CPT | Performed by: INTERNAL MEDICINE

## 2025-02-13 PROCEDURE — B24BZZ4 ULTRASONOGRAPHY OF HEART WITH AORTA, TRANSESOPHAGEAL: ICD-10-PCS | Performed by: INTERNAL MEDICINE

## 2025-02-13 PROCEDURE — 6360000002 HC RX W HCPCS: Performed by: NURSE PRACTITIONER

## 2025-02-13 PROCEDURE — 97161 PT EVAL LOW COMPLEX 20 MIN: CPT

## 2025-02-13 PROCEDURE — 85610 PROTHROMBIN TIME: CPT

## 2025-02-13 PROCEDURE — 97165 OT EVAL LOW COMPLEX 30 MIN: CPT

## 2025-02-13 RX ORDER — FUROSEMIDE 10 MG/ML
40 INJECTION INTRAMUSCULAR; INTRAVENOUS ONCE
Status: COMPLETED | OUTPATIENT
Start: 2025-02-13 | End: 2025-02-13

## 2025-02-13 RX ADMIN — CARVEDILOL 12.5 MG: 6.25 TABLET, FILM COATED ORAL at 09:18

## 2025-02-13 RX ADMIN — FINASTERIDE 5 MG: 5 TABLET, FILM COATED ORAL at 09:18

## 2025-02-13 RX ADMIN — HEPARIN SODIUM 2000 UNITS: 1000 INJECTION INTRAVENOUS; SUBCUTANEOUS at 12:13

## 2025-02-13 RX ADMIN — FUROSEMIDE 40 MG: 10 INJECTION, SOLUTION INTRAMUSCULAR; INTRAVENOUS at 12:59

## 2025-02-13 RX ADMIN — CARVEDILOL 12.5 MG: 6.25 TABLET, FILM COATED ORAL at 17:57

## 2025-02-13 RX ADMIN — SODIUM CHLORIDE, PRESERVATIVE FREE 10 ML: 5 INJECTION INTRAVENOUS at 19:56

## 2025-02-13 RX ADMIN — HEPARIN SODIUM 11 UNITS/KG/HR: 10000 INJECTION, SOLUTION INTRAVENOUS at 12:15

## 2025-02-13 RX ADMIN — ISOSORBIDE DINITRATE 5 MG: 10 TABLET ORAL at 09:18

## 2025-02-13 RX ADMIN — SACUBITRIL AND VALSARTAN 1 TABLET: 49; 51 TABLET, FILM COATED ORAL at 09:18

## 2025-02-13 RX ADMIN — SACUBITRIL AND VALSARTAN 1 TABLET: 49; 51 TABLET, FILM COATED ORAL at 19:56

## 2025-02-13 RX ADMIN — BUMETANIDE 2 MG: 1 TABLET ORAL at 09:18

## 2025-02-13 RX ADMIN — HYDRALAZINE HYDROCHLORIDE 10 MG: 10 TABLET ORAL at 09:18

## 2025-02-13 RX ADMIN — ASPIRIN 81 MG: 81 TABLET, COATED ORAL at 09:18

## 2025-02-13 NOTE — ACP (ADVANCE CARE PLANNING)
Advance Care Planning   Healthcare Decision Maker:    Primary Decision Maker: Gavin Leahyi A - [facility] - [phone]    Click here to complete Healthcare Decision Makers including selection of the Healthcare Decision Maker Relationship (ie \"Primary\").  Today we documented Decision Maker(s) consistent with Legal Next of Kin hierarchy.

## 2025-02-13 NOTE — CARE COORDINATION
Social Work / Discharge Planning : Patient admitted form [facility] with A-Fib dx. Cardiology consulted. CHF coordinator consulted. Patient uses a ww at baseline and has 02 at 2 liters. Currently at 4. Therapy is ordered and await therapy input to better assist with discharge planning. SW attempted to meet with patient but currently with Pastoral care. AWait treatment plan and recommendations. Goal to return to [facility]. if supported by therapy am/pacs. SW to follow. Electronically signed by MARTHA Brantley on 2/13/25 at 10:35 AM EST

## 2025-02-13 NOTE — PLAN OF CARE
Problem: Chronic Conditions and Co-morbidities  Goal: Patient's chronic conditions and co-morbidity symptoms are monitored and maintained or improved  2/13/2025 1003 by Madison Dunham RN  Outcome: Progressing     Problem: Safety - Adult  Goal: Free from fall injury  2/13/2025 1003 by Madison Dunham RN  Outcome: Progressing  Flowsheets (Taken 2/13/2025 0915)  Free From Fall Injury: Instruct family/caregiver on patient safety     Problem: ABCDS Injury Assessment  Goal: Absence of physical injury  2/13/2025 1003 by Madison Dunham RN  Outcome: Progressing  Flowsheets (Taken 2/13/2025 0915)  Absence of Physical Injury: Implement safety measures based on patient assessment

## 2025-02-13 NOTE — H&P
[facility]  History and Physical      CHIEF COMPLAINT:    Chief Complaint   Patient presents with    Chest Pain     Chest pressure x a few days; wears 2L O2 at baseline, 89-92% on 2L at triage         Patient of Romaine Arnold MD presents with:  CHF (congestive heart failure), NYHA class I, acute on chronic, combined (HCC)    History of Present Illness:   Mr. 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  Mr. Parry presented to [facility] ED on 02/12/2025 with complaints of chest pressure for the past 2 days. He states that he was also having dyspnea with fatigue. He denies accompanying fever, chills, nausea and vomiting.   Upon arrival to the ED his VS were 97.8-70-/67-97% on 2 L NC. CXR with cardiomegaly with mild CHF and small pleural effusions with chronic interstitial lung changes. EKG Atrial Fibrillation with HR 59. NT ProBNP 6811. He was admitted to a telemetry monitored unit.   He is resting comfortably on evaluation in no acute distress.  No family members are currently at bedside.    REVIEW OF SYSTEMS:  Pertinent negatives are above in HPI.  10 point ROS otherwise negative.      Past Medical History:   Diagnosis Date    Acute HFrEF (heart failure with reduced ejection fraction) ([facility]) 10/31/2024    Acute kidney injury superimposed on chronic kidney disease ([facility]) 11/04/2024    BPH (benign prostatic hyperplasia)     CAD (coronary artery disease)     Carotid artery calcification     DJD (degenerative joint disease)     History of falling     Hyperlipemia     Hypertension     Lung nodule     Macular degeneration     Numbness and tingling in left arm 02/02/2018    Numbness and tingling of left side of face 02/02/2018    S/P CABG x 6 2000

## 2025-02-14 ENCOUNTER — ANESTHESIA EVENT (OUTPATIENT)
Age: 89
End: 2025-02-14
Payer: MEDICARE

## 2025-02-14 ENCOUNTER — APPOINTMENT (OUTPATIENT)
Age: 89
DRG: 291 | End: 2025-02-14
Payer: MEDICARE

## 2025-02-14 ENCOUNTER — ANESTHESIA (OUTPATIENT)
Age: 89
End: 2025-02-14
Payer: MEDICARE

## 2025-02-14 LAB
ANION GAP SERPL CALCULATED.3IONS-SCNC: 11 MMOL/L (ref 7–16)
BUN SERPL-MCNC: 41 MG/DL (ref 6–23)
CALCIUM SERPL-MCNC: 8.6 MG/DL (ref 8.6–10.2)
CHLORIDE SERPL-SCNC: 99 MMOL/L (ref 98–107)
CO2 SERPL-SCNC: 26 MMOL/L (ref 22–29)
CREAT SERPL-MCNC: 1.5 MG/DL (ref 0.7–1.2)
ECHO BSA: 1.87 M2
ECHO LV EF PHYSICIAN: 55 %
EKG ATRIAL RATE: 56 BPM
EKG P AXIS: 45 DEGREES
EKG P-R INTERVAL: 196 MS
EKG Q-T INTERVAL: 474 MS
EKG QRS DURATION: 128 MS
EKG QTC CALCULATION (BAZETT): 457 MS
EKG R AXIS: -50 DEGREES
EKG T AXIS: 30 DEGREES
EKG VENTRICULAR RATE: 56 BPM
ERYTHROCYTE [DISTWIDTH] IN BLOOD BY AUTOMATED COUNT: 16.6 % (ref 11.5–15)
GFR, ESTIMATED: 43 ML/MIN/1.73M2
GLUCOSE SERPL-MCNC: 108 MG/DL (ref 74–99)
HCT VFR BLD AUTO: 30.3 % (ref 37–54)
HGB BLD-MCNC: 9.5 G/DL (ref 12.5–16.5)
LEFT VENTRICULAR EJECTION FRACTION HIGH VALUE: 60 %
LV EF: 55 %
MCH RBC QN AUTO: 26.8 PG (ref 26–35)
MCHC RBC AUTO-ENTMCNC: 31.4 G/DL (ref 32–34.5)
MCV RBC AUTO: 85.6 FL (ref 80–99.9)
PARTIAL THROMBOPLASTIN TIME: 53.8 SEC (ref 24.5–35.1)
PARTIAL THROMBOPLASTIN TIME: 64.6 SEC (ref 24.5–35.1)
PLATELET # BLD AUTO: 201 K/UL (ref 130–450)
PMV BLD AUTO: 9.2 FL (ref 7–12)
POTASSIUM SERPL-SCNC: 3.8 MMOL/L (ref 3.5–5)
RBC # BLD AUTO: 3.54 M/UL (ref 3.8–5.8)
SODIUM SERPL-SCNC: 136 MMOL/L (ref 132–146)
WBC OTHER # BLD: 8.7 K/UL (ref 4.5–11.5)

## 2025-02-14 PROCEDURE — 93010 ELECTROCARDIOGRAM REPORT: CPT | Performed by: INTERNAL MEDICINE

## 2025-02-14 PROCEDURE — 80048 BASIC METABOLIC PNL TOTAL CA: CPT

## 2025-02-14 PROCEDURE — 7100000011 HC PHASE II RECOVERY - ADDTL 15 MIN

## 2025-02-14 PROCEDURE — 6360000004 HC RX CONTRAST MEDICATION: Performed by: INTERNAL MEDICINE

## 2025-02-14 PROCEDURE — 2060000000 HC ICU INTERMEDIATE R&B

## 2025-02-14 PROCEDURE — 85027 COMPLETE CBC AUTOMATED: CPT

## 2025-02-14 PROCEDURE — 93325 DOPPLER ECHO COLOR FLOW MAPG: CPT | Performed by: INTERNAL MEDICINE

## 2025-02-14 PROCEDURE — 2500000003 HC RX 250 WO HCPCS: Performed by: NURSE PRACTITIONER

## 2025-02-14 PROCEDURE — 6360000002 HC RX W HCPCS: Performed by: INTERNAL MEDICINE

## 2025-02-14 PROCEDURE — 93312 ECHO TRANSESOPHAGEAL: CPT | Performed by: INTERNAL MEDICINE

## 2025-02-14 PROCEDURE — 6370000000 HC RX 637 (ALT 250 FOR IP): Performed by: NURSE PRACTITIONER

## 2025-02-14 PROCEDURE — 3700000001 HC ADD 15 MINUTES (ANESTHESIA)

## 2025-02-14 PROCEDURE — 6370000000 HC RX 637 (ALT 250 FOR IP): Performed by: INTERNAL MEDICINE

## 2025-02-14 PROCEDURE — 7100000010 HC PHASE II RECOVERY - FIRST 15 MIN

## 2025-02-14 PROCEDURE — 85730 THROMBOPLASTIN TIME PARTIAL: CPT

## 2025-02-14 PROCEDURE — 92960 CARDIOVERSION ELECTRIC EXT: CPT

## 2025-02-14 PROCEDURE — 2700000000 HC OXYGEN THERAPY PER DAY

## 2025-02-14 PROCEDURE — 3700000000 HC ANESTHESIA ATTENDED CARE

## 2025-02-14 PROCEDURE — 6360000002 HC RX W HCPCS: Performed by: NURSE PRACTITIONER

## 2025-02-14 PROCEDURE — 93320 DOPPLER ECHO COMPLETE: CPT | Performed by: INTERNAL MEDICINE

## 2025-02-14 PROCEDURE — 6360000002 HC RX W HCPCS: Performed by: NURSE ANESTHETIST, CERTIFIED REGISTERED

## 2025-02-14 PROCEDURE — 2580000003 HC RX 258: Performed by: NURSE ANESTHETIST, CERTIFIED REGISTERED

## 2025-02-14 PROCEDURE — C8925 2D TEE W OR W/O FOL W/CON,IN: HCPCS

## 2025-02-14 PROCEDURE — 6360000002 HC RX W HCPCS

## 2025-02-14 PROCEDURE — 92960 CARDIOVERSION ELECTRIC EXT: CPT | Performed by: INTERNAL MEDICINE

## 2025-02-14 PROCEDURE — 93005 ELECTROCARDIOGRAM TRACING: CPT | Performed by: INTERNAL MEDICINE

## 2025-02-14 RX ORDER — SODIUM CHLORIDE 9 MG/ML
INJECTION, SOLUTION INTRAVENOUS
Status: DISCONTINUED | OUTPATIENT
Start: 2025-02-14 | End: 2025-02-14 | Stop reason: SDUPTHER

## 2025-02-14 RX ORDER — FUROSEMIDE 10 MG/ML
20 INJECTION INTRAMUSCULAR; INTRAVENOUS ONCE
Status: COMPLETED | OUTPATIENT
Start: 2025-02-14 | End: 2025-02-14

## 2025-02-14 RX ORDER — PROPOFOL 10 MG/ML
INJECTION, EMULSION INTRAVENOUS
Status: DISCONTINUED | OUTPATIENT
Start: 2025-02-14 | End: 2025-02-14 | Stop reason: SDUPTHER

## 2025-02-14 RX ADMIN — SULFUR HEXAFLUORIDE 2 ML: 60.7; .19; .19 INJECTION, POWDER, LYOPHILIZED, FOR SUSPENSION INTRAVENOUS; INTRAVESICAL at 08:41

## 2025-02-14 RX ADMIN — SODIUM CHLORIDE, PRESERVATIVE FREE 10 ML: 5 INJECTION INTRAVENOUS at 20:46

## 2025-02-14 RX ADMIN — SODIUM CHLORIDE: 9 INJECTION, SOLUTION INTRAVENOUS at 08:15

## 2025-02-14 RX ADMIN — FINASTERIDE 5 MG: 5 TABLET, FILM COATED ORAL at 09:45

## 2025-02-14 RX ADMIN — BUMETANIDE 2 MG: 1 TABLET ORAL at 09:44

## 2025-02-14 RX ADMIN — HEPARIN SODIUM 11 UNITS/KG/HR: 10000 INJECTION, SOLUTION INTRAVENOUS at 06:23

## 2025-02-14 RX ADMIN — ASPIRIN 81 MG: 81 TABLET, COATED ORAL at 09:44

## 2025-02-14 RX ADMIN — FUROSEMIDE 20 MG: 10 INJECTION, SOLUTION INTRAMUSCULAR; INTRAVENOUS at 13:13

## 2025-02-14 RX ADMIN — ONDANSETRON 4 MG: 4 TABLET, ORALLY DISINTEGRATING ORAL at 16:38

## 2025-02-14 RX ADMIN — PROPOFOL 80 MG: 10 INJECTION, EMULSION INTRAVENOUS at 08:14

## 2025-02-14 RX ADMIN — APIXABAN 2.5 MG: 2.5 TABLET, FILM COATED ORAL at 20:46

## 2025-02-14 RX ADMIN — SACUBITRIL AND VALSARTAN 1 TABLET: 49; 51 TABLET, FILM COATED ORAL at 09:44

## 2025-02-14 ASSESSMENT — LIFESTYLE VARIABLES: SMOKING_STATUS: 0

## 2025-02-14 ASSESSMENT — PAIN - FUNCTIONAL ASSESSMENT
PAIN_FUNCTIONAL_ASSESSMENT: 0-10
PAIN_FUNCTIONAL_ASSESSMENT: ACTIVITIES ARE NOT PREVENTED
PAIN_FUNCTIONAL_ASSESSMENT: 0-10
PAIN_FUNCTIONAL_ASSESSMENT: ACTIVITIES ARE NOT PREVENTED
PAIN_FUNCTIONAL_ASSESSMENT: 0-10
PAIN_FUNCTIONAL_ASSESSMENT: ACTIVITIES ARE NOT PREVENTED

## 2025-02-14 ASSESSMENT — PAIN SCALES - GENERAL: PAINLEVEL_OUTOF10: 0

## 2025-02-14 NOTE — PLAN OF CARE
Problem: Chronic Conditions and Co-morbidities  Goal: Patient's chronic conditions and co-morbidity symptoms are monitored and maintained or improved  Outcome: Progressing     Problem: Safety - Adult  Goal: Free from fall injury  Outcome: Progressing  Flowsheets (Taken 2/14/2025 0930)  Free From Fall Injury: Instruct family/caregiver on patient safety     Problem: ABCDS Injury Assessment  Goal: Absence of physical injury  Outcome: Progressing  Flowsheets (Taken 2/14/2025 0930)  Absence of Physical Injury: Implement safety measures based on patient assessment     Problem: Pain  Goal: Verbalizes/displays adequate comfort level or baseline comfort level  Outcome: Progressing

## 2025-02-14 NOTE — PLAN OF CARE
Patient's chart updated to reflect:        - HF care plan, HF education points and HF discharge instructions.  -Orders: 2 gram sodium diet, daily weights, I/O.  -PCP and cardiology follow up appointments to be scheduled within 7 days of hospital discharge.  -CHF education session will be provided to the patient prior to hospital discharge.    most recent EF:  Lab Results   Component Value Date    LVEF 45 10/31/2024    LVEFMODE Echo 10/31/2024       Keesha Meadows, RN MSN,RN  Heart Failure Navigator    Future Appointments   Date Time Provider Department Center   2/19/2025  1:30 PM [facility] CHF ROOM 1 [facility]   2/28/2025  9:30 AM Romaine Arnold MD TRAIL PC [facility] ECC DEP   3/27/2025  1:45 PM Rosaura Lyon DO [place] [facility]   6/26/2025 10:40 AM Mery Perdomo APRN - [facility]

## 2025-02-14 NOTE — CARE COORDINATION
Attempted to evaluate patient, he is off the floor for scheduled cardiac procedure, will attempt to evaluate later as able.   Electronically signed by ABHISHEK Vogt CNP on 2/14/25 at 9:30 AM EST

## 2025-02-14 NOTE — ANESTHESIA PRE PROCEDURE
97.3 °F (36.3 °C) 97.9 °F (36.6 °C) 97.5 °F (36.4 °C)   TempSrc:   Oral    SpO2: 97% 99% 94% 94%   Weight:       Height:                                                  BP Readings from Last 3 Encounters:   02/14/25 (!) 140/68   02/05/25 (!) 112/52   01/24/25 112/60       NPO Status: Time of last liquid consumption: 2000                        Time of last solid consumption: 1700                        Date of last liquid consumption: 02/13/25                        Date of last solid food consumption: 02/13/25    BMI:   Wt Readings from Last 3 Encounters:   02/12/25 75.3 kg (166 lb)   02/05/25 76.7 kg (169 lb)   01/24/25 73.3 kg (161 lb 9.6 oz)     Body mass index is 26.79 kg/m².    CBC:   Lab Results   Component Value Date/Time    WBC 8.7 02/14/2025 06:08 AM    RBC 3.54 02/14/2025 06:08 AM    HGB 9.5 02/14/2025 06:08 AM    HCT 30.3 02/14/2025 06:08 AM    MCV 85.6 02/14/2025 06:08 AM    RDW 16.6 02/14/2025 06:08 AM     02/14/2025 06:08 AM       CMP:   Lab Results   Component Value Date/Time     02/14/2025 06:08 AM    K 3.8 02/14/2025 06:08 AM    K 3.9 03/13/2023 04:20 AM    CL 99 02/14/2025 06:08 AM    CO2 26 02/14/2025 06:08 AM    BUN 41 02/14/2025 06:08 AM    CREATININE 1.5 02/14/2025 06:08 AM    GFRAA >60 10/04/2021 10:54 AM    LABGLOM 43 02/14/2025 06:08 AM    LABGLOM 57 12/01/2023 02:43 AM    GLUCOSE 108 02/14/2025 06:08 AM    GLUCOSE 109 05/08/2012 08:54 AM    CALCIUM 8.6 02/14/2025 06:08 AM    BILITOT 0.5 02/12/2025 03:39 PM    ALKPHOS 78 02/12/2025 03:39 PM    AST 16 02/12/2025 03:39 PM    ALT 9 02/12/2025 03:39 PM       POC Tests: No results for input(s): \"POCGLU\", \"POCNA\", \"POCK\", \"POCCL\", \"POCBUN\", \"POCHEMO\", \"POCHCT\" in the last 72 hours.    Coags:   Lab Results   Component Value Date/Time    PROTIME 12.5 02/13/2025 02:45 AM    INR 1.2 02/13/2025 02:45 AM    APTT 64.6 02/13/2025 11:42 PM    APTT 44.0 03/13/2023 04:20 AM       HCG (If Applicable): No results found for: \"PREGTESTUR\",

## 2025-02-14 NOTE — ANESTHESIA POSTPROCEDURE EVALUATION
Department of Anesthesiology  Postprocedure Note    Patient: Tashi Parry  MRN: [id number]  YOB: 1931  Date of evaluation: 2/14/2025    Procedure Summary       Date: 02/14/25 Room / Location: [facility]    Anesthesia Start: 0812 Anesthesia Stop: 0836    Procedure: NUNO W/ POSSIBLE CARDIOVERSION (PRN CONTRAST/BUBBLE/3D) Diagnosis: Atrial flutter, unspecified type (HCC)    Scheduled Providers:  Responsible Provider: Eveline Valencia DO    Anesthesia Type: MAC ASA Status: 4            Anesthesia Type: MAC    Danyelle Phase I: Danyelle Score: 9    Danyelle Phase II:      Anesthesia Post Evaluation    Patient location during evaluation: bedside  Patient participation: complete - patient participated  Level of consciousness: awake  Pain score: 0  Airway patency: patent  Nausea & Vomiting: no nausea and no vomiting  Cardiovascular status: blood pressure returned to baseline and hemodynamically stable  Respiratory status: acceptable  Hydration status: stable  Pain management: adequate and satisfactory to patient        No notable events documented.

## 2025-02-14 NOTE — CARE COORDINATION
Social Work / Discharge Planning : Therapy am/pac 17/24 for both PT and OT. Plan to return to [facility] when stable. SW to follow. Electronically signed by MARTHA Brantley on 2/14/25 at 10:22 AM EST

## 2025-02-14 NOTE — CONSULTS
[facility]   Inpatient CHF Nurse Navigator Consult      Cardiologist: Dr. Lyon    Tashi Parry is a 89 y.o. (7/18/1931) male with a history of HFmrEF, most recent EF:  Lab Results   Component Value Date    LVEF 45 10/31/2024    LVEFMODE Echo 10/31/2024     Patient was awake and alert, patient with nausea (had an emesis prior to) the consultation and education was reviewed with family. They were engaged and asked appropriate questions throughout the education session. Patient currently follows with the CHF clinic, last seen on 2/5 for an add an appointment due to a 6# wt gain and was given IV diuretic. Patient was scheduled to follow up 2/14 but was admitted on 2/12 with new Afib and ADHF. Patient currently at assisted living and has been getting weighted daily. Reviewed HFU appointments with daughter. In basket message sent to Rabia Sanders to schedule HFU appointment with Dr. Lyon.     Barriers identified during consult contributing to HF Hospitalization:  [] Limited medication adherence   [] Poor health literacy, education regarding HF medications provided   [] Pill box provided to patient  [] Difficulty affording medications  [] Difficulty obtaining/ managing medications  [] Prescription assistance information given     [] Not weighing themselves daily  [x] Weight log provided for easy monitoring  [] Scale provided     [x] Not following low sodium diet  [] Food insecurity   [x] 2 gram sodium diet education provided   [] Low sodium recipes provided  [] Sodium free seasoning provided   [] Low sodium meal delivery options given to patient  [] Dietician consulted     [] Lack of transportation to appointments     [] Depression, given chronic illness  [] Primary team notified     [] Goals of care need addressed  [] Palliative care consulted     [] CHF CHW consulted, to assist with n/a        Chart Reviewed:  Diet: ADULT DIET; Regular; Low Fat/Low Chol/High Fiber/RENEA   Daily 
data filed at 2/13/2025 0039  Gross per 24 hour   Intake --   Output 1400 ml   Net -1400 ml       Labs:   CBC:   Recent Labs     02/12/25 2025 02/13/25 0245   WBC 7.7 8.2   HGB 9.4* 9.8*   HCT 29.9* 30.4*    184     BMP:   Recent Labs     02/12/25  1539 02/13/25 0245    135   K 4.2 3.7   CO2 26 27   BUN 40* 38*   CREATININE 1.7* 1.5*   LABGLOM 37* 45*   CALCIUM 8.9 8.8     Mag:   Lab Results   Component Value Date/Time    MG 2.1 02/13/2025 02:45 AM       TSH:   Lab Results   Component Value Date    TSH 4.58 (H) 02/12/2025       HgA1c:   Hemoglobin A1C   Date Value Ref Range Status   02/13/2025 5.7 (H) 4.0 - 5.6 % Final         PRO-BNP:   Lab Results   Component Value Date    PROBNP 6,811 (H) 02/12/2025    PROBNP 5,974 (H) 02/05/2025    PROBNP 3,895 (H) 01/23/2025    PROBNP 5,976 (H) 01/06/2025    PROBNP 6,546 (H) 12/03/2024    PROBNP 5,135 (H) 11/19/2024    PROBNP 5,203 (H) 11/17/2024    PROBNP 9,233 (H) 11/15/2024    PROBNP 13,074 (H) 11/13/2024    PROBNP 3,941 (H) 10/31/2024     PT/INR:   Recent Labs     02/13/25 0245   PROTIME 12.5*   INR 1.2     APTT:  Recent Labs     02/12/25 2025 02/13/25 0245   APTT 31.8 138.4*     HS TROP:  Lab Results   Component Value Date/Time    TROPHS 72 02/12/2025 05:04 PM    TROPHS 72 02/12/2025 03:39 PM    TROPHS 62 11/13/2024 11:59 AM    TROPHS 64 11/13/2024 10:18 AM    TROPHS 54 10/30/2024 08:53 AM    TROPHS 52 10/30/2024 05:07 AM    TROPHS 48 10/29/2024 10:09 AM    TROPHS 41 10/28/2024 08:30 PM    TROPHS 39 10/28/2024 04:37 PM    TROPHS 122 09/22/2024 08:54 AM     LIPID PANEL:  Lab Results   Component Value Date    CHOL 206 (H) 11/14/2024    TRIG 80 11/14/2024    HDL 43 11/14/2024     (H) 11/14/2024    VLDL 16 11/14/2024      LIVER PROFILE:  Recent Labs     02/12/25  1539   AST 16   ALT 9     [id number] Score for Atrial Fibrillation Stroke Risk   Risk   Factors  Component Value   C CHF Yes 1   H HTN Yes 1   A2 Age >= 75 Yes,  (89 y.o.) 2   D DM No 0

## 2025-02-15 LAB
ANION GAP SERPL CALCULATED.3IONS-SCNC: 10 MMOL/L (ref 7–16)
BUN SERPL-MCNC: 41 MG/DL (ref 6–23)
CALCIUM SERPL-MCNC: 8.7 MG/DL (ref 8.6–10.2)
CHLORIDE SERPL-SCNC: 100 MMOL/L (ref 98–107)
CO2 SERPL-SCNC: 28 MMOL/L (ref 22–29)
CREAT SERPL-MCNC: 1.6 MG/DL (ref 0.7–1.2)
ERYTHROCYTE [DISTWIDTH] IN BLOOD BY AUTOMATED COUNT: 16.6 % (ref 11.5–15)
GFR, ESTIMATED: 41 ML/MIN/1.73M2
GLUCOSE SERPL-MCNC: 108 MG/DL (ref 74–99)
HCT VFR BLD AUTO: 32 % (ref 37–54)
HGB BLD-MCNC: 9.8 G/DL (ref 12.5–16.5)
MCH RBC QN AUTO: 26.5 PG (ref 26–35)
MCHC RBC AUTO-ENTMCNC: 30.6 G/DL (ref 32–34.5)
MCV RBC AUTO: 86.5 FL (ref 80–99.9)
PLATELET # BLD AUTO: 172 K/UL (ref 130–450)
PMV BLD AUTO: 9.2 FL (ref 7–12)
POTASSIUM SERPL-SCNC: 3.7 MMOL/L (ref 3.5–5)
RBC # BLD AUTO: 3.7 M/UL (ref 3.8–5.8)
SODIUM SERPL-SCNC: 138 MMOL/L (ref 132–146)
WBC OTHER # BLD: 6.1 K/UL (ref 4.5–11.5)

## 2025-02-15 PROCEDURE — 2060000000 HC ICU INTERMEDIATE R&B

## 2025-02-15 PROCEDURE — 97530 THERAPEUTIC ACTIVITIES: CPT

## 2025-02-15 PROCEDURE — 2700000000 HC OXYGEN THERAPY PER DAY

## 2025-02-15 PROCEDURE — 85027 COMPLETE CBC AUTOMATED: CPT

## 2025-02-15 PROCEDURE — 80048 BASIC METABOLIC PNL TOTAL CA: CPT

## 2025-02-15 PROCEDURE — 6370000000 HC RX 637 (ALT 250 FOR IP): Performed by: NURSE PRACTITIONER

## 2025-02-15 PROCEDURE — 99232 SBSQ HOSP IP/OBS MODERATE 35: CPT | Performed by: INTERNAL MEDICINE

## 2025-02-15 PROCEDURE — 6370000000 HC RX 637 (ALT 250 FOR IP): Performed by: INTERNAL MEDICINE

## 2025-02-15 PROCEDURE — 2500000003 HC RX 250 WO HCPCS: Performed by: NURSE PRACTITIONER

## 2025-02-15 RX ORDER — CARVEDILOL 6.25 MG/1
3.12 TABLET ORAL 2 TIMES DAILY WITH MEALS
Status: DISCONTINUED | OUTPATIENT
Start: 2025-02-15 | End: 2025-02-16 | Stop reason: HOSPADM

## 2025-02-15 RX ORDER — SACUBITRIL AND VALSARTAN 24; 26 MG/1; MG/1
1 TABLET, FILM COATED ORAL 2 TIMES DAILY
Status: DISCONTINUED | OUTPATIENT
Start: 2025-02-15 | End: 2025-02-16 | Stop reason: HOSPADM

## 2025-02-15 RX ORDER — TRAMADOL HYDROCHLORIDE 50 MG/1
25 TABLET ORAL ONCE
Status: COMPLETED | OUTPATIENT
Start: 2025-02-16 | End: 2025-02-15

## 2025-02-15 RX ADMIN — ASPIRIN 81 MG: 81 TABLET, COATED ORAL at 09:08

## 2025-02-15 RX ADMIN — HYDRALAZINE HYDROCHLORIDE 10 MG: 10 TABLET ORAL at 14:34

## 2025-02-15 RX ADMIN — SODIUM CHLORIDE, PRESERVATIVE FREE 10 ML: 5 INJECTION INTRAVENOUS at 20:36

## 2025-02-15 RX ADMIN — SODIUM CHLORIDE, PRESERVATIVE FREE 10 ML: 5 INJECTION INTRAVENOUS at 09:12

## 2025-02-15 RX ADMIN — APIXABAN 2.5 MG: 2.5 TABLET, FILM COATED ORAL at 09:08

## 2025-02-15 RX ADMIN — SACUBITRIL AND VALSARTAN 1 TABLET: 24; 26 TABLET, FILM COATED ORAL at 12:59

## 2025-02-15 RX ADMIN — BUMETANIDE 2 MG: 1 TABLET ORAL at 09:08

## 2025-02-15 RX ADMIN — TRAMADOL HYDROCHLORIDE 25 MG: 50 TABLET, COATED ORAL at 23:59

## 2025-02-15 RX ADMIN — ISOSORBIDE DINITRATE 5 MG: 10 TABLET ORAL at 20:34

## 2025-02-15 RX ADMIN — ISOSORBIDE DINITRATE 5 MG: 10 TABLET ORAL at 14:34

## 2025-02-15 RX ADMIN — APIXABAN 2.5 MG: 2.5 TABLET, FILM COATED ORAL at 20:35

## 2025-02-15 RX ADMIN — SACUBITRIL AND VALSARTAN 1 TABLET: 24; 26 TABLET, FILM COATED ORAL at 20:34

## 2025-02-15 RX ADMIN — HYDRALAZINE HYDROCHLORIDE 10 MG: 10 TABLET ORAL at 20:35

## 2025-02-15 RX ADMIN — FINASTERIDE 5 MG: 5 TABLET, FILM COATED ORAL at 09:09

## 2025-02-15 RX ADMIN — ACETAMINOPHEN 650 MG: 325 TABLET ORAL at 23:01

## 2025-02-15 ASSESSMENT — PAIN DESCRIPTION - LOCATION: LOCATION: ABDOMEN

## 2025-02-15 ASSESSMENT — PAIN SCALES - GENERAL
PAINLEVEL_OUTOF10: 5
PAINLEVEL_OUTOF10: 2

## 2025-02-15 ASSESSMENT — PAIN DESCRIPTION - ORIENTATION: ORIENTATION: RIGHT;LOWER

## 2025-02-15 ASSESSMENT — PAIN DESCRIPTION - DESCRIPTORS: DESCRIPTORS: SORE

## 2025-02-15 NOTE — PLAN OF CARE
Problem: Chronic Conditions and Co-morbidities  Goal: Patient's chronic conditions and co-morbidity symptoms are monitored and maintained or improved  2/15/2025 0934 by Mónica Colvin RN  Outcome: Progressing  Flowsheets (Taken 2/15/2025 0900)  Care Plan - Patient's Chronic Conditions and Co-Morbidity Symptoms are Monitored and Maintained or Improved: Monitor and assess patient's chronic conditions and comorbid symptoms for stability, deterioration, or improvement  2/14/2025 2327 by Edmundo Talavera RN  Outcome: Progressing     Problem: Safety - Adult  Goal: Free from fall injury  2/15/2025 0934 by Mónica Colvin RN  Outcome: Progressing  Flowsheets (Taken 2/15/2025 0900)  Free From Fall Injury: Instruct family/caregiver on patient safety  2/14/2025 2327 by Edmundo Talavera RN  Outcome: Progressing     Problem: ABCDS Injury Assessment  Goal: Absence of physical injury  2/15/2025 0934 by Mónica Colvin RN  Outcome: Progressing  Flowsheets (Taken 2/15/2025 0900)  Absence of Physical Injury: Implement safety measures based on patient assessment  2/14/2025 2327 by Edmundo Talavera RN  Outcome: Progressing     Problem: Pain  Goal: Verbalizes/displays adequate comfort level or baseline comfort level  2/15/2025 0934 by Mónica Colvin RN  Outcome: Progressing  2/14/2025 2327 by Edmundo Talavera RN  Outcome: Progressing

## 2025-02-15 NOTE — PLAN OF CARE
Problem: Chronic Conditions and Co-morbidities  Goal: Patient's chronic conditions and co-morbidity symptoms are monitored and maintained or improved  2/14/2025 2327 by Edmundo Talavera RN  Outcome: Progressing  2/14/2025 1028 by Madison Dunham RN  Outcome: Progressing     Problem: Safety - Adult  Goal: Free from fall injury  2/14/2025 2327 by Edmundo Talavera RN  Outcome: Progressing  2/14/2025 1028 by Madison Dunham RN  Outcome: Progressing  Flowsheets (Taken 2/14/2025 0930)  Free From Fall Injury: Instruct family/caregiver on patient safety     Problem: ABCDS Injury Assessment  Goal: Absence of physical injury  2/14/2025 2327 by Edmundo Talavera RN  Outcome: Progressing  2/14/2025 1028 by Madison Dunham RN  Outcome: Progressing  Flowsheets (Taken 2/14/2025 0930)  Absence of Physical Injury: Implement safety measures based on patient assessment     Problem: Pain  Goal: Verbalizes/displays adequate comfort level or baseline comfort level  2/14/2025 2327 by Edmundo Talavera RN  Outcome: Progressing  2/14/2025 1028 by Madison Dunham RN  Outcome: Progressing

## 2025-02-16 ENCOUNTER — APPOINTMENT (OUTPATIENT)
Dept: CT IMAGING | Age: 89
DRG: 291 | End: 2025-02-16
Payer: MEDICARE

## 2025-02-16 VITALS
RESPIRATION RATE: 18 BRPM | DIASTOLIC BLOOD PRESSURE: 62 MMHG | HEIGHT: 66 IN | TEMPERATURE: 97.7 F | HEART RATE: 51 BPM | OXYGEN SATURATION: 99 % | WEIGHT: 156.2 LBS | BODY MASS INDEX: 25.1 KG/M2 | SYSTOLIC BLOOD PRESSURE: 133 MMHG

## 2025-02-16 LAB
ANION GAP SERPL CALCULATED.3IONS-SCNC: 9 MMOL/L (ref 7–16)
BACTERIA URNS QL MICRO: ABNORMAL
BASOPHILS # BLD: 0.02 K/UL (ref 0–0.2)
BASOPHILS NFR BLD: 0 % (ref 0–2)
BILIRUB UR QL STRIP: NEGATIVE
BUN SERPL-MCNC: 44 MG/DL (ref 6–23)
CALCIUM SERPL-MCNC: 8.1 MG/DL (ref 8.6–10.2)
CHLORIDE SERPL-SCNC: 99 MMOL/L (ref 98–107)
CLARITY UR: CLEAR
CO2 SERPL-SCNC: 27 MMOL/L (ref 22–29)
COLOR UR: YELLOW
CREAT SERPL-MCNC: 1.6 MG/DL (ref 0.7–1.2)
EOSINOPHIL # BLD: 0.04 K/UL (ref 0.05–0.5)
EOSINOPHILS RELATIVE PERCENT: 1 % (ref 0–6)
ERYTHROCYTE [DISTWIDTH] IN BLOOD BY AUTOMATED COUNT: 16.4 % (ref 11.5–15)
GFR, ESTIMATED: 41 ML/MIN/1.73M2
GLUCOSE SERPL-MCNC: 112 MG/DL (ref 74–99)
GLUCOSE UR STRIP-MCNC: NEGATIVE MG/DL
HCT VFR BLD AUTO: 27.9 % (ref 37–54)
HGB BLD-MCNC: 8.9 G/DL (ref 12.5–16.5)
HGB UR QL STRIP.AUTO: ABNORMAL
IMM GRANULOCYTES # BLD AUTO: <0.03 K/UL (ref 0–0.58)
IMM GRANULOCYTES NFR BLD: 0 % (ref 0–5)
KETONES UR STRIP-MCNC: NEGATIVE MG/DL
LEUKOCYTE ESTERASE UR QL STRIP: ABNORMAL
LYMPHOCYTES NFR BLD: 1.08 K/UL (ref 1.5–4)
LYMPHOCYTES RELATIVE PERCENT: 19 % (ref 20–42)
MAGNESIUM SERPL-MCNC: 2.1 MG/DL (ref 1.6–2.6)
MCH RBC QN AUTO: 27.4 PG (ref 26–35)
MCHC RBC AUTO-ENTMCNC: 31.9 G/DL (ref 32–34.5)
MCV RBC AUTO: 85.8 FL (ref 80–99.9)
MONOCYTES NFR BLD: 1.12 K/UL (ref 0.1–0.95)
MONOCYTES NFR BLD: 19 % (ref 2–12)
NEUTROPHILS NFR BLD: 61 % (ref 43–80)
NEUTS SEG NFR BLD: 3.51 K/UL (ref 1.8–7.3)
NITRITE UR QL STRIP: NEGATIVE
PH UR STRIP: 6 [PH] (ref 5–8)
PLATELET # BLD AUTO: 143 K/UL (ref 130–450)
PMV BLD AUTO: 8.9 FL (ref 7–12)
POTASSIUM SERPL-SCNC: 3.5 MMOL/L (ref 3.5–5)
PROCALCITONIN SERPL-MCNC: 0.16 NG/ML (ref 0–0.08)
PROT UR STRIP-MCNC: ABNORMAL MG/DL
RBC # BLD AUTO: 3.25 M/UL (ref 3.8–5.8)
RBC #/AREA URNS HPF: ABNORMAL /HPF
SODIUM SERPL-SCNC: 135 MMOL/L (ref 132–146)
SP GR UR STRIP: 1.02 (ref 1–1.03)
UROBILINOGEN UR STRIP-ACNC: 0.2 EU/DL (ref 0–1)
WBC #/AREA URNS HPF: ABNORMAL /HPF
WBC OTHER # BLD: 5.8 K/UL (ref 4.5–11.5)

## 2025-02-16 PROCEDURE — 85025 COMPLETE CBC W/AUTO DIFF WBC: CPT

## 2025-02-16 PROCEDURE — 74176 CT ABD & PELVIS W/O CONTRAST: CPT

## 2025-02-16 PROCEDURE — 2500000003 HC RX 250 WO HCPCS: Performed by: NURSE PRACTITIONER

## 2025-02-16 PROCEDURE — 80048 BASIC METABOLIC PNL TOTAL CA: CPT

## 2025-02-16 PROCEDURE — 6370000000 HC RX 637 (ALT 250 FOR IP): Performed by: NURSE PRACTITIONER

## 2025-02-16 PROCEDURE — 84145 PROCALCITONIN (PCT): CPT

## 2025-02-16 PROCEDURE — 83735 ASSAY OF MAGNESIUM: CPT

## 2025-02-16 PROCEDURE — 6370000000 HC RX 637 (ALT 250 FOR IP): Performed by: INTERNAL MEDICINE

## 2025-02-16 PROCEDURE — 81001 URINALYSIS AUTO W/SCOPE: CPT

## 2025-02-16 PROCEDURE — 2700000000 HC OXYGEN THERAPY PER DAY

## 2025-02-16 RX ORDER — CARVEDILOL 3.12 MG/1
3.12 TABLET ORAL 2 TIMES DAILY WITH MEALS
Qty: 60 TABLET | Refills: 3 | Status: SHIPPED | OUTPATIENT
Start: 2025-02-16 | End: 2025-02-16

## 2025-02-16 RX ORDER — CEFDINIR 300 MG/1
300 CAPSULE ORAL DAILY
Qty: 5 CAPSULE | Refills: 0 | Status: SHIPPED | OUTPATIENT
Start: 2025-02-16 | End: 2025-02-16

## 2025-02-16 RX ORDER — CARVEDILOL 3.12 MG/1
3.12 TABLET ORAL 2 TIMES DAILY WITH MEALS
Qty: 60 TABLET | Refills: 3 | Status: SHIPPED | OUTPATIENT
Start: 2025-02-16

## 2025-02-16 RX ORDER — CEFDINIR 300 MG/1
300 CAPSULE ORAL DAILY
Qty: 5 CAPSULE | Refills: 0 | Status: SHIPPED | OUTPATIENT
Start: 2025-02-16 | End: 2025-02-21

## 2025-02-16 RX ADMIN — ISOSORBIDE DINITRATE 5 MG: 10 TABLET ORAL at 13:31

## 2025-02-16 RX ADMIN — ASPIRIN 81 MG: 81 TABLET, COATED ORAL at 08:11

## 2025-02-16 RX ADMIN — FINASTERIDE 5 MG: 5 TABLET, FILM COATED ORAL at 08:11

## 2025-02-16 RX ADMIN — ISOSORBIDE DINITRATE 5 MG: 10 TABLET ORAL at 08:11

## 2025-02-16 RX ADMIN — HYDRALAZINE HYDROCHLORIDE 10 MG: 10 TABLET ORAL at 08:11

## 2025-02-16 RX ADMIN — BUMETANIDE 2 MG: 1 TABLET ORAL at 08:11

## 2025-02-16 RX ADMIN — APIXABAN 2.5 MG: 2.5 TABLET, FILM COATED ORAL at 08:11

## 2025-02-16 RX ADMIN — HYDRALAZINE HYDROCHLORIDE 10 MG: 10 TABLET ORAL at 13:31

## 2025-02-16 RX ADMIN — SODIUM CHLORIDE, PRESERVATIVE FREE 10 ML: 5 INJECTION INTRAVENOUS at 08:12

## 2025-02-16 RX ADMIN — SACUBITRIL AND VALSARTAN 1 TABLET: 24; 26 TABLET, FILM COATED ORAL at 08:11

## 2025-02-16 ASSESSMENT — PAIN SCALES - GENERAL: PAINLEVEL_OUTOF10: 0

## 2025-02-16 NOTE — PLAN OF CARE
Problem: Chronic Conditions and Co-morbidities  Goal: Patient's chronic conditions and co-morbidity symptoms are monitored and maintained or improved  Outcome: Progressing  Flowsheets (Taken 2/16/2025 0800)  Care Plan - Patient's Chronic Conditions and Co-Morbidity Symptoms are Monitored and Maintained or Improved: Monitor and assess patient's chronic conditions and comorbid symptoms for stability, deterioration, or improvement     Problem: Safety - Adult  Goal: Free from fall injury  Outcome: Progressing  Flowsheets (Taken 2/16/2025 0800)  Free From Fall Injury: Instruct family/caregiver on patient safety     Problem: ABCDS Injury Assessment  Goal: Absence of physical injury  Outcome: Progressing  Flowsheets (Taken 2/16/2025 0800)  Absence of Physical Injury: Implement safety measures based on patient assessment     Problem: Pain  Goal: Verbalizes/displays adequate comfort level or baseline comfort level  Outcome: Progressing

## 2025-02-16 NOTE — DISCHARGE SUMMARY
2/12/2025  EXAMINATION: ONE XRAY VIEW OF THE CHEST 2/12/2025 3:44 pm COMPARISON: November 13, 2024 HISTORY: ORDERING SYSTEM PROVIDED HISTORY: Chest pain, history of CHF TECHNOLOGIST PROVIDED HISTORY: Reason for exam:->Chest pain, history of CHF FINDINGS: Single frontal view of the chest demonstrates chronic interstitial lung changes with surgical clips in the upper perihilar regions.  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  There is cardiomegaly with small pleural effusions and mild CHF.     1. Cardiomegaly with mild CHF and small pleural effusions. 2. Chronic interstitial lung changes.       Discharge Exam:    HEENT: NCAT,  PERRLA, No JVD  Heart:  RRR, no murmurs, gallops, or rubs.  Lungs:  CTA bilaterally, no wheeze, rales or rhonchi  Abd: bowel sounds present, nontender, nondistended, no masses  Extrem:  No clubbing, cyanosis, or edema    Disposition: home     Patient Condition at Discharge: Stable    Patient Instructions:      Medication List        START taking these medications      apixaban 2.5 MG Tabs tablet  Commonly known as: ELIQUIS  Take 1 tablet by mouth 2 times daily            CHANGE how you take these medications      carvedilol 3.125 MG tablet  Commonly known as: COREG  Take 1 tablet by mouth 2 times daily (with meals)  What changed:   medication strength  how much to take            CONTINUE taking these medications      ALBUTEROL SULFATE IN     aspirin 81 MG EC tablet  Take 1 tablet by mouth daily     bumetanide 1 MG tablet  Commonly known as: BUMEX     Cold-Eeze Lozg     docusate sodium 100 MG capsule  Commonly known as: COLACE     finasteride 5 MG tablet  Commonly known as: PROSCAR     fish oil 1000 MG capsule     hydrALAZINE 10 MG tablet  Commonly known as: APRESOLINE  Take 1 tablet by mouth 3 times daily     isosorbide dinitrate 5 MG tablet  Commonly known as: ISORDIL  Take 1 tablet by mouth 3 times daily     LOZENGES MT     ondansetron 8 MG tablet  Commonly known as: ZOFRAN

## 2025-02-17 NOTE — PROGRESS NOTES
[facility]        CARDIOLOGY                 INPATIENT PROGRESS NOTE          PATIENT SEEN IN FOLLOW UP FOR: A fib    Hospital Day: 4     Tashi Parry is a 89 year old patient known to Dr. Lyon      SUBJECTIVE: Denies any CP, breathing Ok. No orthopnea. No distress    ROS: Review of rest of 10 systems negative except as mentioned above    OBJECTIVE: No acute distress.  See Assessment     Diagnostics:       Telemetry: Reviewed    NUNO 2/13/2025    Left Ventricle: Normal left ventricular systolic function with a visually estimated EF of 55 - 60%.    Right Ventricle: Mildly reduced systolic function.    Aortic Valve: Trileaflet valve. Mild sclerosis of the aortic valve cusps. Moderate regurgitation.    Mitral Valve: Moderate regurgitation with a centrally directed jet.    Tricuspid Valve: Mild regurgitation.    Left Atrium: Decreased appendage flow velocity. No left atrial appendage thrombus noted. Light spontaneous echo contrast visualized.    Interatrial Septum: Agitated saline study was positive without provocation. Right to left shunt was noted.    Right Atrium: No thrombus or mass visualized    Successful direct-current cardioversion at 200 J to normal sinus rhythm.    Image quality is adequate. Contrast used: Lumason.    TTE 10/31/2024 (Dr. Mckeon):     Left Ventricle: EF by visual approximation is 45-50%. Left ventricle size is normal. Mild concentric hypertrophy. Stage 2 diastolic dysfunction.    Right Ventricle: Right ventricle is dilated. Reduced systolic function. TAPSE is 1.4 cm.    Left Atrium: Left atrial volume index is severely increased (>48 mL/m2).    Aortic Valve: Mild to moderate regurgitation. No stenosis.    Mitral Valve: Moderate regurgitation. No stenosis noted.    Tricuspid Valve: Mild regurgitation. The estimated RVSP is 62 mmHg.    Contrast used: Definity.         Intake/Output Summary (Last 24 hours) at 2/15/2025 2625  Last data filed at 2/15/2025 
    [facility]   Progress note      Subjective:    The patient is awake and alert.    Feels well denies any acute complaints  Vomited this morning    Objective:    BP (!) 145/52   Pulse 57   Temp 98.2 °F (36.8 °C) (Oral)   Resp 17   Ht 1.676 m (5' 6\")   Wt 70.9 kg (156 lb 3.2 oz)   SpO2 93%   BMI 25.21 kg/m²     In: 100 [I.V.:100]  Out: 700   In: 100   Out: 700 [Urine:700]    General appearance: NAD, conversant  HEENT: AT/NC, MMM  Neck: FROM, supple  Lungs: Clear to auscultation  CV: RRR, no MRGs  Vasc: Radial pulses 2+  Abdomen: Soft, non-tender; no masses or HSM  Extremities: No peripheral edema or digital cyanosis  Skin: no rash, lesions or ulcers  Psych: Alert and oriented to person, place and time  Neuro: Alert and interactive     Recent Labs     02/13/25  0245 02/14/25  0608 02/15/25  0500   WBC 8.2 8.7 6.1   HGB 9.8* 9.5* 9.8*   HCT 30.4* 30.3* 32.0*    201 172       Recent Labs     02/13/25  0245 02/14/25  0608 02/15/25  0500    136 138   K 3.7 3.8 3.7   CL 99 99 100   CO2 27 26 28   BUN 38* 41* 41*   CREATININE 1.5* 1.5* 1.6*   CALCIUM 8.8 8.6 8.7       Assessment:    Principal Problem:    CHF (congestive heart failure), NYHA class I, acute on chronic, combined (HCC)  Active Problems:    Coronary artery disease involving native coronary artery of native heart without angina pectoris    Chest pain    Acute on chronic congestive heart failure (HCC)    New onset atrial fibrillation (HCC)  Resolved Problems:    * No resolved hospital problems. *      Plan:    ASSESSMENT:  New onset Atrial Fibrillation, date of onset unclear with SVR  Chest Pain with Troponin elevation   Known Hx CAD s/p CABG in 2000  Acute on Chronic HFmrEF  HTN  HLD  CKD (baseline SCr 1-1.5)  BPH  Known Hx Pancreatic mass  Non Small Cell Lung CA s/p LLL lobectomy with wedge resection and VATs in 2019  COPD with remote tobacco use  DJD  Macular Degeneration  Hyperglycemia with HA1C 5.7%  Anemia with H&H 
    INPATIENT CARDIOLOGY FOLLOW-UP    Name: Tashi Parry    Age: 89 y.o.    Date of Admission: 2/12/2025  3:16 PM    Date of Service: 2/14/2025    Primary Cardiologist: Dr. Lyon    Chief Complaint: Follow-up for new onset atrial fibrillation, decompensated heart failure    Interim History:  No new overnight cardiac complaints. Currently with no complaints of CP, SOB, palpitations, dizziness, or lightheadedness.  Rate controlled atrial fibrillation overnight.  Underwent successful NUNO guided cardioversion today.  Tolerated well.  Feeling fatigued.  Low diastolic pressures postprocedure.  Remains in sinus rhythm.    2 L negative since yesterday.  Review of Systems:   Negative except as described above    Problem List:  Patient Active Problem List   Diagnosis    Coronary artery disease involving native coronary artery of native heart without angina pectoris    Hyperlipemia    Asymptomatic bilateral carotid artery stenosis    Malignant neoplasm of lower lobe of left lung (HCC)    Hx of coronary artery disease    Chest pain    Essential hypertension    Anemia    Benign prostatic hyperplasia without lower urinary tract symptoms    Gastro-esophageal reflux disease without esophagitis    Macular degeneration    Overweight with body mass index (BMI) 25.0-29.9    Primary malignant neoplasm of lung (HCC)    Sensorineural hearing loss (SNHL) of both ears    History of non-ST elevation myocardial infarction (NSTEMI)    Fall down steps    Unspecified dementia, mild, without behavioral disturbance, psychotic disturbance, mood disturbance, and anxiety (HCC)    Personal history of other malignant neoplasm of bronchus and lung    Acquired absence of lung (part of)    Nonexudative senile macular degeneration of retina    Primary osteoarthritis involving multiple joints    History of falling, presenting hazards to health    Constipation    Nonrheumatic mitral valve regurgitation    Pulmonary hypertension (HCC)    Muscle weakness 
  [facility] Quality Flow/Interdisciplinary Rounds Progress Note        Quality Flow Rounds held on February 13, 2025    Disciplines Attending:  Bedside Nurse, , , and Nursing Unit Leadership    Tashi Parry was admitted on 2/12/2025  3:16 PM    Anticipated Discharge Date:       Disposition:    Cornell Score:  Cornell Scale Score: 20    [facility] RISK OF UNPLANNED READMISSION 2.0             20.8 Total Score        Discussed patient goal for the day, patient clinical progression, and barriers to discharge.  The following Goal(s) of the Day/Commitment(s) have been identified:   discharge planning, heparin gtt, cardio, wean O2, PT/OT      Lloyd Chowdhury RN  February 13, 2025        
  [facility] Quality Flow/Interdisciplinary Rounds Progress Note        Quality Flow Rounds held on February 14, 2025    Disciplines Attending:  Bedside Nurse, , , and Nursing Unit Leadership    Tashi Parry was admitted on 2/12/2025  3:16 PM    Anticipated Discharge Date:       Disposition:    Cornell Score:  Cornell Scale Score: 19    [facility] RISK OF UNPLANNED READMISSION 2.0             20.9 Total Score        Discussed patient goal for the day, patient clinical progression, and barriers to discharge.  The following Goal(s) of the Day/Commitment(s) have been identified:   NUNO/DCCV, hep gtt, await plan after procedure      Damaris Barba RN  February 14, 2025        
  P Quality Flow/Interdisciplinary Rounds Progress Note        Quality Flow Rounds held on February 16, 2025    Disciplines Attending:  Bedside Nurse    Tashi Benderky was admitted on 2/12/2025  3:16 PM    Anticipated Discharge Date:       Disposition:    Cornell Score:  Cornell Scale Score: 19    [facility] RISK OF UNPLANNED READMISSION 2.0             21.6 Total Score        Discussed patient goal for the day, patient clinical progression, and barriers to discharge.  The following Goal(s) of the Day/Commitment(s) have been identified:   Check cardiology Plan, discharge planning       Ariana Joshi RN  February 16, 2025        
  Physician Progress Note      PATIENT:               EVANGELINA JOHNSON  CSN #:                  [id number]  :                       1931  ADMIT DATE:       2025 3:16 PM  DISCH DATE:        2025 4:14 PM  RESPONDING  PROVIDER #:        Yamileth Patel MD          QUERY TEXT:    Pt admitted with new onset afib.  Pt noted to have COPD, CHF and wears oxygen   2L at baseline. If possible, please document in the progress notes and   discharge summary if you are evaluating and/or treating any of the following:    The medical record reflects the following:  Risk Factors: CHF, COPD  Clinical Indicators: Per ED \"Wears 2L O2 at baseline, 89-92% on 2L at triage.\"  Treatment: continue oxygen  Options provided:  -- Chronic respiratory failure with hypoxia  -- Chronic respiratory failure with hypercapnia  -- Chronic respiratory failure with hypoxia and hypercapnia  -- Other - I will add my own diagnosis  -- Disagree - Not applicable / Not valid  -- Disagree - Clinically unable to determine / Unknown  -- Refer to Clinical Documentation Reviewer    PROVIDER RESPONSE TEXT:    This patient has chronic respiratory failure with hypoxia.    Query created by: Lili Brown on 2025 9:30 AM      Electronically signed by:  Yamileth Patel MD 2025 6:47 AM          
4 Eyes Skin Assessment     NAME:  Tashi Parry  YOB: 1931  MEDICAL RECORD NUMBER:  [id number]    The patient is being assessed for  Admission    I agree that at least one RN has performed a thorough Head to Toe Skin Assessment on the patient. ALL assessment sites listed below have been assessed.      Areas assessed by both nurses:    Head, Face, Ears, Shoulders, Back, Chest, Arms, Elbows, Hands, Sacrum. Buttock, Coccyx, Ischium, Legs. Feet and Heels, and Under Medical Devices         Does the Patient have a Wound? No noted wound(s)       Cornell Prevention initiated by RN: Yes  Wound Care Orders initiated by RN: No    Pressure Injury (Stage 3,4, Unstageable, DTI, NWPT, and Complex wounds) if present, place Wound referral order by RN under : No    New Ostomies, if present place, Ostomy referral order under : No     Nurse 1 eSignature: Electronically signed by Dona Diamond RN on 2/12/25 at 8:42 PM EST    **SHARE this note so that the co-signing nurse can place an eSignature**    Nurse 2 eSignature: Electronically signed by Monik Tolentino RN on 2/12/25 at 11:41 PM EST    
Cardiology consult sent to Dr. Lyon's group via [employer].   
Messaged cardiology about 3 second pause.   
Occupational Therapy    OCCUPATIONAL THERAPY INITIAL EVALUATION    [facility]   [address], [place]         Date:2025                                                  Patient Name: Tashi Parry    MRN: [id number]    : 1931    Room: [address] [place] [address]      Evaluating OT: Toyin Dodson OTR/L   [id number]      Referring Provider:Janet De La Rosa APRN - CNP     Specific Provider Orders/Date:OT eval and treat 2025      Diagnosis:  New onset atrial fibrillation (HCC) [I48.91]  Atrial fibrillation, new onset (HCC) [I48.91]  CHF (congestive heart failure), NYHA class I, acute on chronic, combined (HCC) [I50.43]  Chest pain, unspecified type [R07.9]  Acute on chronic congestive heart failure, unspecified heart failure type (HCC) [I50.9]     Pertinent Medical History: DJD, macular degeneration, CABG, hip surgery       Precautions:  Fall Risk, O2, [place]     Assessment of current deficits    [x] Functional mobility  [x]ADLs  [x] Strength               [x]Cognition    [x] Functional transfers   [x] IADLs         [x] Safety Awareness   [x]Endurance    [x] Fine Coordination              [x] Balance      [] Vision/perception   [x]Sensation     []Gross Motor Coordination  [] ROM  [] Delirium                   [] Motor Control     OT PLAN OF CARE   OT POC based on physician orders, patient diagnosis and results of clinical assessment    Frequency/Duration  1-3 days/wk for PRN   Specific OT Treatment Interventions to include:   ADL retraining/adapted techniques and AE recommendations to increase functional independence within precautions                    Energy conservation techniques to improve tolerance for selfcare routine   Functional transfer/mobility training/DME recommendations for increased independence, safety and fall prevention         Patient/family education to increase safety and functional independence             Environmental modifications for 
Patients daughter requesting Eliquis script be sent to [facility] on [address].  
Physical Therapy  Facility/Department: [address] INTERMEDIATE  Physical Therapy Treatment Note    Name: Tashi Parry  : 1931  MRN: [id number]  Date of Service: 2/15/2025      Patient Diagnosis(es): The primary encounter diagnosis was Chest pain, unspecified type. Diagnoses of New onset atrial fibrillation (HCC), Acute on chronic congestive heart failure, unspecified heart failure type (HCC), and Atrial flutter, unspecified type (HCC) were also pertinent to this visit.  Past Medical History:  has a past medical history of Acute HFrEF (heart failure with reduced ejection fraction) (HCC), Acute kidney injury superimposed on chronic kidney disease (HCC), BPH (benign prostatic hyperplasia), CAD (coronary artery disease), Carotid artery calcification, DJD (degenerative joint disease), History of falling, Hyperlipemia, Hypertension, Lung nodule, Macular degeneration, Numbness and tingling in left arm, Numbness and tingling of left side of face, S/P CABG x 6, and Shingles.  Past Surgical History:  has a past surgical history that includes Diagnostic Cardiac Cath Lab Procedure (00); Cardiac surgery; TURP; Dental surgery; Total hip arthroplasty (Right, 2011); Total hip arthroplasty (Left, 2009); other surgical history (2011); Thoracoscopy (Left, 2019); TURP (N/A, 2019); Prostate surgery (Bilateral, 2019); and Lung removal, partial (Left).      Referring provider:  Yamileth Patel MD    PT Order:  PT eval and treat     Evaluating PT:  Yuni Yepez PT, DPT PT [id number]    Room #:  0631/0631-A  Diagnosis:  New onset atrial fibrillation (HCC) [I48.91]  Atrial fibrillation, new onset (HCC) [I48.91]  CHF (congestive heart failure), NYHA class I, acute on chronic, combined (HCC) [I50.43]  Chest pain, unspecified type [R07.9]  Acute on chronic congestive heart failure, unspecified heart failure type (HCC) [I50.9]  Precautions:  fall risk, O2  Equipment Needs:  none.  Pt owns a 
Report called to [facility]. All questions answered.Notified family picking patient up for transport.     Discharge instruction reviewed with patients daughter Aure.Medications sent to [facility] in [place] per Aure request.   
Report called to floor RN  
Spiritual Health History and Assessment/Progress Note  [facility]    Initial Encounter,  ,  ,      Name: Tashi Parry MRN: [id number]    Age: 89 y.o.     Sex: male   Language: English   Evangelical: [employer]   CHF (congestive heart failure), NYHA class I, acute on chronic, combined (HCC)     Date: 2/13/2025                           Spiritual Assessment began in [facility] INTERMEDIATE        Referral/Consult From: Rounding   Encounter Overview/Reason: Initial Encounter  Service Provided For: Patient    Ayah, Belief, Meaning:   Patient identifies as spiritual, is connected with a ayah tradition or spiritual practice, and has beliefs or practices that help with coping during difficult times  Family/Friends No family/friends present      Importance and Influence:  Patient has spiritual/personal beliefs that influence decisions regarding their health  Family/Friends No family/friends present    Community:  Patient feels well-supported. Support system includes: Children and Extended family  Family/Friends No family/friends present    Assessment and Plan of Care:     Patient Interventions include: Facilitated expression of thoughts and feelings and Affirmed coping skills/support systems  Family/Friends Interventions include: No family/friends present    Patient Plan of Care: Spiritual Care available upon further referral  Family/Friends Plan of Care: No family/friends present    Electronically signed by Chaplain Carole on 2/13/2025 at 1:02 PM    
walker.    SUBJECTIVE:    Pt lives at [facility].  Pt ambulated with walker around the facility and uses a cane in his room.  Pt is independent with all mobility.     OBJECTIVE:   Initial Evaluation  Date: 2/13 Treatment Short Term/ Long Term   Goals   Was pt agreeable to Eval/treatment? yes     Does pt have pain? None reported     Bed Mobility  Rolling: SBA  Supine to sit: SBA  Sit to supine: NT  Scooting: SBA to sitting EOB  independent   Transfers Sit to stand: Min A  Stand to sit: Min A  Stand pivot: NT  Modified independent   Ambulation    30 feet and 15 feet x 2 with w/w SBA.    100+ feet with w/w modified independent    Stair negotiation: ascended and descended  NT      ROM BLE:  WFL     Strength BLE:  grossly 4-/5  Increase LE Strength by 1/2 mm grade   Balance Sitting EOB:  Supervision  Dynamic Standing:  SBA with w/w  Sitting EOB:  independent  Dynamic Standing:  modified independent   AM-PAC 6 Clicks 17/24       Pt is A & O  Sensation:  Pt denies numbness and tingling to extremities      Vitals:  SPO2 remained in the 90s during mobility on O2.     Patient education  Pt educated on PT objectives during eval and while in the hospital, hand placement during transfers, calling for assistance.    Patient response to education:   Pt verbalized understanding Pt demonstrated skill Pt requires further education in this area   yes With cueing yes     ASSESSMENT:    Conditions Requiring Skilled Therapeutic Intervention:    [x]Decreased strength     []Decreased ROM  [x]Decreased functional mobility  [x]Decreased balance   [x]Decreased endurance   [x]Decreased posture  []Decreased sensation  []Decreased coordination   []Decreased vision  []Decreased safety awareness   []Increased pain       Comments:  Pt found in bed.   At end of eval, pt left sitting up in the chair with call light in reach and chair alarm on.      Treatment:  Patient practiced and was instructed in the following treatment:    Functional mobility performed 
systolic function.    6. The left atrium is moderately dilated.    7. There is global hypokinesis of the left ventricle with minor regional variations.      TTE 10/31/2024 (Dr. Mckeon):     Left Ventricle: EF by visual approximation is 45-50%. Left ventricle size is normal. Mild concentric hypertrophy. Stage 2 diastolic dysfunction.    Right Ventricle: Right ventricle is dilated. Reduced systolic function. TAPSE is 1.4 cm.    Left Atrium: Left atrial volume index is severely increased (>48 mL/m2).    Aortic Valve: Mild to moderate regurgitation. No stenosis.    Mitral Valve: Moderate regurgitation. No stenosis noted.    Tricuspid Valve: Mild regurgitation. The estimated RVSP is 62 mmHg.    Contrast used: Definity.    I have personally participated in the history, exam, diagnosis with my Advanced Practice Nurse/Physician Assistant on the date of service.  I discussed pertinent history, exam findings, and treatment plan with our physician extender.    Assessment:  New onset A.fib (40-60) [id number] Score 5 - currently on heparin drip; currently a.fib with SVR.    Chest pain that patient describes as constant pressure x 2 days prior to arrival.  Patient currently asymptomatic.  Troponin 72>>72  Patient not interested in further ischemic evaluation  History of HFmrEF - mild hypervolemia noted on exam; BNP 6811 most recent echo 10/31/2024 EF 45-50%  CAD s/p CABG x 6 in 2000 (LIMA-LAD, LYNNETTE-Lcx, SVG-OM, SVG-LPL, SVG-PDA-RPL) Dr. Guerra   VHD: Mild- mod AR. Mod MR. Mod TR   Chronically elevated troponin 122 peak ; 40-60   HTN   HLD - not on statin d/t intolerance   CORBIN on CKD - baseline Cr 1.0-1.5   BPH - on finasteride   Chronic Anemia - baseline hgb 8.9-11.6   COPD / Chronic hypoxia - 2L NC at baseline   History of non-small cell lung cancer - invasive adenocarcinoma   CARLO invasive adenocarcinoma s/p robotic VATS, LLL wedge resection, LLL lobectomy in 4/19   RUL spiculated nodule 7 x 5 mm on CT chest 4/19, followed by

## 2025-02-18 ENCOUNTER — TELEPHONE (OUTPATIENT)
Dept: PRIMARY CARE CLINIC | Age: 89
End: 2025-02-18

## 2025-02-18 NOTE — TELEPHONE ENCOUNTER
..Care Transitions Initial Follow Up Call    Outreach made within 2 business days of discharge: Yes    Patient: Tashi Parry Patient : 1931   MRN: [id number]  Reason for Admission: CHF  Discharge Date: 25       Spoke with: no answer    Discharge department/facility: [facility]                  Scheduled appointment with PCP within 7-14 days    Follow Up  Future Appointments   Date Time Provider Department Center   2025  1:30 PM [facility] ROOM 1 [facility]   2025  8:45 AM [facility] ROOM 2 [facility]   2025  2:15 PM Rosaura Lyon, DO [facility]   2025  9:30 AM Romaine Arnold MD TRAIL [facility]   2025 10:40 AM Mery Perdomo, ABHISHEK - CNS [facility]       Dalila Vance MA

## 2025-02-19 ENCOUNTER — HOSPITAL ENCOUNTER (OUTPATIENT)
Dept: OTHER | Age: 89
Setting detail: THERAPIES SERIES
End: 2025-02-19
Payer: MEDICARE

## 2025-02-24 ENCOUNTER — HOSPITAL ENCOUNTER (OUTPATIENT)
Dept: OTHER | Age: 89
Setting detail: THERAPIES SERIES
Discharge: HOME | End: 2025-02-24
Payer: MEDICARE

## 2025-02-24 ENCOUNTER — TELEPHONE (OUTPATIENT)
Dept: OTHER | Age: 89
End: 2025-02-24

## 2025-02-24 VITALS
RESPIRATION RATE: 18 BRPM | DIASTOLIC BLOOD PRESSURE: 46 MMHG | OXYGEN SATURATION: 99 % | WEIGHT: 157.8 LBS | SYSTOLIC BLOOD PRESSURE: 106 MMHG | BODY MASS INDEX: 25.47 KG/M2 | HEART RATE: 57 BPM

## 2025-02-24 DIAGNOSIS — I50.32 CHRONIC HEART FAILURE WITH PRESERVED EJECTION FRACTION (HCC): Primary | ICD-10-CM

## 2025-02-24 LAB
ANION GAP SERPL CALCULATED.3IONS-SCNC: 10 MMOL/L (ref 7–16)
BNP SERPL-MCNC: 2582 PG/ML (ref 0–450)
BUN SERPL-MCNC: 39 MG/DL (ref 6–23)
CALCIUM SERPL-MCNC: 9.1 MG/DL (ref 8.6–10.2)
CHLORIDE SERPL-SCNC: 100 MMOL/L (ref 98–107)
CO2 SERPL-SCNC: 25 MMOL/L (ref 22–29)
CREAT SERPL-MCNC: 1.9 MG/DL (ref 0.7–1.2)
GFR, ESTIMATED: 33 ML/MIN/1.73M2
GLUCOSE SERPL-MCNC: 98 MG/DL (ref 74–99)
POTASSIUM SERPL-SCNC: 4.5 MMOL/L (ref 3.5–5)
SODIUM SERPL-SCNC: 135 MMOL/L (ref 132–146)

## 2025-02-24 PROCEDURE — 36415 COLL VENOUS BLD VENIPUNCTURE: CPT

## 2025-02-24 PROCEDURE — 80048 BASIC METABOLIC PNL TOTAL CA: CPT

## 2025-02-24 PROCEDURE — 99214 OFFICE O/P EST MOD 30 MIN: CPT

## 2025-02-24 PROCEDURE — 83880 ASSAY OF NATRIURETIC PEPTIDE: CPT

## 2025-02-24 NOTE — PROGRESS NOTES
aware of when to take action   [x] Daily weights  [] Scale provided   [x] Low sodium diet (2000 mg)  Barriers to compliance  [] Refuses to monitor diet  [] Socioeconomic difficulties  [] Unable to cook for self (use of frozen meals, can goods, etc)  [] CHF CHW consulted  [] Low sodium meal delivery options given to patient  [] Dietician consulted   [] Low sodium recipes provided  [] Sodium free seasoning provided   [x] Fluid intake 6-8 cups (around 64 oz)  [x] Reviewed currently prescribed medications with patient, educated on importance of compliance and answered any questions regarding their medication  [] Pill box provided to patient  [] Patient using pill packing pharmacy   [] CPAP/BiPAP use  [] Low impact exercise / cardiac rehab   [] LifeVest use  [x] Patient aware of signs and symptoms of worsening HF, CHF clinic phone number provided and made aware to call clinic for sooner if evaluation if needed     [] Difficulty affording medications  [] CHF CHW consulted  [] Prescription assistance information given   [] [facility] medication assistance program information given   [] Sample medications provided to patient to help bridge gap until affordability N/A          Scheduled to follow up in CHF clinic on:   Future Appointments   Date Time Provider Department Center   2/27/2025  2:15 PM Rosaura Lyon DO [facility]   2/28/2025  9:30 AM Romaine Arnold MD TRAIL [facility] DEP   3/11/2025  8:00 AM [facility] ROOM 2 [facility]   6/26/2025 10:40 AM Mery Perdomo, APRN - CNS [facility]

## 2025-02-24 NOTE — TELEPHONE ENCOUNTER
3:00 PM spoke with Salma at [facility] regarding BMP lab needing drawn in 1 week and orders needing faxed to CHF clinic per Yulisa GRANGER    Labs and CHF clinic note reviewed  Spoke with Damairs DELAROSA  Follow up BMP in 1 week   Follow up in CHF clinic in 2 weeks    Patient has follow up in clinic March 11, 25 .    Orders faxed over to [place] for labs in 1 week.

## 2025-02-24 NOTE — RESULT ENCOUNTER NOTE
Labs and CHF clinic note reviewed  Spoke with Damaris DELAROSA  Follow up BMP in 1 week   Follow up in CHF clinic in 2 weeks

## 2025-02-27 ENCOUNTER — OFFICE VISIT (OUTPATIENT)
Dept: CARDIOLOGY CLINIC | Age: 89
End: 2025-02-27
Payer: MEDICARE

## 2025-02-27 VITALS
SYSTOLIC BLOOD PRESSURE: 98 MMHG | WEIGHT: 159 LBS | HEIGHT: 67 IN | HEART RATE: 57 BPM | DIASTOLIC BLOOD PRESSURE: 58 MMHG | OXYGEN SATURATION: 99 % | RESPIRATION RATE: 18 BRPM | BODY MASS INDEX: 24.96 KG/M2 | TEMPERATURE: 97.3 F

## 2025-02-27 DIAGNOSIS — I25.810 CORONARY ARTERY DISEASE INVOLVING CORONARY BYPASS GRAFT OF NATIVE HEART WITHOUT ANGINA PECTORIS: Primary | ICD-10-CM

## 2025-02-27 PROCEDURE — 99214 OFFICE O/P EST MOD 30 MIN: CPT | Performed by: INTERNAL MEDICINE

## 2025-02-27 PROCEDURE — 1159F MED LIST DOCD IN RCRD: CPT | Performed by: INTERNAL MEDICINE

## 2025-02-27 PROCEDURE — 93000 ELECTROCARDIOGRAM COMPLETE: CPT | Performed by: INTERNAL MEDICINE

## 2025-02-27 PROCEDURE — 1123F ACP DISCUSS/DSCN MKR DOCD: CPT | Performed by: INTERNAL MEDICINE

## 2025-02-27 RX ORDER — SACUBITRIL AND VALSARTAN 49; 51 MG/1; MG/1
1 TABLET, FILM COATED ORAL 2 TIMES DAILY
Qty: 60 TABLET | Refills: 3 | Status: SHIPPED | OUTPATIENT
Start: 2025-02-27

## 2025-02-27 NOTE — PROGRESS NOTES
Highest education level: Not on file   Occupational History    Occupation: retired-    Tobacco Use    Smoking status: Former     Current packs/day: 0.00     Average packs/day: 1 pack/day for 10.3 years (10.3 ttl pk-yrs)     Types: Cigarettes     Start date: 1978     Quit date: 1988     Years since quittin.4    Smokeless tobacco: Never   Vaping Use    Vaping status: Never Used    Passive vaping exposure: Yes   Substance and Sexual Activity    Alcohol use: Not Currently     Comment: socially    Drug use: No    Sexual activity: Not Currently   Other Topics Concern    Not on file   Social History Narrative    Not on file     Social Determinants of Health     Financial Resource Strain: Low Risk  (2023)    Overall Financial Resource Strain (CARDIA)     Difficulty of Paying Living Expenses: Not hard at all   Food Insecurity: No Food Insecurity (2025)    Hunger Vital Sign     Worried About Running Out of Food in the Last Year: Never true     Ran Out of Food in the Last Year: Never true   Transportation Needs: No Transportation Needs (2025)    PRAPARE - Transportation     Lack of Transportation (Medical): No     Lack of Transportation (Non-Medical): No   Physical Activity: Insufficiently Active (2025)    Exercise Vital Sign     Days of Exercise per Week: 4 days     Minutes of Exercise per Session: 30 min   Stress: Not on file   Social Connections: Not on file   Intimate Partner Violence: Not on file   Housing Stability: Low Risk  (2025)    Housing Stability Vital Sign     Unable to Pay for Housing in the Last Year: No     Number of Times Moved in the Last Year: 1     Homeless in the Last Year: No       Family History   Problem Relation Age of Onset    Other Mother         accident age 32 years , Tashi ATWOOD was 10 months old    Other Father         accident age 42 decesed 10 years when Father passed    Heart Disease Brother     Alzheimer's Disease Sister     Heart

## 2025-02-28 ENCOUNTER — OFFICE VISIT (OUTPATIENT)
Dept: PRIMARY CARE CLINIC | Age: 89
End: 2025-02-28

## 2025-02-28 VITALS
BODY MASS INDEX: 25.2 KG/M2 | TEMPERATURE: 98.2 F | SYSTOLIC BLOOD PRESSURE: 112 MMHG | OXYGEN SATURATION: 93 % | RESPIRATION RATE: 18 BRPM | HEART RATE: 61 BPM | DIASTOLIC BLOOD PRESSURE: 58 MMHG | WEIGHT: 156.8 LBS | HEIGHT: 66 IN

## 2025-02-28 DIAGNOSIS — D64.9 ANEMIA, UNSPECIFIED TYPE: ICD-10-CM

## 2025-02-28 DIAGNOSIS — I25.10 CORONARY ARTERY DISEASE INVOLVING NATIVE CORONARY ARTERY OF NATIVE HEART WITHOUT ANGINA PECTORIS: ICD-10-CM

## 2025-02-28 DIAGNOSIS — I48.91 NEW ONSET ATRIAL FIBRILLATION (HCC): Primary | ICD-10-CM

## 2025-02-28 DIAGNOSIS — Z09 HOSPITAL DISCHARGE FOLLOW-UP: ICD-10-CM

## 2025-02-28 DIAGNOSIS — I50.23 ACUTE ON CHRONIC SYSTOLIC CONGESTIVE HEART FAILURE (HCC): ICD-10-CM

## 2025-02-28 DIAGNOSIS — I10 ESSENTIAL HYPERTENSION: ICD-10-CM

## 2025-02-28 DIAGNOSIS — C34.32 MALIGNANT NEOPLASM OF LOWER LOBE OF LEFT LUNG (HCC): ICD-10-CM

## 2025-02-28 DIAGNOSIS — M62.81 MUSCLE WEAKNESS (GENERALIZED): ICD-10-CM
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
## 2025-02-28 PROBLEM — R07.9 CHEST PAIN: Status: RESOLVED | Noted: 2020-10-02 | Resolved: 2025-02-28

## 2025-02-28 PROBLEM — I50.43 CHF (CONGESTIVE HEART FAILURE), NYHA CLASS I, ACUTE ON CHRONIC, COMBINED (HCC): Status: RESOLVED | Noted: 2024-11-13 | Resolved: 2025-02-28

## 2025-02-28 PROBLEM — I50.9 ACUTE ON CHRONIC CONGESTIVE HEART FAILURE (HCC): Status: RESOLVED | Noted: 2024-11-14 | Resolved: 2025-02-28

## 2025-02-28 NOTE — PROGRESS NOTES
Post-Discharge Transitional Care  Follow Up    Tashi Parry   YOB: 1931    Date of Office Visit:  2/28/2025  Date of Hospital Admission: 2/12/25  Date of Hospital Discharge: 2/16/25  Risk of hospital readmission (high >=14%. Medium >=10%) :Readmission Risk Score: 22.1    Care management risk score Rising risk (score 2-5) and Complex Care (Scores >=6): No Risk Score On File     Non face to face  following discharge, date last encounter closed (first attempt may have been earlier): 02/18/2025    Call initiated 2 business days of discharge: Yes    ASSESSMENT/PLAN:   New onset atrial fibrillation (HCC)  Comments:  Stable in NSR after DCCV. Continues on Eliquis for stroke prevention.  Acute on chronic systolic congestive heart failure (HCC)  Comments:  Resolved.  Continue on Entresto, bumetanide and carvedilol. Follows with cardiology and CHF clinic.  Coronary artery disease involving native coronary artery of native heart without angina pectoris  Comments:  Stable. Follows with cardiology.  Essential hypertension  Comments:  Well-controlled. Continue on hydralazine and carvedilol.  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 Follows with pulmonology.  Muscle weakness (generalized)  Comments:  Improving.  Continue PT and HEP.  Anemia, unspecified type  Comments:  Stable and asymptomatic. Continue to monitor CBC.  Hospital discharge follow-up  -     MO DISCHARGE MEDS RECONCILED W/ CURRENT OUTPATIENT MED LIST    Medical Decision Making: high complexity  Return in about 7 weeks (around 4/18/2025).      On this date 2/28/2025 I have spent 50 minutes reviewing previous notes, test results and face to face with the patient discussing the diagnosis and importance of compliance with the treatment plan as well as documenting on the day of the visit.     Subjective:   HPI:  Follow up of Hospital problems/diagnosis(es): Patient admitted on 2/12/25 with new onset a

## 2025-03-11 ENCOUNTER — TELEPHONE (OUTPATIENT)
Dept: PRIMARY CARE CLINIC | Age: 89
End: 2025-03-11

## 2025-03-11 NOTE — TELEPHONE ENCOUNTER
I do not recommend that he go to the ER based on review of his recent labs.  If he starts becoming symptomatic, feels very fatigued, has shortness of breath or becomes lightheaded, then I recommend that he go to the ER for evaluation.

## 2025-03-11 NOTE — TELEPHONE ENCOUNTER
Pts daughter calling, stating [facility] but was called today and advised to go to ER for evaluation because his HGB was 8 something which was lower than last visit and his kidney functions were declining. We tried to have lab results faxed to office today, but [facility] requires [facility]. We will have to have it faxed to patient at assisted living to be signed, then [facility]. Daughter also states her father is feeling fine at this time, does not want to go to ER.     ADVISE

## 2025-03-13 ENCOUNTER — APPOINTMENT (OUTPATIENT)
Dept: GENERAL RADIOLOGY | Age: 89
DRG: 280 | End: 2025-03-13
Payer: MEDICARE

## 2025-03-13 ENCOUNTER — HOSPITAL ENCOUNTER (OUTPATIENT)
Dept: OTHER | Age: 89
Setting detail: THERAPIES SERIES
Discharge: HOME | End: 2025-03-13

## 2025-03-13 ENCOUNTER — HOSPITAL ENCOUNTER (INPATIENT)
Age: 89
LOS: 5 days | Discharge: NURSING, REHAB OR LONG-TERM CARE | DRG: 280 | End: 2025-03-18
Attending: STUDENT IN AN ORGANIZED HEALTH CARE EDUCATION/TRAINING PROGRAM | Admitting: INTERNAL MEDICINE
Payer: MEDICARE

## 2025-03-13 ENCOUNTER — APPOINTMENT (OUTPATIENT)
Age: 89
DRG: 280 | End: 2025-03-13
Payer: MEDICARE

## 2025-03-13 DIAGNOSIS — R07.9 CHEST PAIN, UNSPECIFIED TYPE: Primary | ICD-10-CM

## 2025-03-13 DIAGNOSIS — R06.02 SHORTNESS OF BREATH: ICD-10-CM

## 2025-03-13 DIAGNOSIS — I21.4 NSTEMI (NON-ST ELEVATED MYOCARDIAL INFARCTION) (HCC): ICD-10-CM

## 2025-03-13 LAB
ALBUMIN SERPL-MCNC: 4.1 G/DL (ref 3.5–5.2)
ALP SERPL-CCNC: 64 U/L (ref 40–129)
ALT SERPL-CCNC: 7 U/L (ref 0–40)
ANION GAP SERPL CALCULATED.3IONS-SCNC: 15 MMOL/L (ref 7–16)
AST SERPL-CCNC: 15 U/L (ref 0–39)
B.E.: -4.8 MMOL/L (ref -3–3)
BASOPHILS # BLD: 0.08 K/UL (ref 0–0.2)
BASOPHILS NFR BLD: 1 % (ref 0–2)
BILIRUB SERPL-MCNC: 0.5 MG/DL (ref 0–1.2)
BNP SERPL-MCNC: 5296 PG/ML (ref 0–450)
BUN SERPL-MCNC: 42 MG/DL (ref 6–23)
CALCIUM SERPL-MCNC: 9 MG/DL (ref 8.6–10.2)
CHLORIDE SERPL-SCNC: 96 MMOL/L (ref 98–107)
CO2 SERPL-SCNC: 22 MMOL/L (ref 22–29)
COHB: 1 % (ref 0–1.5)
CREAT SERPL-MCNC: 2 MG/DL (ref 0.7–1.2)
CRITICAL: ABNORMAL
CRP SERPL HS-MCNC: 5 MG/L (ref 0–5)
DATE ANALYZED: ABNORMAL
DATE OF COLLECTION: ABNORMAL
ECHO EST RA PRESSURE: 15 MMHG
ECHO LA DIAMETER: 5.1 CM
ECHO LA VOL A-L A2C: 90 ML (ref 18–58)
ECHO LA VOL A-L A4C: 99 ML (ref 18–58)
ECHO LA VOL MOD A2C: 83 ML (ref 18–58)
ECHO LA VOL MOD A4C: 94 ML (ref 18–58)
ECHO LA VOLUME AREA LENGTH: 98 ML
ECHO LV E' LATERAL VELOCITY: 6 CM/S
ECHO LV E' SEPTAL VELOCITY: 3 CM/S
ECHO LV EDV A2C: 134 ML
ECHO LV EDV A4C: 129 ML
ECHO LV EDV BP: 134 ML (ref 67–155)
ECHO LV EF PHYSICIAN: 25 %
ECHO LV EJECTION FRACTION A2C: 36 %
ECHO LV EJECTION FRACTION A4C: 37 %
ECHO LV EJECTION FRACTION BIPLANE: 37 % (ref 55–100)
ECHO LV ESV A2C: 86 ML
ECHO LV ESV A4C: 82 ML
ECHO LV ESV BP: 84 ML (ref 22–58)
ECHO LV FRACTIONAL SHORTENING: 19 % (ref 28–44)
ECHO LV INTERNAL DIMENSION DIASTOLIC: 5.8 CM (ref 4.2–5.9)
ECHO LV INTERNAL DIMENSION SYSTOLIC: 4.7 CM
ECHO LV IVSD: 1.4 CM (ref 0.6–1)
ECHO LV IVSS: 1.6 CM
ECHO LV MASS 2D: 349.2 G (ref 88–224)
ECHO LV POSTERIOR WALL DIASTOLIC: 1.3 CM (ref 0.6–1)
ECHO LV POSTERIOR WALL SYSTOLIC: 1.5 CM
ECHO LV RELATIVE WALL THICKNESS RATIO: 0.45
ECHO MV "A" WAVE DURATION: 156.9 MSEC
ECHO MV A VELOCITY: 1.16 M/S
ECHO MV E DECELERATION TIME (DT): 197.6 MS
ECHO MV E VELOCITY: 1.22 M/S
ECHO MV E/A RATIO: 1.05
ECHO MV E/E' LATERAL: 20.33
ECHO MV E/E' RATIO (AVERAGED): 30.5
ECHO MV E/E' SEPTAL: 40.67
ECHO PULMONARY ARTERY SYSTOLIC PRESSURE (PASP): 67 MMHG
ECHO PVEIN PEAK D VELOCITY: 0.4 M/S
ECHO PVEIN PEAK S VELOCITY: 0.4 M/S
ECHO PVEIN S/D RATIO: 1
ECHO RIGHT VENTRICULAR SYSTOLIC PRESSURE (RVSP): 67 MMHG
ECHO RV BASAL DIMENSION: 4.8 CM
ECHO RV INTERNAL DIMENSION: 3.9 CM
ECHO RV TAPSE: 1.6 CM (ref 1.7–?)
ECHO TV REGURGITANT MAX VELOCITY: 3.6 M/S
ECHO TV REGURGITANT PEAK GRADIENT: 52 MMHG
EOSINOPHIL # BLD: 0.46 K/UL (ref 0.05–0.5)
EOSINOPHILS RELATIVE PERCENT: 6 % (ref 0–6)
ERYTHROCYTE [DISTWIDTH] IN BLOOD BY AUTOMATED COUNT: 16.8 % (ref 11.5–15)
ERYTHROCYTE [SEDIMENTATION RATE] IN BLOOD BY WESTERGREN METHOD: 34 MM/HR (ref 0–15)
FERRITIN SERPL-MCNC: 100 NG/ML
GFR, ESTIMATED: 31 ML/MIN/1.73M2
GLUCOSE SERPL-MCNC: 133 MG/DL (ref 74–99)
HCO3: 18.3 MMOL/L (ref 22–26)
HCT VFR BLD AUTO: 25 % (ref 37–54)
HGB BLD-MCNC: 8 G/DL (ref 12.5–16.5)
HHB: 4.6 % (ref 0–5)
IMM GRANULOCYTES # BLD AUTO: <0.03 K/UL (ref 0–0.58)
IMM GRANULOCYTES NFR BLD: 0 % (ref 0–5)
INR PPP: 1.4
LAB: ABNORMAL
LYMPHOCYTES NFR BLD: 1.06 K/UL (ref 1.5–4)
LYMPHOCYTES RELATIVE PERCENT: 15 % (ref 20–42)
Lab: 822
MCH RBC QN AUTO: 27.7 PG (ref 26–35)
MCHC RBC AUTO-ENTMCNC: 32 G/DL (ref 32–34.5)
MCV RBC AUTO: 86.5 FL (ref 80–99.9)
METHB: 0.3 % (ref 0–1.5)
MODE: ABNORMAL
MONOCYTES NFR BLD: 0.9 K/UL (ref 0.1–0.95)
MONOCYTES NFR BLD: 12 % (ref 2–12)
NEUTROPHILS NFR BLD: 65 % (ref 43–80)
NEUTS SEG NFR BLD: 4.71 K/UL (ref 1.8–7.3)
O2 CONTENT: 10.7 ML/DL
O2 SATURATION: 95.3 % (ref 92–98.5)
O2HB: 94.1 % (ref 94–97)
OPERATOR ID: ABNORMAL
PARTIAL THROMBOPLASTIN TIME: 130.5 SEC (ref 24.5–35.1)
PARTIAL THROMBOPLASTIN TIME: 31.9 SEC (ref 24.5–35.1)
PATIENT TEMP: 37 C
PCO2: 26.5 MMHG (ref 35–45)
PH BLOOD GAS: 7.46 (ref 7.35–7.45)
PLATELET # BLD AUTO: 235 K/UL (ref 130–450)
PMV BLD AUTO: 8.8 FL (ref 7–12)
PO2: 78.4 MMHG (ref 75–100)
POTASSIUM SERPL-SCNC: 4.3 MMOL/L (ref 3.5–5)
PROT SERPL-MCNC: 7.2 G/DL (ref 6.4–8.3)
PROTHROMBIN TIME: 14.2 SEC (ref 9.3–12.4)
RBC # BLD AUTO: 2.89 M/UL (ref 3.8–5.8)
SODIUM SERPL-SCNC: 133 MMOL/L (ref 132–146)
SOURCE, BLOOD GAS: ABNORMAL
THB: 8 G/DL (ref 11.5–16.5)
TIME ANALYZED: 840
TROPONIN I SERPL HS-MCNC: 133 NG/L (ref 0–11)
TROPONIN I SERPL HS-MCNC: 138 NG/L (ref 0–11)
TROPONIN I SERPL HS-MCNC: 351 NG/L (ref 0–11)
TROPONIN I SERPL HS-MCNC: 740 NG/L (ref 0–11)
WBC OTHER # BLD: 7.2 K/UL (ref 4.5–11.5)

## 2025-03-13 PROCEDURE — C8924 2D TTE W OR W/O FOL W/CON,FU: HCPCS

## 2025-03-13 PROCEDURE — 93308 TTE F-UP OR LMTD: CPT | Performed by: INTERNAL MEDICINE

## 2025-03-13 PROCEDURE — 99223 1ST HOSP IP/OBS HIGH 75: CPT | Performed by: INTERNAL MEDICINE

## 2025-03-13 PROCEDURE — 6370000000 HC RX 637 (ALT 250 FOR IP): Performed by: NURSE PRACTITIONER

## 2025-03-13 PROCEDURE — 6360000004 HC RX CONTRAST MEDICATION

## 2025-03-13 PROCEDURE — 96375 TX/PRO/DX INJ NEW DRUG ADDON: CPT

## 2025-03-13 PROCEDURE — 85730 THROMBOPLASTIN TIME PARTIAL: CPT

## 2025-03-13 PROCEDURE — 80053 COMPREHEN METABOLIC PANEL: CPT

## 2025-03-13 PROCEDURE — 93005 ELECTROCARDIOGRAM TRACING: CPT | Performed by: STUDENT IN AN ORGANIZED HEALTH CARE EDUCATION/TRAINING PROGRAM

## 2025-03-13 PROCEDURE — 71045 X-RAY EXAM CHEST 1 VIEW: CPT

## 2025-03-13 PROCEDURE — 84484 ASSAY OF TROPONIN QUANT: CPT

## 2025-03-13 PROCEDURE — 99285 EMERGENCY DEPT VISIT HI MDM: CPT

## 2025-03-13 PROCEDURE — 6370000000 HC RX 637 (ALT 250 FOR IP): Performed by: STUDENT IN AN ORGANIZED HEALTH CARE EDUCATION/TRAINING PROGRAM

## 2025-03-13 PROCEDURE — 2500000003 HC RX 250 WO HCPCS: Performed by: NURSE PRACTITIONER

## 2025-03-13 PROCEDURE — 96374 THER/PROPH/DIAG INJ IV PUSH: CPT

## 2025-03-13 PROCEDURE — 85652 RBC SED RATE AUTOMATED: CPT

## 2025-03-13 PROCEDURE — 82805 BLOOD GASES W/O2 SATURATION: CPT

## 2025-03-13 PROCEDURE — 2060000000 HC ICU INTERMEDIATE R&B

## 2025-03-13 PROCEDURE — 2700000000 HC OXYGEN THERAPY PER DAY

## 2025-03-13 PROCEDURE — 85610 PROTHROMBIN TIME: CPT

## 2025-03-13 PROCEDURE — 93325 DOPPLER ECHO COLOR FLOW MAPG: CPT | Performed by: INTERNAL MEDICINE

## 2025-03-13 PROCEDURE — 86140 C-REACTIVE PROTEIN: CPT

## 2025-03-13 PROCEDURE — 6360000002 HC RX W HCPCS: Performed by: STUDENT IN AN ORGANIZED HEALTH CARE EDUCATION/TRAINING PROGRAM

## 2025-03-13 PROCEDURE — 93321 DOPPLER ECHO F-UP/LMTD STD: CPT | Performed by: INTERNAL MEDICINE

## 2025-03-13 PROCEDURE — 82728 ASSAY OF FERRITIN: CPT

## 2025-03-13 PROCEDURE — 6360000002 HC RX W HCPCS

## 2025-03-13 PROCEDURE — APPSS60 APP SPLIT SHARED TIME 46-60 MINUTES

## 2025-03-13 PROCEDURE — 83880 ASSAY OF NATRIURETIC PEPTIDE: CPT

## 2025-03-13 PROCEDURE — 85025 COMPLETE CBC W/AUTO DIFF WBC: CPT

## 2025-03-13 PROCEDURE — 6360000002 HC RX W HCPCS: Performed by: NURSE PRACTITIONER

## 2025-03-13 RX ORDER — MORPHINE SULFATE 4 MG/ML
4 INJECTION, SOLUTION INTRAMUSCULAR; INTRAVENOUS ONCE
Refills: 0 | Status: COMPLETED | OUTPATIENT
Start: 2025-03-13 | End: 2025-03-13

## 2025-03-13 RX ORDER — ISOSORBIDE DINITRATE 10 MG/1
5 TABLET ORAL 3 TIMES DAILY
Status: DISCONTINUED | OUTPATIENT
Start: 2025-03-13 | End: 2025-03-14

## 2025-03-13 RX ORDER — SODIUM CHLORIDE 0.9 % (FLUSH) 0.9 %
10 SYRINGE (ML) INJECTION PRN
Status: DISCONTINUED | OUTPATIENT
Start: 2025-03-13 | End: 2025-03-18 | Stop reason: HOSPADM

## 2025-03-13 RX ORDER — BUMETANIDE 1 MG/1
2 TABLET ORAL DAILY
Status: DISCONTINUED | OUTPATIENT
Start: 2025-03-13 | End: 2025-03-14

## 2025-03-13 RX ORDER — ONDANSETRON 2 MG/ML
4 INJECTION INTRAMUSCULAR; INTRAVENOUS EVERY 6 HOURS PRN
Status: DISCONTINUED | OUTPATIENT
Start: 2025-03-13 | End: 2025-03-18 | Stop reason: HOSPADM

## 2025-03-13 RX ORDER — HEPARIN SODIUM 1000 [USP'U]/ML
4000 INJECTION, SOLUTION INTRAVENOUS; SUBCUTANEOUS ONCE
Status: COMPLETED | OUTPATIENT
Start: 2025-03-13 | End: 2025-03-13

## 2025-03-13 RX ORDER — ISOSORBIDE DINITRATE 10 MG/1
5 TABLET ORAL 3 TIMES DAILY
Status: DISCONTINUED | OUTPATIENT
Start: 2025-03-13 | End: 2025-03-13

## 2025-03-13 RX ORDER — CARVEDILOL 3.12 MG/1
3.12 TABLET ORAL 2 TIMES DAILY WITH MEALS
Status: DISCONTINUED | OUTPATIENT
Start: 2025-03-13 | End: 2025-03-13

## 2025-03-13 RX ORDER — HEPARIN SODIUM 10000 [USP'U]/100ML
5-30 INJECTION, SOLUTION INTRAVENOUS CONTINUOUS
Status: DISCONTINUED | OUTPATIENT
Start: 2025-03-13 | End: 2025-03-16

## 2025-03-13 RX ORDER — FENTANYL CITRATE 50 UG/ML
25 INJECTION, SOLUTION INTRAMUSCULAR; INTRAVENOUS ONCE
Refills: 0 | Status: COMPLETED | OUTPATIENT
Start: 2025-03-13 | End: 2025-03-13

## 2025-03-13 RX ORDER — ACETAMINOPHEN 325 MG/1
650 TABLET ORAL EVERY 6 HOURS PRN
Status: DISCONTINUED | OUTPATIENT
Start: 2025-03-13 | End: 2025-03-18 | Stop reason: HOSPADM

## 2025-03-13 RX ORDER — HYDRALAZINE HYDROCHLORIDE 10 MG/1
10 TABLET, FILM COATED ORAL EVERY 8 HOURS SCHEDULED
Status: DISCONTINUED | OUTPATIENT
Start: 2025-03-13 | End: 2025-03-13

## 2025-03-13 RX ORDER — HYDRALAZINE HYDROCHLORIDE 10 MG/1
10 TABLET, FILM COATED ORAL 3 TIMES DAILY
Status: DISCONTINUED | OUTPATIENT
Start: 2025-03-13 | End: 2025-03-14

## 2025-03-13 RX ORDER — CARVEDILOL 6.25 MG/1
3.12 TABLET ORAL 2 TIMES DAILY WITH MEALS
Status: DISCONTINUED | OUTPATIENT
Start: 2025-03-13 | End: 2025-03-13

## 2025-03-13 RX ORDER — CARVEDILOL 6.25 MG/1
3.12 TABLET ORAL 2 TIMES DAILY WITH MEALS
Status: DISCONTINUED | OUTPATIENT
Start: 2025-03-13 | End: 2025-03-18 | Stop reason: HOSPADM

## 2025-03-13 RX ORDER — HEPARIN SODIUM 1000 [USP'U]/ML
4000 INJECTION, SOLUTION INTRAVENOUS; SUBCUTANEOUS PRN
Status: DISCONTINUED | OUTPATIENT
Start: 2025-03-13 | End: 2025-03-16

## 2025-03-13 RX ORDER — ACETAMINOPHEN 650 MG/1
650 SUPPOSITORY RECTAL EVERY 6 HOURS PRN
Status: DISCONTINUED | OUTPATIENT
Start: 2025-03-13 | End: 2025-03-18 | Stop reason: HOSPADM

## 2025-03-13 RX ORDER — DOCUSATE SODIUM 100 MG/1
200 CAPSULE, LIQUID FILLED ORAL DAILY
Status: DISCONTINUED | OUTPATIENT
Start: 2025-03-13 | End: 2025-03-18 | Stop reason: HOSPADM

## 2025-03-13 RX ORDER — HEPARIN SODIUM 1000 [USP'U]/ML
2000 INJECTION, SOLUTION INTRAVENOUS; SUBCUTANEOUS PRN
Status: DISCONTINUED | OUTPATIENT
Start: 2025-03-13 | End: 2025-03-16

## 2025-03-13 RX ORDER — ATORVASTATIN CALCIUM 40 MG/1
80 TABLET, FILM COATED ORAL NIGHTLY
Status: DISCONTINUED | OUTPATIENT
Start: 2025-03-13 | End: 2025-03-18 | Stop reason: HOSPADM

## 2025-03-13 RX ORDER — ONDANSETRON 4 MG/1
4 TABLET, ORALLY DISINTEGRATING ORAL EVERY 8 HOURS PRN
Status: DISCONTINUED | OUTPATIENT
Start: 2025-03-13 | End: 2025-03-18 | Stop reason: HOSPADM

## 2025-03-13 RX ORDER — POLYETHYLENE GLYCOL 3350 17 G/17G
17 POWDER, FOR SOLUTION ORAL DAILY PRN
Status: DISCONTINUED | OUTPATIENT
Start: 2025-03-13 | End: 2025-03-18 | Stop reason: HOSPADM

## 2025-03-13 RX ORDER — ASPIRIN 81 MG/1
81 TABLET ORAL DAILY
Status: DISCONTINUED | OUTPATIENT
Start: 2025-03-14 | End: 2025-03-18 | Stop reason: HOSPADM

## 2025-03-13 RX ORDER — ASPIRIN 81 MG/1
324 TABLET, CHEWABLE ORAL ONCE
Status: COMPLETED | OUTPATIENT
Start: 2025-03-13 | End: 2025-03-13

## 2025-03-13 RX ORDER — SODIUM CHLORIDE 9 MG/ML
INJECTION, SOLUTION INTRAVENOUS PRN
Status: DISCONTINUED | OUTPATIENT
Start: 2025-03-13 | End: 2025-03-18 | Stop reason: HOSPADM

## 2025-03-13 RX ORDER — SODIUM CHLORIDE 0.9 % (FLUSH) 0.9 %
5-40 SYRINGE (ML) INJECTION EVERY 12 HOURS SCHEDULED
Status: DISCONTINUED | OUTPATIENT
Start: 2025-03-13 | End: 2025-03-18 | Stop reason: HOSPADM

## 2025-03-13 RX ORDER — NITROGLYCERIN 0.4 MG/1
0.4 TABLET SUBLINGUAL ONCE
Status: COMPLETED | OUTPATIENT
Start: 2025-03-13 | End: 2025-03-13

## 2025-03-13 RX ORDER — ASPIRIN 81 MG/1
81 TABLET, CHEWABLE ORAL DAILY
Status: DISCONTINUED | OUTPATIENT
Start: 2025-03-14 | End: 2025-03-13

## 2025-03-13 RX ADMIN — SACUBITRIL AND VALSARTAN 1 TABLET: 49; 51 TABLET, FILM COATED ORAL at 20:41

## 2025-03-13 RX ADMIN — FENTANYL CITRATE 25 MCG: 50 INJECTION INTRAMUSCULAR; INTRAVENOUS at 08:48

## 2025-03-13 RX ADMIN — SODIUM CHLORIDE, PRESERVATIVE FREE 10 ML: 5 INJECTION INTRAVENOUS at 20:39

## 2025-03-13 RX ADMIN — HEPARIN SODIUM 4000 UNITS: 1000 INJECTION INTRAVENOUS; SUBCUTANEOUS at 14:30

## 2025-03-13 RX ADMIN — HEPARIN SODIUM 12 UNITS/KG/HR: 10000 INJECTION, SOLUTION INTRAVENOUS at 14:32

## 2025-03-13 RX ADMIN — SULFUR HEXAFLUORIDE 2 ML: 60.7; .19; .19 INJECTION, POWDER, LYOPHILIZED, FOR SUSPENSION INTRAVENOUS; INTRAVESICAL at 14:47

## 2025-03-13 RX ADMIN — NITROGLYCERIN 0.4 MG: 0.4 TABLET, ORALLY DISINTEGRATING SUBLINGUAL at 08:43

## 2025-03-13 RX ADMIN — NITROGLYCERIN 0.4 MG: 0.4 TABLET, ORALLY DISINTEGRATING SUBLINGUAL at 08:36

## 2025-03-13 RX ADMIN — ISOSORBIDE DINITRATE 5 MG: 10 TABLET ORAL at 20:40

## 2025-03-13 RX ADMIN — CARVEDILOL 3.12 MG: 6.25 TABLET, FILM COATED ORAL at 19:02

## 2025-03-13 RX ADMIN — ATORVASTATIN CALCIUM 80 MG: 40 TABLET, FILM COATED ORAL at 20:41

## 2025-03-13 RX ADMIN — HEPARIN SODIUM 9 UNITS/KG/HR: 10000 INJECTION, SOLUTION INTRAVENOUS at 23:08

## 2025-03-13 RX ADMIN — MORPHINE SULFATE 4 MG: 4 INJECTION, SOLUTION INTRAMUSCULAR; INTRAVENOUS at 13:04

## 2025-03-13 RX ADMIN — BUMETANIDE 2 MG: 1 TABLET ORAL at 19:02

## 2025-03-13 RX ADMIN — ASPIRIN 81 MG CHEWABLE TABLET 324 MG: 81 TABLET CHEWABLE at 08:38

## 2025-03-13 RX ADMIN — DOCUSATE SODIUM 200 MG: 100 CAPSULE, LIQUID FILLED ORAL at 19:02

## 2025-03-13 RX ADMIN — HYDRALAZINE HYDROCHLORIDE 10 MG: 10 TABLET ORAL at 20:40

## 2025-03-13 ASSESSMENT — PAIN DESCRIPTION - LOCATION: LOCATION: CHEST

## 2025-03-13 ASSESSMENT — PAIN SCALES - GENERAL
PAINLEVEL_OUTOF10: 9
PAINLEVEL_OUTOF10: 8
PAINLEVEL_OUTOF10: 8

## 2025-03-13 ASSESSMENT — PAIN - FUNCTIONAL ASSESSMENT: PAIN_FUNCTIONAL_ASSESSMENT: 0-10

## 2025-03-13 NOTE — PROGRESS NOTES
Patient and daughter arrive for CHF visit.  Daughter states that patient is having active CP 7/10 and AMS.  Patient never came in for CHF visit today and this RN walked patient and daughter to the emergency department.

## 2025-03-13 NOTE — ED PROVIDER NOTES
Spoke with Janet for Dr. Patel, discussed the patient.  They will admit [BB]      ED Course User Index  [BB] Tonio Campos,         Differential diagnosis: ACS, MSK pain, pleural effusion, pneumothorax, pneumonia, pleural effusion to name a few  Review of ED/ Outpatient Records: On chart review patient was seen and evaluated on 02/15/2025, admitted for chest pain and new onset atrial fibrillation  Historians that case was discussed with: None   My EKG interpretation: See ED course  Imaging Non-plain film images such as CT, Ultrasound and MRI are read by the radiologist. Plain radiographic images are visualized and preliminarily interpreted by the ED provider:  CXR with no visible pneumothorax, haziness throughout  Discussed with other providers:  James Burkett NP for cardiology, LAURI Bowen for Dr. Patel  Tests Considered but not ordered: None  Decision making tools/risks stratification / MDM / Independent Interpretation of labs: Tashi Parry presents to the ED for evaluation of chest pain, shortness of breath.  History frompatient / EMR / Family at bedside , with  minimal  limitations. Workup in the ED revealed patient appears uncomfortable.  He is having rapid shallow breathing.  Family at bedside notes they went to pick him up to go to the heart failure clinic.  Notes that upon getting to  the patient he was found to be altered in nature.  He had taken off his nasal cannula overnight, they placed it back on.  He is more alert and oriented at this time.  He had however been complaining of chest pain and discomfort.  Recently was started on anticoagulation for atrial fibrillation.  Blood work as well as imaging was obtained CBC showed no leukocytosis, hemoglobin 8.0 with prior being 8.9, metabolic panel normal sodium, potassium, slight hypochloremia of 96, creatinine 2.0 prior being 1.9.  Initial troponin of 138, INR of 1.4.  Repeat troponin was obtained went down to 133; patient was found to have

## 2025-03-14 ENCOUNTER — APPOINTMENT (OUTPATIENT)
Dept: CT IMAGING | Age: 89
DRG: 280 | End: 2025-03-14
Payer: MEDICARE

## 2025-03-14 PROBLEM — I50.9 ACUTE DECOMPENSATED HEART FAILURE (HCC): Status: ACTIVE | Noted: 2024-10-31

## 2025-03-14 LAB
ANION GAP SERPL CALCULATED.3IONS-SCNC: 13 MMOL/L (ref 7–16)
BUN SERPL-MCNC: 41 MG/DL (ref 6–23)
CALCIUM SERPL-MCNC: 8.6 MG/DL (ref 8.6–10.2)
CHLORIDE SERPL-SCNC: 98 MMOL/L (ref 98–107)
CHOLEST SERPL-MCNC: 196 MG/DL
CO2 SERPL-SCNC: 23 MMOL/L (ref 22–29)
CREAT SERPL-MCNC: 2 MG/DL (ref 0.7–1.2)
EKG ATRIAL RATE: 79 BPM
EKG ATRIAL RATE: 82 BPM
EKG P AXIS: 33 DEGREES
EKG P AXIS: 88 DEGREES
EKG P-R INTERVAL: 186 MS
EKG P-R INTERVAL: 188 MS
EKG Q-T INTERVAL: 416 MS
EKG Q-T INTERVAL: 418 MS
EKG QRS DURATION: 138 MS
EKG QRS DURATION: 138 MS
EKG QTC CALCULATION (BAZETT): 479 MS
EKG QTC CALCULATION (BAZETT): 486 MS
EKG R AXIS: -55 DEGREES
EKG R AXIS: 145 DEGREES
EKG T AXIS: 27 DEGREES
EKG T AXIS: 96 DEGREES
EKG VENTRICULAR RATE: 79 BPM
EKG VENTRICULAR RATE: 82 BPM
ERYTHROCYTE [DISTWIDTH] IN BLOOD BY AUTOMATED COUNT: 16.8 % (ref 11.5–15)
GFR, ESTIMATED: 31 ML/MIN/1.73M2
GLUCOSE SERPL-MCNC: 98 MG/DL (ref 74–99)
HBA1C MFR BLD: 5.7 % (ref 4–5.6)
HCT VFR BLD AUTO: 25.5 % (ref 37–54)
HDLC SERPL-MCNC: 63 MG/DL
HGB BLD-MCNC: 7.9 G/DL (ref 12.5–16.5)
LDLC SERPL CALC-MCNC: 120 MG/DL
MAGNESIUM SERPL-MCNC: 2.2 MG/DL (ref 1.6–2.6)
MCH RBC QN AUTO: 27.4 PG (ref 26–35)
MCHC RBC AUTO-ENTMCNC: 31 G/DL (ref 32–34.5)
MCV RBC AUTO: 88.5 FL (ref 80–99.9)
PARTIAL THROMBOPLASTIN TIME: 118.6 SEC (ref 24.5–35.1)
PARTIAL THROMBOPLASTIN TIME: 45.5 SEC (ref 24.5–35.1)
PARTIAL THROMBOPLASTIN TIME: 56.7 SEC (ref 24.5–35.1)
PLATELET # BLD AUTO: 217 K/UL (ref 130–450)
PMV BLD AUTO: 8.9 FL (ref 7–12)
POTASSIUM SERPL-SCNC: 4.2 MMOL/L (ref 3.5–5)
RBC # BLD AUTO: 2.88 M/UL (ref 3.8–5.8)
SODIUM SERPL-SCNC: 134 MMOL/L (ref 132–146)
TRIGL SERPL-MCNC: 64 MG/DL
TROPONIN I SERPL HS-MCNC: 801 NG/L (ref 0–11)
VLDLC SERPL CALC-MCNC: 13 MG/DL
WBC OTHER # BLD: 9.6 K/UL (ref 4.5–11.5)

## 2025-03-14 PROCEDURE — 6360000002 HC RX W HCPCS: Performed by: NURSE PRACTITIONER

## 2025-03-14 PROCEDURE — 2500000003 HC RX 250 WO HCPCS: Performed by: NURSE PRACTITIONER

## 2025-03-14 PROCEDURE — 85730 THROMBOPLASTIN TIME PARTIAL: CPT

## 2025-03-14 PROCEDURE — 80048 BASIC METABOLIC PNL TOTAL CA: CPT

## 2025-03-14 PROCEDURE — 2060000000 HC ICU INTERMEDIATE R&B

## 2025-03-14 PROCEDURE — 85027 COMPLETE CBC AUTOMATED: CPT

## 2025-03-14 PROCEDURE — 99233 SBSQ HOSP IP/OBS HIGH 50: CPT | Performed by: INTERNAL MEDICINE

## 2025-03-14 PROCEDURE — 80061 LIPID PANEL: CPT

## 2025-03-14 PROCEDURE — 83735 ASSAY OF MAGNESIUM: CPT

## 2025-03-14 PROCEDURE — 93010 ELECTROCARDIOGRAM REPORT: CPT | Performed by: INTERNAL MEDICINE

## 2025-03-14 PROCEDURE — 6370000000 HC RX 637 (ALT 250 FOR IP): Performed by: NURSE PRACTITIONER

## 2025-03-14 PROCEDURE — 84484 ASSAY OF TROPONIN QUANT: CPT

## 2025-03-14 PROCEDURE — 83036 HEMOGLOBIN GLYCOSYLATED A1C: CPT

## 2025-03-14 PROCEDURE — 6370000000 HC RX 637 (ALT 250 FOR IP): Performed by: INTERNAL MEDICINE

## 2025-03-14 PROCEDURE — 2700000000 HC OXYGEN THERAPY PER DAY

## 2025-03-14 PROCEDURE — 6360000002 HC RX W HCPCS: Performed by: INTERNAL MEDICINE

## 2025-03-14 RX ORDER — ISOSORBIDE DINITRATE 10 MG/1
10 TABLET ORAL 3 TIMES DAILY
Status: DISCONTINUED | OUTPATIENT
Start: 2025-03-14 | End: 2025-03-18 | Stop reason: HOSPADM

## 2025-03-14 RX ORDER — BUMETANIDE 0.25 MG/ML
1 INJECTION, SOLUTION INTRAMUSCULAR; INTRAVENOUS 2 TIMES DAILY
Status: DISCONTINUED | OUTPATIENT
Start: 2025-03-14 | End: 2025-03-18 | Stop reason: HOSPADM

## 2025-03-14 RX ADMIN — BUMETANIDE 1 MG: 0.25 INJECTION INTRAMUSCULAR; INTRAVENOUS at 09:29

## 2025-03-14 RX ADMIN — CARVEDILOL 3.12 MG: 6.25 TABLET, FILM COATED ORAL at 19:10

## 2025-03-14 RX ADMIN — ISOSORBIDE DINITRATE 5 MG: 10 TABLET ORAL at 09:23

## 2025-03-14 RX ADMIN — SODIUM CHLORIDE, PRESERVATIVE FREE 10 ML: 5 INJECTION INTRAVENOUS at 20:12

## 2025-03-14 RX ADMIN — ASPIRIN 81 MG: 81 TABLET, COATED ORAL at 09:24

## 2025-03-14 RX ADMIN — HEPARIN SODIUM 2000 UNITS: 1000 INJECTION INTRAVENOUS; SUBCUTANEOUS at 13:50

## 2025-03-14 RX ADMIN — ATORVASTATIN CALCIUM 80 MG: 40 TABLET, FILM COATED ORAL at 20:12

## 2025-03-14 RX ADMIN — HYDRALAZINE HYDROCHLORIDE 10 MG: 10 TABLET ORAL at 09:24

## 2025-03-14 RX ADMIN — CARVEDILOL 3.12 MG: 6.25 TABLET, FILM COATED ORAL at 09:24

## 2025-03-14 RX ADMIN — ISOSORBIDE DINITRATE 10 MG: 10 TABLET ORAL at 20:12

## 2025-03-14 RX ADMIN — ONDANSETRON 4 MG: 2 INJECTION, SOLUTION INTRAMUSCULAR; INTRAVENOUS at 09:56

## 2025-03-14 RX ADMIN — BUMETANIDE 1 MG: 0.25 INJECTION INTRAMUSCULAR; INTRAVENOUS at 19:10

## 2025-03-14 RX ADMIN — DOCUSATE SODIUM 200 MG: 100 CAPSULE, LIQUID FILLED ORAL at 09:23

## 2025-03-14 RX ADMIN — SACUBITRIL AND VALSARTAN 1 TABLET: 49; 51 TABLET, FILM COATED ORAL at 09:30

## 2025-03-14 NOTE — CARE COORDINATION
Social Work / Discharge Planning : Patient admitted form [facility] with  SOB. Cardiology consulted. CHF coordinator consulted. Patient uses a ww at baseline and has 02 at 2 liters. Currently at 3. Therapy is ordered and await therapy input to better assist with discharge planning. SW  met with patient daughter and explained role as discharge planner/ transition of care. Goals at discharge discussed and they are leaning towards SNF. SNF chcoeis discussed and they would like [facility] if needed. Call placed and message left with Kalen from [facility] to see if they have a bed for patient. Await response. Will need pre-cert if am/pacs support SNF placement. AWait treatment plan and recommendations. SW to follow.Electronically signed by MARTHA Brantley on 3/14/25 at 9:52 AM EDT         Addendum: Kalen from [facility] updated SW that they are following patient. If SNF is plan. N 17 ,HENS and transport forms have been completed. Will need pre-cert. SW to follow. Electronically signed by MARTHA Brantley on 3/14/25 at 10:39 AM EDT

## 2025-03-14 NOTE — DISCHARGE INSTR - COC
Continuity of Care Form    Patient Name: Tashi Parry   :  1931  MRN:  [id number]    Admit date:  3/13/2025  Discharge date:  3-18-25    Code Status Order: DNR-CC   Advance Directives:     Admitting Physician:  Yamileth Patel MD  PCP: Romaine Arnold MD    Discharging Nurse: ALBERTA Deutsch RN  Discharging Hospital Unit/Room#: 0633/0633-A  Discharging Unit Phone Number: [phone]    Emergency Contact:   Extended Emergency Contact Information  Primary Emergency Contact: Mary Kate Leahy  Address: [address] [place] [address] [place]  Mobile Phone: [phone]  Relation: Child  Preferred language: English   needed? No  Secondary Emergency Contact: Art Hicks  Address: [address] [place] [address] [place]  Mobile Phone: [phone]  Relation: Step Child    Past Surgical History:  Past Surgical History:   Procedure Laterality Date    CARDIAC SURGERY      6 vessel in     DENTAL SURGERY      root canal    DIAGNOSTIC CARDIAC CATH LAB PROCEDURE  00    LUNG REMOVAL, PARTIAL Left     march    OTHER SURGICAL HISTORY  2011    Injection right hip  ADRIANNE Doan MD    PROSTATE SURGERY Bilateral 2019    THORACOSCOPY Left 2019    BRONCHOSCOPY LEFT THORACOSCOPY ROBOTIC VIDEO ASSISTED , WEDGE RESECTION, POSS. LEFT LOWER LOBECTOMY performed by Ortiz Pedroza MD at [facility]    TOTAL HIP ARTHROPLASTY Right 2011    Right BRIONNA  ADRIANNE Doan MD    TOTAL HIP ARTHROPLASTY Left 2009    Left BRIONNA  ADRIANNE Doan MD    TURP      TURP N/A 2019    CYSTOSCOPY, RETROGRADE PYELOGRAM,  URERTHRAL DILITATION, TRANSURETHRAL RESECTION PROSTATE performed by Carter Hale MD at [facility]       Immunization History:   Immunization History   Administered Date(s) Administered    COVID-19, PFIZER GRAY top, DO NOT Dilute, (age 12 y+), IM, 30 mcg/0.3 mL 2022    COVID-19, PFIZER PURPLE top, DILUTE for use, (age 12 y+), 30mcg/0.3mL 2021,

## 2025-03-14 NOTE — PLAN OF CARE
Patient's chart updated to reflect:      .    - HF care plan, HF education points and HF discharge instructions.  -Orders: 2 gram sodium diet, daily weights, I/O.  -PCP or cardiology follow up appointments to be scheduled within 7 days of hospital discharge.  -CHF education session will be provided to the patient prior to hospital discharge.    Karen Beach RN   Heart Failure Navigator

## 2025-03-14 NOTE — H&P
BILITOT 0.5 2025 08:23 AM    ALKPHOS 64 2025 08:23 AM    AST 15 2025 08:23 AM    ALT 7 2025 08:23 AM       Imagin2025 CXR:  1.  Cardiomegaly, unchanged.  Dilated central pulmonary arteries compatible  with underlying pulmonary hypertension.  Prior CABG.  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  As correlated with the  clinical history of chest pain, consider CTA chest with aortic dissection  protocol.     2.  Bilateral interstitial fibrosis together with more isolated regions of  parenchymal scarring at the left lung.  Details above.  Possible small left  pleural effusion versus pleural reaction with pleural scarring.    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  COPD with remote tobacco use  DJD  Macular Degeneration  Hyperglycemia with HA1C 5.7%  Anemia with drop in H&H, now 7.9/25.5    PLAN:  Continue IV heparin, elevated troponins in the 7-800 range-family however does not wish for aggressive intervention-agree  Continue medical management for NSTEMI  I had ordered a CT chest, discussed with son at bedside-after discussion we will cancel given that no aggressive measures are to be undertaken  Cardiology consulted  Monitor BMP  Avoid nephrotoxic agents  Monitor CBC  Hemoccult stool   Hold Eliquis  Continue Heparin IV  -May involve hospice if no further plans for intervention       Code status: DNR CC  Requires Inpatient level of care

## 2025-03-14 NOTE — ACP (ADVANCE CARE PLANNING)
Advance Care Planning   Healthcare Decision Maker:    Primary Decision Maker: Gavin Leahyi A [facility] [phone]    Click here to complete Healthcare Decision Makers including selection of the Healthcare Decision Maker Relationship (ie \"Primary\").  Today we documented Decision Maker(s) consistent with Legal Next of Kin hierarchy.

## 2025-03-15 LAB
ANION GAP SERPL CALCULATED.3IONS-SCNC: 9 MMOL/L (ref 7–16)
BUN SERPL-MCNC: 41 MG/DL (ref 6–23)
CALCIUM SERPL-MCNC: 8.6 MG/DL (ref 8.6–10.2)
CHLORIDE SERPL-SCNC: 100 MMOL/L (ref 98–107)
CO2 SERPL-SCNC: 26 MMOL/L (ref 22–29)
CREAT SERPL-MCNC: 1.9 MG/DL (ref 0.7–1.2)
ERYTHROCYTE [DISTWIDTH] IN BLOOD BY AUTOMATED COUNT: 16.7 % (ref 11.5–15)
GFR, ESTIMATED: 32 ML/MIN/1.73M2
GLUCOSE SERPL-MCNC: 113 MG/DL (ref 74–99)
HCT VFR BLD AUTO: 23.9 % (ref 37–54)
HGB BLD-MCNC: 7.3 G/DL (ref 12.5–16.5)
MCH RBC QN AUTO: 26.9 PG (ref 26–35)
MCHC RBC AUTO-ENTMCNC: 30.5 G/DL (ref 32–34.5)
MCV RBC AUTO: 88.2 FL (ref 80–99.9)
PARTIAL THROMBOPLASTIN TIME: 43.4 SEC (ref 24.5–35.1)
PARTIAL THROMBOPLASTIN TIME: 48.9 SEC (ref 24.5–35.1)
PARTIAL THROMBOPLASTIN TIME: 72.7 SEC (ref 24.5–35.1)
PLATELET # BLD AUTO: 195 K/UL (ref 130–450)
PMV BLD AUTO: 8.9 FL (ref 7–12)
POTASSIUM SERPL-SCNC: 4.2 MMOL/L (ref 3.5–5)
RBC # BLD AUTO: 2.71 M/UL (ref 3.8–5.8)
SODIUM SERPL-SCNC: 135 MMOL/L (ref 132–146)
WBC OTHER # BLD: 10.2 K/UL (ref 4.5–11.5)

## 2025-03-15 PROCEDURE — 6360000002 HC RX W HCPCS: Performed by: NURSE PRACTITIONER

## 2025-03-15 PROCEDURE — 2500000003 HC RX 250 WO HCPCS

## 2025-03-15 PROCEDURE — 6370000000 HC RX 637 (ALT 250 FOR IP): Performed by: INTERNAL MEDICINE

## 2025-03-15 PROCEDURE — 2060000000 HC ICU INTERMEDIATE R&B

## 2025-03-15 PROCEDURE — 85730 THROMBOPLASTIN TIME PARTIAL: CPT

## 2025-03-15 PROCEDURE — 85027 COMPLETE CBC AUTOMATED: CPT

## 2025-03-15 PROCEDURE — 6360000002 HC RX W HCPCS: Performed by: INTERNAL MEDICINE

## 2025-03-15 PROCEDURE — 6370000000 HC RX 637 (ALT 250 FOR IP): Performed by: NURSE PRACTITIONER

## 2025-03-15 PROCEDURE — 2700000000 HC OXYGEN THERAPY PER DAY

## 2025-03-15 PROCEDURE — 2500000003 HC RX 250 WO HCPCS: Performed by: NURSE PRACTITIONER

## 2025-03-15 PROCEDURE — 80048 BASIC METABOLIC PNL TOTAL CA: CPT

## 2025-03-15 PROCEDURE — 99233 SBSQ HOSP IP/OBS HIGH 50: CPT | Performed by: INTERNAL MEDICINE

## 2025-03-15 RX ORDER — GUAIFENESIN 200 MG/10ML
200 LIQUID ORAL EVERY 4 HOURS PRN
Status: DISCONTINUED | OUTPATIENT
Start: 2025-03-15 | End: 2025-03-16

## 2025-03-15 RX ADMIN — ASPIRIN 81 MG: 81 TABLET, COATED ORAL at 08:13

## 2025-03-15 RX ADMIN — HEPARIN SODIUM 8 UNITS/KG/HR: 10000 INJECTION, SOLUTION INTRAVENOUS at 04:33

## 2025-03-15 RX ADMIN — ISOSORBIDE DINITRATE 10 MG: 10 TABLET ORAL at 08:13

## 2025-03-15 RX ADMIN — HEPARIN SODIUM 2000 UNITS: 1000 INJECTION INTRAVENOUS; SUBCUTANEOUS at 05:07

## 2025-03-15 RX ADMIN — CARVEDILOL 3.12 MG: 6.25 TABLET, FILM COATED ORAL at 08:13

## 2025-03-15 RX ADMIN — BUMETANIDE 1 MG: 0.25 INJECTION INTRAMUSCULAR; INTRAVENOUS at 08:16

## 2025-03-15 RX ADMIN — HEPARIN SODIUM 2000 UNITS: 1000 INJECTION INTRAVENOUS; SUBCUTANEOUS at 20:39

## 2025-03-15 RX ADMIN — BUMETANIDE 1 MG: 0.25 INJECTION INTRAMUSCULAR; INTRAVENOUS at 17:40

## 2025-03-15 RX ADMIN — ATORVASTATIN CALCIUM 80 MG: 40 TABLET, FILM COATED ORAL at 19:27

## 2025-03-15 RX ADMIN — CARVEDILOL 3.12 MG: 6.25 TABLET, FILM COATED ORAL at 17:40

## 2025-03-15 RX ADMIN — DOCUSATE SODIUM 200 MG: 100 CAPSULE, LIQUID FILLED ORAL at 08:13

## 2025-03-15 RX ADMIN — GUAIFENESIN 200 MG: 200 SOLUTION ORAL at 15:10

## 2025-03-15 RX ADMIN — ISOSORBIDE DINITRATE 10 MG: 10 TABLET ORAL at 15:10

## 2025-03-15 RX ADMIN — ISOSORBIDE DINITRATE 10 MG: 10 TABLET ORAL at 19:27

## 2025-03-15 RX ADMIN — SODIUM CHLORIDE, PRESERVATIVE FREE 10 ML: 5 INJECTION INTRAVENOUS at 08:17

## 2025-03-15 ASSESSMENT — PAIN SCALES - GENERAL: PAINLEVEL_OUTOF10: 0

## 2025-03-15 NOTE — PLAN OF CARE
Problem: ABCDS Injury Assessment  Goal: Absence of physical injury  3/14/2025 2134 by Violette Mckeon RN  Outcome: Progressing  3/14/2025 1015 by Lena Gonsalez RN  Outcome: Progressing     Problem: Discharge Planning  Goal: Discharge to home or other facility with appropriate resources  3/14/2025 2134 by Violette Mckeon RN  Outcome: Progressing  Flowsheets (Taken 3/14/2025 2000)  Discharge to home or other facility with appropriate resources: Identify barriers to discharge with patient and caregiver  3/14/2025 1015 by Lena Gonsalez RN  Outcome: Progressing     Problem: Chronic Conditions and Co-morbidities  Goal: Patient's chronic conditions and co-morbidity symptoms are monitored and maintained or improved  3/14/2025 2134 by Violette Mckeon RN  Outcome: Progressing  Flowsheets (Taken 3/14/2025 2000)  Care Plan - Patient's Chronic Conditions and Co-Morbidity Symptoms are Monitored and Maintained or Improved: Monitor and assess patient's chronic conditions and comorbid symptoms for stability, deterioration, or improvement  3/14/2025 1015 by Lena Gonsalez RN  Outcome: Progressing     Problem: Pain  Goal: Verbalizes/displays adequate comfort level or baseline comfort level  3/14/2025 2134 by Violette Mckeon RN  Outcome: Progressing  3/14/2025 1015 by Lena Gonsalez RN  Outcome: Progressing     Problem: Safety - Adult  Goal: Free from fall injury  Outcome: Progressing     Problem: Skin/Tissue Integrity  Goal: Skin integrity remains intact  Description: 1.  Monitor for areas of redness and/or skin breakdown  2.  Assess vascular access sites hourly  3.  Every 4-6 hours minimum:  Change oxygen saturation probe site  4.  Every 4-6 hours:  If on nasal continuous positive airway pressure, respiratory therapy assess nares and determine need for appliance change or resting period  Outcome: Progressing

## 2025-03-15 NOTE — PLAN OF CARE
Problem: ABCDS Injury Assessment  Goal: Absence of physical injury  3/15/2025 0958 by Domingo Ugarte RN  Outcome: Progressing  3/14/2025 2134 by Violette Mckeon RN  Outcome: Progressing     Problem: Discharge Planning  Goal: Discharge to home or other facility with appropriate resources  3/15/2025 0958 by Domingo Ugarte RN  Outcome: Progressing  3/14/2025 2134 by Violette Mckeon RN  Outcome: Progressing  Flowsheets (Taken 3/14/2025 2000)  Discharge to home or other facility with appropriate resources: Identify barriers to discharge with patient and caregiver     Problem: Chronic Conditions and Co-morbidities  Goal: Patient's chronic conditions and co-morbidity symptoms are monitored and maintained or improved  3/15/2025 0958 by Domingo Ugarte RN  Outcome: Progressing  3/14/2025 2134 by Violette Mckeon RN  Outcome: Progressing  Flowsheets (Taken 3/14/2025 2000)  Care Plan - Patient's Chronic Conditions and Co-Morbidity Symptoms are Monitored and Maintained or Improved: Monitor and assess patient's chronic conditions and comorbid symptoms for stability, deterioration, or improvement     Problem: Pain  Goal: Verbalizes/displays adequate comfort level or baseline comfort level  3/15/2025 0958 by Domingo Ugarte RN  Outcome: Progressing  3/14/2025 2134 by Violette Mckeon RN  Outcome: Progressing     Problem: Safety - Adult  Goal: Free from fall injury  3/15/2025 0958 by Domingo Ugarte RN  Outcome: Progressing  3/14/2025 2134 by Violette Mckeon RN  Outcome: Progressing     Problem: Skin/Tissue Integrity  Goal: Skin integrity remains intact  Description: 1.  Monitor for areas of redness and/or skin breakdown  2.  Assess vascular access sites hourly  3.  Every 4-6 hours minimum:  Change oxygen saturation probe site  4.  Every 4-6 hours:  If on nasal continuous positive airway pressure, respiratory therapy assess nares and determine need for appliance change or resting period  3/15/2025 0958 by Domingo Ugarte RN  Outcome:

## 2025-03-15 NOTE — CARE COORDINATION
Social Work:    Order for hospice consult received. Social service placed call to daughter, primary contact Edilma Davalos explained that she feels NP Melania may have received incorrect information. Mary Kate states she had inquired as to whether or not they needed hospice intervention but when she met with NP Melania, Melania was under the impression they requested hospice, hence the order for the consult.  Mary Kate said she does not feel they are ready for hospice at this time.  Social service explained to Mary Kate that she can obtain an informational consult to help with future planning and Mary Kate agreed. [facility] were [facility]. Mayr Kate advised that Mr. Parry resides at [facility] however, she is not certain he can return there vs snf/ICF (?).  Social work called an informational hospice consult in today. Social work will need to follow-up to see if plan is return to [facility] vs snf.       Electronically signed by MARTHA Parker on 3/15/2025 at 2:34 PM

## 2025-03-16 LAB
ANION GAP SERPL CALCULATED.3IONS-SCNC: 11 MMOL/L (ref 7–16)
B PARAP IS1001 DNA NPH QL NAA+NON-PROBE: NOT DETECTED
B PERT DNA SPEC QL NAA+PROBE: NOT DETECTED
BUN SERPL-MCNC: 41 MG/DL (ref 6–23)
C PNEUM DNA NPH QL NAA+NON-PROBE: NOT DETECTED
CALCIUM SERPL-MCNC: 8.3 MG/DL (ref 8.6–10.2)
CHLORIDE SERPL-SCNC: 99 MMOL/L (ref 98–107)
CO2 SERPL-SCNC: 25 MMOL/L (ref 22–29)
CREAT SERPL-MCNC: 2 MG/DL (ref 0.7–1.2)
ERYTHROCYTE [DISTWIDTH] IN BLOOD BY AUTOMATED COUNT: 16.7 % (ref 11.5–15)
FLUAV RNA NPH QL NAA+NON-PROBE: NOT DETECTED
FLUBV RNA NPH QL NAA+NON-PROBE: NOT DETECTED
GFR, ESTIMATED: 31 ML/MIN/1.73M2
GLUCOSE SERPL-MCNC: 129 MG/DL (ref 74–99)
HADV DNA NPH QL NAA+NON-PROBE: NOT DETECTED
HCOV 229E RNA NPH QL NAA+NON-PROBE: NOT DETECTED
HCOV HKU1 RNA NPH QL NAA+NON-PROBE: NOT DETECTED
HCOV NL63 RNA NPH QL NAA+NON-PROBE: NOT DETECTED
HCOV OC43 RNA NPH QL NAA+NON-PROBE: NOT DETECTED
HCT VFR BLD AUTO: 22.6 % (ref 37–54)
HGB BLD-MCNC: 7 G/DL (ref 12.5–16.5)
HMPV RNA NPH QL NAA+NON-PROBE: NOT DETECTED
HPIV1 RNA NPH QL NAA+NON-PROBE: NOT DETECTED
HPIV2 RNA NPH QL NAA+NON-PROBE: NOT DETECTED
HPIV3 RNA NPH QL NAA+NON-PROBE: NOT DETECTED
HPIV4 RNA NPH QL NAA+NON-PROBE: NOT DETECTED
M PNEUMO DNA NPH QL NAA+NON-PROBE: NOT DETECTED
MCH RBC QN AUTO: 27 PG (ref 26–35)
MCHC RBC AUTO-ENTMCNC: 31 G/DL (ref 32–34.5)
MCV RBC AUTO: 87.3 FL (ref 80–99.9)
PARTIAL THROMBOPLASTIN TIME: 73 SEC (ref 24.5–35.1)
PARTIAL THROMBOPLASTIN TIME: 76.3 SEC (ref 24.5–35.1)
PLATELET # BLD AUTO: 188 K/UL (ref 130–450)
PMV BLD AUTO: 9.2 FL (ref 7–12)
POTASSIUM SERPL-SCNC: 4 MMOL/L (ref 3.5–5)
RBC # BLD AUTO: 2.59 M/UL (ref 3.8–5.8)
RSV RNA NPH QL NAA+NON-PROBE: NOT DETECTED
RV+EV RNA NPH QL NAA+NON-PROBE: NOT DETECTED
SARS-COV-2 RNA NPH QL NAA+NON-PROBE: NOT DETECTED
SODIUM SERPL-SCNC: 135 MMOL/L (ref 132–146)
SPECIMEN DESCRIPTION: NORMAL
TROPONIN I SERPL HS-MCNC: 697 NG/L (ref 0–11)
WBC OTHER # BLD: 9.8 K/UL (ref 4.5–11.5)

## 2025-03-16 PROCEDURE — 6370000000 HC RX 637 (ALT 250 FOR IP): Performed by: NURSE PRACTITIONER

## 2025-03-16 PROCEDURE — 84484 ASSAY OF TROPONIN QUANT: CPT

## 2025-03-16 PROCEDURE — 2500000003 HC RX 250 WO HCPCS: Performed by: NURSE PRACTITIONER

## 2025-03-16 PROCEDURE — 2700000000 HC OXYGEN THERAPY PER DAY

## 2025-03-16 PROCEDURE — 6360000002 HC RX W HCPCS: Performed by: NURSE PRACTITIONER

## 2025-03-16 PROCEDURE — 80048 BASIC METABOLIC PNL TOTAL CA: CPT

## 2025-03-16 PROCEDURE — 99233 SBSQ HOSP IP/OBS HIGH 50: CPT | Performed by: INTERNAL MEDICINE

## 2025-03-16 PROCEDURE — 2500000003 HC RX 250 WO HCPCS

## 2025-03-16 PROCEDURE — 85730 THROMBOPLASTIN TIME PARTIAL: CPT

## 2025-03-16 PROCEDURE — 6370000000 HC RX 637 (ALT 250 FOR IP): Performed by: INTERNAL MEDICINE

## 2025-03-16 PROCEDURE — 85027 COMPLETE CBC AUTOMATED: CPT

## 2025-03-16 PROCEDURE — 2060000000 HC ICU INTERMEDIATE R&B

## 2025-03-16 PROCEDURE — 6360000002 HC RX W HCPCS: Performed by: INTERNAL MEDICINE

## 2025-03-16 PROCEDURE — 0202U NFCT DS 22 TRGT SARS-COV-2: CPT

## 2025-03-16 RX ORDER — GUAIFENESIN 400 MG/1
400 TABLET ORAL 4 TIMES DAILY
Status: DISCONTINUED | OUTPATIENT
Start: 2025-03-16 | End: 2025-03-18 | Stop reason: HOSPADM

## 2025-03-16 RX ADMIN — CARVEDILOL 3.12 MG: 6.25 TABLET, FILM COATED ORAL at 07:35

## 2025-03-16 RX ADMIN — CARVEDILOL 3.12 MG: 6.25 TABLET, FILM COATED ORAL at 16:36

## 2025-03-16 RX ADMIN — GUAIFENESIN 400 MG: 400 TABLET ORAL at 21:20

## 2025-03-16 RX ADMIN — GUAIFENESIN 400 MG: 400 TABLET ORAL at 12:21

## 2025-03-16 RX ADMIN — SODIUM CHLORIDE, PRESERVATIVE FREE 10 ML: 5 INJECTION INTRAVENOUS at 21:21

## 2025-03-16 RX ADMIN — ISOSORBIDE DINITRATE 10 MG: 10 TABLET ORAL at 12:21

## 2025-03-16 RX ADMIN — ASPIRIN 81 MG: 81 TABLET, COATED ORAL at 07:36

## 2025-03-16 RX ADMIN — GUAIFENESIN 400 MG: 400 TABLET ORAL at 16:37

## 2025-03-16 RX ADMIN — GUAIFENESIN 200 MG: 200 SOLUTION ORAL at 07:35

## 2025-03-16 RX ADMIN — ISOSORBIDE DINITRATE 10 MG: 10 TABLET ORAL at 07:36

## 2025-03-16 RX ADMIN — SODIUM CHLORIDE, PRESERVATIVE FREE 10 ML: 5 INJECTION INTRAVENOUS at 07:39

## 2025-03-16 RX ADMIN — ISOSORBIDE DINITRATE 10 MG: 10 TABLET ORAL at 21:20

## 2025-03-16 RX ADMIN — ATORVASTATIN CALCIUM 80 MG: 40 TABLET, FILM COATED ORAL at 21:19

## 2025-03-16 RX ADMIN — HEPARIN SODIUM 12 UNITS/KG/HR: 10000 INJECTION, SOLUTION INTRAVENOUS at 14:37

## 2025-03-16 RX ADMIN — DOCUSATE SODIUM 200 MG: 100 CAPSULE, LIQUID FILLED ORAL at 07:36

## 2025-03-16 RX ADMIN — BUMETANIDE 1 MG: 0.25 INJECTION INTRAMUSCULAR; INTRAVENOUS at 07:39

## 2025-03-16 ASSESSMENT — PAIN - FUNCTIONAL ASSESSMENT: PAIN_FUNCTIONAL_ASSESSMENT: ACTIVITIES ARE NOT PREVENTED

## 2025-03-16 ASSESSMENT — PAIN SCALES - GENERAL
PAINLEVEL_OUTOF10: 0
PAINLEVEL_OUTOF10: 0

## 2025-03-16 NOTE — PLAN OF CARE
Problem: ABCDS Injury Assessment  Goal: Absence of physical injury  3/16/2025 0852 by Mikki Deutsch, RN  Outcome: Progressing  3/15/2025 2135 by Violette Mckeon RN  Outcome: Progressing

## 2025-03-16 NOTE — PLAN OF CARE
Problem: ABCDS Injury Assessment  Goal: Absence of physical injury  3/15/2025 2135 by Violette Mckeon RN  Outcome: Progressing  3/15/2025 0958 by Domingo Ugarte RN  Outcome: Progressing     Problem: Discharge Planning  Goal: Discharge to home or other facility with appropriate resources  3/15/2025 2135 by Violette Mckeon RN  Outcome: Progressing  Flowsheets (Taken 3/15/2025 1945)  Discharge to home or other facility with appropriate resources: Arrange for needed discharge resources and transportation as appropriate  3/15/2025 0958 by Domingo Ugarte RN  Outcome: Progressing     Problem: Chronic Conditions and Co-morbidities  Goal: Patient's chronic conditions and co-morbidity symptoms are monitored and maintained or improved  3/15/2025 2135 by Violette Mckeon RN  Outcome: Progressing  Flowsheets (Taken 3/15/2025 1945)  Care Plan - Patient's Chronic Conditions and Co-Morbidity Symptoms are Monitored and Maintained or Improved: Monitor and assess patient's chronic conditions and comorbid symptoms for stability, deterioration, or improvement  3/15/2025 0958 by Domingo Ugarte RN  Outcome: Progressing     Problem: Pain  Goal: Verbalizes/displays adequate comfort level or baseline comfort level  3/15/2025 2135 by Violette Mckeon RN  Outcome: Progressing  3/15/2025 0958 by oDmingo Ugarte RN  Outcome: Progressing     Problem: Safety - Adult  Goal: Free from fall injury  3/15/2025 2135 by Violette Mckeon RN  Outcome: Progressing  3/15/2025 0958 by Domingo Ugarte RN  Outcome: Progressing     Problem: Skin/Tissue Integrity  Goal: Skin integrity remains intact  Description: 1.  Monitor for areas of redness and/or skin breakdown  2.  Assess vascular access sites hourly  3.  Every 4-6 hours minimum:  Change oxygen saturation probe site  4.  Every 4-6 hours:  If on nasal continuous positive airway pressure, respiratory therapy assess nares and determine need for appliance change or resting period  3/15/2025 2135 by Violette Mckeon

## 2025-03-17 LAB
ANION GAP SERPL CALCULATED.3IONS-SCNC: 6 MMOL/L (ref 7–16)
BASOPHILS # BLD: 0.05 K/UL (ref 0–0.2)
BASOPHILS NFR BLD: 1 % (ref 0–2)
BUN SERPL-MCNC: 40 MG/DL (ref 6–23)
CALCIUM SERPL-MCNC: 8.7 MG/DL (ref 8.6–10.2)
CHLORIDE SERPL-SCNC: 100 MMOL/L (ref 98–107)
CO2 SERPL-SCNC: 27 MMOL/L (ref 22–29)
CREAT SERPL-MCNC: 1.8 MG/DL (ref 0.7–1.2)
EOSINOPHIL # BLD: 0.34 K/UL (ref 0.05–0.5)
EOSINOPHILS RELATIVE PERCENT: 4 % (ref 0–6)
ERYTHROCYTE [DISTWIDTH] IN BLOOD BY AUTOMATED COUNT: 16.8 % (ref 11.5–15)
GFR, ESTIMATED: 35 ML/MIN/1.73M2
GLUCOSE SERPL-MCNC: 127 MG/DL (ref 74–99)
HCT VFR BLD AUTO: 23.7 % (ref 37–54)
HGB BLD-MCNC: 7.3 G/DL (ref 12.5–16.5)
IMM GRANULOCYTES # BLD AUTO: 0.04 K/UL (ref 0–0.58)
IMM GRANULOCYTES NFR BLD: 1 % (ref 0–5)
LYMPHOCYTES NFR BLD: 0.74 K/UL (ref 1.5–4)
LYMPHOCYTES RELATIVE PERCENT: 9 % (ref 20–42)
MCH RBC QN AUTO: 27.2 PG (ref 26–35)
MCHC RBC AUTO-ENTMCNC: 30.8 G/DL (ref 32–34.5)
MCV RBC AUTO: 88.4 FL (ref 80–99.9)
MONOCYTES NFR BLD: 1.01 K/UL (ref 0.1–0.95)
MONOCYTES NFR BLD: 12 % (ref 2–12)
NEUTROPHILS NFR BLD: 74 % (ref 43–80)
NEUTS SEG NFR BLD: 6.16 K/UL (ref 1.8–7.3)
PLATELET # BLD AUTO: 202 K/UL (ref 130–450)
PMV BLD AUTO: 9.3 FL (ref 7–12)
POTASSIUM SERPL-SCNC: 3.9 MMOL/L (ref 3.5–5)
RBC # BLD AUTO: 2.68 M/UL (ref 3.8–5.8)
SODIUM SERPL-SCNC: 133 MMOL/L (ref 132–146)
TROPONIN I SERPL HS-MCNC: 663 NG/L (ref 0–11)
WBC OTHER # BLD: 8.3 K/UL (ref 4.5–11.5)

## 2025-03-17 PROCEDURE — 2060000000 HC ICU INTERMEDIATE R&B

## 2025-03-17 PROCEDURE — 6370000000 HC RX 637 (ALT 250 FOR IP): Performed by: NURSE PRACTITIONER

## 2025-03-17 PROCEDURE — 97165 OT EVAL LOW COMPLEX 30 MIN: CPT

## 2025-03-17 PROCEDURE — 36415 COLL VENOUS BLD VENIPUNCTURE: CPT

## 2025-03-17 PROCEDURE — 99233 SBSQ HOSP IP/OBS HIGH 50: CPT | Performed by: INTERNAL MEDICINE

## 2025-03-17 PROCEDURE — 2700000000 HC OXYGEN THERAPY PER DAY

## 2025-03-17 PROCEDURE — 99222 1ST HOSP IP/OBS MODERATE 55: CPT

## 2025-03-17 PROCEDURE — 2500000003 HC RX 250 WO HCPCS: Performed by: NURSE PRACTITIONER

## 2025-03-17 PROCEDURE — 6370000000 HC RX 637 (ALT 250 FOR IP)

## 2025-03-17 PROCEDURE — 84484 ASSAY OF TROPONIN QUANT: CPT

## 2025-03-17 PROCEDURE — 85025 COMPLETE CBC W/AUTO DIFF WBC: CPT

## 2025-03-17 PROCEDURE — 80048 BASIC METABOLIC PNL TOTAL CA: CPT

## 2025-03-17 PROCEDURE — 6370000000 HC RX 637 (ALT 250 FOR IP): Performed by: INTERNAL MEDICINE

## 2025-03-17 RX ORDER — HYDROXYZINE PAMOATE 25 MG/1
25 CAPSULE ORAL ONCE
Status: COMPLETED | OUTPATIENT
Start: 2025-03-18 | End: 2025-03-17

## 2025-03-17 RX ADMIN — ACETAMINOPHEN 650 MG: 325 TABLET ORAL at 23:57

## 2025-03-17 RX ADMIN — SODIUM CHLORIDE, PRESERVATIVE FREE 10 ML: 5 INJECTION INTRAVENOUS at 08:05

## 2025-03-17 RX ADMIN — GUAIFENESIN 400 MG: 400 TABLET ORAL at 17:26

## 2025-03-17 RX ADMIN — GUAIFENESIN 400 MG: 400 TABLET ORAL at 20:29

## 2025-03-17 RX ADMIN — ATORVASTATIN CALCIUM 80 MG: 40 TABLET, FILM COATED ORAL at 20:29

## 2025-03-17 RX ADMIN — ASPIRIN 81 MG: 81 TABLET, COATED ORAL at 08:03

## 2025-03-17 RX ADMIN — ISOSORBIDE DINITRATE 10 MG: 10 TABLET ORAL at 20:29

## 2025-03-17 RX ADMIN — CARVEDILOL 3.12 MG: 6.25 TABLET, FILM COATED ORAL at 08:03

## 2025-03-17 RX ADMIN — GUAIFENESIN 400 MG: 400 TABLET ORAL at 08:03

## 2025-03-17 RX ADMIN — DOCUSATE SODIUM 200 MG: 100 CAPSULE, LIQUID FILLED ORAL at 08:03

## 2025-03-17 RX ADMIN — GUAIFENESIN 400 MG: 400 TABLET ORAL at 13:08

## 2025-03-17 RX ADMIN — ISOSORBIDE DINITRATE 10 MG: 10 TABLET ORAL at 08:03

## 2025-03-17 RX ADMIN — SODIUM CHLORIDE, PRESERVATIVE FREE 10 ML: 5 INJECTION INTRAVENOUS at 20:29

## 2025-03-17 RX ADMIN — ISOSORBIDE DINITRATE 10 MG: 10 TABLET ORAL at 13:08

## 2025-03-17 RX ADMIN — HYDROXYZINE PAMOATE 25 MG: 25 CAPSULE ORAL at 23:57

## 2025-03-17 RX ADMIN — CARVEDILOL 3.12 MG: 6.25 TABLET, FILM COATED ORAL at 17:26

## 2025-03-17 NOTE — CONSULTS
[facility]   Inpatient CHF Nurse Navigator Consult      Cardiologist: Dr Camron Parry is a 89 y.o. (7/18/1931) male with a history of HFrEF, most recent EF:  Lab Results   Component Value Date    LVEF 55 02/14/2025    LVEFMODE Echo 10/31/2024     03/13/2025 EF 20-25%    Patient was awake and alert, laying in bed during the consultation and is agreeable to heart failure education. He was engaged and asked appropriate questions throughout the education session. His family is at the beside.     Barriers identified during consult contributing to HF Hospitalization:  [] Limited medication adherence   [] Poor health literacy, education regarding HF medications provided   [] Pill box provided to patient  [] Difficulty affording medications  [] Difficulty obtaining/ managing medications  [] Prescription assistance information given     [] Not weighing themselves daily  [x] Weight log provided for easy monitoring  [] Scale provided     [] Not following low sodium diet  [] Food insecurity   [x] 2 gram sodium diet education provided   [] Low sodium recipes provided  [] Sodium free seasoning provided   [] Low sodium meal delivery options given to patient  [] Dietician consulted     [] Lack of transportation to appointments     [] Depression, given chronic illness  [] Primary team notified     [] Goals of care need addressed  [] Palliative care consulted     [] CHF CHW consulted, to assist with     Chart Reviewed:  Diet: ADULT DIET; Clear Liquid; No Caffeine   Daily Weights: Patient Vitals for the past 96 hrs (Last 3 readings):   Weight   03/13/25 1737 71.6 kg (157 lb 12.8 oz)   03/13/25 1355 72.6 kg (160 lb)     I/O:   Intake/Output Summary (Last 24 hours) at 3/14/2025 1347  Last data filed at 3/13/2025 2218  Gross per 24 hour   Intake --   Output 800 ml   Net -800 ml       [] Nursing staff/manager notified of inaccurate franz weights or I/O        Discharge Plan:  Above 
  Palliative Care Department  [phone]  Palliative Care Initial Consult  Provider Tiffany Clayton, APRN - CNP     Tashi Parry  [id number]  Hospital Day: 5  Date of Initial Consult: 3/17/25  Referring Provider: Yamileth Patel MD  Palliative Medicine was consulted for assistance with: Information and follow as outpatient    HPI:   Tashi Parry is a 89 y.o. with a medical history of CAD s/p CABG, CHF, HTN, HLD, CKD, pancreatic mass, non-small cell lung CA s/p lobectomy with wedge resection, COPD who was admitted on 3/13/2025 from nursing facility with a CHIEF COMPLAINT of chest pain.  He was admitted with NSTEMI and placed on heparin drip.  Patient was somnolent, and per notes family expressed wishes for comfort measures.  He was made a DNR CC.  Throughout admission, patient became more alert, and family wish to pursue medical management only with no aggressive measures.  They are interested in discharge to SNF for rehab while following with palliative care.  They did speak with hospice for information only.  Palliative medicine consulted for further assistance with information on following as an outpatient.    ASSESSMENT/PLAN:     Pertinent Hospital Diagnoses     NSTEMI  Acute on chronic CHF  Altered mental status  CORBIN on CKD  Anemia  Afib      Palliative Care Encounter / Counseling Regarding Goals of Care  Please see detailed goals of care discussion as below  At this time, Tashi Parry, Does Not have capacity for medical decision-making.  Capacity is time limited and situation/question specific  During encounter Mary Kate pimentel surrogate medical decision-maker  Outcome of goals of care meeting:   Not interested in hospice at this time  Continue DNR-CC  Goal is SNF for rehab and to follow with palliative care, will transition to hospice when they feel ready  Code status DNR-CC  Advanced Directives: no POA or living will in [facility]  Surrogate/Legal NOK:  Anita Leahy (child) [phone]        Spiritual 
excess amount of alcohol.  Discussed calling the cardiologist / doctor with a weight gain of 3 pounds in one day or a total of 5 pounds or more in one week. Also, if you should have a significant weight loss of 3# or more in one day to call the doctor, they may need to decrease or hold the diuretic dose. On days you feels nauseated and not eating / drinking, having emesis or diarrhea,  informed to call the cardiologist  / doctor, they may need to decrease or hold diuretic to avoid dehydration.  Again, stressed the importance of informing their medical provider the first day of onset of any of the signs and symptoms in the \"Yellow Zone\" of the HF Zones.     The Heart Failure Booklet given to the patient with additional patient education addressing:  What is Heart Failure?  Things You Can Do to Live Well with HF  How to Take Your Medications  How to Eat Less Salt  [place] its Salt?  Exercising Well with Heart Failure  Signs and symptoms of HF to report  Weight Yourself Each Day  Home Self Management- activity, weight tracking, taking medications as prescribed, meals /diet planning (sodium and fluid restriction), how to read food labels, keeping physician follow ups, smoking cessation, follow the “Heart Failure Zones”  The Heart Failure zones  Every Dose Every Day    Instructed to call 911 if you have any of the following symptoms:  Struggling to breathe unrelieved with rest  Having chest pain  Confusion or can’t think clearly      Patient verbalizes understanding of above.   Greater than 30 minutes was spent educating patient.        Karen Beach RN   Heart Failure Navigator     
L cannula.    Please note: past medical records were reviewed per electronic medical record (EMR) - see detailed reports under Past Medical/ Surgical History.   Past Medical History:    CAD:  CABG x6 2000 (Dr. Guerra): LIMA-LAD,LYNNETTE-LCx,SVG-OM,SVG-LPL,SVG-PDA-RPL  Lexiscan stress test 1/17/2018: moderate sized partially reversible defect in the inferolateral  wall. without regional wall motion abnormalities. Intermediate risk   HFrecEF/VHD/PFO:  TTE 4/2019: EF 60%.  TTE 12/2022 [facility]: EF 50-55%.  TTE 10/31/2024: EF 45-50%. Mild concentric hypertrophy. Stage 2 diastolic dysfunction. Right ventricle is dilated. Left atrial volume index is severely increased (>48 mL/m2). Mild- mod AR. Mod MR. Mod TR  NUNO 2/14/2025: EF 55 to 60%.  Mildly reduced systolic function of RV.  Mild sclerosis of AV cusp with mild AR.  Moderate MR with centrally directed jet.  Mild TR.  Agitated saline was positive but without provocation with right to left shunt noted.  No thrombi.  PAF:  New onset 2/2025 with SVR  [id number], on Eliquis  Successful NUNO/DCCV 2/14/2025 Dr. Whittaker  Chronically elevated troponin 122 peak ; 40-60  HTN   HLD - not on statin (intolerance)   CORBIN on CKD - baseline Cr 0.9-1.4.  [facility]  Chronic Anemia - baseline hgb 8.9-11.6   BPH - on finasteride  Carotid disease: Ultrasound 2021: R CA 30-49%, LICA 50 to 69%  History of Pancreatic mass   History of non-small cell lung cancer - invasive adenocarcinoma   CARLO invasive adenocarcinoma s/p robotic VATS, LLL wedge resection, LLL lobectomy in 4/19   RUL spiculated nodule 7 x 5 mm on CT chest 4/19, followed by Pulmonary  COPD / Chronic hypoxia - 2L NC at baseline   DJD  Macular degeneration    Shingles 12/2019   COVID 9/2024   Allergy to Nitro - rash  Former tobacco abuse   DNR CC CODE STATUS      Past Surgical History:    Past Surgical History:   Procedure Laterality Date    CARDIAC SURGERY      6 vessel in 2000    DENTAL SURGERY      root

## 2025-03-17 NOTE — CARE COORDINATION
Transition of care update - Patient admitted form [facility]. Patient uses a ww at baseline and has 02 at 2 liters. Hospice consult in place. CM placed a call to dtr Mary Kate to confirm hospice/discharge plan. Mary Kate tells CM she will speak with [facility] today. CM will f/u w/Mary Kate later today. CM updated Migdalia w/[facility] who tells CM that the facility does not contract /[facility], a one time contract would have to be obtained. Will follow.  MARK Vasquez, RN  [facility] Case Management  [phone]    CM met w/dtr Mary Kate who feels pt looks better and she would like rehab at [facility] w/[facility] upon discharge. [facility] [employer] notified to call or come see dtr to provide information only. Precert is required, therapy to eval pt.

## 2025-03-17 NOTE — PLAN OF CARE
Problem: ABCDS Injury Assessment  Goal: Absence of physical injury  3/17/2025 0842 by Mikki Deutsch, RN  Outcome: Progressing  3/17/2025 0212 by Elvie Hankins, RN  Outcome: Progressing

## 2025-03-17 NOTE — PLAN OF CARE
Problem: ABCDS Injury Assessment  Goal: Absence of physical injury  Outcome: Progressing     Problem: Discharge Planning  Goal: Discharge to home or other facility with appropriate resources  Outcome: Progressing  Flowsheets (Taken 3/16/2025 2018)  Discharge to home or other facility with appropriate resources: Identify barriers to discharge with patient and caregiver     Problem: Chronic Conditions and Co-morbidities  Goal: Patient's chronic conditions and co-morbidity symptoms are monitored and maintained or improved  Outcome: Progressing  Flowsheets (Taken 3/16/2025 2018)  Care Plan - Patient's Chronic Conditions and Co-Morbidity Symptoms are Monitored and Maintained or Improved: Monitor and assess patient's chronic conditions and comorbid symptoms for stability, deterioration, or improvement     Problem: Pain  Goal: Verbalizes/displays adequate comfort level or baseline comfort level  Outcome: Progressing  Flowsheets  Taken 3/16/2025 2345  Verbalizes/displays adequate comfort level or baseline comfort level:   Assess pain using appropriate pain scale   Encourage patient to monitor pain and request assistance   Administer analgesics based on type and severity of pain and evaluate response   Implement non-pharmacological measures as appropriate and evaluate response  Taken 3/16/2025 2000  Verbalizes/displays adequate comfort level or baseline comfort level:   Encourage patient to monitor pain and request assistance   Assess pain using appropriate pain scale   Administer analgesics based on type and severity of pain and evaluate response   Implement non-pharmacological measures as appropriate and evaluate response     Problem: Safety - Adult  Goal: Free from fall injury  Outcome: Progressing

## 2025-03-18 VITALS
HEIGHT: 66 IN | RESPIRATION RATE: 18 BRPM | DIASTOLIC BLOOD PRESSURE: 64 MMHG | WEIGHT: 147.71 LBS | OXYGEN SATURATION: 98 % | BODY MASS INDEX: 23.74 KG/M2 | HEART RATE: 62 BPM | TEMPERATURE: 97.3 F | SYSTOLIC BLOOD PRESSURE: 142 MMHG

## 2025-03-18 PROCEDURE — 2500000003 HC RX 250 WO HCPCS: Performed by: NURSE PRACTITIONER

## 2025-03-18 PROCEDURE — 6370000000 HC RX 637 (ALT 250 FOR IP): Performed by: NURSE PRACTITIONER

## 2025-03-18 PROCEDURE — 6370000000 HC RX 637 (ALT 250 FOR IP): Performed by: INTERNAL MEDICINE

## 2025-03-18 PROCEDURE — 2700000000 HC OXYGEN THERAPY PER DAY

## 2025-03-18 PROCEDURE — 99233 SBSQ HOSP IP/OBS HIGH 50: CPT | Performed by: INTERNAL MEDICINE

## 2025-03-18 RX ORDER — ATORVASTATIN CALCIUM 80 MG/1
80 TABLET, FILM COATED ORAL NIGHTLY
Qty: 30 TABLET | Refills: 3 | Status: SHIPPED | OUTPATIENT
Start: 2025-03-18

## 2025-03-18 RX ADMIN — ISOSORBIDE DINITRATE 10 MG: 10 TABLET ORAL at 08:39

## 2025-03-18 RX ADMIN — DOCUSATE SODIUM 200 MG: 100 CAPSULE, LIQUID FILLED ORAL at 08:38

## 2025-03-18 RX ADMIN — CARVEDILOL 3.12 MG: 6.25 TABLET, FILM COATED ORAL at 08:38

## 2025-03-18 RX ADMIN — ASPIRIN 81 MG: 81 TABLET, COATED ORAL at 08:39

## 2025-03-18 RX ADMIN — SODIUM CHLORIDE, PRESERVATIVE FREE 10 ML: 5 INJECTION INTRAVENOUS at 08:40

## 2025-03-18 RX ADMIN — GUAIFENESIN 400 MG: 400 TABLET ORAL at 08:39

## 2025-03-18 ASSESSMENT — PAIN SCALES - GENERAL: PAINLEVEL_OUTOF10: 2

## 2025-03-18 NOTE — CARE COORDINATION
Oscar received for [facility]. CM called dtr Mary Kate who remains agreeable to pt discharging to [facility]. Mary Kate will have her  José transport pt to [facility]. Mary Kate prefers [facility] to follow at [place], CM made the referral to Tawny who will reach out to Mary Kate. SW completed transport form and 7000 w/EULALIA initiated.   STEPH VasquezN, RN  [facility] Case Management  [phone]

## 2025-03-18 NOTE — DISCHARGE SUMMARY
CNP, documentation was conducted and revisions were made as appropriate directly by me. I agree with the above documented exam, problem list, and plan of care.     Yamileth Patel MD

## 2025-03-18 NOTE — PLAN OF CARE
Problem: ABCDS Injury Assessment  Goal: Absence of physical injury  3/18/2025 0950 by Mikki Deutsch, RN  Outcome: Progressing  3/18/2025 0045 by Socorro Arriaga, RN  Outcome: Progressing

## 2025-03-19 NOTE — PROGRESS NOTES
INPATIENT CARDIOLOGY FOLLOW-UP    Name: Tashi Parry    Age: 89 y.o.    Date of Admission: 3/13/2025  8:11 AM    Date of Service: 3/14/2025    Primary Cardiologist: Dr. Lyon    Chief Complaint: Follow-up for non-STEMI, decompensated heart    Interim History:  No new overnight cardiac complaints. Currently with no complaints of CP, SOB, palpitations, dizziness, or lightheadedness. SR on telemetry.  No significant arrhythmia seen    Had some nausea this morning.  Troponin level at 801.  On heparin  No chest pains.  Nausea better now.    Review of Systems:   Negative except as described above    Problem List:  Patient Active Problem List   Diagnosis    Coronary artery disease involving native coronary artery of native heart without angina pectoris    Hyperlipemia    Asymptomatic bilateral carotid artery stenosis    Malignant neoplasm of lower lobe of left lung (HCC)    Hx of coronary artery disease    Chest pain    Essential hypertension    Anemia    Benign prostatic hyperplasia without lower urinary tract symptoms    Gastro-esophageal reflux disease without esophagitis    Macular degeneration    Overweight with body mass index (BMI) 25.0-29.9    Primary malignant neoplasm of lung (HCC)    Sensorineural hearing loss (SNHL) of both ears    History of non-ST elevation myocardial infarction (NSTEMI)    Unspecified dementia, mild, without behavioral disturbance, psychotic disturbance, mood disturbance, and anxiety (HCC)    Personal history of other malignant neoplasm of bronchus and lung    Acquired absence of lung (part of)    Nonexudative senile macular degeneration of retina    Primary osteoarthritis involving multiple joints    History of falling, presenting hazards to health    Constipation    Acute decompensated heart failure (HCC)    Nonrheumatic mitral valve regurgitation    Pulmonary hypertension (HCC)    Muscle weakness (generalized)    Hypoxemia    Difficulty in walking, not elsewhere classified    
    INPATIENT CARDIOLOGY FOLLOW-UP    Name: Tashi Parry    Age: 89 y.o.    Date of Admission: 3/13/2025  8:11 AM    Date of Service: 3/16/2025    Primary Cardiologist: Dr. 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)    Hx of coronary artery disease    Chest pain    Essential hypertension    Anemia    Benign prostatic hyperplasia without lower urinary tract symptoms    Gastro-esophageal reflux disease without esophagitis    Macular degeneration    Overweight with body mass index (BMI) 25.0-29.9    Primary malignant neoplasm of lung (HCC)    Sensorineural hearing loss (SNHL) of both ears    History of non-ST elevation myocardial infarction (NSTEMI)    Unspecified dementia, mild, without behavioral disturbance, psychotic disturbance, mood disturbance, and anxiety (HCC)    Personal history of other malignant neoplasm of bronchus and lung    Acquired absence of lung (part of)    Nonexudative senile macular degeneration of retina    Primary osteoarthritis involving multiple joints    History of falling, presenting hazards to health    Constipation    Acute decompensated heart failure (HCC)    Nonrheumatic mitral valve regurgitation    Pulmonary hypertension (HCC)    Muscle weakness (generalized)    Hypoxemia    Difficulty in walking, not elsewhere classified    Chronic respiratory failure with hypoxia (HCC)    Ischemic cardiomyopathy    VHD (valvular heart disease)    RVF (right ventricular failure) (HCC)    Urinary retention    New onset atrial fibrillation (HCC)    NSTEMI (non-ST elevated myocardial 
    [facility]                                Progress note    Subjective:  Denies chest pain and dyspnea    Objective:  Sitting up in bed, conversing without difficulty, listening to fitaboratepel music  No acute distress  No family present at the bedside  BP (!) 104/53   Pulse 58   Temp 98.3 °F (36.8 °C) (Oral)   Resp 18   Ht 1.676 m (5' 6\")   Wt 68.6 kg (151 lb 3.8 oz)   SpO2 98%   BMI 24.41 kg/m²   CONST:  Well developed, well nourished elderly  male who appears stated age. Awake, alert, cooperative, no apparent distress  HEENT:   Head- Normocephalic, atraumatic   Eyes- Conjunctivae pink, anicteric  Throat- Oral mucosa pink and moist  Neck-  No stridor, trachea midline, no jugular venous distention. No adenopathy   CHEST: Chest symmetrical and non-tender to palpation. No accessory muscle use or intercostal retractions  RESPIRATORY: Lung sounds -diminished throughout fields   CARDIOVASCULAR:     No carotid bruit  Heart Inspection- shows no noted pulsations  Heart Palpation- no heaves or thrills; PMI is non-displaced   Heart Ausculation- Regular rate and rhythm, no murmur. No s3, s4 or rub   PV: No lower extremity edema. No varicosities. Pedal pulses palpable, no clubbing or cyanosis   ABDOMEN: Soft, non-tender to light palpation. Bowel sounds present. No palpable masses no organomegaly; no abdominal bruit  MS: Good muscle strength and tone. No atrophy or abnormal movements.   : Deferred  SKIN: Warm and dry no statis dermatitis or ulcers   NEURO / PSYCH: Oriented to person, place and time. Speech clear and appropriate. Follows all commands. Pleasant affect      Recent Labs     03/14/25  0503 03/15/25  0430 03/16/25  0245   WBC 9.6 10.2 9.8   HGB 7.9* 7.3* 7.0*   HCT 25.5* 23.9* 22.6*    195 188       Recent Labs     03/14/25  0503 03/15/25  0430 03/16/25  0245    135 135   K 4.2 4.2 4.0   CL 98 100 99   CO2 23 26 25   BUN 41* 41* 41*   CREATININE 2.0* 1.9* 2.0*   CALCIUM 8.6 
    [facility]                                Progress note    Subjective:  Denies chest pain and dyspnea    Objective:  Sitting up in bed, conversing without difficulty  No acute distress  Daughter present at the bedside states that she has concern as he has only had 1 cup of coffee to eat or drink in the past 2 days and has been very lethargic, sleeping most of the day since he has been admitted  BP (!) 125/51   Pulse 62   Temp 98.2 °F (36.8 °C) (Oral)   Resp 20   Ht 1.676 m (5' 6\")   Wt 71.6 kg (157 lb 12.8 oz)   SpO2 97%   BMI 25.47 kg/m²   CONST:  Well developed, well nourished elderly  male who appears stated age. Awake, alert, cooperative, no apparent distress  HEENT:   Head- Normocephalic, atraumatic   Eyes- Conjunctivae pink, anicteric  Throat- Oral mucosa pink and moist  Neck-  No stridor, trachea midline, no jugular venous distention. No adenopathy   CHEST: Chest symmetrical and non-tender to palpation. No accessory muscle use or intercostal retractions  RESPIRATORY: Lung sounds -diminished with scattered rhonchi throughout fields   CARDIOVASCULAR:     No carotid bruit  Heart Inspection- shows no noted pulsations  Heart Palpation- no heaves or thrills; PMI is non-displaced   Heart Ausculation- Regular rate and rhythm, no murmur. No s3, s4 or rub   PV: No lower extremity edema. No varicosities. Pedal pulses palpable, no clubbing or cyanosis   ABDOMEN: Soft, non-tender to light palpation. Bowel sounds present. No palpable masses no organomegaly; no abdominal bruit  MS: Good muscle strength and tone. No atrophy or abnormal movements.   : Deferred  SKIN: Warm and dry no statis dermatitis or ulcers   NEURO / PSYCH: Oriented to person, place and time. Speech clear and appropriate. Follows all commands. Pleasant affect      Recent Labs     03/13/25  0823 03/14/25  0503 03/15/25  0430   WBC 7.2 9.6 10.2   HGB 8.0* 7.9* 7.3*   HCT 25.0* 25.5* 23.9*    217 195       Recent 
  [facility] Quality Flow/Interdisciplinary Rounds Progress Note        Quality Flow Rounds held on March 17, 2025    Disciplines Attending:  Bedside Nurse, , , and Nursing Unit Leadership    Tashi Parry was admitted on 3/13/2025  8:11 AM    Anticipated Discharge Date:  Expected Discharge Date: 03/17/25    Disposition:    Cornell Score:  Cornell Scale Score: 20    [facility] RISK OF UNPLANNED READMISSION 2.0             26.3 Total Score        Discussed patient goal for the day, patient clinical progression, and barriers to discharge.  The following Goal(s) of the Day/Commitment(s) have been identified:   discharge planning, cardio, need precert      Lloyd Chowdhury RN  March 17, 2025        
  [facility] Quality Flow/Interdisciplinary Rounds Progress Note        Quality Flow Rounds held on March 14, 2025    Disciplines Attending:  Bedside Nurse, , , and Nursing Unit Leadership    Tashi Parry was admitted on 3/13/2025  8:11 AM    Anticipated Discharge Date:       Disposition:    Cornell Score:  Cornell Scale Score: 19    [facility] RISK OF UNPLANNED READMISSION 2.0             26.5 Total Score        Discussed patient goal for the day, patient clinical progression, and barriers to discharge.  The following Goal(s) of the Day/Commitment(s) have been identified:   hep gtt, monitor labs, awiat plans.      Damaris Barba RN  March 14, 2025        
  Physician Progress Note      PATIENT:               EVANGELINA JOHNSON  CSN #:                  [id number]  :                       1931  ADMIT DATE:       3/13/2025 8:11 AM  DISCH DATE:        3/18/2025 12:31 PM  RESPONDING  PROVIDER #:        Yamileth Patel MD          QUERY TEXT:    Patient admitted with NSTEMI.  Noted documentation of Acute Kidney Injury in   H&P 3/14 and subsequent Progress Notes.  In order to support the diagnosis of   CORBIN, please include additional clinical indicators in your documentation.? Or   please document if the diagnosis of CORBIN has been ruled out after further   study.    The medical record reflects the following:  Risk Factors: HTN, CKD, Advanced Age >90  Clinical Indicators: -H&P 3/14 \"CORBIN superimposed on CKD (baseline SCr 1-1.5)\"  -Cardiology Consult Note 3/13 \"CORBIN on CKD.  Worsening creatinine over the last   month.  2.0 creatinine today.\"  -3/13-3/17 Creatinine 2.0-1.8  Treatment: Labs, I&O    Defined by Kidney Disease Improving Global Outcomes (KDIGO) clinical practice   guideline for acute kidney injury:  -Increase in SCr by greater than or equal to 0.3 mg/dl within 48 hours; or  -Increase or decrease in SCr to greater than or equal to 1.5 times baseline,   which is known or presumed to have occurred within the prior 7 days; or  -Urine volume < 0.5ml/kg/h for 6 hours.      Thank you,  Scot Murray, BSN, RN, CRCR  Clinical Documentation Integrity  [phone]  Options provided:  -- Acute kidney injury evidenced by, Please document evidence.  -- Acute kidney injury ruled out after study  -- Other - I will add my own diagnosis  -- Disagree - Not applicable / Not valid  -- Disagree - Clinically unable to determine / Unknown  -- Refer to Clinical Documentation Reviewer    PROVIDER RESPONSE TEXT:    Acute kidney injury was ruled out after study.    Query created by: Scot Murray on 3/17/2025 4:25 PM      Electronically signed by:  Yamileth Patel MD 3/19/2025 5:50 AM          
  [facility] Quality Flow/Interdisciplinary Rounds Progress Note        Quality Flow Rounds held on March 18, 2025    Disciplines Attending:  Bedside Nurse, , , and Nursing Unit Leadership    Tashi Parry was admitted on 3/13/2025  8:11 AM    Anticipated Discharge Date:  Expected Discharge Date: 03/18/25    Disposition:    Cornell Score:  Cornell Scale Score: 18    [facility] RISK OF UNPLANNED READMISSION 2.0             26.1 Total Score        Discussed patient goal for the day, patient clinical progression, and barriers to discharge.  The following Goal(s) of the Day/Commitment(s) have been identified:   discharge      Lloyd Chowdhury RN  March 18, 2025        
4 Eyes Skin Assessment     NAME:  Tashi Parry  YOB: 1931  MEDICAL RECORD NUMBER:  [id number]    The patient is being assessed for  Admission    I agree that at least one RN has performed a thorough Head to Toe Skin Assessment on the patient. ALL assessment sites listed below have been assessed.      Areas assessed by both nurses:    Head, Face, Ears, Shoulders, Back, Chest, Arms, Elbows, Hands, Sacrum. Buttock, Coccyx, Ischium, Legs. Feet and Heels, and Under Medical Devices         Does the Patient have a Wound? No noted wound(s)       Cornell Prevention initiated by RN: Yes  Wound Care Orders initiated by RN: No    Pressure Injury (Stage 3,4, Unstageable, DTI, NWPT, and Complex wounds) if present, place Wound referral order by RN under : No    New Ostomies, if present place, Ostomy referral order under : No     Nurse 1 eSignature: Electronically signed by Nicholas Wellington RN on 3/13/25 at 6:08 PM EDT    **SHARE this note so that the co-signing nurse can place an eSignature**    Nurse 2 eSignature: Electronically signed by Caroline Alcantar RN on 3/13/25 at 6:10 PM EDT    
Call placed to [place] to give nurse to nurse spoke to [place]   
Database initiated. Patient is alert with some confusion and is hard of wilian. He comes in from [facility]. He uses a walker and wears 3.5 liters oxygen at baseline. He had a fall this morning. Medications and DNR-CC verified using facility paperwork. Son in law at bedside said he will try and climb out of bed.   
Family expressed concerns over ordered CT Chest. They believed the exam was supposed to be cancelled. Call placed to Dr. Patel regarding concerns. No answer at this time.  
Patient daughter stated she reached out to [facility] to get information not to move forward with hospice just to gather information on hospice.  
Patient is alert and oriented x4. He verbalized to me that he does wish to be a DNR CC. I updated April NP for Dr Patel who is on call of the conversation I had with the patient and his daughter Mary Kate. She will update the order.   
Patient's daughter Mary Kate Leahy called to inquire about code status. She wants to follow his wishes and if his heart were to stop he would not want lifesaving measures. I have reached out to Dr Patel to have the code status updated. Awaiting return call.   
Physical Therapy  Facility/Department: [address] INTERMEDIATE  Physical Therapy Initial Assessment    Name: Tashi Parry  : 1931  MRN: [id number]  Date of Service: 3/17/2025          Patient Diagnosis(es): The primary encounter diagnosis was Chest pain, unspecified type. Diagnoses of Shortness of breath and NSTEMI (non-ST elevated myocardial infarction) (HCC) were also pertinent to this visit.  Past Medical History:  has a past medical history of Acute HFrEF (heart failure with reduced ejection fraction) (HCC), Acute kidney injury superimposed on chronic kidney disease, Acute on chronic congestive heart failure (HCC), BPH (benign prostatic hyperplasia), CAD (coronary artery disease), Carotid artery calcification, CHF (congestive heart failure), NYHA class I, acute on chronic, combined (HCC), DJD (degenerative joint disease), History of falling, Hyperlipemia, Hypertension, Lung nodule, Macular degeneration, Numbness and tingling in left arm, Numbness and tingling of left side of face, S/P CABG x 6, and Shingles.  Past Surgical History:  has a past surgical history that includes Diagnostic Cardiac Cath Lab Procedure (00); Cardiac surgery; TURP; Dental surgery; Total hip arthroplasty (Right, 2011); Total hip arthroplasty (Left, 2009); other surgical history (2011); Thoracoscopy (Left, 2019); TURP (N/A, 2019); Prostate surgery (Bilateral, 2019); and Lung removal, partial (Left).      Requires PT Follow-Up: Yes    Evaluating Therapist: Antionette Sheffield PT     Referring Provider:  Yamileth Patel MD     PT order : PT eval and treat     Room #: 633  DIAGNOSIS: The primary encounter diagnosis was Chest pain, unspecified type. Diagnoses of Shortness of breath and NSTEMI (non-ST elevated myocardial infarction) (HCC) were also pertinent to this visit  PRECAUTIONS: falls , O2     Social:  Pt lives at [facility]  in a 1 floor plan   Prior to admission pt walked with  ww . Home O2 per chart      
Physical Therapy  PT eval attempted;  was requested by charge RN. Pt's daughter reports she is leaning towards Hospice and wants to hold off on PT eval today. Will re-attempt at later date/as appropriate.     
Secure messaged Dr. Patel after hours line in regards to patient being restless and trying to get out of bed. Jessica Molina returned phone call. Will review patients chart and place orders as necessary.  
Secure voicemail left for Dr. Patel re: patient diet. No answer at this time.  
Spiritual Health History and Assessment/Progress Note  [facility]    Initial Encounter, Attempted Encounter,  ,  ,      Name: Tashi Parry MRN: [id number]    Age: 89 y.o.     Sex: male   Language: English   Sabianist: Yazidi Rastafari   NSTEMI (non-ST elevated myocardial infarction) (HCC)     Date: 3/14/2025                           Spiritual Assessment began in [facility] INTERMEDIATE        Referral/Consult From: Rounding   Encounter Overview/Reason: Initial Encounter, Attempted Encounter  Service Provided For: Patient    Ayah, Belief, Meaning:   Patient is connected with a ayah tradition or spiritual practice  Family/Friends No family/friends present      Importance and Influence:  Patient unable to assess at this time  Family/Friends No family/friends present    Community:  Patient feels well-supported. Support system includes: Children and Extended family  Family/Friends No family/friends present    Assessment and Plan of Care:     Patient Interventions include: Other: Patient is sleeping and appears peaceful, did not disturb. Prayer is silently offered for the patient and a prayer card was left in the room.  Family/Friends Interventions include: No family/friends present    Patient Plan of Care: Spiritual Care available upon further referral  Family/Friends Plan of Care: No family/friends present    Electronically signed by Chaplain Carole on 3/14/2025 at 11:39 AM    
Spiritual Health History and Assessment/Progress Note  [facility]    Attempted Encounter,  ,  ,      Name: Tashi Parry MRN: [id number]    Age: 89 y.o.     Sex: male   Language: English   Synagogue: Quaker Protestant   NSTEMI (non-ST elevated myocardial infarction) (HCC)     Date: 3/15/2025                           Spiritual Assessment began in [facility] INTERMEDIATE        Referral/Consult From: Rounding   Encounter Overview/Reason: Attempted Encounter  Service Provided For: Patient    Ayah, Belief, Meaning:   Patient unable to assess at this time  Family/Friends No family/friends present      Importance and Influence:  Patient unable to assess at this time  Family/Friends No family/friends present    Community:  Patient Unable to assess. Patient was sleeping.   prayed a silent prayer for the patient and left devotional material and information about  services.  Family/Friends No family/friends present    Assessment and Plan of Care:     Patient Interventions include: Unable to assess. Patient was sleeping.   prayed a silent prayer for the patient and left devotional material and information about  services.  Family/Friends Interventions include: No family/friends present    Patient Plan of Care: Spiritual Care available upon further referral  Family/Friends Plan of Care: No family/friends present    Electronically signed by Chaplain Jay Jay on 3/15/2025 at 2:29 PM   
Voicemail left with primary re: family request for cough medicine.   
Medications:  No current facility-administered medications for this encounter.    Current Outpatient Medications:     atorvastatin (LIPITOR) 80 MG tablet, Take 1 tablet by mouth nightly, Disp: 30 tablet, Rfl: 3    OXYGEN, Inhale 2 L into the lungs daily, Disp: , Rfl:     carvedilol (COREG) 3.125 MG tablet, Take 1 tablet by mouth 2 times daily (with meals), Disp: 60 tablet, Rfl: 3    apixaban (ELIQUIS) 2.5 MG TABS tablet, Take 1 tablet by mouth 2 times daily, Disp: 60 tablet, Rfl: 5    ALBUTEROL SULFATE IN, Take 1 vial by nebulization every 4 hours as needed for Wheezing or Shortness of Breath, Disp: , Rfl:     docusate sodium (COLACE) 100 MG capsule, Take 2 capsules by mouth daily, Disp: , Rfl:     Homeopathic Products (COLD-EEZE) LOZG, Take 1 lozenge by mouth every 2 hours as needed (SORE THROAT), Disp: , Rfl:     diphenhydrAMINE-APAP, sleep, (TYLENOL PM EXTRA STRENGTH)  MG tablet, Take 1 tablet by mouth nightly as needed for Sleep, Disp: , Rfl:     ondansetron (ZOFRAN) 8 MG tablet, Take 1 tablet by mouth every 8 hours as needed for Nausea or Vomiting, Disp: , Rfl:     bumetanide (BUMEX) 1 MG tablet, Take 2 tablets by mouth daily, Disp: , Rfl:     finasteride (PROSCAR) 5 MG tablet, Take 1 tablet by mouth daily, Disp: , Rfl:     hydrALAZINE (APRESOLINE) 10 MG tablet, Take 1 tablet by mouth 3 times daily, Disp: 90 tablet, Rfl: 5    Multiple Vitamins-Minerals (THERAPEUTIC MULTIVITAMIN-MINERALS) tablet, Take 1 tablet by mouth daily, Disp: 90 tablet, Rfl: 3    polyethylene glycol (GLYCOLAX) 17 g packet, Take 1 packet by mouth daily as needed for Constipation, Disp: , Rfl:     oxymetazoline (AFRIN) 0.05 % nasal spray, 2 sprays by Nasal route 2 times daily as needed for Congestion, Disp: , Rfl:     Omega-3 Fatty Acids (FISH OIL) 1000 MG capsule, Take 2 capsules by mouth daily, Disp: , Rfl:     Calcium Carb-Cholecalciferol (OYSTER SHELL CALCIUM PLUS D) 500-5 MG-MCG TABS, Take 1 tablet by mouth daily, Disp: , Rfl:     
pursued per patient and family wishes  Discontinue IV heparin when okay with Cardiology  CT of the chest not performed as no aggressive measures are to be undertaken  VS stable  CORBIN, monitor BMP  Anemia, worsening with H&H 7.3/23.9 (03/15)--> 7/22.6 (03/16)  Monitor CBC  Hemoccult stool    3/17/2025  Vital signs stable  Patient continues on 2 liters O2  BUN/Creat: 40/1.8  Palliative care consulted   Troponin - 663  H&H improving - 7.3/23.7  Appreciate palliative input  [facility] - 17/24  Patient is going to [facility] status: DNR CC  Consultants: Cardiology  DVT Prophylaxis IV Heparin  PT/OT  Discharge planning       ABHISHEK Bourgeois - CNP,  12:26 PM  3/17/2025  
returned to bed  with call light and phone within reach, all lines and tubes intact.  *ALARM ON     Overall patient demonstrated  decreased independence and safety during completion of ADL/functional transfer/mobility tasks.  Pt would benefit from continued skilled OT to increase safety and independence with completion of ADL/IADL tasks for functional independence and quality of life.        Rehab Potential: fair + for established goals     Patient / Family Goal: none stated       Patient and/or family were instructed on functional diagnosis, prognosis/goals and OT plan of care. Demonstrated fair +  understanding.     Eval Complexity: Low    Time In: 1550  Time Out: 1406    Min Units   OT Eval Low 97165 x  1   OT Eval Medium 96676      OT Eval High 29938      OT Re-Eval 24580       Therapeutic Ex 60063      Therapeutic Activities 16653      ADL/Self Care 00027       Orthotic Management 63100       Manual 95581     Neuro Re-Ed 27974       Non-Billable Time      OT Re eval 86678        Evaluation Time additionally includes thorough review of current medical information, gathering information on past medical history/social history and prior level of function, interpretation of standardized testing/informal observation of tasks, assessment of data and development of plan of care and goals.            Toyin Dodson  OTR/L  OT [id number]                           
EF 55 to 60%.  Mildly reduced systolic function of RV.  Mild sclerosis of AV cusp with mild AR.  Moderate MR with centrally directed jet.  Mild TR.  Agitated saline was positive but without provocation with right to left shunt noted.  No thrombi.  PAF:  New onset 2/2025 with SVR  [id number] 5, on Eliquis  Successful NUNO/DCCV 2/14/2025 Dr. Whittaker     Limited echocardiogram from 3/13/2025:    Limited study.    Left Ventricle: Severely reduced left ventricular systolic function with a visually estimated EF of 20 - 25%.    Right Ventricle: Moderately reduced systolic function.    Mitral Valve: Moderate regurgitation.    Tricuspid Valve: Mild to moderate regurgitation. Severely elevated RVSP, consistent with severe pulmonary hypertension. Est RA pressure is 15 mmHg. The estimated PASP is 67 mmHg.    Right Atrium: Right atrium is mildly dilated.    Image quality is adequate. Contrast used: Lumason. Procedure performed with the patient in a supine position.          Assessment:  Non-STEMI with worsening biventricular  Chest pain.  Substernal with radiation straight through to back.  Unrelieved with nitroglycerin.  Fentanyl helping.  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  Elevated BNP 5200 today, but does not appear significantly hypervolemic.  AMS.  Patient reportedly found off of oxygen this morning>> hypoxia?  At baseline mentation now per family.  Baseline poor historian.  CORBIN on CKD.  Worsening creatinine over the last month.  2.0 creatinine today.  Previous baseline 0.9-1.4  Acute on chronic anemia.  Hemoccult negative in ER today.  Worsening anemia likely 2/2 progressing CKD.    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  4.2 cm on CT 12/2024.  CAD history : CABG x6 2000:LIMA-LAD,LYNNETTE-LCx,SVG-OM,SVG-LPL,SVG-PDA-RPL.  Intermediate risk Lexiscan 2018 with Dr. Terrell defect of inferolateral wall without regional wall Nando abnormalities.  VHD/PFO: Mild AR, moderate MR eccentric to the check, mild TR 
lung cancer - invasive adenocarcinoma .CARLO invasive adenocarcinoma s/p robotic VATS, LLL wedge resection, LLL lobectomy in 4/19. RUL spiculated nodule 7 x 5 mm on CT chest 4/19, followed by Pulmonary  COPD / Chronic hypoxia - 2L NC at baseline   DJD  Macular degeneration    Shingles 12/2019   COVID 9/2024   Allergy to Nitro - rash  Former tobacco abuse   DNR CC CODE STATUS     Plan:  Presentation consistent with non-STEMI and decompensated heart failure  Per chart record, patient is DNR CCA and preferred conservative measures only.    Family is requesting heparin drip discontinued  Daughter who is the power of  reports they are going to have family meeting today with palliative care services  Continue aspirin, high intensity statin.  As well as beta-blocker  If blood pressure remains tenuous change from Coreg to metoprolol succinate  Entresto on hold given kidney dysfunction  Monitor BUN/creatinine closely and hold Bumex if kidney function worsens  Monitor daily weights and renal function urine output  Echocardiogram as above.  Worsening biventricular dysfunction.  Moderate to severe pulmonary hypertension with elevated right atrial pressure.   On Coreg and Entresto is on hold  If kidney function does not allow resumption of Entresto consider Isordil and hydralazine at a lower dose if blood pressure can tolerate  Resume Eliquis once heparin drip is discontinued if there is no contraindication  Rest per primary other specialist involved    Greater than 50 minutes was spent counseling the patient, reviewing the rationale for the above recommendations and reviewing the patient's current medication list, problem list and results of all previously ordered testing.    Jorge Koehler MD  [facility]    NOTE: This report was transcribed using voice recognition software. Every effort was made to ensure accuracy; however, inadvertent computerized transcription errors may be present.

## 2025-03-22 ENCOUNTER — OUTSIDE SERVICES (OUTPATIENT)
Dept: PRIMARY CARE CLINIC | Age: 89
End: 2025-03-22

## 2025-03-22 DIAGNOSIS — I25.10 CORONARY ARTERY DISEASE INVOLVING NATIVE CORONARY ARTERY OF NATIVE HEART WITHOUT ANGINA PECTORIS: ICD-10-CM

## 2025-03-22 DIAGNOSIS — D64.9 ANEMIA, UNSPECIFIED TYPE: ICD-10-CM

## 2025-03-22 DIAGNOSIS — I21.4 NSTEMI (NON-ST ELEVATED MYOCARDIAL INFARCTION) (HCC): Primary | ICD-10-CM

## 2025-03-22 DIAGNOSIS — I50.23 ACUTE ON CHRONIC SYSTOLIC CONGESTIVE HEART FAILURE (HCC): ICD-10-CM

## 2025-03-22 DIAGNOSIS — Z91.81 AT MAXIMUM RISK FOR FALL: ICD-10-CM

## 2025-03-22 DIAGNOSIS — N17.9 ACUTE KIDNEY INJURY SUPERIMPOSED ON CKD: ICD-10-CM

## 2025-03-22 DIAGNOSIS — R53.81 PHYSICAL DECONDITIONING: ICD-10-CM

## 2025-03-22 DIAGNOSIS — N18.9 ACUTE KIDNEY INJURY SUPERIMPOSED ON CKD: ICD-10-CM

## 2025-03-22 NOTE — PROGRESS NOTES
Tashi Parry (:  1931) is a 89 y.o. male.    Subjective   SUBJECTIVE/OBJECTIVE:  Past Medical History:   Diagnosis Date    Acute HFrEF (heart failure with reduced ejection fraction) ([facility]) 10/31/2024    Acute kidney injury superimposed on chronic kidney disease 2024    Acute on chronic congestive heart failure ([facility]) 2024    BPH (benign prostatic hyperplasia)     CAD (coronary artery disease)     Carotid artery calcification     CHF (congestive heart failure), NYHA class I, acute on chronic, combined ([facility]) 2024    DJD (degenerative joint disease)     History of falling     Hyperlipemia     Hypertension     Lung nodule     Macular degeneration     Numbness and tingling in left arm 2018    Numbness and tingling of left side of face 2018    S/P CABG x 6     LIMA-LAD, LYNNETTE-LCx,SVG-OM,SVG-LPL,SVG-PDA-RPL    Shingles       Past Surgical History:   Procedure Laterality Date    CARDIAC SURGERY      6 vessel in     DENTAL SURGERY      root canal    DIAGNOSTIC CARDIAC CATH LAB PROCEDURE  00    LUNG REMOVAL, PARTIAL Left     march    OTHER SURGICAL HISTORY  2011    Injection right hip  ADRIANNE Doan MD    PROSTATE SURGERY Bilateral 2019    THORACOSCOPY Left 2019    BRONCHOSCOPY LEFT THORACOSCOPY ROBOTIC VIDEO ASSISTED , WEDGE RESECTION, POSS. LEFT LOWER LOBECTOMY performed by Ortiz Pedroza MD at [facility] [place]    TOTAL HIP ARTHROPLASTY Right 2011    Right BRIONNA  ADRIANNE Doan MD    TOTAL HIP ARTHROPLASTY Left 2009    Left BRIONNA  ADRIANNE Doan MD    TURP      TURP N/A 2019    CYSTOSCOPY, RETROGRADE PYELOGRAM,  URERTHRAL DILITATION, TRANSURETHRAL RESECTION PROSTATE performed by Carter Hale MD at [facility] [place]      Family History   Problem Relation Age of Onset    Other Mother         accident age 32 years , Tashi ATWOOD was 10 months old    Other Father         accident age 42 decesed 10 years when Father passed    Heart Disease Brother     Alzheimer's

## 2025-03-24 ENCOUNTER — HOSPITAL ENCOUNTER (EMERGENCY)
Age: 89
Discharge: HOME | End: 2025-03-24
Attending: EMERGENCY MEDICINE
Payer: MEDICARE

## 2025-03-24 VITALS
WEIGHT: 147 LBS | SYSTOLIC BLOOD PRESSURE: 152 MMHG | TEMPERATURE: 98.2 F | OXYGEN SATURATION: 100 % | HEART RATE: 63 BPM | BODY MASS INDEX: 23.63 KG/M2 | HEIGHT: 66 IN | RESPIRATION RATE: 16 BRPM | DIASTOLIC BLOOD PRESSURE: 70 MMHG

## 2025-03-24 DIAGNOSIS — N18.9 CHRONIC KIDNEY DISEASE, UNSPECIFIED CKD STAGE: ICD-10-CM

## 2025-03-24 DIAGNOSIS — D64.9 ANEMIA, UNSPECIFIED TYPE: Primary | ICD-10-CM

## 2025-03-24 LAB
ABO + RH BLD: NORMAL
ALBUMIN SERPL-MCNC: 4 G/DL (ref 3.5–5.2)
ALP SERPL-CCNC: 86 U/L (ref 40–129)
ALT SERPL-CCNC: 12 U/L (ref 0–40)
ANION GAP SERPL CALCULATED.3IONS-SCNC: 14 MMOL/L (ref 7–16)
ARM BAND NUMBER: NORMAL
AST SERPL-CCNC: 18 U/L (ref 0–39)
BASOPHILS # BLD: 0.09 K/UL (ref 0–0.2)
BASOPHILS NFR BLD: 1 % (ref 0–2)
BILIRUB SERPL-MCNC: 0.6 MG/DL (ref 0–1.2)
BLOOD BANK SAMPLE EXPIRATION: NORMAL
BLOOD GROUP ANTIBODIES SERPL: NEGATIVE
BUN SERPL-MCNC: 33 MG/DL (ref 6–23)
CALCIUM SERPL-MCNC: 9.1 MG/DL (ref 8.6–10.2)
CHLORIDE SERPL-SCNC: 96 MMOL/L (ref 98–107)
CO2 SERPL-SCNC: 25 MMOL/L (ref 22–29)
CREAT SERPL-MCNC: 1.5 MG/DL (ref 0.7–1.2)
EOSINOPHIL # BLD: 0.18 K/UL (ref 0.05–0.5)
EOSINOPHILS RELATIVE PERCENT: 2 % (ref 0–6)
ERYTHROCYTE [DISTWIDTH] IN BLOOD BY AUTOMATED COUNT: 16.1 % (ref 11.5–15)
GFR, ESTIMATED: 45 ML/MIN/1.73M2
GLUCOSE SERPL-MCNC: 117 MG/DL (ref 74–99)
HCT VFR BLD AUTO: 25 % (ref 37–54)
HGB BLD-MCNC: 7.7 G/DL (ref 12.5–16.5)
INR PPP: 1.5
LYMPHOCYTES NFR BLD: 0.81 K/UL (ref 1.5–4)
LYMPHOCYTES RELATIVE PERCENT: 8 % (ref 20–42)
MCH RBC QN AUTO: 27.1 PG (ref 26–35)
MCHC RBC AUTO-ENTMCNC: 30.8 G/DL (ref 32–34.5)
MCV RBC AUTO: 88 FL (ref 80–99.9)
MONOCYTES NFR BLD: 0.45 K/UL (ref 0.1–0.95)
MONOCYTES NFR BLD: 4 % (ref 2–12)
NEUTROPHILS NFR BLD: 85 % (ref 43–80)
NEUTS SEG NFR BLD: 8.86 K/UL (ref 1.8–7.3)
PLATELET # BLD AUTO: 222 K/UL (ref 130–450)
PMV BLD AUTO: 9 FL (ref 7–12)
POTASSIUM SERPL-SCNC: 4.3 MMOL/L (ref 3.5–5)
PROT SERPL-MCNC: 7.2 G/DL (ref 6.4–8.3)
PROTHROMBIN TIME: 15.9 SEC (ref 9.3–12.4)
RBC # BLD AUTO: 2.84 M/UL (ref 3.8–5.8)
RBC # BLD: ABNORMAL 10*6/UL
SODIUM SERPL-SCNC: 135 MMOL/L (ref 132–146)
WBC OTHER # BLD: 10.4 K/UL (ref 4.5–11.5)

## 2025-03-24 PROCEDURE — 80053 COMPREHEN METABOLIC PANEL: CPT

## 2025-03-24 PROCEDURE — 86850 RBC ANTIBODY SCREEN: CPT

## 2025-03-24 PROCEDURE — 83550 IRON BINDING TEST: CPT

## 2025-03-24 PROCEDURE — 99283 EMERGENCY DEPT VISIT LOW MDM: CPT

## 2025-03-24 PROCEDURE — 86900 BLOOD TYPING SEROLOGIC ABO: CPT

## 2025-03-24 PROCEDURE — 82728 ASSAY OF FERRITIN: CPT

## 2025-03-24 PROCEDURE — 85025 COMPLETE CBC W/AUTO DIFF WBC: CPT

## 2025-03-24 PROCEDURE — 85610 PROTHROMBIN TIME: CPT

## 2025-03-24 PROCEDURE — 83540 ASSAY OF IRON: CPT

## 2025-03-24 PROCEDURE — 86901 BLOOD TYPING SEROLOGIC RH(D): CPT

## 2025-03-24 ASSESSMENT — PAIN - FUNCTIONAL ASSESSMENT: PAIN_FUNCTIONAL_ASSESSMENT: NONE - DENIES PAIN

## 2025-03-25 LAB
FERRITIN SERPL-MCNC: 93 NG/ML
IRON SATN MFR SERPL: 8 % (ref 20–55)
IRON SERPL-MCNC: 25 UG/DL (ref 59–158)
TIBC SERPL-MCNC: 321 UG/DL (ref 250–450)

## 2025-03-25 NOTE — ED PROVIDER NOTES
Pulse Respirations SpO2   (!) 124/50 -- -- 98.2 °F (36.8 °C) Oral 56 20 100 %      Height Weight         -- --                      Constitutional/General: Alert and oriented x3  Head: Normocephalic and atraumatic  Eyes: PERRL, EOMI, conjunctiva normal, sclera non icteric  ENT:  Oropharynx clear, handling secretions, no trismus, no asymmetry of the posterior oropharynx or uvular edema  Neck: Supple, full ROM, no stridor, no meningeal signs  Respiratory: Lungs clear to auscultation bilaterally, no wheezes, rales, or rhonchi. Not in respiratory distress  Cardiovascular:  Regular rate. Regular rhythm. No murmurs, no gallops, no rubs. 2+ distal pulses. Equal extremity pulses.   GI:  Abdomen Soft, Non tender, Non distended.  No rebound, guarding, or rigidity. No pulsatile masses.  Rectal: No masses.  No hemorrhoids.  Brown stool.  Hemoccult negative.  Chaperoned by Marilyn DELAROSA.  Musculoskeletal: Moves all extremities x 4. Warm and well perfused,  Integument: skin warm and dry. No rashes.   Neurologic: GCS 15, no focal deficits,Psychiatric: Normal Affect            DIAGNOSTIC RESULTS   LABS:    Labs Reviewed   CBC WITH AUTO DIFFERENTIAL - Abnormal; Notable for the following components:       Result Value    RBC 2.84 (*)     Hemoglobin 7.7 (*)     Hematocrit 25.0 (*)     MCHC 30.8 (*)     RDW 16.1 (*)     Neutrophils % 85 (*)     Lymphocytes % 8 (*)     Neutrophils Absolute 8.86 (*)     Lymphocytes Absolute 0.81 (*)     All other components within normal limits   COMPREHENSIVE METABOLIC PANEL - Abnormal; Notable for the following components:    Chloride 96 (*)     Glucose 117 (*)     BUN 33 (*)     Creatinine 1.5 (*)     Est, Glom Filt Rate 45 (*)     All other components within normal limits   PROTIME-INR - Abnormal; Notable for the following components:    Protime 15.9 (*)     All other components within normal limits   TYPE AND SCREEN       As interpreted by me, the above displayed labs are abnormal. All other labs

## 2025-03-27 ENCOUNTER — OUTSIDE SERVICES (OUTPATIENT)
Dept: PRIMARY CARE CLINIC | Age: 89
End: 2025-03-27

## 2025-03-27 DIAGNOSIS — Z91.81 AT MAXIMUM RISK FOR FALL: ICD-10-CM

## 2025-03-27 DIAGNOSIS — I21.4 NSTEMI (NON-ST ELEVATED MYOCARDIAL INFARCTION) (HCC): Primary | ICD-10-CM

## 2025-03-27 DIAGNOSIS — N18.9 ACUTE KIDNEY INJURY SUPERIMPOSED ON CKD: ICD-10-CM

## 2025-03-27 DIAGNOSIS — I50.23 ACUTE ON CHRONIC SYSTOLIC CONGESTIVE HEART FAILURE (HCC): ICD-10-CM

## 2025-03-27 DIAGNOSIS — N17.9 ACUTE KIDNEY INJURY SUPERIMPOSED ON CKD: ICD-10-CM

## 2025-03-27 DIAGNOSIS — D64.9 ANEMIA, UNSPECIFIED TYPE: ICD-10-CM

## 2025-03-27 DIAGNOSIS — R53.81 PHYSICAL DECONDITIONING: ICD-10-CM

## 2025-03-27 DIAGNOSIS — I25.10 CORONARY ARTERY DISEASE INVOLVING NATIVE CORONARY ARTERY OF NATIVE HEART WITHOUT ANGINA PECTORIS: ICD-10-CM

## 2025-03-28 ENCOUNTER — TELEPHONE (OUTPATIENT)
Dept: OTHER | Age: 89
End: 2025-03-28

## 2025-03-28 ENCOUNTER — HOSPITAL ENCOUNTER (OUTPATIENT)
Dept: OTHER | Age: 89
Setting detail: THERAPIES SERIES
Discharge: HOME | End: 2025-03-28
Payer: MEDICARE

## 2025-03-28 VITALS
RESPIRATION RATE: 18 BRPM | OXYGEN SATURATION: 94 % | HEART RATE: 60 BPM | BODY MASS INDEX: 25.02 KG/M2 | SYSTOLIC BLOOD PRESSURE: 104 MMHG | DIASTOLIC BLOOD PRESSURE: 45 MMHG | WEIGHT: 155 LBS

## 2025-03-28 LAB
ANION GAP SERPL CALCULATED.3IONS-SCNC: 10 MMOL/L (ref 7–16)
BNP SERPL-MCNC: ABNORMAL PG/ML (ref 0–450)
BUN SERPL-MCNC: 28 MG/DL (ref 6–23)
CALCIUM SERPL-MCNC: 8.8 MG/DL (ref 8.6–10.2)
CHLORIDE SERPL-SCNC: 97 MMOL/L (ref 98–107)
CO2 SERPL-SCNC: 24 MMOL/L (ref 22–29)
CREAT SERPL-MCNC: 1.5 MG/DL (ref 0.7–1.2)
GFR, ESTIMATED: 43 ML/MIN/1.73M2
GLUCOSE SERPL-MCNC: 141 MG/DL (ref 74–99)
POTASSIUM SERPL-SCNC: 3.8 MMOL/L (ref 3.5–5)
SODIUM SERPL-SCNC: 131 MMOL/L (ref 132–146)

## 2025-03-28 PROCEDURE — 99214 OFFICE O/P EST MOD 30 MIN: CPT

## 2025-03-28 PROCEDURE — 83880 ASSAY OF NATRIURETIC PEPTIDE: CPT

## 2025-03-28 PROCEDURE — 36415 COLL VENOUS BLD VENIPUNCTURE: CPT

## 2025-03-28 PROCEDURE — 80048 BASIC METABOLIC PNL TOTAL CA: CPT

## 2025-03-28 ASSESSMENT — PATIENT HEALTH QUESTIONNAIRE - PHQ9
2. FEELING DOWN, DEPRESSED OR HOPELESS: NOT AT ALL
SUM OF ALL RESPONSES TO PHQ QUESTIONS 1-9: 0
1. LITTLE INTEREST OR PLEASURE IN DOING THINGS: NOT AT ALL
SUM OF ALL RESPONSES TO PHQ QUESTIONS 1-9: 0

## 2025-03-28 NOTE — PROGRESS NOTES
Congestive Heart Failure Clinic   [facility]      Referring Provider: Dr. Kolb     Primary Care Physician: Romaine Arnold MD   Cardiologist: Dr. Lyon  Nephrologist: Dr Phoenix      HISTORY OF PRESENT ILLNESS:     Tashi Parry is a 89 y.o. (7/18/1931) male with a history of HFmrEF(EF 41%-49%)  Pre Cupid:     Lab Results   Component Value Date    LVEF 55 02/14/2025     Post Cupid:    Lab Results   Component Value Date    EFBP 37 (A) 03/13/2025         He presents to the CHF clinic for ongoing evaluation and monitoring of heart failure.    In the CHF clinic today he denies any adverse symptoms except:  [] Dizziness or lightheadedness   [] Syncope or near syncope  [] Recent Fall  [] Shortness of breath at rest   [] Dyspnea with exertion recovers with rest  [] Decline in functional capacity (unable to perform activities they had previously been able to do)  [] Fatigue   [] Orthopnea  [] PND  [] Nocturnal cough  [] Hemoptysis  [x] Chest pain, pressure, or discomfort chest pressure with exertion  [] Palpitations  [] Abdominal distention  [] Abdominal bloating  [] Early satiety  [] Blood in stool   [] Diarrhea  [] Constipation  [] Nausea/Vomiting  [] Decreased urinary response to oral diuretic   [] Scrotal swelling   [] Lower extremity edema  [] Used PRN doses of oral diuretic   [] Weight gain    Wt Readings from Last 3 Encounters:   03/28/25 70.3 kg (155 lb)   03/24/25 66.7 kg (147 lb)   03/18/25 67 kg (147 lb 11.3 oz)           SOCIAL HISTORY:  [x] Denies tobacco, alcohol or illicit drug abuse  [] Tobacco use:  [] ETOH use:  [] Illicit drug use:        MEDICATIONS:    Allergies   Allergen Reactions    Bactrim [Sulfamethoxazole-Trimethoprim] Other (See Comments)     INSOMNIA    Biaxin [Clarithromycin] Rash    Cipro [Ciprofloxacin Hcl] Other (See Comments)     UNKNOWN REACTION, NOT LISTED ON FACILITY PAPERWORK.     Iodinated Contrast Media Swelling and Rash

## 2025-03-28 NOTE — PLAN OF CARE
Problem: Chronic Conditions and Co-morbidities  Goal: Patient's chronic conditions and co-morbidity symptoms are monitored and maintained or improved  3/28/2025 1419 by Damaris Yang RN  Outcome: Progressing

## 2025-03-28 NOTE — TELEPHONE ENCOUNTER
4:34 PM spoke with Mitchel at [facility] regarding labs and medication adjustment reviewed by Yulisa GRANGER        Labs and CHF clinic note reviewed  Vitals: 104/45, 60     Stop Coreg to allow increased BP room   Start Toprol 25 mg daily   Increase Hydralazine 25 mg TID     BP/HR check in 1 week         Orders faxed to Mitchel at facility.

## 2025-03-28 NOTE — RESULT ENCOUNTER NOTE
Labs and CHF clinic note reviewed  Vitals: 104/45, 60    Stop Coreg to allow increased BP room   Start Toprol 25 mg daily   Increase Hydralazine 25 mg TID    BP/HR check in 1 week     Thank you

## 2025-03-29 ENCOUNTER — HOSPITAL ENCOUNTER (OUTPATIENT)
Age: 89
Discharge: HOME | End: 2025-03-29
Payer: MEDICARE

## 2025-03-29 ENCOUNTER — HOSPITAL ENCOUNTER (EMERGENCY)
Age: 89
Discharge: NURSING, REHAB OR LONG-TERM CARE | End: 2025-03-29
Attending: EMERGENCY MEDICINE
Payer: MEDICARE

## 2025-03-29 VITALS
TEMPERATURE: 97.3 F | BODY MASS INDEX: 24.33 KG/M2 | HEART RATE: 51 BPM | HEIGHT: 67 IN | WEIGHT: 155 LBS | RESPIRATION RATE: 15 BRPM | DIASTOLIC BLOOD PRESSURE: 63 MMHG | OXYGEN SATURATION: 100 % | SYSTOLIC BLOOD PRESSURE: 107 MMHG

## 2025-03-29 DIAGNOSIS — D64.9 ANEMIA, UNSPECIFIED TYPE: Primary | ICD-10-CM

## 2025-03-29 LAB
BASOPHILS # BLD: 0.09 K/UL (ref 0–0.2)
BASOPHILS NFR BLD: 1 % (ref 0–2)
EOSINOPHIL # BLD: 0.27 K/UL (ref 0.05–0.5)
EOSINOPHILS RELATIVE PERCENT: 4 % (ref 0–6)
ERYTHROCYTE [DISTWIDTH] IN BLOOD BY AUTOMATED COUNT: 15.9 % (ref 11.5–15)
ERYTHROCYTE [DISTWIDTH] IN BLOOD BY AUTOMATED COUNT: 16.2 % (ref 11.5–15)
HCT VFR BLD AUTO: 21 % (ref 37–54)
HCT VFR BLD AUTO: 22.8 % (ref 37–54)
HGB BLD-MCNC: 6.7 G/DL (ref 12.5–16.5)
HGB BLD-MCNC: 6.8 G/DL (ref 12.5–16.5)
IMM GRANULOCYTES # BLD AUTO: <0.03 K/UL (ref 0–0.58)
IMM GRANULOCYTES NFR BLD: 0 % (ref 0–5)
LYMPHOCYTES NFR BLD: 0.93 K/UL (ref 1.5–4)
LYMPHOCYTES RELATIVE PERCENT: 14 % (ref 20–42)
MCH RBC QN AUTO: 26.2 PG (ref 26–35)
MCH RBC QN AUTO: 26.6 PG (ref 26–35)
MCHC RBC AUTO-ENTMCNC: 29.8 G/DL (ref 32–34.5)
MCHC RBC AUTO-ENTMCNC: 31.9 G/DL (ref 32–34.5)
MCV RBC AUTO: 83.3 FL (ref 80–99.9)
MCV RBC AUTO: 87.7 FL (ref 80–99.9)
MONOCYTES NFR BLD: 0.91 K/UL (ref 0.1–0.95)
MONOCYTES NFR BLD: 13 % (ref 2–12)
NEUTROPHILS NFR BLD: 68 % (ref 43–80)
NEUTS SEG NFR BLD: 4.63 K/UL (ref 1.8–7.3)
PLATELET # BLD AUTO: 251 K/UL (ref 130–450)
PLATELET # BLD AUTO: 254 K/UL (ref 130–450)
PMV BLD AUTO: 9.2 FL (ref 7–12)
PMV BLD AUTO: 9.3 FL (ref 7–12)
RBC # BLD AUTO: 2.52 M/UL (ref 3.8–5.8)
RBC # BLD AUTO: 2.6 M/UL (ref 3.8–5.8)
WBC OTHER # BLD: 6.9 K/UL (ref 4.5–11.5)
WBC OTHER # BLD: 7.2 K/UL (ref 4.5–11.5)

## 2025-03-29 PROCEDURE — 36415 COLL VENOUS BLD VENIPUNCTURE: CPT

## 2025-03-29 PROCEDURE — 86900 BLOOD TYPING SEROLOGIC ABO: CPT

## 2025-03-29 PROCEDURE — P9016 RBC LEUKOCYTES REDUCED: HCPCS

## 2025-03-29 PROCEDURE — 36430 TRANSFUSION BLD/BLD COMPNT: CPT

## 2025-03-29 PROCEDURE — 86901 BLOOD TYPING SEROLOGIC RH(D): CPT

## 2025-03-29 PROCEDURE — 85025 COMPLETE CBC W/AUTO DIFF WBC: CPT

## 2025-03-29 PROCEDURE — 86923 COMPATIBILITY TEST ELECTRIC: CPT

## 2025-03-29 PROCEDURE — 86850 RBC ANTIBODY SCREEN: CPT

## 2025-03-29 PROCEDURE — 85027 COMPLETE CBC AUTOMATED: CPT

## 2025-03-29 PROCEDURE — 99285 EMERGENCY DEPT VISIT HI MDM: CPT

## 2025-03-29 RX ORDER — SODIUM CHLORIDE 9 MG/ML
INJECTION, SOLUTION INTRAVENOUS PRN
Status: DISCONTINUED | OUTPATIENT
Start: 2025-03-29 | End: 2025-03-29 | Stop reason: HOSPADM

## 2025-03-29 ASSESSMENT — PAIN - FUNCTIONAL ASSESSMENT
PAIN_FUNCTIONAL_ASSESSMENT: NONE - DENIES PAIN
PAIN_FUNCTIONAL_ASSESSMENT: NONE - DENIES PAIN
PAIN_FUNCTIONAL_ASSESSMENT: 0-10
PAIN_FUNCTIONAL_ASSESSMENT: NONE - DENIES PAIN
PAIN_FUNCTIONAL_ASSESSMENT: NONE - DENIES PAIN

## 2025-03-29 ASSESSMENT — PAIN SCALES - GENERAL: PAINLEVEL_OUTOF10: 0

## 2025-03-29 NOTE — ED PROVIDER NOTES
[facility] EMERGENCY DEPARTMENT  EMERGENCY DEPARTMENT ENCOUNTER        Pt Name: Tashi Parry  MRN: [id number]  Birthdate 7/18/1931  Date of evaluation: 3/29/2025  Provider: Loida Contreras DO  PCP: Romaine Arnold MD  Note Started: 1:40 PM EDT 3/29/25    CHIEF COMPLAINT       Chief Complaint   Patient presents with    low hgb     Sent from assisted living for blood transfusion    Abnormal Lab       HISTORY OF PRESENT ILLNESS: 1 or more Elements   History From: Patient    Limitations to history : None  Social Determinants : None    Tashi Parry is a 89 y.o. male who presents for low hemoglobin.  Patient has history of low hemoglobin.  He states that it is been low for the last few months.  They have not had to transfuse him yet.  He denies any black-colored stools or bleeding from anywhere.  He denies any history of bleeding disorders.  He is on Eliquis for A-fib and an MI that he had earlier this month.  He denies any chest pain or shortness of breath.  He has had multiple occult stool test from his hemoglobin being low and they have been negative every time.  Denies any fever, chills, n/v, headache, dizziness, vision changes, neck tenderness or stiffness, weakness, palpitations, leg swelling/tenderness, cough, abd pain, dysuria, hematuria, diarrhea, constipation, bloody stools.    Nursing Notes were all reviewed and agreed with or any disagreements were addressed in the HPI.    ROS:   Pertinent positives and negatives are stated within HPI, all other systems reviewed and are negative.      --------------------------------------------- PAST HISTORY ---------------------------------------------  Past Medical History:  has a past medical history of Acute HFrEF (heart failure with reduced ejection fraction) ([facility]), Acute kidney injury superimposed on chronic kidney disease, Acute on chronic congestive heart failure (HCC), BPH (benign prostatic hyperplasia), CAD (coronary artery    Physical exam remarkable for clear breath sounds bilaterally, regular heart rate and rhythm  Ddx: Anemia, acute blood loss  Labs interpreted by me: CBC showed hemoglobin of 6.8, WBC within normal range.    Patient administered 1 unit PRBCs.  Patient was otherwise well-appearing and had stable vitals.  Due to patient's recent rectal exams being negative for occult blood, another rectal was not performed at this time as patient was not having any black-colored stools.  Due to his reassuring exam and vitals, he will be discharged home for outpatient management and follow-up. He is educated on signs and symptoms that require emergent evaluation. Pt is advised to return to the ED if his symptoms change or worsen. If his pain persists, pt may need further evaluation. Pt is agreeable to plan and all questions have been answered at this time.        Please see ED course for more details:    ED Course as of 03/29/25 2014   Sat Mar 29, 2025   1641 I reviewed the patient's chart.  Patient mid on 3/13/2025 for NSTEMI.  Patient was on IV heparin.  Family did not want aggressive intervention.  Patient was noted to have anemia during that admission. [JA]   1712 Patient is a 89-year-old male presenting with anemia found on outpatient labs.  Patient denies any black or bloody stools or any evidence of active bleeding.  Apparently he was seen in the ED recently for the same complaints.  He has had 2 negative Hemoccult test recently.  He denies any fevers, chest pain, or shortness of breath.  He is chronically on oxygen with no increasing oxygen requirements.  Patient is DNR-CC.    PHYSICAL EXAM:  Constitutional/General: Alert and oriented x3, non toxic in NAD  Head: Normocephalic and atraumatic  Mouth: Oropharynx clear, handling secretions, no trismus  Neck: Supple, full ROM,   Pulmonary: Lungs clear to auscultation bilaterally, no wheezes, rales, or rhonchi. Not in respiratory distress  Cardiovascular:  Regular rate and

## 2025-03-29 NOTE — ED NOTES
Blood consent form signed by patient, witnessed by this RN. Correct patient label placed on consent form and placed in patient chart.

## 2025-03-30 LAB
ABO/RH: NORMAL
ANTIBODY SCREEN: NEGATIVE
ARM BAND NUMBER: NORMAL
BLOOD BANK BLOOD PRODUCT EXPIRATION DATE: NORMAL
BLOOD BANK DISPENSE STATUS: NORMAL
BLOOD BANK ISBT PRODUCT BLOOD TYPE: 600
BLOOD BANK PRODUCT CODE: NORMAL
BLOOD BANK SAMPLE EXPIRATION: NORMAL
BLOOD BANK UNIT TYPE AND RH: NORMAL
BPU ID: NORMAL
COMPONENT: NORMAL
CROSSMATCH RESULT: NORMAL
TRANSFUSION STATUS: NORMAL
UNIT DIVISION: 0
UNIT ISSUE DATE/TIME: NORMAL

## 2025-03-31 DIAGNOSIS — I50.9 ACUTE DECOMPENSATED HEART FAILURE (HCC): Primary | ICD-10-CM

## 2025-03-31 DIAGNOSIS — D64.9 ANEMIA, UNSPECIFIED TYPE: ICD-10-CM

## 2025-04-02 ENCOUNTER — HOSPITAL ENCOUNTER (EMERGENCY)
Age: 89
Discharge: HOME | End: 2025-04-02
Attending: EMERGENCY MEDICINE
Payer: MEDICARE

## 2025-04-02 ENCOUNTER — APPOINTMENT (OUTPATIENT)
Dept: GENERAL RADIOLOGY | Age: 89
End: 2025-04-02
Payer: MEDICARE

## 2025-04-02 VITALS
HEART RATE: 58 BPM | OXYGEN SATURATION: 100 % | RESPIRATION RATE: 16 BRPM | BODY MASS INDEX: 24.33 KG/M2 | DIASTOLIC BLOOD PRESSURE: 62 MMHG | SYSTOLIC BLOOD PRESSURE: 141 MMHG | TEMPERATURE: 97.5 F | WEIGHT: 155 LBS | HEIGHT: 67 IN

## 2025-04-02 DIAGNOSIS — R53.1 GENERALIZED WEAKNESS: Primary | ICD-10-CM

## 2025-04-02 DIAGNOSIS — D64.9 CHRONIC ANEMIA: ICD-10-CM

## 2025-04-02 LAB
ABO + RH BLD: NORMAL
ALBUMIN SERPL-MCNC: 3.6 G/DL (ref 3.5–5.2)
ALP SERPL-CCNC: 81 U/L (ref 40–129)
ALT SERPL-CCNC: 17 U/L (ref 0–40)
ANION GAP SERPL CALCULATED.3IONS-SCNC: 11 MMOL/L (ref 7–16)
ARM BAND NUMBER: NORMAL
AST SERPL-CCNC: 18 U/L (ref 0–39)
BASOPHILS # BLD: 0.08 K/UL (ref 0–0.2)
BASOPHILS NFR BLD: 1 % (ref 0–2)
BILIRUB DIRECT SERPL-MCNC: <0.2 MG/DL (ref 0–0.3)
BILIRUB INDIRECT SERPL-MCNC: NORMAL MG/DL (ref 0–1)
BILIRUB SERPL-MCNC: 0.6 MG/DL (ref 0–1.2)
BLOOD BANK SAMPLE EXPIRATION: NORMAL
BLOOD GROUP ANTIBODIES SERPL: NEGATIVE
BNP SERPL-MCNC: ABNORMAL PG/ML (ref 0–450)
BUN SERPL-MCNC: 34 MG/DL (ref 6–23)
CALCIUM SERPL-MCNC: 8.7 MG/DL (ref 8.6–10.2)
CHLORIDE SERPL-SCNC: 98 MMOL/L (ref 98–107)
CO2 SERPL-SCNC: 24 MMOL/L (ref 22–29)
CREAT SERPL-MCNC: 1.6 MG/DL (ref 0.7–1.2)
EOSINOPHIL # BLD: 0.33 K/UL (ref 0.05–0.5)
EOSINOPHILS RELATIVE PERCENT: 5 % (ref 0–6)
ERYTHROCYTE [DISTWIDTH] IN BLOOD BY AUTOMATED COUNT: 16.6 % (ref 11.5–15)
GFR, ESTIMATED: 41 ML/MIN/1.73M2
GLUCOSE SERPL-MCNC: 121 MG/DL (ref 74–99)
HCT VFR BLD AUTO: 23.9 % (ref 37–54)
HGB BLD-MCNC: 7.4 G/DL (ref 12.5–16.5)
IMM GRANULOCYTES # BLD AUTO: 0.03 K/UL (ref 0–0.58)
IMM GRANULOCYTES NFR BLD: 0 % (ref 0–5)
INR PPP: 1.5
LYMPHOCYTES NFR BLD: 0.84 K/UL (ref 1.5–4)
LYMPHOCYTES RELATIVE PERCENT: 12 % (ref 20–42)
MCH RBC QN AUTO: 26.9 PG (ref 26–35)
MCHC RBC AUTO-ENTMCNC: 31 G/DL (ref 32–34.5)
MCV RBC AUTO: 86.9 FL (ref 80–99.9)
MONOCYTES NFR BLD: 0.82 K/UL (ref 0.1–0.95)
MONOCYTES NFR BLD: 11 % (ref 2–12)
NEUTROPHILS NFR BLD: 71 % (ref 43–80)
NEUTS SEG NFR BLD: 5.07 K/UL (ref 1.8–7.3)
PARTIAL THROMBOPLASTIN TIME: 35.8 SEC (ref 24.5–35.1)
PLATELET # BLD AUTO: 258 K/UL (ref 130–450)
PMV BLD AUTO: 9.2 FL (ref 7–12)
POTASSIUM SERPL-SCNC: 4.3 MMOL/L (ref 3.5–5)
PROT SERPL-MCNC: 6.6 G/DL (ref 6.4–8.3)
PROTHROMBIN TIME: 16.2 SEC (ref 9.3–12.4)
RBC # BLD AUTO: 2.75 M/UL (ref 3.8–5.8)
SODIUM SERPL-SCNC: 133 MMOL/L (ref 132–146)
TROPONIN I SERPL HS-MCNC: 69 NG/L (ref 0–11)
TROPONIN I SERPL HS-MCNC: 73 NG/L (ref 0–11)
WBC OTHER # BLD: 7.2 K/UL (ref 4.5–11.5)

## 2025-04-02 PROCEDURE — 84484 ASSAY OF TROPONIN QUANT: CPT

## 2025-04-02 PROCEDURE — 86850 RBC ANTIBODY SCREEN: CPT

## 2025-04-02 PROCEDURE — 93005 ELECTROCARDIOGRAM TRACING: CPT | Performed by: EMERGENCY MEDICINE

## 2025-04-02 PROCEDURE — 85025 COMPLETE CBC W/AUTO DIFF WBC: CPT

## 2025-04-02 PROCEDURE — 86901 BLOOD TYPING SEROLOGIC RH(D): CPT

## 2025-04-02 PROCEDURE — 71045 X-RAY EXAM CHEST 1 VIEW: CPT

## 2025-04-02 PROCEDURE — 80053 COMPREHEN METABOLIC PANEL: CPT

## 2025-04-02 PROCEDURE — 85730 THROMBOPLASTIN TIME PARTIAL: CPT

## 2025-04-02 PROCEDURE — 85610 PROTHROMBIN TIME: CPT

## 2025-04-02 PROCEDURE — 83880 ASSAY OF NATRIURETIC PEPTIDE: CPT

## 2025-04-02 PROCEDURE — 82248 BILIRUBIN DIRECT: CPT

## 2025-04-02 PROCEDURE — 99285 EMERGENCY DEPT VISIT HI MDM: CPT

## 2025-04-02 PROCEDURE — 86900 BLOOD TYPING SEROLOGIC ABO: CPT

## 2025-04-02 ASSESSMENT — ENCOUNTER SYMPTOMS
EYE PAIN: 0
COUGH: 0
SINUS PRESSURE: 0
WHEEZING: 0
DIARRHEA: 0
BACK PAIN: 0
EYE REDNESS: 0
ABDOMINAL PAIN: 0
EYE DISCHARGE: 0
SORE THROAT: 0
SHORTNESS OF BREATH: 1
NAUSEA: 0
VOMITING: 0

## 2025-04-02 ASSESSMENT — PAIN SCALES - GENERAL: PAINLEVEL_OUTOF10: 0

## 2025-04-02 ASSESSMENT — PAIN - FUNCTIONAL ASSESSMENT: PAIN_FUNCTIONAL_ASSESSMENT: 0-10

## 2025-04-02 NOTE — DISCHARGE INSTR - COC
Continuity of Care Form    Patient Name: Tashi Parry   :  1931  MRN:  [id number]    Admit date:  2025  Discharge date:  ***    Code Status Order: Prior   Advance Directives:     Admitting Physician:  No admitting provider for patient encounter.  PCP: Romaine Arnold MD    Discharging Nurse: ***  Discharging Hospital Unit/Room#: DISPO/D01  Discharging Unit Phone Number: ***    Emergency Contact:   Extended Emergency Contact Information  Primary Emergency Contact: Mary Kate Leahy  Address: [address] [place] [address] [place]  Mobile Phone: [phone]  Relation: Child  Preferred language: English   needed? No  Secondary Emergency Contact: Art Hicks  Address: [address] [place] [address] [place]  Mobile Phone: [phone]  Relation: Step Child    Past Surgical History:  Past Surgical History:   Procedure Laterality Date    CARDIAC SURGERY      6 vessel in     DENTAL SURGERY      root canal    DIAGNOSTIC CARDIAC CATH LAB PROCEDURE  00    LUNG REMOVAL, PARTIAL Left     march    OTHER SURGICAL HISTORY  2011    Injection right hip  ADRIANNE Doan MD    PROSTATE SURGERY Bilateral 2019    THORACOSCOPY Left 2019    BRONCHOSCOPY LEFT THORACOSCOPY ROBOTIC VIDEO ASSISTED , WEDGE RESECTION, POSS. LEFT LOWER LOBECTOMY performed by Ortiz Pedroza MD at [facility] [place]    TOTAL HIP ARTHROPLASTY Right 2011    Right BRIONNA  ADRIANNE Doan MD    TOTAL HIP ARTHROPLASTY Left 2009    Left BRIONNA  ADRIANNE Doan MD    TURP      TURP N/A 2019    CYSTOSCOPY, RETROGRADE PYELOGRAM,  URERTHRAL DILITATION, TRANSURETHRAL RESECTION PROSTATE performed by Carter Hale MD at [facility] [place]       Immunization History:   Immunization History   Administered Date(s) Administered    COVID-19, PFIZER GRAY top, DO NOT Dilute, (age 12 y+), IM, 30 mcg/0.3 mL 2022    COVID-19, PFIZER PURPLE top, DILUTE for use, (age 12 y+), 30mcg/0.3mL 2021,

## 2025-04-02 NOTE — ED PROVIDER NOTES
.  89-year-old male presents to the emergency department with fatigue starting this morning patient had a recent blood transfusion did not have a recheck of the blood at that time patient has progressed to worsening shortness of breath with exertion and fatigue over the last couple days wife brought him in today for further evaluation the patient is on Eliquis for anticoagulation he states no known rectal bleeding no abdominal pain or other complaints at this time    The history is provided by the patient.   Fatigue  Severity:  Moderate  Onset quality:  Gradual  Duration:  1 day  Timing:  Intermittent  Progression:  Waxing and waning  Chronicity:  New  Relieved by:  Nothing  Worsened by:  Nothing  Ineffective treatments:  None tried  Associated symptoms: shortness of breath    Associated symptoms: no abdominal pain, no arthralgias, no chest pain, no cough, no diarrhea, no dysuria, no fever, no frequency, no headaches, no nausea and no vomiting    Risk factors: anemia         Review of Systems   Constitutional:  Positive for fatigue. Negative for chills and fever.   HENT:  Negative for ear pain, sinus pressure and sore throat.    Eyes:  Negative for pain, discharge and redness.   Respiratory:  Positive for shortness of breath. Negative for cough and wheezing.    Cardiovascular:  Negative for chest pain.   Gastrointestinal:  Negative for abdominal pain, diarrhea, nausea and vomiting.   Genitourinary:  Negative for dysuria and frequency.   Musculoskeletal:  Negative for arthralgias and back pain.   Skin:  Negative for rash and wound.   Neurological:  Negative for weakness and headaches.   Hematological:  Negative for adenopathy.   All other systems reviewed and are negative.       Physical Exam  Constitutional:       Appearance: Normal appearance.   HENT:      Head: Normocephalic and atraumatic.      Nose: Nose normal.      Mouth/Throat:      Mouth: Mucous membranes are moist.   Eyes:      Extraocular Movements:

## 2025-04-02 NOTE — ED NOTES
Pt standing and moving, uses walker to walk has O2 at home, unable to fully ambulate at baseline without walker   Sats 97 % when moving

## 2025-04-03 ASSESSMENT — ENCOUNTER SYMPTOMS
RHINORRHEA: 0
WHEEZING: 0
DIARRHEA: 0
SORE THROAT: 0
VOMITING: 0
SHORTNESS OF BREATH: 0
BACK PAIN: 0
COUGH: 0
PHOTOPHOBIA: 0
CONSTIPATION: 0
ABDOMINAL PAIN: 0

## 2025-04-03 NOTE — PROGRESS NOTES
Value Date    HDL 63 03/14/2025    HDL 43 11/14/2024    HDL 69 12/08/2020     No components found for: \"LDLCHOLESTEROL\", \"LDLCALC\"  Lab Results   Component Value Date    VLDL 13 03/14/2025    VLDL 16 11/14/2024    VLDL 14 12/08/2020     No results found for: \"CHOLHDLRATIO\"  Lab Results   Component Value Date    TSH 4.58 (H) 02/12/2025            Assessment & Plan     1. NSTEMI (non-ST elevated myocardial infarction) (HCC)  2. Acute on chronic systolic congestive heart failure (HCC)  3. Acute kidney injury superimposed on CKD  4. Coronary artery disease involving native coronary artery of native heart without angina pectoris  5. Anemia, unspecified type  6. At maximum risk for fall  7. Physical deconditioning           Seen today for weekly skilled assessment  Family needs to decide on hgb going forward. Hospice vs transfusion. Pt might be discharging back to [place]. Regardless will need repeat CBC at SNF tomorrow or is he discharges, [facility] needs to follow up on hgb  Continue to follow at CHF clinic   Follow up w cardiology: Dr Lyon  Follow up w pulmonology   HTN/CHF: continue amlodipine, carvedilol, bumex and hydralazine  Monitor cr   Chart reviewed: Continue with orders per chart in point click care  Labs: Collect weekly CBC and CMP x3 weeks from admission then monthly.  Continue with therapies as tolerated  Continue with weekly skilled assessment  Continue with discharge planning  Acute medical concerns provider on-call     Over 30 min was spent between patient care, chart review, discussing patient management with nursing staff and interpreting diagnostics      An electronic signature was used to authenticate this note.    --Nato Barrera PA-C   This office note has been dictated.

## 2025-04-04 LAB
EKG ATRIAL RATE: 53 BPM
EKG P AXIS: 87 DEGREES
EKG P-R INTERVAL: 198 MS
EKG Q-T INTERVAL: 448 MS
EKG QRS DURATION: 130 MS
EKG QTC CALCULATION (BAZETT): 420 MS
EKG R AXIS: -54 DEGREES
EKG T AXIS: 121 DEGREES
EKG VENTRICULAR RATE: 53 BPM

## 2025-04-04 PROCEDURE — 93010 ELECTROCARDIOGRAM REPORT: CPT | Performed by: INTERNAL MEDICINE

## (undated) DEVICE — GARMENT,MEDLINE,DVT,INT,CALF,MED, GEN2: Brand: MEDLINE

## (undated) DEVICE — DRAPE THER FLUID WARMING 66X44 IN FLAT SLUSH DBL DISC ORS

## (undated) DEVICE — SPECIMEN CUP W/LID: Brand: DEROYAL

## (undated) DEVICE — GLOVE ORANGE PI 7 1/2   MSG9075

## (undated) DEVICE — SKIN AFFIX SURG ADHESIVE 72/CS 0.55ML: Brand: MEDLINE

## (undated) DEVICE — CYSTO PACK: Brand: MEDLINE INDUSTRIES, INC.

## (undated) DEVICE — STAPLER 45 RELOAD: Brand: ENDOWRIST

## (undated) DEVICE — NEEDLE INSUF L120MM DIA2MM DISP FOR PNEUMOPERI ENDOPATH

## (undated) DEVICE — NEEDLE SPNL 20GA L3.5IN YEL HUB S STL REG WALL FIT STYL W/

## (undated) DEVICE — ELECTRODE ELECSURG POWERBALL 24 FR MONOPOLAR FOR ELITE SYS

## (undated) DEVICE — SHEET,DRAPE,40X58,STERILE: Brand: MEDLINE

## (undated) DEVICE — BLADE CLIPPER GEN PURP NS

## (undated) DEVICE — CANISTER FOR THORACIC SUCTION SYSTEM: Brand: THOPAZ DISPOSABLE CANISTER 0.8L

## (undated) DEVICE — SCOPE DAVINCI XI 30 DEG W/CORD

## (undated) DEVICE — DAVINCI THORACIC INSTRUMENT SET

## (undated) DEVICE — GOWN,SIRUS,FABRNF,XL,20/CS: Brand: MEDLINE

## (undated) DEVICE — MARKER,SKIN,PREP-RESISTANT,NON-STERILE: Brand: MEDLINE

## (undated) DEVICE — STANDARD HYPODERMIC NEEDLE,ALUMINUM HUB: Brand: MONOJECT

## (undated) DEVICE — STAPLER 60 RELOAD GREEN: Brand: SUREFORM

## (undated) DEVICE — PATIENT RETURN ELECTRODE, SINGLE-USE, CONTACT QUALITY MONITORING, ADULT, WITH 9FT CORD, FOR PATIENTS WEIGING OVER 33LBS. (15KG): Brand: MEGADYNE

## (undated) DEVICE — PAD,NON-ADHERENT,2X3,STERILE,LF,1/PK: Brand: MEDLINE

## (undated) DEVICE — LOOP VES W13MM THK09MM MINI RED SIL FLD REPELLENT

## (undated) DEVICE — CATHETER URET OLV TIP 5FRX70CM FLEXIMA

## (undated) DEVICE — ARM DRAPE

## (undated) DEVICE — EVACUATOR URO BLDR W/ ADPT UROVAC

## (undated) DEVICE — STERILE LATEX POWDER FREE SURGICAL GLOVES WITH HYDROGEL COATING: Brand: PROTEXIS

## (undated) DEVICE — SYRINGE,TOOMEY,IRRIGATION,70CC,STERILE: Brand: MEDLINE

## (undated) DEVICE — BLADELESS OBTURATOR: Brand: WECK VISTA

## (undated) DEVICE — Z CONVERTED USE 2275207 CLOTH PREP W7.5XL7.5IN 2% CHG SKIN ALC AND RNS FREE

## (undated) DEVICE — CANNULA SEAL

## (undated) DEVICE — [HIGH FLOW INSUFFLATOR,  DO NOT USE IF PACKAGE IS DAMAGED,  KEEP DRY,  KEEP AWAY FROM SUNLIGHT,  PROTECT FROM HEAT AND RADIOACTIVE SOURCES.]: Brand: PNEUMOSURE

## (undated) DEVICE — CATHETER URETH 22FR BLLN 30CC L16IN SIL 3 W F DISP

## (undated) DEVICE — EVACUATOR SURG GLS BODY M CONN TBNG AND BLB ELLIK

## (undated) DEVICE — CAMERA STRYKER 1488 HD GEN

## (undated) DEVICE — TOWEL,OR,DSP,ST,BLUE,STD,6/PK,12PK/CS: Brand: MEDLINE

## (undated) DEVICE — Device: Brand: LEVEL 1

## (undated) DEVICE — INTENDED FOR TISSUE SEPARATION, AND OTHER PROCEDURES THAT REQUIRE A SHARP SURGICAL BLADE TO PUNCTURE OR CUT.: Brand: BARD-PARKER ® STAINLESS STEEL BLADES

## (undated) DEVICE — MARYLAND BIPOLAR FORCEPS: Brand: ENDOWRIST

## (undated) DEVICE — SCOPE DAVINCI XI 0 DEG W/CORD

## (undated) DEVICE — STAPLER 30 RELOAD WHITE: Brand: ENDOWRIST

## (undated) DEVICE — 3M™ MEDIPORE™ + PAD 3564: Brand: 3M™ MEDIPORE™

## (undated) DEVICE — CATHETER URETH 24FR L16IN BLLN 30CC SIL COUVELAIRE TIP 3 W

## (undated) DEVICE — CHLORAPREP 26ML ORANGE

## (undated) DEVICE — CONTROL SYRINGE LUER-LOCK TIP: Brand: MONOJECT

## (undated) DEVICE — SOLUTION IV 1000ML 0.9% SOD CHL PH 5 INJ USP VIAFLX PLAS

## (undated) DEVICE — CATHETER THORACENTESIS STR 28 FRX23 IN 6 EYELET TAPR TIP LF

## (undated) DEVICE — STAPLER 60: Brand: SUREFORM

## (undated) DEVICE — Z DISCONTINUED NO SUB IDED BAG SPEC RETRV M C240ML MOUTH 7.3MM L17CM SHFT 10MM NYL EZEE

## (undated) DEVICE — POUCH, INSTRUMENT, 3POCKET, INVISISHIELD: Brand: MEDLINE

## (undated) DEVICE — SET INST DAVINCI ACCESSORIES

## (undated) DEVICE — STRAP,CATHETER,ADJBL FOAM,HOOK&LOOP: Brand: MEDLINE

## (undated) DEVICE — PAD,NON-ADHERENT,3X8,STERILE,LF,1/PK: Brand: MEDLINE

## (undated) DEVICE — SEAL

## (undated) DEVICE — ELECTRODE ES BPLR WIRE DISP SUPERLOOP

## (undated) DEVICE — SURGICAL PROCEDURE PACK BASIC

## (undated) DEVICE — ELECTRODE MPLR DIA26FR LOOP CUT ENDOSCP DISP CIRCON ACMI

## (undated) DEVICE — GLOVE SURG SZ 75 STD WHT LTX SYN POLYMER BEAD REINF ANTI RL

## (undated) DEVICE — READY WET SKIN SCRUB TRAY-LF: Brand: MEDLINE INDUSTRIES, INC.

## (undated) DEVICE — CABLE ENDOSCP L10FT ACT DISP

## (undated) DEVICE — ROBOTIC THORACIC EXTRAS

## (undated) DEVICE — CATHETER THOR 28FR L23CM DIA9.3MM POLYVI CHL TAPR CONN TIP

## (undated) DEVICE — DRIP REDUCTION MANIFOLD

## (undated) DEVICE — REDUCER: Brand: ENDOWRIST

## (undated) DEVICE — TUBING SUCT DBL M CONN SAMP PRT COMPATIBLE W/ 20-32FR

## (undated) DEVICE — STAPLER SHEATH: Brand: ENDOWRIST

## (undated) DEVICE — E-Z CLEAN, NON-STICK, PTFE COATED, ELECTROSURGICAL BLADE ELECTRODE, MODIFIED EXTENDED INSULATION, 2.5 INCH (6.35 CM): Brand: MEGADYNE

## (undated) DEVICE — SYRINGE MED 50ML LUERLOCK TIP

## (undated) DEVICE — ELECTRODE ES 36CM LAP FLAT L HK COAT DISP CLEANCOAT

## (undated) DEVICE — 3M™ STERI-DRAPE™ INCISE DRAPE 1050 (60CM X 45CM): Brand: STERI-DRAPE™

## (undated) DEVICE — HOSE CONN FOR WST MGMT SYS NEPTUNE 2

## (undated) DEVICE — SOLUTION IV IRRIG WATER 1000ML POUR BRL 2F7114

## (undated) DEVICE — GLOVE SURG SZ 7.5 L11.73IN FNGR THK9.8MIL STRW LTX POLYMER

## (undated) DEVICE — PUMP SUCT THOR THOPAZ

## (undated) DEVICE — 1071 S-DRP URO STLE-GAMA 10/BX,4X/C: Brand: STERI-DRAPE™

## (undated) DEVICE — PUMP SUC IRR TBNG L10FT W/ HNDPC ASSEMB STRYKEFLOW 2

## (undated) DEVICE — BAG SPEC RETRIEVALXL C2000ML MOUTH 14MM L27CM SHFT 15MM NYL

## (undated) DEVICE — TIP-UP FENESTRATED GRASPER: Brand: ENDOWRIST

## (undated) DEVICE — STAPLER 45: Brand: ENDOWRIST

## (undated) DEVICE — BAG DRNGE COMB PK

## (undated) DEVICE — SOLUTION IV IRRIG POUR BRL 0.9% SODIUM CHL 2F7124

## (undated) DEVICE — AGENT HEMSTAT W4XL8IN OXIDIZED REGENERATED CELOS ABSRB

## (undated) DEVICE — CADIERE FORCEPS: Brand: ENDOWRIST

## (undated) DEVICE — DRAINBAG,ANTI-REFLUX TOWER,L/F,2000ML,LL: Brand: MEDLINE

## (undated) DEVICE — ALCOHOL RUBBING ISO 16OZ 70%

## (undated) DEVICE — STANDARD HYPODERMIC NEEDLE,POLYPROPYLENE HUB: Brand: MONOJECT

## (undated) DEVICE — KIT SURG W7XL11IN 2 PKT UNTREATED NA

## (undated) DEVICE — GLOVE ORANGE PI 7   MSG9070

## (undated) DEVICE — TROCAR ENDOSCP L100MM DIA12MM BLDELSS OBT RADLUC STBL SL

## (undated) DEVICE — SYRINGE MED 20ML STD CLR PLAS LUERLOCK TIP N CTRL DISP

## (undated) DEVICE — CURITY FLEXIBLE ADHESIVE BANDAGE: Brand: CURITY

## (undated) DEVICE — Device

## (undated) DEVICE — PACK,UNIV, II AURORA: Brand: MEDLINE

## (undated) DEVICE — COLUMN DRAPE

## (undated) DEVICE — Z INACTIVE USE 2635504 SOLUTION IRRIG 3000ML 1.5% GL USP UROMATIC CONT

## (undated) DEVICE — GLOVE SURG SZ 65 THK91MIL LTX FREE SYN POLYISOPRENE

## (undated) DEVICE — LABEL MED 4 IN SURG PANEL W/ PEN STRL

## (undated) DEVICE — CANNULA STAPLER ENDOWRIST 12MM

## (undated) DEVICE — GOWN,SIRUS,FABRNF,L,20/CS: Brand: MEDLINE

## (undated) DEVICE — 1.5L THIN WALL CAN: Brand: CRD